# Patient Record
Sex: FEMALE | Race: BLACK OR AFRICAN AMERICAN | NOT HISPANIC OR LATINO | Employment: UNEMPLOYED | URBAN - METROPOLITAN AREA
[De-identification: names, ages, dates, MRNs, and addresses within clinical notes are randomized per-mention and may not be internally consistent; named-entity substitution may affect disease eponyms.]

---

## 2018-12-06 PROBLEM — E10.11 DIABETIC KETOACIDOSIS WITH COMA ASSOCIATED WITH TYPE 1 DIABETES MELLITUS: Status: ACTIVE | Noted: 2018-12-06

## 2018-12-07 PROBLEM — K92.0 HEMATEMESIS: Status: ACTIVE | Noted: 2018-12-07

## 2018-12-07 PROBLEM — Z71.3 ENCOUNTER FOR DIETARY CONSULTATION: Chronic | Status: ACTIVE | Noted: 2018-12-07

## 2018-12-13 PROBLEM — E86.0 DEHYDRATION: Status: ACTIVE | Noted: 2018-12-13

## 2018-12-21 ENCOUNTER — HOSPITAL ENCOUNTER (INPATIENT)
Facility: OTHER | Age: 28
LOS: 3 days | Discharge: HOME | DRG: 639 | End: 2018-12-24
Attending: EMERGENCY MEDICINE | Admitting: HOSPITALIST
Payer: MEDICAID

## 2018-12-21 DIAGNOSIS — E10.10 DIABETIC KETOACIDOSIS WITHOUT COMA ASSOCIATED WITH TYPE 1 DIABETES MELLITUS: ICD-10-CM

## 2018-12-21 DIAGNOSIS — F32.A DEPRESSION, UNSPECIFIED DEPRESSION TYPE: ICD-10-CM

## 2018-12-21 DIAGNOSIS — F99 PSYCHIATRIC DISORDER: ICD-10-CM

## 2018-12-21 DIAGNOSIS — E10.9 TYPE 1 DIABETES MELLITUS WITHOUT COMPLICATION: Primary | ICD-10-CM

## 2018-12-21 PROBLEM — E87.5 HYPERKALEMIA: Status: ACTIVE | Noted: 2018-12-21

## 2018-12-21 LAB
ALLENS TEST: ABNORMAL
ANION GAP SERPL CALC-SCNC: 16 MMOL/L
ANION GAP SERPL CALC-SCNC: 26 MMOL/L
B-HCG UR QL: NEGATIVE
B-OH-BUTYR BLD STRIP-SCNC: 7.8 MMOL/L
BACTERIA #/AREA URNS HPF: NORMAL /HPF
BASOPHILS # BLD AUTO: 0.01 K/UL
BASOPHILS NFR BLD: 0.2 %
BILIRUB UR QL STRIP: NEGATIVE
BUN SERPL-MCNC: 16 MG/DL
BUN SERPL-MCNC: 20 MG/DL
CALCIUM SERPL-MCNC: 8.4 MG/DL
CALCIUM SERPL-MCNC: 9.9 MG/DL
CHLORIDE SERPL-SCNC: 102 MMOL/L
CHLORIDE SERPL-SCNC: 116 MMOL/L
CLARITY UR: CLEAR
CO2 SERPL-SCNC: 11 MMOL/L
CO2 SERPL-SCNC: 12 MMOL/L
COLOR UR: YELLOW
CREAT SERPL-MCNC: 1 MG/DL
CREAT SERPL-MCNC: 1.2 MG/DL
CTP QC/QA: YES
DELSYS: ABNORMAL
DIFFERENTIAL METHOD: ABNORMAL
EOSINOPHIL # BLD AUTO: 0 K/UL
EOSINOPHIL NFR BLD: 0.2 %
ERYTHROCYTE [DISTWIDTH] IN BLOOD BY AUTOMATED COUNT: 15.7 %
EST. GFR  (AFRICAN AMERICAN): >60 ML/MIN/1.73 M^2
EST. GFR  (AFRICAN AMERICAN): >60 ML/MIN/1.73 M^2
EST. GFR  (NON AFRICAN AMERICAN): >60 ML/MIN/1.73 M^2
EST. GFR  (NON AFRICAN AMERICAN): >60 ML/MIN/1.73 M^2
GLUCOSE SERPL-MCNC: 202 MG/DL
GLUCOSE SERPL-MCNC: 433 MG/DL
GLUCOSE UR QL STRIP: ABNORMAL
HCO3 UR-SCNC: 11.9 MMOL/L (ref 24–28)
HCT VFR BLD AUTO: 34.7 %
HGB BLD-MCNC: 10.9 G/DL
HGB UR QL STRIP: NEGATIVE
KETONES UR QL STRIP: ABNORMAL
LEUKOCYTE ESTERASE UR QL STRIP: NEGATIVE
LYMPHOCYTES # BLD AUTO: 1.5 K/UL
LYMPHOCYTES NFR BLD: 27.2 %
MCH RBC QN AUTO: 26.7 PG
MCHC RBC AUTO-ENTMCNC: 31.4 G/DL
MCV RBC AUTO: 85 FL
MICROSCOPIC COMMENT: NORMAL
MONOCYTES # BLD AUTO: 0.3 K/UL
MONOCYTES NFR BLD: 5.6 %
NEUTROPHILS # BLD AUTO: 3.7 K/UL
NEUTROPHILS NFR BLD: 66.6 %
NITRITE UR QL STRIP: NEGATIVE
PCO2 BLDA: 26.3 MMHG (ref 35–45)
PH SMN: 7.26 [PH] (ref 7.35–7.45)
PH UR STRIP: 6 [PH] (ref 5–8)
PLATELET # BLD AUTO: 210 K/UL
PMV BLD AUTO: 12 FL
PO2 BLDA: 73 MMHG (ref 40–60)
POC BE: -15 MMOL/L
POC SATURATED O2: 93 % (ref 95–100)
POCT GLUCOSE: 175 MG/DL (ref 70–110)
POCT GLUCOSE: 178 MG/DL (ref 70–110)
POCT GLUCOSE: 187 MG/DL (ref 70–110)
POCT GLUCOSE: 208 MG/DL (ref 70–110)
POCT GLUCOSE: 263 MG/DL (ref 70–110)
POCT GLUCOSE: 369 MG/DL (ref 70–110)
POCT GLUCOSE: 394 MG/DL (ref 70–110)
POTASSIUM SERPL-SCNC: 5 MMOL/L
POTASSIUM SERPL-SCNC: 6 MMOL/L
PROT UR QL STRIP: NEGATIVE
RBC # BLD AUTO: 4.09 M/UL
SAMPLE: ABNORMAL
SITE: ABNORMAL
SODIUM SERPL-SCNC: 140 MMOL/L
SODIUM SERPL-SCNC: 143 MMOL/L
SP GR UR STRIP: 1.02 (ref 1–1.03)
SQUAMOUS #/AREA URNS HPF: 1 /HPF
URN SPEC COLLECT METH UR: ABNORMAL
UROBILINOGEN UR STRIP-ACNC: NEGATIVE EU/DL
WBC # BLD AUTO: 5.51 K/UL
YEAST URNS QL MICRO: NORMAL

## 2018-12-21 PROCEDURE — 63600175 PHARM REV CODE 636 W HCPCS: Performed by: PHYSICIAN ASSISTANT

## 2018-12-21 PROCEDURE — 82962 GLUCOSE BLOOD TEST: CPT

## 2018-12-21 PROCEDURE — 80048 BASIC METABOLIC PNL TOTAL CA: CPT | Mod: 91

## 2018-12-21 PROCEDURE — 94761 N-INVAS EAR/PLS OXIMETRY MLT: CPT

## 2018-12-21 PROCEDURE — 81025 URINE PREGNANCY TEST: CPT | Performed by: EMERGENCY MEDICINE

## 2018-12-21 PROCEDURE — 25000003 PHARM REV CODE 250: Performed by: NURSE PRACTITIONER

## 2018-12-21 PROCEDURE — 83036 HEMOGLOBIN GLYCOSYLATED A1C: CPT

## 2018-12-21 PROCEDURE — 82010 KETONE BODYS QUAN: CPT

## 2018-12-21 PROCEDURE — 85025 COMPLETE CBC W/AUTO DIFF WBC: CPT

## 2018-12-21 PROCEDURE — 81000 URINALYSIS NONAUTO W/SCOPE: CPT

## 2018-12-21 PROCEDURE — 82803 BLOOD GASES ANY COMBINATION: CPT

## 2018-12-21 PROCEDURE — 99291 CRITICAL CARE FIRST HOUR: CPT | Mod: 25

## 2018-12-21 PROCEDURE — 96365 THER/PROPH/DIAG IV INF INIT: CPT

## 2018-12-21 PROCEDURE — 25000003 PHARM REV CODE 250: Performed by: EMERGENCY MEDICINE

## 2018-12-21 PROCEDURE — 96375 TX/PRO/DX INJ NEW DRUG ADDON: CPT

## 2018-12-21 PROCEDURE — 96361 HYDRATE IV INFUSION ADD-ON: CPT

## 2018-12-21 PROCEDURE — 20000000 HC ICU ROOM

## 2018-12-21 PROCEDURE — 80048 BASIC METABOLIC PNL TOTAL CA: CPT

## 2018-12-21 PROCEDURE — 25000003 PHARM REV CODE 250: Performed by: PHYSICIAN ASSISTANT

## 2018-12-21 PROCEDURE — 36415 COLL VENOUS BLD VENIPUNCTURE: CPT

## 2018-12-21 PROCEDURE — 99223 1ST HOSP IP/OBS HIGH 75: CPT | Mod: ,,, | Performed by: NURSE PRACTITIONER

## 2018-12-21 RX ORDER — SODIUM CHLORIDE 9 MG/ML
1000 INJECTION, SOLUTION INTRAVENOUS
Status: COMPLETED | OUTPATIENT
Start: 2018-12-21 | End: 2018-12-21

## 2018-12-21 RX ORDER — SODIUM CHLORIDE AND POTASSIUM CHLORIDE 150; 900 MG/100ML; MG/100ML
INJECTION, SOLUTION INTRAVENOUS CONTINUOUS
Status: DISCONTINUED | OUTPATIENT
Start: 2018-12-21 | End: 2018-12-21

## 2018-12-21 RX ORDER — FLUOXETINE HYDROCHLORIDE 20 MG/1
20 CAPSULE ORAL DAILY
Status: DISCONTINUED | OUTPATIENT
Start: 2018-12-22 | End: 2018-12-24 | Stop reason: HOSPADM

## 2018-12-21 RX ORDER — DIVALPROEX SODIUM 250 MG/1
250 TABLET, FILM COATED, EXTENDED RELEASE ORAL DAILY
Status: DISCONTINUED | OUTPATIENT
Start: 2018-12-22 | End: 2018-12-24 | Stop reason: HOSPADM

## 2018-12-21 RX ORDER — ACETAMINOPHEN 325 MG/1
650 TABLET ORAL EVERY 4 HOURS PRN
Status: DISCONTINUED | OUTPATIENT
Start: 2018-12-21 | End: 2018-12-24 | Stop reason: HOSPADM

## 2018-12-21 RX ORDER — SODIUM CHLORIDE 9 MG/ML
INJECTION, SOLUTION INTRAVENOUS CONTINUOUS
Status: DISCONTINUED | OUTPATIENT
Start: 2018-12-21 | End: 2018-12-21

## 2018-12-21 RX ORDER — DIVALPROEX SODIUM 500 MG/1
500 TABLET, FILM COATED, EXTENDED RELEASE ORAL DAILY
Status: DISCONTINUED | OUTPATIENT
Start: 2018-12-22 | End: 2018-12-24 | Stop reason: HOSPADM

## 2018-12-21 RX ORDER — DEXTROSE MONOHYDRATE AND SODIUM CHLORIDE 5; .9 G/100ML; G/100ML
INJECTION, SOLUTION INTRAVENOUS CONTINUOUS
Status: DISCONTINUED | OUTPATIENT
Start: 2018-12-21 | End: 2018-12-22

## 2018-12-21 RX ORDER — SODIUM CHLORIDE 0.9 % (FLUSH) 0.9 %
5 SYRINGE (ML) INJECTION
Status: DISCONTINUED | OUTPATIENT
Start: 2018-12-21 | End: 2018-12-24 | Stop reason: HOSPADM

## 2018-12-21 RX ORDER — METOCLOPRAMIDE HYDROCHLORIDE 5 MG/ML
10 INJECTION INTRAMUSCULAR; INTRAVENOUS EVERY 6 HOURS PRN
Status: DISCONTINUED | OUTPATIENT
Start: 2018-12-21 | End: 2018-12-24 | Stop reason: HOSPADM

## 2018-12-21 RX ADMIN — SODIUM CHLORIDE 1000 ML: 0.9 INJECTION, SOLUTION INTRAVENOUS at 04:12

## 2018-12-21 RX ADMIN — SODIUM CHLORIDE 1000 ML: 0.9 INJECTION, SOLUTION INTRAVENOUS at 07:12

## 2018-12-21 RX ADMIN — PROMETHAZINE HYDROCHLORIDE 25 MG: 25 INJECTION INTRAMUSCULAR; INTRAVENOUS at 04:12

## 2018-12-21 RX ADMIN — SODIUM CHLORIDE 1000 ML: 0.9 INJECTION, SOLUTION INTRAVENOUS at 06:12

## 2018-12-21 RX ADMIN — DEXTROSE AND SODIUM CHLORIDE: 5; .9 INJECTION, SOLUTION INTRAVENOUS at 09:12

## 2018-12-21 RX ADMIN — SODIUM CHLORIDE 5 UNITS/HR: 9 INJECTION, SOLUTION INTRAVENOUS at 06:12
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
## 2018-12-22 LAB
ALBUMIN SERPL BCP-MCNC: 2.6 G/DL
ALP SERPL-CCNC: 66 U/L
ALT SERPL W/O P-5'-P-CCNC: 12 U/L
ANION GAP SERPL CALC-SCNC: 11 MMOL/L
ANION GAP SERPL CALC-SCNC: 11 MMOL/L
ANION GAP SERPL CALC-SCNC: 7 MMOL/L
AST SERPL-CCNC: 12 U/L
BASOPHILS # BLD AUTO: 0.03 K/UL
BASOPHILS NFR BLD: 0.6 %
BILIRUB SERPL-MCNC: 0.4 MG/DL
BUN SERPL-MCNC: 10 MG/DL
BUN SERPL-MCNC: 12 MG/DL
BUN SERPL-MCNC: 12 MG/DL
BUN SERPL-MCNC: 14 MG/DL
BUN SERPL-MCNC: 14 MG/DL
CALCIUM SERPL-MCNC: 7.8 MG/DL
CALCIUM SERPL-MCNC: 7.8 MG/DL
CALCIUM SERPL-MCNC: 8.1 MG/DL
CALCIUM SERPL-MCNC: 8.1 MG/DL
CALCIUM SERPL-MCNC: 8.3 MG/DL
CHLORIDE SERPL-SCNC: 107 MMOL/L
CHLORIDE SERPL-SCNC: 113 MMOL/L
CHLORIDE SERPL-SCNC: 114 MMOL/L
CHLORIDE SERPL-SCNC: 114 MMOL/L
CHLORIDE SERPL-SCNC: 115 MMOL/L
CO2 SERPL-SCNC: 16 MMOL/L
CO2 SERPL-SCNC: 19 MMOL/L
CO2 SERPL-SCNC: 20 MMOL/L
CO2 SERPL-SCNC: 20 MMOL/L
CO2 SERPL-SCNC: 22 MMOL/L
CREAT SERPL-MCNC: 0.8 MG/DL
CREAT SERPL-MCNC: 0.9 MG/DL
CREAT SERPL-MCNC: 0.9 MG/DL
CREAT SERPL-MCNC: 1 MG/DL
CREAT SERPL-MCNC: 1 MG/DL
DIFFERENTIAL METHOD: ABNORMAL
EOSINOPHIL # BLD AUTO: 0.2 K/UL
EOSINOPHIL NFR BLD: 3.6 %
ERYTHROCYTE [DISTWIDTH] IN BLOOD BY AUTOMATED COUNT: 15.4 %
EST. GFR  (AFRICAN AMERICAN): >60 ML/MIN/1.73 M^2
EST. GFR  (NON AFRICAN AMERICAN): >60 ML/MIN/1.73 M^2
ESTIMATED AVG GLUCOSE: ABNORMAL MG/DL
GLUCOSE SERPL-MCNC: 184 MG/DL
GLUCOSE SERPL-MCNC: 192 MG/DL
GLUCOSE SERPL-MCNC: 192 MG/DL
GLUCOSE SERPL-MCNC: 481 MG/DL
GLUCOSE SERPL-MCNC: 73 MG/DL
HBA1C MFR BLD HPLC: >14 %
HCT VFR BLD AUTO: 27.6 %
HGB BLD-MCNC: 9 G/DL
LYMPHOCYTES # BLD AUTO: 2.9 K/UL
LYMPHOCYTES NFR BLD: 54.6 %
MAGNESIUM SERPL-MCNC: 2 MG/DL
MCH RBC QN AUTO: 26.8 PG
MCHC RBC AUTO-ENTMCNC: 32.6 G/DL
MCV RBC AUTO: 82 FL
MONOCYTES # BLD AUTO: 0.5 K/UL
MONOCYTES NFR BLD: 9.1 %
NEUTROPHILS # BLD AUTO: 1.6 K/UL
NEUTROPHILS NFR BLD: 32.1 %
PHOSPHATE SERPL-MCNC: 2.8 MG/DL
PLATELET # BLD AUTO: ABNORMAL K/UL
PLATELET BLD QL SMEAR: ABNORMAL
PMV BLD AUTO: ABNORMAL FL
POCT GLUCOSE: 113 MG/DL (ref 70–110)
POCT GLUCOSE: 115 MG/DL (ref 70–110)
POCT GLUCOSE: 131 MG/DL (ref 70–110)
POCT GLUCOSE: 157 MG/DL (ref 70–110)
POCT GLUCOSE: 188 MG/DL (ref 70–110)
POCT GLUCOSE: 189 MG/DL (ref 70–110)
POCT GLUCOSE: 190 MG/DL (ref 70–110)
POCT GLUCOSE: 209 MG/DL (ref 70–110)
POCT GLUCOSE: 241 MG/DL (ref 70–110)
POCT GLUCOSE: 337 MG/DL (ref 70–110)
POCT GLUCOSE: 431 MG/DL (ref 70–110)
POCT GLUCOSE: 75 MG/DL (ref 70–110)
POTASSIUM SERPL-SCNC: 3.6 MMOL/L
POTASSIUM SERPL-SCNC: 4.6 MMOL/L
POTASSIUM SERPL-SCNC: 4.6 MMOL/L
POTASSIUM SERPL-SCNC: 4.9 MMOL/L
POTASSIUM SERPL-SCNC: 5.3 MMOL/L
PROT SERPL-MCNC: 5.1 G/DL
RBC # BLD AUTO: 3.36 M/UL
SODIUM SERPL-SCNC: 137 MMOL/L
SODIUM SERPL-SCNC: 141 MMOL/L
SODIUM SERPL-SCNC: 141 MMOL/L
SODIUM SERPL-SCNC: 142 MMOL/L
SODIUM SERPL-SCNC: 142 MMOL/L
WBC # BLD AUTO: 5.26 K/UL

## 2018-12-22 PROCEDURE — 36415 COLL VENOUS BLD VENIPUNCTURE: CPT

## 2018-12-22 PROCEDURE — 80048 BASIC METABOLIC PNL TOTAL CA: CPT

## 2018-12-22 PROCEDURE — 80048 BASIC METABOLIC PNL TOTAL CA: CPT | Mod: 91

## 2018-12-22 PROCEDURE — 20000000 HC ICU ROOM

## 2018-12-22 PROCEDURE — 25000003 PHARM REV CODE 250: Performed by: HOSPITALIST

## 2018-12-22 PROCEDURE — 83735 ASSAY OF MAGNESIUM: CPT

## 2018-12-22 PROCEDURE — 94761 N-INVAS EAR/PLS OXIMETRY MLT: CPT

## 2018-12-22 PROCEDURE — 84100 ASSAY OF PHOSPHORUS: CPT

## 2018-12-22 PROCEDURE — 99232 SBSQ HOSP IP/OBS MODERATE 35: CPT | Mod: ,,, | Performed by: HOSPITALIST

## 2018-12-22 PROCEDURE — S5571 INSULIN DISPOS PEN 3 ML: HCPCS | Performed by: HOSPITALIST

## 2018-12-22 PROCEDURE — 85025 COMPLETE CBC W/AUTO DIFF WBC: CPT

## 2018-12-22 PROCEDURE — 25000003 PHARM REV CODE 250: Performed by: NURSE PRACTITIONER

## 2018-12-22 PROCEDURE — 63600175 PHARM REV CODE 636 W HCPCS: Performed by: HOSPITALIST

## 2018-12-22 PROCEDURE — 80053 COMPREHEN METABOLIC PANEL: CPT

## 2018-12-22 RX ORDER — IBUPROFEN 200 MG
16 TABLET ORAL
Status: DISCONTINUED | OUTPATIENT
Start: 2018-12-22 | End: 2018-12-22

## 2018-12-22 RX ORDER — INSULIN ASPART 100 [IU]/ML
5 INJECTION, SOLUTION INTRAVENOUS; SUBCUTANEOUS
Status: DISCONTINUED | OUTPATIENT
Start: 2018-12-22 | End: 2018-12-24 | Stop reason: HOSPADM

## 2018-12-22 RX ORDER — GLUCAGON 1 MG
1 KIT INJECTION
Status: DISCONTINUED | OUTPATIENT
Start: 2018-12-22 | End: 2018-12-22

## 2018-12-22 RX ORDER — SODIUM CHLORIDE 9 MG/ML
INJECTION, SOLUTION INTRAVENOUS CONTINUOUS
Status: DISCONTINUED | OUTPATIENT
Start: 2018-12-22 | End: 2018-12-24 | Stop reason: HOSPADM

## 2018-12-22 RX ORDER — INSULIN ASPART 100 [IU]/ML
3 INJECTION, SOLUTION INTRAVENOUS; SUBCUTANEOUS
Status: DISCONTINUED | OUTPATIENT
Start: 2018-12-22 | End: 2018-12-22

## 2018-12-22 RX ORDER — INSULIN ASPART 100 [IU]/ML
0-5 INJECTION, SOLUTION INTRAVENOUS; SUBCUTANEOUS
Status: DISCONTINUED | OUTPATIENT
Start: 2018-12-22 | End: 2018-12-24 | Stop reason: HOSPADM

## 2018-12-22 RX ORDER — IBUPROFEN 200 MG
24 TABLET ORAL
Status: DISCONTINUED | OUTPATIENT
Start: 2018-12-22 | End: 2018-12-22

## 2018-12-22 RX ADMIN — INSULIN ASPART 3 UNITS: 100 INJECTION, SOLUTION INTRAVENOUS; SUBCUTANEOUS at 12:12

## 2018-12-22 RX ADMIN — DIVALPROEX SODIUM 250 MG: 250 TABLET, EXTENDED RELEASE ORAL at 09:12

## 2018-12-22 RX ADMIN — SODIUM CHLORIDE: 0.9 INJECTION, SOLUTION INTRAVENOUS at 08:12

## 2018-12-22 RX ADMIN — INSULIN DETEMIR 5 UNITS: 100 INJECTION, SOLUTION SUBCUTANEOUS at 12:12

## 2018-12-22 RX ADMIN — INSULIN ASPART 6 UNITS: 100 INJECTION, SOLUTION INTRAVENOUS; SUBCUTANEOUS at 10:12

## 2018-12-22 RX ADMIN — SODIUM CHLORIDE: 0.9 INJECTION, SOLUTION INTRAVENOUS at 10:12

## 2018-12-22 RX ADMIN — INSULIN ASPART 3 UNITS: 100 INJECTION, SOLUTION INTRAVENOUS; SUBCUTANEOUS at 04:12

## 2018-12-22 RX ADMIN — FLUOXETINE 20 MG: 20 CAPSULE ORAL at 09:12

## 2018-12-22 RX ADMIN — DIVALPROEX SODIUM 500 MG: 500 TABLET, FILM COATED, EXTENDED RELEASE ORAL at 09:12

## 2018-12-22 RX ADMIN — INSULIN ASPART 4 UNITS: 100 INJECTION, SOLUTION INTRAVENOUS; SUBCUTANEOUS at 05:12

## 2018-12-22 RX ADMIN — QUETIAPINE FUMARATE 300 MG: 200 TABLET, EXTENDED RELEASE ORAL at 09:12
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
## 2018-12-22 NOTE — ED NOTES
Pt hourly rounding complete. Pt resting in stretcher AAOx4, RR even and unlabored, NAD noted. Pt up to date on POC and has no questions, concerns, or needs at this time. Pt remains on continuous bp, cardiac, and pulse ox monitors. Bed locked and in lowest position, side rails up x2, and call light within reach.
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
## 2018-12-22 NOTE — HOSPITAL COURSE
"The patient initially had an anion gap of 16 and she was placed on an insulin drip for DKA.  Her gap closed quickly and the patient was started on a diet which she tolerated.  HBA1c >14 and attempted to discuss treatment plan with the patient, but she is focused only on external factors (i.e. Disagreements with family and uncertainty of living situation upon discharge).  She stated that she feels that she could "hurt someone" when she is discharged.  Telepsychiatry will be consulted to evaluate the patient and did not feel she needed inpatient psychiatric evaluation.  The patient was given medications (insulin and depakote) prior to discharge and information regarding women's shelters in the community.  She is instructed to follow up with her PCP within a week if she is able.  "

## 2018-12-22 NOTE — SUBJECTIVE & OBJECTIVE
Interval History: The patient has no complaints today    Review of Systems   All other systems reviewed and are negative.    Objective:     Vital Signs (Most Recent):  Temp: 98.6 °F (37 °C) (12/22/18 1455)  Pulse: 100 (12/22/18 1455)  Resp: 14 (12/22/18 1255)  BP: 121/73 (12/22/18 1455)  SpO2: 100 % (12/22/18 1455) Vital Signs (24h Range):  Temp:  [97.8 °F (36.6 °C)-99.1 °F (37.3 °C)] 98.6 °F (37 °C)  Pulse:  [] 100  Resp:  [14-20] 14  SpO2:  [99 %-100 %] 100 %  BP: ()/(54-85) 121/73     Weight: 53.7 kg (118 lb 6.2 oz)  Body mass index is 18.54 kg/m².    Intake/Output Summary (Last 24 hours) at 12/22/2018 1553  Last data filed at 12/22/2018 1310  Gross per 24 hour   Intake 7746.22 ml   Output 1975 ml   Net 5771.22 ml      Physical Exam   Constitutional: She is oriented to person, place, and time. She appears well-developed and well-nourished.   Disheveled female in NAD, cooperative with exam   HENT:   Head: Normocephalic.   Mouth/Throat: Oropharynx is clear and moist.   Missing teeth   Eyes: Conjunctivae and EOM are normal. Pupils are equal, round, and reactive to light. Right eye exhibits no discharge. Left eye exhibits no discharge.   Neck: Normal range of motion. Neck supple.   Cardiovascular: Regular rhythm, normal heart sounds and intact distal pulses. Tachycardia present.   Pulses:       Radial pulses are 2+ on the right side, and 2+ on the left side.        Dorsalis pedis pulses are 1+ on the right side, and 1+ on the left side.   Pulmonary/Chest: Effort normal and breath sounds normal. No respiratory distress.   Abdominal: Soft. She exhibits no distension. Bowel sounds are decreased. There is generalized tenderness.   Musculoskeletal: Normal range of motion.   Neurological: She is alert and oriented to person, place, and time.   Skin: Skin is warm and dry.   Psychiatric: She has a normal mood and affect. Her behavior is normal. Thought content normal.       Significant Labs:   CBC:   Recent Labs    Lab 12/21/18  1616 12/22/18  0443   WBC 5.51 5.26   HGB 10.9* 9.0*   HCT 34.7* 27.6*    SEE COMMENT     CMP:   Recent Labs   Lab 12/22/18  0033 12/22/18  0443 12/22/18  0824    141  141 142   K 4.9 4.6  4.6 3.6   * 114*  114* 113*   CO2 16* 20*  20* 22*   * 192*  192* 73   BUN 14 12  12 10   CREATININE 1.0 0.9  0.9 0.8   CALCIUM 8.1* 7.8*  7.8* 8.1*   PROT  --  5.1*  --    ALBUMIN  --  2.6*  --    BILITOT  --  0.4  --    ALKPHOS  --  66  --    AST  --  12  --    ALT  --  12  --    ANIONGAP 11 7*  7* 7*   EGFRNONAA >60 >60  >60 >60     Magnesium:   Recent Labs   Lab 12/22/18  0443   MG 2.0

## 2018-12-22 NOTE — NURSING
Rec'd pt at 1600.  Pt in NAD, VSS.  No sliding scale insulin does noted, MD notified and new orders acknowledged.  Pt resting comfortably with call light in reach.  Will continue to monitor.

## 2018-12-22 NOTE — ASSESSMENT & PLAN NOTE
BG- 433, anion gap 26, beta hydroxybutyrate- 7.8    Insulin drip  3L bolus in ER  D5NS at 150/hr  Sugar free diet

## 2018-12-23 PROBLEM — E87.5 HYPERKALEMIA: Status: RESOLVED | Noted: 2018-12-21 | Resolved: 2018-12-23

## 2018-12-23 PROBLEM — E10.9 TYPE 1 DIABETES MELLITUS WITHOUT COMPLICATION: Status: RESOLVED | Noted: 2018-12-23 | Resolved: 2018-12-23

## 2018-12-23 PROBLEM — E10.10 DIABETIC KETOACIDOSIS WITHOUT COMA ASSOCIATED WITH TYPE 1 DIABETES MELLITUS: Status: RESOLVED | Noted: 2018-12-21 | Resolved: 2018-12-23

## 2018-12-23 PROBLEM — F99 PSYCHIATRIC DISORDER: Status: ACTIVE | Noted: 2018-12-23

## 2018-12-23 PROBLEM — E10.9 TYPE 1 DIABETES MELLITUS WITHOUT COMPLICATION: Status: ACTIVE | Noted: 2018-12-23

## 2018-12-23 PROBLEM — E10.10 DIABETIC KETOACIDOSIS WITHOUT COMA ASSOCIATED WITH TYPE 1 DIABETES MELLITUS: Status: ACTIVE | Noted: 2018-12-23

## 2018-12-23 LAB
ALBUMIN SERPL BCP-MCNC: 2.3 G/DL
ALP SERPL-CCNC: 71 U/L
ALT SERPL W/O P-5'-P-CCNC: 10 U/L
ANION GAP SERPL CALC-SCNC: 9 MMOL/L
AST SERPL-CCNC: 13 U/L
BASOPHILS # BLD AUTO: 0.06 K/UL
BASOPHILS NFR BLD: 1.1 %
BILIRUB SERPL-MCNC: 0.3 MG/DL
BUN SERPL-MCNC: 12 MG/DL
CALCIUM SERPL-MCNC: 8.7 MG/DL
CHLORIDE SERPL-SCNC: 111 MMOL/L
CO2 SERPL-SCNC: 21 MMOL/L
CREAT SERPL-MCNC: 0.8 MG/DL
DIFFERENTIAL METHOD: ABNORMAL
EOSINOPHIL # BLD AUTO: 0.3 K/UL
EOSINOPHIL NFR BLD: 5.7 %
ERYTHROCYTE [DISTWIDTH] IN BLOOD BY AUTOMATED COUNT: 15.2 %
EST. GFR  (AFRICAN AMERICAN): >60 ML/MIN/1.73 M^2
EST. GFR  (NON AFRICAN AMERICAN): >60 ML/MIN/1.73 M^2
GLUCOSE SERPL-MCNC: 216 MG/DL
HCT VFR BLD AUTO: 29.7 %
HGB BLD-MCNC: 9.7 G/DL
LYMPHOCYTES # BLD AUTO: 3.2 K/UL
LYMPHOCYTES NFR BLD: 56.4 %
MAGNESIUM SERPL-MCNC: 1.9 MG/DL
MCH RBC QN AUTO: 26.8 PG
MCHC RBC AUTO-ENTMCNC: 32.7 G/DL
MCV RBC AUTO: 82 FL
MONOCYTES # BLD AUTO: 0.5 K/UL
MONOCYTES NFR BLD: 9.5 %
NEUTROPHILS # BLD AUTO: 1.5 K/UL
NEUTROPHILS NFR BLD: 27.3 %
PHOSPHATE SERPL-MCNC: 3.2 MG/DL
PLATELET # BLD AUTO: 179 K/UL
PMV BLD AUTO: ABNORMAL FL
POCT GLUCOSE: 106 MG/DL (ref 70–110)
POCT GLUCOSE: 141 MG/DL (ref 70–110)
POCT GLUCOSE: 144 MG/DL (ref 70–110)
POCT GLUCOSE: 174 MG/DL (ref 70–110)
POCT GLUCOSE: 246 MG/DL (ref 70–110)
POTASSIUM SERPL-SCNC: 4.2 MMOL/L
PROT SERPL-MCNC: 5 G/DL
RBC # BLD AUTO: 3.62 M/UL
SODIUM SERPL-SCNC: 141 MMOL/L
WBC # BLD AUTO: 5.59 K/UL

## 2018-12-23 PROCEDURE — G0425 INPT/ED TELECONSULT30: HCPCS | Mod: GT,AF,HB, | Performed by: PSYCHIATRY & NEUROLOGY

## 2018-12-23 PROCEDURE — 94761 N-INVAS EAR/PLS OXIMETRY MLT: CPT

## 2018-12-23 PROCEDURE — 36415 COLL VENOUS BLD VENIPUNCTURE: CPT

## 2018-12-23 PROCEDURE — 84100 ASSAY OF PHOSPHORUS: CPT

## 2018-12-23 PROCEDURE — 99233 SBSQ HOSP IP/OBS HIGH 50: CPT | Mod: ,,, | Performed by: HOSPITALIST

## 2018-12-23 PROCEDURE — 11000001 HC ACUTE MED/SURG PRIVATE ROOM

## 2018-12-23 PROCEDURE — 63600175 PHARM REV CODE 636 W HCPCS: Performed by: HOSPITALIST

## 2018-12-23 PROCEDURE — 80053 COMPREHEN METABOLIC PANEL: CPT

## 2018-12-23 PROCEDURE — 25000003 PHARM REV CODE 250: Performed by: NURSE PRACTITIONER

## 2018-12-23 PROCEDURE — 83735 ASSAY OF MAGNESIUM: CPT

## 2018-12-23 PROCEDURE — 85025 COMPLETE CBC W/AUTO DIFF WBC: CPT

## 2018-12-23 RX ADMIN — INSULIN ASPART 5 UNITS: 100 INJECTION, SOLUTION INTRAVENOUS; SUBCUTANEOUS at 08:12

## 2018-12-23 RX ADMIN — QUETIAPINE FUMARATE 300 MG: 200 TABLET, EXTENDED RELEASE ORAL at 08:12

## 2018-12-23 RX ADMIN — INSULIN ASPART 5 UNITS: 100 INJECTION, SOLUTION INTRAVENOUS; SUBCUTANEOUS at 11:12

## 2018-12-23 RX ADMIN — ACETAMINOPHEN 650 MG: 325 TABLET ORAL at 06:12

## 2018-12-23 RX ADMIN — DIVALPROEX SODIUM 500 MG: 500 TABLET, FILM COATED, EXTENDED RELEASE ORAL at 08:12

## 2018-12-23 RX ADMIN — INSULIN ASPART 5 UNITS: 100 INJECTION, SOLUTION INTRAVENOUS; SUBCUTANEOUS at 04:12

## 2018-12-23 RX ADMIN — DIVALPROEX SODIUM 250 MG: 250 TABLET, EXTENDED RELEASE ORAL at 08:12

## 2018-12-23 RX ADMIN — INSULIN ASPART 1 UNITS: 100 INJECTION, SOLUTION INTRAVENOUS; SUBCUTANEOUS at 09:12

## 2018-12-23 RX ADMIN — FLUOXETINE 20 MG: 20 CAPSULE ORAL at 08:12

## 2018-12-23 NOTE — ASSESSMENT & PLAN NOTE
The patient has labile blood glucose  Levels are good this am, will continue current insulin regimen for now

## 2018-12-23 NOTE — NURSING
Patient afebrile, VSS. Denies any nausea or vomiting. Blood glucose is currently 144. Adequate urine output overnight. All safety precautions maintained.

## 2018-12-23 NOTE — PLAN OF CARE
12/22/18 1800   Discharge Assessment   Assessment Type Discharge Planning Assessment   Confirmed/corrected address and phone number on facesheet? Yes   Assessment information obtained from? Patient;Caregiver;Medical Record   Communicated expected length of stay with patient/caregiver yes   Prior to hospitilization cognitive status: Alert/Oriented   Prior to hospitalization functional status: Independent   Current cognitive status: Alert/Oriented   Current Functional Status: Assistive Equipment   Lives With other (see comments)   Able to Return to Prior Arrangements yes   Is patient able to care for self after discharge? Yes   Equipment Currently Used at Home glucometer   Do you have any problems affording any of your prescribed medications? No   Is the patient taking medications as prescribed? yes   Does the patient have transportation home? Yes   Transportation Anticipated public transportation   Discharge Plan A Shelter   Patient/Family in Agreement with Plan yes

## 2018-12-23 NOTE — PLAN OF CARE
"Discharge Planning:  Patient admitted on 12-21-18  LOS-day 1    Chart reviewed, Care plan discussed  Discussed care plan with treatment team,  attending Dr Chandra    Current dispo: home soon  Reports she is "homeless" and occasionally with her "godfather"   Shelter seems to be the location at discharge this admission.  I hope she will outreach to other supportive family members while she is a patient here.      Case management  to follow  "
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
## 2018-12-23 NOTE — ASSESSMENT & PLAN NOTE
Obvious psych disorder, but formal diagnosis is unknown  Will consult telepsych to evaluate as the patient expresses that she wants to harm others

## 2018-12-23 NOTE — PLAN OF CARE
Problem: Adult Inpatient Plan of Care  Goal: Plan of Care Review  Outcome: Ongoing (interventions implemented as appropriate)  Care plan reviewed with patient . VSS on RA. No S/S of pain or discomfort. Patient became alittle agitated when speaking to psychiatrst today but calmed down after I spoke to her. Blood sugar 174. IV CDI . Call light within reach. Bed wheels locked and in lowest position. Side rails up X3. Will continue to monitor patient.

## 2018-12-23 NOTE — PROGRESS NOTES
Report given to ALEXANDRA Rock. Notified new nurse of tele psych consult, consult is already called in. Pranav MORRIS is aware that psych MD still needs to evaluate pt via tele consult monitor.

## 2018-12-23 NOTE — PROGRESS NOTES
Notified the regional referral center to let them know pt is now in room 304 so the psych MD can eval pt.

## 2018-12-23 NOTE — SUBJECTIVE & OBJECTIVE
Interval History: The patient is difficult to re-direct in conversation and focused on anger and frustration she feels with family members.    Review of Systems   Psychiatric/Behavioral: Positive for agitation and behavioral problems.     Objective:     Vital Signs (Most Recent):  Temp: 98.2 °F (36.8 °C) (12/23/18 0715)  Pulse: 86 (12/23/18 0900)  Resp: 19 (12/23/18 0900)  BP: 116/86 (12/23/18 0900)  SpO2: 99 % (12/23/18 0900) Vital Signs (24h Range):  Temp:  [97.6 °F (36.4 °C)-98.7 °F (37.1 °C)] 98.2 °F (36.8 °C)  Pulse:  [] 86  Resp:  [12-22] 19  SpO2:  [98 %-100 %] 99 %  BP: ()/(56-88) 116/86     Weight: 54.2 kg (119 lb 7.8 oz)  Body mass index is 18.71 kg/m².    Intake/Output Summary (Last 24 hours) at 12/23/2018 0959  Last data filed at 12/23/2018 0600  Gross per 24 hour   Intake 3511.91 ml   Output 4650 ml   Net -1138.09 ml      Physical Exam   Constitutional: She is oriented to person, place, and time. She appears well-developed and well-nourished.   Disheveled female in NAD, cooperative with exam   HENT:   Head: Normocephalic.   Mouth/Throat: Oropharynx is clear and moist.   Missing teeth   Eyes: Conjunctivae and EOM are normal. Pupils are equal, round, and reactive to light. Right eye exhibits no discharge. Left eye exhibits no discharge.   Neck: Normal range of motion. Neck supple.   Cardiovascular: Regular rhythm, normal heart sounds and intact distal pulses. Tachycardia present.   Pulses:       Radial pulses are 2+ on the right side, and 2+ on the left side.        Dorsalis pedis pulses are 1+ on the right side, and 1+ on the left side.   Pulmonary/Chest: Effort normal and breath sounds normal. No respiratory distress.   Abdominal: Soft. She exhibits no distension. Bowel sounds are decreased.   Musculoskeletal: Normal range of motion.   Neurological: She is alert and oriented to person, place, and time.   Skin: Skin is warm and dry.   Psychiatric: She has a normal mood and affect. Her  behavior is normal. Thought content normal.       Significant Labs:   CBC:   Recent Labs   Lab 12/21/18  1616 12/22/18  0443 12/23/18  0325   WBC 5.51 5.26 5.59   HGB 10.9* 9.0* 9.7*   HCT 34.7* 27.6* 29.7*    SEE COMMENT 179     CMP:   Recent Labs   Lab 12/22/18  0443 12/22/18  0824 12/22/18  2151 12/23/18  0325     141 142 137 141   K 4.6  4.6 3.6 5.3* 4.2   *  114* 113* 107 111*   CO2 20*  20* 22* 19* 21*   *  192* 73 481* 216*   BUN 12  12 10 14 12   CREATININE 0.9  0.9 0.8 1.0 0.8   CALCIUM 7.8*  7.8* 8.1* 8.3* 8.7   PROT 5.1*  --   --  5.0*   ALBUMIN 2.6*  --   --  2.3*   BILITOT 0.4  --   --  0.3   ALKPHOS 66  --   --  71   AST 12  --   --  13   ALT 12  --   --  10   ANIONGAP 7*  7* 7* 11 9   EGFRNONAA >60  >60 >60 >60 >60     Magnesium:   Recent Labs   Lab 12/22/18  0443 12/23/18  0325   MG 2.0 1.9

## 2018-12-23 NOTE — NURSING TRANSFER
Nursing Transfer Note      12/23/2018     Transfer To: Rm 304    Transfer via wheelchair    Transfer with pt belongings    Transported by Aditi Delong RN    Medicines sent: Yes    Chart send with patient: Yes    Notified: pt notified aunt    Patient reassessed at: 1230 12/23/18    Upon arrival to floor: patient oriented to room, call bell in reach and bed in lowest position

## 2018-12-24 VITALS
SYSTOLIC BLOOD PRESSURE: 128 MMHG | RESPIRATION RATE: 16 BRPM | DIASTOLIC BLOOD PRESSURE: 85 MMHG | HEART RATE: 84 BPM | WEIGHT: 119.5 LBS | TEMPERATURE: 98 F | OXYGEN SATURATION: 99 % | BODY MASS INDEX: 18.75 KG/M2 | HEIGHT: 67 IN

## 2018-12-24 PROBLEM — E10.10 DIABETIC KETOACIDOSIS WITHOUT COMA ASSOCIATED WITH TYPE 1 DIABETES MELLITUS: Status: RESOLVED | Noted: 2018-12-23 | Resolved: 2018-12-24

## 2018-12-24 LAB
ALBUMIN SERPL BCP-MCNC: 2.4 G/DL
ALP SERPL-CCNC: 71 U/L
ALT SERPL W/O P-5'-P-CCNC: 9 U/L
ANION GAP SERPL CALC-SCNC: 8 MMOL/L
AST SERPL-CCNC: 7 U/L
BASOPHILS # BLD AUTO: 0.02 K/UL
BASOPHILS NFR BLD: 0.5 %
BILIRUB SERPL-MCNC: 0.1 MG/DL
BUN SERPL-MCNC: 15 MG/DL
CALCIUM SERPL-MCNC: 8.7 MG/DL
CHLORIDE SERPL-SCNC: 109 MMOL/L
CO2 SERPL-SCNC: 26 MMOL/L
CREAT SERPL-MCNC: 0.7 MG/DL
DIFFERENTIAL METHOD: ABNORMAL
EOSINOPHIL # BLD AUTO: 0.2 K/UL
EOSINOPHIL NFR BLD: 5.7 %
ERYTHROCYTE [DISTWIDTH] IN BLOOD BY AUTOMATED COUNT: 15.5 %
EST. GFR  (AFRICAN AMERICAN): >60 ML/MIN/1.73 M^2
EST. GFR  (NON AFRICAN AMERICAN): >60 ML/MIN/1.73 M^2
GLUCOSE SERPL-MCNC: 195 MG/DL
HCT VFR BLD AUTO: 31.9 %
HGB BLD-MCNC: 10.3 G/DL
LYMPHOCYTES # BLD AUTO: 2.3 K/UL
LYMPHOCYTES NFR BLD: 62.4 %
MAGNESIUM SERPL-MCNC: 1.7 MG/DL
MCH RBC QN AUTO: 27 PG
MCHC RBC AUTO-ENTMCNC: 32.3 G/DL
MCV RBC AUTO: 84 FL
MONOCYTES # BLD AUTO: 0.4 K/UL
MONOCYTES NFR BLD: 11.2 %
NEUTROPHILS # BLD AUTO: 0.7 K/UL
NEUTROPHILS NFR BLD: 19.9 %
PHOSPHATE SERPL-MCNC: 4.3 MG/DL
PLATELET # BLD AUTO: 287 K/UL
PLATELET BLD QL SMEAR: ABNORMAL
PMV BLD AUTO: 10.5 FL
POCT GLUCOSE: 154 MG/DL (ref 70–110)
POCT GLUCOSE: 174 MG/DL (ref 70–110)
POTASSIUM SERPL-SCNC: 4 MMOL/L
PROT SERPL-MCNC: 4.9 G/DL
RBC # BLD AUTO: 3.82 M/UL
SODIUM SERPL-SCNC: 143 MMOL/L
WBC # BLD AUTO: 3.67 K/UL

## 2018-12-24 PROCEDURE — 83735 ASSAY OF MAGNESIUM: CPT

## 2018-12-24 PROCEDURE — 94761 N-INVAS EAR/PLS OXIMETRY MLT: CPT

## 2018-12-24 PROCEDURE — 80053 COMPREHEN METABOLIC PANEL: CPT

## 2018-12-24 PROCEDURE — 99238 HOSP IP/OBS DSCHRG MGMT 30/<: CPT | Mod: ,,, | Performed by: HOSPITALIST

## 2018-12-24 PROCEDURE — 25000003 PHARM REV CODE 250: Performed by: NURSE PRACTITIONER

## 2018-12-24 PROCEDURE — 85025 COMPLETE CBC W/AUTO DIFF WBC: CPT

## 2018-12-24 PROCEDURE — 36415 COLL VENOUS BLD VENIPUNCTURE: CPT

## 2018-12-24 PROCEDURE — 84100 ASSAY OF PHOSPHORUS: CPT

## 2018-12-24 RX ORDER — INSULIN ASPART 100 [IU]/ML
5 INJECTION, SOLUTION INTRAVENOUS; SUBCUTANEOUS
Qty: 10 ML | Refills: 3 | Status: SHIPPED | OUTPATIENT
Start: 2018-12-24 | End: 2018-12-24 | Stop reason: HOSPADM

## 2018-12-24 RX ORDER — INSULIN LISPRO 100 [IU]/ML
5 INJECTION, SOLUTION INTRAVENOUS; SUBCUTANEOUS
Qty: 10 ML | Refills: 3 | Status: ON HOLD | OUTPATIENT
Start: 2018-12-24 | End: 2020-01-30 | Stop reason: HOSPADM

## 2018-12-24 RX ORDER — INSULIN GLARGINE 100 [IU]/ML
20 INJECTION, SOLUTION SUBCUTANEOUS NIGHTLY
Qty: 15 ML | Refills: 3 | Status: SHIPPED | OUTPATIENT
Start: 2018-12-24 | End: 2020-03-26

## 2018-12-24 RX ORDER — DIVALPROEX SODIUM 500 MG/1
500 TABLET, FILM COATED, EXTENDED RELEASE ORAL DAILY
Qty: 30 TABLET | Refills: 3 | Status: ON HOLD | OUTPATIENT
Start: 2018-12-24 | End: 2019-06-14 | Stop reason: CLARIF

## 2018-12-24 RX ORDER — INSULIN GLARGINE 100 [IU]/ML
20 INJECTION, SOLUTION SUBCUTANEOUS NIGHTLY
Qty: 10 ML | Refills: 3 | Status: SHIPPED | OUTPATIENT
Start: 2018-12-24 | End: 2018-12-24 | Stop reason: HOSPADM

## 2018-12-24 RX ADMIN — INSULIN ASPART 5 UNITS: 100 INJECTION, SOLUTION INTRAVENOUS; SUBCUTANEOUS at 08:12

## 2018-12-24 RX ADMIN — QUETIAPINE FUMARATE 300 MG: 200 TABLET, EXTENDED RELEASE ORAL at 08:12

## 2018-12-24 RX ADMIN — INSULIN ASPART 2 UNITS: 100 INJECTION, SOLUTION INTRAVENOUS; SUBCUTANEOUS at 08:12

## 2018-12-24 RX ADMIN — DIVALPROEX SODIUM 250 MG: 250 TABLET, EXTENDED RELEASE ORAL at 08:12

## 2018-12-24 RX ADMIN — DIVALPROEX SODIUM 500 MG: 500 TABLET, FILM COATED, EXTENDED RELEASE ORAL at 08:12

## 2018-12-24 RX ADMIN — FLUOXETINE 20 MG: 20 CAPSULE ORAL at 08:12

## 2018-12-24 RX ADMIN — INSULIN ASPART 5 UNITS: 100 INJECTION, SOLUTION INTRAVENOUS; SUBCUTANEOUS at 12:12

## 2018-12-24 RX ADMIN — INSULIN ASPART 2 UNITS: 100 INJECTION, SOLUTION INTRAVENOUS; SUBCUTANEOUS at 12:12

## 2018-12-24 NOTE — PLAN OF CARE
Problem: Adult Inpatient Plan of Care  Goal: Plan of Care Review  Outcome: Ongoing (interventions implemented as appropriate)  Patient resting in bed at this time, all important items within reach, instructed to call as needed, rounding and plan of care verbalized, call light within reach, bed in lowest and locked position, no injuries reported, night light on, bed alarm used as needed    NS at 100, pt tolerating  AC/-200s  -110s  BRP  No pain reported  Calm and coropertaive  RA, tolerating       No further concerns at this time  Will cont to monitor
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
## 2018-12-24 NOTE — PLAN OF CARE
Copy of discharge summary provided to patient & instructed her to bring to any follow up appts      12/24/18 1337   Final Note   Assessment Type Final Discharge Note   Anticipated Discharge Disposition Home   What phone number can be called within the next 1-3 days to see how you are doing after discharge? [phone]   Discharge plans and expectations educations in teach back method with documentation complete? Yes   Right Care Referral Info   Post Acute Recommendation No Care

## 2018-12-24 NOTE — PROGRESS NOTES
"ALIN spoke to Kezia in pharmacy, pt has insulin and other meds filled in November.  Depakote will be filled by [facility] and copay is $2.  ALIN met with pt and pt acknowledged that meds were filled and that the insulin is at her, Godfather's home, in his "ice box", but doesn't have transportation to get there.  ALIN asked for aunt's phone # [phone]; pt called aunt Margy and gave phone to ALIN.  Margy stated she can pickup pt once discharge and will take pt to [facility] home (Margy's cousin) to get meds.      Pt needs Depakote but stated that she doesn't have the $2 copay.  CM Dept will cover the $2  Copay.  ALIN completed the Cost Transfer from[facility]  signed fjorm  and ALIN faxed to [facility] and requested bedside delivery.   "
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
## 2019-06-13 ENCOUNTER — HOSPITAL ENCOUNTER (INPATIENT)
Facility: OTHER | Age: 29
LOS: 2 days | Discharge: HOME | DRG: 638 | End: 2019-06-15
Attending: EMERGENCY MEDICINE | Admitting: HOSPITALIST
Payer: MEDICAID

## 2019-06-13 DIAGNOSIS — E10.10 DIABETIC KETOACIDOSIS WITHOUT COMA ASSOCIATED WITH TYPE 1 DIABETES MELLITUS: Primary | ICD-10-CM

## 2019-06-13 DIAGNOSIS — E87.5 HYPERKALEMIA: ICD-10-CM

## 2019-06-13 DIAGNOSIS — E86.0 DEHYDRATION: ICD-10-CM

## 2019-06-13 DIAGNOSIS — R73.9 HYPERGLYCEMIA: ICD-10-CM

## 2019-06-13 DIAGNOSIS — R11.2 NAUSEA AND VOMITING: ICD-10-CM

## 2019-06-13 DIAGNOSIS — N28.9 RENAL INSUFFICIENCY: ICD-10-CM

## 2019-06-13 PROBLEM — N17.9 AKI (ACUTE KIDNEY INJURY): Status: ACTIVE | Noted: 2019-06-13

## 2019-06-13 PROBLEM — F19.10 POLYSUBSTANCE ABUSE: Status: ACTIVE | Noted: 2019-06-13

## 2019-06-13 PROBLEM — Z72.0 TOBACCO ABUSE: Status: ACTIVE | Noted: 2019-06-13

## 2019-06-13 LAB
ALBUMIN SERPL BCP-MCNC: 5 G/DL (ref 3.5–5.2)
ALLENS TEST: ABNORMAL
ALP SERPL-CCNC: 102 U/L (ref 55–135)
ALT SERPL W/O P-5'-P-CCNC: 9 U/L (ref 10–44)
AMPHET+METHAMPHET UR QL: NEGATIVE
ANION GAP SERPL CALC-SCNC: 15 MMOL/L (ref 8–16)
ANION GAP SERPL CALC-SCNC: 17 MMOL/L (ref 8–16)
ANION GAP SERPL CALC-SCNC: 24 MMOL/L (ref 8–16)
ANION GAP SERPL CALC-SCNC: 29 MMOL/L (ref 8–16)
ANISOCYTOSIS BLD QL SMEAR: SLIGHT
AST SERPL-CCNC: 17 U/L (ref 10–40)
B-HCG UR QL: NEGATIVE
B-OH-BUTYR BLD STRIP-SCNC: 7 MMOL/L (ref 0–0.5)
BARBITURATES UR QL SCN>200 NG/ML: NEGATIVE
BASOPHILS # BLD AUTO: ABNORMAL K/UL (ref 0–0.2)
BASOPHILS NFR BLD: 1 % (ref 0–1.9)
BENZODIAZ UR QL SCN>200 NG/ML: NEGATIVE
BILIRUB SERPL-MCNC: 0.7 MG/DL (ref 0.1–1)
BILIRUB UR QL STRIP: NEGATIVE
BUN SERPL-MCNC: 10 MG/DL (ref 6–20)
BUN SERPL-MCNC: 13 MG/DL (ref 6–20)
BUN SERPL-MCNC: 17 MG/DL (ref 6–20)
BUN SERPL-MCNC: 18 MG/DL (ref 6–20)
BZE UR QL SCN: NORMAL
CALCIUM SERPL-MCNC: 10.4 MG/DL (ref 8.7–10.5)
CALCIUM SERPL-MCNC: 8.7 MG/DL (ref 8.7–10.5)
CALCIUM SERPL-MCNC: 9.2 MG/DL (ref 8.7–10.5)
CALCIUM SERPL-MCNC: 9.3 MG/DL (ref 8.7–10.5)
CANNABINOIDS UR QL SCN: NORMAL
CHLORIDE SERPL-SCNC: 102 MMOL/L (ref 95–110)
CHLORIDE SERPL-SCNC: 109 MMOL/L (ref 95–110)
CHLORIDE SERPL-SCNC: 110 MMOL/L (ref 95–110)
CHLORIDE SERPL-SCNC: 112 MMOL/L (ref 95–110)
CLARITY UR: CLEAR
CO2 SERPL-SCNC: 13 MMOL/L (ref 23–29)
CO2 SERPL-SCNC: 5 MMOL/L (ref 23–29)
CO2 SERPL-SCNC: 7 MMOL/L (ref 23–29)
CO2 SERPL-SCNC: 9 MMOL/L (ref 23–29)
COLOR UR: YELLOW
CREAT SERPL-MCNC: 1.3 MG/DL (ref 0.5–1.4)
CREAT SERPL-MCNC: 1.5 MG/DL (ref 0.5–1.4)
CREAT SERPL-MCNC: 1.5 MG/DL (ref 0.5–1.4)
CREAT SERPL-MCNC: 1.6 MG/DL (ref 0.5–1.4)
CREAT UR-MCNC: 24.5 MG/DL (ref 15–325)
CTP QC/QA: YES
DELSYS: ABNORMAL
DIFFERENTIAL METHOD: ABNORMAL
EOSINOPHIL # BLD AUTO: ABNORMAL K/UL (ref 0–0.5)
EOSINOPHIL NFR BLD: 1 % (ref 0–8)
ERYTHROCYTE [DISTWIDTH] IN BLOOD BY AUTOMATED COUNT: 16.7 % (ref 11.5–14.5)
EST. GFR  (AFRICAN AMERICAN): 49 ML/MIN/1.73 M^2
EST. GFR  (AFRICAN AMERICAN): 53 ML/MIN/1.73 M^2
EST. GFR  (AFRICAN AMERICAN): 53 ML/MIN/1.73 M^2
EST. GFR  (AFRICAN AMERICAN): >60 ML/MIN/1.73 M^2
EST. GFR  (NON AFRICAN AMERICAN): 43 ML/MIN/1.73 M^2
EST. GFR  (NON AFRICAN AMERICAN): 46 ML/MIN/1.73 M^2
EST. GFR  (NON AFRICAN AMERICAN): 46 ML/MIN/1.73 M^2
EST. GFR  (NON AFRICAN AMERICAN): 55 ML/MIN/1.73 M^2
ETHANOL UR-MCNC: <10 MG/DL
GLUCOSE SERPL-MCNC: 203 MG/DL (ref 70–110)
GLUCOSE SERPL-MCNC: 245 MG/DL (ref 70–110)
GLUCOSE SERPL-MCNC: 310 MG/DL (ref 70–110)
GLUCOSE SERPL-MCNC: 452 MG/DL (ref 70–110)
GLUCOSE UR QL STRIP: ABNORMAL
HCO3 UR-SCNC: 7.7 MMOL/L (ref 24–28)
HCT VFR BLD AUTO: 45.9 % (ref 37–48.5)
HGB BLD-MCNC: 14.3 G/DL (ref 12–16)
HGB UR QL STRIP: ABNORMAL
KETONES UR QL STRIP: ABNORMAL
LEUKOCYTE ESTERASE UR QL STRIP: NEGATIVE
LYMPHOCYTES # BLD AUTO: ABNORMAL K/UL (ref 1–4.8)
LYMPHOCYTES NFR BLD: 27 % (ref 18–48)
MCH RBC QN AUTO: 25.8 PG (ref 27–31)
MCHC RBC AUTO-ENTMCNC: 31.2 G/DL (ref 32–36)
MCV RBC AUTO: 83 FL (ref 82–98)
METAMYELOCYTES NFR BLD MANUAL: 1 %
METHADONE UR QL SCN>300 NG/ML: NEGATIVE
MONOCYTES # BLD AUTO: ABNORMAL K/UL (ref 0.3–1)
MONOCYTES NFR BLD: 4 % (ref 4–15)
NEUTROPHILS NFR BLD: 65 % (ref 38–73)
NEUTS BAND NFR BLD MANUAL: 1 %
NITRITE UR QL STRIP: NEGATIVE
OPIATES UR QL SCN: NEGATIVE
PCO2 BLDA: 25.2 MMHG (ref 35–45)
PCP UR QL SCN>25 NG/ML: NEGATIVE
PH SMN: 7.09 [PH] (ref 7.35–7.45)
PH UR STRIP: 6 [PH] (ref 5–8)
PLATELET # BLD AUTO: 258 K/UL (ref 150–350)
PLATELET BLD QL SMEAR: ABNORMAL
PMV BLD AUTO: 10.9 FL (ref 9.2–12.9)
PO2 BLDA: 118 MMHG (ref 80–100)
POC BE: -22 MMOL/L
POC SATURATED O2: 97 % (ref 95–100)
POCT GLUCOSE: 189 MG/DL (ref 70–110)
POCT GLUCOSE: 191 MG/DL (ref 70–110)
POCT GLUCOSE: 210 MG/DL (ref 70–110)
POCT GLUCOSE: 221 MG/DL (ref 70–110)
POCT GLUCOSE: 244 MG/DL (ref 70–110)
POCT GLUCOSE: 244 MG/DL (ref 70–110)
POCT GLUCOSE: 251 MG/DL (ref 70–110)
POCT GLUCOSE: 252 MG/DL (ref 70–110)
POCT GLUCOSE: 253 MG/DL (ref 70–110)
POCT GLUCOSE: 261 MG/DL (ref 70–110)
POCT GLUCOSE: 323 MG/DL (ref 70–110)
POCT GLUCOSE: 338 MG/DL (ref 70–110)
POCT GLUCOSE: 357 MG/DL (ref 70–110)
POCT GLUCOSE: 375 MG/DL (ref 70–110)
POCT GLUCOSE: 438 MG/DL (ref 70–110)
POCT GLUCOSE: 483 MG/DL (ref 70–110)
POTASSIUM SERPL-SCNC: 4.4 MMOL/L (ref 3.5–5.1)
POTASSIUM SERPL-SCNC: 5.1 MMOL/L (ref 3.5–5.1)
POTASSIUM SERPL-SCNC: 5.2 MMOL/L (ref 3.5–5.1)
POTASSIUM SERPL-SCNC: 5.4 MMOL/L (ref 3.5–5.1)
POTASSIUM SERPL-SCNC: 6 MMOL/L (ref 3.5–5.1)
PROT SERPL-MCNC: 9.3 G/DL (ref 6–8.4)
PROT UR QL STRIP: ABNORMAL
RBC # BLD AUTO: 5.55 M/UL (ref 4–5.4)
SAMPLE: ABNORMAL
SITE: ABNORMAL
SODIUM SERPL-SCNC: 136 MMOL/L (ref 136–145)
SODIUM SERPL-SCNC: 137 MMOL/L (ref 136–145)
SODIUM SERPL-SCNC: 138 MMOL/L (ref 136–145)
SODIUM SERPL-SCNC: 141 MMOL/L (ref 136–145)
SP GR UR STRIP: >=1.03 (ref 1–1.03)
TOXICOLOGY INFORMATION: NORMAL
TROPONIN I SERPL DL<=0.01 NG/ML-MCNC: <0.006 NG/ML (ref 0–0.03)
URN SPEC COLLECT METH UR: ABNORMAL
UROBILINOGEN UR STRIP-ACNC: NEGATIVE EU/DL
WBC # BLD AUTO: 8.36 K/UL (ref 3.9–12.7)

## 2019-06-13 PROCEDURE — 36600 WITHDRAWAL OF ARTERIAL BLOOD: CPT

## 2019-06-13 PROCEDURE — 85007 BL SMEAR W/DIFF WBC COUNT: CPT

## 2019-06-13 PROCEDURE — 63600175 PHARM REV CODE 636 W HCPCS: Performed by: HOSPITALIST

## 2019-06-13 PROCEDURE — 36415 COLL VENOUS BLD VENIPUNCTURE: CPT

## 2019-06-13 PROCEDURE — 93010 ELECTROCARDIOGRAM REPORT: CPT | Mod: ,,, | Performed by: INTERNAL MEDICINE

## 2019-06-13 PROCEDURE — 94761 N-INVAS EAR/PLS OXIMETRY MLT: CPT

## 2019-06-13 PROCEDURE — 25000003 PHARM REV CODE 250: Performed by: EMERGENCY MEDICINE

## 2019-06-13 PROCEDURE — 81003 URINALYSIS AUTO W/O SCOPE: CPT

## 2019-06-13 PROCEDURE — 99291 CRITICAL CARE FIRST HOUR: CPT | Mod: ,,, | Performed by: HOSPITALIST

## 2019-06-13 PROCEDURE — 99284 EMERGENCY DEPT VISIT MOD MDM: CPT | Mod: 25

## 2019-06-13 PROCEDURE — 99291 PR CRITICAL CARE, E/M 30-74 MINUTES: ICD-10-PCS | Mod: ,,, | Performed by: HOSPITALIST

## 2019-06-13 PROCEDURE — 80053 COMPREHEN METABOLIC PANEL: CPT

## 2019-06-13 PROCEDURE — 63600175 PHARM REV CODE 636 W HCPCS: Performed by: EMERGENCY MEDICINE

## 2019-06-13 PROCEDURE — S5010 5% DEXTROSE AND 0.45% SALINE: HCPCS | Performed by: HOSPITALIST

## 2019-06-13 PROCEDURE — 93010 ELECTROCARDIOGRAM REPORT: CPT | Mod: 76,,, | Performed by: INTERNAL MEDICINE

## 2019-06-13 PROCEDURE — 82962 GLUCOSE BLOOD TEST: CPT

## 2019-06-13 PROCEDURE — 84132 ASSAY OF SERUM POTASSIUM: CPT

## 2019-06-13 PROCEDURE — 83036 HEMOGLOBIN GLYCOSYLATED A1C: CPT

## 2019-06-13 PROCEDURE — 99900035 HC TECH TIME PER 15 MIN (STAT)

## 2019-06-13 PROCEDURE — 20000000 HC ICU ROOM

## 2019-06-13 PROCEDURE — 25000003 PHARM REV CODE 250: Performed by: HOSPITALIST

## 2019-06-13 PROCEDURE — 93010 EKG 12-LEAD: ICD-10-PCS | Mod: ,,, | Performed by: INTERNAL MEDICINE

## 2019-06-13 PROCEDURE — 93005 ELECTROCARDIOGRAM TRACING: CPT

## 2019-06-13 PROCEDURE — S4991 NICOTINE PATCH NONLEGEND: HCPCS | Performed by: HOSPITALIST

## 2019-06-13 PROCEDURE — 81025 URINE PREGNANCY TEST: CPT | Performed by: EMERGENCY MEDICINE

## 2019-06-13 PROCEDURE — 85027 COMPLETE CBC AUTOMATED: CPT

## 2019-06-13 PROCEDURE — 96374 THER/PROPH/DIAG INJ IV PUSH: CPT

## 2019-06-13 PROCEDURE — 82803 BLOOD GASES ANY COMBINATION: CPT

## 2019-06-13 PROCEDURE — 82010 KETONE BODYS QUAN: CPT

## 2019-06-13 PROCEDURE — 80307 DRUG TEST PRSMV CHEM ANLYZR: CPT

## 2019-06-13 PROCEDURE — 96361 HYDRATE IV INFUSION ADD-ON: CPT

## 2019-06-13 PROCEDURE — 80048 BASIC METABOLIC PNL TOTAL CA: CPT | Mod: 91

## 2019-06-13 PROCEDURE — S5571 INSULIN DISPOS PEN 3 ML: HCPCS | Performed by: HOSPITALIST

## 2019-06-13 PROCEDURE — 84484 ASSAY OF TROPONIN QUANT: CPT

## 2019-06-13 RX ORDER — SODIUM CHLORIDE 450 MG/100ML
INJECTION, SOLUTION INTRAVENOUS CONTINUOUS
Status: DISCONTINUED | OUTPATIENT
Start: 2019-06-13 | End: 2019-06-15 | Stop reason: HOSPADM

## 2019-06-13 RX ORDER — SODIUM BICARBONATE 1 MEQ/ML
25 SYRINGE (ML) INTRAVENOUS ONCE
Status: COMPLETED | OUTPATIENT
Start: 2019-06-13 | End: 2019-06-13

## 2019-06-13 RX ORDER — DEXTROSE MONOHYDRATE 100 MG/ML
1000 INJECTION, SOLUTION INTRAVENOUS
Status: DISCONTINUED | OUTPATIENT
Start: 2019-06-13 | End: 2019-06-14

## 2019-06-13 RX ORDER — SODIUM CHLORIDE 0.9 % (FLUSH) 0.9 %
10 SYRINGE (ML) INJECTION
Status: DISCONTINUED | OUTPATIENT
Start: 2019-06-13 | End: 2019-06-15 | Stop reason: HOSPADM

## 2019-06-13 RX ORDER — GLUCAGON 1 MG
1 KIT INJECTION
Status: DISCONTINUED | OUTPATIENT
Start: 2019-06-13 | End: 2019-06-15 | Stop reason: HOSPADM

## 2019-06-13 RX ORDER — ACETAMINOPHEN 325 MG/1
650 TABLET ORAL EVERY 6 HOURS PRN
Status: DISCONTINUED | OUTPATIENT
Start: 2019-06-13 | End: 2019-06-15 | Stop reason: HOSPADM

## 2019-06-13 RX ORDER — FAMOTIDINE 20 MG/1
20 TABLET, FILM COATED ORAL 2 TIMES DAILY
Status: DISCONTINUED | OUTPATIENT
Start: 2019-06-13 | End: 2019-06-15 | Stop reason: HOSPADM

## 2019-06-13 RX ORDER — IBUPROFEN 200 MG
16 TABLET ORAL
Status: DISCONTINUED | OUTPATIENT
Start: 2019-06-13 | End: 2019-06-15 | Stop reason: HOSPADM

## 2019-06-13 RX ORDER — SODIUM CHLORIDE 9 MG/ML
1000 INJECTION, SOLUTION INTRAVENOUS CONTINUOUS
Status: ACTIVE | OUTPATIENT
Start: 2019-06-13 | End: 2019-06-13

## 2019-06-13 RX ORDER — IBUPROFEN 200 MG
1 TABLET ORAL DAILY
Status: DISCONTINUED | OUTPATIENT
Start: 2019-06-13 | End: 2019-06-15 | Stop reason: HOSPADM

## 2019-06-13 RX ORDER — DEXTROSE MONOHYDRATE AND SODIUM CHLORIDE 5; .45 G/100ML; G/100ML
INJECTION, SOLUTION INTRAVENOUS CONTINUOUS
Status: DISCONTINUED | OUTPATIENT
Start: 2019-06-13 | End: 2019-06-14

## 2019-06-13 RX ORDER — IBUPROFEN 200 MG
24 TABLET ORAL
Status: DISCONTINUED | OUTPATIENT
Start: 2019-06-13 | End: 2019-06-15 | Stop reason: HOSPADM

## 2019-06-13 RX ORDER — INSULIN ASPART 100 [IU]/ML
1-10 INJECTION, SOLUTION INTRAVENOUS; SUBCUTANEOUS
Status: DISCONTINUED | OUTPATIENT
Start: 2019-06-13 | End: 2019-06-15 | Stop reason: HOSPADM

## 2019-06-13 RX ORDER — FLUOXETINE HYDROCHLORIDE 20 MG/1
20 CAPSULE ORAL DAILY
Status: DISCONTINUED | OUTPATIENT
Start: 2019-06-13 | End: 2019-06-15 | Stop reason: HOSPADM

## 2019-06-13 RX ADMIN — DEXTROSE MONOHYDRATE AND SODIUM CHLORIDE: 5; .45 INJECTION, SOLUTION INTRAVENOUS at 09:06

## 2019-06-13 RX ADMIN — PROMETHAZINE HYDROCHLORIDE 25 MG: 25 INJECTION INTRAMUSCULAR; INTRAVENOUS at 10:06

## 2019-06-13 RX ADMIN — FAMOTIDINE 20 MG: 20 TABLET, FILM COATED ORAL at 12:06

## 2019-06-13 RX ADMIN — SODIUM CHLORIDE 1000 ML: 0.9 INJECTION, SOLUTION INTRAVENOUS at 08:06

## 2019-06-13 RX ADMIN — ACETAMINOPHEN 650 MG: 325 TABLET, FILM COATED ORAL at 08:06

## 2019-06-13 RX ADMIN — SODIUM CHLORIDE: 0.45 INJECTION, SOLUTION INTRAVENOUS at 11:06

## 2019-06-13 RX ADMIN — INSULIN DETEMIR 15 UNITS: 100 INJECTION, SOLUTION SUBCUTANEOUS at 10:06

## 2019-06-13 RX ADMIN — SODIUM CHLORIDE 1000 ML: 0.9 INJECTION, SOLUTION INTRAVENOUS at 09:06

## 2019-06-13 RX ADMIN — FAMOTIDINE 20 MG: 20 TABLET, FILM COATED ORAL at 08:06

## 2019-06-13 RX ADMIN — SODIUM BICARBONATE 25 MEQ: 84 INJECTION, SOLUTION INTRAVENOUS at 06:06

## 2019-06-13 RX ADMIN — NICOTINE 1 PATCH: 14 PATCH, EXTENDED RELEASE TRANSDERMAL at 12:06

## 2019-06-13 RX ADMIN — DEXTROSE MONOHYDRATE AND SODIUM CHLORIDE: 5; .45 INJECTION, SOLUTION INTRAVENOUS at 02:06

## 2019-06-13 RX ADMIN — SODIUM CHLORIDE 5 UNITS/HR: 9 INJECTION, SOLUTION INTRAVENOUS at 10:06

## 2019-06-13 RX ADMIN — INSULIN HUMAN 5 UNITS: 100 INJECTION, SOLUTION PARENTERAL at 08:06

## 2019-06-13 RX ADMIN — SODIUM CHLORIDE 1000 ML: 0.9 INJECTION, SOLUTION INTRAVENOUS at 11:06

## 2019-06-13 NOTE — ASSESSMENT & PLAN NOTE
-K is minimally elevated, but most likely she is actually total body potassium depleted  -Treat with IV fluids and insulin drip  -Repeat labs q4h.

## 2019-06-13 NOTE — ED NOTES
"Donya Sheppard called Dr. Bond to bedside complaining of patient saying she "can't breathe." Patient denied complaint to RN. Dr. Bond at bedside explaining to patient how her oxygenation is 100%, listened to breath sounds (BBS clear), and patient does not appear to be in distress. SOB may be caused due to DKA and PH of blood. Will continue to monitor. VSS at this time.   "

## 2019-06-13 NOTE — ASSESSMENT & PLAN NOTE
-Mrs. Wilson is admitted to inpatient status in our icu  -On admit her sugar is 452, anion gap is 29, beta-hydroxybutyrate is 7.0 and pH is 7.09  -She will be treated with IV fluids, insulin drip, q1h accu checks and q4h BMP  -Will transition to subcutaneous insulin once anion-gap has closed.

## 2019-06-13 NOTE — ASSESSMENT & PLAN NOTE
"-UDS positive for cocaine and marijuana  -When asked when she last used these substances she states, "I dunno."    "

## 2019-06-13 NOTE — ASSESSMENT & PLAN NOTE
"-Chart notes bipolar disorder and schizophrenia, but patient only responds to inquiries with, "I lynne."  -Continue fluoxetine and seroquel for now.  -    "

## 2019-06-13 NOTE — SUBJECTIVE & OBJECTIVE
"Past Medical History:   Diagnosis Date    Bipolar 1 disorder       Diabetes mellitus  Crack cocaine abuse        Past Surgical History:   Procedure Laterality Date     SECTION         Review of patient's allergies indicates:   Allergen Reactions    Codeine     Zofran (as hydrochloride) [ondansetron hcl] Hives       No current facility-administered medications on file prior to encounter.      Current Outpatient Medications on File Prior to Encounter   Medication Sig    divalproex ER (DEPAKOTE) 500 MG Tb24 Take 1 tablet (500 mg total) by mouth once daily.    fluoxetine (PROZAC) 20 MG capsule Take 20 mg by mouth once daily.    ibuprofen (ADVIL,MOTRIN) 800 MG tablet Take 800 mg by mouth 3 (three) times daily.    insulin (BASAGLAR KWIKPEN U-100 INSULIN) glargine 100 units/mL (3mL) SubQ pen Inject 20 Units into the skin every evening.    insulin lispro 100 unit/mL injection Inject 5 Units into the skin 3 (three) times daily before meals.    multivitamin capsule Take 1 capsule by mouth once daily.    quetiapine (SEROQUEL XR) 300 MG Tb24 Take by mouth once daily.    ranitidine (ZANTAC) 150 MG capsule Take 150 mg by mouth 2 (two) times daily.     Family History     "I dunno" per patient        Tobacco Use    Smoking status: yes    Smokeless tobacco: daily   Substance and Sexual Activity    Alcohol use: Yes    Drug use: Yes     Types: Marijuana    Sexual activity: Never     Review of Systems   Constitutional: Positive for chills.   HENT: Negative for congestion and dental problem.    Eyes: Negative for discharge.   Respiratory: Negative for apnea and cough.    Gastrointestinal: Positive for nausea and vomiting. Negative for abdominal pain.   Genitourinary: Negative for difficulty urinating and dysuria.   Neurological: Positive for headaches.   Hematological: Negative for adenopathy. Does not bruise/bleed easily.   Psychiatric/Behavioral: Negative for agitation and behavioral problems.     Objective: "     Vital Signs (Most Recent):  Temp: 98.8 °F (37.1 °C) (06/13/19 1145)  Pulse: 102 (06/13/19 1145)  Resp: 16 (06/13/19 0807)  BP: 139/84 (06/13/19 1230)  SpO2: 100 % (06/13/19 1145) Vital Signs (24h Range):  Temp:  [97.9 °F (36.6 °C)-98.8 °F (37.1 °C)] 98.8 °F (37.1 °C)  Pulse:  [] 102  Resp:  [16] 16  SpO2:  [100 %] 100 %  BP: (134-168)/() 139/84     Weight: 53.1 kg (117 lb)  Body mass index is 18.32 kg/m².    Physical Exam   Constitutional: She appears well-developed.   No acute distress, uncooperative with exam   HENT:   Head: Normocephalic and atraumatic.   Eyes:   Patient refuses to open her eyes for me   Neck: Normal range of motion. Neck supple.   Cardiovascular: Normal rate, regular rhythm and normal heart sounds.   Pulmonary/Chest: Effort normal and breath sounds normal. No respiratory distress.   Abdominal: Soft. Bowel sounds are normal. She exhibits no distension. There is no tenderness.   Musculoskeletal: Normal range of motion. She exhibits no edema.   Neurological: No cranial nerve deficit. Coordination normal.   Moves all extremities, but uncooperative with exam   Skin: Skin is warm and dry.   Psychiatric: Her behavior is normal.   Somewhat agitated and uncooperative   Vitals reviewed.       Significant Labs: All pertinent labs within the past 24 hours have been reviewed.    Significant Imaging: I have reviewed and interpreted all pertinent imaging results/findings within the past 24 hours.

## 2019-06-13 NOTE — ED TRIAGE NOTES
EMT states patient's wife 911 due to weakness and vomiting all night. Patient difficult to arouse on arrival from EMS. Does not open eyes to vigorous stimulation. Withdraws from all 4 extremities, PERRLA. All vitals stable. 2 IVs placed. Urinated on self; then diarrhea, as well. Fluids have been initiated, Insulin administered. Over first hour after arrival, patient became more awake and alert. Answered minimal questions appropriately and following minimal commands, appears irritable.         Donya Sheppard at bedside with patient. Donya states she is patient's wife (kyle) but is unable to state patient's last name, birthday, or current medications/medical history. States they have been  for about 7 years.       Vomiting red streaked blood. Had an eppisode of bradycardia while vomitting. EKG captured. 0845

## 2019-06-13 NOTE — ED NOTES
Dr. Amos made aware of delay in transfer to  ICU. Waiting on insulin drip from pharmacy. Will initiate. Per Dr. Amos okay to give one time dose of phenergan IC 25 mg for nausea and vomiting and then reassess if pt is able to tolerate tylenol for pain.  prior to start of insulin infusion.

## 2019-06-13 NOTE — H&P
"[facility] Hospital Medicine  History & Physical    Patient Name: Hollie Wilson  MRN: [id number]  Admission Date: 2019  Attending Physician: Neo Amos MD  Primary Care Provider: Jose R Valdivia MD         Patient information was obtained from patient, past medical records and ER records.     Subjective:     Principal Problem:Diabetic ketoacidosis with coma associated with type 1 diabetes mellitus    Chief Complaint:   Chief Complaint   Patient presents with    Hyperglycemia     pt  with weakness, nausea vomiting xone day.         HPI: Mrs. Wilson is a29 year old woman with history of bipolar disorder, type 1 diabetes mellitus and crack cocaine abuse who came in for evaluation of nausea and vomiting for 3 days.  History is mostly obtained from review of the records as patient will not answer most of my questions.  Her records indicate she was seen at [facility] ER 2 days ago and treated for hyperglycemia and discharged from the ER.  She states she's been vomiting for 2 days.  She states she has chills and is hungry.  She complains of a headache but when asked for how long she states, "I dunno".  When asked if she has been taking her insulin she states, "I dunno.".  When asked if she has her insulin she states, "I dunno."  She refuses to open her eyes for me.  In the ER she was found to have DKA with elevated anion-gap, dangerously low pH, elevated beta-hydroxybutyrate and hyperglycemia.    Past Medical History:   Diagnosis Date    Bipolar 1 disorder       Diabetes mellitus  Crack cocaine abuse        Past Surgical History:   Procedure Laterality Date     SECTION         Review of patient's allergies indicates:   Allergen Reactions    Codeine     Zofran (as hydrochloride) [ondansetron hcl] Hives       No current facility-administered medications on file prior to encounter.      Current Outpatient Medications on File Prior to Encounter   Medication Sig    divalproex ER " "(DEPAKOTE) 500 MG Tb24 Take 1 tablet (500 mg total) by mouth once daily.    fluoxetine (PROZAC) 20 MG capsule Take 20 mg by mouth once daily.    ibuprofen (ADVIL,MOTRIN) 800 MG tablet Take 800 mg by mouth 3 (three) times daily.    insulin (BASAGLAR KWIKPEN U-100 INSULIN) glargine 100 units/mL (3mL) SubQ pen Inject 20 Units into the skin every evening.    insulin lispro 100 unit/mL injection Inject 5 Units into the skin 3 (three) times daily before meals.    multivitamin capsule Take 1 capsule by mouth once daily.    quetiapine (SEROQUEL XR) 300 MG Tb24 Take by mouth once daily.    ranitidine (ZANTAC) 150 MG capsule Take 150 mg by mouth 2 (two) times daily.     Family History     "I dunno" per patient        Tobacco Use    Smoking status: yes    Smokeless tobacco: daily   Substance and Sexual Activity    Alcohol use: Yes    Drug use: Yes     Types: Marijuana    Sexual activity: Never     Review of Systems   Constitutional: Positive for chills.   HENT: Negative for congestion and dental problem.    Eyes: Negative for discharge.   Respiratory: Negative for apnea and cough.    Gastrointestinal: Positive for nausea and vomiting. Negative for abdominal pain.   Genitourinary: Negative for difficulty urinating and dysuria.   Neurological: Positive for headaches.   Hematological: Negative for adenopathy. Does not bruise/bleed easily.   Psychiatric/Behavioral: Negative for agitation and behavioral problems.     Objective:     Vital Signs (Most Recent):  Temp: 98.8 °F (37.1 °C) (06/13/19 1145)  Pulse: 102 (06/13/19 1145)  Resp: 16 (06/13/19 0807)  BP: 139/84 (06/13/19 1230)  SpO2: 100 % (06/13/19 1145) Vital Signs (24h Range):  Temp:  [97.9 °F (36.6 °C)-98.8 °F (37.1 °C)] 98.8 °F (37.1 °C)  Pulse:  [] 102  Resp:  [16] 16  SpO2:  [100 %] 100 %  BP: (134-168)/() 139/84     Weight: 53.1 kg (117 lb)  Body mass index is 18.32 kg/m².    Physical Exam   Constitutional: She appears well-developed.   No acute " "distress, uncooperative with exam   HENT:   Head: Normocephalic and atraumatic.   Eyes:   Patient refuses to open her eyes for me   Neck: Normal range of motion. Neck supple.   Cardiovascular: Normal rate, regular rhythm and normal heart sounds.   Pulmonary/Chest: Effort normal and breath sounds normal. No respiratory distress.   Abdominal: Soft. Bowel sounds are normal. She exhibits no distension. There is no tenderness.   Musculoskeletal: Normal range of motion. She exhibits no edema.   Neurological: No cranial nerve deficit. Coordination normal.   Moves all extremities, but uncooperative with exam   Skin: Skin is warm and dry.   Psychiatric: Her behavior is normal.   Somewhat agitated and uncooperative   Vitals reviewed.       Significant Labs: All pertinent labs within the past 24 hours have been reviewed.    Significant Imaging: I have reviewed and interpreted all pertinent imaging results/findings within the past 24 hours.    Assessment/Plan:     * Diabetic ketoacidosis with coma associated with type 1 diabetes mellitus  -Mrs. Wilson is admitted to inpatient status in our icu  -On admit her sugar is 452, anion gap is 29, beta-hydroxybutyrate is 7.0 and pH is 7.09  -She will be treated with IV fluids, insulin drip, q1h accu checks and q4h BMP  -Will transition to subcutaneous insulin once anion-gap has closed.      KIMBERLY (acute kidney injury)  -Cr mildly elevated  -Most likely pre-renal due to dehydration from auto-diuresis  -Treat with IV fluids   -Repeat labs in AM.      Hyperkalemia  -K is minimally elevated, but most likely she is actually total body potassium depleted  -Treat with IV fluids and insulin drip  -Repeat labs q4h.      Polysubstance abuse  -UDS positive for cocaine and marijuana  -When asked when she last used these substances she states, "I dunno."      Tobacco abuse  -When patient is more receptive I will  on cessation.  -NRT offered        Psychiatric disorder  -Chart notes bipolar " "disorder and schizophrenia, but patient only responds to inquiries with, "I lynne."  -Continue fluoxetine and seroquel for now.  -      Type 1 diabetes mellitus without complication  -Treatment as above.  -She is severely non-compliant.  -Consult diabetes educator        VTE Risk Mitigation (From admission, onward)        Ordered     IP VTE LOW RISK PATIENT  Once      06/13/19 1144     Place TRISTAN hose  Until discontinued      06/13/19 1144     Place sequential compression device  Until discontinued      06/13/19 1144         35 minutes cc time    Neo Amos MD  Department of Hospital Medicine   [facility]  "

## 2019-06-13 NOTE — PLAN OF CARE
spoke with nurse and provided a compassionate presence, reflective listening and prayer support for patient and family.

## 2019-06-13 NOTE — ED PROVIDER NOTES
Encounter Date: 2019    SCRIBE #1 NOTE: I, Raiza Junior , am scribing for, and in the presence of, Dr. Thakur .       History     Chief Complaint   Patient presents with    Hyperglycemia     pt  with weakness, nausea vomiting xone day.      Time seen by provider: 8:16 AM    This is a 29 y.o. female who presents with complaint of nausea and associated vomiting since two days ago per EMS. Her last blood sugar reading was 478. Girlfriend communicates that symptoms became worst yesterday and she was vomiting all last night. She reports that symptoms became even worst this morning and the pt started to experience body aches and generalized weakness.     The history is provided by the EMS personnel and a friend.     Review of patient's allergies indicates:   Allergen Reactions    Codeine     Zofran (as hydrochloride) [ondansetron hcl] Hives     Past Medical History:   Diagnosis Date    Bipolar 1 disorder     Diabetes mellitus      Past Surgical History:   Procedure Laterality Date     SECTION       No family history on file.  Social History     Tobacco Use    Smoking status: Never Smoker    Smokeless tobacco: Never Used   Substance Use Topics    Alcohol use: Yes    Drug use: Yes     Types: Marijuana     Review of Systems   Constitutional: Negative for chills and fever.        Body aches.    HENT: Negative for congestion, rhinorrhea and sore throat.    Eyes: Negative for visual disturbance.   Respiratory: Negative for cough and shortness of breath.    Cardiovascular: Negative for chest pain.   Gastrointestinal: Positive for nausea and vomiting. Negative for abdominal pain and diarrhea.   Genitourinary: Negative for dysuria.   Musculoskeletal: Negative for back pain.   Skin: Negative for rash.   Neurological: Positive for weakness (generalized). Negative for dizziness and light-headedness.   Psychiatric/Behavioral: Negative for confusion.       Physical Exam     Initial Vitals [19 0807]   BP Pulse  Resp Temp SpO2   137/88 89 16 97.9 °F (36.6 °C) 100 %      MAP       --         Physical Exam    Nursing note and vitals reviewed.  Constitutional: She is not diaphoretic. No distress.   Fatigued.    HENT:   Head: Normocephalic and atraumatic.   Right Ear: External ear normal.   Left Ear: External ear normal.   Eyes: Right eye exhibits no discharge. Left eye exhibits no discharge.   Neck: Normal range of motion. Neck supple.   Cardiovascular: Normal rate, regular rhythm and normal heart sounds. Exam reveals no gallop and no friction rub.    No murmur heard.  Pulmonary/Chest: Breath sounds normal. No respiratory distress. She has no wheezes. She has no rhonchi. She has no rales.   Patient protecting own airway without complications.    Abdominal: Soft. Bowel sounds are normal. She exhibits no distension. There is no tenderness. There is no rebound and no guarding.   Musculoskeletal: Normal range of motion. She exhibits no edema or tenderness.   Lymphadenopathy:     She has no cervical adenopathy.   Neurological: She is alert and oriented to person, place, and time. She has normal strength. No sensory deficit.   Skin: Skin is warm and dry. No rash noted. No erythema.   Psychiatric: She has a normal mood and affect. Her behavior is normal.         ED Course   Procedures  Labs Reviewed   CBC W/ AUTO DIFFERENTIAL - Abnormal; Notable for the following components:       Result Value    RBC 5.55 (*)     Mean Corpuscular Hemoglobin 25.8 (*)     Mean Corpuscular Hemoglobin Conc 31.2 (*)     RDW 16.7 (*)     All other components within normal limits   COMPREHENSIVE METABOLIC PANEL - Abnormal; Notable for the following components:    Potassium 6.0 (*)     CO2 5 (*)     Glucose 452 (*)     Creatinine 1.6 (*)     Total Protein 9.3 (*)     ALT 9 (*)     Anion Gap 29 (*)     eGFR if  49 (*)     eGFR if non  43 (*)     All other components within normal limits    Narrative:       Glucose and CO2   critical result(s) called and verbal readback   obtained from Tangela MORRIS at 0918. , 06/13/2019 09:19   BETA - HYDROXYBUTYRATE, SERUM - Abnormal; Notable for the following components:    Beta-Hydroxybutyrate 7.0 (*)     All other components within normal limits   URINALYSIS, REFLEX TO URINE CULTURE - Abnormal; Notable for the following components:    Specific Gravity, UA >=1.030 (*)     Protein, UA Trace (*)     Glucose, UA 2+ (*)     Ketones, UA 3+ (*)     Occult Blood UA Trace (*)     All other components within normal limits    Narrative:     Preferred Collection Type->Urine, Clean Catch   POCT GLUCOSE - Abnormal; Notable for the following components:    POCT Glucose 375 (*)     All other components within normal limits   ISTAT PROCEDURE - Abnormal; Notable for the following components:    POC PH 7.095 (*)     POC PCO2 25.2 (*)     POC PO2 118 (*)     POC HCO3 7.7 (*)     All other components within normal limits   POCT GLUCOSE - Abnormal; Notable for the following components:    POCT Glucose 483 (*)     All other components within normal limits   POCT GLUCOSE - Abnormal; Notable for the following components:    POCT Glucose 438 (*)     All other components within normal limits   POCT GLUCOSE - Abnormal; Notable for the following components:    POCT Glucose 338 (*)     All other components within normal limits   TOXICOLOGY SCREEN, URINE, RANDOM (COMPLIANCE)    Narrative:     Preferred Collection Type->Urine, Clean Catch   TROPONIN I   TROPONIN I   POTASSIUM   POCT URINE PREGNANCY   POCT GLUCOSE MONITORING CONTINUOUS   POCT GLUCOSE MONITORING CONTINUOUS     EKG Readings: (Independently Interpreted)   Bifascicular Block.        Imaging Results          X-Ray Chest AP Portable (Final result)  Result time 06/13/19 10:21:42    Final result by Ernesto Horan III, MD (06/13/19 10:21:42)                 Impression:      No acute process seen.      Electronically signed by: Ernesto Horan  MD  Date:    06/13/2019  Time:    10:21             Narrative:    EXAMINATION:  XR CHEST AP PORTABLE    CLINICAL HISTORY:  hyperglycemia;    FINDINGS:  Heart size is normal lungs are clear and the bones show no abnormality.  There is mild scoliosis.                                            Scribe Attestation:   Scribe #1: I performed the above scribed service and the documentation accurately describes the services I performed. I attest to the accuracy of the note.    Attending Attestation:           Physician Attestation for Scribe:  Physician Attestation Statement for Scribe #1: I, Dr. Thakur , reviewed documentation, as scribed by Raiza Junior  in my presence, and it is both accurate and complete.         Attending ED Notes:   Emergent evaluation of a 29-year-old female with past medical history of diabetes and diabetic ketoacidosis now presents to the emergency room with complaint of nausea, vomiting and generalized weakness with elevation of blood glucose.  Patient is afebrile, tachycardic and hyperglycemic.  Patient has no elevation white blood cell count.  H&H within normal limits. Potassium is 6.0 with specimen being slightly hemolyzed.  Patient has a bicarb of 5.  Blood glucose is 452.  Beta hydroxybutyrate is 7.0.  PH on ABG is 7.095.  Patient is in diabetic ketoacidosis.  Tox screen is positive for cocaine and marijuana.  Urinary analysis reveals 3+ ketones, trace blood, trace protein and 2+ sugar.  Patient is administered IV regular insulin and IV fluids.  The patient is extensively counseled her diagnosis and treatment including all diagnostic, laboratory and physical exam findings.  The patient is admitted to the ICU in critical condition.          ED Course as of Jun 13 1256   Thu Jun 13, 2019   0837 Notified Dr. Amos and patient will be admitted to Dr. Amos.     [BL]   0972 Called Dr. Amos back for official admission.     [BL]      ED Course User Index  [BL] Raiza Junior     Clinical Impression:      1. Diabetic ketoacidosis without coma associated with type 1 diabetes mellitus    2. Hyperglycemia    3. Nausea and vomiting    4. Hyperkalemia    5. Dehydration    6. Renal insufficiency                                   Eric Thakur MD  06/13/19 4181

## 2019-06-13 NOTE — ED NOTES
While vomiting, pt will become bradycardic with HR dropping to 44-50 bpm. Pt remains awake and responsive during this time. Blood streaked emesis noted. Dr. Bond made aware

## 2019-06-13 NOTE — HPI
"Mrs. Wilson is a29 year old woman with history of bipolar disorder, type 1 diabetes mellitus and crack cocaine abuse who came in for evaluation of nausea and vomiting for 3 days.  History is mostly obtained from review of the records as patient will not answer most of my questions.  Her records indicate she was seen at [facility] ER 2 days ago and treated for hyperglycemia and discharged from the ER.  She states she's been vomiting for 2 days.  She states she has chills and is hungry.  She complains of a headache but when asked for how long she states, "I dunno".  When asked if she has been taking her insulin she states, "I dunno.".  When asked if she has her insulin she states, "I dunno."  She refuses to open her eyes for me.  In the ER she was found to have DKA with elevated anion-gap, dangerously low pH, elevated beta-hydroxybutyrate and hyperglycemia.  "

## 2019-06-13 NOTE — ED NOTES
Patient transported to ICU bed 9 via 2 RNs on stretcher connected to zoll for continuous monitoring throughout transportation. VSS. ICU RNs assumed care of patient.

## 2019-06-13 NOTE — ED NOTES
Transported pt to ICU without incident on portable cardiac monitor with BP cycling and on cont pulse ox in side lying position. Insulin infusing to left arm at 5 units/hr. Pt appears more alert, answering questions more appropriately.

## 2019-06-13 NOTE — ED NOTES
Spoke with Jeanette from ICU, all RNs are with a patient and will call back when someone is available to receive report.

## 2019-06-13 NOTE — ED NOTES
Donya Silver was at bedside, 2 RNs overheard Donya on phone stating she was with one of her girlfriends right now.   After Stating to RN upon arrival that she was patient's wife, while unable to provide any information or background on patient.

## 2019-06-13 NOTE — ASSESSMENT & PLAN NOTE
-Cr mildly elevated  -Most likely pre-renal due to dehydration from auto-diuresis  -Treat with IV fluids   -Repeat labs in AM.

## 2019-06-14 PROBLEM — E10.10 DIABETIC KETOACIDOSIS WITHOUT COMA ASSOCIATED WITH TYPE 1 DIABETES MELLITUS: Status: ACTIVE | Noted: 2019-06-14

## 2019-06-14 LAB
ANION GAP SERPL CALC-SCNC: 10 MMOL/L (ref 8–16)
ANION GAP SERPL CALC-SCNC: 12 MMOL/L (ref 8–16)
ANION GAP SERPL CALC-SCNC: 14 MMOL/L (ref 8–16)
BASOPHILS # BLD AUTO: 0.03 K/UL (ref 0–0.2)
BASOPHILS NFR BLD: 0.3 % (ref 0–1.9)
BUN SERPL-MCNC: 10 MG/DL (ref 6–20)
BUN SERPL-MCNC: 10 MG/DL (ref 6–20)
BUN SERPL-MCNC: 9 MG/DL (ref 6–20)
CALCIUM SERPL-MCNC: 8.4 MG/DL (ref 8.7–10.5)
CALCIUM SERPL-MCNC: 8.5 MG/DL (ref 8.7–10.5)
CALCIUM SERPL-MCNC: 8.9 MG/DL (ref 8.7–10.5)
CHLORIDE SERPL-SCNC: 106 MMOL/L (ref 95–110)
CHLORIDE SERPL-SCNC: 109 MMOL/L (ref 95–110)
CHLORIDE SERPL-SCNC: 110 MMOL/L (ref 95–110)
CO2 SERPL-SCNC: 12 MMOL/L (ref 23–29)
CO2 SERPL-SCNC: 16 MMOL/L (ref 23–29)
CO2 SERPL-SCNC: 20 MMOL/L (ref 23–29)
CREAT SERPL-MCNC: 1.1 MG/DL (ref 0.5–1.4)
CREAT SERPL-MCNC: 1.1 MG/DL (ref 0.5–1.4)
CREAT SERPL-MCNC: 1.2 MG/DL (ref 0.5–1.4)
DIFFERENTIAL METHOD: ABNORMAL
EOSINOPHIL # BLD AUTO: 0.1 K/UL (ref 0–0.5)
EOSINOPHIL NFR BLD: 1.4 % (ref 0–8)
ERYTHROCYTE [DISTWIDTH] IN BLOOD BY AUTOMATED COUNT: 16.6 % (ref 11.5–14.5)
EST. GFR  (AFRICAN AMERICAN): >60 ML/MIN/1.73 M^2
EST. GFR  (NON AFRICAN AMERICAN): >60 ML/MIN/1.73 M^2
ESTIMATED AVG GLUCOSE: 338 MG/DL (ref 68–131)
GLUCOSE SERPL-MCNC: 143 MG/DL (ref 70–110)
GLUCOSE SERPL-MCNC: 199 MG/DL (ref 70–110)
GLUCOSE SERPL-MCNC: 242 MG/DL (ref 70–110)
HBA1C MFR BLD HPLC: 13.4 % (ref 4–5.6)
HCT VFR BLD AUTO: 39.4 % (ref 37–48.5)
HGB BLD-MCNC: 12.8 G/DL (ref 12–16)
LYMPHOCYTES # BLD AUTO: 2.9 K/UL (ref 1–4.8)
LYMPHOCYTES NFR BLD: 31.5 % (ref 18–48)
MAGNESIUM SERPL-MCNC: 1.9 MG/DL (ref 1.6–2.6)
MCH RBC QN AUTO: 25.6 PG (ref 27–31)
MCHC RBC AUTO-ENTMCNC: 32.5 G/DL (ref 32–36)
MCV RBC AUTO: 79 FL (ref 82–98)
MONOCYTES # BLD AUTO: 0.9 K/UL (ref 0.3–1)
MONOCYTES NFR BLD: 9.5 % (ref 4–15)
NEUTROPHILS # BLD AUTO: 5.2 K/UL (ref 1.8–7.7)
NEUTROPHILS NFR BLD: 56.6 % (ref 38–73)
PLATELET # BLD AUTO: 243 K/UL (ref 150–350)
PMV BLD AUTO: 10.9 FL (ref 9.2–12.9)
POCT GLUCOSE: 135 MG/DL (ref 70–110)
POCT GLUCOSE: 147 MG/DL (ref 70–110)
POCT GLUCOSE: 194 MG/DL (ref 70–110)
POCT GLUCOSE: 220 MG/DL (ref 70–110)
POTASSIUM SERPL-SCNC: 3.4 MMOL/L (ref 3.5–5.1)
POTASSIUM SERPL-SCNC: 3.6 MMOL/L (ref 3.5–5.1)
POTASSIUM SERPL-SCNC: 4.3 MMOL/L (ref 3.5–5.1)
RBC # BLD AUTO: 5 M/UL (ref 4–5.4)
SODIUM SERPL-SCNC: 136 MMOL/L (ref 136–145)
SODIUM SERPL-SCNC: 136 MMOL/L (ref 136–145)
SODIUM SERPL-SCNC: 137 MMOL/L (ref 136–145)
WBC # BLD AUTO: 9.22 K/UL (ref 3.9–12.7)

## 2019-06-14 PROCEDURE — 99232 SBSQ HOSP IP/OBS MODERATE 35: CPT | Mod: ,,, | Performed by: HOSPITALIST

## 2019-06-14 PROCEDURE — 83735 ASSAY OF MAGNESIUM: CPT

## 2019-06-14 PROCEDURE — 94761 N-INVAS EAR/PLS OXIMETRY MLT: CPT

## 2019-06-14 PROCEDURE — 99232 PR SUBSEQUENT HOSPITAL CARE,LEVL II: ICD-10-PCS | Mod: ,,, | Performed by: HOSPITALIST

## 2019-06-14 PROCEDURE — 11000001 HC ACUTE MED/SURG PRIVATE ROOM

## 2019-06-14 PROCEDURE — 80048 BASIC METABOLIC PNL TOTAL CA: CPT | Mod: 91

## 2019-06-14 PROCEDURE — S4991 NICOTINE PATCH NONLEGEND: HCPCS | Performed by: HOSPITALIST

## 2019-06-14 PROCEDURE — 25000003 PHARM REV CODE 250: Performed by: HOSPITALIST

## 2019-06-14 PROCEDURE — 63600175 PHARM REV CODE 636 W HCPCS: Performed by: HOSPITALIST

## 2019-06-14 PROCEDURE — 36415 COLL VENOUS BLD VENIPUNCTURE: CPT

## 2019-06-14 PROCEDURE — 85025 COMPLETE CBC W/AUTO DIFF WBC: CPT

## 2019-06-14 RX ORDER — INSULIN ASPART 100 [IU]/ML
5 INJECTION, SOLUTION INTRAVENOUS; SUBCUTANEOUS
Status: DISCONTINUED | OUTPATIENT
Start: 2019-06-14 | End: 2019-06-15 | Stop reason: HOSPADM

## 2019-06-14 RX ORDER — INSULIN ASPART 100 [IU]/ML
1-10 INJECTION, SOLUTION INTRAVENOUS; SUBCUTANEOUS
Status: DISCONTINUED | OUTPATIENT
Start: 2019-06-14 | End: 2019-06-14

## 2019-06-14 RX ADMIN — INSULIN DETEMIR 15 UNITS: 100 INJECTION, SOLUTION SUBCUTANEOUS at 09:06

## 2019-06-14 RX ADMIN — ACETAMINOPHEN 650 MG: 325 TABLET, FILM COATED ORAL at 08:06

## 2019-06-14 RX ADMIN — SODIUM CHLORIDE: 0.45 INJECTION, SOLUTION INTRAVENOUS at 11:06

## 2019-06-14 RX ADMIN — INSULIN DETEMIR 15 UNITS: 100 INJECTION, SOLUTION SUBCUTANEOUS at 08:06

## 2019-06-14 RX ADMIN — NICOTINE 1 PATCH: 14 PATCH, EXTENDED RELEASE TRANSDERMAL at 08:06

## 2019-06-14 RX ADMIN — INSULIN ASPART 4 UNITS: 100 INJECTION, SOLUTION INTRAVENOUS; SUBCUTANEOUS at 05:06

## 2019-06-14 RX ADMIN — INSULIN ASPART 5 UNITS: 100 INJECTION, SOLUTION INTRAVENOUS; SUBCUTANEOUS at 12:06

## 2019-06-14 RX ADMIN — FAMOTIDINE 20 MG: 20 TABLET, FILM COATED ORAL at 08:06

## 2019-06-14 RX ADMIN — INSULIN ASPART 5 UNITS: 100 INJECTION, SOLUTION INTRAVENOUS; SUBCUTANEOUS at 05:06

## 2019-06-14 RX ADMIN — FAMOTIDINE 20 MG: 20 TABLET, FILM COATED ORAL at 09:06

## 2019-06-14 RX ADMIN — SODIUM CHLORIDE: 0.45 INJECTION, SOLUTION INTRAVENOUS at 05:06

## 2019-06-14 RX ADMIN — SODIUM CHLORIDE: 0.45 INJECTION, SOLUTION INTRAVENOUS at 07:06

## 2019-06-14 NOTE — ASSESSMENT & PLAN NOTE
-Cr mildly elevated on admit and improved today.  -Most likely pre-renal due to dehydration from auto-diuresis  -Continue IV fluids  -Repeat labs in AM.

## 2019-06-14 NOTE — PLAN OF CARE
Patient moved from ICU to [facility].    LMSW met with the patient at the bedside.     Patient is alert and oriented with no communication barriers.     Prior to admission patient was independent. Patient denies the use of HH. Patient has a BGM.     Patients PCP is correct on the face sheet. Patient choice pharmacy is at [facility].       Patient has a history of major depression. Patient she has been cleared by her psychiatrist and is no longer taken medications per  orders. Patient denies needing additional mental health resources.     Patient will need Medicaid transportation home.      CM team will continue to follow.        06/14/19 1411   Discharge Assessment   Assessment Type Discharge Planning Assessment   Confirmed/corrected address and phone number on facesheet? Yes   Assessment information obtained from? Patient   Communicated expected length of stay with patient/caregiver no   Prior to hospitilization cognitive status: Alert/Oriented   Prior to hospitalization functional status: Independent   Current cognitive status: Alert/Oriented   Current Functional Status: Independent   Lives With other (see comments)   Able to Return to Prior Arrangements yes   Is patient able to care for self after discharge? Yes   Patient's perception of discharge disposition home or selfcare   Readmission Within the Last 30 Days no previous admission in last 30 days   Patient currently being followed by outpatient case management? No   Patient currently receives any other outside agency services? No   Equipment Currently Used at Home glucometer   Do you have any problems affording any of your prescribed medications? No   Is the patient taking medications as prescribed? yes   Does the patient have transportation home? No   Does the patient receive services at the [facility]? No   Discharge Plan A Home   Patient/Family in Agreement with Plan yes

## 2019-06-14 NOTE — PLAN OF CARE
Problem: Adult Inpatient Plan of Care  Goal: Plan of Care Review  Pt off of insulin drip and transitioned to AC/HS with sliding scale insulin. Tolerating PO without nausea/vomiting. Significant other at bedside. Slept through the night with no problems noted.

## 2019-06-14 NOTE — PROGRESS NOTES
"[facility] Hospital Medicine  Progress Note    Patient Name: Hollie Wilson  MRN: [id number]  Patient Class: IP- Inpatient   Admission Date: 6/13/2019  Length of Stay: 1 days  Attending Physician: Neo Amos MD  Primary Care Provider: Jose R Valdivia MD        Subjective:     Principal Problem:Diabetic ketoacidosis with coma associated with type 1 diabetes mellitus      HPI:  Mrs. Wilson is a29 year old woman with history of bipolar disorder, type 1 diabetes mellitus and crack cocaine abuse who came in for evaluation of nausea and vomiting for 3 days.  History is mostly obtained from review of the records as patient will not answer most of my questions.  Her records indicate she was seen at [facility] ER 2 days ago and treated for hyperglycemia and discharged from the ER.  She states she's been vomiting for 2 days.  She states she has chills and is hungry.  She complains of a headache but when asked for how long she states, "I dunno".  When asked if she has been taking her insulin she states, "I dunno.".  When asked if she has her insulin she states, "I dunno."  She refuses to open her eyes for me.  In the ER she was found to have DKA with elevated anion-gap, dangerously low pH, elevated beta-hydroxybutyrate and hyperglycemia.    Overview/Hospital Course:  No notes on file    Interval History:  No acute events overnight.  Feels much better.  Now on subq insulin.    Review of Systems   Constitutional: Negative for activity change, chills, diaphoresis and fatigue.   HENT: Negative for congestion, drooling and hearing loss.    Eyes: Negative for discharge.   Respiratory: Negative for apnea, chest tightness, shortness of breath and wheezing.    Cardiovascular: Negative for palpitations and leg swelling.   Gastrointestinal: Negative for abdominal distention, abdominal pain, constipation and diarrhea.   Musculoskeletal: Negative for arthralgias and gait problem.   Skin: Negative for rash. "   Neurological: Negative for seizures, light-headedness and numbness.   Hematological: Negative for adenopathy.   Psychiatric/Behavioral: Negative for agitation and behavioral problems.     Objective:     Vital Signs (Most Recent):  Temp: 98.9 °F (37.2 °C) (06/14/19 1147)  Pulse: 74 (06/14/19 1200)  Resp: 18 (06/14/19 1147)  BP: 121/81 (06/14/19 1147)  SpO2: 100 % (06/14/19 1147) Vital Signs (24h Range):  Temp:  [98 °F (36.7 °C)-100.3 °F (37.9 °C)] 98.9 °F (37.2 °C)  Pulse:  [] 74  Resp:  [12-30] 18  SpO2:  [98 %-100 %] 100 %  BP: (103-159)/(59-97) 121/81     Weight: 53.1 kg (117 lb)  Body mass index is 19.47 kg/m².    Intake/Output Summary (Last 24 hours) at 6/14/2019 1216  Last data filed at 6/14/2019 0600  Gross per 24 hour   Intake 2860.33 ml   Output 550 ml   Net 2310.33 ml      Physical Exam   Constitutional: She is oriented to person, place, and time. She appears well-developed and well-nourished.   HENT:   Head: Normocephalic and atraumatic.   Eyes: Pupils are equal, round, and reactive to light. EOM are normal.   Neck: Normal range of motion. Neck supple.   Cardiovascular: Normal rate, regular rhythm and normal heart sounds.   Pulmonary/Chest: Effort normal and breath sounds normal. No respiratory distress.   Abdominal: Soft. Bowel sounds are normal. She exhibits no distension. There is no tenderness.   Musculoskeletal: Normal range of motion. She exhibits no edema.   Neurological: She is oriented to person, place, and time. No cranial nerve deficit. Coordination normal.   Skin: Skin is warm and dry.   Psychiatric: She has a normal mood and affect. Her behavior is normal.   Vitals reviewed.      Significant Labs: All pertinent labs within the past 24 hours have been reviewed.    Significant Imaging: I have reviewed and interpreted all pertinent imaging results/findings within the past 24 hours.      Assessment/Plan:      * Diabetic ketoacidosis with coma associated with type 1 diabetes mellitus  -Mrs.  "Steve was admitted to inpatient status in our icu  -On admit her sugar was 452, anion gap was 29, beta-hydroxybutyrate was 7.0 and pH was 7.09  -She was treated with IV fluids, insulin drip, q1h accu checks and q4h BMP  -Her sugar and anion gap have normalized and she has been transitioned to subq insulin  -She remains mildly acidotic.   -Will transfer to the floor and continue with IV fluids and subq insulin  -She needs to receive diabetes education.  -Hopefully home tomorrow.    KIMBERLY (acute kidney injury)  -Cr mildly elevated on admit and improved today.  -Most likely pre-renal due to dehydration from auto-diuresis  -Continue IV fluids  -Repeat labs in AM.      Hyperkalemia  -K has normalized  -Repeat labs in AM    Polysubstance abuse  -UDS positive for cocaine and marijuana  -Counseled on cessation    Tobacco abuse  -When patient is more receptive I will  on cessation.  -NRT offered        Psychiatric disorder  -Chart notes bipolar disorder and schizophrenia, but patient only responds to inquiries with, "CORRINE batista."  -Continue fluoxetine and seroquel for now.  -      Type 1 diabetes mellitus without complication  -Treatment as above.  -She is severely non-compliant.  -Consult diabetes educator        VTE Risk Mitigation (From admission, onward)        Ordered     IP VTE LOW RISK PATIENT  Once      06/13/19 1144     Place TRISTAN hose  Until discontinued      06/13/19 1144     Place sequential compression device  Until discontinued      06/13/19 1144                Neo Amos MD  Department of Hospital Medicine   [facility]  "

## 2019-06-14 NOTE — SUBJECTIVE & OBJECTIVE
Interval History:  No acute events overnight.  Feels much better.  Now on subq insulin.    Review of Systems   Constitutional: Negative for activity change, chills, diaphoresis and fatigue.   HENT: Negative for congestion, drooling and hearing loss.    Eyes: Negative for discharge.   Respiratory: Negative for apnea, chest tightness, shortness of breath and wheezing.    Cardiovascular: Negative for palpitations and leg swelling.   Gastrointestinal: Negative for abdominal distention, abdominal pain, constipation and diarrhea.   Musculoskeletal: Negative for arthralgias and gait problem.   Skin: Negative for rash.   Neurological: Negative for seizures, light-headedness and numbness.   Hematological: Negative for adenopathy.   Psychiatric/Behavioral: Negative for agitation and behavioral problems.     Objective:     Vital Signs (Most Recent):  Temp: 98.9 °F (37.2 °C) (06/14/19 1147)  Pulse: 74 (06/14/19 1200)  Resp: 18 (06/14/19 1147)  BP: 121/81 (06/14/19 1147)  SpO2: 100 % (06/14/19 1147) Vital Signs (24h Range):  Temp:  [98 °F (36.7 °C)-100.3 °F (37.9 °C)] 98.9 °F (37.2 °C)  Pulse:  [] 74  Resp:  [12-30] 18  SpO2:  [98 %-100 %] 100 %  BP: (103-159)/(59-97) 121/81     Weight: 53.1 kg (117 lb)  Body mass index is 19.47 kg/m².    Intake/Output Summary (Last 24 hours) at 6/14/2019 1216  Last data filed at 6/14/2019 0600  Gross per 24 hour   Intake 2860.33 ml   Output 550 ml   Net 2310.33 ml      Physical Exam   Constitutional: She is oriented to person, place, and time. She appears well-developed and well-nourished.   HENT:   Head: Normocephalic and atraumatic.   Eyes: Pupils are equal, round, and reactive to light. EOM are normal.   Neck: Normal range of motion. Neck supple.   Cardiovascular: Normal rate, regular rhythm and normal heart sounds.   Pulmonary/Chest: Effort normal and breath sounds normal. No respiratory distress.   Abdominal: Soft. Bowel sounds are normal. She exhibits no distension. There is no  tenderness.   Musculoskeletal: Normal range of motion. She exhibits no edema.   Neurological: She is oriented to person, place, and time. No cranial nerve deficit. Coordination normal.   Skin: Skin is warm and dry.   Psychiatric: She has a normal mood and affect. Her behavior is normal.   Vitals reviewed.      Significant Labs: All pertinent labs within the past 24 hours have been reviewed.    Significant Imaging: I have reviewed and interpreted all pertinent imaging results/findings within the past 24 hours.

## 2019-06-14 NOTE — NURSING
Received to room via wheel chair with nurse from ICU, awake and alert. Oriented to room and call light. No c/o at present, no distress noted, will continue to monitor.

## 2019-06-14 NOTE — ASSESSMENT & PLAN NOTE
-Mrs. Wilson was admitted to inpatient status in our icu  -On admit her sugar was 452, anion gap was 29, beta-hydroxybutyrate was 7.0 and pH was 7.09  -She was treated with IV fluids, insulin drip, q1h accu checks and q4h BMP  -Her sugar and anion gap have normalized and she has been transitioned to subq insulin  -She remains mildly acidotic.   -Will transfer to the floor and continue with IV fluids and subq insulin  -She needs to receive diabetes education.  -Hopefully home tomorrow.

## 2019-06-14 NOTE — PHARMACY MED REC
"Admission Medication Reconciliation - Pharmacy Consult Note    The home medication history was taken by Ev Quiros CPhT.  Based on information gathered and subsequent review by the clinical pharmacist, the items below may need attention.     You may go to "Admission" then "Reconcile Home Medications" tabs to review and/or act upon these items.     Potentially problematic discrepancies with current MAR  o Patient IS NOT taking the following which was ordered upon admit  o Fluoxetine 20 mg PO daily  o Quetiapine  mg PO daily    Medications Prior to Admission   Medication Sig Dispense Refill Last Dose    insulin (BASAGLAR KWIKPEN U-100 INSULIN) glargine 100 units/mL (3mL) SubQ pen Inject 20 Units into the skin every evening. (Patient taking differently: Inject 15 Units into the skin 2 (two) times daily. ) 15 mL 3     insulin lispro 100 unit/mL injection Inject 5 Units into the skin 3 (three) times daily before meals. (Patient taking differently: Inject 8 Units into the skin 3 (three) times daily before meals. ) 10 mL 3     ranitidine (ZANTAC) 150 MG capsule Take 150 mg by mouth 2 (two) times daily.   Unknown       Please address this information as you see fit.  Feel free to contact us if you have any questions or require assistance.    Javed Nichols, Pharm.D., BCPS  [phone]                .  .          "

## 2019-06-14 NOTE — CONSULTS
Pt seen by DM educator. When asked questions, pt not responding with coherent answers. Pt has visitor in room upon arrival who pt was yelling at upon entry. Visitor left as soon as educator opened door. Pt spoke about needing to call family, but did not have phone/did not remember numbers. 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 Could not discuss her diabetes in any meaningful way that provides further assistance to patient compliance. Folder with information left for patient.

## 2019-06-15 VITALS
RESPIRATION RATE: 16 BRPM | SYSTOLIC BLOOD PRESSURE: 109 MMHG | DIASTOLIC BLOOD PRESSURE: 74 MMHG | HEART RATE: 90 BPM | TEMPERATURE: 97 F | HEIGHT: 65 IN | OXYGEN SATURATION: 98 % | WEIGHT: 117 LBS | BODY MASS INDEX: 19.49 KG/M2

## 2019-06-15 LAB
ANION GAP SERPL CALC-SCNC: 12 MMOL/L (ref 8–16)
BASOPHILS # BLD AUTO: 0.02 K/UL (ref 0–0.2)
BASOPHILS NFR BLD: 0.5 % (ref 0–1.9)
BUN SERPL-MCNC: 8 MG/DL (ref 6–20)
CALCIUM SERPL-MCNC: 9.2 MG/DL (ref 8.7–10.5)
CHLORIDE SERPL-SCNC: 109 MMOL/L (ref 95–110)
CO2 SERPL-SCNC: 19 MMOL/L (ref 23–29)
CREAT SERPL-MCNC: 0.9 MG/DL (ref 0.5–1.4)
DIFFERENTIAL METHOD: ABNORMAL
EOSINOPHIL # BLD AUTO: 0.1 K/UL (ref 0–0.5)
EOSINOPHIL NFR BLD: 2.8 % (ref 0–8)
ERYTHROCYTE [DISTWIDTH] IN BLOOD BY AUTOMATED COUNT: 16.6 % (ref 11.5–14.5)
EST. GFR  (AFRICAN AMERICAN): >60 ML/MIN/1.73 M^2
EST. GFR  (NON AFRICAN AMERICAN): >60 ML/MIN/1.73 M^2
GLUCOSE SERPL-MCNC: 193 MG/DL (ref 70–110)
HCT VFR BLD AUTO: 42 % (ref 37–48.5)
HGB BLD-MCNC: 13.9 G/DL (ref 12–16)
LYMPHOCYTES # BLD AUTO: 2.1 K/UL (ref 1–4.8)
LYMPHOCYTES NFR BLD: 49.8 % (ref 18–48)
MAGNESIUM SERPL-MCNC: 2.1 MG/DL (ref 1.6–2.6)
MCH RBC QN AUTO: 25.7 PG (ref 27–31)
MCHC RBC AUTO-ENTMCNC: 33.1 G/DL (ref 32–36)
MCV RBC AUTO: 78 FL (ref 82–98)
MONOCYTES # BLD AUTO: 0.4 K/UL (ref 0.3–1)
MONOCYTES NFR BLD: 8.1 % (ref 4–15)
NEUTROPHILS # BLD AUTO: 1.7 K/UL (ref 1.8–7.7)
NEUTROPHILS NFR BLD: 38.6 % (ref 38–73)
PLATELET # BLD AUTO: 216 K/UL (ref 150–350)
PMV BLD AUTO: 11.2 FL (ref 9.2–12.9)
POCT GLUCOSE: 112 MG/DL (ref 70–110)
POCT GLUCOSE: 171 MG/DL (ref 70–110)
POTASSIUM SERPL-SCNC: 3.7 MMOL/L (ref 3.5–5.1)
RBC # BLD AUTO: 5.41 M/UL (ref 4–5.4)
SODIUM SERPL-SCNC: 140 MMOL/L (ref 136–145)
WBC # BLD AUTO: 4.3 K/UL (ref 3.9–12.7)

## 2019-06-15 PROCEDURE — S4991 NICOTINE PATCH NONLEGEND: HCPCS | Performed by: HOSPITALIST

## 2019-06-15 PROCEDURE — 80048 BASIC METABOLIC PNL TOTAL CA: CPT

## 2019-06-15 PROCEDURE — 99239 HOSP IP/OBS DSCHRG MGMT >30: CPT | Mod: ,,, | Performed by: HOSPITALIST

## 2019-06-15 PROCEDURE — 99239 PR HOSPITAL DISCHARGE DAY,>30 MIN: ICD-10-PCS | Mod: ,,, | Performed by: HOSPITALIST

## 2019-06-15 PROCEDURE — 85025 COMPLETE CBC W/AUTO DIFF WBC: CPT

## 2019-06-15 PROCEDURE — 36415 COLL VENOUS BLD VENIPUNCTURE: CPT

## 2019-06-15 PROCEDURE — 25000003 PHARM REV CODE 250: Performed by: HOSPITALIST

## 2019-06-15 PROCEDURE — 83735 ASSAY OF MAGNESIUM: CPT

## 2019-06-15 RX ORDER — QUETIAPINE 300 MG/1
300 TABLET, FILM COATED, EXTENDED RELEASE ORAL DAILY
Status: ON HOLD
Start: 2019-06-15 | End: 2020-01-28

## 2019-06-15 RX ORDER — FLUOXETINE HYDROCHLORIDE 20 MG/1
20 CAPSULE ORAL DAILY
Status: ON HOLD
Start: 2019-06-15 | End: 2020-01-30 | Stop reason: SDUPTHER

## 2019-06-15 RX ORDER — IBUPROFEN 200 MG
1 TABLET ORAL DAILY
Refills: 0 | Status: ON HOLD | COMMUNITY
Start: 2019-06-16 | End: 2020-03-25 | Stop reason: SDUPTHER

## 2019-06-15 RX ADMIN — FAMOTIDINE 20 MG: 20 TABLET, FILM COATED ORAL at 09:06

## 2019-06-15 RX ADMIN — NICOTINE 1 PATCH: 14 PATCH, EXTENDED RELEASE TRANSDERMAL at 09:06

## 2019-06-15 RX ADMIN — INSULIN DETEMIR 15 UNITS: 100 INJECTION, SOLUTION SUBCUTANEOUS at 09:06

## 2019-06-15 RX ADMIN — SODIUM CHLORIDE: 0.45 INJECTION, SOLUTION INTRAVENOUS at 12:06

## 2019-06-15 RX ADMIN — INSULIN ASPART 5 UNITS: 100 INJECTION, SOLUTION INTRAVENOUS; SUBCUTANEOUS at 09:06

## 2019-06-15 RX ADMIN — SODIUM CHLORIDE: 0.45 INJECTION, SOLUTION INTRAVENOUS at 07:06

## 2019-06-15 RX ADMIN — ACETAMINOPHEN 650 MG: 325 TABLET, FILM COATED ORAL at 01:06

## 2019-06-15 NOTE — ASSESSMENT & PLAN NOTE
-Chart notes bipolar disorder and schizophrenia  -She is at baseline status at the time of discharge per discussion with her wife.  -Continue fluoxetine and seroquel for now.  -Follow up with primary care provider at discharge

## 2019-06-15 NOTE — ASSESSMENT & PLAN NOTE
-Mrs. Wilson was admitted to inpatient status in our icu  -On admit her sugar was 452, anion gap was 29, beta-hydroxybutyrate was 7.0 and pH was 7.09  -She was treated with IV fluids, insulin drip, q1h accu checks and q4h BMP  -Her sugar and anion gap have normalized.  she was transitioned to subq insulin and stepped down to the floor on 6/14.  -She received additional diabetes education from myself.  -Her sugars are stable and she has her insulin and can be safely discharged.

## 2019-06-15 NOTE — NURSING
Patient educated on discharge instructions and verbalized understanding.  All questions answered to patient's satisfaction.  IVs removed without complication.  Wife at bedside.  Patient to be transported off unit via walking.

## 2019-06-15 NOTE — ASSESSMENT & PLAN NOTE
-UDS positive for cocaine and marijuana  -had an extensive conversation with she and her wife regarding the risks of further cocaine use including heart attack, stroke and peripheral vascular disease--all leading to early death  -She is in denial that she has an addiction and is pre-contemplative at this point.

## 2019-06-15 NOTE — DISCHARGE SUMMARY
"[facility] Hospital Medicine  Discharge Summary      Patient Name: Hollie Wilson  MRN: [id number]  Admission Date: 6/13/2019  Hospital Length of Stay: 2 days  Discharge Date and Time:  06/15/2019 12:02 PM  Attending Physician: Johnny Amos MD   Discharging Provider: Johnny Amos MD  Primary Care Provider: Jose R Valdivia MD      HPI:   Mrs. Wilson is a29 year old woman with history of bipolar disorder, type 1 diabetes mellitus and crack cocaine abuse who came in for evaluation of nausea and vomiting for 3 days.  History is mostly obtained from review of the records as patient will not answer most of my questions.  Her records indicate she was seen at [facility] ER 2 days ago and treated for hyperglycemia and discharged from the ER.  She states she's been vomiting for 2 days.  She states she has chills and is hungry.  She complains of a headache but when asked for how long she states, "I dunno".  When asked if she has been taking her insulin she states, "I dunno.".  When asked if she has her insulin she states, "I dunno."  She refuses to open her eyes for me.  In the ER she was found to have DKA with elevated anion-gap, dangerously low pH, elevated beta-hydroxybutyrate and hyperglycemia.    Consults:   Consults (From admission, onward)        Status Ordering Provider     Inpatient consult to Diabetes educator  Once     Provider:  (Not yet assigned)    Completed JOHNNY AMOS        Hospital Course By Problem:   * Diabetic ketoacidosis with coma associated with type 1 diabetes mellitus  -Mrs. Wilson was admitted to inpatient status in our icu  -On admit her sugar was 452, anion gap was 29, beta-hydroxybutyrate was 7.0 and pH was 7.09  -She was treated with IV fluids, insulin drip, q1h accu checks and q4h BMP  -Her sugar and anion gap have normalized.  she was transitioned to subq insulin and stepped down to the floor on 6/14.  -She received additional diabetes education from myself.  -Her " sugars are stable and she has her insulin and can be safely discharged.    KIMBERLY (acute kidney injury)  -Cr mildly elevated on admit  -Most likely pre-renal due to dehydration from auto-diuresis  -Cr has normalized by the time of discharge    Hyperkalemia  -K has normalized    Polysubstance abuse  -UDS positive for cocaine and marijuana  -had an extensive conversation with she and her wife regarding the risks of further cocaine use including heart attack, stroke and peripheral vascular disease--all leading to early death  -She is in denial that she has an addiction and is pre-contemplative at this point.    Tobacco abuse  -Counselled on cessation.  -NRT offered        Psychiatric disorder  -Chart notes bipolar disorder and schizophrenia  -She is at baseline status at the time of discharge per discussion with her wife.  -Continue fluoxetine and seroquel for now.  -Follow up with primary care provider at discharge      Type 1 diabetes mellitus without complication  -Treatment as above.  -She is severely non-compliant.      Final Active Diagnoses:    Diagnosis Date Noted POA    PRINCIPAL PROBLEM:  Diabetic ketoacidosis with coma associated with type 1 diabetes mellitus [E10.11] 12/06/2018 Yes    KIMBERLY (acute kidney injury) [N17.9] 06/13/2019 Yes    Hyperkalemia [E87.5] 06/13/2019 Yes    Polysubstance abuse [F19.10] 06/13/2019 Yes    Tobacco abuse [Z72.0] 06/13/2019 Yes    Psychiatric disorder [F99] 12/23/2018 Yes    Type 1 diabetes mellitus without complication [E10.9] 12/23/2018 Yes      Problems Resolved During this Admission:    Diagnosis Date Noted Date Resolved POA    Diabetic ketoacidosis without coma associated with type 1 diabetes mellitus [E10.10] 06/13/2019 06/13/2019 Yes       Discharged Condition: good    Disposition: Home or Self Care    Follow Up:  Follow-up Information     Jose R Valdivia MD In 1 week.    Specialty:  Internal Medicine  Contact information:  [address] [facility] [address]  [place]  [place] [address]  [phone]                 Patient Instructions:      Diet diabetic     Notify your health care provider if you experience any of the following:  increased confusion or weakness     Notify your health care provider if you experience any of the following:  persistent dizziness, light-headedness, or visual disturbances     Notify your health care provider if you experience any of the following:  worsening rash     Notify your health care provider if you experience any of the following:  severe persistent headache     Notify your health care provider if you experience any of the following:  difficulty breathing or increased cough     Notify your health care provider if you experience any of the following:  severe uncontrolled pain     Notify your health care provider if you experience any of the following:  persistent nausea and vomiting or diarrhea     Notify your health care provider if you experience any of the following:  temperature >100.4     Activity as tolerated       Significant Diagnostic Studies: Labs:   BMP:   Recent Labs   Lab 06/14/19  0547 06/14/19  1515 06/15/19  0555   * 242* 193*    136 140   K 3.6 3.4* 3.7    106 109   CO2 16* 20* 19*   BUN 9 10 8   CREATININE 1.1 1.1 0.9   CALCIUM 8.9 8.5* 9.2   MG 1.9  --  2.1   , CMP   Recent Labs   Lab 06/14/19  0547 06/14/19  1515 06/15/19  0555    136 140   K 3.6 3.4* 3.7    106 109   CO2 16* 20* 19*   * 242* 193*   BUN 9 10 8   CREATININE 1.1 1.1 0.9   CALCIUM 8.9 8.5* 9.2   ANIONGAP 12 10 12   ESTGFRAFRICA >60 >60 >60   EGFRNONAA >60 >60 >60   , CBC   Recent Labs   Lab 06/14/19  0548 06/15/19  0555   WBC 9.22 4.30   HGB 12.8 13.9   HCT 39.4 42.0    216    and A1C:   Recent Labs   Lab 12/21/18  2124 06/13/19  1615   HGBA1C >14.0* 13.4*       Pending Diagnostic Studies:     None         Medications:  Reconciled Home Medications:      Medication List      START taking these medications    nicotine 14 mg/24  hr  Commonly known as:  NICODERM CQ  Place 1 patch onto the skin once daily.  Start taking on:  6/16/2019        CHANGE how you take these medications    insulin glargine 100 units/mL (3mL) SubQ pen  Commonly known as:  BASAGLAR KWIKPEN U-100 INSULIN  Inject 20 Units into the skin every evening.  What changed:    · how much to take  · when to take this     insulin lispro 100 unit/mL injection  Inject 5 Units into the skin 3 (three) times daily before meals.  What changed:  how much to take     QUEtiapine 300 MG Tb24  Commonly known as:  SEROQUEL XR  Take 1 tablet (300 mg total) by mouth once daily.  What changed:  how much to take        CONTINUE taking these medications    FLUoxetine 20 MG capsule  Take 1 capsule (20 mg total) by mouth once daily.     ranitidine 150 MG capsule  Commonly known as:  ZANTAC  Take 150 mg by mouth 2 (two) times daily.            Indwelling Lines/Drains at time of discharge:   Lines/Drains/Airways          None          Time spent on the discharge of patient: 35 minutes  Patient was seen and examined on the date of discharge and determined to be suitable for discharge.         Neo Amos MD  Department of Hospital Medicine  [facility]

## 2019-06-15 NOTE — ASSESSMENT & PLAN NOTE
-Cr mildly elevated on admit  -Most likely pre-renal due to dehydration from auto-diuresis  -Cr has normalized by the time of discharge

## 2019-06-15 NOTE — PLAN OF CARE
Problem: Fall Injury Risk  Goal: Absence of Fall and Fall-Related Injury    Intervention: Identify and Manage Contributors to Fall Injury Risk     06/15/19 0517   Manage Acute Allergic Reaction   Medication Review/Management medications reviewed   Identify and Manage Contributors to Fall Injury Risk   Self-Care Promotion independence encouraged         Problem: Adult Inpatient Plan of Care  Goal: Absence of Hospital-Acquired Illness or Injury    Intervention: Identify and Manage Fall Risk     06/15/19 0517   Optimize Balance and Safe Activity   Safety Promotion/Fall Prevention assistive device/personal item within reach;bed alarm refused;Fall Risk signage in place;family to remain at bedside;nonskid shoes/socks when out of bed;side rails raised x 2;instructed to call staff for mobility         Problem: Diabetes Comorbidity  Goal: Blood Glucose Level Within Desired Range    Intervention: Maintain Glycemic Control     06/15/19 0517   Monitor and Manage Ketoacidosis   Glycemic Management blood glucose monitoring

## 2019-06-15 NOTE — PLAN OF CARE
No DC needs.       06/15/19 1128   Final Note   Assessment Type Final Discharge Note   Anticipated Discharge Disposition Home   Hospital Follow Up  Appt(s) scheduled? Yes   Discharge plans and expectations educations in teach back method with documentation complete? Yes   Right Care Referral Info   Post Acute Recommendation No Care

## 2020-01-27 ENCOUNTER — HOSPITAL ENCOUNTER (INPATIENT)
Facility: OTHER | Age: 30
LOS: 3 days | Discharge: HOME | DRG: 638 | End: 2020-01-30
Attending: EMERGENCY MEDICINE | Admitting: INTERNAL MEDICINE
Payer: MEDICAID

## 2020-01-27 DIAGNOSIS — E10.10 DIABETIC KETOACIDOSIS WITHOUT COMA ASSOCIATED WITH TYPE 1 DIABETES MELLITUS: ICD-10-CM

## 2020-01-27 DIAGNOSIS — F19.10 POLYSUBSTANCE ABUSE: ICD-10-CM

## 2020-01-27 DIAGNOSIS — Z72.0 TOBACCO ABUSE: ICD-10-CM

## 2020-01-27 DIAGNOSIS — E11.10 DKA (DIABETIC KETOACIDOSES): ICD-10-CM

## 2020-01-27 DIAGNOSIS — E87.20 LACTIC ACIDOSIS: Primary | ICD-10-CM

## 2020-01-27 DIAGNOSIS — F79 INTELLECTUAL DISABILITY: ICD-10-CM

## 2020-01-27 DIAGNOSIS — F33.40 RECURRENT MAJOR DEPRESSIVE DISORDER, IN REMISSION: ICD-10-CM

## 2020-01-27 DIAGNOSIS — N17.9 AKI (ACUTE KIDNEY INJURY): ICD-10-CM

## 2020-01-27 DIAGNOSIS — F99 PSYCHIATRIC DISORDER: ICD-10-CM

## 2020-01-27 LAB
ALBUMIN SERPL BCP-MCNC: 4.2 G/DL (ref 3.5–5.2)
ALLENS TEST: ABNORMAL
ALP SERPL-CCNC: 99 U/L (ref 55–135)
ALT SERPL W/O P-5'-P-CCNC: 23 U/L (ref 10–44)
AMPHET+METHAMPHET UR QL: NEGATIVE
ANION GAP SERPL CALC-SCNC: 11 MMOL/L (ref 8–16)
ANION GAP SERPL CALC-SCNC: 11 MMOL/L (ref 8–16)
ANION GAP SERPL CALC-SCNC: 27 MMOL/L (ref 8–16)
AST SERPL-CCNC: 26 U/L (ref 10–40)
B-HCG UR QL: NEGATIVE
B-OH-BUTYR BLD STRIP-SCNC: 5.7 MMOL/L (ref 0–0.5)
BACTERIA #/AREA URNS HPF: ABNORMAL /HPF
BACTERIA #/AREA URNS HPF: NORMAL /HPF
BARBITURATES UR QL SCN>200 NG/ML: NEGATIVE
BASOPHILS # BLD AUTO: 0.03 K/UL (ref 0–0.2)
BASOPHILS NFR BLD: 0.3 % (ref 0–1.9)
BENZODIAZ UR QL SCN>200 NG/ML: NEGATIVE
BILIRUB SERPL-MCNC: 0.8 MG/DL (ref 0.1–1)
BILIRUB UR QL STRIP: ABNORMAL
BILIRUB UR QL STRIP: NEGATIVE
BUN SERPL-MCNC: 16 MG/DL (ref 6–20)
BUN SERPL-MCNC: 16 MG/DL (ref 6–20)
BUN SERPL-MCNC: 19 MG/DL (ref 6–20)
BZE UR QL SCN: NEGATIVE
CALCIUM SERPL-MCNC: 10.6 MG/DL (ref 8.7–10.5)
CALCIUM SERPL-MCNC: 8.6 MG/DL (ref 8.7–10.5)
CALCIUM SERPL-MCNC: 8.6 MG/DL (ref 8.7–10.5)
CANNABINOIDS UR QL SCN: NORMAL
CHLORIDE SERPL-SCNC: 102 MMOL/L (ref 95–110)
CHLORIDE SERPL-SCNC: 115 MMOL/L (ref 95–110)
CHLORIDE SERPL-SCNC: 115 MMOL/L (ref 95–110)
CLARITY UR: ABNORMAL
CLARITY UR: CLEAR
CO2 SERPL-SCNC: 12 MMOL/L (ref 23–29)
CO2 SERPL-SCNC: 18 MMOL/L (ref 23–29)
CO2 SERPL-SCNC: 18 MMOL/L (ref 23–29)
COLOR UR: ABNORMAL
COLOR UR: YELLOW
CREAT SERPL-MCNC: 1 MG/DL (ref 0.5–1.4)
CREAT SERPL-MCNC: 1 MG/DL (ref 0.5–1.4)
CREAT SERPL-MCNC: 1.4 MG/DL (ref 0.5–1.4)
CREAT UR-MCNC: 35.1 MG/DL (ref 15–325)
CTP QC/QA: YES
DELSYS: ABNORMAL
DIFFERENTIAL METHOD: ABNORMAL
EOSINOPHIL # BLD AUTO: 0 K/UL (ref 0–0.5)
EOSINOPHIL NFR BLD: 0.3 % (ref 0–8)
ERYTHROCYTE [DISTWIDTH] IN BLOOD BY AUTOMATED COUNT: 16.3 % (ref 11.5–14.5)
EST. GFR  (AFRICAN AMERICAN): 58 ML/MIN/1.73 M^2
EST. GFR  (AFRICAN AMERICAN): >60 ML/MIN/1.73 M^2
EST. GFR  (AFRICAN AMERICAN): >60 ML/MIN/1.73 M^2
EST. GFR  (NON AFRICAN AMERICAN): 50 ML/MIN/1.73 M^2
EST. GFR  (NON AFRICAN AMERICAN): >60 ML/MIN/1.73 M^2
EST. GFR  (NON AFRICAN AMERICAN): >60 ML/MIN/1.73 M^2
ETHANOL UR-MCNC: <10 MG/DL
FLOW: 2
GLUCOSE SERPL-MCNC: 196 MG/DL (ref 70–110)
GLUCOSE SERPL-MCNC: 196 MG/DL (ref 70–110)
GLUCOSE SERPL-MCNC: 604 MG/DL (ref 70–110)
GLUCOSE UR QL STRIP: ABNORMAL
GLUCOSE UR QL STRIP: ABNORMAL
HCO3 UR-SCNC: 14 MMOL/L (ref 24–28)
HCT VFR BLD AUTO: 38.9 % (ref 37–48.5)
HGB BLD-MCNC: 11.8 G/DL (ref 12–16)
HGB UR QL STRIP: ABNORMAL
HGB UR QL STRIP: ABNORMAL
HYALINE CASTS #/AREA URNS LPF: 0 /LPF
IMM GRANULOCYTES # BLD AUTO: 0.06 K/UL (ref 0–0.04)
IMM GRANULOCYTES NFR BLD AUTO: 0.6 % (ref 0–0.5)
KETONES UR QL STRIP: ABNORMAL
KETONES UR QL STRIP: ABNORMAL
LACTATE SERPL-SCNC: 2.5 MMOL/L (ref 0.5–2.2)
LACTATE SERPL-SCNC: 4.1 MMOL/L (ref 0.5–2.2)
LEUKOCYTE ESTERASE UR QL STRIP: NEGATIVE
LEUKOCYTE ESTERASE UR QL STRIP: NEGATIVE
LYMPHOCYTES # BLD AUTO: 0.3 K/UL (ref 1–4.8)
LYMPHOCYTES NFR BLD: 2.5 % (ref 18–48)
MAGNESIUM SERPL-MCNC: 1.8 MG/DL (ref 1.6–2.6)
MCH RBC QN AUTO: 26.6 PG (ref 27–31)
MCHC RBC AUTO-ENTMCNC: 30.3 G/DL (ref 32–36)
MCV RBC AUTO: 88 FL (ref 82–98)
METHADONE UR QL SCN>300 NG/ML: NEGATIVE
MICROSCOPIC COMMENT: ABNORMAL
MICROSCOPIC COMMENT: NORMAL
MODE: ABNORMAL
MONOCYTES # BLD AUTO: 0.7 K/UL (ref 0.3–1)
MONOCYTES NFR BLD: 7.2 % (ref 4–15)
NEUTROPHILS # BLD AUTO: 8.9 K/UL (ref 1.8–7.7)
NEUTROPHILS NFR BLD: 89.1 % (ref 38–73)
NITRITE UR QL STRIP: NEGATIVE
NITRITE UR QL STRIP: NEGATIVE
NRBC BLD-RTO: 0 /100 WBC
OPIATES UR QL SCN: NEGATIVE
PCO2 BLDA: 27.9 MMHG (ref 35–45)
PCP UR QL SCN>25 NG/ML: NEGATIVE
PH SMN: 7.31 [PH] (ref 7.35–7.45)
PH UR STRIP: 6 [PH] (ref 5–8)
PH UR STRIP: 6 [PH] (ref 5–8)
PLATELET # BLD AUTO: 227 K/UL (ref 150–350)
PMV BLD AUTO: 11.7 FL (ref 9.2–12.9)
PO2 BLDA: 29 MMHG (ref 40–60)
POC BE: -12 MMOL/L
POC SATURATED O2: 51 % (ref 95–100)
POCT GLUCOSE: 179 MG/DL (ref 70–110)
POCT GLUCOSE: 182 MG/DL (ref 70–110)
POCT GLUCOSE: 187 MG/DL (ref 70–110)
POCT GLUCOSE: 257 MG/DL (ref 70–110)
POCT GLUCOSE: 270 MG/DL (ref 70–110)
POCT GLUCOSE: 332 MG/DL (ref 70–110)
POTASSIUM SERPL-SCNC: 4 MMOL/L (ref 3.5–5.1)
POTASSIUM SERPL-SCNC: 4 MMOL/L (ref 3.5–5.1)
POTASSIUM SERPL-SCNC: 4.9 MMOL/L (ref 3.5–5.1)
PROT SERPL-MCNC: 7.9 G/DL (ref 6–8.4)
PROT UR QL STRIP: ABNORMAL
PROT UR QL STRIP: NEGATIVE
RBC # BLD AUTO: 4.43 M/UL (ref 4–5.4)
RBC #/AREA URNS HPF: 2 /HPF (ref 0–4)
RBC #/AREA URNS HPF: 4 /HPF (ref 0–4)
SAMPLE: ABNORMAL
SITE: ABNORMAL
SODIUM SERPL-SCNC: 141 MMOL/L (ref 136–145)
SODIUM SERPL-SCNC: 144 MMOL/L (ref 136–145)
SODIUM SERPL-SCNC: 144 MMOL/L (ref 136–145)
SP GR UR STRIP: 1.02 (ref 1–1.03)
SP GR UR STRIP: 1.02 (ref 1–1.03)
SP02: 100
SQUAMOUS #/AREA URNS HPF: 8 /HPF
SQUAMOUS #/AREA URNS HPF: 8 /HPF
TOXICOLOGY INFORMATION: NORMAL
TRICHOMONAS UR QL MICRO: ABNORMAL
TROPONIN I SERPL DL<=0.01 NG/ML-MCNC: <0.006 NG/ML (ref 0–0.03)
URN SPEC COLLECT METH UR: ABNORMAL
URN SPEC COLLECT METH UR: ABNORMAL
UROBILINOGEN UR STRIP-ACNC: NEGATIVE EU/DL
UROBILINOGEN UR STRIP-ACNC: NEGATIVE EU/DL
WBC # BLD AUTO: 10.02 K/UL (ref 3.9–12.7)
WBC #/AREA URNS HPF: 2 /HPF (ref 0–5)
WBC #/AREA URNS HPF: 4 /HPF (ref 0–5)
YEAST URNS QL MICRO: ABNORMAL
YEAST URNS QL MICRO: NORMAL

## 2020-01-27 PROCEDURE — 83605 ASSAY OF LACTIC ACID: CPT | Mod: 91

## 2020-01-27 PROCEDURE — 63600175 PHARM REV CODE 636 W HCPCS: Performed by: EMERGENCY MEDICINE

## 2020-01-27 PROCEDURE — 63600175 PHARM REV CODE 636 W HCPCS: Performed by: PHYSICIAN ASSISTANT

## 2020-01-27 PROCEDURE — 63600175 PHARM REV CODE 636 W HCPCS: Performed by: INTERNAL MEDICINE

## 2020-01-27 PROCEDURE — 93010 EKG 12-LEAD: ICD-10-PCS | Mod: ,,, | Performed by: INTERNAL MEDICINE

## 2020-01-27 PROCEDURE — 80048 BASIC METABOLIC PNL TOTAL CA: CPT

## 2020-01-27 PROCEDURE — 99900035 HC TECH TIME PER 15 MIN (STAT)

## 2020-01-27 PROCEDURE — 84484 ASSAY OF TROPONIN QUANT: CPT

## 2020-01-27 PROCEDURE — 96365 THER/PROPH/DIAG IV INF INIT: CPT

## 2020-01-27 PROCEDURE — 81000 URINALYSIS NONAUTO W/SCOPE: CPT

## 2020-01-27 PROCEDURE — 20000000 HC ICU ROOM

## 2020-01-27 PROCEDURE — 81025 URINE PREGNANCY TEST: CPT | Performed by: EMERGENCY MEDICINE

## 2020-01-27 PROCEDURE — 25000003 PHARM REV CODE 250: Performed by: PHYSICIAN ASSISTANT

## 2020-01-27 PROCEDURE — 80307 DRUG TEST PRSMV CHEM ANLYZR: CPT

## 2020-01-27 PROCEDURE — 99285 EMERGENCY DEPT VISIT HI MDM: CPT | Mod: 25

## 2020-01-27 PROCEDURE — 85025 COMPLETE CBC W/AUTO DIFF WBC: CPT

## 2020-01-27 PROCEDURE — 96361 HYDRATE IV INFUSION ADD-ON: CPT

## 2020-01-27 PROCEDURE — 82803 BLOOD GASES ANY COMBINATION: CPT

## 2020-01-27 PROCEDURE — 93005 ELECTROCARDIOGRAM TRACING: CPT

## 2020-01-27 PROCEDURE — 82010 KETONE BODYS QUAN: CPT

## 2020-01-27 PROCEDURE — 87040 BLOOD CULTURE FOR BACTERIA: CPT | Mod: 59

## 2020-01-27 PROCEDURE — 83605 ASSAY OF LACTIC ACID: CPT

## 2020-01-27 PROCEDURE — 25000003 PHARM REV CODE 250: Performed by: INTERNAL MEDICINE

## 2020-01-27 PROCEDURE — 99223 PR INITIAL HOSPITAL CARE,LEVL III: ICD-10-PCS | Mod: ,,, | Performed by: INTERNAL MEDICINE

## 2020-01-27 PROCEDURE — 99223 1ST HOSP IP/OBS HIGH 75: CPT | Mod: ,,, | Performed by: INTERNAL MEDICINE

## 2020-01-27 PROCEDURE — 87632 RESP VIRUS 6-11 TARGETS: CPT

## 2020-01-27 PROCEDURE — 36415 COLL VENOUS BLD VENIPUNCTURE: CPT

## 2020-01-27 PROCEDURE — 80053 COMPREHEN METABOLIC PANEL: CPT

## 2020-01-27 PROCEDURE — 81000 URINALYSIS NONAUTO W/SCOPE: CPT | Mod: 91

## 2020-01-27 PROCEDURE — 93010 ELECTROCARDIOGRAM REPORT: CPT | Mod: ,,, | Performed by: INTERNAL MEDICINE

## 2020-01-27 PROCEDURE — 83735 ASSAY OF MAGNESIUM: CPT

## 2020-01-27 RX ORDER — SODIUM CHLORIDE 0.9 % (FLUSH) 0.9 %
10 SYRINGE (ML) INJECTION
Status: DISCONTINUED | OUTPATIENT
Start: 2020-01-27 | End: 2020-01-30 | Stop reason: HOSPADM

## 2020-01-27 RX ORDER — SODIUM CHLORIDE 0.9 % (FLUSH) 0.9 %
10 SYRINGE (ML) INJECTION
Status: DISCONTINUED | OUTPATIENT
Start: 2020-01-27 | End: 2020-01-28

## 2020-01-27 RX ORDER — HYDROXYZINE PAMOATE 25 MG/1
25 CAPSULE ORAL EVERY 8 HOURS PRN
Status: DISCONTINUED | OUTPATIENT
Start: 2020-01-27 | End: 2020-01-30 | Stop reason: HOSPADM

## 2020-01-27 RX ORDER — SODIUM CHLORIDE 9 MG/ML
INJECTION, SOLUTION INTRAVENOUS CONTINUOUS
Status: DISCONTINUED | OUTPATIENT
Start: 2020-01-27 | End: 2020-01-27

## 2020-01-27 RX ORDER — DEXTROSE MONOHYDRATE 100 MG/ML
1000 INJECTION, SOLUTION INTRAVENOUS
Status: DISCONTINUED | OUTPATIENT
Start: 2020-01-27 | End: 2020-01-28

## 2020-01-27 RX ORDER — FLUOXETINE HYDROCHLORIDE 20 MG/1
20 CAPSULE ORAL DAILY
Status: DISCONTINUED | OUTPATIENT
Start: 2020-01-28 | End: 2020-01-28

## 2020-01-27 RX ORDER — ACETAMINOPHEN 325 MG/1
650 TABLET ORAL EVERY 6 HOURS PRN
Status: DISCONTINUED | OUTPATIENT
Start: 2020-01-27 | End: 2020-01-30 | Stop reason: HOSPADM

## 2020-01-27 RX ORDER — HALOPERIDOL 5 MG/ML
2 INJECTION INTRAMUSCULAR ONCE
Status: COMPLETED | OUTPATIENT
Start: 2020-01-27 | End: 2020-01-27

## 2020-01-27 RX ORDER — CHLORPROMAZINE HYDROCHLORIDE 25 MG/1
25 TABLET, FILM COATED ORAL NIGHTLY
Status: DISCONTINUED | OUTPATIENT
Start: 2020-01-27 | End: 2020-01-30 | Stop reason: HOSPADM

## 2020-01-27 RX ORDER — PANTOPRAZOLE SODIUM 40 MG/1
40 TABLET, DELAYED RELEASE ORAL DAILY
Status: DISCONTINUED | OUTPATIENT
Start: 2020-01-28 | End: 2020-01-30 | Stop reason: HOSPADM

## 2020-01-27 RX ORDER — DEXTROSE MONOHYDRATE AND SODIUM CHLORIDE 5; .9 G/100ML; G/100ML
INJECTION, SOLUTION INTRAVENOUS CONTINUOUS
Status: DISCONTINUED | OUTPATIENT
Start: 2020-01-27 | End: 2020-01-28

## 2020-01-27 RX ADMIN — CHLORPROMAZINE HYDROCHLORIDE 25 MG: 25 TABLET, SUGAR COATED ORAL at 09:01

## 2020-01-27 RX ADMIN — CEFTRIAXONE 1 G: 1 INJECTION, SOLUTION INTRAVENOUS at 05:01

## 2020-01-27 RX ADMIN — SODIUM CHLORIDE 1000 ML: 0.9 INJECTION, SOLUTION INTRAVENOUS at 02:01

## 2020-01-27 RX ADMIN — PROMETHAZINE HYDROCHLORIDE 12.5 MG: 25 INJECTION INTRAMUSCULAR; INTRAVENOUS at 03:01

## 2020-01-27 RX ADMIN — ACETAMINOPHEN 650 MG: 325 TABLET ORAL at 09:01

## 2020-01-27 RX ADMIN — HALOPERIDOL LACTATE 2 MG: 5 INJECTION INTRAMUSCULAR at 07:01

## 2020-01-27 RX ADMIN — SODIUM CHLORIDE 5 UNITS/HR: 9 INJECTION, SOLUTION INTRAVENOUS at 03:01

## 2020-01-27 RX ADMIN — HYDROXYZINE PAMOATE 25 MG: 25 CAPSULE ORAL at 07:01

## 2020-01-27 RX ADMIN — DEXTROSE AND SODIUM CHLORIDE: 5; .9 INJECTION, SOLUTION INTRAVENOUS at 07:01

## 2020-01-27 RX ADMIN — SODIUM CHLORIDE: 0.9 INJECTION, SOLUTION INTRAVENOUS at 05:01

## 2020-01-27 RX ADMIN — SODIUM CHLORIDE 1000 ML: 0.9 INJECTION, SOLUTION INTRAVENOUS at 03:01

## 2020-01-27 NOTE — ASSESSMENT & PLAN NOTE
DKA - in T1D poorly adherent with treatment. Multiple admissions for DKA with recent ED visit at [facility] on 1/14/2020 (d/c from ED) and [facility] 1/14/20 (admitted for 2 days). On Levemir 16 units at bedtime and Aspart 6 units with meals as outpatient. Last A1C on 1/14/2020 was 11.3.     Glucose of 604 with AG of 27 and B-HB of 5.7 and Lactate of 4.1 on admission.  Venous PH 7.307.  Will admit to ICU on Insulin drip protocol until AG close. IVFluids and Electrolyte replacement with q1 hour BMP.    WBC normal, but has left shift. Lactate elevated at 4.1. CXR normal. Blood cultures pending from ED. Check RVP and Procalcitonin. No obvious source of infection at this time, so will not place on antibiotics.

## 2020-01-27 NOTE — ASSESSMENT & PLAN NOTE
KIMBERLY - creatinine 1.4 on admission with normal baseline. No significant proteinuria on past UAs.  Suspect pre-renal. Follow. Urine lytes if it does not resolve.

## 2020-01-27 NOTE — HOSPITAL COURSE
Patient was started on an insulin drip and IV fluids and transferred to the ICU.  Anion gap closed and she was transition to basal/prandial insulin and transferred to the floor on 1/28.  Nausea improved and she was tolerating a diabetic diet on discharge.  Patient requested a refill of her Novolog and diabetic supplies, but stated she had enough Levemir.  When Novolog prescription was sent to the pharmacy they said it was not time for a refill yet, she should still have plenty despite the fact that she missed an appointment with her endocrinologist (1/29) while she was in the hospital.  On further discussion with her she said that she had run out because she couldn't see the dial on the pen and was wasting too much insulin when priming the pen.  In addition she was not sure how much insulin she was giving herself.  Her boyfriend at bedside said he could not help her with this.  We tried to teach her how to count the clicks prior to giving the insulin but it's not clear whether she understood the instructions.  She should follow up in endocrinology clinic at [facility] as soon as possible for further diabetic education.  While she has low intellectual abilities and has mental health issues she is very clear about the amount and type of insulin she is using so should be teachable.

## 2020-01-27 NOTE — ASSESSMENT & PLAN NOTE
MDD and Intellectual Disability - continue Thorazine, Prozac, Hydroxyzine.  SW consult.  Lives with a friend.

## 2020-01-27 NOTE — HPI
Per H&P by Dr. Panda:  Patient is a 29 year old female with a PMH significant for T1D (multiple admissions for DKA and poor adherence with insulin treatment), MDD, Intellectual Disability (IQ 71), Polysubstance Abuse, GERD who presents with N/V/D and found to be in DKA with Glucose of 604 with AG of 27 and B-HB of 5.7 and Lactate of 4.1.  Patient states the last time she took her insulin was yesterday afternoon.  She is able to tell me her Aspart dose, but not her Detemir dose.  When questioned why she has had multiple admissions for DKA off medications, she says people are stealing her medications.  States she started with N/V today, but has been having daily loose BMs for the past week.  Complains of total body pain, but no specific chest pain.  Has epigastric pain on admission.  Denies f/c.  No cough.  No SOB.  No headaches or change in vision.  Admits to daily marijuana use, but denies other substance use.  No suicidal ideations today.  She is oriented to person, place, month/year, president and situation.

## 2020-01-27 NOTE — H&P
[facility] Hospital Medicine  History & Physical    Patient Name: Hollie Wilson  MRN: [id number]  Admission Date: 1/27/2020  Attending Physician: Saskia Black MD   Primary Care Provider: Jose R Valdivia MD         Patient information was obtained from patient, past medical records and ER records.     Subjective:     Principal Problem:DKA (diabetic ketoacidoses)    Chief Complaint:   Chief Complaint   Patient presents with    Abdominal Pain     n/v x1 week . PMH DKA. EMS CBG>500        HPI: Patient is a 29 year old female with a PMH significant for T1D (multiple admissions for DKA and poor adherence with insulin treatment), MDD, Intellectual Disability (IQ 71), Polysubstance Abuse, GERD who presents with N/V/D and found to be in DKA with Glucose of 604 with AG of 27 and B-HB of 5.7 and Lactate of 4.1.  Patient states the last time she took her insulin was yesterday afternoon.  She is able to tell me her Aspart dose, but not her Detemir dose.  When questioned why she has had multiple admissions for DKA off medications, she says people are stealing her medications.  States she started with N/V today, but has been having daily loose BMs for the past week.  Complains of total body pain, but no specific chest pain.  Has epigastric pain on admission.  Denies f/c.  No cough.  No SOB.  No headaches or change in vision.  Admits to daily marijuana use, but denies other substance use.  No suicidal ideations today.  She is oriented to person, place, month/year, president and situation.      No new subjective & objective note has been filed under this hospital service since the last note was generated.    Assessment/Plan:   Patient is a 29 year old female with a PMH significant for T1D (multiple admissions for DKA and poor adherence with insulin treatment), MDD, Intellectual Disability (IQ 71), Polysubstance Abuse, GERD who presents with N/V/D and found to be in DKA with Glucose of 604 with AG of 27 and  B-HB of 5.7 and Lactate of 4.1.     -Endocrine - DKA - in T1D poorly adherent with treatment. Multiple admissions for DKA with recent ED visit at [facility] on 1/14/2020 (d/c from ED) and [facility] 1/14/20 (admitted for 2 days). On Levemir 16 units at bedtime and Aspart 6 units with meals as outpatient. Last A1C on 1/14/2020 was 11.3.     Glucose of 604 with AG of 27 and B-HB of 5.7 and Lactate of 4.1 on admission.  Venous PH 7.307.  Will admit to ICU on Insulin drip protocol until AG close. IVFluids and Electrolyte replacement with q1 hour BMP.    -ID - WBC normal, but has left shift. Lactate elevated at 4.1. CXR normal. Blood cultures pending from ED. Check RVP and Procalcitonin. No obvious source of infection at this time, so will not place on antibiotics.    -Renal - KIMBERLY - creatinine 1.4 on admission with normal baseline. No significant proteinuria on past UAs.  Suspect pre-renal. Follow. Urine lytes if it does not resolve.    -GI - N/V/D - suspect due to above. Consider stool studies. Allergy to Zofran, so Phenergan as needed.    -Psych - MDD and Intellectual Disability - continue Thorazine, Prozac, Hydroxyzine.  SW consult.  Lives with a friend.    History of PSA - check urine tox.     -Heme - Normocytic Anemia - Hgb 11.8. Follow.     -DVT Prophylaxis with SCD for now      VTE Risk Mitigation (From admission, onward)    None             Ravin Panda MD  Department of Hospital Medicine   [facility]

## 2020-01-27 NOTE — ED TRIAGE NOTES
Pt arrives with c/o elevated blood sugars, nausea, vomiting and diarrhea. Pt has been seen twice this month and diagnosed with DKA

## 2020-01-27 NOTE — SUBJECTIVE & OBJECTIVE
Past Medical History:   Diagnosis Date    Bipolar 1 disorder     Diabetes mellitus        Past Surgical History:   Procedure Laterality Date     SECTION         Review of patient's allergies indicates:   Allergen Reactions    Codeine     Zofran (as hydrochloride) [ondansetron hcl] Hives       No current facility-administered medications on file prior to encounter.      Current Outpatient Medications on File Prior to Encounter   Medication Sig    FLUoxetine 20 MG capsule Take 1 capsule (20 mg total) by mouth once daily.    insulin (BASAGLAR KWIKPEN U-100 INSULIN) glargine 100 units/mL (3mL) SubQ pen Inject 20 Units into the skin every evening. (Patient taking differently: Inject 15 Units into the skin 2 (two) times daily. )    insulin lispro 100 unit/mL injection Inject 5 Units into the skin 3 (three) times daily before meals. (Patient taking differently: Inject 8 Units into the skin 3 (three) times daily before meals. )    nicotine (NICODERM CQ) 14 mg/24 hr Place 1 patch onto the skin once daily.    QUEtiapine (SEROQUEL XR) 300 MG Tb24 Take 1 tablet (300 mg total) by mouth once daily.    ranitidine (ZANTAC) 150 MG capsule Take 150 mg by mouth 2 (two) times daily.     Family History     None        Tobacco Use    Smoking status: Never Smoker    Smokeless tobacco: Never Used   Substance and Sexual Activity    Alcohol use: Yes    Drug use: Yes     Types: Marijuana    Sexual activity: Never     Review of Systems   Constitutional: Positive for activity change. Negative for chills, diaphoresis and fever.   HENT: Negative for congestion, ear pain and sinus pain.    Eyes: Negative for pain.   Respiratory: Negative for chest tightness and shortness of breath.    Cardiovascular: Negative for chest pain, palpitations and leg swelling.   Gastrointestinal: Positive for abdominal pain, diarrhea, nausea and vomiting.   Endocrine: Negative for polydipsia and polyphagia.   Genitourinary: Negative for  difficulty urinating, frequency and pelvic pain.   Musculoskeletal: Positive for arthralgias.   Neurological: Negative for dizziness, seizures, syncope and headaches.   Hematological: Negative.    Psychiatric/Behavioral: Positive for behavioral problems, dysphoric mood and sleep disturbance.     Objective:     Vital Signs (Most Recent):  Temp: 98.5 °F (36.9 °C) (01/27/20 1524)  Pulse: 108 (01/27/20 1531)  Resp: (!) 35 (01/27/20 1531)  BP: 119/78 (01/27/20 1531)  SpO2: 99 % (01/27/20 1531) Vital Signs (24h Range):  Temp:  [98.5 °F (36.9 °C)] 98.5 °F (36.9 °C)  Pulse:  [] 108  Resp:  [21-35] 35  SpO2:  [98 %-100 %] 99 %  BP: (119-154)/(78-93) 119/78     Weight: 53.1 kg (117 lb)  Body mass index is 19.47 kg/m².    Physical Exam   Constitutional: She is oriented to person, place, and time. She appears distressed.   Thin, chronically ill appearing   HENT:   Head: Normocephalic and atraumatic.   Nose: Nose normal.   Mouth/Throat: Oropharynx is clear and moist.   Eyes: Pupils are equal, round, and reactive to light. Conjunctivae and EOM are normal. Right eye exhibits no discharge. No scleral icterus.   Neck: Normal range of motion. Neck supple. No JVD present. No tracheal deviation present. No thyromegaly present.   Cardiovascular: Regular rhythm and normal heart sounds.   tachycardia   Pulmonary/Chest: Effort normal and breath sounds normal. She has no wheezes. She has no rales.   Abdominal: Soft. There is tenderness. There is no rebound and no guarding.   Musculoskeletal: She exhibits no edema.   Neurological: She is alert and oriented to person, place, and time.   Skin: Skin is dry. She is not diaphoretic.   Psychiatric:   agitated         CRANIAL NERVES     CN III, IV, VI   Pupils are equal, round, and reactive to light.  Extraocular motions are normal.        Significant Labs:   CBC:   Recent Labs   Lab 01/27/20  1434   WBC 10.02   HGB 11.8*   HCT 38.9        CMP:   Recent Labs   Lab 01/27/20  1434   NA  141   K 4.9      CO2 12*   *   BUN 19   CREATININE 1.4   CALCIUM 10.6*   PROT 7.9   ALBUMIN 4.2   BILITOT 0.8   ALKPHOS 99   AST 26   ALT 23   ANIONGAP 27*   EGFRNONAA 50*     Lactic Acid:   Recent Labs   Lab 01/27/20  1446   LACTATE 4.1*     Magnesium: No results for input(s): MG in the last 48 hours.    Significant Imaging: I have reviewed all pertinent imaging results/findings within the past 24 hours.

## 2020-01-27 NOTE — H&P
[facility] Medicine  History & Physical    Patient Name: Hollie Wilson  MRN: [id number]  Admission Date: 2020  Attending Physician: Saskia Black MD   Primary Care Provider: Jose R Valdivia MD         Patient information was obtained from patient, past medical records and ER records.     Subjective:     Principal Problem:DKA (diabetic ketoacidoses)    Chief Complaint:   Chief Complaint   Patient presents with    Abdominal Pain     n/v x1 week . PMH DKA. EMS CBG>500        HPI: Patient is a 29 year old female with a PMH significant for T1D (multiple admissions for DKA and poor adherence with insulin treatment), MDD, Intellectual Disability (IQ 71), Polysubstance Abuse, GERD who presents with N/V/D and found to be in DKA with Glucose of 604 with AG of 27 and B-HB of 5.7 and Lactate of 4.1.  Patient states the last time she took her insulin was yesterday afternoon.  She is able to tell me her Aspart dose, but not her Detemir dose.  When questioned why she has had multiple admissions for DKA off medications, she says people are stealing her medications.  States she started with N/V today, but has been having daily loose BMs for the past week.  Complains of total body pain, but no specific chest pain.  Has epigastric pain on admission.  Denies f/c.  No cough.  No SOB.  No headaches or change in vision.  Admits to daily marijuana use, but denies other substance use.  No suicidal ideations today.  She is oriented to person, place, month/year, president and situation.      Past Medical History:   Diagnosis Date    Bipolar 1 disorder     Diabetes mellitus        Past Surgical History:   Procedure Laterality Date     SECTION         Review of patient's allergies indicates:   Allergen Reactions    Codeine     Zofran (as hydrochloride) [ondansetron hcl] Hives       No current facility-administered medications on file prior to encounter.      Current Outpatient Medications on  File Prior to Encounter   Medication Sig    FLUoxetine 20 MG capsule Take 1 capsule (20 mg total) by mouth once daily.    insulin (BASAGLAR KWIKPEN U-100 INSULIN) glargine 100 units/mL (3mL) SubQ pen Inject 20 Units into the skin every evening. (Patient taking differently: Inject 15 Units into the skin 2 (two) times daily. )    insulin lispro 100 unit/mL injection Inject 5 Units into the skin 3 (three) times daily before meals. (Patient taking differently: Inject 8 Units into the skin 3 (three) times daily before meals. )    nicotine (NICODERM CQ) 14 mg/24 hr Place 1 patch onto the skin once daily.    QUEtiapine (SEROQUEL XR) 300 MG Tb24 Take 1 tablet (300 mg total) by mouth once daily.    ranitidine (ZANTAC) 150 MG capsule Take 150 mg by mouth 2 (two) times daily.     Family History     None        Tobacco Use    Smoking status: Never Smoker    Smokeless tobacco: Never Used   Substance and Sexual Activity    Alcohol use: Yes    Drug use: Yes     Types: Marijuana    Sexual activity: Never     Review of Systems   Constitutional: Positive for activity change. Negative for chills, diaphoresis and fever.   HENT: Negative for congestion, ear pain and sinus pain.    Eyes: Negative for pain.   Respiratory: Negative for chest tightness and shortness of breath.    Cardiovascular: Negative for chest pain, palpitations and leg swelling.   Gastrointestinal: Positive for abdominal pain, diarrhea, nausea and vomiting.   Endocrine: Negative for polydipsia and polyphagia.   Genitourinary: Negative for difficulty urinating, frequency and pelvic pain.   Musculoskeletal: Positive for arthralgias.   Neurological: Negative for dizziness, seizures, syncope and headaches.   Hematological: Negative.    Psychiatric/Behavioral: Positive for behavioral problems, dysphoric mood and sleep disturbance.     Objective:     Vital Signs (Most Recent):  Temp: 98.5 °F (36.9 °C) (01/27/20 1524)  Pulse: 108 (01/27/20 1531)  Resp: (!) 35  (01/27/20 1531)  BP: 119/78 (01/27/20 1531)  SpO2: 99 % (01/27/20 1531) Vital Signs (24h Range):  Temp:  [98.5 °F (36.9 °C)] 98.5 °F (36.9 °C)  Pulse:  [] 108  Resp:  [21-35] 35  SpO2:  [98 %-100 %] 99 %  BP: (119-154)/(78-93) 119/78     Weight: 53.1 kg (117 lb)  Body mass index is 19.47 kg/m².    Physical Exam   Constitutional: She is oriented to person, place, and time. She appears distressed.   Thin, chronically ill appearing   HENT:   Head: Normocephalic and atraumatic.   Nose: Nose normal.   Mouth/Throat: Oropharynx is clear and moist.   Eyes: Pupils are equal, round, and reactive to light. Conjunctivae and EOM are normal. Right eye exhibits no discharge. No scleral icterus.   Neck: Normal range of motion. Neck supple. No JVD present. No tracheal deviation present. No thyromegaly present.   Cardiovascular: Regular rhythm and normal heart sounds.   tachycardia   Pulmonary/Chest: Effort normal and breath sounds normal. She has no wheezes. She has no rales.   Abdominal: Soft. There is tenderness. There is no rebound and no guarding.   Musculoskeletal: She exhibits no edema.   Neurological: She is alert and oriented to person, place, and time.   Skin: Skin is dry. She is not diaphoretic.   Psychiatric:   agitated         CRANIAL NERVES     CN III, IV, VI   Pupils are equal, round, and reactive to light.  Extraocular motions are normal.        Significant Labs:   CBC:   Recent Labs   Lab 01/27/20  1434   WBC 10.02   HGB 11.8*   HCT 38.9        CMP:   Recent Labs   Lab 01/27/20  1434      K 4.9      CO2 12*   *   BUN 19   CREATININE 1.4   CALCIUM 10.6*   PROT 7.9   ALBUMIN 4.2   BILITOT 0.8   ALKPHOS 99   AST 26   ALT 23   ANIONGAP 27*   EGFRNONAA 50*     Lactic Acid:   Recent Labs   Lab 01/27/20  1446   LACTATE 4.1*     Magnesium: No results for input(s): MG in the last 48 hours.    Significant Imaging: I have reviewed all pertinent imaging results/findings within the past 24  hours.    Assessment/Plan:   Patient is a 29 year old female with a PMH significant for T1D (multiple admissions for DKA and poor adherence with insulin treatment), MDD, Intellectual Disability (IQ 71), Polysubstance Abuse, GERD who presents with N/V/D and found to be in DKA with Glucose of 604 with AG of 27 and B-HB of 5.7 and Lactate of 4.1.     -Endocrine - DKA - in T1D poorly adherent with treatment. Multiple admissions for DKA with recent ED visit at [facility] on 1/14/2020 (d/c from ED) and [facility] 1/14/20 (admitted for 2 days). On Levemir 16 units at bedtime and Aspart 6 units with meals as outpatient. Last A1C on 1/14/2020 was 11.3.     Glucose of 604 with AG of 27 and B-HB of 5.7 and Lactate of 4.1 on admission.  Venous PH 7.307.  Will admit to ICU on Insulin drip protocol until AG close. IVFluids and Electrolyte replacement with q1 hour BMP.    -ID - WBC normal, but has left shift. Lactate elevated at 4.1. CXR normal. Blood cultures pending from ED. Check RVP and Procalcitonin. No obvious source of infection at this time, so will not place on antibiotics.    -Renal - KIMBERLY - creatinine 1.4 on admission with normal baseline. No significant proteinuria on past UAs.  Suspect pre-renal. Follow. Urine lytes if it does not resolve.    -GI - N/V/D - suspect due to above. Consider stool studies. Allergy to Zofran, so Phenergan as needed.    -Psych - MDD and Intellectual Disability - continue Thorazine, Prozac, Hydroxyzine.  SW consult.  Lives with a friend.    History of PSA - check urine tox.  Admits to only daily marijuana use.    -Heme - Normocytic Anemia - Hgb 11.8. Follow.     -DVT Prophylaxis with SCD for now    VTE Risk Mitigation (From admission, onward)    None             Ravin Panda MD  Department of Hospital Medicine   [facility]

## 2020-01-27 NOTE — ED PROVIDER NOTES
Encounter Date: 2020       History     Chief Complaint   Patient presents with    Abdominal Pain     n/v x1 week . PMH DKA. EMS CBG>500     This 29-year-old female presents emergency department complaining of nausea and vomiting which became worse overnight last night.  Recent admission to [facility] ICU for DKA.  Patient reports generalized body aches.  She denies fever chills. She reports she is also having diarrhea at least 3 or 4 times today.   Her history is limited by condition.     The history is provided by the patient. The history is limited by the condition of the patient.     Review of patient's allergies indicates:   Allergen Reactions    Codeine     Zofran (as hydrochloride) [ondansetron hcl] Hives     Past Medical History:   Diagnosis Date    Bipolar 1 disorder     Diabetes mellitus      Past Surgical History:   Procedure Laterality Date     SECTION       No family history on file.  Social History     Tobacco Use    Smoking status: Never Smoker    Smokeless tobacco: Never Used   Substance Use Topics    Alcohol use: Yes    Drug use: Yes     Types: Marijuana     Review of Systems   Constitutional: Negative for fever.   HENT: Negative for sore throat.    Respiratory: Negative for shortness of breath.    Cardiovascular: Negative for chest pain.   Gastrointestinal: Positive for diarrhea, nausea and vomiting.   Genitourinary: Negative for dysuria.   Musculoskeletal: Positive for myalgias. Negative for back pain.   Skin: Negative for color change, rash and wound.   Neurological: Positive for weakness.   Hematological: Does not bruise/bleed easily.   All other systems reviewed and are negative.      Physical Exam     Initial Vitals   BP Pulse Resp Temp SpO2   -- -- -- -- --      MAP       --         Physical Exam    Nursing note and vitals reviewed.  Constitutional: She appears well-developed and well-nourished. She appears distressed.   HENT:   Head:  Normocephalic and atraumatic.   Right Ear: External ear normal.   Left Ear: External ear normal.   Dry mucosa   Eyes: Conjunctivae and EOM are normal. Pupils are equal, round, and reactive to light. Right eye exhibits no discharge. Left eye exhibits no discharge. No scleral icterus.   Neck: Normal range of motion. Neck supple.   Cardiovascular: Normal rate and normal heart sounds. Exam reveals no gallop and no friction rub.    No murmur heard.  Tachycardic   Pulmonary/Chest: No stridor. No respiratory distress. She has no wheezes. She has no rhonchi. She has no rales.   Tachypneic   Abdominal: Soft. Bowel sounds are normal. She exhibits no distension and no mass. There is no tenderness. There is no rebound and no guarding.   Musculoskeletal: Normal range of motion. She exhibits no edema.   Neurological: She is alert and oriented to person, place, and time. She has normal strength. No cranial nerve deficit or sensory deficit. GCS score is 15. GCS eye subscore is 4. GCS verbal subscore is 5. GCS motor subscore is 6.   Skin: Skin is warm and dry. Capillary refill takes less than 2 seconds.   Psychiatric: She has a normal mood and affect. Her behavior is normal. Judgment and thought content normal.         ED Course   Critical Care  Date/Time: 1/27/2020 3:00 PM  Performed by: Saskia Black MD  Authorized by: Saskia Black MD   Direct patient critical care time: 20 minutes  Additional history critical care time: 10 minutes  Ordering / reviewing critical care time: 10 minutes  Documentation critical care time: 10 minutes  Consulting other physicians critical care time: 10 minutes  Total critical care time (exclusive of procedural time) : 60 minutes  Critical care was necessary to treat or prevent imminent or life-threatening deterioration of the following conditions: dehydration, metabolic crisis, renal failure, CNS failure or compromise, respiratory failure and endocrine crisis.  Critical care was time spent personally  by me on the following activities: development of treatment plan with patient or surrogate, evaluation of patient's response to treatment, interpretation of cardiac output measurements, examination of patient, obtaining history from patient or surrogate, ordering and performing treatments and interventions, ordering and review of laboratory studies, ordering and review of radiographic studies, re-evaluation of patient's condition and review of old charts.        Labs Reviewed   CBC W/ AUTO DIFFERENTIAL - Abnormal; Notable for the following components:       Result Value    Hemoglobin 11.8 (*)     Mean Corpuscular Hemoglobin 26.6 (*)     Mean Corpuscular Hemoglobin Conc 30.3 (*)     RDW 16.3 (*)     Immature Granulocytes 0.6 (*)     Gran # (ANC) 8.9 (*)     Immature Grans (Abs) 0.06 (*)     Lymph # 0.3 (*)     Gran% 89.1 (*)     Lymph% 2.5 (*)     All other components within normal limits   COMPREHENSIVE METABOLIC PANEL - Abnormal; Notable for the following components:    CO2 12 (*)     Glucose 604 (*)     Calcium 10.6 (*)     Anion Gap 27 (*)     eGFR if  58 (*)     eGFR if non  50 (*)     All other components within normal limits    Narrative:     GLU critical result(s) called and verbal readback obtained from Rodrigo Ortega RN by [facility] 01/27/2020 15:06   BETA - HYDROXYBUTYRATE, SERUM - Abnormal; Notable for the following components:    Beta-Hydroxybutyrate 5.7 (*)     All other components within normal limits   LACTIC ACID, PLASMA - Abnormal; Notable for the following components:    Lactate (Lactic Acid) 4.1 (*)     All other components within normal limits    Narrative:     LA critical result(s) called and verbal readback obtained from   Diana Corona RN by [facility] 01/27/2020 15:21   ISTAT PROCEDURE - Abnormal; Notable for the following components:    POC PH 7.307 (*)     POC PCO2 27.9 (*)     POC PO2 29 (*)     POC HCO3 14.0 (*)     POC SATURATED O2 51 (*)     All other  components within normal limits   TROPONIN I   TROPONIN I   POCT URINE PREGNANCY     EKG Readings: (Independently Interpreted)   Previous EKG: Compared with most recent EKG Rhythm: Normal Sinus Rhythm. Heart Rate: 82. Axis: Left Axis Deviation. Other Impression: Abnormal QRS       Imaging Results    None          Medical Decision Making:   Initial Assessment:   29-year-old female with probable recurrent diabetic ketoacidosis.  Mild delirium noted, however patient is oriented.  Unclear with treatment regimen patient is on for her diabetes, poorly controlled with multiple recent admissions.   Differential Diagnosis:   Differential diagnosis includes, but is not limited to:  Poorly controlled diabetes mellitus, DKA, HHS, infectious cause, reactive hyperglycemia due to underlying medical condition such as myocardial infarction, CVA, lack of compliance with medication or dietary regimen    Clinical Tests:   Lab Tests: Ordered and Reviewed  Radiological Study: Ordered and Reviewed  ED Management:  I discussed the patient's presentation, findings and response to treatment in the ED with the hospitalist on call who will admit for further treatment and evaluation.    Dr. Corona - Dr. Panda will admit                                 Clinical Impression:       ICD-10-CM ICD-9-CM   1. Lactic acidosis E87.2 276.2   2. DKA (diabetic ketoacidoses) E11.10 250.10   3. Diabetic ketoacidosis without coma associated with type 1 diabetes mellitus E10.10 250.11         Disposition:   Disposition: Admitted  Condition: Laina Black MD  01/29/20 1124

## 2020-01-28 PROBLEM — E10.10 DIABETIC KETOACIDOSIS WITHOUT COMA ASSOCIATED WITH TYPE 1 DIABETES MELLITUS: Status: ACTIVE | Noted: 2020-01-27

## 2020-01-28 LAB
ANION GAP SERPL CALC-SCNC: 10 MMOL/L (ref 8–16)
ANION GAP SERPL CALC-SCNC: 6 MMOL/L (ref 8–16)
ANION GAP SERPL CALC-SCNC: 8 MMOL/L (ref 8–16)
BUN SERPL-MCNC: 13 MG/DL (ref 6–20)
BUN SERPL-MCNC: 15 MG/DL (ref 6–20)
BUN SERPL-MCNC: 17 MG/DL (ref 6–20)
CALCIUM SERPL-MCNC: 7.6 MG/DL (ref 8.7–10.5)
CALCIUM SERPL-MCNC: 7.9 MG/DL (ref 8.7–10.5)
CALCIUM SERPL-MCNC: 8.4 MG/DL (ref 8.7–10.5)
CHLORIDE SERPL-SCNC: 116 MMOL/L (ref 95–110)
CHLORIDE SERPL-SCNC: 117 MMOL/L (ref 95–110)
CHLORIDE SERPL-SCNC: 118 MMOL/L (ref 95–110)
CO2 SERPL-SCNC: 19 MMOL/L (ref 23–29)
CO2 SERPL-SCNC: 20 MMOL/L (ref 23–29)
CO2 SERPL-SCNC: 21 MMOL/L (ref 23–29)
CREAT SERPL-MCNC: 0.8 MG/DL (ref 0.5–1.4)
CREAT SERPL-MCNC: 0.9 MG/DL (ref 0.5–1.4)
CREAT SERPL-MCNC: 1 MG/DL (ref 0.5–1.4)
EST. GFR  (AFRICAN AMERICAN): >60 ML/MIN/1.73 M^2
EST. GFR  (NON AFRICAN AMERICAN): >60 ML/MIN/1.73 M^2
ESTIMATED AVG GLUCOSE: 260 MG/DL (ref 68–131)
GLUCOSE SERPL-MCNC: 162 MG/DL (ref 70–110)
GLUCOSE SERPL-MCNC: 183 MG/DL (ref 70–110)
GLUCOSE SERPL-MCNC: 235 MG/DL (ref 70–110)
HBA1C MFR BLD HPLC: 10.7 % (ref 4–5.6)
POCT GLUCOSE: 156 MG/DL (ref 70–110)
POCT GLUCOSE: 169 MG/DL (ref 70–110)
POCT GLUCOSE: 171 MG/DL (ref 70–110)
POCT GLUCOSE: 174 MG/DL (ref 70–110)
POCT GLUCOSE: 184 MG/DL (ref 70–110)
POCT GLUCOSE: 188 MG/DL (ref 70–110)
POCT GLUCOSE: 197 MG/DL (ref 70–110)
POCT GLUCOSE: 219 MG/DL (ref 70–110)
POCT GLUCOSE: 227 MG/DL (ref 70–110)
POCT GLUCOSE: 252 MG/DL (ref 70–110)
POCT GLUCOSE: 254 MG/DL (ref 70–110)
POCT GLUCOSE: 263 MG/DL (ref 70–110)
POTASSIUM SERPL-SCNC: 3.8 MMOL/L (ref 3.5–5.1)
POTASSIUM SERPL-SCNC: 3.9 MMOL/L (ref 3.5–5.1)
POTASSIUM SERPL-SCNC: 4 MMOL/L (ref 3.5–5.1)
SODIUM SERPL-SCNC: 144 MMOL/L (ref 136–145)
SODIUM SERPL-SCNC: 145 MMOL/L (ref 136–145)
SODIUM SERPL-SCNC: 146 MMOL/L (ref 136–145)

## 2020-01-28 PROCEDURE — 25000003 PHARM REV CODE 250: Performed by: PHYSICIAN ASSISTANT

## 2020-01-28 PROCEDURE — 11000001 HC ACUTE MED/SURG PRIVATE ROOM

## 2020-01-28 PROCEDURE — 63600175 PHARM REV CODE 636 W HCPCS: Performed by: CLINIC/CENTER

## 2020-01-28 PROCEDURE — 99233 PR SUBSEQUENT HOSPITAL CARE,LEVL III: ICD-10-PCS | Mod: ,,, | Performed by: HOSPITALIST

## 2020-01-28 PROCEDURE — 83036 HEMOGLOBIN GLYCOSYLATED A1C: CPT

## 2020-01-28 PROCEDURE — 80048 BASIC METABOLIC PNL TOTAL CA: CPT

## 2020-01-28 PROCEDURE — 99233 SBSQ HOSP IP/OBS HIGH 50: CPT | Mod: ,,, | Performed by: HOSPITALIST

## 2020-01-28 PROCEDURE — 25000003 PHARM REV CODE 250: Performed by: INTERNAL MEDICINE

## 2020-01-28 PROCEDURE — C9399 UNCLASSIFIED DRUGS OR BIOLOG: HCPCS | Performed by: HOSPITALIST

## 2020-01-28 PROCEDURE — 80048 BASIC METABOLIC PNL TOTAL CA: CPT | Mod: 91

## 2020-01-28 PROCEDURE — 36415 COLL VENOUS BLD VENIPUNCTURE: CPT

## 2020-01-28 PROCEDURE — 63600175 PHARM REV CODE 636 W HCPCS: Performed by: HOSPITALIST

## 2020-01-28 RX ORDER — INSULIN ASPART 100 [IU]/ML
6 INJECTION, SOLUTION INTRAVENOUS; SUBCUTANEOUS
Status: ON HOLD | COMMUNITY
End: 2020-01-30 | Stop reason: HOSPADM

## 2020-01-28 RX ORDER — CHLORPROMAZINE HYDROCHLORIDE 25 MG/1
25 TABLET, FILM COATED ORAL NIGHTLY
Status: ON HOLD | COMMUNITY
End: 2020-03-25 | Stop reason: HOSPADM

## 2020-01-28 RX ORDER — HYDROXYZINE PAMOATE 25 MG/1
25 CAPSULE ORAL EVERY 8 HOURS PRN
Status: ON HOLD | COMMUNITY
End: 2020-03-25 | Stop reason: HOSPADM

## 2020-01-28 RX ORDER — INSULIN ASPART 100 [IU]/ML
0-5 INJECTION, SOLUTION INTRAVENOUS; SUBCUTANEOUS
Status: DISCONTINUED | OUTPATIENT
Start: 2020-01-28 | End: 2020-01-30 | Stop reason: HOSPADM

## 2020-01-28 RX ORDER — GLUCAGON 1 MG
1 KIT INJECTION
Status: DISCONTINUED | OUTPATIENT
Start: 2020-01-28 | End: 2020-01-30 | Stop reason: HOSPADM

## 2020-01-28 RX ORDER — INSULIN ASPART 100 [IU]/ML
6 INJECTION, SOLUTION INTRAVENOUS; SUBCUTANEOUS
Status: DISCONTINUED | OUTPATIENT
Start: 2020-01-28 | End: 2020-01-30 | Stop reason: HOSPADM

## 2020-01-28 RX ORDER — FLUOXETINE HYDROCHLORIDE 20 MG/1
40 CAPSULE ORAL DAILY
Status: DISCONTINUED | OUTPATIENT
Start: 2020-01-29 | End: 2020-01-30 | Stop reason: HOSPADM

## 2020-01-28 RX ORDER — IBUPROFEN 200 MG
16 TABLET ORAL
Status: DISCONTINUED | OUTPATIENT
Start: 2020-01-28 | End: 2020-01-30 | Stop reason: HOSPADM

## 2020-01-28 RX ORDER — IBUPROFEN 200 MG
24 TABLET ORAL
Status: DISCONTINUED | OUTPATIENT
Start: 2020-01-28 | End: 2020-01-30 | Stop reason: HOSPADM

## 2020-01-28 RX ADMIN — INSULIN ASPART 6 UNITS: 100 INJECTION, SOLUTION INTRAVENOUS; SUBCUTANEOUS at 05:01

## 2020-01-28 RX ADMIN — INSULIN ASPART 3 UNITS: 100 INJECTION, SOLUTION INTRAVENOUS; SUBCUTANEOUS at 06:01

## 2020-01-28 RX ADMIN — PANTOPRAZOLE SODIUM 40 MG: 40 TABLET, DELAYED RELEASE ORAL at 09:01

## 2020-01-28 RX ADMIN — ACETAMINOPHEN 650 MG: 325 TABLET ORAL at 07:01

## 2020-01-28 RX ADMIN — INSULIN ASPART 1 UNITS: 100 INJECTION, SOLUTION INTRAVENOUS; SUBCUTANEOUS at 09:01

## 2020-01-28 RX ADMIN — ACETAMINOPHEN 650 MG: 325 TABLET ORAL at 12:01

## 2020-01-28 RX ADMIN — CHLORPROMAZINE HYDROCHLORIDE 25 MG: 25 TABLET, SUGAR COATED ORAL at 09:01

## 2020-01-28 RX ADMIN — HYDROXYZINE PAMOATE 25 MG: 25 CAPSULE ORAL at 09:01

## 2020-01-28 RX ADMIN — INSULIN DETEMIR 15 UNITS: 100 INJECTION, SOLUTION SUBCUTANEOUS at 09:01

## 2020-01-28 RX ADMIN — ACETAMINOPHEN 650 MG: 325 TABLET ORAL at 05:01

## 2020-01-28 RX ADMIN — INSULIN ASPART 6 UNITS: 100 INJECTION, SOLUTION INTRAVENOUS; SUBCUTANEOUS at 11:01

## 2020-01-28 RX ADMIN — SODIUM CHLORIDE 1000 ML: 0.9 INJECTION, SOLUTION INTRAVENOUS at 12:01

## 2020-01-28 RX ADMIN — FLUOXETINE 20 MG: 20 CAPSULE ORAL at 09:01

## 2020-01-28 RX ADMIN — INSULIN ASPART 3 UNITS: 100 INJECTION, SOLUTION INTRAVENOUS; SUBCUTANEOUS at 11:01

## 2020-01-28 NOTE — PROCEDURES
"Hollie Wilson is a 29 y.o. female patient.    Temp: 98.7 °F (37.1 °C) (01/27/20 1645)  Pulse: 108 (01/27/20 1730)  Resp: 17 (01/27/20 1730)  BP: 125/69 (01/27/20 1730)  SpO2: 100 % (01/27/20 1730)  Weight: 56.5 kg (124 lb 9 oz) (01/27/20 1645)  Height: 5' 5" (165.1 cm) (01/27/20 1645)    PICC  Date/Time: 1/27/2020 7:45 PM  Performed by: Alhaji Bradley RN  Consent Done: Yes  Time out: Immediately prior to procedure a time out was called to verify the correct patient, procedure, equipment, support staff and site/side marked as required  Indications: med administration and vascular access  Anesthesia: local infiltration  Local anesthetic: lidocaine 1% without epinephrine  Anesthetic Total (mL): 5  Description of findings: midline   Preparation: skin prepped with chlorhexidine (without alcohol)  Skin prep agent dried: skin prep agent completely dried prior to procedure  Sterile barriers: all five maximum sterile barriers used - cap, mask, sterile gown, sterile gloves, and large sterile sheet  Hand hygiene: hand hygiene performed prior to central venous catheter insertion  Location details: left basilic  Catheter type: single lumen  Catheter size: 4 Fr  Catheter Length: 10cm    Ultrasound guidance: yes  Vessel Caliber: medium and patent, compressibility normal  Vascular Doppler: not done  Needle advanced into vessel with real time Ultrasound guidance.  Guidewire confirmed in vessel.  Sterile sheath used.  no esophageal manometryNumber of attempts: 1  Post-procedure: blood return through all ports, chlorhexidine patch and sterile dressing applied  Estimated blood loss (mL): 0  Specimens: No  Implants: No  Assessment: successful placement  Complications: none          Alhaji Bradley  1/27/2020    "

## 2020-01-28 NOTE — PLAN OF CARE
Problem: Fall Injury Risk  Goal: Absence of Fall and Fall-Related Injury  Outcome: Ongoing, Progressing     Problem: Adult Inpatient Plan of Care  Goal: Plan of Care Review  Outcome: Ongoing, Progressing     Problem: Diabetes Comorbidity  Goal: Blood Glucose Level Within Desired Range  Outcome: Ongoing, Progressing     Problem: Skin Injury Risk Increased  Goal: Skin Health and Integrity  Outcome: Ongoing, Progressing     Problem: Infection  Goal: Infection Symptom Resolution  Outcome: Ongoing, Progressing     Problem: Diabetic Ketoacidosis  Goal: Fluid and Electrolyte Balance with Absence of Ketosis  Outcome: Ongoing, Progressing

## 2020-01-28 NOTE — PLAN OF CARE
Initial Discharge Planning Assessment:  Patient admitted on 1/27/2020    Chart reviewed, Care plan discussed with treatment team,  attending Dr. Florence    PCP updated in Epic: [facility]  Pharmacy, updated in Epic: [facility] at [place], however for this admission pt requests bedside delivery    DME at home: Pt states she has a glucometer, but it is not working and she does not have all the needles and strips that she needs. MD informed.    Current dispo: home  Transportation: Pt will need medicaid transportation home.     Power of  or Living Will: none.    Case management  to follow.       01/28/20 1556   Discharge Assessment   Assessment Type Discharge Planning Assessment   Confirmed/corrected address and phone number on facesheet? Yes   Assessment information obtained from? Patient   Communicated expected length of stay with patient/caregiver yes   Prior to hospitilization cognitive status: Alert/Oriented   Prior to hospitalization functional status: Independent   Current cognitive status: Alert/Oriented   Current Functional Status: Independent   Lives With friend(s)   Able to Return to Prior Arrangements yes   Is patient able to care for self after discharge? Yes   Who are your caregiver(s) and their phone number(s)? Aunt: Margy Rome [phone]   Patient's perception of discharge disposition home or selfcare   Readmission Within the Last 30 Days no previous admission in last 30 days   Patient currently being followed by outpatient case management? No   Patient currently receives any other outside agency services? No   Equipment Currently Used at Home glucometer   Do you have any problems affording any of your prescribed medications? No   Is the patient taking medications as prescribed? no   If no, which medications is patient not taking? insulin, because she states she does not have supplies for her glucometer.  MD informed.   Does the patient have transportation home? No   Does the patient  receive services at the [facility]? No   Discharge Plan A Home with family   DME Needed Upon Discharge  glucometer   Patient/Family in Agreement with Plan yes

## 2020-01-28 NOTE — PROGRESS NOTES
E-ICU    Pt spiked 101.4 with tachycardia to 120s    After receiving tylenol T down to 99.9 but still tachycardic and how hypotensive 90/50    Will order 1000ml NS bolus

## 2020-01-28 NOTE — PLAN OF CARE
Problem: Fall Injury Risk  Goal: Absence of Fall and Fall-Related Injury  Outcome: Ongoing, Progressing     Problem: Adult Inpatient Plan of Care  Goal: Plan of Care Review  Outcome: Ongoing, Progressing     Problem: Diabetes Comorbidity  Goal: Blood Glucose Level Within Desired Range  Outcome: Ongoing, Progressing

## 2020-01-29 PROBLEM — N17.9 AKI (ACUTE KIDNEY INJURY): Status: RESOLVED | Noted: 2019-06-13 | Resolved: 2020-01-29

## 2020-01-29 LAB
ANION GAP SERPL CALC-SCNC: 9 MMOL/L (ref 8–16)
BUN SERPL-MCNC: 6 MG/DL (ref 6–20)
CALCIUM SERPL-MCNC: 7.9 MG/DL (ref 8.7–10.5)
CHLORIDE SERPL-SCNC: 110 MMOL/L (ref 95–110)
CO2 SERPL-SCNC: 19 MMOL/L (ref 23–29)
CREAT SERPL-MCNC: 0.7 MG/DL (ref 0.5–1.4)
EST. GFR  (AFRICAN AMERICAN): >60 ML/MIN/1.73 M^2
EST. GFR  (NON AFRICAN AMERICAN): >60 ML/MIN/1.73 M^2
GLUCOSE SERPL-MCNC: 205 MG/DL (ref 70–110)
POCT GLUCOSE: 150 MG/DL (ref 70–110)
POCT GLUCOSE: 177 MG/DL (ref 70–110)
POCT GLUCOSE: 186 MG/DL (ref 70–110)
POCT GLUCOSE: 86 MG/DL (ref 70–110)
POTASSIUM SERPL-SCNC: 3.9 MMOL/L (ref 3.5–5.1)
SODIUM SERPL-SCNC: 138 MMOL/L (ref 136–145)

## 2020-01-29 PROCEDURE — 36415 COLL VENOUS BLD VENIPUNCTURE: CPT

## 2020-01-29 PROCEDURE — 80048 BASIC METABOLIC PNL TOTAL CA: CPT

## 2020-01-29 PROCEDURE — 99233 SBSQ HOSP IP/OBS HIGH 50: CPT | Mod: ,,, | Performed by: HOSPITALIST

## 2020-01-29 PROCEDURE — 11000001 HC ACUTE MED/SURG PRIVATE ROOM

## 2020-01-29 PROCEDURE — 97802 MEDICAL NUTRITION INDIV IN: CPT

## 2020-01-29 PROCEDURE — 99233 PR SUBSEQUENT HOSPITAL CARE,LEVL III: ICD-10-PCS | Mod: ,,, | Performed by: HOSPITALIST

## 2020-01-29 PROCEDURE — 25000003 PHARM REV CODE 250: Performed by: HOSPITALIST

## 2020-01-29 PROCEDURE — 76937 US GUIDE VASCULAR ACCESS: CPT

## 2020-01-29 PROCEDURE — 36410 VNPNXR 3YR/> PHY/QHP DX/THER: CPT

## 2020-01-29 PROCEDURE — 25000003 PHARM REV CODE 250: Performed by: INTERNAL MEDICINE

## 2020-01-29 PROCEDURE — 25000003 PHARM REV CODE 250: Performed by: PHYSICIAN ASSISTANT

## 2020-01-29 PROCEDURE — 94761 N-INVAS EAR/PLS OXIMETRY MLT: CPT

## 2020-01-29 PROCEDURE — C1751 CATH, INF, PER/CENT/MIDLINE: HCPCS

## 2020-01-29 RX ADMIN — PANTOPRAZOLE SODIUM 40 MG: 40 TABLET, DELAYED RELEASE ORAL at 11:01

## 2020-01-29 RX ADMIN — ACETAMINOPHEN 650 MG: 325 TABLET ORAL at 06:01

## 2020-01-29 RX ADMIN — FLUOXETINE 40 MG: 20 CAPSULE ORAL at 11:01

## 2020-01-29 RX ADMIN — CHLORPROMAZINE HYDROCHLORIDE 25 MG: 25 TABLET, SUGAR COATED ORAL at 09:01

## 2020-01-29 RX ADMIN — INSULIN ASPART 6 UNITS: 100 INJECTION, SOLUTION INTRAVENOUS; SUBCUTANEOUS at 06:01

## 2020-01-29 RX ADMIN — INSULIN ASPART 6 UNITS: 100 INJECTION, SOLUTION INTRAVENOUS; SUBCUTANEOUS at 03:01

## 2020-01-29 RX ADMIN — ACETAMINOPHEN 650 MG: 325 TABLET ORAL at 10:01

## 2020-01-29 RX ADMIN — HYDROXYZINE PAMOATE 25 MG: 25 CAPSULE ORAL at 09:01

## 2020-01-29 RX ADMIN — INSULIN DETEMIR 15 UNITS: 100 INJECTION, SOLUTION SUBCUTANEOUS at 09:01

## 2020-01-29 RX ADMIN — INSULIN ASPART 6 UNITS: 100 INJECTION, SOLUTION INTRAVENOUS; SUBCUTANEOUS at 11:01

## 2020-01-29 NOTE — SUBJECTIVE & OBJECTIVE
Interval History:  Patient hungry and anion gap is closed.  Feeling better.    Review of Systems   Constitutional: Negative for chills and fever.   Respiratory: Negative for cough and shortness of breath.    Cardiovascular: Negative for chest pain and palpitations.     Objective:     Vital Signs (Most Recent):  Temp: 99 °F (37.2 °C) (01/28/20 1550)  Pulse: 94 (01/28/20 1550)  Resp: 15 (01/28/20 1550)  BP: 109/66 (01/28/20 1550)  SpO2: 100 % (01/28/20 1020) Vital Signs (24h Range):  Temp:  [98.1 °F (36.7 °C)-101.4 °F (38.6 °C)] 99 °F (37.2 °C)  Pulse:  [] 94  Resp:  [14-27] 15  SpO2:  [94 %-100 %] 100 %  BP: ()/(50-93) 109/66     Weight: 56.5 kg (124 lb 9 oz)  Body mass index is 20.73 kg/m².    Intake/Output Summary (Last 24 hours) at 1/28/2020 1919  Last data filed at 1/28/2020 0600  Gross per 24 hour   Intake 2391.02 ml   Output 200 ml   Net 2191.02 ml      Physical Exam   Constitutional: She is oriented to person, place, and time. No distress.   Thin, chronically ill appearing   HENT:   Head: Normocephalic and atraumatic.   Nose: Nose normal.   Mouth/Throat: Oropharynx is clear and moist.   Eyes: Pupils are equal, round, and reactive to light. Conjunctivae and EOM are normal. Right eye exhibits no discharge. No scleral icterus.   Neck: Normal range of motion. Neck supple. No JVD present. No tracheal deviation present. No thyromegaly present.   Cardiovascular: Normal rate, regular rhythm and normal heart sounds.   Pulmonary/Chest: Effort normal and breath sounds normal. She has no wheezes. She has no rales.   Abdominal: Soft. There is no tenderness. There is no rebound and no guarding.   Musculoskeletal: She exhibits no edema.   Neurological: She is alert and oriented to person, place, and time.   Skin: Skin is dry. She is not diaphoretic.   Psychiatric: She has a normal mood and affect. Her behavior is normal. Thought content normal.       Significant Labs: All pertinent labs within the past 24 hours  have been reviewed.    Significant Imaging: I have reviewed all pertinent imaging results/findings within the past 24 hours.

## 2020-01-29 NOTE — PLAN OF CARE
Ambulatory outpatient referral placed for psych at nearest location to pt home: [facility].  ALIN to discuss need for followup with pt.      Referral order and clinicals called in and faxed to [facility] (ph [phone] / fax [phone]).  [facility] clinic states pt needs to walk in for care, no appointment necessary.  Information added to AVS.

## 2020-01-29 NOTE — PLAN OF CARE
Assessed pt for spiritual distress - none reported.  Patient stated that she had no spiritual care needs and patient didn't seem to want to participate in spiritual care, so the exchange was brief.  Followup spiritual care was offered upon request.   Pt might benefit with additional spiritual care if time permits before her discharge.

## 2020-01-29 NOTE — PROGRESS NOTES
"  [facility]  Adult Nutrition  Consult Note    SUMMARY     Recommendations    1. Continue current diet regimen via diabetic diet   2. Encourage >50% of PO intake + commercial beverage Boost Glucose Control     Goals:   1. >85% of EEN, EPN by RD follow up    Nutrition Goal Status: new    Reason for Assessment    Reason For Assessment: consult  Diagnosis: diabetes diagnosis/complications(DKA)  Relevant Medical History: DM; Bipolar;   Interdisciplinary Rounds: did not attend    General Information Comments:   1.29.20 Pt noted with DKA with last A1C of 10.6. Pt reports decreased appetite; tray was not touched upon visit. Pt states both appetite and weight fluctuates at home (125 - 135lbs) depending on her mood. Per chart pt has a UBW of 126lbs and current weight of 124lbs. Pt would benefit from Boost Glucose Control. Pt educated on diabetic diet. NFPE completed 1.29.20: mild depletion of orbital and clavicle. Moderate depletion of scapular/back and legs. RD to monitor.     Nutrition Discharge Planning: Adequate intake via diabetic diet    Nutrition Risk Screen    Nutrition Risk Screen: no indicators present    Nutrition/Diet History    Patient Reported Diet/Restrictions/Preferences: diabetic diet  Typical Food/Fluid Intake: Pt states intake fluctuates with mood  Spiritual, Cultural Beliefs, Rastafarian Practices, Values that Affect Care: no    Anthropometrics    Height Method: Stated  Height: 5' 5" (165.1 cm)  Height (inches): 65 in  Weight Method: Bed Scale  Weight: 56.5 kg (124 lb 9 oz)  Weight (lb): 124.56 lb  Ideal Body Weight (IBW), Female: 125 lb  % Ideal Body Weight, Female (lb): 99.65 %  BMI (Calculated): 20.7  BMI Grade: 18.5-24.9 - normal     Lab/Procedures/Meds    Pertinent Labs Reviewed: reviewed  Pertinent Labs Comments: Glucose 205 (H); A1C 10.7  Pertinent Medications Reviewed: reviewed  Pertinent Medications Comments: Insulin, pantoprazole    Estimated/Assessed Needs    Weight Used For " Calorie Calculations: 56.5 kg (124 lb 9 oz)  Energy Calorie Requirements (kcal): 1536 - 1695 kcal/day  Energy Need Method: Gem-St Jeor(x 1.2 kcal/kg)  Protein Requirements: 56.62 - 73.6 g/day(1.0 -1.3 g/kg)  Weight Used For Protein Calculations: 56.5 kg (124 lb 9 oz)  Fluid Requirements (mL): 1 mL/kcal or per MD  Estimated Fluid Requirement Method: RDA Method  RDA Method (mL): 1536  CHO Requirement: 192 g/day    Nutrition Prescription Ordered    Current Diet Order: Diabetic   Oral Nutrition Supplement: Boost Glucose Control (Vanilla)    Evaluation of Received Nutrient/Fluid Intake    % Intake of Estimated Energy Needs: 50 75%   % Meal Intake: 25 - 50 %    Nutrition Risk    Level of Risk/Frequency of Follow-up: moderate to severe    Assessment and Plan    Nutrition Problem:   Malnutrition in the context of Chronic Illness    Related to (etiology):  Decreased appetite     Signs and Symptoms (as evidenced by):  Energy Intake: <75% of estimated energy requirement for >1 week  Body Fat Depletion: mild depletion of orbital, clavicle, triceps   Muscle Mass Depletion: moderate depletion of scapular region, lower extremities     Interventions(treatment strategy):  Collaboration of care with other providers  Commercial beverage      Nutrition Diagnosis Status:   New        Nutrition Problem:   Inconsistent CHO distribution     Related to (Etiology):  Lack of meal planning  Food and Nutrition related knowledge deficit    Signs and Symptoms (As evidenced by):  High blood glucose of 205 and last A1c of 10.6%    Interventions (treatment strategy):   Collaboration of care with other providers  Diabetic diet handout given to pt to follow at home    Nutrition Diagnosis Status:  New      Monitor and Evaluation    Food and Nutrient Intake: energy intake, food and beverage intake  Food and Nutrient Adminstration: diet order  Knowledge/Beliefs/Attitudes: food and nutrition knowledge/skill  Physical Activity and Function:  nutrition-related ADLs and IADLs  Anthropometric Measurements: weight, weight change  Biochemical Data, Medical Tests and Procedures: glucose/endocrine profile, electrolyte and renal panel, gastrointestinal profile, inflammatory profile, lipid profile  Nutrition-Focused Physical Findings: overall appearance     Malnutrition Assessment    Weight loss: 10lb weight loss in 1 month  Body Fat Depletion: mild depletion of orbital, clavicle, triceps   Muscle Mass Depletion: moderate depletion of scapular region, lower extremities   No edema  Fernando Score: 20    Nutrition Follow-Up    RD Follow-up?: Yes

## 2020-01-29 NOTE — PLAN OF CARE
Problem: Malnutrition  Goal: Improved Nutritional Intake  Outcome: Ongoing, Progressing     Recommendations    1. Continue current diet regimen via diabetic diet   2. Encourage >50% of PO intake + commercial beverage Boost Glucose Control     Goals:   1. >85% of EEN, EPN by RD follow up    Nutrition Goal Status: new

## 2020-01-29 NOTE — ASSESSMENT & PLAN NOTE
DKA - in T1D poorly adherent with treatment. Multiple admissions for DKA with recent ED visit at [facility] on 1/14/2020 (d/c from ED) and [facility] 1/14/20 (admitted for 2 days). On Levemir 16 units at bedtime and Aspart 6 units with meals as outpatient. Last A1C on 1/14/2020 was 11.3.     Glucose of 604 with AG of 27 and B-HB of 5.7 and Lactate of 4.1 on admission.  Venous PH 7.307.  Will admit to ICU on Insulin drip protocol until AG close. IVFluids and Electrolyte replacement with q1 hour BMP.    WBC normal, but has left shift. Lactate elevated at 4.1. CXR normal. Blood cultures pending from ED. Check RVP and Procalcitonin. No obvious source of infection at this time, so will not place on antibiotics.

## 2020-01-29 NOTE — PLAN OF CARE
Patient AAOx4, sating good in RA. POC explained, needs attended. POCT checked. Instructed to call staff for mobility. Call light within reach. No complaints at this time.

## 2020-01-29 NOTE — PROGRESS NOTES
[facility] Hospital Medicine  Progress Note    Patient Name: Hollie Wilson  MRN: [id number]  Patient Class: IP- Inpatient   Admission Date: 1/27/2020  Length of Stay: 1 days  Attending Physician: Jennifer Florence MD  Primary Care Provider: [facility]        Subjective:     Principal Problem:Diabetic ketoacidosis without coma associated with type 1 diabetes mellitus        HPI:  Per H&P by Dr. Panda:  Patient is a 29 year old female with a PMH significant for T1D (multiple admissions for DKA and poor adherence with insulin treatment), MDD, Intellectual Disability (IQ 71), Polysubstance Abuse, GERD who presents with N/V/D and found to be in DKA with Glucose of 604 with AG of 27 and B-HB of 5.7 and Lactate of 4.1.  Patient states the last time she took her insulin was yesterday afternoon.  She is able to tell me her Aspart dose, but not her Detemir dose.  When questioned why she has had multiple admissions for DKA off medications, she says people are stealing her medications.  States she started with N/V today, but has been having daily loose BMs for the past week.  Complains of total body pain, but no specific chest pain.  Has epigastric pain on admission.  Denies f/c.  No cough.  No SOB.  No headaches or change in vision.  Admits to daily marijuana use, but denies other substance use.  No suicidal ideations today.  She is oriented to person, place, month/year, president and situation.      Overview/Hospital Course:  Started on Insulin drip in the ED and waiting for transport to ICU.  Given IVFluid bolus in ED.    Interval History:  Patient hungry and anion gap is closed.  Feeling better.    Review of Systems   Constitutional: Negative for chills and fever.   Respiratory: Negative for cough and shortness of breath.    Cardiovascular: Negative for chest pain and palpitations.     Objective:     Vital Signs (Most Recent):  Temp: 99 °F (37.2 °C) (01/28/20  1550)  Pulse: 94 (01/28/20 1550)  Resp: 15 (01/28/20 1550)  BP: 109/66 (01/28/20 1550)  SpO2: 100 % (01/28/20 1020) Vital Signs (24h Range):  Temp:  [98.1 °F (36.7 °C)-101.4 °F (38.6 °C)] 99 °F (37.2 °C)  Pulse:  [] 94  Resp:  [14-27] 15  SpO2:  [94 %-100 %] 100 %  BP: ()/(50-93) 109/66     Weight: 56.5 kg (124 lb 9 oz)  Body mass index is 20.73 kg/m².    Intake/Output Summary (Last 24 hours) at 1/28/2020 1919  Last data filed at 1/28/2020 0600  Gross per 24 hour   Intake 2391.02 ml   Output 200 ml   Net 2191.02 ml      Physical Exam   Constitutional: She is oriented to person, place, and time. No distress.   Thin, chronically ill appearing   HENT:   Head: Normocephalic and atraumatic.   Nose: Nose normal.   Mouth/Throat: Oropharynx is clear and moist.   Eyes: Pupils are equal, round, and reactive to light. Conjunctivae and EOM are normal. Right eye exhibits no discharge. No scleral icterus.   Neck: Normal range of motion. Neck supple. No JVD present. No tracheal deviation present. No thyromegaly present.   Cardiovascular: Normal rate, regular rhythm and normal heart sounds.   Pulmonary/Chest: Effort normal and breath sounds normal. She has no wheezes. She has no rales.   Abdominal: Soft. There is no tenderness. There is no rebound and no guarding.   Musculoskeletal: She exhibits no edema.   Neurological: She is alert and oriented to person, place, and time.   Skin: Skin is dry. She is not diaphoretic.   Psychiatric: She has a normal mood and affect. Her behavior is normal. Thought content normal.       Significant Labs: All pertinent labs within the past 24 hours have been reviewed.    Significant Imaging: I have reviewed all pertinent imaging results/findings within the past 24 hours.      Assessment/Plan:      * Diabetic ketoacidosis without coma associated with type 1 diabetes mellitus  DKA - in T1D poorly adherent with treatment. Multiple admissions for DKA with recent ED visit at [facility] on 1/14/2020  "(d/c from ED) and [facility] 1/14/20 (admitted for 2 days). On Levemir 16 units at bedtime and Aspart 6 units with meals as outpatient. Last A1C on 1/14/2020 was 11.3.     Glucose of 604 with AG of 27 and B-HB of 5.7 and Lactate of 4.1 on admission.  Venous PH 7.307.  Will admit to ICU on Insulin drip protocol until AG close. IVFluids and Electrolyte replacement with q1 hour BMP.    WBC normal, but has left shift. Lactate elevated at 4.1. CXR normal. Blood cultures pending from ED. Check RVP and Procalcitonin. No obvious source of infection at this time, so will not place on antibiotics.    Intellectual disability  - See above under "MDD"      Recurrent major depressive disorder, in remission  MDD and Intellectual Disability - continue Thorazine, Prozac, Hydroxyzine.  SW consult.  Lives with a friend.      KIMBERLY (acute kidney injury)  KIMBERLY - creatinine 1.4 on admission with normal baseline. No significant proteinuria on past UAs.  Suspect pre-renal. Follow. Urine lytes if it does not resolve.      Polysubstance abuse  Check urine tox.      VTE Risk Mitigation (From admission, onward)         Ordered     IP VTE LOW RISK PATIENT  Once      01/27/20 7099                      Jennifer Kessler MD  Department of Hospital Medicine   [facility]  "

## 2020-01-30 VITALS
HEIGHT: 65 IN | TEMPERATURE: 99 F | BODY MASS INDEX: 20.75 KG/M2 | OXYGEN SATURATION: 97 % | SYSTOLIC BLOOD PRESSURE: 117 MMHG | HEART RATE: 104 BPM | DIASTOLIC BLOOD PRESSURE: 80 MMHG | WEIGHT: 124.56 LBS | RESPIRATION RATE: 16 BRPM

## 2020-01-30 LAB
ANION GAP SERPL CALC-SCNC: 10 MMOL/L (ref 8–16)
BUN SERPL-MCNC: 6 MG/DL (ref 6–20)
CALCIUM SERPL-MCNC: 8.3 MG/DL (ref 8.7–10.5)
CHLORIDE SERPL-SCNC: 108 MMOL/L (ref 95–110)
CO2 SERPL-SCNC: 23 MMOL/L (ref 23–29)
CREAT SERPL-MCNC: 0.7 MG/DL (ref 0.5–1.4)
ENTEROVIRUS: NOT DETECTED
EST. GFR  (AFRICAN AMERICAN): >60 ML/MIN/1.73 M^2
EST. GFR  (NON AFRICAN AMERICAN): >60 ML/MIN/1.73 M^2
GLUCOSE SERPL-MCNC: 157 MG/DL (ref 70–110)
HUMAN BOCAVIRUS: NOT DETECTED
HUMAN CORONAVIRUS, COMMON COLD VIRUS: NOT DETECTED
INFLUENZA A - H1N1-09: POSITIVE
PARAINFLUENZA: NOT DETECTED
POCT GLUCOSE: 134 MG/DL (ref 70–110)
POCT GLUCOSE: >500 MG/DL (ref 70–110)
POCT GLUCOSE: >500 MG/DL (ref 70–110)
POTASSIUM SERPL-SCNC: 3.7 MMOL/L (ref 3.5–5.1)
RVP - ADENOVIRUS: NOT DETECTED
RVP - HUMAN METAPNEUMOVIRUS (HMPV): NOT DETECTED
RVP - INFLUENZA A: NOT DETECTED
RVP - INFLUENZA B: NOT DETECTED
RVP - RESPIRATORY SYNCTIAL VIRUS (RSV) A: NOT DETECTED
RVP - RESPIRATORY VIRAL PANEL, SOURCE: ABNORMAL
RVP - RHINOVIRUS: NOT DETECTED
SODIUM SERPL-SCNC: 141 MMOL/L (ref 136–145)

## 2020-01-30 PROCEDURE — 25000003 PHARM REV CODE 250: Performed by: HOSPITALIST

## 2020-01-30 PROCEDURE — 25000003 PHARM REV CODE 250: Performed by: PHYSICIAN ASSISTANT

## 2020-01-30 PROCEDURE — 99238 HOSP IP/OBS DSCHRG MGMT 30/<: CPT | Mod: ,,, | Performed by: HOSPITALIST

## 2020-01-30 PROCEDURE — 36415 COLL VENOUS BLD VENIPUNCTURE: CPT

## 2020-01-30 PROCEDURE — 99238 PR HOSPITAL DISCHARGE DAY,<30 MIN: ICD-10-PCS | Mod: ,,, | Performed by: HOSPITALIST

## 2020-01-30 PROCEDURE — 80048 BASIC METABOLIC PNL TOTAL CA: CPT

## 2020-01-30 PROCEDURE — 25000003 PHARM REV CODE 250: Performed by: INTERNAL MEDICINE

## 2020-01-30 RX ORDER — FLUOXETINE HYDROCHLORIDE 20 MG/1
40 CAPSULE ORAL DAILY
Start: 2020-01-30 | End: 2023-07-07 | Stop reason: CLARIF

## 2020-01-30 RX ORDER — INSULIN ASPART 100 [IU]/ML
6 INJECTION, SOLUTION INTRAVENOUS; SUBCUTANEOUS
Qty: 15 ML | Refills: 0 | Status: ON HOLD | OUTPATIENT
Start: 2020-01-30 | End: 2020-03-25 | Stop reason: SDUPTHER

## 2020-01-30 RX ORDER — LANCETS
1 EACH MISCELLANEOUS
Qty: 100 EACH | Refills: 0 | Status: ON HOLD | OUTPATIENT
Start: 2020-01-30 | End: 2020-03-25 | Stop reason: SDUPTHER

## 2020-01-30 RX ADMIN — INSULIN ASPART 6 UNITS: 100 INJECTION, SOLUTION INTRAVENOUS; SUBCUTANEOUS at 08:01

## 2020-01-30 RX ADMIN — ACETAMINOPHEN 650 MG: 325 TABLET ORAL at 07:01

## 2020-01-30 RX ADMIN — FLUOXETINE 40 MG: 20 CAPSULE ORAL at 08:01

## 2020-01-30 RX ADMIN — PANTOPRAZOLE SODIUM 40 MG: 40 TABLET, DELAYED RELEASE ORAL at 08:01

## 2020-01-30 NOTE — SUBJECTIVE & OBJECTIVE
"Interval History:  Patient feeling better and tolerating food.  She has bipolar disorder and PTSD and gets most of her care at [facility].  Has also endocrinologist at [facility] ("Ms. Stubbs") and missed an appointment with her today.  She reports she ran out of her Novolog and diabetic supplies and needs new supplies.    Review of Systems   Constitutional: Negative for chills and fever.   Respiratory: Negative for cough and shortness of breath.    Cardiovascular: Negative for chest pain and palpitations.     Objective:     Vital Signs (Most Recent):  Temp: 99.5 °F (37.5 °C) (01/29/20 1917)  Pulse: 82 (01/29/20 1917)  Resp: 18 (01/29/20 1917)  BP: 129/81 (01/29/20 1917)  SpO2: 95 % (01/29/20 1917) Vital Signs (24h Range):  Temp:  [98.4 °F (36.9 °C)-99.5 °F (37.5 °C)] 99.5 °F (37.5 °C)  Pulse:  [82-92] 82  Resp:  [16-20] 18  SpO2:  [95 %-100 %] 95 %  BP: (105-134)/(67-88) 129/81     Weight: 56.5 kg (124 lb 9 oz)  Body mass index is 20.73 kg/m².    Intake/Output Summary (Last 24 hours) at 1/29/2020 1936  Last data filed at 1/29/2020 1900  Gross per 24 hour   Intake 325 ml   Output 700 ml   Net -375 ml      Physical Exam   Constitutional: She is oriented to person, place, and time. No distress.   Thin, chronically ill appearing   HENT:   Head: Normocephalic and atraumatic.   Nose: Nose normal.   Mouth/Throat: Oropharynx is clear and moist.   Eyes: Pupils are equal, round, and reactive to light. Conjunctivae and EOM are normal. Right eye exhibits no discharge. No scleral icterus.   Neck: Normal range of motion. Neck supple. No JVD present. No tracheal deviation present. No thyromegaly present.   Cardiovascular: Normal rate, regular rhythm and normal heart sounds.   Pulmonary/Chest: Effort normal and breath sounds normal. She has no wheezes. She has no rales.   Abdominal: Soft. There is no tenderness. There is no rebound and no guarding.   Musculoskeletal: She exhibits no edema.   Neurological: She is alert and oriented to person, " place, and time.   Skin: Skin is dry. She is not diaphoretic.   Psychiatric: She has a normal mood and affect. Her behavior is normal. Thought content normal.       Significant Labs:   POCT Glucose:   Recent Labs   Lab 01/29/20  0747 01/29/20  1230 01/29/20  1810   POCTGLUCOSE 150* 177* 86       Significant Imaging: I have reviewed all pertinent imaging results/findings within the past 24 hours.

## 2020-01-30 NOTE — ASSESSMENT & PLAN NOTE
- Type I diabetic presented in DKA after multiple hospital visits this month.  Last HbA1c on 1/14/20 was 11.3.  - Home regimen Levemir 16 units at bedtime and aspart 6 units with meals.  Seems she ran out of aspart.   - Anion gap closed and patient transferred to floor.  BG control much better.  Tolerating diet.  - Will prescribe the Novolog and diabetic supplies on discharge, likely tomorrow.

## 2020-01-30 NOTE — PLAN OF CARE
Pt given dc instructions - verbs understanding of material.  IV removed.  Belongings taken by patient.  Transported via wc off unit.

## 2020-01-30 NOTE — PROGRESS NOTES
"[facility] Hospital Medicine  Progress Note    Patient Name: Hollie Wilson  MRN: [id number]  Patient Class: IP- Inpatient   Admission Date: 1/27/2020  Length of Stay: 2 days  Attending Physician: Jennifer Florence MD  Primary Care Provider: [facility]        Subjective:     Principal Problem:Diabetic ketoacidosis without coma associated with type 1 diabetes mellitus        HPI:  Per H&P by Dr. Panda:  Patient is a 29 year old female with a PMH significant for T1D (multiple admissions for DKA and poor adherence with insulin treatment), MDD, Intellectual Disability (IQ 71), Polysubstance Abuse, GERD who presents with N/V/D and found to be in DKA with Glucose of 604 with AG of 27 and B-HB of 5.7 and Lactate of 4.1.  Patient states the last time she took her insulin was yesterday afternoon.  She is able to tell me her Aspart dose, but not her Detemir dose.  When questioned why she has had multiple admissions for DKA off medications, she says people are stealing her medications.  States she started with N/V today, but has been having daily loose BMs for the past week.  Complains of total body pain, but no specific chest pain.  Has epigastric pain on admission.  Denies f/c.  No cough.  No SOB.  No headaches or change in vision.  Admits to daily marijuana use, but denies other substance use.  No suicidal ideations today.  She is oriented to person, place, month/year, president and situation.      Overview/Hospital Course:  Patient was started on an insulin drip and IV fluids and transferred to the ICU.  Anion gap closed and she was transition to basal/prandial insulin and transferred to the floor on 1/28.    Interval History:  Patient feeling better and tolerating food.  She has bipolar disorder and PTSD and gets most of her care at [facility].  Has also endocrinologist at [facility] ("Ms. Evelyne") and missed an appointment with her today.  She reports she ran out of her Novolog " and diabetic supplies and needs new supplies.    Review of Systems   Constitutional: Negative for chills and fever.   Respiratory: Negative for cough and shortness of breath.    Cardiovascular: Negative for chest pain and palpitations.     Objective:     Vital Signs (Most Recent):  Temp: 99.5 °F (37.5 °C) (01/29/20 1917)  Pulse: 82 (01/29/20 1917)  Resp: 18 (01/29/20 1917)  BP: 129/81 (01/29/20 1917)  SpO2: 95 % (01/29/20 1917) Vital Signs (24h Range):  Temp:  [98.4 °F (36.9 °C)-99.5 °F (37.5 °C)] 99.5 °F (37.5 °C)  Pulse:  [82-92] 82  Resp:  [16-20] 18  SpO2:  [95 %-100 %] 95 %  BP: (105-134)/(67-88) 129/81     Weight: 56.5 kg (124 lb 9 oz)  Body mass index is 20.73 kg/m².    Intake/Output Summary (Last 24 hours) at 1/29/2020 1936  Last data filed at 1/29/2020 1900  Gross per 24 hour   Intake 325 ml   Output 700 ml   Net -375 ml      Physical Exam   Constitutional: She is oriented to person, place, and time. No distress.   Thin, chronically ill appearing   HENT:   Head: Normocephalic and atraumatic.   Nose: Nose normal.   Mouth/Throat: Oropharynx is clear and moist.   Eyes: Pupils are equal, round, and reactive to light. Conjunctivae and EOM are normal. Right eye exhibits no discharge. No scleral icterus.   Neck: Normal range of motion. Neck supple. No JVD present. No tracheal deviation present. No thyromegaly present.   Cardiovascular: Normal rate, regular rhythm and normal heart sounds.   Pulmonary/Chest: Effort normal and breath sounds normal. She has no wheezes. She has no rales.   Abdominal: Soft. There is no tenderness. There is no rebound and no guarding.   Musculoskeletal: She exhibits no edema.   Neurological: She is alert and oriented to person, place, and time.   Skin: Skin is dry. She is not diaphoretic.   Psychiatric: She has a normal mood and affect. Her behavior is normal. Thought content normal.       Significant Labs:   POCT Glucose:   Recent Labs   Lab 01/29/20  0747 01/29/20  1230 01/29/20  1810  "  POCTGLUCOSE 150* 177* 86       Significant Imaging: I have reviewed all pertinent imaging results/findings within the past 24 hours.      Assessment/Plan:      * Diabetic ketoacidosis without coma associated with type 1 diabetes mellitus  - Type I diabetic presented in DKA after multiple hospital visits this month.  Last HbA1c on 20 was 11.3.  - Home regimen Levemir 16 units at bedtime and aspart 6 units with meals.  Seems she ran out of aspart.   - Anion gap closed and patient transferred to floor.  BG control much better.  Tolerating diet.  - Will prescribe the Novolog and diabetic supplies on discharge, likely tomorrow.      Intellectual disability  - See above under "MDD"      Recurrent major depressive disorder, in remission  - History of MDD and PTSD.  Reportedly has IQ 71 (per [facility] note)  - Hospitalized at [facility] in December for suicidal ideation and discharged with follow up arranged at [facility].  - Continue Thorazine, Prozac, Hydroxyzine.  Followed at [facility], but dislikes going there as her father  while hospitalized there.      Polysubstance abuse  - Check urine tox showed cannabis.  She denies other drug use.      VTE Risk Mitigation (From admission, onward)         Ordered     IP VTE LOW RISK PATIENT  Once      20 [id number]                      Jennifer Kessler MD  Department of Hospital Medicine   [facility]  "

## 2020-01-30 NOTE — ASSESSMENT & PLAN NOTE
- History of MDD and PTSD.  Reportedly has IQ 71 (per [facility] note)  - Hospitalized at [facility] in December for suicidal ideation and discharged with follow up arranged at [facility].  - Continue Thorazine, Prozac, Hydroxyzine.  Followed at [facility], but dislikes going there as her father  while hospitalized there.

## 2020-01-30 NOTE — PLAN OF CARE
Family member at bedside to provide transportation home.    Explained importance of obtaining medication and going to followup appointments.  All questions answered, pt verbalizes understanding.      No further DC needs from CM perspective.       01/30/20 0936   Final Note   Assessment Type Final Discharge Note   Anticipated Discharge Disposition Home   What phone number can be called within the next 1-3 days to see how you are doing after discharge? [phone]   Hospital Follow Up  Appt(s) scheduled? Yes   Discharge plans and expectations educations in teach back method with documentation complete? Yes   Right Care Referral Info   Post Acute Recommendation No Care

## 2020-01-31 NOTE — DISCHARGE SUMMARY
[facility] Hospital Medicine  Discharge Summary      Patient Name: Hollie Wilson  MRN: [id number]  Admission Date: 1/27/2020  Hospital Length of Stay: 3 days  Discharge Date and Time: 1/30/2020 10:46 AM  Attending Physician: No att. providers found   Discharging Provider: Jennifer Kessler MD  Primary Care Provider: [facility]      HPI:   Per H&P by Dr. Panda:  Patient is a 29 year old female with a PMH significant for T1D (multiple admissions for DKA and poor adherence with insulin treatment), MDD, Intellectual Disability (IQ 71), Polysubstance Abuse, GERD who presents with N/V/D and found to be in DKA with Glucose of 604 with AG of 27 and B-HB of 5.7 and Lactate of 4.1.  Patient states the last time she took her insulin was yesterday afternoon.  She is able to tell me her Aspart dose, but not her Detemir dose.  When questioned why she has had multiple admissions for DKA off medications, she says people are stealing her medications.  States she started with N/V today, but has been having daily loose BMs for the past week.  Complains of total body pain, but no specific chest pain.  Has epigastric pain on admission.  Denies f/c.  No cough.  No SOB.  No headaches or change in vision.  Admits to daily marijuana use, but denies other substance use.  No suicidal ideations today.  She is oriented to person, place, month/year, president and situation.            Hospital Course:   Patient was started on an insulin drip and IV fluids and transferred to the ICU.  Anion gap closed and she was transition to basal/prandial insulin and transferred to the floor on 1/28.  Nausea improved and she was tolerating a diabetic diet on discharge.  Patient requested a refill of her Novolog and diabetic supplies, but stated she had enough Levemir.  When Novolog prescription was sent to the pharmacy they said it was not time for a refill yet, she should still have plenty despite the  fact that she missed an appointment with her endocrinologist (1/29) while she was in the hospital.  On further discussion with her she said that she had run out because she couldn't see the dial on the pen and was wasting too much insulin when priming the pen.  In addition she was not sure how much insulin she was giving herself.  Her boyfriend at bedside said he could not help her with this.  We tried to teach her how to count the clicks prior to giving the insulin but it's not clear whether she understood the instructions.  She should follow up in endocrinology clinic at [facility] as soon as possible for further diabetic education.  While she has low intellectual abilities and has mental health issues she is very clear about the amount and type of insulin she is using so should be teachable.     Consults:   Consults (From admission, onward)        Status Ordering Provider     Inpatient consult to Registered Dietitian/Nutritionist  Once     Provider:  (Not yet assigned)    Completed CHERIE SANDHU     Inpatient consult to Social Work  Once     Provider:  (Not yet assigned)    Completed CARLTON PHIPPS            Final Active Diagnoses:    Diagnosis Date Noted POA    PRINCIPAL PROBLEM:  Diabetic ketoacidosis without coma associated with type 1 diabetes mellitus [E10.10] 01/27/2020 Yes    Recurrent major depressive disorder, in remission [F33.40] 01/27/2020 Yes    Intellectual disability [F79] 01/27/2020 Yes    Polysubstance abuse [F19.10] 06/13/2019 Yes    Tobacco abuse [Z72.0] 06/13/2019 Yes      Problems Resolved During this Admission:    Diagnosis Date Noted Date Resolved POA    KIMBERLY (acute kidney injury) [N17.9] 06/13/2019 01/29/2020 Yes       Discharged Condition: stable    Disposition: Home or Self Care    Follow Up:  Follow-up Information     Go to [facility].    Specialties:  Behavioral Health, Psychiatry, Psychology  Why:  Walk in clinic for psychiatric  followup  Contact information:  [address]  [facility] [address]  [phone]             Go to [facility].    Specialty:  Family Medicine  Contact information:  [address] [facility] [address]  [phone]             [facility]. Go in 1 week.    Why:  Outpatient Services- weekly support on Thursdays only  (1:30 - 4:30)   Contact information:  Drop in center on Thursdays (1:30-4:30)  [facility]   [facility] location   [address] [place] [address]  [phone]           Evelyne Maharaj MD.    Why:  Reschedule missed appointment (1/29/20) at [facility]  Contact information:  [facility] Endocr[facility]           Ana Montoya MD.    Specialty:  Internal Medicine  Why:  Follow up for the next available appointment Internal Medicine [facility]  Contact information:  [address] [place] [address]  [phone]             Daphne Lares NP.    Specialty:  Family Medicine  Why:  As scheduled 3/13/20 Gynecology  Contact information:  [address] [facility] [id number] [phone]                 Patient Instructions:      Ambulatory Referral to Psychiatry   Referral Priority: Routine Referral Type: Psychiatric   Referral Reason: Specialty Services Required   Referred to Provider: [facility] Requested Specialty: Psychiatry   Number of Visits Requested: 1     Diet diabetic     Activity as tolerated       Medications:  Reconciled Home Medications:      Medication List      START taking these medications    insulin aspart U-100 100 unit/mL (3 mL) Inpn pen  Commonly known as:  NovoLOG  Inject 6 Units into the skin 3 (three) times daily with meals.  Replaces:  insulin aspart U-100 100 unit/mL injection     OneTouch Ultra Blue Test Strip Strp  Generic drug:  blood sugar diagnostic  test 3 (three) times daily before meals. For One Touch meter     OneTouch UltraSoft Lancets Misc  Generic  drug:  lancets  test 3 (three) times daily with meals.        CONTINUE taking these medications    chlorproMAZINE 25 MG tablet  Commonly known as:  THORAZINE  Take 25 mg by mouth every evening.     FLUoxetine 20 MG capsule  Take 2 capsules (40 mg total) by mouth once daily.     hydrOXYzine pamoate 25 MG Cap  Commonly known as:  VISTARIL  Take 25 mg by mouth every 8 (eight) hours as needed (Insomnia, anxiety).     insulin detemir U-100 100 unit/mL injection  Commonly known as:  LEVEMIR  Inject 15 Units into the skin every evening.     nicotine 14 mg/24 hr  Commonly known as:  NICODERM CQ  Place 1 patch onto the skin once daily.        STOP taking these medications    insulin aspart U-100 100 unit/mL injection  Commonly known as:  NOVOLOG  Replaced by:  insulin aspart U-100 100 unit/mL (3 mL) Inpn pen     insulin glargine 100 units/mL (3mL) SubQ pen  Commonly known as:  Basaglar KwikPen U-100 Insulin     insulin lispro 100 unit/mL injection            Time spent on the discharge of patient: <30 minutes  Patient was seen and examined on the date of discharge and determined to be suitable for discharge.         Jennifer Kessler MD  Department of Hospital Medicine  [facility]

## 2020-02-01 LAB
BACTERIA BLD CULT: NORMAL
BACTERIA BLD CULT: NORMAL

## 2020-02-03 ENCOUNTER — PATIENT OUTREACH (OUTPATIENT)
Dept: ADMINISTRATIVE | Facility: CLINIC | Age: 30
End: 2020-02-03

## 2020-02-03 NOTE — PROGRESS NOTES
C3 nurse attempted to contact patient. No answer. The following message was left for the patient to return the call:  Good Morning  I am a nurse calling on behalf of [facility] from the [facility].  This is a Transitional Care Call for Hollie Wilson . When you have a moment please contact us at [phone] or [phone] Monday through Friday, between the hours of 8 am to 4 pm. We look forward to speaking with you. On behalf of [facility] have a nice day.    The patient does not have a scheduled HOSFU appointment within 7-14 days post hospital discharge date 01/30/2020 [facility] appointment scheduling.

## 2020-03-24 ENCOUNTER — HOSPITAL ENCOUNTER (INPATIENT)
Facility: OTHER | Age: 30
LOS: 2 days | Discharge: HOME | DRG: 638 | End: 2020-03-26
Attending: EMERGENCY MEDICINE | Admitting: EMERGENCY MEDICINE
Payer: MEDICAID

## 2020-03-24 ENCOUNTER — ANESTHESIA (OUTPATIENT)
Dept: INTENSIVE CARE | Facility: OTHER | Age: 30
DRG: 638 | End: 2020-03-24
Payer: MEDICAID

## 2020-03-24 ENCOUNTER — ANESTHESIA EVENT (OUTPATIENT)
Dept: INTENSIVE CARE | Facility: OTHER | Age: 30
DRG: 638 | End: 2020-03-24
Payer: MEDICAID

## 2020-03-24 DIAGNOSIS — Z91.148 NONCOMPLIANCE WITH MEDICATIONS: ICD-10-CM

## 2020-03-24 DIAGNOSIS — E13.10 DIABETIC KETOACIDOSIS WITHOUT COMA ASSOCIATED WITH OTHER SPECIFIED DIABETES MELLITUS: Primary | ICD-10-CM

## 2020-03-24 PROBLEM — Z59.00 HOMELESS: Status: ACTIVE | Noted: 2020-03-24

## 2020-03-24 PROBLEM — E11.10 DIABETIC ACIDOSIS WITHOUT COMA: Status: ACTIVE | Noted: 2020-03-24

## 2020-03-24 LAB
ANION GAP SERPL CALC-SCNC: 18 MMOL/L (ref 8–16)
ANION GAP SERPL CALC-SCNC: 9 MMOL/L (ref 8–16)
B-OH-BUTYR BLD STRIP-SCNC: 3.3 MMOL/L (ref 0–0.5)
BACTERIA #/AREA URNS HPF: ABNORMAL /HPF
BASOPHILS # BLD AUTO: 0.03 K/UL (ref 0–0.2)
BASOPHILS NFR BLD: 0.6 % (ref 0–1.9)
BILIRUB UR QL STRIP: NEGATIVE
BUN SERPL-MCNC: 14 MG/DL (ref 6–20)
BUN SERPL-MCNC: 18 MG/DL (ref 6–20)
CALCIUM SERPL-MCNC: 8.2 MG/DL (ref 8.7–10.5)
CALCIUM SERPL-MCNC: 9 MG/DL (ref 8.7–10.5)
CHLORIDE SERPL-SCNC: 102 MMOL/L (ref 95–110)
CHLORIDE SERPL-SCNC: 109 MMOL/L (ref 95–110)
CLARITY UR: CLEAR
CO2 SERPL-SCNC: 15 MMOL/L (ref 23–29)
CO2 SERPL-SCNC: 24 MMOL/L (ref 23–29)
COLOR UR: YELLOW
CREAT SERPL-MCNC: 0.8 MG/DL (ref 0.5–1.4)
CREAT SERPL-MCNC: 1.4 MG/DL (ref 0.5–1.4)
CREAT UR-MCNC: 61.9 MG/DL (ref 15–325)
DIFFERENTIAL METHOD: ABNORMAL
EOSINOPHIL # BLD AUTO: 0.2 K/UL (ref 0–0.5)
EOSINOPHIL NFR BLD: 4.4 % (ref 0–8)
ERYTHROCYTE [DISTWIDTH] IN BLOOD BY AUTOMATED COUNT: 15.7 % (ref 11.5–14.5)
EST. GFR  (AFRICAN AMERICAN): 58 ML/MIN/1.73 M^2
EST. GFR  (AFRICAN AMERICAN): >60 ML/MIN/1.73 M^2
EST. GFR  (NON AFRICAN AMERICAN): 50 ML/MIN/1.73 M^2
EST. GFR  (NON AFRICAN AMERICAN): >60 ML/MIN/1.73 M^2
ESTIMATED AVG GLUCOSE: 324 MG/DL (ref 68–131)
GLUCOSE SERPL-MCNC: 138 MG/DL (ref 70–110)
GLUCOSE SERPL-MCNC: 469 MG/DL (ref 70–110)
GLUCOSE SERPL-MCNC: 569 MG/DL (ref 70–110)
GLUCOSE UR QL STRIP: ABNORMAL
HBA1C MFR BLD HPLC: 12.9 % (ref 4–5.6)
HCG INTACT+B SERPL-ACNC: <1.2 MIU/ML
HCT VFR BLD AUTO: 37.1 % (ref 37–48.5)
HGB BLD-MCNC: 11.1 G/DL (ref 12–16)
HGB UR QL STRIP: NEGATIVE
IMM GRANULOCYTES # BLD AUTO: 0.01 K/UL (ref 0–0.04)
IMM GRANULOCYTES NFR BLD AUTO: 0.2 % (ref 0–0.5)
KETONES UR QL STRIP: ABNORMAL
LACTATE SERPL-SCNC: 0.4 MMOL/L (ref 0.5–2.2)
LEUKOCYTE ESTERASE UR QL STRIP: NEGATIVE
LYMPHOCYTES # BLD AUTO: 2.4 K/UL (ref 1–4.8)
LYMPHOCYTES NFR BLD: 48.6 % (ref 18–48)
MAGNESIUM SERPL-MCNC: 2.2 MG/DL (ref 1.6–2.6)
MCH RBC QN AUTO: 26 PG (ref 27–31)
MCHC RBC AUTO-ENTMCNC: 29.9 G/DL (ref 32–36)
MCV RBC AUTO: 87 FL (ref 82–98)
MICROSCOPIC COMMENT: ABNORMAL
MONOCYTES # BLD AUTO: 0.5 K/UL (ref 0.3–1)
MONOCYTES NFR BLD: 9.6 % (ref 4–15)
NEUTROPHILS # BLD AUTO: 1.8 K/UL (ref 1.8–7.7)
NEUTROPHILS NFR BLD: 36.6 % (ref 38–73)
NITRITE UR QL STRIP: NEGATIVE
NRBC BLD-RTO: 0 /100 WBC
OSMOLALITY UR: 749 MOSM/KG (ref 50–1200)
PH UR STRIP: 6 [PH] (ref 5–8)
PLATELET # BLD AUTO: 183 K/UL (ref 150–350)
PMV BLD AUTO: 12.6 FL (ref 9.2–12.9)
POCT GLUCOSE: 126 MG/DL (ref 70–110)
POCT GLUCOSE: 128 MG/DL (ref 70–110)
POCT GLUCOSE: 128 MG/DL (ref 70–110)
POCT GLUCOSE: 147 MG/DL (ref 70–110)
POCT GLUCOSE: 181 MG/DL (ref 70–110)
POCT GLUCOSE: 191 MG/DL (ref 70–110)
POCT GLUCOSE: 275 MG/DL (ref 70–110)
POCT GLUCOSE: 284 MG/DL (ref 70–110)
POCT GLUCOSE: 286 MG/DL (ref 70–110)
POCT GLUCOSE: 310 MG/DL (ref 70–110)
POCT GLUCOSE: 393 MG/DL (ref 70–110)
POCT GLUCOSE: 469 MG/DL (ref 70–110)
POTASSIUM SERPL-SCNC: 3.7 MMOL/L (ref 3.5–5.1)
POTASSIUM SERPL-SCNC: 4.6 MMOL/L (ref 3.5–5.1)
PROT UR QL STRIP: NEGATIVE
PROT UR-MCNC: <7 MG/DL (ref 0–15)
PROT/CREAT UR: NORMAL MG/G{CREAT} (ref 0–0.2)
RBC # BLD AUTO: 4.27 M/UL (ref 4–5.4)
RBC #/AREA URNS HPF: 0 /HPF (ref 0–4)
SODIUM SERPL-SCNC: 135 MMOL/L (ref 136–145)
SODIUM SERPL-SCNC: 142 MMOL/L (ref 136–145)
SODIUM UR-SCNC: 64 MMOL/L (ref 20–250)
SP GR UR STRIP: 1.01 (ref 1–1.03)
SQUAMOUS #/AREA URNS HPF: 6 /HPF
URN SPEC COLLECT METH UR: ABNORMAL
UROBILINOGEN UR STRIP-ACNC: NEGATIVE EU/DL
WBC # BLD AUTO: 5.02 K/UL (ref 3.9–12.7)
WBC #/AREA URNS HPF: 2 /HPF (ref 0–5)
WBC CLUMPS URNS QL MICRO: ABNORMAL
YEAST URNS QL MICRO: ABNORMAL

## 2020-03-24 PROCEDURE — 25000003 PHARM REV CODE 250: Performed by: PHYSICIAN ASSISTANT

## 2020-03-24 PROCEDURE — 83605 ASSAY OF LACTIC ACID: CPT

## 2020-03-24 PROCEDURE — 63600175 PHARM REV CODE 636 W HCPCS: Performed by: NURSE PRACTITIONER

## 2020-03-24 PROCEDURE — 82010 KETONE BODYS QUAN: CPT

## 2020-03-24 PROCEDURE — C9399 UNCLASSIFIED DRUGS OR BIOLOG: HCPCS | Performed by: HOSPITALIST

## 2020-03-24 PROCEDURE — 80048 BASIC METABOLIC PNL TOTAL CA: CPT

## 2020-03-24 PROCEDURE — 11000001 HC ACUTE MED/SURG PRIVATE ROOM

## 2020-03-24 PROCEDURE — 63600175 PHARM REV CODE 636 W HCPCS: Performed by: EMERGENCY MEDICINE

## 2020-03-24 PROCEDURE — 99223 PR INITIAL HOSPITAL CARE,LEVL III: ICD-10-PCS | Mod: ,,, | Performed by: PHYSICIAN ASSISTANT

## 2020-03-24 PROCEDURE — 85025 COMPLETE CBC W/AUTO DIFF WBC: CPT

## 2020-03-24 PROCEDURE — 99223 1ST HOSP IP/OBS HIGH 75: CPT | Mod: ,,, | Performed by: HOSPITALIST

## 2020-03-24 PROCEDURE — 63600175 PHARM REV CODE 636 W HCPCS: Performed by: PHYSICIAN ASSISTANT

## 2020-03-24 PROCEDURE — S5010 5% DEXTROSE AND 0.45% SALINE: HCPCS | Performed by: PHYSICIAN ASSISTANT

## 2020-03-24 PROCEDURE — 81000 URINALYSIS NONAUTO W/SCOPE: CPT

## 2020-03-24 PROCEDURE — 82962 GLUCOSE BLOOD TEST: CPT

## 2020-03-24 PROCEDURE — 84300 ASSAY OF URINE SODIUM: CPT

## 2020-03-24 PROCEDURE — 84702 CHORIONIC GONADOTROPIN TEST: CPT

## 2020-03-24 PROCEDURE — S4991 NICOTINE PATCH NONLEGEND: HCPCS | Performed by: PHYSICIAN ASSISTANT

## 2020-03-24 PROCEDURE — 83735 ASSAY OF MAGNESIUM: CPT

## 2020-03-24 PROCEDURE — 63600175 PHARM REV CODE 636 W HCPCS: Performed by: SPECIALIST

## 2020-03-24 PROCEDURE — 82570 ASSAY OF URINE CREATININE: CPT

## 2020-03-24 PROCEDURE — 80048 BASIC METABOLIC PNL TOTAL CA: CPT | Mod: 91

## 2020-03-24 PROCEDURE — 99223 1ST HOSP IP/OBS HIGH 75: CPT | Mod: ,,, | Performed by: PHYSICIAN ASSISTANT

## 2020-03-24 PROCEDURE — 63600175 PHARM REV CODE 636 W HCPCS: Performed by: HOSPITALIST

## 2020-03-24 PROCEDURE — 99223 PR INITIAL HOSPITAL CARE,LEVL III: ICD-10-PCS | Mod: ,,, | Performed by: HOSPITALIST

## 2020-03-24 PROCEDURE — 36556 INSERT NON-TUNNEL CV CATH: CPT | Performed by: SPECIALIST

## 2020-03-24 PROCEDURE — 83036 HEMOGLOBIN GLYCOSYLATED A1C: CPT

## 2020-03-24 PROCEDURE — 99291 CRITICAL CARE FIRST HOUR: CPT

## 2020-03-24 PROCEDURE — 36415 COLL VENOUS BLD VENIPUNCTURE: CPT

## 2020-03-24 PROCEDURE — 83935 ASSAY OF URINE OSMOLALITY: CPT

## 2020-03-24 RX ORDER — IBUPROFEN 200 MG
16 TABLET ORAL
Status: DISCONTINUED | OUTPATIENT
Start: 2020-03-24 | End: 2020-03-26 | Stop reason: HOSPADM

## 2020-03-24 RX ORDER — MIDAZOLAM HYDROCHLORIDE 1 MG/ML
2 INJECTION INTRAMUSCULAR; INTRAVENOUS ONCE
Status: COMPLETED | OUTPATIENT
Start: 2020-03-24 | End: 2020-03-24

## 2020-03-24 RX ORDER — IBUPROFEN 200 MG
1 TABLET ORAL DAILY
Status: DISCONTINUED | OUTPATIENT
Start: 2020-03-24 | End: 2020-03-26 | Stop reason: HOSPADM

## 2020-03-24 RX ORDER — HYDROXYZINE PAMOATE 25 MG/1
25 CAPSULE ORAL EVERY 8 HOURS PRN
Status: DISCONTINUED | OUTPATIENT
Start: 2020-03-24 | End: 2020-03-26 | Stop reason: HOSPADM

## 2020-03-24 RX ORDER — ACETAMINOPHEN 325 MG/1
650 TABLET ORAL EVERY 4 HOURS PRN
Status: DISCONTINUED | OUTPATIENT
Start: 2020-03-24 | End: 2020-03-26 | Stop reason: HOSPADM

## 2020-03-24 RX ORDER — SODIUM CHLORIDE 9 MG/ML
1000 INJECTION, SOLUTION INTRAVENOUS
Status: COMPLETED | OUTPATIENT
Start: 2020-03-24 | End: 2020-03-24

## 2020-03-24 RX ORDER — CHLORPROMAZINE HYDROCHLORIDE 25 MG/1
25 TABLET, FILM COATED ORAL NIGHTLY
Status: DISCONTINUED | OUTPATIENT
Start: 2020-03-24 | End: 2020-03-24

## 2020-03-24 RX ORDER — INSULIN ASPART 100 [IU]/ML
3 INJECTION, SOLUTION INTRAVENOUS; SUBCUTANEOUS
Status: DISCONTINUED | OUTPATIENT
Start: 2020-03-24 | End: 2020-03-25

## 2020-03-24 RX ORDER — SODIUM CHLORIDE 0.9 % (FLUSH) 0.9 %
10 SYRINGE (ML) INJECTION
Status: DISCONTINUED | OUTPATIENT
Start: 2020-03-24 | End: 2020-03-24

## 2020-03-24 RX ORDER — IBUPROFEN 200 MG
24 TABLET ORAL
Status: DISCONTINUED | OUTPATIENT
Start: 2020-03-24 | End: 2020-03-26 | Stop reason: HOSPADM

## 2020-03-24 RX ORDER — GLUCAGON 1 MG
1 KIT INJECTION
Status: DISCONTINUED | OUTPATIENT
Start: 2020-03-24 | End: 2020-03-26 | Stop reason: HOSPADM

## 2020-03-24 RX ORDER — FLUOXETINE HYDROCHLORIDE 20 MG/1
40 CAPSULE ORAL DAILY
Status: DISCONTINUED | OUTPATIENT
Start: 2020-03-24 | End: 2020-03-26 | Stop reason: HOSPADM

## 2020-03-24 RX ORDER — DEXTROSE MONOHYDRATE AND SODIUM CHLORIDE 5; .45 G/100ML; G/100ML
INJECTION, SOLUTION INTRAVENOUS CONTINUOUS PRN
Status: DISCONTINUED | OUTPATIENT
Start: 2020-03-24 | End: 2020-03-24

## 2020-03-24 RX ORDER — INSULIN ASPART 100 [IU]/ML
10 INJECTION, SOLUTION INTRAVENOUS; SUBCUTANEOUS ONCE
Status: COMPLETED | OUTPATIENT
Start: 2020-03-24 | End: 2020-03-24

## 2020-03-24 RX ORDER — SODIUM CHLORIDE, SODIUM LACTATE, POTASSIUM CHLORIDE, CALCIUM CHLORIDE 600; 310; 30; 20 MG/100ML; MG/100ML; MG/100ML; MG/100ML
INJECTION, SOLUTION INTRAVENOUS CONTINUOUS
Status: DISCONTINUED | OUTPATIENT
Start: 2020-03-24 | End: 2020-03-26 | Stop reason: HOSPADM

## 2020-03-24 RX ORDER — SODIUM CHLORIDE 9 MG/ML
INJECTION, SOLUTION INTRAVENOUS CONTINUOUS
Status: DISCONTINUED | OUTPATIENT
Start: 2020-03-24 | End: 2020-03-24

## 2020-03-24 RX ORDER — HEPARIN SODIUM 5000 [USP'U]/ML
5000 INJECTION, SOLUTION INTRAVENOUS; SUBCUTANEOUS EVERY 8 HOURS
Status: DISCONTINUED | OUTPATIENT
Start: 2020-03-24 | End: 2020-03-26 | Stop reason: HOSPADM

## 2020-03-24 RX ADMIN — SODIUM CHLORIDE 1000 ML: 0.9 INJECTION, SOLUTION INTRAVENOUS at 02:03

## 2020-03-24 RX ADMIN — SODIUM CHLORIDE, SODIUM LACTATE, POTASSIUM CHLORIDE, AND CALCIUM CHLORIDE: .6; .31; .03; .02 INJECTION, SOLUTION INTRAVENOUS at 12:03

## 2020-03-24 RX ADMIN — HYDROXYZINE PAMOATE 25 MG: 25 CAPSULE ORAL at 12:03

## 2020-03-24 RX ADMIN — SODIUM CHLORIDE, SODIUM LACTATE, POTASSIUM CHLORIDE, AND CALCIUM CHLORIDE: .6; .31; .03; .02 INJECTION, SOLUTION INTRAVENOUS at 08:03

## 2020-03-24 RX ADMIN — SODIUM CHLORIDE 5 UNITS/HR: 9 INJECTION, SOLUTION INTRAVENOUS at 04:03

## 2020-03-24 RX ADMIN — INSULIN DETEMIR 10 UNITS: 100 INJECTION, SOLUTION SUBCUTANEOUS at 11:03

## 2020-03-24 RX ADMIN — NICOTINE 1 PATCH: 14 PATCH, EXTENDED RELEASE TRANSDERMAL at 12:03

## 2020-03-24 RX ADMIN — HEPARIN SODIUM 5000 UNITS: 5000 INJECTION, SOLUTION INTRAVENOUS; SUBCUTANEOUS at 10:03

## 2020-03-24 RX ADMIN — INSULIN ASPART 10 UNITS: 100 INJECTION, SOLUTION INTRAVENOUS; SUBCUTANEOUS at 10:03

## 2020-03-24 RX ADMIN — HEPARIN SODIUM 5000 UNITS: 5000 INJECTION, SOLUTION INTRAVENOUS; SUBCUTANEOUS at 03:03

## 2020-03-24 RX ADMIN — ACETAMINOPHEN 650 MG: 325 TABLET, FILM COATED ORAL at 05:03

## 2020-03-24 RX ADMIN — ACETAMINOPHEN 650 MG: 325 TABLET, FILM COATED ORAL at 11:03

## 2020-03-24 RX ADMIN — ACETAMINOPHEN 650 MG: 325 TABLET, FILM COATED ORAL at 10:03

## 2020-03-24 RX ADMIN — INSULIN HUMAN 5 UNITS: 100 INJECTION, SOLUTION PARENTERAL at 04:03

## 2020-03-24 RX ADMIN — MIDAZOLAM HYDROCHLORIDE 2 MG: 1 INJECTION, SOLUTION INTRAMUSCULAR; INTRAVENOUS at 08:03

## 2020-03-24 RX ADMIN — HYDROXYZINE PAMOATE 25 MG: 25 CAPSULE ORAL at 10:03

## 2020-03-24 RX ADMIN — INSULIN ASPART 3 UNITS: 100 INJECTION, SOLUTION INTRAVENOUS; SUBCUTANEOUS at 04:03

## 2020-03-24 RX ADMIN — DEXTROSE AND SODIUM CHLORIDE: 5; .45 INJECTION, SOLUTION INTRAVENOUS at 06:03

## 2020-03-24 NOTE — PLAN OF CARE
LMSW contacted the patients cell no answer. LMSW contacted the PACU for patient. PACU patients do not have room phones to complete discharge planning assessment per nurse.     LMSW contacted the patients emergency contact Margy [phone]. NO answer. LMSW will re attempted when patient is moved to the floor.        03/24/20 0915   Discharge Assessment   Assessment Type Discharge Planning Assessment   Prior to hospitilization cognitive status: Unable to Assess

## 2020-03-24 NOTE — CONSULTS
Inpatient consult to Anesthesiology-ACS  Consult performed by: Dorian Hitchcock MD  Consult ordered by: Dagoberto Smalls MD  Reason for consult: Vascular Access.  Assessment/Recommendations: Pt with DKA. No peripheral venous access. Recommend CVL. Discussed with pt, chart reviewed, consent for CVL obtained from pt.

## 2020-03-24 NOTE — ASSESSMENT & PLAN NOTE
- Ms. Hollie Wilson is admitted to inpatient status   - she has DKA w/ elevation anion gap of 18, K of 4.6, and glucose upon initial labs of 569  - She was treated in the ED with NS 2L, and 5 units regular insulin prior to starting insulin drip  - she will be maintained on insulin drip while anion gap closes   - normally takes 16 units long acting insulin QHS and 5 units short acting insulin TID   - DKA pathways ordered

## 2020-03-24 NOTE — SUBJECTIVE & OBJECTIVE
Past Medical History:   Diagnosis Date    Anemia     Bipolar 1 disorder     Diabetes mellitus     Scoliosis        Past Surgical History:   Procedure Laterality Date     SECTION         Review of patient's allergies indicates:   Allergen Reactions    Codeine     Zofran (as hydrochloride) [ondansetron hcl] Hives       No current facility-administered medications on file prior to encounter.      Current Outpatient Medications on File Prior to Encounter   Medication Sig    chlorproMAZINE (THORAZINE) 25 MG tablet Take 25 mg by mouth every evening.    FLUoxetine 20 MG capsule Take 2 capsules (40 mg total) by mouth once daily.    hydrOXYzine pamoate (VISTARIL) 25 MG Cap Take 25 mg by mouth every 8 (eight) hours as needed (Insomnia, anxiety).     insulin aspart U-100 (NOVOLOG) 100 unit/mL (3 mL) InPn pen Inject 6 Units into the skin 3 (three) times daily with meals.    insulin detemir U-100 (LEVEMIR) 100 unit/mL injection Inject 15 Units into the skin every evening.    blood sugar diagnostic Strp test 3 (three) times daily before meals. For One Touch meter    lancets (ONETOUCH ULTRASOFT LANCETS) Misc test 3 (three) times daily with meals.    nicotine (NICODERM CQ) 14 mg/24 hr Place 1 patch onto the skin once daily.     Family History     None        Tobacco Use    Smoking status: Current Every Day Smoker    Smokeless tobacco: Never Used   Substance and Sexual Activity    Alcohol use: Yes    Drug use: Yes     Types: Marijuana    Sexual activity: Never     Review of Systems   Constitutional: Negative for activity change, appetite change, chills, diaphoresis and fever.   HENT: Negative for congestion, ear pain, postnasal drip, rhinorrhea, sinus pressure, sore throat and trouble swallowing.    Respiratory: Negative for cough, shortness of breath and wheezing.    Cardiovascular: Negative for chest pain and palpitations.   Gastrointestinal: Negative for abdominal pain, diarrhea, nausea and vomiting.    Genitourinary: Negative for dysuria, flank pain, frequency, hematuria and urgency.   Musculoskeletal: Negative for arthralgias and myalgias.   Skin: Negative for color change, pallor, rash and wound.   Neurological: Negative for dizziness, syncope, light-headedness and headaches.   Psychiatric/Behavioral: Negative for confusion and decreased concentration.     Objective:     Vital Signs (Most Recent):  Temp: 98.5 °F (36.9 °C) (03/24/20 0500)  Pulse: 84 (03/24/20 0600)  Resp: 20 (03/24/20 0600)  BP: 95/60 (03/24/20 0600)  SpO2: 99 % (03/24/20 0600) Vital Signs (24h Range):  Temp:  [97.7 °F (36.5 °C)-98.5 °F (36.9 °C)] 98.5 °F (36.9 °C)  Pulse:  [75-84] 84  Resp:  [19-20] 20  SpO2:  [99 %-100 %] 99 %  BP: ()/(60-75) 95/60     Weight: 56.2 kg (124 lb)  Body mass index is 19.42 kg/m².    Physical Exam   Constitutional: She is oriented to person, place, and time. She appears well-developed and well-nourished. No distress.   HENT:   Head: Normocephalic and atraumatic.   Eyes: Pupils are equal, round, and reactive to light. Conjunctivae and EOM are normal. No scleral icterus.   Neck: Normal range of motion. Neck supple. No tracheal deviation present.   Cardiovascular: Normal rate, regular rhythm, normal heart sounds and intact distal pulses. Exam reveals no gallop and no friction rub.   No murmur heard.  Pulmonary/Chest: Effort normal and breath sounds normal. No stridor. No respiratory distress. She has no wheezes. She has no rales.   Abdominal: Soft. Bowel sounds are normal. She exhibits no distension and no mass. There is no tenderness. There is no guarding.   Neurological: She is alert and oriented to person, place, and time. No cranial nerve deficit. She exhibits normal muscle tone.   Skin: Skin is warm and dry. She is not diaphoretic. No erythema.   Psychiatric: She has a normal mood and affect. Her behavior is normal. Judgment and thought content normal.   Nursing note and vitals reviewed.        CRANIAL  NERVES     CN III, IV, VI   Pupils are equal, round, and reactive to light.  Extraocular motions are normal.        Significant Labs:   BMP:   Recent Labs   Lab 03/24/20  0248   *   *   K 4.6      CO2 15*   BUN 18   CREATININE 1.4   CALCIUM 9.0     CBC:   Recent Labs   Lab 03/24/20  0248   WBC 5.02   HGB 11.1*   HCT 37.1        CMP:   Recent Labs   Lab 03/24/20  0248   *   K 4.6      CO2 15*   *   BUN 18   CREATININE 1.4   CALCIUM 9.0   ANIONGAP 18*   EGFRNONAA 50*     Urine Culture: No results for input(s): LABURIN in the last 48 hours.  Urine Studies: No results for input(s): COLORU, APPEARANCEUA, PHUR, SPECGRAV, PROTEINUA, GLUCUA, KETONESU, BILIRUBINUA, OCCULTUA, NITRITE, UROBILINOGEN, LEUKOCYTESUR, RBCUA, WBCUA, BACTERIA, SQUAMEPITHEL, HYALINECASTS in the last 48 hours.    Invalid input(s): JOSEFINA  All pertinent labs within the past 24 hours have been reviewed.    Significant Imaging: I have reviewed all pertinent imaging results/findings within the past 24 hours.   Imaging Results    None
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
## 2020-03-24 NOTE — OR NURSING
Anesthesia Hitchcock at beside to insert central line assisted by YAKELIN Neil CRNA. Consent signed and placed in chart. Urine specimen sent to lab.

## 2020-03-24 NOTE — OR NURSING
Phlebotomy unable to drawn labwork due to poor veins. Twice attempted. Dr Coppola secure chat states would like central line. Dr Hitchcock anesthesia notifed. States will come to bedside.
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
## 2020-03-24 NOTE — NURSING
VSS. [id number]. Sleepy but arouses to voice. Pt. on room air. Pt arrived to floor via bed with ALEXANDRA Lopez and transferred to bed. IVF started, oriented to room, call light placed within reach, bed low and locked. No complaints of pain. No acute distress noted at this time. Will continue to monitor.

## 2020-03-24 NOTE — NURSING
Blood sugar 191, Insulin decreased by 50 %, now at 2.5 units per hour.  IV fluids changed to D5!/2 NS at 100 ml/hr.
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
## 2020-03-24 NOTE — ED PROVIDER NOTES
Encounter Date: 3/24/2020    SCRIBE #1 NOTE: Anastasia CASTLE am scribing for, and in the presence of, Dr. Jaquez.       History     Chief Complaint   Patient presents with    Hyperglycemia      upon EMS arrival- non compliant with meds     Time seen by provider: 2:31 AM    This is a 29 y.o. female who presents with complaint of hyperglycemia. Pt has a hx of Diabetes and is insulin dependent. She states she has not had access to her medicine since she has been homeless as of Friday. Pt states since then, her blood sugar levels have been high. She reports associated dizziness, frustration, and sleepiness. She denies polyuria, polydipsia, nausea, vomiting, or abdominal pain.    The history is provided by the patient and medical records.     Review of patient's allergies indicates:   Allergen Reactions    Codeine     Zofran (as hydrochloride) [ondansetron hcl] Hives     Past Medical History:   Diagnosis Date    Anemia     Bipolar 1 disorder     Diabetes mellitus     Scoliosis      Past Surgical History:   Procedure Laterality Date     SECTION       History reviewed. No pertinent family history.  Social History     Tobacco Use    Smoking status: Current Every Day Smoker    Smokeless tobacco: Never Used   Substance Use Topics    Alcohol use: Yes    Drug use: Yes     Types: Marijuana     Review of Systems   Constitutional: Positive for fatigue. Negative for chills and fever.   HENT: Negative for congestion, rhinorrhea and sore throat.    Eyes: Negative for visual disturbance.   Respiratory: Negative for cough and shortness of breath.    Cardiovascular: Negative for chest pain.   Gastrointestinal: Negative for abdominal pain, diarrhea, nausea and vomiting.   Endocrine: Negative for polydipsia and polyuria.   Genitourinary: Negative for dysuria.   Musculoskeletal: Negative for back pain.   Skin: Negative for rash.   Neurological: Positive for dizziness. Negative for weakness and  light-headedness.   Psychiatric/Behavioral: Positive for agitation (frustration). Negative for confusion.       Physical Exam     Initial Vitals [03/24/20 0148]   BP Pulse Resp Temp SpO2   109/75 75 19 97.7 °F (36.5 °C) 100 %      MAP       --         Physical Exam    Nursing note and vitals reviewed.  Constitutional: She appears well-developed and well-nourished. She is not diaphoretic. No distress.   HENT:   Head: Normocephalic and atraumatic.   Eyes: Conjunctivae and EOM are normal. Pupils are equal, round, and reactive to light. No scleral icterus.   Neck: Normal range of motion. Neck supple.   Cardiovascular: Normal rate, regular rhythm and normal heart sounds. Exam reveals no gallop and no friction rub.    No murmur heard.  Pulmonary/Chest: Breath sounds normal. No respiratory distress. She has no wheezes. She has no rhonchi. She has no rales.   Abdominal: Soft. Bowel sounds are normal. She exhibits no distension. There is no tenderness. There is no rebound and no guarding.   Musculoskeletal: Normal range of motion. She exhibits no edema or tenderness.   Neurological: She is alert and oriented to person, place, and time.   Skin: Skin is warm and dry.         ED Course   Critical Care  Date/Time: 3/24/2020 4:56 AM  Performed by: Cathy Jaquez MD  Authorized by: Cathy Jaquez MD   Direct patient critical care time: 15 minutes  Ordering / reviewing critical care time: 15 minutes  Documentation critical care time: 5 minutes  Consulting other physicians critical care time: 5 minutes  Total critical care time (exclusive of procedural time) : 40 minutes  Critical care was necessary to treat or prevent imminent or life-threatening deterioration of the following conditions: endocrine crisis.        Labs Reviewed   CBC W/ AUTO DIFFERENTIAL - Abnormal; Notable for the following components:       Result Value    Hemoglobin 11.1 (*)     Mean Corpuscular Hemoglobin 26.0 (*)     Mean Corpuscular Hemoglobin Conc 29.9 (*)      RDW 15.7 (*)     Gran% 36.6 (*)     Lymph% 48.6 (*)     All other components within normal limits   BETA - HYDROXYBUTYRATE, SERUM - Abnormal; Notable for the following components:    Beta-Hydroxybutyrate 3.3 (*)     All other components within normal limits   BASIC METABOLIC PANEL - Abnormal; Notable for the following components:    Sodium 135 (*)     CO2 15 (*)     Glucose 569 (*)     Anion Gap 18 (*)     eGFR if  58 (*)     eGFR if non  50 (*)     All other components within normal limits    Narrative:     glu    critical result(s) called and verbal readback obtained from   olga rasmussen by AUGIE 03/24/2020 03:35   POCT GLUCOSE - Abnormal; Notable for the following components:    POCT Glucose 469 (*)     All other components within normal limits   POCT GLUCOSE - Abnormal; Notable for the following components:    POCT Glucose 393 (*)     All other components within normal limits   URINALYSIS, REFLEX TO URINE CULTURE          Imaging Results    None          Medical Decision Making:   History:   Old Medical Records: I decided to obtain old medical records.  Clinical Tests:   Lab Tests: Ordered and Reviewed    Additional MDM:   Comments: 29-year-old insulin-dependent diabetic female presents hyperglycemic for the past 4 days.  Patient does admit to medication noncompliance over that same period of time.  Physical exam unremarkable.  Triage vital signs within normal limits.  Labs were obtained and patient does have changes consistent with DKA.  Her beta hydroxybutyrate is 3.3, she has an anion gap of 18 and a bicarb of 15.  Blood glucose was greater than 500.  She did receive 2 L of IV fluids and was started on an insulin drip with a 5 unit insulin bolus given.  Case has been discussed with Jenny Moon the patient was admitted to the hospitalist service..          Scribe Attestation:   Scribe #1: I performed the above scribed service and the documentation accurately describes the  services I performed. I attest to the accuracy of the note.    Attending Attestation:           Physician Attestation for Scribe:  Physician Attestation Statement for Scribe #1: I, Dr. Jaquez, reviewed documentation, as scribed by Anastasia Nguyen in my presence, and it is both accurate and complete.                 ED Course as of Mar 24 0514   Tue Mar 24, 2020   0349 Case discussed with hospitalist. Will admit to Dr. Smalls.    [DM]      ED Course User Index  [DM] Kathy Nguyen                Clinical Impression:     1. Diabetic ketoacidosis without coma associated with other specified diabetes mellitus    2. Noncompliance with medications                ED Disposition Condition    Admit                           Cathy Jaquez MD  03/24/20 0510

## 2020-03-24 NOTE — OR NURSING
Report by phone to Cassandra MORRIS 3 [facility]. Pt placed on transport. No change from previous assessment. Prepared for transfer to [facility].

## 2020-03-24 NOTE — ANESTHESIA PROCEDURE NOTES
Central Line    Diagnosis: DKA  Doctor requesting consult: Serina  Patient location during procedure: PACU  Procedure start time: 3/24/2020 8:21 AM  Timeout: 3/24/2020 8:21 AM  Procedure end time: 3/24/2020 8:32 AM    Staffing  Authorizing Provider: Dorian Hitchcock MD  Performing Provider: Dorian Hitchcock MD    Staffing  Anesthesiologist: Dorian Hitchcock MD  Performed: anesthesiologist   Anesthesiologist was present at the time of the procedure.  Preanesthetic Checklist  Completed: patient identified, site marked, surgical consent, pre-op evaluation, timeout performed, IV checked, risks and benefits discussed, monitors and equipment checked and anesthesia consent given  Indication   Indication: vascular access, med administration     Anesthesia   local infiltration    Central Line   Skin Prep: skin prepped with ChloraPrep, skin prep agent completely dried prior to procedure  maximum sterile barriers used during central venous catheter insertion  hand hygiene performed prior to central venous catheter insertion  Location: right, internal jugular.   Catheter type: triple lumen  Catheter Size: 7 Fr  Inserted Catheter Length: 15 cm  Ultrasound: vascular probe with ultrasound  Vessel Caliber: medium, patent  Vascular Doppler:  not done, compressibility normal  Needle advanced into vessel with real time Ultrasound guidance.  Guidewire confirmed in vessel.  Manometry: none  Insertion Attempts: 1   Securement:line sutured, chlorhexidine patch, sterile dressing applied and blood return through all ports    Post-Procedure   X-Ray: no pneumothorax on x-ray, placement verified by x-ray, tip termination and successful placement  Tip termination comments: In SVC   Adverse Events:none    Guidewire Guidewire removed intact. Guidewire removed intact, verified with nurse.

## 2020-03-25 PROBLEM — F19.20 POLYSUBSTANCE (INCLUDING OPIOIDS) DEPENDENCE, DAILY USE: Status: ACTIVE | Noted: 2017-05-10

## 2020-03-25 PROBLEM — F14.10 COCAINE ABUSE: Status: ACTIVE | Noted: 2017-10-31

## 2020-03-25 PROBLEM — F33.2 MDD (MAJOR DEPRESSIVE DISORDER), RECURRENT SEVERE, WITHOUT PSYCHOSIS: Status: ACTIVE | Noted: 2017-05-10

## 2020-03-25 PROBLEM — D50.9 IRON DEFICIENCY ANEMIA: Status: ACTIVE | Noted: 2018-05-14

## 2020-03-25 PROBLEM — F11.20 POLYSUBSTANCE (INCLUDING OPIOIDS) DEPENDENCE, DAILY USE: Status: ACTIVE | Noted: 2017-05-10

## 2020-03-25 PROBLEM — R41.83 BORDERLINE INTELLECTUAL FUNCTIONING: Status: ACTIVE | Noted: 2017-08-08

## 2020-03-25 LAB
ANION GAP SERPL CALC-SCNC: 7 MMOL/L (ref 8–16)
BASOPHILS # BLD AUTO: 0.03 K/UL (ref 0–0.2)
BASOPHILS NFR BLD: 0.6 % (ref 0–1.9)
BUN SERPL-MCNC: 17 MG/DL (ref 6–20)
CALCIUM SERPL-MCNC: 8.1 MG/DL (ref 8.7–10.5)
CHLORIDE SERPL-SCNC: 106 MMOL/L (ref 95–110)
CO2 SERPL-SCNC: 24 MMOL/L (ref 23–29)
CREAT SERPL-MCNC: 1 MG/DL (ref 0.5–1.4)
DIFFERENTIAL METHOD: ABNORMAL
EOSINOPHIL # BLD AUTO: 0.3 K/UL (ref 0–0.5)
EOSINOPHIL NFR BLD: 6 % (ref 0–8)
ERYTHROCYTE [DISTWIDTH] IN BLOOD BY AUTOMATED COUNT: 15.8 % (ref 11.5–14.5)
EST. GFR  (AFRICAN AMERICAN): >60 ML/MIN/1.73 M^2
EST. GFR  (NON AFRICAN AMERICAN): >60 ML/MIN/1.73 M^2
GLUCOSE SERPL-MCNC: 461 MG/DL (ref 70–110)
HCT VFR BLD AUTO: 35.5 % (ref 37–48.5)
HGB BLD-MCNC: 10.7 G/DL (ref 12–16)
IMM GRANULOCYTES # BLD AUTO: 0.01 K/UL (ref 0–0.04)
IMM GRANULOCYTES NFR BLD AUTO: 0.2 % (ref 0–0.5)
LYMPHOCYTES # BLD AUTO: 2.7 K/UL (ref 1–4.8)
LYMPHOCYTES NFR BLD: 53 % (ref 18–48)
MAGNESIUM SERPL-MCNC: 1.9 MG/DL (ref 1.6–2.6)
MCH RBC QN AUTO: 25.6 PG (ref 27–31)
MCHC RBC AUTO-ENTMCNC: 30.1 G/DL (ref 32–36)
MCV RBC AUTO: 85 FL (ref 82–98)
MONOCYTES # BLD AUTO: 0.3 K/UL (ref 0.3–1)
MONOCYTES NFR BLD: 5.8 % (ref 4–15)
NEUTROPHILS # BLD AUTO: 1.8 K/UL (ref 1.8–7.7)
NEUTROPHILS NFR BLD: 34.4 % (ref 38–73)
NRBC BLD-RTO: 0 /100 WBC
PHOSPHATE SERPL-MCNC: 3.1 MG/DL (ref 2.7–4.5)
PLATELET # BLD AUTO: 231 K/UL (ref 150–350)
PMV BLD AUTO: 12.4 FL (ref 9.2–12.9)
POCT GLUCOSE: 188 MG/DL (ref 70–110)
POCT GLUCOSE: 253 MG/DL (ref 70–110)
POCT GLUCOSE: 417 MG/DL (ref 70–110)
POCT GLUCOSE: 443 MG/DL (ref 70–110)
POTASSIUM SERPL-SCNC: 4.6 MMOL/L (ref 3.5–5.1)
RBC # BLD AUTO: 4.18 M/UL (ref 4–5.4)
SODIUM SERPL-SCNC: 137 MMOL/L (ref 136–145)
WBC # BLD AUTO: 5.17 K/UL (ref 3.9–12.7)

## 2020-03-25 PROCEDURE — 85025 COMPLETE CBC W/AUTO DIFF WBC: CPT

## 2020-03-25 PROCEDURE — 11000001 HC ACUTE MED/SURG PRIVATE ROOM

## 2020-03-25 PROCEDURE — 99233 PR SUBSEQUENT HOSPITAL CARE,LEVL III: ICD-10-PCS | Mod: ,,, | Performed by: HOSPITALIST

## 2020-03-25 PROCEDURE — 99233 SBSQ HOSP IP/OBS HIGH 50: CPT | Mod: ,,, | Performed by: HOSPITALIST

## 2020-03-25 PROCEDURE — 83735 ASSAY OF MAGNESIUM: CPT

## 2020-03-25 PROCEDURE — 84100 ASSAY OF PHOSPHORUS: CPT

## 2020-03-25 PROCEDURE — 80048 BASIC METABOLIC PNL TOTAL CA: CPT

## 2020-03-25 PROCEDURE — S4991 NICOTINE PATCH NONLEGEND: HCPCS | Performed by: PHYSICIAN ASSISTANT

## 2020-03-25 PROCEDURE — 94761 N-INVAS EAR/PLS OXIMETRY MLT: CPT

## 2020-03-25 PROCEDURE — 25000003 PHARM REV CODE 250: Performed by: PHYSICIAN ASSISTANT

## 2020-03-25 PROCEDURE — 63600175 PHARM REV CODE 636 W HCPCS: Performed by: PHYSICIAN ASSISTANT

## 2020-03-25 PROCEDURE — 63600175 PHARM REV CODE 636 W HCPCS: Performed by: HOSPITALIST

## 2020-03-25 RX ORDER — IBUPROFEN 200 MG
1 TABLET ORAL DAILY
Qty: 28 PATCH | Refills: 0 | Status: SHIPPED | OUTPATIENT
Start: 2020-03-25 | End: 2020-04-22

## 2020-03-25 RX ORDER — PEN NEEDLE, DIABETIC 30 GX3/16"
1 NEEDLE, DISPOSABLE MISCELLANEOUS 4 TIMES DAILY
Qty: 100 EACH | Refills: 0 | Status: ON HOLD | OUTPATIENT
Start: 2020-03-25 | End: 2023-06-14 | Stop reason: HOSPADM

## 2020-03-25 RX ORDER — LANCETS
1 EACH MISCELLANEOUS
Qty: 100 EACH | Refills: 0 | Status: ON HOLD | OUTPATIENT
Start: 2020-03-25 | End: 2023-06-14 | Stop reason: HOSPADM

## 2020-03-25 RX ORDER — ACETAMINOPHEN 500 MG
1000 TABLET ORAL EVERY 8 HOURS PRN
Status: ON HOLD | COMMUNITY
Start: 2020-03-25 | End: 2023-06-18

## 2020-03-25 RX ORDER — INSULIN ASPART 100 [IU]/ML
6 INJECTION, SOLUTION INTRAVENOUS; SUBCUTANEOUS
Status: DISCONTINUED | OUTPATIENT
Start: 2020-03-25 | End: 2020-03-26 | Stop reason: HOSPADM

## 2020-03-25 RX ORDER — INSULIN ASPART 100 [IU]/ML
5 INJECTION, SOLUTION INTRAVENOUS; SUBCUTANEOUS ONCE
Status: COMPLETED | OUTPATIENT
Start: 2020-03-25 | End: 2020-03-25

## 2020-03-25 RX ORDER — INSULIN ASPART 100 [IU]/ML
4 INJECTION, SOLUTION INTRAVENOUS; SUBCUTANEOUS
Status: DISCONTINUED | OUTPATIENT
Start: 2020-03-25 | End: 2020-03-25

## 2020-03-25 RX ORDER — INSULIN ASPART 100 [IU]/ML
5 INJECTION, SOLUTION INTRAVENOUS; SUBCUTANEOUS
Qty: 15 ML | Refills: 0 | Status: SHIPPED | OUTPATIENT
Start: 2020-03-25 | End: 2020-03-26 | Stop reason: SDUPTHER

## 2020-03-25 RX ADMIN — HYDROXYZINE PAMOATE 25 MG: 25 CAPSULE ORAL at 01:03

## 2020-03-25 RX ADMIN — SODIUM CHLORIDE, SODIUM LACTATE, POTASSIUM CHLORIDE, AND CALCIUM CHLORIDE: .6; .31; .03; .02 INJECTION, SOLUTION INTRAVENOUS at 01:03

## 2020-03-25 RX ADMIN — ACETAMINOPHEN 650 MG: 325 TABLET, FILM COATED ORAL at 06:03

## 2020-03-25 RX ADMIN — ACETAMINOPHEN 650 MG: 325 TABLET, FILM COATED ORAL at 10:03

## 2020-03-25 RX ADMIN — INSULIN ASPART 6 UNITS: 100 INJECTION, SOLUTION INTRAVENOUS; SUBCUTANEOUS at 04:03

## 2020-03-25 RX ADMIN — NICOTINE 1 PATCH: 14 PATCH, EXTENDED RELEASE TRANSDERMAL at 08:03

## 2020-03-25 RX ADMIN — HEPARIN SODIUM 5000 UNITS: 5000 INJECTION, SOLUTION INTRAVENOUS; SUBCUTANEOUS at 01:03

## 2020-03-25 RX ADMIN — HYDROXYZINE PAMOATE 25 MG: 25 CAPSULE ORAL at 11:03

## 2020-03-25 RX ADMIN — HEPARIN SODIUM 5000 UNITS: 5000 INJECTION, SOLUTION INTRAVENOUS; SUBCUTANEOUS at 06:03

## 2020-03-25 RX ADMIN — INSULIN ASPART 4 UNITS: 100 INJECTION, SOLUTION INTRAVENOUS; SUBCUTANEOUS at 08:03

## 2020-03-25 RX ADMIN — INSULIN ASPART 5 UNITS: 100 INJECTION, SOLUTION INTRAVENOUS; SUBCUTANEOUS at 01:03

## 2020-03-25 RX ADMIN — HEPARIN SODIUM 5000 UNITS: 5000 INJECTION, SOLUTION INTRAVENOUS; SUBCUTANEOUS at 10:03

## 2020-03-25 RX ADMIN — FLUOXETINE 40 MG: 20 CAPSULE ORAL at 08:03

## 2020-03-25 RX ADMIN — ACETAMINOPHEN 650 MG: 325 TABLET, FILM COATED ORAL at 01:03

## 2020-03-25 RX ADMIN — INSULIN ASPART 4 UNITS: 100 INJECTION, SOLUTION INTRAVENOUS; SUBCUTANEOUS at 12:03

## 2020-03-25 RX ADMIN — ACETAMINOPHEN 650 MG: 325 TABLET, FILM COATED ORAL at 11:03

## 2020-03-25 RX ADMIN — SODIUM CHLORIDE, SODIUM LACTATE, POTASSIUM CHLORIDE, AND CALCIUM CHLORIDE: .6; .31; .03; .02 INJECTION, SOLUTION INTRAVENOUS at 06:03

## 2020-03-25 RX ADMIN — HYDROXYZINE PAMOATE 25 MG: 25 CAPSULE ORAL at 06:03

## 2020-03-25 NOTE — PLAN OF CARE
LMSW completed a telephone assessment with the patient,      Patient is alert and oriented with no communication barriers.     Prior to admission patient was independent. Patient is not current with HH or DME. Patient will nee a BGM. Patient is homeless and has been for some time. Patient is banned from local shelters for bringing drugs into the shelters. Patient needs some medium clothing.      Patients PCP is correct on the face sheet. Patient choice pharmacy is bedside delivery.     Patient denies having advance directives.     Patient is agreeable to be being discharge back to the community       03/25/20 1005   Discharge Assessment   Assessment Type Discharge Planning Assessment   Confirmed/corrected address and phone number on facesheet? Yes   Assessment information obtained from? Patient   Communicated expected length of stay with patient/caregiver no   Prior to hospitilization cognitive status: Alert/Oriented   Prior to hospitalization functional status: Independent   Current cognitive status: Alert/Oriented   Current Functional Status: Independent   Lives With other (see comments)   Able to Return to Prior Arrangements yes   Is patient able to care for self after discharge? Yes   Patient's perception of discharge disposition homeless   Readmission Within the Last 30 Days no previous admission in last 30 days   Patient currently being followed by outpatient case management? No   Patient currently receives any other outside agency services? No   Equipment Currently Used at Home none   Do you have any problems affording any of your prescribed medications? No   Is the patient taking medications as prescribed? yes   Does the patient have transportation home? Yes   Transportation Anticipated family or friend will provide   Does the patient receive services at the [facility]? No   Discharge Plan A Other   DME Needed Upon Discharge  glucometer   Patient/Family in Agreement with Plan yes

## 2020-03-25 NOTE — PLAN OF CARE
Recommendations    1. Continue diabetic diet and boost glucose TID.   2. Weekly weights.     Goals: 1. >85% of EEN, EPN by RD follow up.   Nutrition Goal Status: new  Communication of RD Recs: other (comment)(POC)

## 2020-03-25 NOTE — PLAN OF CARE
Plan of Care reviewed with patient and questions answered. Pt up ad asmita in room. Pt voiding per straw colored urine without difficulty noted. Pt free from falls or injury. Pain controlled with pain medications as ordered.  Purposeful rounding performed. Blood sugars monitored and sliding scale insulin given as needed.  Tolerating diet well. Safety maintained. Bed in lowest position and locked. Call light in reach. No acute distress noted. VSS.          Afsaneh Torres RN

## 2020-03-25 NOTE — CONSULTS
"  [facility]  Adult Nutrition  Consult Note    SUMMARY     Recommendations    1. Continue diabetic diet and boost glucose TID.   2. Weekly weights.     Goals: 1. >85% of EEN, EPN by RD follow up.   Nutrition Goal Status: new  Communication of RD Recs: other (comment)(POC)    Reason for Assessment    Reason For Assessment: consult  Diagnosis: diabetes diagnosis/complications(Diabetic ketoacidosis with coma associated with type 1 diabetes mellitus)  Relevant Medical History: DM  Interdisciplinary Rounds: did not attend  General Information Comments:   3.25.- Pt is a 29-year-old woman with history of diabetes mellitus type1. PO intake 100% of meals. Unable to reach pt via phone x 3 attempts for DM education.   -20lb weight loss x 1 year. -15.08% weight loss x 1 year. No edema noted via chart. Pt recently homeless and without insulin.   Due to recent hospital wide restrictions to limit the transfer of COVID-19, we are not performing any physical exams at this time. All S/S will be observational; NFPE to be performed at a future date.    Nutrition Discharge Planning: Adequate nutrition to meet needs via Diabetic diet.     Nutrition Risk Screen    Nutrition Risk Screen: no indicators present    Nutrition/Diet History    Spiritual, Cultural Beliefs, [employer]yb[employer]ian Practices, Values that Affect Care: no    Anthropometrics    Temp: 96.1 °F (35.6 °C)  Height Method: Measured  Height: 5' 7" (170.2 cm)  Height (inches): 67 in  Weight Method: Bed Scale  Weight: 55.2 kg (121 lb 11.1 oz)  Weight (lb): 121.7 lb  Ideal Body Weight (IBW), Female: 135 lb  % Ideal Body Weight, Female (lb): 90.15 %  BMI (Calculated): 19.1  BMI Grade: 18.5-24.9 - normal  Usual Body Weight (UBW), k kg  % Usual Body Weight: 85.1  % Weight Change From Usual Weight: -15.08 %     Lab/Procedures/Meds    Pertinent Labs Reviewed: reviewed  Pertinent Labs Comments: Glucose 461, A1C 12.9  Pertinent Medications Reviewed: reviewed  Pertinent " Medications Comments: Heparin, Insulin    Estimated/Assessed Needs    Weight Used For Calorie Calculations: 56.2 kg (123 lb 14.4 oz)  Energy Calorie Requirements (kcal): 1636 kcals/day(x1.25)  Energy Need Method: [place] Jeor  Protein Requirements: 45-56 g/day(0.8-1.0 g/kg)  Weight Used For Protein Calculations: 56.2 kg (123 lb 14.4 oz)  Fluid Requirements (mL): 1mL/kcal or per MD  Estimated Fluid Requirement Method: RDA Method  RDA Method (mL): 1636  CHO Requirement: 204 g CHO    Nutrition Prescription Ordered    Current Diet Order: 2000 kcal Diabetic diet  Oral Nutrition Supplement: Boost glucose TID    Evaluation of Received Nutrient/Fluid Intake    Energy Calories Required: meeting needs  Protein Required: meeting needs  Fluid Required: meeting needs  Comments: LBM 3.24.20  Tolerance: tolerating  % Intake of Estimated Energy Needs: 75 - 100 %  % Meal Intake: 75 - 100 %    Nutrition Risk    Level of Risk/Frequency of Follow-up: low     Assessment and Plan    Nutrition Problem  Inconsistent CHO distribution     Related to (etiology):   Lack of meal planning    Signs and Symptoms (as evidenced by):   Glucose 461, A1C 12.9    Interventions (treatment strategy):  Collaboration with other providers  DM diet   Commercial beverage    Nutrition Diagnosis Status:   New    Monitor and Evaluation    Food and Nutrient Intake: food and beverage intake  Food and Nutrient Adminstration: diet order  Knowledge/Beliefs/Attitudes: food and nutrition knowledge/skill  Physical Activity and Function: nutrition-related ADLs and IADLs  Anthropometric Measurements: weight  Biochemical Data, Medical Tests and Procedures: glucose/endocrine profile  Nutrition-Focused Physical Findings: overall appearance     Malnutrition Assessment     Fernando Score: 21  No edema noted via chart.   Due to recent hospital wide restrictions to limit the transfer of COVID-19, we are not performing any physical exams at this time. All S/S will be  observational; NFPE to be performed at a future date.    Nutrition Follow-Up    RD Follow-up?: Yes

## 2020-03-25 NOTE — PLAN OF CARE
CM spoke with pharmacy and medication/supply cost is $0 and will be delivered to patient's bedside.    CM picked out clothes for patient and placed in a drawstring bag. Presented clothing to [address] [place] [address] to give to patient at discharge.

## 2020-03-26 VITALS
DIASTOLIC BLOOD PRESSURE: 80 MMHG | BODY MASS INDEX: 19.1 KG/M2 | HEIGHT: 67 IN | SYSTOLIC BLOOD PRESSURE: 126 MMHG | WEIGHT: 121.69 LBS | HEART RATE: 75 BPM | TEMPERATURE: 98 F | OXYGEN SATURATION: 98 % | RESPIRATION RATE: 18 BRPM

## 2020-03-26 LAB
ANION GAP SERPL CALC-SCNC: 6 MMOL/L (ref 8–16)
BASOPHILS # BLD AUTO: 0.02 K/UL (ref 0–0.2)
BASOPHILS NFR BLD: 0.3 % (ref 0–1.9)
BUN SERPL-MCNC: 12 MG/DL (ref 6–20)
CALCIUM SERPL-MCNC: 8.2 MG/DL (ref 8.7–10.5)
CHLORIDE SERPL-SCNC: 106 MMOL/L (ref 95–110)
CO2 SERPL-SCNC: 25 MMOL/L (ref 23–29)
CREAT SERPL-MCNC: 0.9 MG/DL (ref 0.5–1.4)
DIFFERENTIAL METHOD: ABNORMAL
EOSINOPHIL # BLD AUTO: 0.2 K/UL (ref 0–0.5)
EOSINOPHIL NFR BLD: 2.6 % (ref 0–8)
ERYTHROCYTE [DISTWIDTH] IN BLOOD BY AUTOMATED COUNT: 15.9 % (ref 11.5–14.5)
EST. GFR  (AFRICAN AMERICAN): >60 ML/MIN/1.73 M^2
EST. GFR  (NON AFRICAN AMERICAN): >60 ML/MIN/1.73 M^2
GLUCOSE SERPL-MCNC: 349 MG/DL (ref 70–110)
HCT VFR BLD AUTO: 33.9 % (ref 37–48.5)
HGB BLD-MCNC: 10.5 G/DL (ref 12–16)
IMM GRANULOCYTES # BLD AUTO: 0.01 K/UL (ref 0–0.04)
IMM GRANULOCYTES NFR BLD AUTO: 0.2 % (ref 0–0.5)
LYMPHOCYTES # BLD AUTO: 2.7 K/UL (ref 1–4.8)
LYMPHOCYTES NFR BLD: 44.7 % (ref 18–48)
MAGNESIUM SERPL-MCNC: 1.8 MG/DL (ref 1.6–2.6)
MCH RBC QN AUTO: 26.1 PG (ref 27–31)
MCHC RBC AUTO-ENTMCNC: 31 G/DL (ref 32–36)
MCV RBC AUTO: 84 FL (ref 82–98)
MONOCYTES # BLD AUTO: 0.4 K/UL (ref 0.3–1)
MONOCYTES NFR BLD: 5.9 % (ref 4–15)
NEUTROPHILS # BLD AUTO: 2.8 K/UL (ref 1.8–7.7)
NEUTROPHILS NFR BLD: 46.3 % (ref 38–73)
NRBC BLD-RTO: 0 /100 WBC
PHOSPHATE SERPL-MCNC: 3.2 MG/DL (ref 2.7–4.5)
PLATELET # BLD AUTO: 197 K/UL (ref 150–350)
PMV BLD AUTO: 11.4 FL (ref 9.2–12.9)
POCT GLUCOSE: 219 MG/DL (ref 70–110)
POCT GLUCOSE: 318 MG/DL (ref 70–110)
POCT GLUCOSE: 339 MG/DL (ref 70–110)
POTASSIUM SERPL-SCNC: 4.1 MMOL/L (ref 3.5–5.1)
RBC # BLD AUTO: 4.03 M/UL (ref 4–5.4)
SODIUM SERPL-SCNC: 137 MMOL/L (ref 136–145)
WBC # BLD AUTO: 6.09 K/UL (ref 3.9–12.7)

## 2020-03-26 PROCEDURE — 25000003 PHARM REV CODE 250: Performed by: PHYSICIAN ASSISTANT

## 2020-03-26 PROCEDURE — 83735 ASSAY OF MAGNESIUM: CPT

## 2020-03-26 PROCEDURE — 99239 PR HOSPITAL DISCHARGE DAY,>30 MIN: ICD-10-PCS | Mod: ,,, | Performed by: HOSPITALIST

## 2020-03-26 PROCEDURE — S4991 NICOTINE PATCH NONLEGEND: HCPCS | Performed by: PHYSICIAN ASSISTANT

## 2020-03-26 PROCEDURE — 99239 HOSP IP/OBS DSCHRG MGMT >30: CPT | Mod: ,,, | Performed by: HOSPITALIST

## 2020-03-26 PROCEDURE — 85025 COMPLETE CBC W/AUTO DIFF WBC: CPT

## 2020-03-26 PROCEDURE — 84100 ASSAY OF PHOSPHORUS: CPT

## 2020-03-26 PROCEDURE — 80048 BASIC METABOLIC PNL TOTAL CA: CPT

## 2020-03-26 PROCEDURE — 63600175 PHARM REV CODE 636 W HCPCS: Performed by: PHYSICIAN ASSISTANT

## 2020-03-26 RX ORDER — DEXTROSE 4 G
TABLET,CHEWABLE ORAL
Qty: 1 EACH | Refills: 0 | Status: ON HOLD | OUTPATIENT
Start: 2020-03-26 | End: 2023-06-14 | Stop reason: HOSPADM

## 2020-03-26 RX ORDER — INSULIN ASPART 100 [IU]/ML
6 INJECTION, SOLUTION INTRAVENOUS; SUBCUTANEOUS
Qty: 15 ML | Refills: 0 | Status: ON HOLD | OUTPATIENT
Start: 2020-03-26 | End: 2023-06-14 | Stop reason: SDUPTHER

## 2020-03-26 RX ADMIN — HEPARIN SODIUM 5000 UNITS: 5000 INJECTION, SOLUTION INTRAVENOUS; SUBCUTANEOUS at 05:03

## 2020-03-26 RX ADMIN — INSULIN ASPART 6 UNITS: 100 INJECTION, SOLUTION INTRAVENOUS; SUBCUTANEOUS at 07:03

## 2020-03-26 RX ADMIN — FLUOXETINE 40 MG: 20 CAPSULE ORAL at 09:03

## 2020-03-26 RX ADMIN — ACETAMINOPHEN 650 MG: 325 TABLET, FILM COATED ORAL at 09:03

## 2020-03-26 RX ADMIN — NICOTINE 1 PATCH: 14 PATCH, EXTENDED RELEASE TRANSDERMAL at 09:03

## 2020-03-26 NOTE — SUBJECTIVE & OBJECTIVE
Interval History:  Elevated blood sugars.    Review of Systems   Constitutional: Negative for chills and fever.   Respiratory: Negative for shortness of breath and wheezing.    Cardiovascular: Negative for chest pain.   Gastrointestinal: Negative for abdominal distention, abdominal pain, constipation, diarrhea, nausea and vomiting.   Genitourinary: Negative for dysuria and frequency.   Musculoskeletal: Negative for arthralgias and myalgias.   Neurological: Negative for light-headedness.   Psychiatric/Behavioral: Negative for agitation and confusion.     Objective:     Vital Signs (Most Recent):  Temp: 98.1 °F (36.7 °C) (03/25/20 2000)  Pulse: 98 (03/25/20 2000)  Resp: 18 (03/25/20 2000)  BP: 121/82 (03/25/20 2000)  SpO2: 98 % (03/25/20 2000) Vital Signs (24h Range):  Temp:  [96.1 °F (35.6 °C)-98.4 °F (36.9 °C)] 98.1 °F (36.7 °C)  Pulse:  [70-99] 98  Resp:  [16-20] 18  SpO2:  [97 %-100 %] 98 %  BP: (118-149)/(80-96) 121/82     Weight: 55.2 kg (121 lb 11.1 oz)  Body mass index is 19.06 kg/m².    Intake/Output Summary (Last 24 hours) at 3/25/2020 2302  Last data filed at 3/25/2020 1747  Gross per 24 hour   Intake 2690 ml   Output 1150 ml   Net 1540 ml      Physical Exam   Constitutional: She is oriented to person, place, and time. She appears well-developed and well-nourished. No distress.   Thin   HENT:   Head: Atraumatic.   Eyes: Conjunctivae are normal.   Neck: Neck supple.   Cardiovascular: Normal rate, regular rhythm and normal heart sounds.   No murmur heard.  Pulmonary/Chest: Effort normal and breath sounds normal. She has no wheezes.   Abdominal: Soft. Bowel sounds are normal. She exhibits no distension. There is no tenderness.   Musculoskeletal: Normal range of motion. She exhibits no edema or deformity.   Neurological: She is alert and oriented to person, place, and time.       Significant Labs: All pertinent labs within the past 24 hours have been reviewed.    Significant Imaging: I have reviewed all  pertinent imaging results/findings within the past 24 hours.

## 2020-03-26 NOTE — HOSPITAL COURSE
Patient is a 29 year-old woman with diabetes mellitus type I admitted to the hospital for treatment of diabetic ketoacidosis due to noncompliance with her insulin regimen.  Patient treated with intravenous fluids and insulin.  Anion gap closed and patient transitioned to subcutaneous insulin.  Patient's insulin regimen adjusted treat more reasonable glycemic control.  Case management and social work consulted provide resources to help arrange for homeless shelter placement and to help establish outpatient care.  Patient provided for an almost 1 month supply of all recommended prescription medications prior to discharge.  I have emphasized the importance of establishing outpatient care.

## 2020-03-26 NOTE — PLAN OF CARE
Patient is discharged back to the community.     CM provided the patient clothes and verified the patients medications co pay is $0. Patient is banded from shelters per patient and is agreeable to return to the community.     CM needs addressed for discharge.        03/26/20 0909   Final Note   Assessment Type Final Discharge Note   Anticipated Discharge Disposition Home   What phone number can be called within the next 1-3 days to see how you are doing after discharge? [phone]   Right Care Referral Info   Post Acute Recommendation No Care
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
## 2020-03-26 NOTE — PLAN OF CARE
VSS and afebrile, AAOx4. Blood glucose checks maintained. Ordered diet tolerated well. Patient had one episode of vomiting that has resolved. Security was called at beginning of shift due to pt standing in magana yelling and cursing. She was going to leave AMA but then changed her mind last second. No problems since. Plan of care reviewed with patient. Purposeful rounding done, call light at bed side, bed at lowest position, brakes on, non-skid socks on, will continue to monitor.

## 2020-03-26 NOTE — ASSESSMENT & PLAN NOTE
Anion gap closed and transitioned to subcutaneous insulin.  Adjusted insulin dose for better control.

## 2020-03-26 NOTE — NURSING
Pt AAOx4, VSS on RA and afebrile. Discharge paperwork given, pt verbalizes understanding. Medications delivered to room per outpatient pharmacy.  IV removed w/ cath tip intact, WNL.  Pt voiding spontaneously without difficulty. Ambulating without difficulty. To be DCd home w/ family-- will be escorted downstairs via  transport team once dressed, ready & ride arrives. Free from falls, injury, or skin breakdown this hospital admission.
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
## 2020-03-27 NOTE — DISCHARGE SUMMARY
[facility]  Hospital Medicine  Discharge Summary      Patient Name: Hollie Wilson  MRN: [id number]  Admission Date: 3/24/2020  Hospital Length of Stay: 2 days  Discharge Date and Time: 3/26/2020 12:01 PM  Attending Physician: Adali Smalls MD  Discharging Provider: Adali Smalls MD      HPI:   Ms. Hollie Wilson is a 29 y.o. female, with PMH of DM-I, who presented to [facility] ED on 3/24/20 with high blood sugar. She states she has not been able to take her meds since she became homeless on Friday hen her boyfriend kicked her out of where they were living together. She notes associated dizziness, sleepiness, and frustration. She denies polyuria, polydipsia, N/V/D, abdominal pain. She was treated in the ED with 5 units of regular insulin, and started on insulin drip. She is admitted to inpatient status.     Hospital Course:   Patient is a 29 year-old woman with diabetes mellitus type I admitted to the hospital for treatment of diabetic ketoacidosis due to noncompliance with her insulin regimen.  Patient treated with intravenous fluids and insulin.  Anion gap closed and patient transitioned to subcutaneous insulin.  Patient's insulin regimen adjusted treat more reasonable glycemic control.  Case management and social work consulted provide resources to help arrange for homeless shelter placement and to help establish outpatient care.  Patient provided for an almost 1 month supply of all recommended prescription medications prior to discharge.  I have emphasized the importance of establishing outpatient care.     Consults:   Consults (From admission, onward)        Status Ordering Provider     Inpatient consult to Anesthesiology-ACS  Once     Provider:  Dorian Hitchcock MD    Completed ADALI SMALLS     Inpatient consult to Registered Dietitian/Nutritionist  Once     Provider:  (Not yet assigned)    Completed HEMALATHA ALFARO     Inpatient consult to Social Work  Once     Provider:  (Not yet  assigned)    Completed HEMALATHA ALFARO          Final Active Diagnoses:    Diagnosis Date Noted POA    PRINCIPAL PROBLEM:  Diabetic ketoacidosis with coma associated with type 1 diabetes mellitus [E10.11] 12/06/2018 Yes    Homeless [Z59.0] 03/24/2020 Not Applicable    Acute kidney injury [N17.9] 06/13/2019 Yes      Problems Resolved During this Admission:       Discharged Condition: Stable    Disposition: Home or Self Care    Follow Up:  Follow-up Information     Schedule an appointment as soon as possible for a visit with [facility].    Specialty:  Family Medicine  Why:  Re-establish care for management of diabetes  Contact information:  [address] [facility] [address]  [phone]                 Patient Instructions:      Diet diabetic     Notify your health care provider if you experience any of the following:  temperature >100.4     Notify your health care provider if you experience any of the following:  persistent nausea and vomiting or diarrhea     Notify your health care provider if you experience any of the following:  severe uncontrolled pain     Notify your health care provider if you experience any of the following:  difficulty breathing or increased cough     Notify your health care provider if you experience any of the following:  severe persistent headache     Notify your health care provider if you experience any of the following:  worsening rash     Notify your health care provider if you experience any of the following:  persistent dizziness, light-headedness, or visual disturbances     Notify your health care provider if you experience any of the following:  increased confusion or weakness     Activity as tolerated        Medications:  Reconciled Home Medications:      Medication List      START taking these medications    acetaminophen 500 MG tablet  Commonly known as:  TYLENOL  Take 2 tablets (1,000 mg total) by mouth every 8 (eight) hours as needed  "for Pain.     BD ULTRA-FINE JENI PEN NEEDLE 32 gauge x 5/32" Ndle  Generic drug:  pen needle, diabetic  Use 4 (four) times daily.     blood-glucose meter Misc  as directed     insulin aspart U-100 100 unit/mL (3 mL) Inpn pen  Commonly known as:  NovoLOG  Inject 6 Units into the skin 3 (three) times daily with meals.     insulin detemir U-100 100 unit/mL (3 mL) Inpn pen  Commonly known as:  LEVEMIR FLEXTOUCH  Inject 25 Units into the skin once daily.  Replaces:  insulin detemir U-100 100 unit/mL injection        CONTINUE taking these medications    FLUoxetine 20 MG capsule  Take 2 capsules (40 mg total) by mouth once daily.     nicotine 14 mg/24 hr  Commonly known as:  NICODERM CQ  Place 1 patch onto the skin once daily.     ONETOUCH ULTRA BLUE TEST STRIP Strp  Generic drug:  blood sugar diagnostic  test 3 (three) times daily before meals. For One Touch meter     ONETOUCH ULTRASOFT LANCETS Misc  Generic drug:  lancets  test 3 (three) times daily with meals.        STOP taking these medications    chlorproMAZINE 25 MG tablet  Commonly known as:  THORAZINE     hydrOXYzine pamoate 25 MG Cap  Commonly known as:  VISTARIL     insulin detemir U-100 100 unit/mL injection  Commonly known as:  LEVEMIR  Replaced by:  insulin detemir U-100 100 unit/mL (3 mL) Inpn pen            Indwelling Lines/Drains at time of discharge:   Lines/Drains/Airways     None                 Time spent on the discharge of patient: 35 minutes  Patient was seen and examined on the date of discharge and determined to be suitable for discharge.         Dagoberto Smalls MD  Department of Hospital Medicine  [facility]  "

## 2020-03-31 ENCOUNTER — PATIENT OUTREACH (OUTPATIENT)
Dept: ADMINISTRATIVE | Facility: CLINIC | Age: 30
End: 2020-03-31

## 2020-04-15 NOTE — PROGRESS NOTES
Please forward this important TCC information to your provider in order to maximize the post discharge care delivery of this patient.    C3 nurse attempted to contact Hollie Wilson for a TCC post hospital discharge follow up call. No answer. C3 nurse left a voice message and OOC # given. The patient does not have a HOSFU appointment with a Provider within 7-14 days post hospital discharge date 3/27/20. Please contact patient and schedule follow up appointment using HOSFU visit type.    Respectfully,  Meka Jimenez, RN  [facility]  [email or web address]

## 2020-11-12 ENCOUNTER — HOSPITAL ENCOUNTER (INPATIENT)
Facility: HOSPITAL | Age: 30
LOS: 2 days | Discharge: PSYCHIATRIC OR SUBSTANCE USE FACILITY | DRG: 639 | End: 2020-11-14
Attending: EMERGENCY MEDICINE | Admitting: HOSPITALIST
Payer: MEDICAID

## 2020-11-12 DIAGNOSIS — R07.9 CHEST PAIN: ICD-10-CM

## 2020-11-12 DIAGNOSIS — E11.10 DKA (DIABETIC KETOACIDOSES): Primary | ICD-10-CM

## 2020-11-12 DIAGNOSIS — R73.9 HYPERGLYCEMIA: ICD-10-CM

## 2020-11-12 DIAGNOSIS — E10.10 DKA, TYPE 1: ICD-10-CM

## 2020-11-12 DIAGNOSIS — Z86.59 HISTORY OF SCHIZOPHRENIA: ICD-10-CM

## 2020-11-12 DIAGNOSIS — E10.10 DIABETIC KETOACIDOSIS WITHOUT COMA ASSOCIATED WITH TYPE 1 DIABETES MELLITUS: ICD-10-CM

## 2020-11-12 DIAGNOSIS — R47.01 DOES NOT SPEAK: ICD-10-CM

## 2020-11-12 PROBLEM — F12.10 MARIJUANA ABUSE: Status: ACTIVE | Noted: 2020-11-12

## 2020-11-12 PROBLEM — F25.0 SCHIZOAFFECTIVE DISORDER, BIPOLAR TYPE: Status: ACTIVE | Noted: 2020-11-12

## 2020-11-12 PROBLEM — F15.10 AMPHETAMINE ABUSE: Status: ACTIVE | Noted: 2020-11-12

## 2020-11-12 PROBLEM — R94.31 PROLONGED Q-T INTERVAL ON ECG: Status: ACTIVE | Noted: 2020-11-12

## 2020-11-12 LAB
ALBUMIN SERPL BCP-MCNC: 4.6 G/DL (ref 3.5–5.2)
ALLENS TEST: ABNORMAL
ALP SERPL-CCNC: 100 U/L (ref 55–135)
ALT SERPL W/O P-5'-P-CCNC: 8 U/L (ref 10–44)
AMORPH CRY URNS QL MICRO: ABNORMAL
AMPHET+METHAMPHET UR QL: NORMAL
ANION GAP SERPL CALC-SCNC: 15 MMOL/L (ref 8–16)
ANION GAP SERPL CALC-SCNC: 19 MMOL/L (ref 8–16)
APAP SERPL-MCNC: <3 UG/ML (ref 10–20)
AST SERPL-CCNC: 11 U/L (ref 10–40)
B-HCG UR QL: NEGATIVE
B-OH-BUTYR BLD STRIP-SCNC: 2 MMOL/L (ref 0–0.5)
B-OH-BUTYR BLD STRIP-SCNC: 5.6 MMOL/L (ref 0–0.5)
BACTERIA #/AREA URNS HPF: ABNORMAL /HPF
BARBITURATES UR QL SCN>200 NG/ML: NEGATIVE
BASOPHILS # BLD AUTO: 0.05 K/UL (ref 0–0.2)
BASOPHILS NFR BLD: 0.7 % (ref 0–1.9)
BENZODIAZ UR QL SCN>200 NG/ML: NEGATIVE
BILIRUB SERPL-MCNC: 0.9 MG/DL (ref 0.1–1)
BILIRUB UR QL STRIP: NEGATIVE
BNP SERPL-MCNC: 25 PG/ML (ref 0–99)
BUN SERPL-MCNC: 16 MG/DL (ref 6–20)
BUN SERPL-MCNC: 18 MG/DL (ref 6–20)
BZE UR QL SCN: NEGATIVE
CALCIUM SERPL-MCNC: 10.1 MG/DL (ref 8.7–10.5)
CALCIUM SERPL-MCNC: 9 MG/DL (ref 8.7–10.5)
CANNABINOIDS UR QL SCN: NORMAL
CHLORIDE SERPL-SCNC: 101 MMOL/L (ref 95–110)
CHLORIDE SERPL-SCNC: 109 MMOL/L (ref 95–110)
CLARITY UR: CLEAR
CO2 SERPL-SCNC: 16 MMOL/L (ref 23–29)
CO2 SERPL-SCNC: 17 MMOL/L (ref 23–29)
COLOR UR: ABNORMAL
CREAT SERPL-MCNC: 1.1 MG/DL (ref 0.5–1.4)
CREAT SERPL-MCNC: 1.2 MG/DL (ref 0.5–1.4)
CREAT UR-MCNC: 39.5 MG/DL (ref 15–325)
DELSYS: ABNORMAL
DIFFERENTIAL METHOD: ABNORMAL
EOSINOPHIL # BLD AUTO: 0.1 K/UL (ref 0–0.5)
EOSINOPHIL NFR BLD: 1.5 % (ref 0–8)
ERYTHROCYTE [DISTWIDTH] IN BLOOD BY AUTOMATED COUNT: 15.4 % (ref 11.5–14.5)
EST. GFR  (AFRICAN AMERICAN): >60 ML/MIN/1.73 M^2
EST. GFR  (AFRICAN AMERICAN): >60 ML/MIN/1.73 M^2
EST. GFR  (NON AFRICAN AMERICAN): 60 ML/MIN/1.73 M^2
EST. GFR  (NON AFRICAN AMERICAN): >60 ML/MIN/1.73 M^2
ETHANOL SERPL-MCNC: <10 MG/DL
GLUCOSE SERPL-MCNC: 209 MG/DL (ref 70–110)
GLUCOSE SERPL-MCNC: 398 MG/DL (ref 70–110)
GLUCOSE UR QL STRIP: ABNORMAL
HCG INTACT+B SERPL-ACNC: <1.2 MIU/ML
HCO3 UR-SCNC: 18.7 MMOL/L (ref 24–28)
HCT VFR BLD AUTO: 40.5 % (ref 37–48.5)
HGB BLD-MCNC: 12.6 G/DL (ref 12–16)
HGB UR QL STRIP: NEGATIVE
IMM GRANULOCYTES # BLD AUTO: 0.02 K/UL (ref 0–0.04)
IMM GRANULOCYTES NFR BLD AUTO: 0.3 % (ref 0–0.5)
KETONES UR QL STRIP: ABNORMAL
LACTATE SERPL-SCNC: 1.3 MMOL/L (ref 0.5–2.2)
LEUKOCYTE ESTERASE UR QL STRIP: NEGATIVE
LIPASE SERPL-CCNC: 9 U/L (ref 4–60)
LYMPHOCYTES # BLD AUTO: 1.9 K/UL (ref 1–4.8)
LYMPHOCYTES NFR BLD: 25.5 % (ref 18–48)
MAGNESIUM SERPL-MCNC: 2.1 MG/DL (ref 1.6–2.6)
MCH RBC QN AUTO: 26.8 PG (ref 27–31)
MCHC RBC AUTO-ENTMCNC: 31.1 G/DL (ref 32–36)
MCV RBC AUTO: 86 FL (ref 82–98)
METHADONE UR QL SCN>300 NG/ML: NEGATIVE
MICROSCOPIC COMMENT: ABNORMAL
MODE: ABNORMAL
MONOCYTES # BLD AUTO: 0.6 K/UL (ref 0.3–1)
MONOCYTES NFR BLD: 8 % (ref 4–15)
NEUTROPHILS # BLD AUTO: 4.7 K/UL (ref 1.8–7.7)
NEUTROPHILS NFR BLD: 64 % (ref 38–73)
NITRITE UR QL STRIP: NEGATIVE
NON-SQ EPI CELLS #/AREA URNS HPF: 3 /HPF
NRBC BLD-RTO: 0 /100 WBC
OPIATES UR QL SCN: NEGATIVE
PCO2 BLDA: 42.6 MMHG (ref 35–45)
PCP UR QL SCN>25 NG/ML: NEGATIVE
PH SMN: 7.25 [PH] (ref 7.35–7.45)
PH UR STRIP: 6 [PH] (ref 5–8)
PHOSPHATE SERPL-MCNC: 2.8 MG/DL (ref 2.7–4.5)
PLATELET # BLD AUTO: 213 K/UL (ref 150–350)
PMV BLD AUTO: 12.2 FL (ref 9.2–12.9)
PO2 BLDA: 26 MMHG (ref 40–60)
POC BE: -8 MMOL/L
POC SATURATED O2: 38 % (ref 95–100)
POC TCO2: 20 MMOL/L (ref 24–29)
POCT GLUCOSE: 154 MG/DL (ref 70–110)
POCT GLUCOSE: 185 MG/DL (ref 70–110)
POCT GLUCOSE: 195 MG/DL (ref 70–110)
POCT GLUCOSE: 197 MG/DL (ref 70–110)
POCT GLUCOSE: 224 MG/DL (ref 70–110)
POCT GLUCOSE: 322 MG/DL (ref 70–110)
POCT GLUCOSE: 337 MG/DL (ref 70–110)
POCT GLUCOSE: 382 MG/DL (ref 70–110)
POTASSIUM SERPL-SCNC: 4.8 MMOL/L (ref 3.5–5.1)
POTASSIUM SERPL-SCNC: 5.1 MMOL/L (ref 3.5–5.1)
PROT SERPL-MCNC: 8.2 G/DL (ref 6–8.4)
PROT UR QL STRIP: NEGATIVE
RBC # BLD AUTO: 4.71 M/UL (ref 4–5.4)
SAMPLE: ABNORMAL
SARS-COV-2 RDRP RESP QL NAA+PROBE: NEGATIVE
SITE: ABNORMAL
SODIUM SERPL-SCNC: 137 MMOL/L (ref 136–145)
SODIUM SERPL-SCNC: 140 MMOL/L (ref 136–145)
SP GR UR STRIP: 1.03 (ref 1–1.03)
TOXICOLOGY INFORMATION: NORMAL
TSH SERPL DL<=0.005 MIU/L-ACNC: 3.64 UIU/ML (ref 0.4–4)
URN SPEC COLLECT METH UR: ABNORMAL
UROBILINOGEN UR STRIP-ACNC: NEGATIVE EU/DL
WBC # BLD AUTO: 7.26 K/UL (ref 3.9–12.7)
YEAST URNS QL MICRO: ABNORMAL

## 2020-11-12 PROCEDURE — 82010 KETONE BODYS QUAN: CPT

## 2020-11-12 PROCEDURE — 25000003 PHARM REV CODE 250: Performed by: EMERGENCY MEDICINE

## 2020-11-12 PROCEDURE — 99285 EMERGENCY DEPT VISIT HI MDM: CPT | Mod: 25

## 2020-11-12 PROCEDURE — 96375 TX/PRO/DX INJ NEW DRUG ADDON: CPT

## 2020-11-12 PROCEDURE — 80053 COMPREHEN METABOLIC PANEL: CPT

## 2020-11-12 PROCEDURE — 93010 EKG 12-LEAD: ICD-10-PCS | Mod: ,,, | Performed by: INTERNAL MEDICINE

## 2020-11-12 PROCEDURE — 84443 ASSAY THYROID STIM HORMONE: CPT

## 2020-11-12 PROCEDURE — 96372 THER/PROPH/DIAG INJ SC/IM: CPT

## 2020-11-12 PROCEDURE — 85025 COMPLETE CBC W/AUTO DIFF WBC: CPT

## 2020-11-12 PROCEDURE — 83605 ASSAY OF LACTIC ACID: CPT

## 2020-11-12 PROCEDURE — 83690 ASSAY OF LIPASE: CPT

## 2020-11-12 PROCEDURE — 20000000 HC ICU ROOM

## 2020-11-12 PROCEDURE — 93010 ELECTROCARDIOGRAM REPORT: CPT | Mod: ,,, | Performed by: INTERNAL MEDICINE

## 2020-11-12 PROCEDURE — 96374 THER/PROPH/DIAG INJ IV PUSH: CPT

## 2020-11-12 PROCEDURE — 25000003 PHARM REV CODE 250: Performed by: INTERNAL MEDICINE

## 2020-11-12 PROCEDURE — 82803 BLOOD GASES ANY COMBINATION: CPT

## 2020-11-12 PROCEDURE — 25000003 PHARM REV CODE 250: Performed by: HOSPITALIST

## 2020-11-12 PROCEDURE — 93005 ELECTROCARDIOGRAM TRACING: CPT

## 2020-11-12 PROCEDURE — U0002 COVID-19 LAB TEST NON-CDC: HCPCS

## 2020-11-12 PROCEDURE — 82962 GLUCOSE BLOOD TEST: CPT

## 2020-11-12 PROCEDURE — 80307 DRUG TEST PRSMV CHEM ANLYZR: CPT

## 2020-11-12 PROCEDURE — 90792 PR PSYCHIATRIC DIAGNOSTIC EVALUATION W/MEDICAL SERVICES: ICD-10-PCS | Mod: SA,HB,, | Performed by: PHYSICIAN ASSISTANT

## 2020-11-12 PROCEDURE — 99900035 HC TECH TIME PER 15 MIN (STAT)

## 2020-11-12 PROCEDURE — S5010 5% DEXTROSE AND 0.45% SALINE: HCPCS | Performed by: INTERNAL MEDICINE

## 2020-11-12 PROCEDURE — 81000 URINALYSIS NONAUTO W/SCOPE: CPT | Mod: 59

## 2020-11-12 PROCEDURE — 63600175 PHARM REV CODE 636 W HCPCS: Performed by: EMERGENCY MEDICINE

## 2020-11-12 PROCEDURE — 81025 URINE PREGNANCY TEST: CPT

## 2020-11-12 PROCEDURE — 63600175 PHARM REV CODE 636 W HCPCS: Performed by: INTERNAL MEDICINE

## 2020-11-12 PROCEDURE — 90792 PSYCH DIAG EVAL W/MED SRVCS: CPT | Mod: SA,HB,, | Performed by: PHYSICIAN ASSISTANT

## 2020-11-12 PROCEDURE — 80329 ANALGESICS NON-OPIOID 1 OR 2: CPT

## 2020-11-12 PROCEDURE — 83735 ASSAY OF MAGNESIUM: CPT

## 2020-11-12 PROCEDURE — 83880 ASSAY OF NATRIURETIC PEPTIDE: CPT

## 2020-11-12 PROCEDURE — 80320 DRUG SCREEN QUANTALCOHOLS: CPT

## 2020-11-12 PROCEDURE — 36415 COLL VENOUS BLD VENIPUNCTURE: CPT

## 2020-11-12 PROCEDURE — 82010 KETONE BODYS QUAN: CPT | Mod: 91

## 2020-11-12 PROCEDURE — 96361 HYDRATE IV INFUSION ADD-ON: CPT

## 2020-11-12 PROCEDURE — 80048 BASIC METABOLIC PNL TOTAL CA: CPT

## 2020-11-12 PROCEDURE — 84702 CHORIONIC GONADOTROPIN TEST: CPT

## 2020-11-12 PROCEDURE — 84100 ASSAY OF PHOSPHORUS: CPT

## 2020-11-12 PROCEDURE — 83036 HEMOGLOBIN GLYCOSYLATED A1C: CPT

## 2020-11-12 RX ORDER — LORAZEPAM 2 MG/ML
1 INJECTION INTRAMUSCULAR
Status: COMPLETED | OUTPATIENT
Start: 2020-11-12 | End: 2020-11-12

## 2020-11-12 RX ORDER — IPRATROPIUM BROMIDE AND ALBUTEROL SULFATE 2.5; .5 MG/3ML; MG/3ML
3 SOLUTION RESPIRATORY (INHALATION) EVERY 4 HOURS PRN
Status: DISCONTINUED | OUTPATIENT
Start: 2020-11-12 | End: 2020-11-14 | Stop reason: HOSPADM

## 2020-11-12 RX ORDER — DEXTROSE MONOHYDRATE 100 MG/ML
1000 INJECTION, SOLUTION INTRAVENOUS
Status: DISCONTINUED | OUTPATIENT
Start: 2020-11-12 | End: 2020-11-12

## 2020-11-12 RX ORDER — HALOPERIDOL 5 MG/ML
5 INJECTION INTRAMUSCULAR
Status: COMPLETED | OUTPATIENT
Start: 2020-11-12 | End: 2020-11-12

## 2020-11-12 RX ORDER — ACETAMINOPHEN 325 MG/1
650 TABLET ORAL EVERY 4 HOURS PRN
Status: DISCONTINUED | OUTPATIENT
Start: 2020-11-12 | End: 2020-11-12

## 2020-11-12 RX ORDER — SODIUM CHLORIDE 9 MG/ML
125 INJECTION, SOLUTION INTRAVENOUS CONTINUOUS
Status: DISCONTINUED | OUTPATIENT
Start: 2020-11-12 | End: 2020-11-13

## 2020-11-12 RX ORDER — DEXTROSE MONOHYDRATE AND SODIUM CHLORIDE 5; .45 G/100ML; G/100ML
125 INJECTION, SOLUTION INTRAVENOUS CONTINUOUS PRN
Status: DISCONTINUED | OUTPATIENT
Start: 2020-11-12 | End: 2020-11-13

## 2020-11-12 RX ORDER — TALC
6 POWDER (GRAM) TOPICAL NIGHTLY PRN
Status: DISCONTINUED | OUTPATIENT
Start: 2020-11-12 | End: 2020-11-14 | Stop reason: HOSPADM

## 2020-11-12 RX ORDER — FLUOXETINE HYDROCHLORIDE 20 MG/1
40 CAPSULE ORAL DAILY
Status: DISCONTINUED | OUTPATIENT
Start: 2020-11-13 | End: 2020-11-14 | Stop reason: HOSPADM

## 2020-11-12 RX ORDER — AMOXICILLIN 250 MG
1 CAPSULE ORAL 2 TIMES DAILY PRN
Status: DISCONTINUED | OUTPATIENT
Start: 2020-11-12 | End: 2020-11-14 | Stop reason: HOSPADM

## 2020-11-12 RX ORDER — DIPHENHYDRAMINE HYDROCHLORIDE 50 MG/ML
25 INJECTION INTRAMUSCULAR; INTRAVENOUS
Status: COMPLETED | OUTPATIENT
Start: 2020-11-12 | End: 2020-11-12

## 2020-11-12 RX ORDER — SODIUM CHLORIDE 0.9 % (FLUSH) 0.9 %
10 SYRINGE (ML) INJECTION
Status: DISCONTINUED | OUTPATIENT
Start: 2020-11-12 | End: 2020-11-14 | Stop reason: HOSPADM

## 2020-11-12 RX ORDER — ENOXAPARIN SODIUM 100 MG/ML
40 INJECTION SUBCUTANEOUS EVERY 24 HOURS
Status: DISCONTINUED | OUTPATIENT
Start: 2020-11-12 | End: 2020-11-14 | Stop reason: HOSPADM

## 2020-11-12 RX ORDER — SODIUM CHLORIDE 0.9 % (FLUSH) 0.9 %
10 SYRINGE (ML) INJECTION
Status: DISCONTINUED | OUTPATIENT
Start: 2020-11-12 | End: 2020-11-12

## 2020-11-12 RX ORDER — HALOPERIDOL 5 MG/ML
5 INJECTION INTRAMUSCULAR EVERY 6 HOURS PRN
Status: DISCONTINUED | OUTPATIENT
Start: 2020-11-12 | End: 2020-11-14 | Stop reason: HOSPADM

## 2020-11-12 RX ORDER — ACETAMINOPHEN 325 MG/1
650 TABLET ORAL EVERY 4 HOURS PRN
Status: DISCONTINUED | OUTPATIENT
Start: 2020-11-12 | End: 2020-11-14 | Stop reason: HOSPADM

## 2020-11-12 RX ADMIN — DEXTROSE AND SODIUM CHLORIDE 125 ML/HR: 5; .45 INJECTION, SOLUTION INTRAVENOUS at 08:11

## 2020-11-12 RX ADMIN — HALOPERIDOL LACTATE 5 MG: 5 INJECTION, SOLUTION INTRAMUSCULAR at 02:11

## 2020-11-12 RX ADMIN — INSULIN HUMAN 6 UNITS/HR: 1 INJECTION, SOLUTION INTRAVENOUS at 05:11

## 2020-11-12 RX ADMIN — LORAZEPAM 1 MG: 2 INJECTION INTRAMUSCULAR; INTRAVENOUS at 02:11

## 2020-11-12 RX ADMIN — SODIUM CHLORIDE 1000 ML: 0.9 INJECTION, SOLUTION INTRAVENOUS at 07:11

## 2020-11-12 RX ADMIN — SODIUM CHLORIDE 1000 ML: 0.9 INJECTION, SOLUTION INTRAVENOUS at 04:11

## 2020-11-12 RX ADMIN — INSULIN HUMAN 1.5 UNITS/HR: 1 INJECTION, SOLUTION INTRAVENOUS at 06:11

## 2020-11-12 RX ADMIN — DIPHENHYDRAMINE HYDROCHLORIDE 25 MG: 50 INJECTION INTRAMUSCULAR; INTRAVENOUS at 02:11

## 2020-11-12 RX ADMIN — LORAZEPAM 1 MG: 2 INJECTION, SOLUTION INTRAMUSCULAR; INTRAVENOUS at 04:11

## 2020-11-12 RX ADMIN — ENOXAPARIN SODIUM 40 MG: 40 INJECTION SUBCUTANEOUS at 09:11

## 2020-11-12 NOTE — CONSULTS
"[facility]  Psychiatry  Consult Note    Patient Name: Hollie Wilson  MRN: [id number]   Code Status: Prior  Admission Date: 11/12/2020  Hospital Length of Stay: 0 days  Attending Physician: Heber Galindo MD  Primary Care Provider: [facility]    Current Legal Status: Physician's Emergency Certificate (PEC)    Patient information was obtained from patient, relative(s) and ER records.   Inpatient consult to Psychiatry  Consult performed by: Hope Parker PA-C  Consult ordered by: Heber Galindo MD        Subjective:     Principal Problem:<principal problem not specified>    Chief Complaint:  Refusal to communicate     HPI: Consult in the ED.  ED HPI: This is a 30 y.o. female with a PMHx of DM and Bipolar 1 disorder who presents to the ED via EMS due to hyperglycemia. Patient is refusing to communicate and will not answer any questions. Per EMS, patient is here for "diabetic problems" and she had a blood glucose of 448 in triage. Patient urinated on the floor.    The patient is examined lying in her bed.  She appears very thin but not cachectic.  She does not make eye contact at 1st and is starting to become tearful.  After my attempts to engage in conversation patient became more tearful and after I asked if I could touch her arm she shrank back.  All nursing staff was attempting to change her into a hospital gown it was noted that she had a ligature around her neck.  I subsequently examined her neck and found no ligature dacia.  The patient got a few call phone calls but did not answer them during interview.    Called the patient's aunt at the number listed in the chart.  She states that the patient has a history of bipolar and schizophrenia and she does not believe she is medicated at this time.  States the patient bounces around for boyfriend boyfriend and allows meant to take advantage of her.  States she has been homeless most of her adult " life and cannot find stable work.  Aunt says the patient has not been living with her recently but she did talk to her on the phone yesterday and does see her periodically.  States refusal to communicate happens relatively frequently with patient.  States the patient has checked herself in voluntarily multiple times to [facility] and [facility].  States she has also seen outpatient psychiatrist at these institutions as well.    No other information is forthcoming at this time.    Hospital Course: No notes on file         Patient History           Medical as of 2020     Past Medical History     Diagnosis Date Comments Source    Anemia -- -- Provider    Bipolar 1 disorder -- -- Provider    Diabetes mellitus -- -- Provider    Scoliosis -- -- Provider                  Surgical as of 2020     Past Surgical History     Procedure Laterality Date Comments Source     SECTION -- -- -- Provider                  Family as of 2020    None           Tobacco Use as of 2020     Smoking Status Smoking Start Date Smoking Quit Date Packs/Day Years Used    Current Every Day Smoker -- -- -- --    Types Comments Smokeless Tobacco Status Smokeless Tobacco Quit Date Source    -- -- Never Used -- Provider            Alcohol Use as of 2020     Alcohol Use Drinks/Week Alcohol/Week Comments Source    Yes   -- -- Provider    Frequency Typical Drinks Binge Drinking        -- -- --              Drug Use as of 2020     Drug Use Types Frequency Comments Source    Yes  Marijuana -- -- Provider            Sexual Activity as of 2020     Sexually Active Birth Control Partners Comments Source    Never -- -- -- Provider            Activities of Daily Living as of 2020    None           Social Documentation as of 2020    None           Occupational as of 2020    None           Socioeconomic as of 2020     Marital Status Spouse Name Number of Children Years Education  Education Level Preferred Language Ethnicity Race Source    Single -- -- -- -- English /Black White --    Financial Resource Strain Food Insecurity: Worry Food Insecurity: Inability Transportation Needs: Medical Transportation Needs: Non-medical    -- -- -- -- --            Pertinent History     Question Response Comments    Lives with -- --    Place in Birth Order -- --    Lives in -- --    Number of Siblings -- --    Raised by -- --    Legal Involvement -- --    Childhood Trauma -- --    Criminal History of -- --    Financial Status -- --    Highest Level of Education -- --    Does patient have access to a firearm? -- --     Service -- --    Primary Leisure Activity -- --    Spirituality -- --        Past Medical History:   Diagnosis Date    Anemia     Bipolar 1 disorder     Diabetes mellitus     Scoliosis      Past Surgical History:   Procedure Laterality Date     SECTION       Family History     None        Tobacco Use    Smoking status: Current Every Day Smoker    Smokeless tobacco: Never Used   Substance and Sexual Activity    Alcohol use: Yes    Drug use: Yes     Types: Marijuana    Sexual activity: Never     Review of patient's allergies indicates:   Allergen Reactions    Zofran (as hydrochloride) [ondansetron hcl] Hives       No current facility-administered medications on file prior to encounter.      Current Outpatient Medications on File Prior to Encounter   Medication Sig    acetaminophen (TYLENOL) 500 MG tablet Take 2 tablets (1,000 mg total) by mouth every 8 (eight) hours as needed for Pain.    blood sugar diagnostic Strp test 3 (three) times daily before meals. For One Touch meter    blood-glucose meter Misc as directed    FLUoxetine 20 MG capsule Take 2 capsules (40 mg total) by mouth once daily.    insulin aspart U-100 (NOVOLOG) 100 unit/mL (3 mL) InPn pen Inject 6 Units into the skin 3 (three) times daily with meals.    insulin detemir U-100  "(LEVEMIR FLEXTOUCH) 100 unit/mL (3 mL) SubQ InPn pen Inject 25 Units into the skin once daily.    lancets (ONETOUCH ULTRASOFT LANCETS) Misc test 3 (three) times daily with meals.    pen needle, diabetic (PEN NEEDLE) 32 gauge x 5/32" Ndle Use 4 (four) times daily.     Psychotherapeutics (From admission, onward)    None        Review of Systems   Unable to perform ROS: Psychiatric disorder     Strengths and Liabilities: Liability: Patient is unstable.    Objective:     Vital Signs (Most Recent):  Temp: 97.9 °F (36.6 °C) (11/12/20 1216)  Pulse: (!) 113 (11/12/20 1318)  Resp: 18 (11/12/20 1318)  BP: (!) 169/92 (11/12/20 1318)  SpO2: 100 % (11/12/20 1318) Vital Signs (24h Range):  Temp:  [97.9 °F (36.6 °C)] 97.9 °F (36.6 °C)  Pulse:  [102-113] 113  Resp:  [17-18] 18  SpO2:  [100 %] 100 %  BP: (121-169)/(87-95) 169/92     Height: 5' 7" (170.2 cm)  Weight: 68 kg (150 lb)  Body mass index is 23.49 kg/m².    No intake or output data in the 24 hours ending 11/12/20 1538    Physical Exam  Psychiatric:         Mood and Affect: Mood is depressed. Affect is tearful.         Speech: She is noncommunicative.         Behavior: Behavior is withdrawn.      Comments: Cannot fully assess mental state          Significant Labs: None    Significant Imaging: None    Assessment/Plan:     History of schizophrenia  Defer medical management to inpatient team if possible, as pt does not require PRN meds at this time.  Can use haldol 5mg IM/IV/PO for non redirectable agitation.  Per my conversation with the patient's aunt, her refusal to speak may have a volitional component as opposed to purely a negative feature of her schizophrenia.  Plan to admit to [facility] or [facility] if possible as these facilities are familiar with her case.  Needs 1:1 sitter and PEC.      Does not speak  As above       Total Time:  60 minutes      Hope Parker PA-C   Psychiatry  [facility]  "

## 2020-11-12 NOTE — ASSESSMENT & PLAN NOTE
Defer medical management to inpatient team if possible, as pt does not require PRN meds at this time.  Can use haldol 5mg IM/IV/PO for non redirectable agitation.  Per my conversation with the patient's aunt, her refusal to speak may have a volitional component as opposed to purely a negative feature of her schizophrenia.  Plan to admit to [facility] or [facility] if possible as these facilities are familiar with her case.  Needs 1:1 sitter and PEC.

## 2020-11-12 NOTE — SUBJECTIVE & OBJECTIVE
Patient History           Medical as of 2020     Past Medical History     Diagnosis Date Comments Source    Anemia -- -- Provider    Bipolar 1 disorder -- -- Provider    Diabetes mellitus -- -- Provider    Scoliosis -- -- Provider                  Surgical as of 2020     Past Surgical History     Procedure Laterality Date Comments Source     SECTION -- -- -- Provider                  Family as of 2020    None           Tobacco Use as of 2020     Smoking Status Smoking Start Date Smoking Quit Date Packs/Day Years Used    Current Every Day Smoker -- -- -- --    Types Comments Smokeless Tobacco Status Smokeless Tobacco Quit Date Source    -- -- Never Used -- Provider            Alcohol Use as of 2020     Alcohol Use Drinks/Week Alcohol/Week Comments Source    Yes   -- -- Provider    Frequency Typical Drinks Binge Drinking        -- -- --              Drug Use as of 2020     Drug Use Types Frequency Comments Source    Yes  Marijuana -- -- Provider            Sexual Activity as of 2020     Sexually Active Birth Control Partners Comments Source    Never -- -- -- Provider            Activities of Daily Living as of 2020    None           Social Documentation as of 2020    None           Occupational as of 2020    None           Socioeconomic as of 2020     Marital Status Spouse Name Number of Children Years Education Education Level Preferred Language Ethnicity Race Source    Single -- -- -- -- English /Black White --    Financial Resource Strain Food Insecurity: Worry Food Insecurity: Inability Transportation Needs: Medical Transportation Needs: Non-medical    -- -- -- -- --            Pertinent History     Question Response Comments    Lives with -- --    Place in Birth Order -- --    Lives in -- --    Number of Siblings -- --    Raised by -- --    Legal Involvement -- --    Childhood Trauma -- --    Criminal History of -- --  "   Financial Status -- --    Highest Level of Education -- --    Does patient have access to a firearm? -- --     Service -- --    Primary Leisure Activity -- --    Spirituality -- --        Past Medical History:   Diagnosis Date    Anemia     Bipolar 1 disorder     Diabetes mellitus     Scoliosis      Past Surgical History:   Procedure Laterality Date     SECTION       Family History     None        Tobacco Use    Smoking status: Current Every Day Smoker    Smokeless tobacco: Never Used   Substance and Sexual Activity    Alcohol use: Yes    Drug use: Yes     Types: Marijuana    Sexual activity: Never     Review of patient's allergies indicates:   Allergen Reactions    Zofran (as hydrochloride) [ondansetron hcl] Hives       No current facility-administered medications on file prior to encounter.      Current Outpatient Medications on File Prior to Encounter   Medication Sig    acetaminophen (TYLENOL) 500 MG tablet Take 2 tablets (1,000 mg total) by mouth every 8 (eight) hours as needed for Pain.    blood sugar diagnostic Strp test 3 (three) times daily before meals. For One Touch meter    blood-glucose meter Misc as directed    FLUoxetine 20 MG capsule Take 2 capsules (40 mg total) by mouth once daily.    insulin aspart U-100 (NOVOLOG) 100 unit/mL (3 mL) InPn pen Inject 6 Units into the skin 3 (three) times daily with meals.    insulin detemir U-100 (LEVEMIR FLEXTOUCH) 100 unit/mL (3 mL) SubQ InPn pen Inject 25 Units into the skin once daily.    lancets (ONETOUCH ULTRASOFT LANCETS) Misc test 3 (three) times daily with meals.    pen needle, diabetic (PEN NEEDLE) 32 gauge x 5/32" Ndle Use 4 (four) times daily.     Psychotherapeutics (From admission, onward)    None        Review of Systems   Unable to perform ROS: Psychiatric disorder     Strengths and Liabilities: Liability: Patient is unstable.    Objective:     Vital Signs (Most Recent):  Temp: 97.9 °F (36.6 °C) (20 " "1216)  Pulse: (!) 113 (11/12/20 1318)  Resp: 18 (11/12/20 1318)  BP: (!) 169/92 (11/12/20 1318)  SpO2: 100 % (11/12/20 1318) Vital Signs (24h Range):  Temp:  [97.9 °F (36.6 °C)] 97.9 °F (36.6 °C)  Pulse:  [102-113] 113  Resp:  [17-18] 18  SpO2:  [100 %] 100 %  BP: (121-169)/(87-95) 169/92     Height: 5' 7" (170.2 cm)  Weight: 68 kg (150 lb)  Body mass index is 23.49 kg/m².    No intake or output data in the 24 hours ending 11/12/20 1538    Physical Exam  Psychiatric:         Mood and Affect: Mood is depressed. Affect is tearful.         Speech: She is noncommunicative.         Behavior: Behavior is withdrawn.      Comments: Cannot fully assess mental state          Significant Labs: None    Significant Imaging: None  "

## 2020-11-12 NOTE — HPI
"Consult in the ED.  ED HPI: This is a 30 y.o. female with a PMHx of DM and Bipolar 1 disorder who presents to the ED via EMS due to hyperglycemia. Patient is refusing to communicate and will not answer any questions. Per EMS, patient is here for "diabetic problems" and she had a blood glucose of 448 in triage. Patient urinated on the floor.    The patient is examined lying in her bed.  She appears very thin but not cachectic.  She does not make eye contact at 1st and is starting to become tearful.  After my attempts to engage in conversation patient became more tearful and after I asked if I could touch her arm she shrank back.  All nursing staff was attempting to change her into a hospital gown it was noted that she had a ligature around her neck.  I subsequently examined her neck and found no ligature dacia.  The patient got a few call phone calls but did not answer them during interview.    Called the patient's aunt at the number listed in the chart.  She states that the patient has a history of bipolar and schizophrenia and she does not believe she is medicated at this time.  States the patient bounces around for boyfriend boyfriend and allows meant to take advantage of her.  States she has been homeless most of her adult life and cannot find stable work.  Aunt says the patient has not been living with her recently but she did talk to her on the phone yesterday and does see her periodically.  States refusal to communicate happens relatively frequently with patient.  States the patient has checked herself in voluntarily multiple times to [facility] and [facility].  States she has also seen outpatient psychiatrist at these institutions as well.    No other information is forthcoming at this time.  "

## 2020-11-12 NOTE — ED PROVIDER NOTES
"Encounter Date: 2020    SCRIBE #1 NOTE: I, Kait Almonte, am scribing for, and in the presence of,  Heber Galindo MD. I have scribed the following portions of the note - Other sections scribed: HPI, ROS.       History     Chief Complaint   Patient presents with    Hyperglycemia     pt comes in via EMS from home with c/o "diabetic problems". pt has hx of diabetes. pt refuses to speak in triage or to EMS.  in triage.      This is a 30 y.o. female with a PMHx of DM and Bipolar 1 disorder who presents to the ED via EMS due to hyperglycemia. Patient is refusing to communicate and will not answer any questions. Per EMS, patient is here for "diabetic problems" and she had a blood glucose of 448 in triage. Patient urinated on the floor.    The history is provided by the EMS personnel and medical records. No  was used.     Review of patient's allergies indicates:   Allergen Reactions    Zofran (as hydrochloride) [ondansetron hcl] Hives     Past Medical History:   Diagnosis Date    Anemia     Bipolar 1 disorder     Diabetes mellitus     Scoliosis      Past Surgical History:   Procedure Laterality Date     SECTION       No family history on file.  Social History     Tobacco Use    Smoking status: Current Every Day Smoker    Smokeless tobacco: Never Used   Substance Use Topics    Alcohol use: Yes    Drug use: Yes     Types: Marijuana     Review of Systems   Unable to perform ROS: Psychiatric disorder       Physical Exam     Initial Vitals [20 1216]   BP Pulse Resp Temp SpO2   (!) 136/95 107 17 97.9 °F (36.6 °C) 100 %      MAP       --         Physical Exam    Nursing note and vitals reviewed.  Constitutional: She appears well-developed. She is not diaphoretic. No distress.   HENT:   Head: Normocephalic and atraumatic.   Right Ear: Tympanic membrane normal.   Left Ear: Tympanic membrane normal.   Nose: Nose normal.   Mouth/Throat: Uvula is midline, oropharynx is clear " and moist and mucous membranes are normal.   Eyes: EOM are normal. Pupils are equal, round, and reactive to light.   Neck: Trachea normal, normal range of motion, full passive range of motion without pain and phonation normal. Neck supple. No stridor present. No spinous process tenderness and no muscular tenderness present. No tracheal deviation and normal range of motion present. No neck rigidity. No JVD present.   Cardiovascular: Regular rhythm and normal heart sounds.   No murmur heard.  Tachycardic   Pulmonary/Chest: Effort normal and breath sounds normal. No respiratory distress. She has no wheezes. She has no rhonchi. She has no rales.   Abdominal: Soft. Bowel sounds are normal. She exhibits no distension. There is no abdominal tenderness.   Musculoskeletal: No tenderness or edema.   Neurological: She is alert.   Alert, not responding to commands, seeming to track with eyes.  Observed to be moving bilateral upper extremities with normal appearing strength.   Skin: Skin is warm and dry. Capillary refill takes less than 2 seconds.         ED Course   Critical Care    Date/Time: 11/14/2020 11:01 AM  Performed by: Heber Galindo MD  Authorized by: Heber Galindo MD   Direct patient critical care time: 20 minutes  Additional history critical care time: 15 minutes  Ordering / reviewing critical care time: 5 minutes  Documentation critical care time: 5 minutes  Consulting other physicians critical care time: 5 minutes  Total critical care time (exclusive of procedural time) : 50 minutes  Critical care time was exclusive of separately billable procedures and treating other patients and teaching time.  Critical care was necessary to treat or prevent imminent or life-threatening deterioration of the following conditions: shock and endocrine crisis.  Critical care was time spent personally by me on the following activities: development of treatment plan with patient or surrogate, discussions with consultants,  evaluation of patient's response to treatment, examination of patient, obtaining history from patient or surrogate, ordering and performing treatments and interventions, ordering and review of laboratory studies, ordering and review of radiographic studies, re-evaluation of patient's condition and review of old charts.        Labs Reviewed   CBC W/ AUTO DIFFERENTIAL - Abnormal; Notable for the following components:       Result Value    MCH 26.8 (*)     MCHC 31.1 (*)     RDW 15.4 (*)     All other components within normal limits   COMPREHENSIVE METABOLIC PANEL - Abnormal; Notable for the following components:    CO2 17 (*)     Glucose 398 (*)     ALT 8 (*)     Anion Gap 19 (*)     All other components within normal limits   URINALYSIS, REFLEX TO URINE CULTURE - Abnormal; Notable for the following components:    Glucose, UA 3+ (*)     Ketones, UA 2+ (*)     All other components within normal limits    Narrative:     Specimen Source->Urine   BETA - HYDROXYBUTYRATE, SERUM - Abnormal; Notable for the following components:    Beta-Hydroxybutyrate 5.6 (*)     All other components within normal limits   ACETAMINOPHEN LEVEL - Abnormal; Notable for the following components:    Acetaminophen (Tylenol), Serum <3.0 (*)     All other components within normal limits   URINALYSIS MICROSCOPIC - Abnormal; Notable for the following components:    Non-Squam Epith 3 (*)     All other components within normal limits    Narrative:     Specimen Source->Urine   ISTAT PROCEDURE - Abnormal; Notable for the following components:    POC PH 7.251 (*)     POC PO2 26 (*)     POC HCO3 18.7 (*)     POC SATURATED O2 38 (*)     POC TCO2 20 (*)     All other components within normal limits   POCT GLUCOSE - Abnormal; Notable for the following components:    POCT Glucose 382 (*)     All other components within normal limits   POCT GLUCOSE - Abnormal; Notable for the following components:    POCT Glucose 322 (*)     All other components within normal  limits   POCT GLUCOSE - Abnormal; Notable for the following components:    POCT Glucose 337 (*)     All other components within normal limits   LIPASE   ALCOHOL,MEDICAL (ETHANOL)   TSH   DRUG SCREEN PANEL, URINE EMERGENCY    Narrative:     Specimen Source->Urine   SARS-COV-2 RNA AMPLIFICATION, QUAL        ECG Results          EKG 12-lead (Final result)  Result time 11/13/20 06:40:35    Final result by Interface, Lab In [facility] (11/13/20 06:40:35)                 Narrative:    Test Reason : R73.9,    Vent. Rate : 102 BPM     Atrial Rate : 102 BPM     P-R Int : 130 ms          QRS Dur : 112 ms      QT Int : 388 ms       P-R-T Axes : 078 -67 055 degrees     QTc Int : 505 ms    Sinus tachycardia  Right atrial enlargement  Pulmonary disease pattern  Right bundle branch block  Left anterior fascicular block   Bifascicular block   Minimal voltage criteria for LVH, may be normal variant  Septal infarct (cited on or before 06-DEC-2018)  Abnormal ECG  When compared with ECG of 27-JAN-2020 14:26,  Significant changes have occurred  Confirmed by Terry Contreras MD ([id number]) on 11/13/2020 6:40:28 AM    Referred By: [email or web address]   SELF           Confirmed By:Terry Contreras MD                            Imaging Results          X-Ray Chest 1 View (Final result)  Result time 11/12/20 14:58:15    Final result by Noah Malik MD (11/12/20 14:58:15)                 Impression:      Suboptimal examination.  No evidence of acute cardiopulmonary disease.      Electronically signed by: Noah Malik MD  Date:    11/12/2020  Time:    14:58             Narrative:    EXAMINATION:  XR CHEST 1 VIEW    CLINICAL HISTORY:  Chest Pain;    TECHNIQUE:  Single frontal view of the chest was performed.    COMPARISON:  03/24/2020    FINDINGS:  Suboptimal examination due to inability of the patient to elevate her head resulting in obscuration of the right apex.  Previously demonstrated right-sided central venous catheter is no longer present.  Heart size and  pulmonary vascularity are within normal limits.  Mild tortuosity of the thoracic aorta.  Lungs are satisfactorily expanded and appear free of active disease.  No pleural fluid or pneumothorax.  Skeletal structures appear intact.                                 Medical Decision Making:   Clinical Tests:   Lab Tests: Ordered and Reviewed  Radiological Study: Ordered and Reviewed  Medical Tests: Ordered and Reviewed    48-year-old female past medical history as noted above presenting with altered mental status concern for hyperglycemia.  Patient withdrawn, flat affect, not following commands.  Patient noted to be having a tourniquet around her neck or observed, and proceeded to try and wrap the EKG cord around her neck as well.  Psychiatry has evaluated patient recommending inpatient hospitalization for psychiatric condition, however with patient being noncompliant in clearly suicidal, pec place, patient medicated.  ED workup also showing pH of 7.25, normal CBC, bicarb 17, glucose greater than 300, anion gap of 19, BHB of 5.6.  Patient started on insulin infusion, will admit to the ICU with anticipated transition to inpatient psychiatric care afterwards.  Family updated inpatient transition to the ICU in stable and improving condition.            Scribe Attestation:   Scribe #1: I performed the above scribed service and the documentation accurately describes the services I performed. I attest to the accuracy of the note.                      Clinical Impression:       ICD-10-CM ICD-9-CM   1. DKA (diabetic ketoacidoses)  E11.10 250.12   2. Hyperglycemia  R73.9 790.29   3. Does not speak  R47.01 784.3   4. History of schizophrenia  Z86.59 V11.0   5. Chest pain  R07.9 786.50   6. DKA, type 1  E10.10 250.13   7. Diabetic ketoacidosis without coma associated with type 1 diabetes mellitus  E10.10 250.13                          ED Disposition Condition    Admit            Scribe Attestation: I, Heber Galindo M.D.,  personally performed the services described in this documentation. All medical record entries made by the scribe were at my direction and in my presence. I have reviewed the chart and agree that the record reflects my personal performance and is accurate and complete.                 Heber Galindo MD  11/14/20 [id number]

## 2020-11-12 NOTE — ED NOTES
Pt attempted to strangulate herself  2 times using a turniquette and 1 pulse ox cable.Crew in the room stopped her.Team attempted to put an IV IN her arm 2 times and pt struggled.

## 2020-11-12 NOTE — ED NOTES
Pt refusing to communicate, to RN or MD, URINATED ON  The floor on the edge of bed with her clothing on, angry demeanor, cringing her arms and refusing to cooperate.

## 2020-11-13 PROBLEM — E11.10 DKA (DIABETIC KETOACIDOSES): Status: ACTIVE | Noted: 2020-11-13

## 2020-11-13 PROBLEM — E11.10 DKA (DIABETIC KETOACIDOSES): Status: RESOLVED | Noted: 2020-11-13 | Resolved: 2020-11-13

## 2020-11-13 PROBLEM — E10.10 DIABETIC KETOACIDOSIS WITHOUT COMA ASSOCIATED WITH TYPE 1 DIABETES MELLITUS: Status: RESOLVED | Noted: 2020-01-27 | Resolved: 2020-11-13

## 2020-11-13 PROBLEM — R94.31 PROLONGED Q-T INTERVAL ON ECG: Status: RESOLVED | Noted: 2020-11-12 | Resolved: 2020-11-13

## 2020-11-13 LAB
ANION GAP SERPL CALC-SCNC: 6 MMOL/L (ref 8–16)
ANION GAP SERPL CALC-SCNC: 9 MMOL/L (ref 8–16)
AORTIC ROOT ANNULUS: 2.95 CM
AORTIC VALVE CUSP SEPERATION: 2.08 CM
ASCENDING AORTA: 2.67 CM
AV INDEX (PROSTH): 0.74
AV MEAN GRADIENT: 7 MMHG
AV PEAK GRADIENT: 10 MMHG
AV VALVE AREA: 1.7 CM2
AV VELOCITY RATIO: 0.79
B-OH-BUTYR BLD STRIP-SCNC: 0 MMOL/L (ref 0–0.5)
B-OH-BUTYR BLD STRIP-SCNC: 0.9 MMOL/L (ref 0–0.5)
BASOPHILS # BLD AUTO: 0.05 K/UL (ref 0–0.2)
BASOPHILS NFR BLD: 0.7 % (ref 0–1.9)
BSA FOR ECHO PROCEDURE: 1.58 M2
BUN SERPL-MCNC: 10 MG/DL (ref 6–20)
BUN SERPL-MCNC: 12 MG/DL (ref 6–20)
CALCIUM SERPL-MCNC: 7.8 MG/DL (ref 8.7–10.5)
CALCIUM SERPL-MCNC: 8.4 MG/DL (ref 8.7–10.5)
CHLORIDE SERPL-SCNC: 109 MMOL/L (ref 95–110)
CHLORIDE SERPL-SCNC: 110 MMOL/L (ref 95–110)
CO2 SERPL-SCNC: 19 MMOL/L (ref 23–29)
CO2 SERPL-SCNC: 21 MMOL/L (ref 23–29)
CREAT SERPL-MCNC: 0.8 MG/DL (ref 0.5–1.4)
CREAT SERPL-MCNC: 0.9 MG/DL (ref 0.5–1.4)
CV ECHO LV RWT: 0.62 CM
DIFFERENTIAL METHOD: ABNORMAL
DOP CALC AO PEAK VEL: 1.62 M/S
DOP CALC AO VTI: 31.39 CM
DOP CALC LVOT AREA: 2.3 CM2
DOP CALC LVOT DIAMETER: 1.71 CM
DOP CALC LVOT PEAK VEL: 1.28 M/S
DOP CALC LVOT STROKE VOLUME: 53.21 CM3
DOP CALCLVOT PEAK VEL VTI: 23.18 CM
E WAVE DECELERATION TIME: 177.33 MSEC
E/A RATIO: 1.2
ECHO LV POSTERIOR WALL: 1.19 CM (ref 0.6–1.1)
EOSINOPHIL # BLD AUTO: 0.4 K/UL (ref 0–0.5)
EOSINOPHIL NFR BLD: 5.2 % (ref 0–8)
ERYTHROCYTE [DISTWIDTH] IN BLOOD BY AUTOMATED COUNT: 15.5 % (ref 11.5–14.5)
EST. GFR  (AFRICAN AMERICAN): >60 ML/MIN/1.73 M^2
EST. GFR  (AFRICAN AMERICAN): >60 ML/MIN/1.73 M^2
EST. GFR  (NON AFRICAN AMERICAN): >60 ML/MIN/1.73 M^2
EST. GFR  (NON AFRICAN AMERICAN): >60 ML/MIN/1.73 M^2
ESTIMATED AVG GLUCOSE: 232 MG/DL (ref 68–131)
FRACTIONAL SHORTENING: 28 % (ref 28–44)
GLUCOSE SERPL-MCNC: 226 MG/DL (ref 70–110)
GLUCOSE SERPL-MCNC: 230 MG/DL (ref 70–110)
HBA1C MFR BLD HPLC: 9.7 % (ref 4–5.6)
HCT VFR BLD AUTO: 35.1 % (ref 37–48.5)
HGB BLD-MCNC: 11.1 G/DL (ref 12–16)
IMM GRANULOCYTES # BLD AUTO: 0.01 K/UL (ref 0–0.04)
IMM GRANULOCYTES NFR BLD AUTO: 0.1 % (ref 0–0.5)
INTERVENTRICULAR SEPTUM: 1.18 CM (ref 0.6–1.1)
IVRT: 105.61 MSEC
LA MAJOR: 4.63 CM
LA MINOR: 4.55 CM
LA WIDTH: 3.22 CM
LEFT ATRIUM SIZE: 2.73 CM
LEFT ATRIUM VOLUME INDEX: 21.3 ML/M2
LEFT ATRIUM VOLUME: 34.29 CM3
LEFT INTERNAL DIMENSION IN SYSTOLE: 2.74 CM (ref 2.1–4)
LEFT VENTRICLE DIASTOLIC VOLUME INDEX: 38.73 ML/M2
LEFT VENTRICLE DIASTOLIC VOLUME: 62.36 ML
LEFT VENTRICLE MASS INDEX: 94 G/M2
LEFT VENTRICLE SYSTOLIC VOLUME INDEX: 17.3 ML/M2
LEFT VENTRICLE SYSTOLIC VOLUME: 27.91 ML
LEFT VENTRICULAR INTERNAL DIMENSION IN DIASTOLE: 3.81 CM (ref 3.5–6)
LEFT VENTRICULAR MASS: 150.96 G
LYMPHOCYTES # BLD AUTO: 3.4 K/UL (ref 1–4.8)
LYMPHOCYTES NFR BLD: 48.7 % (ref 18–48)
MCH RBC QN AUTO: 26.9 PG (ref 27–31)
MCHC RBC AUTO-ENTMCNC: 31.6 G/DL (ref 32–36)
MCV RBC AUTO: 85 FL (ref 82–98)
MONOCYTES # BLD AUTO: 0.5 K/UL (ref 0.3–1)
MONOCYTES NFR BLD: 7.6 % (ref 4–15)
MV PEAK A VEL: 0.7 M/S
MV PEAK E VEL: 0.84 M/S
MV STENOSIS PRESSURE HALF TIME: 51.42 MS
MV VALVE AREA P 1/2 METHOD: 4.28 CM2
NEUTROPHILS # BLD AUTO: 2.6 K/UL (ref 1.8–7.7)
NEUTROPHILS NFR BLD: 37.7 % (ref 38–73)
NRBC BLD-RTO: 0 /100 WBC
PHOSPHATE SERPL-MCNC: 2.7 MG/DL (ref 2.7–4.5)
PISA TR MAX VEL: 2.05 M/S
PLATELET # BLD AUTO: 221 K/UL (ref 150–350)
PMV BLD AUTO: 11.5 FL (ref 9.2–12.9)
POCT GLUCOSE: 118 MG/DL (ref 70–110)
POCT GLUCOSE: 129 MG/DL (ref 70–110)
POCT GLUCOSE: 167 MG/DL (ref 70–110)
POCT GLUCOSE: 182 MG/DL (ref 70–110)
POCT GLUCOSE: 200 MG/DL (ref 70–110)
POCT GLUCOSE: 201 MG/DL (ref 70–110)
POCT GLUCOSE: 202 MG/DL (ref 70–110)
POCT GLUCOSE: 206 MG/DL (ref 70–110)
POCT GLUCOSE: 213 MG/DL (ref 70–110)
POCT GLUCOSE: 218 MG/DL (ref 70–110)
POCT GLUCOSE: 223 MG/DL (ref 70–110)
POCT GLUCOSE: 229 MG/DL (ref 70–110)
POCT GLUCOSE: 319 MG/DL (ref 70–110)
POCT GLUCOSE: 34 MG/DL (ref 70–110)
POCT GLUCOSE: 39 MG/DL (ref 70–110)
POCT GLUCOSE: 42 MG/DL (ref 70–110)
POCT GLUCOSE: 85 MG/DL (ref 70–110)
POTASSIUM SERPL-SCNC: 4 MMOL/L (ref 3.5–5.1)
POTASSIUM SERPL-SCNC: 4.6 MMOL/L (ref 3.5–5.1)
PULM VEIN S/D RATIO: 1.47
PV PEAK D VEL: 0.3 M/S
PV PEAK S VEL: 0.44 M/S
PV PEAK VELOCITY: 0.9 CM/S
RA MAJOR: 4.55 CM
RA PRESSURE: 3 MMHG
RA WIDTH: 3.83 CM
RBC # BLD AUTO: 4.13 M/UL (ref 4–5.4)
RIGHT VENTRICULAR END-DIASTOLIC DIMENSION: 3.36 CM
SODIUM SERPL-SCNC: 135 MMOL/L (ref 136–145)
SODIUM SERPL-SCNC: 139 MMOL/L (ref 136–145)
STJ: 2.42 CM
TR MAX PG: 17 MMHG
TRICUSPID ANNULAR PLANE SYSTOLIC EXCURSION: 1.62 CM
TV REST PULMONARY ARTERY PRESSURE: 20 MMHG
WBC # BLD AUTO: 6.98 K/UL (ref 3.9–12.7)

## 2020-11-13 PROCEDURE — 84100 ASSAY OF PHOSPHORUS: CPT

## 2020-11-13 PROCEDURE — 82010 KETONE BODYS QUAN: CPT

## 2020-11-13 PROCEDURE — 85025 COMPLETE CBC W/AUTO DIFF WBC: CPT

## 2020-11-13 PROCEDURE — C9399 UNCLASSIFIED DRUGS OR BIOLOG: HCPCS | Performed by: INTERNAL MEDICINE

## 2020-11-13 PROCEDURE — S5010 5% DEXTROSE AND 0.45% SALINE: HCPCS | Performed by: INTERNAL MEDICINE

## 2020-11-13 PROCEDURE — 36415 COLL VENOUS BLD VENIPUNCTURE: CPT

## 2020-11-13 PROCEDURE — 82010 KETONE BODYS QUAN: CPT | Mod: 91

## 2020-11-13 PROCEDURE — 63600175 PHARM REV CODE 636 W HCPCS: Performed by: INTERNAL MEDICINE

## 2020-11-13 PROCEDURE — 25000003 PHARM REV CODE 250: Performed by: INTERNAL MEDICINE

## 2020-11-13 PROCEDURE — 11000001 HC ACUTE MED/SURG PRIVATE ROOM

## 2020-11-13 PROCEDURE — 94761 N-INVAS EAR/PLS OXIMETRY MLT: CPT

## 2020-11-13 PROCEDURE — 97802 MEDICAL NUTRITION INDIV IN: CPT

## 2020-11-13 PROCEDURE — 25000003 PHARM REV CODE 250: Performed by: HOSPITALIST

## 2020-11-13 PROCEDURE — 80048 BASIC METABOLIC PNL TOTAL CA: CPT | Mod: 91

## 2020-11-13 PROCEDURE — 63600175 PHARM REV CODE 636 W HCPCS: Performed by: HOSPITALIST

## 2020-11-13 PROCEDURE — 80048 BASIC METABOLIC PNL TOTAL CA: CPT

## 2020-11-13 RX ORDER — IBUPROFEN 200 MG
24 TABLET ORAL
Status: DISCONTINUED | OUTPATIENT
Start: 2020-11-13 | End: 2020-11-14 | Stop reason: HOSPADM

## 2020-11-13 RX ORDER — INSULIN ASPART 100 [IU]/ML
6 INJECTION, SOLUTION INTRAVENOUS; SUBCUTANEOUS
Status: DISCONTINUED | OUTPATIENT
Start: 2020-11-13 | End: 2020-11-14 | Stop reason: HOSPADM

## 2020-11-13 RX ORDER — IBUPROFEN 200 MG
16 TABLET ORAL
Status: DISCONTINUED | OUTPATIENT
Start: 2020-11-13 | End: 2020-11-14 | Stop reason: HOSPADM

## 2020-11-13 RX ORDER — GLUCAGON 1 MG
1 KIT INJECTION
Status: DISCONTINUED | OUTPATIENT
Start: 2020-11-13 | End: 2020-11-14 | Stop reason: HOSPADM

## 2020-11-13 RX ORDER — INSULIN ASPART 100 [IU]/ML
1-10 INJECTION, SOLUTION INTRAVENOUS; SUBCUTANEOUS
Status: DISCONTINUED | OUTPATIENT
Start: 2020-11-13 | End: 2020-11-14 | Stop reason: HOSPADM

## 2020-11-13 RX ORDER — INSULIN ASPART 100 [IU]/ML
5 INJECTION, SOLUTION INTRAVENOUS; SUBCUTANEOUS
Status: DISCONTINUED | OUTPATIENT
Start: 2020-11-13 | End: 2020-11-14 | Stop reason: HOSPADM

## 2020-11-13 RX ORDER — POTASSIUM CHLORIDE 7.45 MG/ML
10 INJECTION INTRAVENOUS
Status: DISCONTINUED | OUTPATIENT
Start: 2020-11-13 | End: 2020-11-13

## 2020-11-13 RX ADMIN — INSULIN ASPART 5 UNITS: 100 INJECTION, SOLUTION INTRAVENOUS; SUBCUTANEOUS at 05:11

## 2020-11-13 RX ADMIN — FLUOXETINE 40 MG: 20 CAPSULE ORAL at 08:11

## 2020-11-13 RX ADMIN — Medication 24 G: at 11:11

## 2020-11-13 RX ADMIN — ENOXAPARIN SODIUM 40 MG: 40 INJECTION SUBCUTANEOUS at 05:11

## 2020-11-13 RX ADMIN — DEXTROSE AND SODIUM CHLORIDE 125 ML/HR: 5; .45 INJECTION, SOLUTION INTRAVENOUS at 04:11

## 2020-11-13 RX ADMIN — INSULIN DETEMIR 25 UNITS: 100 INJECTION, SOLUTION SUBCUTANEOUS at 05:11

## 2020-11-13 RX ADMIN — INSULIN ASPART 6 UNITS: 100 INJECTION, SOLUTION INTRAVENOUS; SUBCUTANEOUS at 07:11
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
## 2020-11-13 NOTE — EICU
EICU    Pt is a 30 o f with dm, schizophrenia admitted with DKA.  No clear signs or symptoms of infection, pt started on insulin gtt and given crystalloid.    Video monitor   134/89 hr 98 sinus 100% RA rr 10  Pt sleeping, nonlabored breathing    Labs reviewed beta hydroxybutarate 5.6  Wbc 7.2 P 64 L 25 M 8  Creat  1.2 BUN 18 ag 19 K 5.1 gluc 398 Ca 10.1  ua ++ketones neg nit/le  utox +THC, +amphetamines    cxr rotated to right, right apex/clavicle obscured by pt head, nl card silhouette no inf/eff  ekg stach 102 lafb RBBB  ms (Similar to 1/20)    A/P  DKA  No clear infectious culprit  - insulin gtt, add D5 containing fluid when fs <250  - replete K aggressively  - fluids prn  - 1:1, psych following  - dvt proph lovenox      Addendum  K 4.6, anion gap closed, on D5 containing fluids- d/w nursing- most recent gluc 213  No suggestion of eschar in oropharynx or nasopharynx per bedside nurse  Replete K while on insulin gtt, creat 0.9  Pt barely interacting, will leave on gtt for now, reassess at breakfast and if she has an appetite, will give longacting insulin to overlap with gtt for 2-3 hours, then feed and can turn off gtt  - at risk for amphetamine w/d

## 2020-11-13 NOTE — PROGRESS NOTES
"[facility] Hospital Medicine  Progress Note    Patient Name: Hollie Wilson  MRN: [id number]  Patient Class: IP- Inpatient   Admission Date: 11/12/2020  Length of Stay: 1 days  Attending Physician: Jossue Gabriel MD  Primary Care Provider: [facility]        Subjective:     Principal Problem:Diabetic ketoacidosis without coma associated with type 1 diabetes mellitus        HPI:  Ms. Wilson is a 31yo lady with a past medical history of DM1, anemia and bipolar 1 disorder.  I performed my history and exam after she had arrived to the MICU.  On evaluation, Ms. Wilson will only make brief eye contact with me, but will not speak at all despite multiple attempts at encouragement.  This was also noted from the ED nurses at triage as well, who noted, "Pt refusing to communicate, to RN or MD, URINATED ON  The floor on the edge of bed with her clothing on, angry demeanor, cringing her arms and refusing to cooperate."    Psychiatry was consulted and was able to gather collateral information, "All nursing staff was attempting to change her into a hospital gown it was noted that she had a ligature around her neck.  I subsequently examined her neck and found no ligature dacia.  The patient got a few call phone calls but did not answer them during interview.Called the patient's aunt at the number listed in the chart.  She states that the patient has a history of bipolar and schizophrenia and she does not believe she is medicated at this time.  States the patient bounces around for boyfriend boyfriend and allows meant to take advantage of her.  States she has been homeless most of her adult life and cannot find stable work.  Aunt says the patient has not been living with her recently but she did talk to her on the phone yesterday and does see her periodically.  States refusal to communicate happens relatively frequently with patient.  States the patient has checked herself in " "voluntarily multiple times to [facility] and [facility].  States she has also seen outpatient psychiatrist at these institutions as well."    Overview/Hospital Course:  28 y/o female with DM1 and Bipolar presented to ER with bizarre behavior.  Patient has been refusing to talk.  She then had angry demeanor in ER and apparently "attempted to strangulate herself  2 times using a turniquette and 1 pulse ox cable" per ER notes.  Patient was PEC'd and Psych consulted.  She was then noted to be in DKA.  Admitted to ICU on insulin drip.  DKA has resolved and patient transitioned to SQ insulin.    Interval History: Still refusing to talk.  Ate breakfast this morning.    Review of Systems   Reason unable to perform ROS: Refusing to talk.     Objective:     Vital Signs (Most Recent):  Temp: 97.4 °F (36.3 °C) (11/13/20 0430)  Pulse: 94 (11/13/20 0842)  Resp: (!) 28 (11/13/20 0842)  BP: 123/87 (11/13/20 0700)  SpO2: 99 % (11/13/20 0842) Vital Signs (24h Range):  Temp:  [97.4 °F (36.3 °C)-98.2 °F (36.8 °C)] 97.4 °F (36.3 °C)  Pulse:  [] 94  Resp:  [11-29] 28  SpO2:  [93 %-100 %] 99 %  BP: (118-169)/() 123/87     Weight: 53.2 kg (117 lb 4.6 oz)  Body mass index is 18.37 kg/m².    Intake/Output Summary (Last 24 hours) at 11/13/2020 0926  Last data filed at 11/13/2020 0600  Gross per 24 hour   Intake 2181.03 ml   Output 600 ml   Net 1581.03 ml      Physical Exam  Vitals signs and nursing note reviewed.   Constitutional:       General: She is awake. She is not in acute distress.     Appearance: She is well-developed and underweight. She is not toxic-appearing or diaphoretic.   HENT:      Head: Normocephalic and atraumatic.      Mouth/Throat:      Pharynx: No oropharyngeal exudate.   Eyes:      General: No scleral icterus.        Right eye: No discharge.         Left eye: No discharge.      Conjunctiva/sclera: Conjunctivae normal.      Pupils: Pupils are equal, round, and reactive to light.      Comments: " Pupils dilated at 5mm bilaterally   Neck:      Musculoskeletal: Normal range of motion and neck supple.      Thyroid: No thyromegaly.      Vascular: No JVD.      Trachea: No tracheal deviation.   Cardiovascular:      Rate and Rhythm: Normal rate and regular rhythm.      Heart sounds: Normal heart sounds. No murmur. No friction rub. No gallop.    Pulmonary:      Effort: Pulmonary effort is normal. No respiratory distress.      Breath sounds: Normal breath sounds. No stridor. No decreased breath sounds, wheezing, rhonchi or rales.   Chest:      Chest wall: No tenderness.   Abdominal:      General: Bowel sounds are normal. There is no distension.      Palpations: Abdomen is soft. There is no mass.      Tenderness: There is no abdominal tenderness. There is no guarding or rebound.   Genitourinary:     Comments: No nugent in place  Musculoskeletal: Normal range of motion.         General: No tenderness.   Lymphadenopathy:      Cervical: No cervical adenopathy.      Comments: No peripheral edema   Skin:     General: Skin is warm and dry.      Coloration: Skin is not pale.      Findings: No erythema or rash.      Comments: Multiple old scars to neck from past TLC   Neurological:      Comments: Refuses to cooperate with exam   Psychiatric:         Attention and Perception: She is inattentive.         Mood and Affect: Mood is depressed. Affect is flat.         Speech: She is noncommunicative.         Behavior: Behavior is uncooperative, slowed and withdrawn.         Judgment: Judgment is inappropriate.         Significant Labs:   BMP:   Recent Labs   Lab 11/12/20 2008 11/13/20  0631   *   < > 226*      < > 139   K 4.8   < > 4.0      < > 109   CO2 16*   < > 21*   BUN 16   < > 10   CREATININE 1.1   < > 0.8   CALCIUM 9.0   < > 8.4*   MG 2.1  --   --     < > = values in this interval not displayed.     CBC:   Recent Labs   Lab 11/12/20  1603 11/13/20  0130   WBC 7.26 6.98   HGB 12.6 11.1*   HCT 40.5 35.1*  "   221       Significant Imaging: I have reviewed all pertinent imaging results/findings within the past 24 hours.      Assessment/Plan:      * Diabetic ketoacidosis without coma associated with type 1 diabetes mellitus  Resolved.  Transition to SQ insulin.  2000 janet ADA diet.  Probably secondary to non compliance with insulin.      Schizoaffective disorder, bipolar type  Appreciate psychiatry consult:  "Can use haldol 5mg IM/IV/PO for non redirectable agitation.  Per my conversation with the patient's aunt, her refusal to speak may have a volitional component as opposed to purely a negative feature of her schizophrenia.  Plan to admit to [facility] if possible as these facilities are familiar with her case.  Needs 1:1 sitter and PEC."      Marijuana abuse   on discontinuation      Amphetamine abuse   on discontinuation      Does not speak  The patient is able to hear and make appropriate eye contact  Suspect the refusal to speak is volitional, not organic        VTE Risk Mitigation (From admission, onward)         Ordered     enoxaparin injection 40 mg  Every 24 hours      11/12/20 1957     Place TRISTAN hose  Until discontinued      11/12/20 1957     Place sequential compression device  Until discontinued      11/12/20 1957     IP VTE LOW RISK PATIENT  Once      11/12/20 1957                Discharge Planning   THELMA:      Code Status: Full Code   Is the patient medically ready for discharge?:     Reason for patient still in hospital (select all that apply): Patient trending condition             Jossue Gabriel MD  Department of Hospital Medicine   [facility]    "

## 2020-11-13 NOTE — SUBJECTIVE & OBJECTIVE
Interval History: Still refusing to talk.  Ate breakfast this morning.    Review of Systems   Reason unable to perform ROS: Refusing to talk.     Objective:     Vital Signs (Most Recent):  Temp: 97.4 °F (36.3 °C) (11/13/20 0430)  Pulse: 94 (11/13/20 0842)  Resp: (!) 28 (11/13/20 0842)  BP: 123/87 (11/13/20 0700)  SpO2: 99 % (11/13/20 0842) Vital Signs (24h Range):  Temp:  [97.4 °F (36.3 °C)-98.2 °F (36.8 °C)] 97.4 °F (36.3 °C)  Pulse:  [] 94  Resp:  [11-29] 28  SpO2:  [93 %-100 %] 99 %  BP: (118-169)/() 123/87     Weight: 53.2 kg (117 lb 4.6 oz)  Body mass index is 18.37 kg/m².    Intake/Output Summary (Last 24 hours) at 11/13/2020 0926  Last data filed at 11/13/2020 0600  Gross per 24 hour   Intake 2181.03 ml   Output 600 ml   Net 1581.03 ml      Physical Exam  Vitals signs and nursing note reviewed.   Constitutional:       General: She is awake. She is not in acute distress.     Appearance: She is well-developed and underweight. She is not toxic-appearing or diaphoretic.   HENT:      Head: Normocephalic and atraumatic.      Mouth/Throat:      Pharynx: No oropharyngeal exudate.   Eyes:      General: No scleral icterus.        Right eye: No discharge.         Left eye: No discharge.      Conjunctiva/sclera: Conjunctivae normal.      Pupils: Pupils are equal, round, and reactive to light.      Comments: Pupils dilated at 5mm bilaterally   Neck:      Musculoskeletal: Normal range of motion and neck supple.      Thyroid: No thyromegaly.      Vascular: No JVD.      Trachea: No tracheal deviation.   Cardiovascular:      Rate and Rhythm: Normal rate and regular rhythm.      Heart sounds: Normal heart sounds. No murmur. No friction rub. No gallop.    Pulmonary:      Effort: Pulmonary effort is normal. No respiratory distress.      Breath sounds: Normal breath sounds. No stridor. No decreased breath sounds, wheezing, rhonchi or rales.   Chest:      Chest wall: No tenderness.   Abdominal:      General: Bowel  sounds are normal. There is no distension.      Palpations: Abdomen is soft. There is no mass.      Tenderness: There is no abdominal tenderness. There is no guarding or rebound.   Genitourinary:     Comments: No nugent in place  Musculoskeletal: Normal range of motion.         General: No tenderness.   Lymphadenopathy:      Cervical: No cervical adenopathy.      Comments: No peripheral edema   Skin:     General: Skin is warm and dry.      Coloration: Skin is not pale.      Findings: No erythema or rash.      Comments: Multiple old scars to neck from past TLC   Neurological:      Comments: Refuses to cooperate with exam   Psychiatric:         Attention and Perception: She is inattentive.         Mood and Affect: Mood is depressed. Affect is flat.         Speech: She is noncommunicative.         Behavior: Behavior is uncooperative, slowed and withdrawn.         Judgment: Judgment is inappropriate.         Significant Labs:   BMP:   Recent Labs   Lab 11/12/20 2008 11/13/20  0631   *   < > 226*      < > 139   K 4.8   < > 4.0      < > 109   CO2 16*   < > 21*   BUN 16   < > 10   CREATININE 1.1   < > 0.8   CALCIUM 9.0   < > 8.4*   MG 2.1  --   --     < > = values in this interval not displayed.     CBC:   Recent Labs   Lab 11/12/20  1603 11/13/20  0130   WBC 7.26 6.98   HGB 12.6 11.1*   HCT 40.5 35.1*    221       Significant Imaging: I have reviewed all pertinent imaging results/findings within the past 24 hours.

## 2020-11-13 NOTE — PROGRESS NOTES
Pt's condition and progress monitored throughout yesterday and today.  She is now stable for inpatient psych placement per primary team and transfer is in process.  She has been seen and evaluated by psych within the past 24 hours and no further psych involvement is necessary at this time due to disposition in progress.  Will continue to be available until pt is transferred.

## 2020-11-13 NOTE — ASSESSMENT & PLAN NOTE
Resolved.  Transition to SQ insulin.  2000 janet ADA diet.  Probably secondary to non compliance with insulin.

## 2020-11-13 NOTE — PROGRESS NOTES
Pt arrived to unit, awake, alert and answering questions appropriately. Pt states she does not remember why she came to the hospital. 1:1 direct sitter @ bs. Room cleared of all ligature risks. No distress noted. Will continue to monitor.

## 2020-11-13 NOTE — PROGRESS NOTES
Donya with the transfer center stated the pt has been accepted into [facility], she stated she will get a ambulatory car dispatched to pick the pt up.

## 2020-11-13 NOTE — H&P
"[facility] Medicine  History & Physical    Patient Name: Hollie Wilson  MRN: [id number]  Admission Date: 11/12/2020  Attending Physician: Jossue Gabriel MD   Primary Care Provider: [facility]         Patient information was obtained from EMS personnel, past medical records and ER records.     Subjective:     Principal Problem:Diabetic ketoacidosis without coma associated with type 1 diabetes mellitus    Chief Complaint:   Chief Complaint   Patient presents with    Hyperglycemia     pt comes in via EMS from home with c/o "diabetic problems". pt has hx of diabetes. pt refuses to speak in triage or to EMS.  in triage.         HPI: Ms. Wilson is a 29yo lady with a past medical history of DM1, anemia and bipolar 1 disorder.  I performed my history and exam after she had arrived to the MICU.  On evaluation, Ms. Wilson will only make brief eye contact with me, but will not speak at all despite multiple attempts at encouragement.  This was also noted from the ED nurses at triage as well, who noted, "Pt refusing to communicate, to RN or MD, URINATED ON  The floor on the edge of bed with her clothing on, angry demeanor, cringing her arms and refusing to cooperate."    Psychiatry was consulted and was able to gather collateral information, "All nursing staff was attempting to change her into a hospital gown it was noted that she had a ligature around her neck.  I subsequently examined her neck and found no ligature dacia.  The patient got a few call phone calls but did not answer them during interview.Called the patient's aunt at the number listed in the chart.  She states that the patient has a history of bipolar and schizophrenia and she does not believe she is medicated at this time.  States the patient bounces around for boyfriend boyfriend and allows meant to take advantage of her.  States she has been homeless most of her adult life and cannot find " "stable work.  Aunt says the patient has not been living with her recently but she did talk to her on the phone yesterday and does see her periodically.  States refusal to communicate happens relatively frequently with patient.  States the patient has checked herself in voluntarily multiple times to [facility] and [facility].  States she has also seen outpatient psychiatrist at these institutions as well."    Past Medical History:   Diagnosis Date    Anemia     Bipolar 1 disorder     Diabetes mellitus     Scoliosis        Past Surgical History:   Procedure Laterality Date     SECTION         Review of patient's allergies indicates:   Allergen Reactions    Zofran (as hydrochloride) [ondansetron hcl] Hives       No current facility-administered medications on file prior to encounter.      Current Outpatient Medications on File Prior to Encounter   Medication Sig    acetaminophen (TYLENOL) 500 MG tablet Take 2 tablets (1,000 mg total) by mouth every 8 (eight) hours as needed for Pain.    blood sugar diagnostic Strp test 3 (three) times daily before meals. For One Touch meter    blood-glucose meter Misc as directed    FLUoxetine 20 MG capsule Take 2 capsules (40 mg total) by mouth once daily.    insulin aspart U-100 (NOVOLOG) 100 unit/mL (3 mL) InPn pen Inject 6 Units into the skin 3 (three) times daily with meals.    insulin detemir U-100 (LEVEMIR FLEXTOUCH) 100 unit/mL (3 mL) SubQ InPn pen Inject 25 Units into the skin once daily.    lancets (ONETOUCH ULTRASOFT LANCETS) Misc test 3 (three) times daily with meals.    pen needle, diabetic (PEN NEEDLE) 32 gauge x 5/32" Ndle Use 4 (four) times daily.     Family History     None        Tobacco Use    Smoking status: Current Every Day Smoker    Smokeless tobacco: Never Used   Substance and Sexual Activity    Alcohol use: Yes    Drug use: Yes     Types: Marijuana    Sexual activity: Never     Review of Systems   Unable to perform ROS: " Patient nonverbal     Objective:     Vital Signs (Most Recent):  Temp: 97.9 °F (36.6 °C) (11/12/20 1216)  Pulse: 104 (11/12/20 1701)  Resp: 18 (11/12/20 1701)  BP: 137/89 (11/12/20 1701)  SpO2: 100 % (11/12/20 1701) Vital Signs (24h Range):  Temp:  [97.9 °F (36.6 °C)] 97.9 °F (36.6 °C)  Pulse:  [102-113] 104  Resp:  [17-18] 18  SpO2:  [100 %] 100 %  BP: (121-169)/(87-95) 137/89     Weight: 68 kg (150 lb)  Body mass index is 23.49 kg/m².    Physical Exam  Vitals signs and nursing note reviewed.   Constitutional:       General: She is awake. She is not in acute distress.     Appearance: She is well-developed and underweight. She is ill-appearing. She is not toxic-appearing or diaphoretic.   HENT:      Head: Normocephalic and atraumatic.      Mouth/Throat:      Pharynx: No oropharyngeal exudate.   Eyes:      General: No scleral icterus.        Right eye: No discharge.         Left eye: No discharge.      Conjunctiva/sclera: Conjunctivae normal.      Pupils: Pupils are equal, round, and reactive to light.      Comments: Pupils dilated at 5mm bilaterally   Neck:      Musculoskeletal: Normal range of motion and neck supple.      Thyroid: No thyromegaly.      Vascular: No JVD.      Trachea: No tracheal deviation.   Cardiovascular:      Rate and Rhythm: Normal rate and regular rhythm.      Heart sounds: Normal heart sounds. No murmur. No friction rub. No gallop.    Pulmonary:      Effort: Pulmonary effort is normal. No respiratory distress.      Breath sounds: Normal breath sounds. No stridor. No decreased breath sounds, wheezing, rhonchi or rales.   Chest:      Chest wall: No tenderness.   Abdominal:      General: Bowel sounds are normal. There is no distension.      Palpations: Abdomen is soft. There is no mass.      Tenderness: There is no abdominal tenderness. There is no guarding or rebound.   Genitourinary:     Comments: No nugent in place  Musculoskeletal: Normal range of motion.         General: No tenderness.    Lymphadenopathy:      Cervical: No cervical adenopathy.      Comments: No peripheral edema   Skin:     General: Skin is warm and dry.      Coloration: Skin is not pale.      Findings: No erythema or rash.      Comments: Multiple old scars to neck from past TLC   Neurological:      Mental Status: She is lethargic and confused.      Comments: Refuses to cooperate with exam   Psychiatric:         Attention and Perception: She is inattentive.         Mood and Affect: Mood is depressed. Affect is flat.         Speech: She is noncommunicative.         Behavior: Behavior is uncooperative, slowed and withdrawn.         Judgment: Judgment is inappropriate.           CRANIAL NERVES     CN III, IV, VI   Pupils are equal, round, and reactive to light.       Significant Labs:   Recent Results (from the past 24 hour(s))   CBC auto differential    Collection Time: 11/12/20  4:03 PM   Result Value Ref Range    WBC 7.26 3.90 - 12.70 K/uL    RBC 4.71 4.00 - 5.40 M/uL    Hemoglobin 12.6 12.0 - 16.0 g/dL    Hematocrit 40.5 37.0 - 48.5 %    MCV 86 82 - 98 fL    MCH 26.8 (L) 27.0 - 31.0 pg    MCHC 31.1 (L) 32.0 - 36.0 g/dL    RDW 15.4 (H) 11.5 - 14.5 %    Platelets 213 150 - 350 K/uL    MPV 12.2 9.2 - 12.9 fL    Immature Granulocytes 0.3 0.0 - 0.5 %    Gran # (ANC) 4.7 1.8 - 7.7 K/uL    Immature Grans (Abs) 0.02 0.00 - 0.04 K/uL    Lymph # 1.9 1.0 - 4.8 K/uL    Mono # 0.6 0.3 - 1.0 K/uL    Eos # 0.1 0.0 - 0.5 K/uL    Baso # 0.05 0.00 - 0.20 K/uL    nRBC 0 0 /100 WBC    Gran % 64.0 38.0 - 73.0 %    Lymph % 25.5 18.0 - 48.0 %    Mono % 8.0 4.0 - 15.0 %    Eosinophil % 1.5 0.0 - 8.0 %    Basophil % 0.7 0.0 - 1.9 %    Differential Method Automated    Comprehensive metabolic panel    Collection Time: 11/12/20  4:03 PM   Result Value Ref Range    Sodium 137 136 - 145 mmol/L    Potassium 5.1 3.5 - 5.1 mmol/L    Chloride 101 95 - 110 mmol/L    CO2 17 (L) 23 - 29 mmol/L    Glucose 398 (H) 70 - 110 mg/dL    BUN 18 6 - 20 mg/dL    Creatinine 1.2  0.5 - 1.4 mg/dL    Calcium 10.1 8.7 - 10.5 mg/dL    Total Protein 8.2 6.0 - 8.4 g/dL    Albumin 4.6 3.5 - 5.2 g/dL    Total Bilirubin 0.9 0.1 - 1.0 mg/dL    Alkaline Phosphatase 100 55 - 135 U/L    AST 11 10 - 40 U/L    ALT 8 (L) 10 - 44 U/L    Anion Gap 19 (H) 8 - 16 mmol/L    eGFR if African American >60 >60 mL/min/1.73 m^2    eGFR if non African American 60 >60 mL/min/1.73 m^2   Lipase    Collection Time: 11/12/20  4:03 PM   Result Value Ref Range    Lipase 9 4 - 60 U/L   Beta - Hydroxybutyrate, Serum    Collection Time: 11/12/20  4:03 PM   Result Value Ref Range    Beta-Hydroxybutyrate 5.6 (H) 0.0 - 0.5 mmol/L   Ethanol    Collection Time: 11/12/20  4:03 PM   Result Value Ref Range    Alcohol, Serum <10 <10 mg/dL   Acetaminophen level    Collection Time: 11/12/20  4:03 PM   Result Value Ref Range    Acetaminophen (Tylenol), Serum <3.0 (L) 10.0 - 20.0 ug/mL   TSH    Collection Time: 11/12/20  4:03 PM   Result Value Ref Range    TSH 3.640 0.400 - 4.000 uIU/mL   ISTAT PROCEDURE    Collection Time: 11/12/20  4:09 PM   Result Value Ref Range    POC PH 7.251 (L) 7.35 - 7.45    POC PCO2 42.6 35 - 45 mmHg    POC PO2 26 (LL) 40 - 60 mmHg    POC HCO3 18.7 (L) 24 - 28 mmol/L    POC BE -8 -2 to 2 mmol/L    POC SATURATED O2 38 (L) 95 - 100 %    POC TCO2 20 (L) 24 - 29 mmol/L    Sample VENOUS     Site Other     Allens Test N/A     DelSys Room Air     Mode SPONT    COVID-19 Rapid Screening    Collection Time: 11/12/20  4:12 PM   Result Value Ref Range    SARS-CoV-2 RNA, Amplification, Qual Negative Negative   POCT glucose    Collection Time: 11/12/20  4:19 PM   Result Value Ref Range    POCT Glucose 382 (H) 70 - 110 mg/dL   Urinalysis, Reflex to Urine Culture Urine, Clean Catch    Collection Time: 11/12/20  4:45 PM    Specimen: Urine   Result Value Ref Range    Specimen UA Urine, Catheterized     Color, UA Straw Yellow, Straw, Daphne    Appearance, UA Clear Clear    pH, UA 6.0 5.0 - 8.0    Specific Gravity, UA 1.030 1.005 -  1.030    Protein, UA Negative Negative    Glucose, UA 3+ (A) Negative    Ketones, UA 2+ (A) Negative    Bilirubin (UA) Negative Negative    Occult Blood UA Negative Negative    Nitrite, UA Negative Negative    Urobilinogen, UA Negative <2.0 EU/dL    Leukocytes, UA Negative Negative   Drug screen panel, emergency    Collection Time: 11/12/20  4:45 PM   Result Value Ref Range    Benzodiazepines Negative     Methadone metabolites Negative     Cocaine (Metab.) Negative     Opiate Scrn, Ur Negative     Barbiturate Screen, Ur Negative     Amphetamine Screen, Ur Presumptive Positive     THC Presumptive Positive     Phencyclidine Negative     Creatinine, Urine 39.5 15.0 - 325.0 mg/dL    Toxicology Information SEE COMMENT    Urinalysis Microscopic    Collection Time: 11/12/20  4:45 PM   Result Value Ref Range    Bacteria None None-Occ /hpf    Yeast, UA None None    Non-Squam Epith 3 (A) <1/hpf /hpf    Amorphous, UA Moderate None-Moderate    Microscopic Comment SEE COMMENT    POCT glucose    Collection Time: 11/12/20  5:27 PM   Result Value Ref Range    POCT Glucose 322 (H) 70 - 110 mg/dL   POCT glucose    Collection Time: 11/12/20  5:43 PM   Result Value Ref Range    POCT Glucose 337 (H) 70 - 110 mg/dL   POCT glucose    Collection Time: 11/12/20  7:28 PM   Result Value Ref Range    POCT Glucose 224 (H) 70 - 110 mg/dL     Significant Imaging:   XR CHEST 1 VIEW  FINDINGS:  Suboptimal examination due to inability of the patient to elevate her head resulting in obscuration of the right apex.  Previously demonstrated right-sided central venous catheter is no longer present.  Heart size and pulmonary vascularity are within normal limits.  Mild tortuosity of the thoracic aorta.  Lungs are satisfactorily expanded and appear free of active disease.  No pleural fluid or pneumothorax.  Skeletal structures appear intact.     Impression:   Suboptimal examination.    No evidence of acute cardiopulmonary disease.      Electronically signed  by: Noah Malik MD  Date:                                            11/12/2020  Time:                                           14:58    12-NOV-2020 16:24:44 EKG    Sinus tachycardia  Vent. rate 102 BPM  KY interval 130 ms  QRS duration 112 ms  QT/QTc 388/505 ms  Right atrial enlargement  Pulmonary disease pattern  Right bundle branch block  Left anterior fascicular block  Bifascicular block    Minimal voltage criteria for LVH, may be normal variant  Septal infarct (cited on or before 06-DEC-2018)  Abnormal ECG  When compared with ECG of 27-JAN-2020 14:26    Current Facility-Administered Medications:     0.9%  NaCl infusion, 125 mL/hr, Intravenous, Continuous, CHERIE Marshall MD    acetaminophen tablet 650 mg, 650 mg, Oral, Q4H PRN, CHERIE Marshall MD    albuterol-ipratropium 2.5 mg-0.5 mg/3 mL nebulizer solution 3 mL, 3 mL, Nebulization, Q4H PRN, CHERIE Marshall MD    dextrose 5 % and 0.45 % NaCl infusion, 125 mL/hr, Intravenous, Continuous PRN, CHERIE Marshall MD    dextrose 50% injection 12.5 g, 12.5 g, Intravenous, PRN, CHERIE Marshall MD    dextrose 50% injection 25 g, 25 g, Intravenous, PRN, CHERIE Marshall MD    enoxaparin injection 40 mg, 40 mg, Subcutaneous, Q24H, CHERIE Marshall MD    [START ON 11/13/2020] FLUoxetine capsule 40 mg, 40 mg, Oral, Daily, Jossue Gabriel MD    haloperidol lactate injection 5 mg, 5 mg, Intravenous, Q6H PRN, CHERIE Marshall MD    insulin regular in 0.9 % NaCl 100 unit/100 mL (1 unit/mL) infusion, 1.5 Units/hr, Intravenous, Continuous, CHERIE Marshall MD, Last Rate: 0.8 mL/hr at 11/12/20 1941, 0.75 Units/hr at 11/12/20 1941    melatonin tablet 6 mg, 6 mg, Oral, Nightly PRN, CHERIE Marshall MD    promethazine (PHENERGAN) 6.25 mg in dextrose 5 % 50 mL IVPB, 6.25 mg, Intravenous, Q6H PRN, CHERIE Marshall MD    senna-docusate 8.6-50 mg per tablet 1 tablet, 1 tablet, Oral, BID PRN, CHERIE Marshall MD    sodium chloride 0.9% flush 10 mL, 10 mL, Intravenous,  "CHERIE SINGH MD    Assessment/Plan:     * Diabetic ketoacidosis without coma associated with type 1 diabetes mellitus  BHB and BMP q4 - continue insulin until BHB normalized  Suspect much of this is from polysubstance abuse and noncompliance  No evidence of acute metabolic stress, such as infection, etc.   -UA and CXR normal  Best practices DKA protocol initiated   -She is on low dose insulin at 1.5U/hr currently, so likely needing to switch to D5NS soon  Check Mg and Phos now  1L NS bolus initiated on arrival as well      Schizoaffective disorder, bipolar type  Appreciate psychiatry consult:  "Can use haldol 5mg IM/IV/PO for non redirectable agitation.  Per my conversation with the patient's aunt, her refusal to speak may have a volitional component as opposed to purely a negative feature of her schizophrenia.  Plan to admit to [facility] if possible as these facilities are familiar with her case.  Needs 1:1 sitter and PEC."      Does not speak  The patient is able to hear and make appropriate eye contact  Suspect the refusal to speak is volitional, not organic      Amphetamine abuse   on discontinuation      Marijuana abuse   on discontinuation      Prolonged Q-T interval on ECG  Check Mg  Place on telemetry  Monitor K and Mg and Ca closely  Bifascicular block  Check Echo    VTE Risk Mitigation (From admission, onward)         Ordered     enoxaparin injection 40 mg  Every 24 hours      11/12/20 1957     Place TRISTAN hose  Until discontinued      11/12/20 1957     Place sequential compression device  Until discontinued      11/12/20 1957     IP VTE LOW RISK PATIENT  Once      11/12/20 1957              Critical care time spent on the evaluation and treatment of severe organ dysfunction, review of pertinent labs and imaging studies, discussions with consulting providers and discussions with patient/family: 60 minutes.     VIKTORIA Marshall MD  Department of Hospital Medicine "   [facility]

## 2020-11-13 NOTE — PROGRESS NOTES
Pt. Blood glucose was 35. Pt refused D50% injection, stating that it burns. Pt was able to take glucose tablets. BG 15 minutes later was 85. Will continue to monitor.

## 2020-11-13 NOTE — ASSESSMENT & PLAN NOTE
BHB and BMP q4 - continue insulin until BHB normalized  Suspect much of this is from polysubstance abuse and noncompliance  No evidence of acute metabolic stress, such as infection, etc.   -UA and CXR normal  Best practices DKA protocol initiated   -She is on low dose insulin at 1.5U/hr currently, so likely needing to switch to D5NS soon  Check Mg and Phos now  1L NS bolus initiated on arrival as well

## 2020-11-13 NOTE — PROGRESS NOTES
0130:  In to collect scheduled BMP and beta hydroxybutyrate labs.   Patient answering simple questions and following simple commands.  Requested bed pan to void - voided without difficulty with yellow urine noted.  Diaper placed per patient request in case of an accident.   Asked about how patient was managing diabetes at home.  Pt states doesn't have glucose meter at home to check blood sugar and is not complaint with home medications.   When asked if patient wanted to hurt herself - patient immediately closed eyes and pretended to be asleep.  Asked her if she wanted a blanket - she opens eyes, nods head, and say yes.  Once again asked if she had plans to harm herself - patient closed eyes and pretended to fall back asleep.   Continues to be calm, slightly cooperative, but also refusing to answer questions in regards to current thoughts in regards to suicidal ideation, depression, and possible treatment for diagnosed mental disorder issues. Basic needs are addressed and rounded on constantly throughout shift.  Will continue to monitor.  Sitter observing patient in a 1:1 setting.

## 2020-11-13 NOTE — ASSESSMENT & PLAN NOTE
The patient is able to hear and make appropriate eye contact  Suspect the refusal to speak is volitional, not organic

## 2020-11-13 NOTE — CONSULTS
Food & Nutrition  Education    Diet Education: Diabetic Diet type 1   Time Spent: 15 minutes  Learners: Patient          Comments: 28 year old patient who presented with hyperglycemia.  Pt moved from ICU to floor today. Pt resting in bed at visit. Pt states she lives with aunt. Pt does not do the cooking. Pt is of very few words. Asked to if she remembers why she presented to the ED, pt does not recall.     Nutrition Education provided with handouts: Talked to patient about the food that cause blood sugar to go up versus the food that don't. Gave pt a handout on these food items. Pt was falling asleep during eduction. Unable to do NFPE due to pt's lack of participation and just wants to rest. Pt denies nausea, voting, constipation, diarrhea or any GI distress at this time. Pt scheduled to be discharged today.       Typical food intake: chicken, fish, like to go to [employer] for the biscuits, apple juice, milk, water and tea.       All questions and concerns answered. Dietitian's contact information provided.       Follow-Up: Yes!    Please Re-consult as needed        Thanks!

## 2020-11-13 NOTE — PLAN OF CARE
Problem: Adult Inpatient Plan of Care  Goal: Plan of Care Review  Outcome: Ongoing, Progressing  Patient remains PEC'd with sitter at the bedside. Patient voices no thoughts or plans to harm herself at this time.  Patient has the following infusing: D5W 0.45% NS at 125ml/hr, insulin drip at 0.6units/hr. First dose of levimer  25 units received. Insulin drip to be discontinued within the next 2hrs.  Suicidal watch continued.

## 2020-11-13 NOTE — PROGRESS NOTES
Call received from Dorys in laboratory.   Issues updating results in computer at this time - calling result for rapid pregnancy test which was negative.

## 2020-11-13 NOTE — PLAN OF CARE
11/13/20 1039   Discharge Assessment   Assessment Type Discharge Planning Assessment   Confirmed/corrected address and phone number on facesheet? Yes   Assessment information obtained from? Patient   Prior to hospitilization cognitive status: Alert/Oriented   Prior to hospitalization functional status: Independent   Current cognitive status: Risk of Harm to Self/Others   Current Functional Status: Independent   Facility Arrived From: home   Lives With other relative(s)   Able to Return to Prior Arrangements yes   Is patient able to care for self after discharge? Unable to determine at this time (comments)   Who are your caregiver(s) and their phone number(s)? Margy Rome [phone]   Patient's perception of discharge disposition psychiatric hospital   Readmission Within the Last 30 Days no previous admission in last 30 days   Patient currently being followed by outpatient case management? No   Patient currently receives any other outside agency services? No   Equipment Currently Used at Home glucometer   Do you have any problems affording any of your prescribed medications? No   Is the patient taking medications as prescribed? yes   Does the patient have transportation home? Yes   Transportation Anticipated car, drives self;family or friend will provide   Dialysis Name and Scheduled days N/A   Does the patient receive services at the [facility]? No   Discharge Plan A Psychiatric hospital   DME Needed Upon Discharge  none   Patient/Family in Agreement with Plan yes       Pt stated she lives at home with her aunt Jaxon

## 2020-11-13 NOTE — PLAN OF CARE
11/13/20 1626   Final Note   Assessment Type Final Discharge Note   Anticipated Discharge Disposition Psych   Hospital Follow Up  Appt(s) scheduled? No   Discharge plans and expectations educations in teach back method with documentation complete? No   Right Care Referral Info   Facility Name [facility]       ALIN informed Nurse Debra that pt was cleared from case management.         [facility] will be here to pick the pt up in about a hour per transfer center.       ALIN notified pt emergency contact and Aunt Margy that pt was leaving to go to [place], pt stated she lives at home with her.

## 2020-11-13 NOTE — HOSPITAL COURSE
"30 y/o female with DM1 and Bipolar presented to ER with bizarre behavior.  Patient has been refusing to talk.  She then had angry demeanor in ER and apparently "attempted to strangulate herself  2 times using a turniquette and 1 pulse ox cable" per ER notes.  Patient was PEC'd and Psych consulted.  She was then noted to be in DKA.  Admitted to ICU on insulin drip.  DKA has resolved and patient transitioned to SQ insulin.  Patient has remained afebrile and hemodynamically stable.  Now medically stable for discharge and admission into inpatient psychiatry.  SW consulted.  Patient has been accepted at inpatient psych and will be discharged there for psych treatment.  "

## 2020-11-13 NOTE — ASSESSMENT & PLAN NOTE
"Appreciate psychiatry consult:  "Can use haldol 5mg IM/IV/PO for non redirectable agitation.  Per my conversation with the patient's aunt, her refusal to speak may have a volitional component as opposed to purely a negative feature of her schizophrenia.  Plan to admit to [facility] or [facility] if possible as these facilities are familiar with her case.  Needs 1:1 sitter and PEC."    "

## 2020-11-14 VITALS
DIASTOLIC BLOOD PRESSURE: 86 MMHG | OXYGEN SATURATION: 97 % | SYSTOLIC BLOOD PRESSURE: 119 MMHG | BODY MASS INDEX: 18.36 KG/M2 | HEIGHT: 67 IN | HEART RATE: 109 BPM | TEMPERATURE: 98 F | RESPIRATION RATE: 18 BRPM | WEIGHT: 117 LBS
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
## 2020-11-14 NOTE — NURSING
Shoian arrived. All belongings verified and given to Bryson. Security at bedside.     AOx4. VSS: B/P-119/86, T-98, Pulse-109, RR-18, O2 sats-97. Pt denies pain or distress. Ambulated to stretcher independently.

## 2020-11-14 NOTE — NURSING
Pt resting in bed watching television. Pt is being verbal and active in conversation 1:1 sitter at bedside.

## 2020-11-14 NOTE — NURSING
"Pt states "I feel like my sugar low". BG checked: 42, asymptomatic, AOx4. Provided a sandwich, 2 apple juice( 236 cc), and a pack of ceci crackers.     2121: BG rechecked: 129.   "

## 2023-06-13 ENCOUNTER — HOSPITAL ENCOUNTER (OUTPATIENT)
Facility: HOSPITAL | Age: 33
Discharge: HOME | End: 2023-06-14
Attending: EMERGENCY MEDICINE | Admitting: STUDENT IN AN ORGANIZED HEALTH CARE EDUCATION/TRAINING PROGRAM
Payer: COMMERCIAL

## 2023-06-13 DIAGNOSIS — E10.11 DIABETIC KETOACIDOSIS WITH COMA ASSOCIATED WITH TYPE 1 DIABETES MELLITUS: Primary | ICD-10-CM

## 2023-06-13 DIAGNOSIS — R73.9 HYPERGLYCEMIA: ICD-10-CM

## 2023-06-13 PROBLEM — D72.829 LEUKOCYTOSIS: Status: ACTIVE | Noted: 2023-06-13

## 2023-06-13 LAB
ALBUMIN SERPL BCP-MCNC: 4.5 G/DL (ref 3.5–5.2)
ALBUMIN SERPL BCP-MCNC: 4.7 G/DL (ref 3.5–5.2)
ALLENS TEST: ABNORMAL
ALP SERPL-CCNC: 134 U/L (ref 55–135)
ALP SERPL-CCNC: 143 U/L (ref 55–135)
ALT SERPL W/O P-5'-P-CCNC: 35 U/L (ref 10–44)
ALT SERPL W/O P-5'-P-CCNC: 38 U/L (ref 10–44)
AMORPH CRY URNS QL MICRO: NORMAL
AMPHET+METHAMPHET UR QL: NEGATIVE
ANION GAP SERPL CALC-SCNC: 10 MMOL/L (ref 8–16)
ANION GAP SERPL CALC-SCNC: 16 MMOL/L (ref 8–16)
ANION GAP SERPL CALC-SCNC: 22 MMOL/L (ref 8–16)
ANION GAP SERPL CALC-SCNC: 22 MMOL/L (ref 8–16)
AST SERPL-CCNC: 35 U/L (ref 10–40)
AST SERPL-CCNC: 37 U/L (ref 10–40)
B-OH-BUTYR BLD STRIP-SCNC: 3.4 MMOL/L (ref 0–0.5)
BACTERIA #/AREA URNS HPF: NORMAL /HPF
BARBITURATES UR QL SCN>200 NG/ML: NEGATIVE
BASOPHILS # BLD AUTO: 0.03 K/UL (ref 0–0.2)
BASOPHILS # BLD AUTO: 0.03 K/UL (ref 0–0.2)
BASOPHILS NFR BLD: 0.2 % (ref 0–1.9)
BASOPHILS NFR BLD: 0.2 % (ref 0–1.9)
BENZODIAZ UR QL SCN>200 NG/ML: NEGATIVE
BILIRUB SERPL-MCNC: 0.6 MG/DL (ref 0.1–1)
BILIRUB SERPL-MCNC: 0.7 MG/DL (ref 0.1–1)
BILIRUB UR QL STRIP: NEGATIVE
BUN SERPL-MCNC: 22 MG/DL (ref 6–20)
BUN SERPL-MCNC: 28 MG/DL (ref 6–20)
BUN SERPL-MCNC: 33 MG/DL (ref 6–20)
BUN SERPL-MCNC: 34 MG/DL (ref 6–20)
BZE UR QL SCN: NEGATIVE
CALCIUM SERPL-MCNC: 10.7 MG/DL (ref 8.7–10.5)
CALCIUM SERPL-MCNC: 10.7 MG/DL (ref 8.7–10.5)
CALCIUM SERPL-MCNC: 8.3 MG/DL (ref 8.7–10.5)
CALCIUM SERPL-MCNC: 9.3 MG/DL (ref 8.7–10.5)
CANNABINOIDS UR QL SCN: ABNORMAL
CHLORIDE SERPL-SCNC: 109 MMOL/L (ref 95–110)
CHLORIDE SERPL-SCNC: 113 MMOL/L (ref 95–110)
CHLORIDE SERPL-SCNC: 98 MMOL/L (ref 95–110)
CHLORIDE SERPL-SCNC: 98 MMOL/L (ref 95–110)
CLARITY UR: CLEAR
CO2 SERPL-SCNC: 22 MMOL/L (ref 23–29)
CO2 SERPL-SCNC: 23 MMOL/L (ref 23–29)
COLOR UR: YELLOW
CREAT SERPL-MCNC: 0.8 MG/DL (ref 0.5–1.4)
CREAT SERPL-MCNC: 1 MG/DL (ref 0.5–1.4)
CREAT SERPL-MCNC: 1.5 MG/DL (ref 0.5–1.4)
CREAT SERPL-MCNC: 1.5 MG/DL (ref 0.5–1.4)
CREAT UR-MCNC: 22.6 MG/DL (ref 15–325)
DIFFERENTIAL METHOD: ABNORMAL
DIFFERENTIAL METHOD: ABNORMAL
EOSINOPHIL # BLD AUTO: 0 K/UL (ref 0–0.5)
EOSINOPHIL # BLD AUTO: 0 K/UL (ref 0–0.5)
EOSINOPHIL NFR BLD: 0 % (ref 0–8)
EOSINOPHIL NFR BLD: 0 % (ref 0–8)
ERYTHROCYTE [DISTWIDTH] IN BLOOD BY AUTOMATED COUNT: 18.7 % (ref 11.5–14.5)
ERYTHROCYTE [DISTWIDTH] IN BLOOD BY AUTOMATED COUNT: 18.7 % (ref 11.5–14.5)
EST. GFR  (NO RACE VARIABLE): 46 ML/MIN/1.73 M^2
EST. GFR  (NO RACE VARIABLE): 46 ML/MIN/1.73 M^2
EST. GFR  (NO RACE VARIABLE): >60 ML/MIN/1.73 M^2
EST. GFR  (NO RACE VARIABLE): >60 ML/MIN/1.73 M^2
GLUCOSE SERPL-MCNC: 157 MG/DL (ref 70–110)
GLUCOSE SERPL-MCNC: 215 MG/DL (ref 70–110)
GLUCOSE SERPL-MCNC: 464 MG/DL (ref 70–110)
GLUCOSE SERPL-MCNC: 510 MG/DL (ref 70–110)
GLUCOSE UR QL STRIP: ABNORMAL
HCG INTACT+B SERPL-ACNC: <1.2 MIU/ML
HCO3 UR-SCNC: 25.2 MMOL/L (ref 24–28)
HCT VFR BLD AUTO: 38.6 % (ref 37–48.5)
HCT VFR BLD AUTO: 38.6 % (ref 37–48.5)
HGB BLD-MCNC: 12.4 G/DL (ref 12–16)
HGB BLD-MCNC: 12.4 G/DL (ref 12–16)
HGB UR QL STRIP: NEGATIVE
HYALINE CASTS #/AREA URNS LPF: 0 /LPF
IMM GRANULOCYTES # BLD AUTO: 0.05 K/UL (ref 0–0.04)
IMM GRANULOCYTES # BLD AUTO: 0.05 K/UL (ref 0–0.04)
IMM GRANULOCYTES NFR BLD AUTO: 0.4 % (ref 0–0.5)
IMM GRANULOCYTES NFR BLD AUTO: 0.4 % (ref 0–0.5)
KETONES UR QL STRIP: ABNORMAL
LACTATE SERPL-SCNC: 1.1 MMOL/L (ref 0.5–2.2)
LEUKOCYTE ESTERASE UR QL STRIP: NEGATIVE
LIPASE SERPL-CCNC: 14 U/L (ref 4–60)
LIPASE SERPL-CCNC: 14 U/L (ref 4–60)
LYMPHOCYTES # BLD AUTO: 1 K/UL (ref 1–4.8)
LYMPHOCYTES # BLD AUTO: 1 K/UL (ref 1–4.8)
LYMPHOCYTES NFR BLD: 7.9 % (ref 18–48)
LYMPHOCYTES NFR BLD: 7.9 % (ref 18–48)
MAGNESIUM SERPL-MCNC: 2.5 MG/DL (ref 1.6–2.6)
MAGNESIUM SERPL-MCNC: 2.5 MG/DL (ref 1.6–2.6)
MCH RBC QN AUTO: 25.3 PG (ref 27–31)
MCH RBC QN AUTO: 25.3 PG (ref 27–31)
MCHC RBC AUTO-ENTMCNC: 32.1 G/DL (ref 32–36)
MCHC RBC AUTO-ENTMCNC: 32.1 G/DL (ref 32–36)
MCV RBC AUTO: 79 FL (ref 82–98)
MCV RBC AUTO: 79 FL (ref 82–98)
METHADONE UR QL SCN>300 NG/ML: NEGATIVE
MICROSCOPIC COMMENT: NORMAL
MONOCYTES # BLD AUTO: 0.4 K/UL (ref 0.3–1)
MONOCYTES # BLD AUTO: 0.4 K/UL (ref 0.3–1)
MONOCYTES NFR BLD: 3.1 % (ref 4–15)
MONOCYTES NFR BLD: 3.1 % (ref 4–15)
NEUTROPHILS # BLD AUTO: 11.4 K/UL (ref 1.8–7.7)
NEUTROPHILS # BLD AUTO: 11.4 K/UL (ref 1.8–7.7)
NEUTROPHILS NFR BLD: 88.4 % (ref 38–73)
NEUTROPHILS NFR BLD: 88.4 % (ref 38–73)
NITRITE UR QL STRIP: NEGATIVE
NRBC BLD-RTO: 0 /100 WBC
NRBC BLD-RTO: 0 /100 WBC
OPIATES UR QL SCN: NEGATIVE
PCO2 BLDA: 32 MMHG (ref 35–45)
PCP UR QL SCN>25 NG/ML: NEGATIVE
PH SMN: 7.5 [PH] (ref 7.35–7.45)
PH UR STRIP: 7 [PH] (ref 5–8)
PLATELET # BLD AUTO: 369 K/UL (ref 150–450)
PLATELET # BLD AUTO: 369 K/UL (ref 150–450)
PMV BLD AUTO: 10.5 FL (ref 9.2–12.9)
PMV BLD AUTO: 10.5 FL (ref 9.2–12.9)
PO2 BLDA: 35 MMHG (ref 40–60)
POC BE: 2 MMOL/L
POC SATURATED O2: 74 % (ref 95–100)
POC TCO2: 26 MMOL/L (ref 24–29)
POCT GLUCOSE: 146 MG/DL (ref 70–110)
POCT GLUCOSE: 147 MG/DL (ref 70–110)
POCT GLUCOSE: 167 MG/DL (ref 70–110)
POCT GLUCOSE: 179 MG/DL (ref 70–110)
POCT GLUCOSE: 235 MG/DL (ref 70–110)
POCT GLUCOSE: 303 MG/DL (ref 70–110)
POCT GLUCOSE: >500 MG/DL (ref 70–110)
POTASSIUM SERPL-SCNC: 3.7 MMOL/L (ref 3.5–5.1)
POTASSIUM SERPL-SCNC: 4 MMOL/L (ref 3.5–5.1)
POTASSIUM SERPL-SCNC: 5.1 MMOL/L (ref 3.5–5.1)
POTASSIUM SERPL-SCNC: 5.5 MMOL/L (ref 3.5–5.1)
PROT SERPL-MCNC: 8.7 G/DL (ref 6–8.4)
PROT SERPL-MCNC: 8.8 G/DL (ref 6–8.4)
PROT UR QL STRIP: ABNORMAL
RBC # BLD AUTO: 4.9 M/UL (ref 4–5.4)
RBC # BLD AUTO: 4.9 M/UL (ref 4–5.4)
RBC #/AREA URNS HPF: 2 /HPF (ref 0–4)
SAMPLE: ABNORMAL
SITE: ABNORMAL
SODIUM SERPL-SCNC: 142 MMOL/L (ref 136–145)
SODIUM SERPL-SCNC: 143 MMOL/L (ref 136–145)
SODIUM SERPL-SCNC: 145 MMOL/L (ref 136–145)
SODIUM SERPL-SCNC: 147 MMOL/L (ref 136–145)
SP GR UR STRIP: >1.03 (ref 1–1.03)
SQUAMOUS #/AREA URNS HPF: 7 /HPF
TOXICOLOGY INFORMATION: ABNORMAL
URN SPEC COLLECT METH UR: ABNORMAL
UROBILINOGEN UR STRIP-ACNC: NEGATIVE EU/DL
WBC # BLD AUTO: 12.93 K/UL (ref 3.9–12.7)
WBC # BLD AUTO: 12.93 K/UL (ref 3.9–12.7)
WBC #/AREA URNS HPF: 0 /HPF (ref 0–5)
YEAST URNS QL MICRO: NORMAL

## 2023-06-13 PROCEDURE — 36410 VNPNXR 3YR/> PHY/QHP DX/THER: CPT

## 2023-06-13 PROCEDURE — 99900035 HC TECH TIME PER 15 MIN (STAT)

## 2023-06-13 PROCEDURE — 93005 ELECTROCARDIOGRAM TRACING: CPT

## 2023-06-13 PROCEDURE — 82010 KETONE BODYS QUAN: CPT | Performed by: EMERGENCY MEDICINE

## 2023-06-13 PROCEDURE — S5010 5% DEXTROSE AND 0.45% SALINE: HCPCS | Performed by: INTERNAL MEDICINE

## 2023-06-13 PROCEDURE — 83605 ASSAY OF LACTIC ACID: CPT | Performed by: INTERNAL MEDICINE

## 2023-06-13 PROCEDURE — 93010 ELECTROCARDIOGRAM REPORT: CPT | Mod: ,,, | Performed by: INTERNAL MEDICINE

## 2023-06-13 PROCEDURE — C1751 CATH, INF, PER/CENT/MIDLINE: HCPCS

## 2023-06-13 PROCEDURE — 83036 HEMOGLOBIN GLYCOSYLATED A1C: CPT | Performed by: INTERNAL MEDICINE

## 2023-06-13 PROCEDURE — G0378 HOSPITAL OBSERVATION PER HR: HCPCS

## 2023-06-13 PROCEDURE — 80053 COMPREHEN METABOLIC PANEL: CPT | Mod: 91 | Performed by: EMERGENCY MEDICINE

## 2023-06-13 PROCEDURE — 25000003 PHARM REV CODE 250: Performed by: EMERGENCY MEDICINE

## 2023-06-13 PROCEDURE — 36415 COLL VENOUS BLD VENIPUNCTURE: CPT | Performed by: INTERNAL MEDICINE

## 2023-06-13 PROCEDURE — 85025 COMPLETE CBC W/AUTO DIFF WBC: CPT | Performed by: EMERGENCY MEDICINE

## 2023-06-13 PROCEDURE — 96375 TX/PRO/DX INJ NEW DRUG ADDON: CPT

## 2023-06-13 PROCEDURE — 25000003 PHARM REV CODE 250: Performed by: INTERNAL MEDICINE

## 2023-06-13 PROCEDURE — 84702 CHORIONIC GONADOTROPIN TEST: CPT | Performed by: INTERNAL MEDICINE

## 2023-06-13 PROCEDURE — 82803 BLOOD GASES ANY COMBINATION: CPT

## 2023-06-13 PROCEDURE — 96366 THER/PROPH/DIAG IV INF ADDON: CPT

## 2023-06-13 PROCEDURE — 80048 BASIC METABOLIC PNL TOTAL CA: CPT | Mod: XB | Performed by: INTERNAL MEDICINE

## 2023-06-13 PROCEDURE — 80307 DRUG TEST PRSMV CHEM ANLYZR: CPT | Performed by: EMERGENCY MEDICINE

## 2023-06-13 PROCEDURE — 96376 TX/PRO/DX INJ SAME DRUG ADON: CPT

## 2023-06-13 PROCEDURE — 93010 EKG 12-LEAD: ICD-10-PCS | Mod: ,,, | Performed by: INTERNAL MEDICINE

## 2023-06-13 PROCEDURE — 83690 ASSAY OF LIPASE: CPT | Mod: 91 | Performed by: EMERGENCY MEDICINE

## 2023-06-13 PROCEDURE — 81000 URINALYSIS NONAUTO W/SCOPE: CPT | Mod: 59 | Performed by: EMERGENCY MEDICINE

## 2023-06-13 PROCEDURE — 63600175 PHARM REV CODE 636 W HCPCS: Performed by: EMERGENCY MEDICINE

## 2023-06-13 PROCEDURE — 63600175 PHARM REV CODE 636 W HCPCS: Performed by: INTERNAL MEDICINE

## 2023-06-13 PROCEDURE — 99285 EMERGENCY DEPT VISIT HI MDM: CPT | Mod: 25

## 2023-06-13 PROCEDURE — 96361 HYDRATE IV INFUSION ADD-ON: CPT

## 2023-06-13 PROCEDURE — 83735 ASSAY OF MAGNESIUM: CPT | Mod: 91 | Performed by: EMERGENCY MEDICINE

## 2023-06-13 PROCEDURE — 96365 THER/PROPH/DIAG IV INF INIT: CPT | Mod: 59

## 2023-06-13 PROCEDURE — 82962 GLUCOSE BLOOD TEST: CPT

## 2023-06-13 PROCEDURE — 96365 THER/PROPH/DIAG IV INF INIT: CPT

## 2023-06-13 RX ORDER — POLYETHYLENE GLYCOL 3350 17 G/17G
17 POWDER, FOR SOLUTION ORAL DAILY
Status: DISCONTINUED | OUTPATIENT
Start: 2023-06-14 | End: 2023-06-14 | Stop reason: HOSPADM

## 2023-06-13 RX ORDER — SODIUM CHLORIDE 9 MG/ML
1000 INJECTION, SOLUTION INTRAVENOUS CONTINUOUS
Status: DISCONTINUED | OUTPATIENT
Start: 2023-06-13 | End: 2023-06-14

## 2023-06-13 RX ORDER — DROPERIDOL 2.5 MG/ML
0.62 INJECTION, SOLUTION INTRAMUSCULAR; INTRAVENOUS
Status: COMPLETED | OUTPATIENT
Start: 2023-06-13 | End: 2023-06-13

## 2023-06-13 RX ORDER — FAMOTIDINE 20 MG/1
20 TABLET, FILM COATED ORAL DAILY
Status: DISCONTINUED | OUTPATIENT
Start: 2023-06-14 | End: 2023-06-14 | Stop reason: HOSPADM

## 2023-06-13 RX ORDER — POTASSIUM CHLORIDE 7.45 MG/ML
60 INJECTION INTRAVENOUS
Status: DISCONTINUED | OUTPATIENT
Start: 2023-06-13 | End: 2023-06-14 | Stop reason: HOSPADM

## 2023-06-13 RX ORDER — METOCLOPRAMIDE HYDROCHLORIDE 5 MG/ML
5 INJECTION INTRAMUSCULAR; INTRAVENOUS EVERY 6 HOURS PRN
Status: DISCONTINUED | OUTPATIENT
Start: 2023-06-13 | End: 2023-06-14 | Stop reason: HOSPADM

## 2023-06-13 RX ORDER — SODIUM CHLORIDE 9 MG/ML
125 INJECTION, SOLUTION INTRAVENOUS CONTINUOUS
Status: DISCONTINUED | OUTPATIENT
Start: 2023-06-13 | End: 2023-06-14

## 2023-06-13 RX ORDER — SODIUM CHLORIDE 0.9 % (FLUSH) 0.9 %
10 SYRINGE (ML) INJECTION
Status: DISCONTINUED | OUTPATIENT
Start: 2023-06-13 | End: 2023-06-14 | Stop reason: HOSPADM

## 2023-06-13 RX ORDER — POTASSIUM CHLORIDE 7.45 MG/ML
80 INJECTION INTRAVENOUS
Status: DISCONTINUED | OUTPATIENT
Start: 2023-06-13 | End: 2023-06-14 | Stop reason: HOSPADM

## 2023-06-13 RX ORDER — MAGNESIUM SULFATE HEPTAHYDRATE 40 MG/ML
2 INJECTION, SOLUTION INTRAVENOUS ONCE
Status: COMPLETED | OUTPATIENT
Start: 2023-06-13 | End: 2023-06-13

## 2023-06-13 RX ORDER — PROCHLORPERAZINE EDISYLATE 5 MG/ML
5 INJECTION INTRAMUSCULAR; INTRAVENOUS EVERY 6 HOURS PRN
Status: DISCONTINUED | OUTPATIENT
Start: 2023-06-13 | End: 2023-06-14 | Stop reason: HOSPADM

## 2023-06-13 RX ORDER — DEXTROSE MONOHYDRATE 100 MG/ML
INJECTION, SOLUTION INTRAVENOUS
Status: DISCONTINUED | OUTPATIENT
Start: 2023-06-13 | End: 2023-06-14 | Stop reason: HOSPADM

## 2023-06-13 RX ORDER — SODIUM CHLORIDE 9 MG/ML
1000 INJECTION, SOLUTION INTRAVENOUS
Status: COMPLETED | OUTPATIENT
Start: 2023-06-13 | End: 2023-06-13

## 2023-06-13 RX ORDER — POTASSIUM CHLORIDE 7.45 MG/ML
40 INJECTION INTRAVENOUS
Status: DISCONTINUED | OUTPATIENT
Start: 2023-06-13 | End: 2023-06-14 | Stop reason: HOSPADM

## 2023-06-13 RX ORDER — DEXTROSE MONOHYDRATE AND SODIUM CHLORIDE 5; .45 G/100ML; G/100ML
INJECTION, SOLUTION INTRAVENOUS CONTINUOUS PRN
Status: DISCONTINUED | OUTPATIENT
Start: 2023-06-13 | End: 2023-06-14

## 2023-06-13 RX ORDER — DEXTROSE MONOHYDRATE AND SODIUM CHLORIDE 5; .45 G/100ML; G/100ML
125 INJECTION, SOLUTION INTRAVENOUS CONTINUOUS PRN
Status: DISCONTINUED | OUTPATIENT
Start: 2023-06-13 | End: 2023-06-14 | Stop reason: HOSPADM

## 2023-06-13 RX ORDER — SODIUM CHLORIDE 0.9 % (FLUSH) 0.9 %
10 SYRINGE (ML) INJECTION
Status: DISCONTINUED | OUTPATIENT
Start: 2023-06-13 | End: 2023-06-14

## 2023-06-13 RX ORDER — ACETAMINOPHEN 325 MG/1
650 TABLET ORAL EVERY 4 HOURS PRN
Status: DISCONTINUED | OUTPATIENT
Start: 2023-06-13 | End: 2023-06-14 | Stop reason: HOSPADM

## 2023-06-13 RX ADMIN — SODIUM CHLORIDE 1000 ML: 9 INJECTION, SOLUTION INTRAVENOUS at 05:06

## 2023-06-13 RX ADMIN — SODIUM CHLORIDE 1000 ML: 0.9 INJECTION, SOLUTION INTRAVENOUS at 06:06

## 2023-06-13 RX ADMIN — DEXTROSE AND SODIUM CHLORIDE 125 ML/HR: 5; 450 INJECTION, SOLUTION INTRAVENOUS at 08:06

## 2023-06-13 RX ADMIN — INSULIN HUMAN 0.1 UNITS/KG/HR: 1 INJECTION, SOLUTION INTRAVENOUS at 07:06

## 2023-06-13 RX ADMIN — MAGNESIUM SULFATE HEPTAHYDRATE 2 G: 40 INJECTION, SOLUTION INTRAVENOUS at 08:06

## 2023-06-13 RX ADMIN — SODIUM CHLORIDE 1000 ML: 9 INJECTION, SOLUTION INTRAVENOUS at 08:06

## 2023-06-13 RX ADMIN — SODIUM CHLORIDE 125 ML/HR: 9 INJECTION, SOLUTION INTRAVENOUS at 08:06

## 2023-06-13 RX ADMIN — SODIUM CHLORIDE 1000 ML: 9 INJECTION, SOLUTION INTRAVENOUS at 01:06

## 2023-06-13 RX ADMIN — DROPERIDOL 0.62 MG: 2.5 INJECTION, SOLUTION INTRAMUSCULAR; INTRAVENOUS at 05:06

## 2023-06-13 RX ADMIN — INSULIN HUMAN 7 UNITS: 100 INJECTION, SOLUTION PARENTERAL at 05:06

## 2023-06-13 NOTE — ED PROVIDER NOTES
Encounter Date: 2023    SCRIBE #1 NOTE: IManjinder, am scribing for, and in the presence of,  Omid Hugo MD.     History     Chief Complaint   Patient presents with    Hyperglycemia     Pt reports to the ED with C/O hyperglycemia x 2 days. Pt's CBG was greater then 500 at triage. Pt also with N/V, SOB, ABD pain. Pt placed in ANDREA bed for eval.      34 y/o female with DM presents to the ED with hyperglycemia (>500) for 2 days. Se also notes nausea, vomiting, abdominal pain, and diarrhea. No attempted treatment. Patient denies fever, congestion, or any other associated symptoms. Patient is noncompliant with prescribed insulin. She notes gastroparesis, kidney history. She endorses smoking cigarettes and marijuana. Abdominal surgical history of C section. No pregnancy concerns. She is allergic to ondansetron.     The history is provided by the patient. No  was used.   Review of patient's allergies indicates:   Allergen Reactions    Clove oil Itching    Zofran (as hydrochloride) [ondansetron hcl] Hives     Past Medical History:   Diagnosis Date    Anemia     Bipolar 1 disorder     Diabetes mellitus     Scoliosis      Past Surgical History:   Procedure Laterality Date     SECTION       No family history on file.  Social History     Tobacco Use    Smoking status: Every Day    Smokeless tobacco: Never   Substance Use Topics    Alcohol use: Yes    Drug use: Yes     Types: Marijuana     Review of Systems   Constitutional:  Negative for fever.   HENT:  Negative for sore throat.    Eyes:  Negative for visual disturbance.   Respiratory:  Negative for shortness of breath.    Cardiovascular:  Negative for chest pain.   Gastrointestinal:  Positive for abdominal pain, diarrhea, nausea and vomiting.   Genitourinary:  Negative for dysuria.   Musculoskeletal:  Negative for back pain.   Skin:  Negative for rash.   Neurological:  Negative for headaches.     Physical Exam     Initial Vitals   BP  Pulse Resp Temp SpO2   06/13/23 1220 06/13/23 1220 06/13/23 1220 06/13/23 1839 06/13/23 1220   (!) 168/104 60 16 97.8 °F (36.6 °C) 98 %      MAP       --                Physical Exam    Nursing note and vitals reviewed.  Constitutional: She appears well-developed and well-nourished.   Dehydrated, frail-appearing.    HENT:   Head: Normocephalic and atraumatic.   Mouth/Throat: Mucous membranes are dry (marked).   Eyes: EOM are normal. Pupils are equal, round, and reactive to light.   Neck: Neck supple. No thyromegaly present. No JVD present.   Normal range of motion.  Cardiovascular:  Regular rhythm.   Tachycardia present.   Exam reveals no gallop and no friction rub.       No murmur heard.  Pulmonary/Chest: Breath sounds normal. No respiratory distress.   Abdominal: Abdomen is soft. Bowel sounds are normal. She exhibits no distension. There is abdominal tenderness in the epigastric area. There is no rebound and no guarding.   Musculoskeletal:         General: No tenderness or edema. Normal range of motion.      Cervical back: Normal range of motion and neck supple.     Neurological: She is alert and oriented to person, place, and time. She has normal strength. GCS score is 15. GCS eye subscore is 4. GCS verbal subscore is 5. GCS motor subscore is 6.   Skin: Skin is warm and dry. Capillary refill takes less than 2 seconds.   Poor skin turgor.    Psychiatric: She has a normal mood and affect.       ED Course   Critical Care    Date/Time: 6/13/2023 11:51 PM  Performed by: Omid Hugo MD  Authorized by: Omid Hugo MD   Direct patient critical care time: 20 minutes  Additional history critical care time: 5 minutes  Ordering / reviewing critical care time: 10 minutes  Documentation critical care time: 10 minutes  Consulting other physicians critical care time: 10 minutes  Total critical care time (exclusive of procedural time) : 55 minutes  Critical care time was exclusive of teaching time and separately  billable procedures and treating other patients.  Critical care was necessary to treat or prevent imminent or life-threatening deterioration of the following conditions: metabolic crisis.  Critical care was time spent personally by me on the following activities: development of treatment plan with patient or surrogate, discussions with consultants, evaluation of patient's response to treatment, examination of patient, obtaining history from patient or surrogate, ordering and performing treatments and interventions, ordering and review of laboratory studies, ordering and review of radiographic studies, pulse oximetry, re-evaluation of patient's condition and review of old charts.      Labs Reviewed   URINALYSIS, REFLEX TO URINE CULTURE - Abnormal; Notable for the following components:       Result Value    Specific Gravity, UA >1.030 (*)     Protein, UA 1+ (*)     Glucose, UA 4+ (*)     Ketones, UA 2+ (*)     All other components within normal limits    Narrative:     Specimen Source->Urine   BETA - HYDROXYBUTYRATE, SERUM - Abnormal; Notable for the following components:    Beta-Hydroxybutyrate 3.4 (*)     All other components within normal limits   COMPREHENSIVE METABOLIC PANEL - Abnormal; Notable for the following components:    CO2 22 (*)     Glucose 510 (*)     BUN 33 (*)     Creatinine 1.5 (*)     Calcium 10.7 (*)     Total Protein 8.8 (*)     Alkaline Phosphatase 143 (*)     Anion Gap 22 (*)     eGFR 46 (*)     All other components within normal limits    Narrative:      Glucose  critical result(s) called and verbal readback obtained from   Stacy Brown by [facility] 06/13/2023 16:45   CBC W/ AUTO DIFFERENTIAL - Abnormal; Notable for the following components:    WBC 12.93 (*)     MCV 79 (*)     MCH 25.3 (*)     RDW 18.7 (*)     Gran # (ANC) 11.4 (*)     Immature Grans (Abs) 0.05 (*)     Gran % 88.4 (*)     Lymph % 7.9 (*)     Mono % 3.1 (*)     All other components within normal limits   CBC W/ AUTO DIFFERENTIAL -  Abnormal; Notable for the following components:    WBC 12.93 (*)     MCV 79 (*)     MCH 25.3 (*)     RDW 18.7 (*)     Gran # (ANC) 11.4 (*)     Immature Grans (Abs) 0.05 (*)     Gran % 88.4 (*)     Lymph % 7.9 (*)     Mono % 3.1 (*)     All other components within normal limits   COMPREHENSIVE METABOLIC PANEL - Abnormal; Notable for the following components:    Potassium 5.5 (*)     Glucose 464 (*)     BUN 34 (*)     Creatinine 1.5 (*)     Calcium 10.7 (*)     Total Protein 8.7 (*)     Anion Gap 22 (*)     eGFR 46 (*)     All other components within normal limits    Narrative:      Glucose  critical result(s) called and verbal readback obtained from   Stacy Brown  by RIKI 06/13/2023 17:59   DRUG SCREEN PANEL, URINE EMERGENCY - Abnormal; Notable for the following components:    THC Presumptive Positive (*)     All other components within normal limits    Narrative:     Specimen Source->Urine   POCT GLUCOSE - Abnormal; Notable for the following components:    POCT Glucose >500 (*)     All other components within normal limits   ISTAT PROCEDURE - Abnormal; Notable for the following components:    POC PH 7.505 (*)     POC PCO2 32.0 (*)     POC PO2 35 (*)     POC SATURATED O2 74 (*)     All other components within normal limits   POCT GLUCOSE - Abnormal; Notable for the following components:    POCT Glucose 303 (*)     All other components within normal limits   POCT GLUCOSE - Abnormal; Notable for the following components:    POCT Glucose 235 (*)     All other components within normal limits   URINALYSIS MICROSCOPIC    Narrative:     Specimen Source->Urine   MAGNESIUM   LIPASE   LIPASE   MAGNESIUM   DRUG SCREEN PANEL, URINE EMERGENCY   POCT URINE PREGNANCY   POCT GLUCOSE MONITORING CONTINUOUS          Imaging Results    None          Medications   sodium chloride 0.9% flush 10 mL (has no administration in time range)   0.9%  NaCl infusion ( Intravenous Canceled Entry 6/13/23 1945)   dextrose 5 % and 0.45 % NaCl  infusion (has no administration in time range)   dextrose 10 % infusion (has no administration in time range)   dextrose 10 % infusion (has no administration in time range)   dextrose 10% bolus 125 mL 125 mL (has no administration in time range)   dextrose 10% bolus 250 mL 250 mL (has no administration in time range)   insulin regular in 0.9 % NaCl 100 unit/100 mL (1 unit/mL) infusion (0.02 Units/kg/hr × 61.2 kg Intravenous Rate/Dose Change 6/13/23 2330)   sodium chloride 0.9% flush 10 mL (has no administration in time range)   0.9%  NaCl infusion (0 mL/hr Intravenous Stopped 6/13/23 2100)   dextrose 5 % and 0.45 % NaCl infusion (125 mL/hr Intravenous New Bag 6/13/23 2053)   dextrose 50% injection 25 g (has no administration in time range)   dextrose 50% injection 12.5 g (has no administration in time range)   acetaminophen tablet 650 mg (has no administration in time range)   potassium chloride 10 mEq in 100 mL IVPB (has no administration in time range)     And   potassium chloride 10 mEq in 100 mL IVPB (has no administration in time range)     And   potassium chloride 10 mEq in 100 mL IVPB (has no administration in time range)   famotidine tablet 20 mg (has no administration in time range)   metoclopramide HCl injection 5 mg (has no administration in time range)   prochlorperazine injection Soln 5 mg (has no administration in time range)   polyethylene glycol packet 17 g (has no administration in time range)   sodium chloride 0.9% bolus 1,000 mL 1,000 mL (0 mLs Intravenous Stopped 6/13/23 1415)   sodium chloride 0.9% bolus 1,000 mL 1,000 mL (0 mLs Intravenous Stopped 6/13/23 1825)   insulin regular injection 7 Units 0.07 mL (7 Units Intravenous Given 6/13/23 1725)   droPERidol injection 0.625 mg (0.625 mg Intravenous Given 6/13/23 1727)   0.9%  NaCl infusion (1,000 mLs Intravenous New Bag 6/13/23 1845)   sodium chloride 0.9% bolus 1,000 mL 1,000 mL (0 mLs Intravenous Stopped 6/13/23 2141)   magnesium sulfate 2g in  water 50mL IVPB (premix) (0 g Intravenous Stopped 6/13/23 8194)     Medical Decision Making:   History:   Old Medical Records: I decided to obtain old medical records.  Initial Assessment:   This is an emergent evaluation of a 33 y.o. female who presents with hyperglycemia, abdominal pain, nausea, vomiting, and diarrhea. The patient was seen and examined. The history and physical exam was obtained; on exam, there is marked dehydration, tachycardia, poor skin turgor, epigastric abdominal tenderness. The nursing notes and vital signs were reviewed. Secondary to symptoms and examination findings, I ordered CBC, CMP, urinalysis, ISTAT. Will treat as with insulin, fluids, droperidol.   Differential Diagnosis:   Includes but is not limited to: DKA, UTI, cystitis, failure to thrive  Clinical Tests:   Lab Tests: Reviewed and Ordered     Patient appears ill and consistent with DKA.  Labs are equivocal for DKA.  There is an anion gap of 22 but CO2 of 23.  Patient does have a beta hydroxybutyrate elevation.  Patient does have ketones in the urine.  But pH is 7.5.  In discussion with Hospital Medicine will go ahead and start insulin drip and admitted to the ICU.     Scribe Attestation:   Scribe #1: I performed the above scribed service and the documentation accurately describes the services I performed. I attest to the accuracy of the note.            I, Omid Hugo, personally performed the services described in this documentation.  All medical record entries made by the scribe were at my direction and in my presence.  I have reviewed the chart and agree that the record reflects my personal performance and is accurate and complete.       Clinical Impression:   Final diagnoses:  [R73.9] Hyperglycemia        ED Disposition Condition    Observation                 Omid Hugo MD  06/13/23 8998

## 2023-06-13 NOTE — NURSING
Patient was very difficult to get a peripheral access started. She had multiple attempts performed by myself and 2 other MD with an US. All attempts were unsuccesful. A midline was placed and medication was administered and blood work was sent to lab.

## 2023-06-14 VITALS
RESPIRATION RATE: 12 BRPM | BODY MASS INDEX: 19.69 KG/M2 | DIASTOLIC BLOOD PRESSURE: 88 MMHG | TEMPERATURE: 98 F | WEIGHT: 125.44 LBS | OXYGEN SATURATION: 100 % | SYSTOLIC BLOOD PRESSURE: 130 MMHG | HEIGHT: 67 IN | HEART RATE: 77 BPM

## 2023-06-14 LAB
ANION GAP SERPL CALC-SCNC: 7 MMOL/L (ref 8–16)
ANION GAP SERPL CALC-SCNC: 7 MMOL/L (ref 8–16)
BASOPHILS # BLD AUTO: 0.03 K/UL (ref 0–0.2)
BASOPHILS NFR BLD: 0.3 % (ref 0–1.9)
BUN SERPL-MCNC: 15 MG/DL (ref 6–20)
BUN SERPL-MCNC: 18 MG/DL (ref 6–20)
CALCIUM SERPL-MCNC: 8.4 MG/DL (ref 8.7–10.5)
CALCIUM SERPL-MCNC: 8.5 MG/DL (ref 8.7–10.5)
CHLORIDE SERPL-SCNC: 110 MMOL/L (ref 95–110)
CHLORIDE SERPL-SCNC: 112 MMOL/L (ref 95–110)
CO2 SERPL-SCNC: 24 MMOL/L (ref 23–29)
CO2 SERPL-SCNC: 25 MMOL/L (ref 23–29)
CREAT SERPL-MCNC: 0.7 MG/DL (ref 0.5–1.4)
CREAT SERPL-MCNC: 0.8 MG/DL (ref 0.5–1.4)
DIFFERENTIAL METHOD: ABNORMAL
EOSINOPHIL # BLD AUTO: 0.1 K/UL (ref 0–0.5)
EOSINOPHIL NFR BLD: 0.8 % (ref 0–8)
ERYTHROCYTE [DISTWIDTH] IN BLOOD BY AUTOMATED COUNT: 18.8 % (ref 11.5–14.5)
EST. GFR  (NO RACE VARIABLE): >60 ML/MIN/1.73 M^2
EST. GFR  (NO RACE VARIABLE): >60 ML/MIN/1.73 M^2
ESTIMATED AVG GLUCOSE: 266 MG/DL (ref 68–131)
GLUCOSE SERPL-MCNC: 118 MG/DL (ref 70–110)
GLUCOSE SERPL-MCNC: 92 MG/DL (ref 70–110)
HBA1C MFR BLD: 10.9 % (ref 4–5.6)
HCT VFR BLD AUTO: 33.5 % (ref 37–48.5)
HGB BLD-MCNC: 10.6 G/DL (ref 12–16)
IMM GRANULOCYTES # BLD AUTO: 0.04 K/UL (ref 0–0.04)
IMM GRANULOCYTES NFR BLD AUTO: 0.4 % (ref 0–0.5)
LYMPHOCYTES # BLD AUTO: 2.3 K/UL (ref 1–4.8)
LYMPHOCYTES NFR BLD: 21.2 % (ref 18–48)
MAGNESIUM SERPL-MCNC: 2.6 MG/DL (ref 1.6–2.6)
MCH RBC QN AUTO: 25.4 PG (ref 27–31)
MCHC RBC AUTO-ENTMCNC: 31.6 G/DL (ref 32–36)
MCV RBC AUTO: 80 FL (ref 82–98)
MONOCYTES # BLD AUTO: 1 K/UL (ref 0.3–1)
MONOCYTES NFR BLD: 9 % (ref 4–15)
NEUTROPHILS # BLD AUTO: 7.5 K/UL (ref 1.8–7.7)
NEUTROPHILS NFR BLD: 68.3 % (ref 38–73)
NRBC BLD-RTO: 0 /100 WBC
PHOSPHATE SERPL-MCNC: 1.9 MG/DL (ref 2.7–4.5)
PLATELET # BLD AUTO: 315 K/UL (ref 150–450)
PMV BLD AUTO: 10.5 FL (ref 9.2–12.9)
POCT GLUCOSE: 103 MG/DL (ref 70–110)
POCT GLUCOSE: 116 MG/DL (ref 70–110)
POCT GLUCOSE: 119 MG/DL (ref 70–110)
POCT GLUCOSE: 125 MG/DL (ref 70–110)
POCT GLUCOSE: 128 MG/DL (ref 70–110)
POCT GLUCOSE: 140 MG/DL (ref 70–110)
POCT GLUCOSE: 67 MG/DL (ref 70–110)
POCT GLUCOSE: 83 MG/DL (ref 70–110)
POCT GLUCOSE: 87 MG/DL (ref 70–110)
POCT GLUCOSE: 90 MG/DL (ref 70–110)
POCT GLUCOSE: 92 MG/DL (ref 70–110)
POCT GLUCOSE: 95 MG/DL (ref 70–110)
POTASSIUM SERPL-SCNC: 3.5 MMOL/L (ref 3.5–5.1)
POTASSIUM SERPL-SCNC: 3.8 MMOL/L (ref 3.5–5.1)
RBC # BLD AUTO: 4.18 M/UL (ref 4–5.4)
SODIUM SERPL-SCNC: 142 MMOL/L (ref 136–145)
SODIUM SERPL-SCNC: 143 MMOL/L (ref 136–145)
WBC # BLD AUTO: 10.92 K/UL (ref 3.9–12.7)

## 2023-06-14 PROCEDURE — 80048 BASIC METABOLIC PNL TOTAL CA: CPT | Performed by: INTERNAL MEDICINE

## 2023-06-14 PROCEDURE — 94761 N-INVAS EAR/PLS OXIMETRY MLT: CPT

## 2023-06-14 PROCEDURE — 96372 THER/PROPH/DIAG INJ SC/IM: CPT | Performed by: SURGERY

## 2023-06-14 PROCEDURE — S5010 5% DEXTROSE AND 0.45% SALINE: HCPCS | Performed by: INTERNAL MEDICINE

## 2023-06-14 PROCEDURE — G0378 HOSPITAL OBSERVATION PER HR: HCPCS

## 2023-06-14 PROCEDURE — 25000003 PHARM REV CODE 250: Performed by: SURGERY

## 2023-06-14 PROCEDURE — 63600175 PHARM REV CODE 636 W HCPCS: Performed by: INTERNAL MEDICINE

## 2023-06-14 PROCEDURE — 36415 COLL VENOUS BLD VENIPUNCTURE: CPT | Performed by: INTERNAL MEDICINE

## 2023-06-14 PROCEDURE — 96366 THER/PROPH/DIAG IV INF ADDON: CPT

## 2023-06-14 PROCEDURE — 25000003 PHARM REV CODE 250: Performed by: STUDENT IN AN ORGANIZED HEALTH CARE EDUCATION/TRAINING PROGRAM

## 2023-06-14 PROCEDURE — 84100 ASSAY OF PHOSPHORUS: CPT | Performed by: INTERNAL MEDICINE

## 2023-06-14 PROCEDURE — 85025 COMPLETE CBC W/AUTO DIFF WBC: CPT | Performed by: INTERNAL MEDICINE

## 2023-06-14 PROCEDURE — 25000003 PHARM REV CODE 250: Performed by: INTERNAL MEDICINE

## 2023-06-14 PROCEDURE — 83735 ASSAY OF MAGNESIUM: CPT | Performed by: INTERNAL MEDICINE

## 2023-06-14 PROCEDURE — 96375 TX/PRO/DX INJ NEW DRUG ADDON: CPT

## 2023-06-14 PROCEDURE — 96361 HYDRATE IV INFUSION ADD-ON: CPT

## 2023-06-14 RX ORDER — GLUCAGON 1 MG
1 KIT INJECTION
Status: DISCONTINUED | OUTPATIENT
Start: 2023-06-14 | End: 2023-06-14 | Stop reason: HOSPADM

## 2023-06-14 RX ORDER — INSULIN ASPART 100 [IU]/ML
1-10 INJECTION, SOLUTION INTRAVENOUS; SUBCUTANEOUS
Status: DISCONTINUED | OUTPATIENT
Start: 2023-06-14 | End: 2023-06-14 | Stop reason: HOSPADM

## 2023-06-14 RX ORDER — SODIUM,POTASSIUM PHOSPHATES 280-250MG
2 POWDER IN PACKET (EA) ORAL ONCE
Status: COMPLETED | OUTPATIENT
Start: 2023-06-14 | End: 2023-06-14

## 2023-06-14 RX ORDER — DEXTROSE 40 %
15 GEL (GRAM) ORAL
Status: DISCONTINUED | OUTPATIENT
Start: 2023-06-14 | End: 2023-06-14 | Stop reason: HOSPADM

## 2023-06-14 RX ORDER — PEN NEEDLE, DIABETIC 30 GX3/16"
1 NEEDLE, DISPOSABLE MISCELLANEOUS 4 TIMES DAILY
Qty: 200 EACH | Refills: 5 | Status: SHIPPED | OUTPATIENT
Start: 2023-06-14 | End: 2024-02-19

## 2023-06-14 RX ORDER — INSULIN ASPART 100 [IU]/ML
4 INJECTION, SOLUTION INTRAVENOUS; SUBCUTANEOUS
Status: DISCONTINUED | OUTPATIENT
Start: 2023-06-14 | End: 2023-06-14 | Stop reason: HOSPADM

## 2023-06-14 RX ORDER — ISOPROPYL ALCOHOL 70 ML/100ML
1 SWAB TOPICAL 4 TIMES DAILY
Qty: 2000 EACH | Refills: 1 | Status: ON HOLD | OUTPATIENT
Start: 2023-06-14 | End: 2023-06-19

## 2023-06-14 RX ORDER — DEXTROSE 40 %
30 GEL (GRAM) ORAL
Status: DISCONTINUED | OUTPATIENT
Start: 2023-06-14 | End: 2023-06-14 | Stop reason: HOSPADM

## 2023-06-14 RX ORDER — BLOOD-GLUCOSE CONTROL, NORMAL
EACH MISCELLANEOUS
Qty: 200 EACH | Refills: 8 | Status: SHIPPED | OUTPATIENT
Start: 2023-06-14 | End: 2024-02-19

## 2023-06-14 RX ORDER — INSULIN ASPART 100 [IU]/ML
6 INJECTION, SOLUTION INTRAVENOUS; SUBCUTANEOUS
Qty: 15 ML | Refills: 11 | Status: ON HOLD | OUTPATIENT
Start: 2023-06-14 | End: 2023-06-20 | Stop reason: SDUPTHER

## 2023-06-14 RX ORDER — DEXTROSE 4 G
TABLET,CHEWABLE ORAL
Qty: 1 EACH | Refills: 0 | Status: ON HOLD | OUTPATIENT
Start: 2023-06-14 | End: 2023-06-19 | Stop reason: CLARIF

## 2023-06-14 RX ADMIN — Medication 2 PACKET: at 03:06

## 2023-06-14 RX ADMIN — FAMOTIDINE 20 MG: 20 TABLET, FILM COATED ORAL at 09:06

## 2023-06-14 RX ADMIN — INSULIN DETEMIR 7 UNITS: 100 INJECTION, SOLUTION SUBCUTANEOUS at 06:06

## 2023-06-14 RX ADMIN — PROCHLORPERAZINE EDISYLATE 5 MG: 5 INJECTION, SOLUTION INTRAMUSCULAR; INTRAVENOUS at 06:06

## 2023-06-14 RX ADMIN — DEXTROSE AND SODIUM CHLORIDE 125 ML/HR: 5; 450 INJECTION, SOLUTION INTRAVENOUS at 04:06

## 2023-06-14 NOTE — DISCHARGE SUMMARY
[facility] Medicine  Discharge Summary      Patient Name: Hollie Wilson  MRN: [id number]  JAYSON: [id number]  Patient Class: OP- Observation  Admission Date: 6/13/2023  Hospital Length of Stay: 0 days  Discharge Date and Time:  06/14/2023 8:46 AM  Attending Physician: Rickie Painter MD   Discharging Provider: Rickie Painter MD  Primary Care Provider: [facility]    Primary Care Team: Networked reference to record PCT     HPI:    is a 33-year-old female, with known history of type 1 diabetes mellitus, bipolar disorder, who presents the emergency room complaining of nausea vomiting, loose stools, and generally not feeling well.  Reports that she has not taken any of her insulin and ran out about 2 months ago.  Reports markedly decreased oral intake since last 2 days.  Denies any chest pain, shortness of breath, fevers or chills. At home, appears that she is supposed to be taking detemir 25 units daily and preprandial 6 units 3 times a day with meals.    In the ER, she has been afebrile, last /77.  Noted to have a white count of 12.93 thousand, anion gap of 22, bicarbonate level of 23, normal lactate of 1.1, beta hydroxybutyrate acid of 3.4 concerning for DKA.  Her chemistry shows creatinine of 1.5 with baseline being normal along with mild hyperkalemia 5.5 and blood glucose of 510.  Her UA is negative for bacteria.  She is given 3 L of normal saline followed by continuous IV fluids, as well as 7 units of IV insulin bolus and started on IV insulin drip.  She is being admitted for further evaluation and treatment.      * No surgery found *      Hospital Course:   Patient admitted with DKA, put in the ICU and placed on insulin drip, in observation with expectation be out of DKA and able to go home.  Patient out of DKA, tolerating diet, can go home.     ROS:  General: Negative for fevers or chills.  Cardiac: Negative for chest pain or orthopnea    Pulmonary: Negative for dyspnea or wheezing.  GI: Negative for abdominal distention or pain     Vitals:    06/14/23 0300 06/14/23 0400 06/14/23 0500 06/14/23 0600   BP: (!) 135/91 122/82 127/86 (!) 139/90   BP Location: Left arm Left arm Left arm Left arm   Patient Position: Lying Lying Lying Lying   Pulse: 80 80 82 78   Resp: 12 (!) 23 11 11   Temp:       TempSrc:       SpO2: 99% 99% 99% 100%   Weight:       Height:              Body mass index is 19.65 kg/m².      PHYSICAL EXAM:  GENERAL APPEARANCE: alert and cooperative, and appears to be in no acute distress.  HEENT:     HEAD: NC/AT     EYES: PERRL, EOMI.  Vision is grossly intact.  NECK: Neck supple, non-tender without LAD, masses or thyromegaly.  CARDIAC: There is no peripheral edema, cyanosis or pallor.   LUNGS: Clear to auscultation and percussion without rales or wheezing  ABDOMEN: Non-distended. No guarding.  MSK: No joint erythema or tenderness.   EXTREMITIES: No significant deformity or joint abnormality. No edema.   NEUROLOGICAL: CN II-XII grossly intact.   SKIN: Skin normal color, texture and turgor with no lesions or eruptions.  PSYCHIATRIC: Oriented. No tangential speech. No Hyperactive features.      Goals of Care Treatment Preferences:  Code Status: Full Code      Consults:   Consults (From admission, onward)        Status Ordering Provider     Inpatient consult to Registered Dietitian/Nutritionist  Once        Provider:  (Not yet assigned)    Acknowledged MARLENI, SILVIA     Inpatient consult to PICC team ([facility])  Once        Provider:  (Not yet assigned)    DANY [place] new Assessment & Plan notes have been filed under this hospital service since the last note was generated.  Service: Hospital Medicine    Final Active Diagnoses:    Diagnosis Date Noted POA    PRINCIPAL PROBLEM:  DKA (diabetic ketoacidoses) [E11.10] 11/13/2020 Yes    Leukocytosis [D72.829] 06/13/2023 Yes    Acute kidney injury [N17.9] 06/13/2019  Yes    Hyperkalemia [E87.5] 06/13/2019 Yes    Bipolar disorder [F31.9] 05/26/2016 Yes      Problems Resolved During this Admission:       Discharged Condition: good    Disposition:     Follow Up:    Patient Instructions:      Ambulatory referral/consult to Internal Medicine   Standing Status: Future   Referral Priority: Routine Referral Type: Consultation   Referral Reason: Specialty Services Required   Requested Specialty: Internal Medicine   Number of Visits Requested: 1     Ambulatory referral/consult to Endocrinology   Standing Status: Future   Referral Priority: Routine Referral Type: Consultation   Requested Specialty: Endocrinology   Number of Visits Requested: 1       Significant Diagnostic Studies:    Recent Results (from the past 100 hour(s))   POCT GLUCOSE    Collection Time: 06/10/23  6:11 AM   Result Value Ref Range    POCT Glucose 286 (H) 65 - 99 mg/dL    Performed by: BLAKE RUSSELL    POCT GLUCOSE    Collection Time: 06/10/23 11:18 AM   Result Value Ref Range    POCT Glucose 394 (H) 65 - 99 mg/dL    Performed by: YOVANI THRASHER    POCT glucose    Collection Time: 06/13/23 12:19 PM   Result Value Ref Range    POCT Glucose >500 (H) 70 - 110 mg/dL   Beta - Hydroxybutyrate, Serum    Collection Time: 06/13/23  1:47 PM   Result Value Ref Range    Beta-Hydroxybutyrate 3.4 (H) 0.0 - 0.5 mmol/L   ISTAT PROCEDURE    Collection Time: 06/13/23  1:50 PM   Result Value Ref Range    POC PH 7.505 (H) 7.35 - 7.45    POC PCO2 32.0 (L) 35 - 45 mmHg    POC PO2 35 (L) 40 - 60 mmHg    POC HCO3 25.2 24 - 28 mmol/L    POC BE 2 -2 to 2 mmol/L    POC SATURATED O2 74 (L) 95 - 100 %    POC TCO2 26 24 - 29 mmol/L    Sample VENOUS     Site Other     Allens Test N/A    Urinalysis, Reflex to Urine Culture Urine, Clean Catch    Collection Time: 06/13/23  1:53 PM    Specimen: Urine   Result Value Ref Range    Specimen UA Urine, Clean Catch     Color, UA Yellow Yellow, Straw, Daphne    Appearance, UA Clear Clear    pH, UA 7.0 5.0 - 8.0     Specific Gravity, UA >1.030 (A) 1.005 - 1.030    Protein, UA 1+ (A) Negative    Glucose, UA 4+ (A) Negative    Ketones, UA 2+ (A) Negative    Bilirubin (UA) Negative Negative    Occult Blood UA Negative Negative    Nitrite, UA Negative Negative    Urobilinogen, UA Negative <2.0 EU/dL    Leukocytes, UA Negative Negative   Urinalysis Microscopic    Collection Time: 06/13/23  1:53 PM   Result Value Ref Range    RBC, UA 2 0 - 4 /hpf    WBC, UA 0 0 - 5 /hpf    Bacteria None None-Occ /hpf    Yeast, UA None None    Squam Epithel, UA 7 /hpf    Hyaline Casts, UA 0 0-1/lpf /lpf    Amorphous, UA Rare None-Moderate    Microscopic Comment SEE COMMENT    Drug screen panel, in-house    Collection Time: 06/13/23  1:53 PM   Result Value Ref Range    Benzodiazepines Negative Negative    Methadone metabolites Negative Negative    Cocaine (Metab.) Negative Negative    Opiate Scrn, Ur Negative Negative    Barbiturate Screen, Ur Negative Negative    Amphetamine Screen, Ur Negative Negative    THC Presumptive Positive (A) Negative    Phencyclidine Negative Negative    Creatinine, Urine 22.6 15.0 - 325.0 mg/dL    Toxicology Information SEE COMMENT    Comprehensive Metabolic Panel    Collection Time: 06/13/23  2:42 PM   Result Value Ref Range    Sodium 142 136 - 145 mmol/L    Potassium 5.1 3.5 - 5.1 mmol/L    Chloride 98 95 - 110 mmol/L    CO2 22 (L) 23 - 29 mmol/L    Glucose 510 (HH) 70 - 110 mg/dL    BUN 33 (H) 6 - 20 mg/dL    Creatinine 1.5 (H) 0.5 - 1.4 mg/dL    Calcium 10.7 (H) 8.7 - 10.5 mg/dL    Total Protein 8.8 (H) 6.0 - 8.4 g/dL    Albumin 4.7 3.5 - 5.2 g/dL    Total Bilirubin 0.7 0.1 - 1.0 mg/dL    Alkaline Phosphatase 143 (H) 55 - 135 U/L    AST 35 10 - 40 U/L    ALT 38 10 - 44 U/L    Anion Gap 22 (H) 8 - 16 mmol/L    eGFR 46 (A) >60 mL/min/1.73 m^2   Magnesium    Collection Time: 06/13/23  2:42 PM   Result Value Ref Range    Magnesium 2.5 1.6 - 2.6 mg/dL   Lipase    Collection Time: 06/13/23  2:42 PM   Result Value Ref Range     Lipase 14 4 - 60 U/L   CBC Auto Differential    Collection Time: 06/13/23  5:13 PM   Result Value Ref Range    WBC 12.93 (H) 3.90 - 12.70 K/uL    RBC 4.90 4.00 - 5.40 M/uL    Hemoglobin 12.4 12.0 - 16.0 g/dL    Hematocrit 38.6 37.0 - 48.5 %    MCV 79 (L) 82 - 98 fL    MCH 25.3 (L) 27.0 - 31.0 pg    MCHC 32.1 32.0 - 36.0 g/dL    RDW 18.7 (H) 11.5 - 14.5 %    Platelets 369 150 - 450 K/uL    MPV 10.5 9.2 - 12.9 fL    Immature Granulocytes 0.4 0.0 - 0.5 %    Gran # (ANC) 11.4 (H) 1.8 - 7.7 K/uL    Immature Grans (Abs) 0.05 (H) 0.00 - 0.04 K/uL    Lymph # 1.0 1.0 - 4.8 K/uL    Mono # 0.4 0.3 - 1.0 K/uL    Eos # 0.0 0.0 - 0.5 K/uL    Baso # 0.03 0.00 - 0.20 K/uL    nRBC 0 0 /100 WBC    Gran % 88.4 (H) 38.0 - 73.0 %    Lymph % 7.9 (L) 18.0 - 48.0 %    Mono % 3.1 (L) 4.0 - 15.0 %    Eosinophil % 0.0 0.0 - 8.0 %    Basophil % 0.2 0.0 - 1.9 %    Differential Method Automated    CBC auto differential    Collection Time: 06/13/23  5:13 PM   Result Value Ref Range    WBC 12.93 (H) 3.90 - 12.70 K/uL    RBC 4.90 4.00 - 5.40 M/uL    Hemoglobin 12.4 12.0 - 16.0 g/dL    Hematocrit 38.6 37.0 - 48.5 %    MCV 79 (L) 82 - 98 fL    MCH 25.3 (L) 27.0 - 31.0 pg    MCHC 32.1 32.0 - 36.0 g/dL    RDW 18.7 (H) 11.5 - 14.5 %    Platelets 369 150 - 450 K/uL    MPV 10.5 9.2 - 12.9 fL    Immature Granulocytes 0.4 0.0 - 0.5 %    Gran # (ANC) 11.4 (H) 1.8 - 7.7 K/uL    Immature Grans (Abs) 0.05 (H) 0.00 - 0.04 K/uL    Lymph # 1.0 1.0 - 4.8 K/uL    Mono # 0.4 0.3 - 1.0 K/uL    Eos # 0.0 0.0 - 0.5 K/uL    Baso # 0.03 0.00 - 0.20 K/uL    nRBC 0 0 /100 WBC    Gran % 88.4 (H) 38.0 - 73.0 %    Lymph % 7.9 (L) 18.0 - 48.0 %    Mono % 3.1 (L) 4.0 - 15.0 %    Eosinophil % 0.0 0.0 - 8.0 %    Basophil % 0.2 0.0 - 1.9 %    Differential Method Automated    Lipase    Collection Time: 06/13/23  5:13 PM   Result Value Ref Range    Lipase 14 4 - 60 U/L   Comprehensive metabolic panel    Collection Time: 06/13/23  5:13 PM   Result Value Ref Range    Sodium 143 136  - 145 mmol/L    Potassium 5.5 (H) 3.5 - 5.1 mmol/L    Chloride 98 95 - 110 mmol/L    CO2 23 23 - 29 mmol/L    Glucose 464 (HH) 70 - 110 mg/dL    BUN 34 (H) 6 - 20 mg/dL    Creatinine 1.5 (H) 0.5 - 1.4 mg/dL    Calcium 10.7 (H) 8.7 - 10.5 mg/dL    Total Protein 8.7 (H) 6.0 - 8.4 g/dL    Albumin 4.5 3.5 - 5.2 g/dL    Total Bilirubin 0.6 0.1 - 1.0 mg/dL    Alkaline Phosphatase 134 55 - 135 U/L    AST 37 10 - 40 U/L    ALT 35 10 - 44 U/L    Anion Gap 22 (H) 8 - 16 mmol/L    eGFR 46 (A) >60 mL/min/1.73 m^2   Magnesium    Collection Time: 06/13/23  5:13 PM   Result Value Ref Range    Magnesium 2.5 1.6 - 2.6 mg/dL   POCT glucose    Collection Time: 06/13/23  6:17 PM   Result Value Ref Range    POCT Glucose 303 (H) 70 - 110 mg/dL   POCT glucose    Collection Time: 06/13/23  7:17 PM   Result Value Ref Range    POCT Glucose 235 (H) 70 - 110 mg/dL   Basic metabolic panel    Collection Time: 06/13/23  8:00 PM   Result Value Ref Range    Sodium 147 (H) 136 - 145 mmol/L    Potassium 4.0 3.5 - 5.1 mmol/L    Chloride 109 95 - 110 mmol/L    CO2 22 (L) 23 - 29 mmol/L    Glucose 215 (H) 70 - 110 mg/dL    BUN 28 (H) 6 - 20 mg/dL    Creatinine 1.0 0.5 - 1.4 mg/dL    Calcium 9.3 8.7 - 10.5 mg/dL    Anion Gap 16 8 - 16 mmol/L    eGFR >60 >60 mL/min/1.73 m^2   Lactic acid, plasma    Collection Time: 06/13/23  8:00 PM   Result Value Ref Range    Lactate (Lactic Acid) 1.1 0.5 - 2.2 mmol/L   HCG, Quantitative    Collection Time: 06/13/23  8:00 PM   Result Value Ref Range    HCG Quant <1.2 See Text mIU/mL   POCT glucose    Collection Time: 06/13/23  8:38 PM   Result Value Ref Range    POCT Glucose 179 (H) 70 - 110 mg/dL   POCT glucose    Collection Time: 06/13/23  9:29 PM   Result Value Ref Range    POCT Glucose 147 (H) 70 - 110 mg/dL   POCT glucose    Collection Time: 06/13/23 10:29 PM   Result Value Ref Range    POCT Glucose 167 (H) 70 - 110 mg/dL   POCT glucose    Collection Time: 06/13/23 11:30 PM   Result Value Ref Range    POCT Glucose  146 (H) 70 - 110 mg/dL   Basic metabolic panel    Collection Time: 06/13/23 11:31 PM   Result Value Ref Range    Sodium 145 136 - 145 mmol/L    Potassium 3.7 3.5 - 5.1 mmol/L    Chloride 113 (H) 95 - 110 mmol/L    CO2 22 (L) 23 - 29 mmol/L    Glucose 157 (H) 70 - 110 mg/dL    BUN 22 (H) 6 - 20 mg/dL    Creatinine 0.8 0.5 - 1.4 mg/dL    Calcium 8.3 (L) 8.7 - 10.5 mg/dL    Anion Gap 10 8 - 16 mmol/L    eGFR >60 >60 mL/min/1.73 m^2   POCT glucose    Collection Time: 06/14/23 12:28 AM   Result Value Ref Range    POCT Glucose 140 (H) 70 - 110 mg/dL   POCT glucose    Collection Time: 06/14/23  1:41 AM   Result Value Ref Range    POCT Glucose 119 (H) 70 - 110 mg/dL   POCT glucose    Collection Time: 06/14/23  2:28 AM   Result Value Ref Range    POCT Glucose 128 (H) 70 - 110 mg/dL   Basic metabolic panel    Collection Time: 06/14/23  3:33 AM   Result Value Ref Range    Sodium 143 136 - 145 mmol/L    Potassium 3.8 3.5 - 5.1 mmol/L    Chloride 112 (H) 95 - 110 mmol/L    CO2 24 23 - 29 mmol/L    Glucose 118 (H) 70 - 110 mg/dL    BUN 18 6 - 20 mg/dL    Creatinine 0.8 0.5 - 1.4 mg/dL    Calcium 8.5 (L) 8.7 - 10.5 mg/dL    Anion Gap 7 (L) 8 - 16 mmol/L    eGFR >60 >60 mL/min/1.73 m^2   POCT glucose    Collection Time: 06/14/23  3:38 AM   Result Value Ref Range    POCT Glucose 116 (H) 70 - 110 mg/dL   POCT glucose    Collection Time: 06/14/23  4:35 AM   Result Value Ref Range    POCT Glucose 125 (H) 70 - 110 mg/dL   POCT glucose    Collection Time: 06/14/23  5:35 AM   Result Value Ref Range    POCT Glucose 92 70 - 110 mg/dL   Phosphorus    Collection Time: 06/14/23  5:36 AM   Result Value Ref Range    Phosphorus 1.9 (L) 2.7 - 4.5 mg/dL   CBC auto differential    Collection Time: 06/14/23  5:36 AM   Result Value Ref Range    WBC 10.92 3.90 - 12.70 K/uL    RBC 4.18 4.00 - 5.40 M/uL    Hemoglobin 10.6 (L) 12.0 - 16.0 g/dL    Hematocrit 33.5 (L) 37.0 - 48.5 %    MCV 80 (L) 82 - 98 fL    MCH 25.4 (L) 27.0 - 31.0 pg    MCHC 31.6 (L)  "32.0 - 36.0 g/dL    RDW 18.8 (H) 11.5 - 14.5 %    Platelets 315 150 - 450 K/uL    MPV 10.5 9.2 - 12.9 fL    Immature Granulocytes 0.4 0.0 - 0.5 %    Gran # (ANC) 7.5 1.8 - 7.7 K/uL    Immature Grans (Abs) 0.04 0.00 - 0.04 K/uL    Lymph # 2.3 1.0 - 4.8 K/uL    Mono # 1.0 0.3 - 1.0 K/uL    Eos # 0.1 0.0 - 0.5 K/uL    Baso # 0.03 0.00 - 0.20 K/uL    nRBC 0 0 /100 WBC    Gran % 68.3 38.0 - 73.0 %    Lymph % 21.2 18.0 - 48.0 %    Mono % 9.0 4.0 - 15.0 %    Eosinophil % 0.8 0.0 - 8.0 %    Basophil % 0.3 0.0 - 1.9 %    Differential Method Automated    Magnesium    Collection Time: 06/14/23  5:36 AM   Result Value Ref Range    Magnesium 2.6 1.6 - 2.6 mg/dL   POCT glucose    Collection Time: 06/14/23  6:25 AM   Result Value Ref Range    POCT Glucose 83 70 - 110 mg/dL   POCT glucose    Collection Time: 06/14/23  7:09 AM   Result Value Ref Range    POCT Glucose 103 70 - 110 mg/dL   POCT glucose    Collection Time: 06/14/23  8:13 AM   Result Value Ref Range    POCT Glucose 95 70 - 110 mg/dL       Microbiology Results (last 7 days)     ** No results found for the last 168 hours. **          Imaging Results    None             Pending Diagnostic Studies:     Procedure Component Value Units Date/Time    Basic metabolic panel [[id number]] Collected: 06/14/23 0759    Order Status: Sent Lab Status: In process Updated: 06/14/23 0826    Specimen: Blood     Hemoglobin A1c [[id number]] Collected: 06/13/23 2000    Order Status: Sent Lab Status: In process Updated: 06/13/23 2000    Specimen: Blood          Medications:  Reconciled Home Medications:      Medication List      CONTINUE taking these medications    acetaminophen 500 MG tablet  Commonly known as: TYLENOL  Take 2 tablets (1,000 mg total) by mouth every 8 (eight) hours as needed for Pain.     BD ULTRA-FINE JENI PEN NEEDLE 32 gauge x 5/32" Ndle  Generic drug: pen needle, diabetic  Use 4 (four) times daily.     blood-glucose meter Misc  as directed     FLUoxetine 20 MG capsule  Take 2 " capsules (40 mg total) by mouth once daily.     insulin aspart U-100 100 unit/mL (3 mL) Inpn pen  Commonly known as: NovoLOG  Inject 6 Units into the skin 3 (three) times daily with meals.     insulin detemir U-100 (Levemir) 100 unit/mL (3 mL) Inpn pen  Inject 25 Units into the skin once daily.     ONETOUCH ULTRA BLUE TEST STRIP Strp  Generic drug: blood sugar diagnostic  test 3 (three) times daily before meals. For One Touch meter     ONETOUCH ULTRASOFT LANCETS Misc  Generic drug: lancets  test 3 (three) times daily with meals.            Indwelling Lines/Drains at time of discharge:   Lines/Drains/Airways     Drain  Duration           Female External Urinary Catheter 06/13/23 1950 <1 day                Time spent on the discharge of patient: 35 minutes    Critical care time spent on the evaluation and treatment of severe organ dysfunction, review of pertinent labs and imaging studies, discussions with consulting providers and discussions with patient/family: 0 minutes.     Rickie Painter MD  Department of Hospital Medicine  [facility] - Intensive Care

## 2023-06-14 NOTE — HPI
is a 33-year-old female, with known history of type 1 diabetes mellitus, bipolar disorder, who presents the emergency room complaining of nausea vomiting, loose stools, and generally not feeling well.  Reports that she has not taken any of her insulin and ran out about 2 months ago.  Reports markedly decreased oral intake since last 2 days.  Denies any chest pain, shortness of breath, fevers or chills. At home, appears that she is supposed to be taking detemir 25 units daily and preprandial 6 units 3 times a day with meals.    In the ER, she has been afebrile, last /77.  Noted to have a white count of 12.93 thousand, anion gap of 22, bicarbonate level of 23, normal lactate of 1.1, beta hydroxybutyrate acid of 3.4 concerning for DKA.  Her chemistry shows creatinine of 1.5 with baseline being normal along with mild hyperkalemia 5.5 and blood glucose of 510.  Her UA is negative for bacteria.  She is given 3 L of normal saline followed by continuous IV fluids, as well as 7 units of IV insulin bolus and started on IV insulin drip.  She is being admitted for further evaluation and treatment.

## 2023-06-14 NOTE — EICU
EICU BRIEF INITIAL EVALUATION:       HISTORY:      33-year-old woman admitted to the ICU for insulin drip for DKA.  DKA appears to be due to noncompliance   History of anemia, bipolar disorder, scoliosis.  She is an everyday smoker    DKA prophylaxis not indicated   GI prophylaxis famotidine    /80, P 87, R 13, O2 sat 100   Resting comfortably on room air    EKG with sinus bradycardia rate 51, bifascicular block, old septal infarct  Echocardiogram done in November 2020 essentially within normal limits except for mild LV concentric hypertrophy      CAMERA ASSESSMENT: Yes      TELEMETRY: Reviewed      NOTES: Reviewed     LABS: Reviewed      IMAGING: Personally reviewed.      DISCUSSED with bedside nurse        ASSESSMENT AND PLAN:       DKA-continue insulin drip until bicarb and anion gap normalized.  Aggressively hydrate.  Check electrolytes every 4 hours.  Replace electrolytes as needed    Prolonged QT-was normal on admitting EKG, now 520 on monitor.  No arrhythmias.  Does not appear to be on any QT prolonging medications.  Will monitor closely but hopefully will improve with correction of electrolyte abnormalities      I have reviewed the plan of care for the patient and agree with the plan of care unless noted otherwise above.      KEIRY Young MD  eICU Attending  [phone]

## 2023-06-14 NOTE — NURSING
[facility] Intensive Care  ICU Shift Summary  Date: 6/14/2023      Prehospitalization: Home  Admit Date / LOS : 6/13/2023/ 0 days    Diagnosis: DKA (diabetic ketoacidosis)    Consults:        Active: N/A       Needed: N/A     Code Status: Full Code   Advanced Directive: <no information>    LDA:  Lines/Drains/Airways       Drain  Duration             Female External Urinary Catheter 06/13/23 1950 <1 day              Peripheral Intravenous Line  Duration                  Midline Catheter Insertion/Assessment  - Single Lumen 06/13/23 1715 Left brachial vein  <1 day         Peripheral IV - Single Lumen 06/13/23 1720 20 G;1 3/4 in Right Forearm <1 day                  Central Lines/Site/Justification:Patient Does Not Have Central Line  Urinary Cath/Order/Justification:Patient Does Not Have Urinary Catheter    Vasopressors/Infusions:    dextrose 5 % and 0.45 % NaCl 125 mL/hr (06/14/23 2996)          GOALS: Volume/ Hemodynamic: MAP >65                     RASS: 0  alert and calm    Pain Management: none       Pain Controlled: not applicable     Rhythm: NSR    Respiratory Device: Room Air                      Most Recent SBT/ SAT: N/A       MOVE Screen: PASS  ICU Liberation: not applicable    VTE Prophylaxis: Ambulation  Mobility: Ambulatory  Stress Ulcer Prophylaxis: Yes    Isolation: No active isolations    Dietary:   Current Diet Order   Procedures    Diet diabetic [facility]; 2000 Calorie; Standard Tray     Order Specific Question:   Indicate patient location for additional diet options:     Answer:   [facility]     Order Specific Question:   Total calories:     Answer:   2000 Calorie     Order Specific Question:   Tray type:     Answer:   Standard Tray      Tolerance: yes  Advancement: @ goal    I & O (24h):    Intake/Output Summary (Last 24 hours) at 6/14/2023 1316  Last data filed at 6/14/2023 0900  Gross per 24 hour   Intake 300 ml   Output 950 ml   Net -650 ml        Restraints: No    Significant  Dates:  Post Op Date: N/A  Rescue Date: N/A  Imaging/ Diagnostics: N/A    Noteworthy Labs:  CBG, GAP, CO2, K    COVID Test: (--)  CBC/Anemia Labs: Coags:    Recent Labs   Lab 06/13/23 1713 06/14/23  0536   WBC 12.93*  12.93* 10.92   HGB 12.4  12.4 10.6*   HCT 38.6  38.6 33.5*     369 315   MCV 79*  79* 80*   RDW 18.7*  18.7* 18.8*    No results for input(s): PT, INR, APTT in the last 168 hours.     Chemistries:   Recent Labs   Lab 06/13/23  1442 06/13/23 1713 06/13/23 2000 06/14/23  0333 06/14/23  0536 06/14/23  0759    143   < > 143  --  142   K 5.1 5.5*   < > 3.8  --  3.5   CL 98 98   < > 112*  --  110   CO2 22* 23   < > 24  --  25   BUN 33* 34*   < > 18  --  15   CREATININE 1.5* 1.5*   < > 0.8  --  0.7   CALCIUM 10.7* 10.7*   < > 8.5*  --  8.4*   PROT 8.8* 8.7*  --   --   --   --    BILITOT 0.7 0.6  --   --   --   --    ALKPHOS 143* 134  --   --   --   --    ALT 38 35  --   --   --   --    AST 35 37  --   --   --   --    MG 2.5 2.5  --   --  2.6  --    PHOS  --   --   --   --  1.9*  --     < > = values in this interval not displayed.        Cardiac Enzymes: Ejection Fractions:    No results for input(s): CPK, CPKMB, MB, TROPONINI in the last 72 hours. No results found for: EF     POCT Glucose: HbA1c:    Recent Labs   Lab 06/14/23  0813 06/14/23  0950 06/14/23  1118   POCTGLUCOSE 95 87 67*    Hemoglobin A1C   Date Value Ref Range Status   06/13/2023 10.9 (H) 4.0 - 5.6 % Final     Comment:     ADA Screening Guidelines:  5.7-6.4%  Consistent with prediabetes  >or=6.5%  Consistent with diabetes    High levels of fetal hemoglobin interfere with the HbA1C  assay. Heterozygous hemoglobin variants (HbS, HgC, etc)do  not significantly interfere with this assay.   However, presence of multiple variants may affect accuracy.             ICU LOS 17h  Level of Care: Discharge    Chart Check: 12 HR Done  Shift Summary/Plan for the shift: Discharge

## 2023-06-14 NOTE — PLAN OF CARE
ALIN notified Steffany that patient is ready for discharge from case management standpoint.        06/14/23 0853   Final Note   Assessment Type Final Discharge Note   Anticipated Discharge Disposition Home   What phone number can be called within the next 1-3 days to see how you are doing after discharge? [phone]   Post-Acute Status   Post-Acute Authorization Other   Other Status No Post-Acute Service Needs   Discharge Delays None known at this time

## 2023-06-14 NOTE — PLAN OF CARE
Problem: Adult Inpatient Plan of Care  Goal: Plan of Care Review  Outcome: Ongoing, Progressing  Goal: Patient-Specific Goal (Individualized)  Outcome: Ongoing, Progressing  Goal: Absence of Hospital-Acquired Illness or Injury  Outcome: Ongoing, Progressing  Goal: Optimal Comfort and Wellbeing  Outcome: Ongoing, Progressing  Goal: Readiness for Transition of Care  Outcome: Ongoing, Progressing     Problem: Diabetic Ketoacidosis  Goal: Fluid and Electrolyte Balance with Absence of Ketosis  Outcome: Ongoing, Progressing     Problem: Diabetes Comorbidity  Goal: Blood Glucose Level Within Targeted Range  Outcome: Ongoing, Progressing     Pt being discharged from ICU - aaox4, VSS, afebrile. Cleared by CM and MD for discharge. Awaiting prescription delivery. CBG WDL. Discharge summary discussed with next steps to follow. Answered all questions and concerns. Pt able to participate in meals. Pt resting comfortably in bed. Side rails up. Wheels locked. Call light within reach. Cell at bedside.

## 2023-06-14 NOTE — PLAN OF CARE
Problem: Adult Inpatient Plan of Care  Goal: Plan of Care Review  Outcome: Ongoing, Progressing  Goal: Patient-Specific Goal (Individualized)  Outcome: Ongoing, Progressing     Problem: Diabetic Ketoacidosis  Goal: Fluid and Electrolyte Balance with Absence of Ketosis  Outcome: Ongoing, Progressing     Problem: Diabetes Comorbidity  Goal: Blood Glucose Level Within Targeted Range  Outcome: Ongoing, Progressing     Problem: Fluid and Electrolyte Imbalance (Acute Kidney Injury/Impairment)  Goal: Fluid and Electrolyte Balance  Outcome: Ongoing, Progressing     Problem: Oral Intake Inadequate (Acute Kidney Injury/Impairment)  Goal: Optimal Nutrition Intake  Outcome: Ongoing, Progressing     Problem: Renal Function Impairment (Acute Kidney Injury/Impairment)  Goal: Effective Renal Function  Outcome: Ongoing, Progressing     Problem: Infection  Goal: Absence of Infection Signs and Symptoms  Outcome: Ongoing, Progressing

## 2023-06-14 NOTE — HOSPITAL COURSE
Patient admitted with DKA, put in the ICU and placed on insulin drip, in observation with expectation be out of DKA and able to go home.  Patient out of DKA, tolerating diet, can go home.

## 2023-06-14 NOTE — NURSING
[facility], [facility] - [place]  Nurses Note -- 4 Eyes      6/13/2023    Skin assessed on: Admit      [x] No Pressure Injuries Present    []Prevention Measures Documented    [] Yes LDA  for Pressure Injury Previously documented     [] Yes New Pressure Injury Discovered   [] LDA for New Pressure Injury Added      Attending RN:  Keesha Ward RN     Second RN:  Tawnya Ybarra RN

## 2023-06-14 NOTE — ASSESSMENT & PLAN NOTE
Diabetic ketoacidosis  Continue IV insulin drip  Continue IV fluids.  Monitor BMP/AG every 4 hours.      Patient's FSGs are uncontrolled due to hyperglycemia on current medication regimen.  Last A1c reviewed-   Lab Results   Component Value Date    HGBA1C 11.9 (H) 04/12/2023     Most recent fingerstick glucose reviewed-   Recent Labs   Lab 06/13/23  1817 06/13/23  1917 06/13/23 2038 06/13/23 2129   POCTGLUCOSE 303* 235* 179* 147*     Current correctional scale  none  Insulin drip anti-hyperglycemic dose as follows-   Antihyperglycemics (From admission, onward)    Start     Stop Route Frequency Ordered    06/13/23 1845  insulin regular in 0.9 % NaCl 100 unit/100 mL (1 unit/mL) infusion        Question Answer Comment   Insulin Rate Adjustment (DO NOT MODIFY ANSWER) [email or web address]\Images\Pharmacy\InsulinInfusions\INSULIN ADJUSTMENT DKA version [id number].pdf    Initial dose (DO NOT CHANGE): 0.1 units/kg/hr        -- IV Continuous 06/13/23 1835        Hold Oral hypoglycemics while patient is in the hospital.

## 2023-06-14 NOTE — H&P
[facility] Medicine  History & Physical    Patient Name: Hollie Wilson  MRN: [id number]  Patient Class: OP- Observation  Admission Date: 6/13/2023  Attending Physician: Rickie Painter MD   Primary Care Provider: [facility]         Patient information was obtained from patient and ER records.     Subjective:     Principal Problem:DKA (diabetic ketoacidosis)    Chief Complaint:   Chief Complaint   Patient presents with    Hyperglycemia     Pt reports to the ED with C/O hyperglycemia x 2 days. Pt's CBG was greater then 500 at triage. Pt also with N/V, SOB, ABD pain. Pt placed in ANDREA bed for eval.         HPI:  is a 33-year-old female, with known history of type 1 diabetes mellitus, bipolar disorder, who presents the emergency room complaining of nausea vomiting, loose stools, and generally not feeling well.  Reports that she has not taken any of her insulin and ran out about 2 months ago.  Reports markedly decreased oral intake since last 2 days.  Denies any chest pain, shortness of breath, fevers or chills. At home, appears that she is supposed to be taking detemir 25 units daily and preprandial 6 units 3 times a day with meals.    In the ER, she has been afebrile, last /77.  Noted to have a white count of 12.93 thousand, anion gap of 22, bicarbonate level of 23, normal lactate of 1.1, beta hydroxybutyrate acid of 3.4 concerning for DKA.  Her chemistry shows creatinine of 1.5 with baseline being normal along with mild hyperkalemia 5.5 and blood glucose of 510.  Her UA is negative for bacteria.  She is given 3 L of normal saline followed by continuous IV fluids, as well as 7 units of IV insulin bolus and started on IV insulin drip.  She is being admitted for further evaluation and treatment.      Past Medical History:   Diagnosis Date    Anemia     Bipolar 1 disorder     Diabetes mellitus     Scoliosis        Past Surgical History:  "  Procedure Laterality Date     SECTION         Review of patient's allergies indicates:   Allergen Reactions    Clove oil Itching    Zofran (as hydrochloride) [ondansetron hcl] Hives       No current facility-administered medications on file prior to encounter.     Current Outpatient Medications on File Prior to Encounter   Medication Sig    acetaminophen (TYLENOL) 500 MG tablet Take 2 tablets (1,000 mg total) by mouth every 8 (eight) hours as needed for Pain.    blood sugar diagnostic Strp test 3 (three) times daily before meals. For One Touch meter    blood-glucose meter Misc as directed    FLUoxetine 20 MG capsule Take 2 capsules (40 mg total) by mouth once daily.    insulin aspart U-100 (NOVOLOG) 100 unit/mL (3 mL) InPn pen Inject 6 Units into the skin 3 (three) times daily with meals.    insulin detemir U-100 (LEVEMIR FLEXTOUCH) 100 unit/mL (3 mL) SubQ InPn pen Inject 25 Units into the skin once daily.    lancets (ONETOUCH ULTRASOFT LANCETS) Misc test 3 (three) times daily with meals.    pen needle, diabetic (PEN NEEDLE) 32 gauge x 5/32" Ndle Use 4 (four) times daily.     Family History    None       Tobacco Use    Smoking status: Every Day    Smokeless tobacco: Never   Substance and Sexual Activity    Alcohol use: Yes    Drug use: Yes     Types: Marijuana    Sexual activity: Never     Review of Systems  12 point systems reviewed and negative except as mentioned in HPI section.    Objective:     Vital Signs (Most Recent):  Temp: 98.3 °F (36.8 °C) (23)  Pulse: 91 (23)  Resp: 12 (23)  BP: 116/77 (23)  SpO2: 100 % (23) Vital Signs (24h Range):  Temp:  [97.8 °F (36.6 °C)-98.3 °F (36.8 °C)] 98.3 °F (36.8 °C)  Pulse:  [52-94] 91  Resp:  [12-49] 12  SpO2:  [98 %-100 %] 100 %  BP: (116-168)/() 116/77     Weight: 61.2 kg (135 lb)  Body mass index is 21.14 kg/m².     Physical Exam   General: Arousable but somnolent  Eye: PERRL, normal " conjunctiva  HEENT: Normocephalic, atraumatic, dry  oral mucosa  Neck: Supple.  Respiratory: Lungs CTA, equal breath sounds, symmetric expansion, no chest wall tenderness  Cardiovascular: Normal rate, regular rhythm, no appreciable murmurs rubs or gallops, no peripheral edema, good pulses and equal in all extremities.  Gastrointestinal: Soft, Mild diffuse discomfort, non-distended.  Genitourinary: Deferred  Musculoskeletal and Neurologic: Motor function is normal with muscle strength 5/5 bilaterally to upper and lower extremities.         Significant Labs: All pertinent labs within the past 24 hours have been reviewed.  A1C:   Recent Labs   Lab 01/14/23  1802 02/17/23  1451 04/12/23  1809   HGBA1C 12.8* 10.5* 11.9*     BMP:   Recent Labs   Lab 06/13/23 1713 06/13/23 2000   * 215*    147*   K 5.5* 4.0   CL 98 109   CO2 23 22*   BUN 34* 28*   CREATININE 1.5* 1.0   CALCIUM 10.7* 9.3   MG 2.5  --      CMP:   Recent Labs   Lab 06/13/23  1442 06/13/23 1713 06/13/23 2000    143 147*   K 5.1 5.5* 4.0   CL 98 98 109   CO2 22* 23 22*   * 464* 215*   BUN 33* 34* 28*   CREATININE 1.5* 1.5* 1.0   CALCIUM 10.7* 10.7* 9.3   PROT 8.8* 8.7*  --    ALBUMIN 4.7 4.5  --    BILITOT 0.7 0.6  --    ALKPHOS 143* 134  --    AST 35 37  --    ALT 38 35  --    ANIONGAP 22* 22* 16       Significant Imaging: I have reviewed all pertinent imaging results/findings within the past 24 hours.  I have reviewed and interpreted all pertinent imaging results/findings within the past 24 hours.    Assessment/Plan:     * DKA (diabetic ketoacidoses)  Diabetic ketoacidosis  Continue IV insulin drip  Continue IV fluids.  Monitor BMP/AG every 4 hours.      Patient's FSGs are uncontrolled due to hyperglycemia on current medication regimen.  Last A1c reviewed-   Lab Results   Component Value Date    HGBA1C 11.9 (H) 04/12/2023     Most recent fingerstick glucose reviewed-   Recent Labs   Lab 06/13/23 1817 06/13/23  1917  06/13/23 2038 06/13/23 2129   POCTGLUCOSE 303* 235* 179* 147*     Current correctional scale  none  Insulin drip anti-hyperglycemic dose as follows-   Antihyperglycemics (From admission, onward)    Start     Stop Route Frequency Ordered    06/13/23 1845  insulin regular in 0.9 % NaCl 100 unit/100 mL (1 unit/mL) infusion        Question Answer Comment   Insulin Rate Adjustment (DO NOT MODIFY ANSWER) [email or web address]\Images\Pharmacy\InsulinInfusions\INSULIN ADJUSTMENT DKA version [id number].pdf    Initial dose (DO NOT CHANGE): 0.1 units/kg/hr        -- IV Continuous 06/13/23 1835        Hold Oral hypoglycemics while patient is in the hospital.    Acute kidney injury  Most likely prerenal, avoid nephrotoxic agents.  Monitor after IV fluids.      Hyperkalemia  On insulin drip.  Monitor BMP as ordered.        Leukocytosis  Appears to be likely reactive in concentration related.  She has been afebrile, UA is negative, will follow after appropriate IV fluids.        Bipolar disorder  Continue fluoxetine.        VTE Risk Mitigation (From admission, onward)         Ordered     IP VTE LOW RISK PATIENT  Once         06/13/23 1835     Place TRISTAN hose  Until discontinued         06/13/23 1835              Critical care time spent on the evaluation and treatment of severe organ dysfunction, review of pertinent labs and imaging studies, discussions with consulting providers and discussions with patient/family: 35 minutes.     On 06/13/2023, patient should be placed in hospital observation services under my care.        Sachin Meade MD  Department of Hospital Medicine  [facility] - Intensive Care

## 2023-06-14 NOTE — NURSING
[facility] [place]  Nurses Note -- 4 Eyes      6/14/2023       Skin assessed on: Q Shift      [x] No Pressure Injuries Present    [x]Prevention Measures Documented    [] Yes LDA  for Pressure Injury Previously documented     [] Yes New Pressure Injury Discovered   [] LDA for New Pressure Injury Added      Attending RN:  Steffany Edwards RN     Second RN:  ALEXANDRA Thao

## 2023-06-14 NOTE — PLAN OF CARE
Case Management Assessment   PCP: [facility]  Pharmacy: [facility]     Patient Arrived From: home  Existing Help at Home: none     Barriers to Discharge: none     Discharge Plan:               A. Home              B. Shelter        SW completed brief assessment and discussed planning with patient at her bedside. Patient stated that she does not have a place to stay at this time she will try to call her family members to see if she can go by one of them when discharge from the hospital.         06/13/23 3536   Discharge Planning   Assessment Type Discharge Planning Brief Assessment   Resource/Environmental Concerns none   Equipment Currently Used at Home glucometer   Current Living Arrangements apartment   Patient/Family Anticipates Transition to home   Patient/Family Anticipated Services at Transition none   DME Needed Upon Discharge  none   Discharge Plan A Home   Discharge Plan B Home

## 2023-06-14 NOTE — PLAN OF CARE
Case Management Assessment     PCP: [facility]  Pharmacy: [facility]    Patient Arrived From: home  Existing Help at Home: none    Barriers to Discharge: none    Discharge Plan:    A. Home   B. Shelter      SW completed brief assessment and discussed planning with patient at her bedside. Patient stated that she does not have a place to stay at this time she will try to call her family members to see if she can go by one of them when discharge from the hospital.

## 2023-06-14 NOTE — EICU
eICU Physician Virtual/Remote Brief Evaluation Note      Telephone call from bedside RN   Blood sugar 92, anion gap 7, bicarb 24  Requesting DKA transition   Chart reviewed, discussed with RN   Patient mostly on 0.0-2.05 units per kg per hour  Orders placed Levemir insulin 7 units now, moderate dose sliding scale, diabetic diet   Stop insulin drip 2 hours after Levemir given      KEIRY Young MD  eICU Attending  [phone]    This report has been created through the use of [employer] dictation software. Typographical and content errors may occur with this process. While efforts are made to detect and correct such errors, in some cases errors will persist. For this reason, wording in this document should be considered in the proper context and not strictly verbatim

## 2023-06-14 NOTE — SUBJECTIVE & OBJECTIVE
"Past Medical History:   Diagnosis Date    Anemia     Bipolar 1 disorder     Diabetes mellitus     Scoliosis        Past Surgical History:   Procedure Laterality Date     SECTION         Review of patient's allergies indicates:   Allergen Reactions    Clove oil Itching    Zofran (as hydrochloride) [ondansetron hcl] Hives       No current facility-administered medications on file prior to encounter.     Current Outpatient Medications on File Prior to Encounter   Medication Sig    acetaminophen (TYLENOL) 500 MG tablet Take 2 tablets (1,000 mg total) by mouth every 8 (eight) hours as needed for Pain.    blood sugar diagnostic Strp test 3 (three) times daily before meals. For One Touch meter    blood-glucose meter Misc as directed    FLUoxetine 20 MG capsule Take 2 capsules (40 mg total) by mouth once daily.    insulin aspart U-100 (NOVOLOG) 100 unit/mL (3 mL) InPn pen Inject 6 Units into the skin 3 (three) times daily with meals.    insulin detemir U-100 (LEVEMIR FLEXTOUCH) 100 unit/mL (3 mL) SubQ InPn pen Inject 25 Units into the skin once daily.    lancets (ONETOUCH ULTRASOFT LANCETS) Misc test 3 (three) times daily with meals.    pen needle, diabetic (PEN NEEDLE) 32 gauge x 5/32" Ndle Use 4 (four) times daily.     Family History    None       Tobacco Use    Smoking status: Every Day    Smokeless tobacco: Never   Substance and Sexual Activity    Alcohol use: Yes    Drug use: Yes     Types: Marijuana    Sexual activity: Never     Review of Systems  12 point systems reviewed and negative except as mentioned in HPI section.    Objective:     Vital Signs (Most Recent):  Temp: 98.3 °F (36.8 °C) (23)  Pulse: 91 (23)  Resp: 12 (23)  BP: 116/77 (23)  SpO2: 100 % (23) Vital Signs (24h Range):  Temp:  [97.8 °F (36.6 °C)-98.3 °F (36.8 °C)] 98.3 °F (36.8 °C)  Pulse:  [52-94] 91  Resp:  [12-49] 12  SpO2:  [98 %-100 %] 100 %  BP: (116-168)/() 116/77     Weight: " 61.2 kg (135 lb)  Body mass index is 21.14 kg/m².     Physical Exam   General: Arousable but somnolent  Eye: PERRL, normal conjunctiva  HEENT: Normocephalic, atraumatic, dry  oral mucosa  Neck: Supple.  Respiratory: Lungs CTA, equal breath sounds, symmetric expansion, no chest wall tenderness  Cardiovascular: Normal rate, regular rhythm, no appreciable murmurs rubs or gallops, no peripheral edema, good pulses and equal in all extremities.  Gastrointestinal: Soft, Mild diffuse discomfort, non-distended.  Genitourinary: Deferred  Musculoskeletal and Neurologic: Motor function is normal with muscle strength 5/5 bilaterally to upper and lower extremities.         Significant Labs: All pertinent labs within the past 24 hours have been reviewed.  A1C:   Recent Labs   Lab 01/14/23  1802 02/17/23  1451 04/12/23  1809   HGBA1C 12.8* 10.5* 11.9*     BMP:   Recent Labs   Lab 06/13/23 1713 06/13/23 2000   * 215*    147*   K 5.5* 4.0   CL 98 109   CO2 23 22*   BUN 34* 28*   CREATININE 1.5* 1.0   CALCIUM 10.7* 9.3   MG 2.5  --      CMP:   Recent Labs   Lab 06/13/23  1442 06/13/23  1713 06/13/23  2000    143 147*   K 5.1 5.5* 4.0   CL 98 98 109   CO2 22* 23 22*   * 464* 215*   BUN 33* 34* 28*   CREATININE 1.5* 1.5* 1.0   CALCIUM 10.7* 10.7* 9.3   PROT 8.8* 8.7*  --    ALBUMIN 4.7 4.5  --    BILITOT 0.7 0.6  --    ALKPHOS 143* 134  --    AST 35 37  --    ALT 38 35  --    ANIONGAP 22* 22* 16       Significant Imaging: I have reviewed all pertinent imaging results/findings within the past 24 hours.  I have reviewed and interpreted all pertinent imaging results/findings within the past 24 hours.

## 2023-06-14 NOTE — ASSESSMENT & PLAN NOTE
Appears to be likely reactive in concentration related.  She has been afebrile, UA is negative, will follow after appropriate IV fluids.

## 2023-06-18 ENCOUNTER — HOSPITAL ENCOUNTER (INPATIENT)
Facility: OTHER | Age: 33
LOS: 2 days | Discharge: LEFT BEFORE DISCHARGE | DRG: 639 | End: 2023-06-20
Attending: EMERGENCY MEDICINE | Admitting: HOSPITALIST
Payer: COMMERCIAL

## 2023-06-18 DIAGNOSIS — R94.31 QT PROLONGATION: ICD-10-CM

## 2023-06-18 DIAGNOSIS — E10.649: ICD-10-CM

## 2023-06-18 DIAGNOSIS — T68.XXXA HYPOTHERMIA, INITIAL ENCOUNTER: ICD-10-CM

## 2023-06-18 DIAGNOSIS — E16.2 HYPOGLYCEMIA: Primary | ICD-10-CM

## 2023-06-18 DIAGNOSIS — F79 INTELLECTUAL DISABILITY: ICD-10-CM

## 2023-06-18 DIAGNOSIS — E10.9 TYPE 1 DIABETES MELLITUS WITHOUT COMPLICATION: ICD-10-CM

## 2023-06-18 DIAGNOSIS — F99 PSYCHIATRIC DISORDER: ICD-10-CM

## 2023-06-18 PROBLEM — D50.9 IRON DEFICIENCY ANEMIA: Status: RESOLVED | Noted: 2018-05-14 | Resolved: 2023-06-18

## 2023-06-18 PROBLEM — N17.9 ACUTE KIDNEY INJURY: Status: RESOLVED | Noted: 2019-06-13 | Resolved: 2023-06-18

## 2023-06-18 PROBLEM — D72.829 LEUKOCYTOSIS: Status: RESOLVED | Noted: 2023-06-13 | Resolved: 2023-06-18

## 2023-06-18 PROBLEM — E87.5 HYPERKALEMIA: Status: RESOLVED | Noted: 2019-06-13 | Resolved: 2023-06-18

## 2023-06-18 PROBLEM — R47.01 DOES NOT SPEAK: Status: RESOLVED | Noted: 2020-11-12 | Resolved: 2023-06-18

## 2023-06-18 PROBLEM — Z71.3 ENCOUNTER FOR DIETARY CONSULTATION: Chronic | Status: RESOLVED | Noted: 2018-12-07 | Resolved: 2023-06-18

## 2023-06-18 PROBLEM — E11.10 DKA (DIABETIC KETOACIDOSIS): Status: RESOLVED | Noted: 2020-11-13 | Resolved: 2023-06-18

## 2023-06-18 PROBLEM — Z97.8 FOLEY CATHETER IN PLACE: Status: ACTIVE | Noted: 2023-06-18

## 2023-06-18 PROBLEM — K92.0 HEMATEMESIS: Status: RESOLVED | Noted: 2018-12-07 | Resolved: 2023-06-18

## 2023-06-18 PROBLEM — E10.11 DIABETIC KETOACIDOSIS WITH COMA ASSOCIATED WITH TYPE 1 DIABETES MELLITUS: Status: RESOLVED | Noted: 2018-12-06 | Resolved: 2023-06-18

## 2023-06-18 LAB
ABO + RH BLD: NORMAL
ALBUMIN SERPL BCP-MCNC: 3.4 G/DL (ref 3.5–5.2)
ALP SERPL-CCNC: 95 U/L (ref 55–135)
ALT SERPL W/O P-5'-P-CCNC: 33 U/L (ref 10–44)
AMPHET+METHAMPHET UR QL: NEGATIVE
ANION GAP SERPL CALC-SCNC: 12 MMOL/L (ref 8–16)
ANION GAP SERPL CALC-SCNC: 13 MMOL/L (ref 8–16)
AST SERPL-CCNC: 46 U/L (ref 10–40)
B-HCG UR QL: NEGATIVE
BACTERIA #/AREA URNS HPF: NORMAL /HPF
BARBITURATES UR QL SCN>200 NG/ML: NEGATIVE
BASOPHILS # BLD AUTO: 0.02 K/UL (ref 0–0.2)
BASOPHILS NFR BLD: 0.3 % (ref 0–1.9)
BENZODIAZ UR QL SCN>200 NG/ML: NEGATIVE
BILIRUB SERPL-MCNC: 0.5 MG/DL (ref 0.1–1)
BILIRUB UR QL STRIP: NEGATIVE
BLD GP AB SCN CELLS X3 SERPL QL: NORMAL
BUN SERPL-MCNC: 11 MG/DL (ref 6–20)
BUN SERPL-MCNC: 9 MG/DL (ref 6–20)
BZE UR QL SCN: NEGATIVE
CALCIUM SERPL-MCNC: 8.6 MG/DL (ref 8.7–10.5)
CALCIUM SERPL-MCNC: 8.8 MG/DL (ref 8.7–10.5)
CANNABINOIDS UR QL SCN: ABNORMAL
CHLORIDE SERPL-SCNC: 104 MMOL/L (ref 95–110)
CHLORIDE SERPL-SCNC: 106 MMOL/L (ref 95–110)
CK SERPL-CCNC: 3045 U/L (ref 20–180)
CLARITY UR: CLEAR
CO2 SERPL-SCNC: 20 MMOL/L (ref 23–29)
CO2 SERPL-SCNC: 23 MMOL/L (ref 23–29)
COLOR UR: YELLOW
CREAT SERPL-MCNC: 0.3 MG/DL (ref 0.5–1.4)
CREAT SERPL-MCNC: 0.6 MG/DL (ref 0.5–1.4)
CREAT SERPL-MCNC: 0.8 MG/DL (ref 0.5–1.4)
CREAT UR-MCNC: 42.2 MG/DL (ref 15–325)
CTP QC/QA: YES
DIFFERENTIAL METHOD: ABNORMAL
EOSINOPHIL # BLD AUTO: 0 K/UL (ref 0–0.5)
EOSINOPHIL NFR BLD: 0.1 % (ref 0–8)
ERYTHROCYTE [DISTWIDTH] IN BLOOD BY AUTOMATED COUNT: 18.3 % (ref 11.5–14.5)
EST. GFR  (NO RACE VARIABLE): >60 ML/MIN/1.73 M^2
EST. GFR  (NO RACE VARIABLE): >60 ML/MIN/1.73 M^2
ETHANOL UR-MCNC: <10 MG/DL
GLUCOSE SERPL-MCNC: 29 MG/DL (ref 70–110)
GLUCOSE SERPL-MCNC: 88 MG/DL (ref 70–110)
GLUCOSE UR QL STRIP: ABNORMAL
HCT VFR BLD AUTO: 41 % (ref 37–48.5)
HCT VFR BLD CALC: 17 %PCV (ref 36–54)
HGB BLD-MCNC: 12.8 G/DL (ref 12–16)
HGB BLD-MCNC: 6 G/DL
HGB UR QL STRIP: NEGATIVE
HYALINE CASTS #/AREA URNS LPF: 0 /LPF
IMM GRANULOCYTES # BLD AUTO: 0.02 K/UL (ref 0–0.04)
IMM GRANULOCYTES NFR BLD AUTO: 0.3 % (ref 0–0.5)
KETONES UR QL STRIP: NEGATIVE
LACTATE SERPL-SCNC: 1.6 MMOL/L (ref 0.5–2.2)
LDH SERPL L TO P-CCNC: 5.25 MMOL/L (ref 0.5–2.2)
LEUKOCYTE ESTERASE UR QL STRIP: NEGATIVE
LYMPHOCYTES # BLD AUTO: 0.6 K/UL (ref 1–4.8)
LYMPHOCYTES NFR BLD: 8.9 % (ref 18–48)
MAGNESIUM SERPL-MCNC: 1.8 MG/DL (ref 1.6–2.6)
MCH RBC QN AUTO: 25.4 PG (ref 27–31)
MCHC RBC AUTO-ENTMCNC: 31.2 G/DL (ref 32–36)
MCV RBC AUTO: 81 FL (ref 82–98)
METHADONE UR QL SCN>300 NG/ML: NEGATIVE
MICROSCOPIC COMMENT: NORMAL
MONOCYTES # BLD AUTO: 0.1 K/UL (ref 0.3–1)
MONOCYTES NFR BLD: 1.1 % (ref 4–15)
NEUTROPHILS # BLD AUTO: 6.2 K/UL (ref 1.8–7.7)
NEUTROPHILS NFR BLD: 89.3 % (ref 38–73)
NITRITE UR QL STRIP: NEGATIVE
NRBC BLD-RTO: 0 /100 WBC
OPIATES UR QL SCN: NEGATIVE
PCP UR QL SCN>25 NG/ML: NEGATIVE
PH UR STRIP: 6 [PH] (ref 5–8)
PLATELET # BLD AUTO: 226 K/UL (ref 150–450)
PLATELET BLD QL SMEAR: ABNORMAL
PMV BLD AUTO: 10 FL (ref 9.2–12.9)
POC IONIZED CALCIUM: 1.24 MMOL/L (ref 1.06–1.42)
POCT GLUCOSE: 131 MG/DL (ref 70–110)
POCT GLUCOSE: 136 MG/DL (ref 70–110)
POCT GLUCOSE: 141 MG/DL (ref 70–110)
POCT GLUCOSE: 252 MG/DL (ref 70–110)
POCT GLUCOSE: 26 MG/DL (ref 70–110)
POCT GLUCOSE: 31 MG/DL (ref 70–110)
POCT GLUCOSE: 54 MG/DL (ref 70–110)
POCT GLUCOSE: 69 MG/DL (ref 70–110)
POCT GLUCOSE: 74 MG/DL (ref 70–110)
POCT GLUCOSE: 81 MG/DL (ref 70–110)
POCT GLUCOSE: 84 MG/DL (ref 70–110)
POCT GLUCOSE: <20 MG/DL (ref 70–110)
POCT GLUCOSE: <20 MG/DL (ref 70–110)
POCT GLUCOSE: >500 MG/DL (ref 70–110)
POTASSIUM BLD-SCNC: 4.4 MMOL/L (ref 3.5–5.1)
POTASSIUM SERPL-SCNC: 3.2 MMOL/L (ref 3.5–5.1)
POTASSIUM SERPL-SCNC: 3.3 MMOL/L (ref 3.5–5.1)
PROT SERPL-MCNC: 6.4 G/DL (ref 6–8.4)
PROT UR QL STRIP: ABNORMAL
RBC # BLD AUTO: 5.04 M/UL (ref 4–5.4)
RBC #/AREA URNS HPF: 3 /HPF (ref 0–4)
SAMPLE: ABNORMAL
SODIUM BLD-SCNC: 135 MMOL/L (ref 136–145)
SODIUM SERPL-SCNC: 139 MMOL/L (ref 136–145)
SODIUM SERPL-SCNC: 139 MMOL/L (ref 136–145)
SP GR UR STRIP: 1.03 (ref 1–1.03)
SPECIMEN OUTDATE: NORMAL
TOXICOLOGY INFORMATION: ABNORMAL
TSH SERPL DL<=0.005 MIU/L-ACNC: 0.56 UIU/ML (ref 0.4–4)
URN SPEC COLLECT METH UR: ABNORMAL
UROBILINOGEN UR STRIP-ACNC: NEGATIVE EU/DL
VALPROATE SERPL-MCNC: <12.5 UG/ML (ref 50–100)
WBC # BLD AUTO: 6.99 K/UL (ref 3.9–12.7)
WBC #/AREA URNS HPF: 5 /HPF (ref 0–5)
YEAST URNS QL MICRO: NORMAL

## 2023-06-18 PROCEDURE — 96361 HYDRATE IV INFUSION ADD-ON: CPT

## 2023-06-18 PROCEDURE — 20000000 HC ICU ROOM

## 2023-06-18 PROCEDURE — 83735 ASSAY OF MAGNESIUM: CPT | Performed by: INTERNAL MEDICINE

## 2023-06-18 PROCEDURE — 63600175 PHARM REV CODE 636 W HCPCS: Performed by: PHYSICIAN ASSISTANT

## 2023-06-18 PROCEDURE — 80164 ASSAY DIPROPYLACETIC ACD TOT: CPT | Performed by: INTERNAL MEDICINE

## 2023-06-18 PROCEDURE — 93010 ELECTROCARDIOGRAM REPORT: CPT | Mod: ,,, | Performed by: INTERNAL MEDICINE

## 2023-06-18 PROCEDURE — 36410 VNPNXR 3YR/> PHY/QHP DX/THER: CPT

## 2023-06-18 PROCEDURE — 25000003 PHARM REV CODE 250: Performed by: EMERGENCY MEDICINE

## 2023-06-18 PROCEDURE — C1751 CATH, INF, PER/CENT/MIDLINE: HCPCS

## 2023-06-18 PROCEDURE — 99223 PR INITIAL HOSPITAL CARE,LEVL III: ICD-10-PCS | Mod: ,,, | Performed by: PHYSICIAN ASSISTANT

## 2023-06-18 PROCEDURE — 76937 US GUIDE VASCULAR ACCESS: CPT

## 2023-06-18 PROCEDURE — 93010 EKG 12-LEAD: ICD-10-PCS | Mod: ,,, | Performed by: INTERNAL MEDICINE

## 2023-06-18 PROCEDURE — 93005 ELECTROCARDIOGRAM TRACING: CPT

## 2023-06-18 PROCEDURE — 82550 ASSAY OF CK (CPK): CPT | Performed by: INTERNAL MEDICINE

## 2023-06-18 PROCEDURE — 36415 COLL VENOUS BLD VENIPUNCTURE: CPT | Performed by: EMERGENCY MEDICINE

## 2023-06-18 PROCEDURE — 82962 GLUCOSE BLOOD TEST: CPT

## 2023-06-18 PROCEDURE — 99291 CRITICAL CARE FIRST HOUR: CPT

## 2023-06-18 PROCEDURE — 86900 BLOOD TYPING SEROLOGIC ABO: CPT | Performed by: EMERGENCY MEDICINE

## 2023-06-18 PROCEDURE — 99223 1ST HOSP IP/OBS HIGH 75: CPT | Mod: ,,, | Performed by: PHYSICIAN ASSISTANT

## 2023-06-18 PROCEDURE — 80307 DRUG TEST PRSMV CHEM ANLYZR: CPT | Performed by: INTERNAL MEDICINE

## 2023-06-18 PROCEDURE — 96375 TX/PRO/DX INJ NEW DRUG ADDON: CPT

## 2023-06-18 PROCEDURE — 63600175 PHARM REV CODE 636 W HCPCS: Performed by: INTERNAL MEDICINE

## 2023-06-18 PROCEDURE — 84443 ASSAY THYROID STIM HORMONE: CPT | Performed by: EMERGENCY MEDICINE

## 2023-06-18 PROCEDURE — 96374 THER/PROPH/DIAG INJ IV PUSH: CPT

## 2023-06-18 PROCEDURE — 80048 BASIC METABOLIC PNL TOTAL CA: CPT | Mod: XB | Performed by: INTERNAL MEDICINE

## 2023-06-18 PROCEDURE — 85025 COMPLETE CBC W/AUTO DIFF WBC: CPT | Performed by: EMERGENCY MEDICINE

## 2023-06-18 PROCEDURE — 81000 URINALYSIS NONAUTO W/SCOPE: CPT | Performed by: EMERGENCY MEDICINE

## 2023-06-18 PROCEDURE — 81025 URINE PREGNANCY TEST: CPT | Performed by: EMERGENCY MEDICINE

## 2023-06-18 PROCEDURE — 96376 TX/PRO/DX INJ SAME DRUG ADON: CPT

## 2023-06-18 PROCEDURE — 83605 ASSAY OF LACTIC ACID: CPT | Performed by: INTERNAL MEDICINE

## 2023-06-18 PROCEDURE — 80053 COMPREHEN METABOLIC PANEL: CPT | Performed by: EMERGENCY MEDICINE

## 2023-06-18 PROCEDURE — 63600175 PHARM REV CODE 636 W HCPCS: Performed by: EMERGENCY MEDICINE

## 2023-06-18 PROCEDURE — 94761 N-INVAS EAR/PLS OXIMETRY MLT: CPT

## 2023-06-18 PROCEDURE — 36680 INSERT NEEDLE BONE CAVITY: CPT

## 2023-06-18 RX ORDER — ONDANSETRON 2 MG/ML
8 INJECTION INTRAMUSCULAR; INTRAVENOUS ONCE AS NEEDED
Status: DISCONTINUED | OUTPATIENT
Start: 2023-06-18 | End: 2023-06-18

## 2023-06-18 RX ORDER — PROCHLORPERAZINE EDISYLATE 5 MG/ML
10 INJECTION INTRAMUSCULAR; INTRAVENOUS ONCE
Status: COMPLETED | OUTPATIENT
Start: 2023-06-18 | End: 2023-06-18

## 2023-06-18 RX ORDER — HYDROXYZINE PAMOATE 25 MG/1
50 CAPSULE ORAL DAILY PRN
Status: DISCONTINUED | OUTPATIENT
Start: 2023-06-18 | End: 2023-06-20 | Stop reason: HOSPADM

## 2023-06-18 RX ORDER — HYDROMORPHONE HYDROCHLORIDE 1 MG/ML
0.5 INJECTION, SOLUTION INTRAMUSCULAR; INTRAVENOUS; SUBCUTANEOUS EVERY 30 MIN PRN
Status: DISCONTINUED | OUTPATIENT
Start: 2023-06-18 | End: 2023-06-20 | Stop reason: HOSPADM

## 2023-06-18 RX ORDER — DEXTROSE MONOHYDRATE 100 MG/ML
INJECTION, SOLUTION INTRAVENOUS
Status: COMPLETED | OUTPATIENT
Start: 2023-06-18 | End: 2023-06-18

## 2023-06-18 RX ORDER — DEXTROSE MONOHYDRATE 100 MG/ML
INJECTION, SOLUTION INTRAVENOUS CONTINUOUS
Status: DISCONTINUED | OUTPATIENT
Start: 2023-06-18 | End: 2023-06-19

## 2023-06-18 RX ORDER — HYDROXYZINE PAMOATE 50 MG/1
50 CAPSULE ORAL DAILY PRN
COMMUNITY
Start: 2023-05-22 | End: 2023-07-07 | Stop reason: CLARIF

## 2023-06-18 RX ORDER — HYDRALAZINE HYDROCHLORIDE 20 MG/ML
10 INJECTION INTRAMUSCULAR; INTRAVENOUS EVERY 6 HOURS PRN
Status: DISCONTINUED | OUTPATIENT
Start: 2023-06-18 | End: 2023-06-20 | Stop reason: HOSPADM

## 2023-06-18 RX ORDER — RISPERIDONE 1 MG/1
1 TABLET ORAL 2 TIMES DAILY
Status: ON HOLD | COMMUNITY
Start: 2023-05-10 | End: 2023-06-18

## 2023-06-18 RX ORDER — DEXTROSE MONOHYDRATE 100 MG/ML
INJECTION, SOLUTION INTRAVENOUS
Status: DISCONTINUED | OUTPATIENT
Start: 2023-06-18 | End: 2023-06-18

## 2023-06-18 RX ORDER — SODIUM CHLORIDE 0.9 % (FLUSH) 0.9 %
10 SYRINGE (ML) INJECTION
Status: DISCONTINUED | OUTPATIENT
Start: 2023-06-18 | End: 2023-06-20 | Stop reason: HOSPADM

## 2023-06-18 RX ORDER — ENOXAPARIN SODIUM 100 MG/ML
40 INJECTION SUBCUTANEOUS EVERY 24 HOURS
Status: DISCONTINUED | OUTPATIENT
Start: 2023-06-18 | End: 2023-06-20 | Stop reason: HOSPADM

## 2023-06-18 RX ORDER — TALC
6 POWDER (GRAM) TOPICAL NIGHTLY PRN
Status: DISCONTINUED | OUTPATIENT
Start: 2023-06-18 | End: 2023-06-20 | Stop reason: HOSPADM

## 2023-06-18 RX ORDER — SODIUM CHLORIDE 0.9 % (FLUSH) 0.9 %
10 SYRINGE (ML) INJECTION EVERY 6 HOURS
Status: DISCONTINUED | OUTPATIENT
Start: 2023-06-19 | End: 2023-06-20 | Stop reason: HOSPADM

## 2023-06-18 RX ORDER — ONDANSETRON 2 MG/ML
4 INJECTION INTRAMUSCULAR; INTRAVENOUS EVERY 8 HOURS PRN
Status: CANCELLED | OUTPATIENT
Start: 2023-06-18

## 2023-06-18 RX ORDER — NALOXONE HCL 0.4 MG/ML
0.02 VIAL (ML) INJECTION
Status: DISCONTINUED | OUTPATIENT
Start: 2023-06-18 | End: 2023-06-20 | Stop reason: HOSPADM

## 2023-06-18 RX ORDER — MAGNESIUM SULFATE HEPTAHYDRATE 40 MG/ML
2 INJECTION, SOLUTION INTRAVENOUS ONCE
Status: COMPLETED | OUTPATIENT
Start: 2023-06-18 | End: 2023-06-18

## 2023-06-18 RX ORDER — FERROUS SULFATE TAB 325 MG (65 MG ELEMENTAL FE) 325 (65 FE) MG
1 TAB ORAL DAILY
Status: ON HOLD | COMMUNITY
Start: 2023-05-22 | End: 2023-06-18

## 2023-06-18 RX ORDER — MUPIROCIN 20 MG/G
OINTMENT TOPICAL 2 TIMES DAILY
Status: DISCONTINUED | OUTPATIENT
Start: 2023-06-19 | End: 2023-06-20 | Stop reason: HOSPADM

## 2023-06-18 RX ORDER — LIDOCAINE HYDROCHLORIDE 10 MG/ML
1 INJECTION INFILTRATION; PERINEURAL ONCE AS NEEDED
Status: DISCONTINUED | OUTPATIENT
Start: 2023-06-18 | End: 2023-06-20 | Stop reason: HOSPADM

## 2023-06-18 RX ORDER — GLUCAGON 1 MG
1 KIT INJECTION
Status: DISCONTINUED | OUTPATIENT
Start: 2023-06-18 | End: 2023-06-20 | Stop reason: HOSPADM

## 2023-06-18 RX ORDER — METOCLOPRAMIDE HYDROCHLORIDE 5 MG/ML
10 INJECTION INTRAMUSCULAR; INTRAVENOUS
Status: COMPLETED | OUTPATIENT
Start: 2023-06-18 | End: 2023-06-18

## 2023-06-18 RX ORDER — DIVALPROEX SODIUM 500 MG/1
500 TABLET, FILM COATED, EXTENDED RELEASE ORAL NIGHTLY
Status: ON HOLD | COMMUNITY
Start: 2022-08-25 | End: 2023-06-18

## 2023-06-18 RX ORDER — DEXTROSE 40 %
15 GEL (GRAM) ORAL
Status: DISCONTINUED | OUTPATIENT
Start: 2023-06-18 | End: 2023-06-20 | Stop reason: HOSPADM

## 2023-06-18 RX ORDER — DEXTROSE MONOHYDRATE 100 MG/ML
INJECTION, SOLUTION INTRAVENOUS CONTINUOUS
Status: DISCONTINUED | OUTPATIENT
Start: 2023-06-18 | End: 2023-06-18

## 2023-06-18 RX ORDER — DEXTROSE 40 %
30 GEL (GRAM) ORAL
Status: DISCONTINUED | OUTPATIENT
Start: 2023-06-18 | End: 2023-06-20 | Stop reason: HOSPADM

## 2023-06-18 RX ORDER — FLUOXETINE 10 MG/1
40 CAPSULE ORAL DAILY
Status: DISCONTINUED | OUTPATIENT
Start: 2023-06-19 | End: 2023-06-20 | Stop reason: HOSPADM

## 2023-06-18 RX ORDER — POTASSIUM CHLORIDE 7.45 MG/ML
10 INJECTION INTRAVENOUS
Status: COMPLETED | OUTPATIENT
Start: 2023-06-18 | End: 2023-06-19

## 2023-06-18 RX ORDER — MAGNESIUM SULFATE HEPTAHYDRATE 40 MG/ML
2 INJECTION, SOLUTION INTRAVENOUS
Status: COMPLETED | OUTPATIENT
Start: 2023-06-18 | End: 2023-06-18

## 2023-06-18 RX ORDER — ACETAMINOPHEN 325 MG/1
650 TABLET ORAL EVERY 4 HOURS PRN
Status: DISCONTINUED | OUTPATIENT
Start: 2023-06-18 | End: 2023-06-20 | Stop reason: HOSPADM

## 2023-06-18 RX ORDER — BENZTROPINE MESYLATE 1 MG/1
1 TABLET ORAL NIGHTLY
Status: ON HOLD | COMMUNITY
Start: 2022-08-25 | End: 2023-06-18

## 2023-06-18 RX ORDER — IBUPROFEN 200 MG
24 TABLET ORAL
Status: DISCONTINUED | OUTPATIENT
Start: 2023-06-18 | End: 2023-06-20 | Stop reason: HOSPADM

## 2023-06-18 RX ORDER — PROCHLORPERAZINE EDISYLATE 5 MG/ML
5 INJECTION INTRAMUSCULAR; INTRAVENOUS EVERY 6 HOURS PRN
Status: DISCONTINUED | OUTPATIENT
Start: 2023-06-18 | End: 2023-06-19

## 2023-06-18 RX ORDER — POLYETHYLENE GLYCOL 3350 17 G/17G
17 POWDER, FOR SOLUTION ORAL DAILY PRN
Status: DISCONTINUED | OUTPATIENT
Start: 2023-06-18 | End: 2023-06-20 | Stop reason: HOSPADM

## 2023-06-18 RX ORDER — FLUPHENAZINE HYDROCHLORIDE 5 MG/1
2.5 TABLET ORAL DAILY
Status: ON HOLD | COMMUNITY
Start: 2022-08-25 | End: 2023-06-18

## 2023-06-18 RX ORDER — MAG HYDROX/ALUMINUM HYD/SIMETH 200-200-20
30 SUSPENSION, ORAL (FINAL DOSE FORM) ORAL 4 TIMES DAILY PRN
Status: DISCONTINUED | OUTPATIENT
Start: 2023-06-18 | End: 2023-06-20 | Stop reason: HOSPADM

## 2023-06-18 RX ADMIN — POTASSIUM CHLORIDE 10 MEQ: 7.46 INJECTION, SOLUTION INTRAVENOUS at 09:06

## 2023-06-18 RX ADMIN — MAGNESIUM SULFATE HEPTAHYDRATE 2 G: 40 INJECTION, SOLUTION INTRAVENOUS at 09:06

## 2023-06-18 RX ADMIN — HYDROMORPHONE HYDROCHLORIDE 0.5 MG: 0.5 INJECTION, SOLUTION INTRAMUSCULAR; INTRAVENOUS; SUBCUTANEOUS at 05:06

## 2023-06-18 RX ADMIN — POTASSIUM CHLORIDE 10 MEQ: 7.46 INJECTION, SOLUTION INTRAVENOUS at 11:06

## 2023-06-18 RX ADMIN — DEXTROSE MONOHYDRATE 25 G: 25 INJECTION, SOLUTION INTRAVENOUS at 11:06

## 2023-06-18 RX ADMIN — SODIUM CHLORIDE 1000 ML: 9 INJECTION, SOLUTION INTRAVENOUS at 03:06

## 2023-06-18 RX ADMIN — DEXTROSE MONOHYDRATE 25 G: 25 INJECTION, SOLUTION INTRAVENOUS at 03:06

## 2023-06-18 RX ADMIN — MAGNESIUM SULFATE 2 G: 2 INJECTION INTRAVENOUS at 04:06

## 2023-06-18 RX ADMIN — METOCLOPRAMIDE 10 MG: 5 INJECTION, SOLUTION INTRAMUSCULAR; INTRAVENOUS at 01:06

## 2023-06-18 RX ADMIN — SODIUM CHLORIDE 1000 ML: 9 INJECTION, SOLUTION INTRAVENOUS at 12:06

## 2023-06-18 RX ADMIN — PROCHLORPERAZINE EDISYLATE 10 MG: 5 INJECTION INTRAMUSCULAR; INTRAVENOUS at 03:06

## 2023-06-18 RX ADMIN — HYDROMORPHONE HYDROCHLORIDE 0.5 MG: 0.5 INJECTION, SOLUTION INTRAMUSCULAR; INTRAVENOUS; SUBCUTANEOUS at 01:06

## 2023-06-18 RX ADMIN — ENOXAPARIN SODIUM 40 MG: 40 INJECTION SUBCUTANEOUS at 09:06

## 2023-06-18 RX ADMIN — DEXTROSE: 10 SOLUTION INTRAVENOUS at 03:06

## 2023-06-18 NOTE — ED NOTES
Rectal temp 90.1-- bear hugger and multiple warm blankets applied to patient. Unable to obtain IV access-- IO started to LLE. Positive bone marrow/blood return noted. D50IVP given per provider at bedside. D10 bolus hung on pressure bag. Pt disoriented but arouses to painful stimuli. No respiratory distress noted. Pt connected to all monitoring.

## 2023-06-18 NOTE — ED NOTES
Pt appears altered, kicking all over bed. CBG checked-- 29. D50 IVP given via IV. Pt improved immediately. Pt started to talk in complete sentences and ask for her . Pt able to drink juice. 2 juices given to pt. Pt tolerated well

## 2023-06-18 NOTE — H&P
"[facility] Medicine  History & Physical    Patient Name: Hollie Wilson  MRN: [id number]  Patient Class: IP- Inpatient  Admission Date: 2023  Attending Physician: Jennifer Florence MD   Primary Care Provider: [place]         Patient information was obtained from patient, past medical records, and ER records.     Subjective:     Principal Problem:Type 1 diabetes mellitus with hypoglycemia    Chief Complaint:   Chief Complaint   Patient presents with    Hypoglycemia     Found down by boyfriend, CBG 20 upon EMS arrival, was given 125 g D10.  Pt currently awake, states, "I'm cold."  Not answering questions or following commands        HPI: Ms. Wilson is a 33-year-old female with a past medical history of type 1 diabetes mellitus, schizoaffective disorder, and intellectual disability who presented via EMS with altered mental status, hypothermia, and hypoglycemia. She was found unconscious on the floor at home by her  this morning. It is unknown how long she was in this state. She was last seen at baseline at an unknown time last night.  In the ED, initial glucose was less than 20.  She required multiple doses of D50 was eventually placed on a D10 infusion.  At the time of my evaluation, patient was awake and alert and oriented to person and place, though she was not sure why she was here.  She did not remember the last time she took her insulin, though she was able to tell me exactly what insulin at what doses she is supposed to be taking.  She denies suicidal ideation/intentional insulin overdose.    She is being admitted to ICU for treatment of profound hypoglycemia requiring D10 infusion.    Past Medical History:   Diagnosis Date    Anemia     Bipolar 1 disorder     Diabetes mellitus     Scoliosis        Past Surgical History:   Procedure Laterality Date     SECTION         Review of patient's allergies indicates:   Allergen Reactions    " "Clove oil Itching    Zofran (as hydrochloride) [ondansetron hcl] Hives       No current facility-administered medications on file prior to encounter.     Current Outpatient Medications on File Prior to Encounter   Medication Sig    FLUoxetine 20 MG capsule Take 2 capsules (40 mg total) by mouth once daily.    hydrOXYzine pamoate (VISTARIL) 50 MG Cap Take 50 mg by mouth daily as needed for Anxiety.    insulin aspart U-100 (NOVOLOG) 100 unit/mL (3 mL) InPn pen Inject 6 Units into the skin 3 (three) times daily with meals.    insulin detemir U-100, Levemir, 100 unit/mL (3 mL) SubQ InPn pen Inject 25 Units into the skin once daily.    [DISCONTINUED] fluphenazine (PROLIXIN) 5 MG tablet Take 2.5 mg by mouth once daily.    alcohol swabs PadM Use 4 (four) times daily.    blood sugar diagnostic Strp To check BG 4 times daily, to use with insurance preferred meter    blood-glucose meter Misc USE TO CHECK BLOOD GLUCOSE FOUR TIMES DAILY    lancets 30 gauge Misc To check BG 4 times daily, to use with insurance preferred meter    pen needle, diabetic 32 gauge x 5/32" Ndle Use 4 (four) times daily.    [DISCONTINUED] acetaminophen (TYLENOL) 500 MG tablet Take 2 tablets (1,000 mg total) by mouth every 8 (eight) hours as needed for Pain.    [DISCONTINUED] benztropine (COGENTIN) 1 MG tablet Take 1 mg by mouth every evening.    [DISCONTINUED] divalproex 500 MG Tb24 Take 500 mg by mouth every evening.    [DISCONTINUED] FEROSUL 325 mg (65 mg iron) Tab tablet Take 1 tablet by mouth once daily.    [DISCONTINUED] risperiDONE (RISPERDAL) 1 MG tablet Take 1 mg by mouth 2 (two) times daily.     Family History    None       Tobacco Use    Smoking status: Every Day    Smokeless tobacco: Never   Substance and Sexual Activity    Alcohol use: Yes    Drug use: Yes     Types: Marijuana    Sexual activity: Never     Review of Systems   Constitutional: Negative.  Negative for appetite change, chills and fever.   Eyes:  Negative for visual disturbance. "   Respiratory:  Negative for cough, shortness of breath and wheezing.    Cardiovascular:  Negative for chest pain, palpitations and leg swelling.   Gastrointestinal:  Negative for abdominal pain, constipation, diarrhea, nausea and vomiting.   Genitourinary:  Negative for difficulty urinating and dysuria.   Skin:  Negative for rash and wound.   Neurological:  Negative for dizziness, light-headedness and headaches.   Psychiatric/Behavioral:  Negative for behavioral problems and suicidal ideas. The patient is not nervous/anxious.    Objective:     Vital Signs (Most Recent):  Temp: 96.5 °F (35.8 °C) (06/18/23 1500)  Pulse: 98 (06/18/23 1937)  Resp: (!) 24 (06/18/23 1937)  BP: (!) 153/98 (06/18/23 1550)  SpO2: 100 % (06/18/23 1937) Vital Signs (24h Range):  Temp:  [90.8 °F (32.7 °C)-96.5 °F (35.8 °C)] 96.5 °F (35.8 °C)  Pulse:  [76-98] 98  Resp:  [12-28] 24  SpO2:  [97 %-100 %] 100 %  BP: (142-153)/(94-98) 153/98        There is no height or weight on file to calculate BMI.     Physical Exam  Vitals and nursing note reviewed.   Constitutional:       General: She is not in acute distress.     Appearance: Normal appearance.   HENT:      Head: Normocephalic and atraumatic.      Mouth/Throat:      Mouth: Mucous membranes are moist.      Pharynx: Oropharynx is clear.   Eyes:      Conjunctiva/sclera: Conjunctivae normal.      Pupils: Pupils are equal, round, and reactive to light.   Cardiovascular:      Rate and Rhythm: Normal rate and regular rhythm.      Pulses: Normal pulses.      Heart sounds: Normal heart sounds. No murmur heard.  Pulmonary:      Effort: Pulmonary effort is normal.      Breath sounds: Normal breath sounds. No wheezing or rales.   Abdominal:      General: Bowel sounds are normal. There is no distension.      Palpations: Abdomen is soft.      Tenderness: There is no abdominal tenderness.   Musculoskeletal:      Right lower leg: No edema.      Left lower leg: No edema.   Skin:     General: Skin is warm and  dry.      Findings: No erythema, lesion or rash.   Neurological:      General: No focal deficit present.      Mental Status: She is alert. Mental status is at baseline.      Comments: Oriented to person and place, not to situation.  Does appear slightly lethargic, but is easily aroused and answers questions appropriately, though her speech is somewhat difficult to understand at times.   Psychiatric:         Mood and Affect: Mood normal.            CRANIAL NERVES     CN III, IV, VI   Pupils are equal, round, and reactive to light.     Significant Labs: All pertinent labs within the past 24 hours have been reviewed.  CBC:   Recent Labs   Lab 06/18/23  1137 06/18/23  1452   WBC  --  6.99   HGB  --  12.8   HCT 17* 41.0   PLT  --  226     CMP:   Recent Labs   Lab 06/18/23  1452 06/18/23  1933    139   K 3.3* 3.2*    104   CO2 20* 23   GLU 29* 88   BUN 11 9   CREATININE 0.6 0.8   CALCIUM 8.8 8.6*   PROT 6.4  --    ALBUMIN 3.4*  --    BILITOT 0.5  --    ALKPHOS 95  --    AST 46*  --    ALT 33  --    ANIONGAP 13 12     Lactic Acid:   Recent Labs   Lab 06/18/23  2116   LACTATE 1.6     Urine Studies:   Recent Labs   Lab 06/18/23  1203   COLORU Yellow   APPEARANCEUA Clear   PHUR 6.0   SPECGRAV 1.030   PROTEINUA 2+*   GLUCUA 4+*   KETONESU Negative   BILIRUBINUA Negative   OCCULTUA Negative   NITRITE Negative   UROBILINOGEN Negative   LEUKOCYTESUR Negative   RBCUA 3   WBCUA 5   BACTERIA Occasional   HYALINECASTS 0       Significant Imaging: I have reviewed all pertinent imaging results/findings within the past 24 hours.    CT Head Without Contrast [[id number]] Resulted: 06/18/23 1718   Order Status: Completed Updated: 06/18/23 1721   Narrative:     EXAMINATION:   CT HEAD WITHOUT CONTRAST     CLINICAL HISTORY:   Mental status change, unknown cause;     TECHNIQUE:   Low dose axial CT images obtained throughout the head without intravenous contrast. Sagittal and coronal reconstructions were performed.     COMPARISON:    Head CT 12/06/2018     FINDINGS:   Intracranial compartment:     Ventricles and sulci are normal in size for age without evidence of hydrocephalus. No extra-axial blood or fluid collections.     The brain parenchyma appears normal. No parenchymal mass, hemorrhage, edema or major vascular distribution infarct.     Skull/extracranial contents (limited evaluation): No fracture. Mastoid air cells and paranasal sinuses are essentially clear.  Imaged portions of the orbits are within normal limits.    Impression:       No acute intracranial abnormality.  If persistent neurologic deficit, MRI brain can be obtained.       Electronically signed by: Tate Chandra MD   Date: 06/18/2023   Time: 17:18   X-Ray Chest 1 View [[id number]] Resulted: 06/18/23 1151   Order Status: Completed Updated: 06/18/23 1153   Narrative:     EXAMINATION:   XR CHEST 1 VIEW     CLINICAL HISTORY:   Hypoglycemia, unspecified     TECHNIQUE:   Single frontal view of the chest was performed.     COMPARISON:   11/12/2020     FINDINGS:   The lungs are clear with normal appearance of pulmonary vasculature. No pleural effusion. No evident pneumothorax.     The cardiac silhouette is normal in size. The hilar and mediastinal contours are unremarkable.     Bones are intact.    Impression:       No acute abnormality.       Electronically signed by: Belkis Thakur MD   Date: 06/18/2023      Assessment/Plan:     * Type 1 diabetes mellitus with hypoglycemia  - admitted at [facility] from 5/30-6/10 for DKA, which was complicated by symptomatic hypoglycemia, and was evaluated by Endocrinology who recommended discharge on Tresiba 8 units every morning with breakfast and 6 units every evening with dinner as well as SSI with meals.  Goal to maintain blood sugars 200-300.  She was admitted again for mild DKA at [facility] from 06/13- 6/14.  She was discharged on 25 units Levemir daily as well as 6 units aspart t.i.d. with meals.  - I suspect that she was discharged on  too much insulin on 6/14 and this is the cause of her hypoglycemia, though it is also possible she accidentally administered too high a dose  - she continues to require D10 infusion at 150 cc/hour to maintain glucose >70  - continue POCT glucose checks every hour    Hypothermia  - temp 90.8 ° F upon arrival, resolved relatively quickly with warm blankets.  - does not appear septic (no leukocytosis, blood pressure stable, chest x-ray and UA clear)  - monitor    Carroll catheter in place  - placed in ED upon arrival when patient was minimally responsive.  - no indication for continued Carroll catheter, can do voiding trial 1st thing in the morning.      Schizoaffective disorder, bipolar type  Anxiety/depression  Intellectual disability  - patient states she is currently not on any antipsychotic medications, and it does not appear that she has any active prescriptions in the system  - continue home Prozac and Vistaril p.r.n.        VTE Risk Mitigation (From admission, onward)           Ordered     enoxaparin injection 40 mg  Every 24 hours         06/18/23 1928     Place sequential compression device  Until discontinued         06/18/23 1928     IP VTE LOW RISK PATIENT  Once         06/18/23 1905     Place TRISTAN hose  Until discontinued         06/18/23 1905                               ORALIA FonsecaC  Department of Hospital Medicine

## 2023-06-18 NOTE — ED NOTES
Pt given juice and D10 increased to 125ml/hr per Dr. Barry at bedside     Pt requesting pain meds. Provider notified

## 2023-06-18 NOTE — ED NOTES
Pt unable to tolerate increase in D10 at 125 due to pain. Infusion put back at 100 ml/hr until pain meds can be given

## 2023-06-18 NOTE — ED NOTES
Fluid warmer connect to IV fluids and running      Pt aaox4, talking in complete sentences and answering questions appropriately

## 2023-06-18 NOTE — ED PROVIDER NOTES
"  Source of History:  Medical record, patient, EMS personnel     Chief complaint:  Per triage note: "Hypoglycemia (Found down by boyfriend, CBG 20 upon EMS arrival, was given 125 g D10.  Pt currently awake, states, "I'm cold."  Not answering questions or following commands)  "    HPI:    Patient presents via EMS with altered mental status, hypothermia, and hypoglycemia. She was found unconscious on the floor at home by her partner this morning. It is unknown how long she was in this state. She was last seen at baseline at an unknown time last night.  History is limited by patient condition and intellectual disability.    ROS:   See HPI for pertinent Review of Systems      Review of patient's allergies indicates:   Allergen Reactions    Clove oil Itching    Zofran (as hydrochloride) [ondansetron hcl] Hives       PMH:  As per HPI and below:  Past Medical History:   Diagnosis Date    Anemia     Bipolar 1 disorder     Diabetes mellitus     Scoliosis        Past Surgical History:   Procedure Laterality Date     SECTION         Social History     Tobacco Use    Smoking status: Every Day    Smokeless tobacco: Never   Substance Use Topics    Alcohol use: Yes    Drug use: Yes     Types: Marijuana       Physical Exam:      Nursing note and vitals reviewed.  BP (!) 153/98 (BP Location: Left arm, Patient Position: Lying)   Pulse 96   Temp 96.5 °F (35.8 °C) (Rectal)   Resp 18   SpO2 99%     Constitutional: No distress.  Eyes: EOMI. No discharge. Anicteric.  HENT:  Very dry mucous membranes.  Neck: Normal range of motion. Neck supple.  Cardiovascular: Normal rate. No murmur, no gallop and no friction rub heard.   Pulmonary/Chest: No respiratory distress. Effort normal. No wheezes, no rales, no rhonchi.   Abdominal: Bowel sounds normal. Soft. No distension and no mass. There is no tenderness. There is no rebound, no guarding, no tenderness at McBurney's point.  Neurological: GCS 15. Alert and oriented to person, place, " "and time. No gross cranial nerve, light touch or strength deficit. Coordination normal.   Skin: Skin is warm and dry.   EXT: 2+ radial pulses.   Psychiatric:  Resists exam.  Difficult to redirect.  Intermittently yells out "I'm cold!"        Medical Decision Making / Independent Interpretations / External Records Reviewed:      ED Course as of 06/18/23 1819   Sun Jun 18, 2023   1114 Patient is a 33-year-old female with diabetes, poorly-controlled diabetes, recently admitted to the ICU for diabetic ketoacidosis who presents with altered mental status, hypothermia, hypoglycemia.  She was found by her partner unconscious on the floor of her home just prior to arrival.  EMS placed an IO, but it became nonfunctional after patient received perhaps as much as half of an amp of glucose.   Multiple attempts at peripheral IV access were unsuccessful including attempt at L EJ.   ===========================================  PROCEDURE: intraosseous line placement 2/2 lack of peripheral IV access, critical illness. Placed by RN. Supervised by myself. Location at L tibial plateau. Appropriate return of blood and flushes appropriately. Tolerated well with no immediate complication  ===========================================  Initial differential included medication error (pt is on insulin, has no sulfonylureas on her medication list), rhabdomyolysis, occult sepsis, aspiration pneumonia.   I anticipate admission.     --  Prehospital/EMS EKG Interpretation: I independently reviewed and interpreted EKG which shows normal sinus rhythm at 65 beats per minute, no STEMI, no significant acute ST/T abnormalities, normal intervals.    There are new T-wave inversions laterally.  -- [RC]   1119 Repeat glucose 31 after pushing in and through left tibial plateau IO.  Will give another amp of D50 [RC]   1131 --  EKG Interpretation: I independently reviewed and interpreted EKG which shows normal sinus rhythm at 74 beats per minute, no STEMI, no " significant acute ST/T abnormalities, QTc 537.  Compared to her prior hospital EKG, prior QTC was 497.  Compared to pre-hospital EKG, lateral T-waves are now upright  --   [RC]   1258 I independently reviewed and interpreted CXR which shows no pneumothorax, no focal consolidation, no cardiomegaly, no acute process.   [RC]   1537 --  EKG Interpretation: I independently reviewed and interpreted EKG which shows normal sinus rhythm at 97 beats per minute, no STEMI, no significant acute ST/T abnormalities, QTc 541.  No acute change compared to prior tracing.  --   [RC]   1636 Multiple attempts were made to bring the patient to CT however she was either unstable, found to be very hypoglycemic, or uncooperative with radiology staff.  She has been brought there again for another attempt.  I anticipate admission for altered mental status pending unremarkable head CT. [RC]   1719 I independently reviewed and interpreted CT head which shows no acute intracranial bleeding, mass, skull fracture, or acute intracranial process.  Final read pending.   I independently reviewed and interpreted labs which are notable for microcytosis, minimal metabolic acidosis, mild hypokalemia, proteinurea.  There is no evidence of acute infection.    [RC]   1731  is now at bedside. He clarifies that pt takes 60 units of an orange insulin pen, and 25 of a green insulin pen, but is unsure of the names. She typically does not check her blood sugar before taking insulin.  He last saw her at 1600 hours before he went to work yesterday evening.  He feels that she is now near her usual mental status. [RC]   1818   =====================================  Critical Care:  45 minutes total critical care time was personally spent by me, exclusive of procedures and separately billable time.   Critical care was necessary to treat or prevent imminent or life-threatening deterioration of the following conditions:  Hypoglycemia, altered mental status    =====================================     [RC]      ED Course User Index  [RC] Vern Barry MD         Medications   HYDROmorphone injection 0.5 mg (0.5 mg Intravenous Given 6/18/23 1758)   dextrose 10 % infusion (has no administration in time range)   dextrose 50% injection 25 g (25 g Intravenous Given 6/18/23 1108)   dextrose 50% injection 25 g (25 g Intravenous Given 6/18/23 1120)   sodium chloride 0.9% bolus 1,000 mL 1,000 mL (0 mLs Intravenous Stopped 6/18/23 1759)   metoclopramide HCl injection 10 mg (10 mg Intravenous Given 6/18/23 1335)   prochlorperazine injection Soln 10 mg (10 mg Intravenous Given 6/18/23 1520)   sodium chloride 0.9% bolus 1,000 mL 1,000 mL (0 mLs Intravenous Stopped 6/18/23 1759)   dextrose 50% injection 25 g (25 g Intravenous Given 6/18/23 1526)   dextrose 10 % infusion ( Intravenous New Bag 6/18/23 1535)   magnesium sulfate 2g in water 50mL IVPB (premix) (2 g Intravenous New Bag 6/18/23 1617)            ---  I, Azra Crowley, scribed for, and in the presence of, Dr. Barry. I performed the scribed service and the documentation accurately describes the services I performed. I attest to the accuracy of the note.     Physician Attestation for Scribe:   I, Vern Barry MD, reviewed documentation as scribed in my presence, which is both accurate and complete.    Diagnostic Impression:    1. Hypoglycemia    2. Hypothermia, initial encounter    3. Type 1 diabetes mellitus without complication    4. Intellectual disability    5. Psychiatric disorder         ED Disposition Condition    Admit Stable          No future appointments.             Vern Barry MD  06/18/23 9632

## 2023-06-18 NOTE — ED NOTES
Unable to locate fluid warmer. Provider notified. House supervisor called.     Warm blankets applied to patient

## 2023-06-19 LAB
ANION GAP SERPL CALC-SCNC: 9 MMOL/L (ref 8–16)
BASOPHILS # BLD AUTO: 0.03 K/UL (ref 0–0.2)
BASOPHILS NFR BLD: 0.3 % (ref 0–1.9)
BUN SERPL-MCNC: 6 MG/DL (ref 6–20)
CALCIUM SERPL-MCNC: 8.8 MG/DL (ref 8.7–10.5)
CHLORIDE SERPL-SCNC: 106 MMOL/L (ref 95–110)
CK SERPL-CCNC: 3616 U/L (ref 20–180)
CO2 SERPL-SCNC: 24 MMOL/L (ref 23–29)
CREAT SERPL-MCNC: 0.7 MG/DL (ref 0.5–1.4)
DIFFERENTIAL METHOD: ABNORMAL
EOSINOPHIL # BLD AUTO: 0.1 K/UL (ref 0–0.5)
EOSINOPHIL NFR BLD: 0.5 % (ref 0–8)
ERYTHROCYTE [DISTWIDTH] IN BLOOD BY AUTOMATED COUNT: 18.6 % (ref 11.5–14.5)
EST. GFR  (NO RACE VARIABLE): >60 ML/MIN/1.73 M^2
GLUCOSE SERPL-MCNC: 111 MG/DL (ref 70–110)
HCT VFR BLD AUTO: 39.8 % (ref 37–48.5)
HGB BLD-MCNC: 12.7 G/DL (ref 12–16)
IMM GRANULOCYTES # BLD AUTO: 0.04 K/UL (ref 0–0.04)
IMM GRANULOCYTES NFR BLD AUTO: 0.4 % (ref 0–0.5)
LYMPHOCYTES # BLD AUTO: 0.8 K/UL (ref 1–4.8)
LYMPHOCYTES NFR BLD: 7 % (ref 18–48)
MCH RBC QN AUTO: 25.8 PG (ref 27–31)
MCHC RBC AUTO-ENTMCNC: 31.9 G/DL (ref 32–36)
MCV RBC AUTO: 81 FL (ref 82–98)
MONOCYTES # BLD AUTO: 0.3 K/UL (ref 0.3–1)
MONOCYTES NFR BLD: 2.5 % (ref 4–15)
NEUTROPHILS # BLD AUTO: 10.2 K/UL (ref 1.8–7.7)
NEUTROPHILS NFR BLD: 89.3 % (ref 38–73)
NRBC BLD-RTO: 0 /100 WBC
PLATELET # BLD AUTO: 194 K/UL (ref 150–450)
PMV BLD AUTO: ABNORMAL FL (ref 9.2–12.9)
POCT GLUCOSE: 102 MG/DL (ref 70–110)
POCT GLUCOSE: 105 MG/DL (ref 70–110)
POCT GLUCOSE: 107 MG/DL (ref 70–110)
POCT GLUCOSE: 111 MG/DL (ref 70–110)
POCT GLUCOSE: 123 MG/DL (ref 70–110)
POCT GLUCOSE: 124 MG/DL (ref 70–110)
POCT GLUCOSE: 127 MG/DL (ref 70–110)
POCT GLUCOSE: 130 MG/DL (ref 70–110)
POCT GLUCOSE: 137 MG/DL (ref 70–110)
POCT GLUCOSE: 139 MG/DL (ref 70–110)
POCT GLUCOSE: 139 MG/DL (ref 70–110)
POCT GLUCOSE: 142 MG/DL (ref 70–110)
POCT GLUCOSE: 146 MG/DL (ref 70–110)
POCT GLUCOSE: 150 MG/DL (ref 70–110)
POCT GLUCOSE: 163 MG/DL (ref 70–110)
POCT GLUCOSE: 71 MG/DL (ref 70–110)
POCT GLUCOSE: 77 MG/DL (ref 70–110)
POCT GLUCOSE: 78 MG/DL (ref 70–110)
POCT GLUCOSE: 91 MG/DL (ref 70–110)
POTASSIUM SERPL-SCNC: 3.7 MMOL/L (ref 3.5–5.1)
RBC # BLD AUTO: 4.93 M/UL (ref 4–5.4)
SODIUM SERPL-SCNC: 139 MMOL/L (ref 136–145)
WBC # BLD AUTO: 11.42 K/UL (ref 3.9–12.7)

## 2023-06-19 PROCEDURE — 25000003 PHARM REV CODE 250: Performed by: HOSPITALIST

## 2023-06-19 PROCEDURE — 85025 COMPLETE CBC W/AUTO DIFF WBC: CPT | Performed by: NURSE PRACTITIONER

## 2023-06-19 PROCEDURE — 63600175 PHARM REV CODE 636 W HCPCS: Performed by: PHYSICIAN ASSISTANT

## 2023-06-19 PROCEDURE — 20000000 HC ICU ROOM

## 2023-06-19 PROCEDURE — 80048 BASIC METABOLIC PNL TOTAL CA: CPT | Performed by: HOSPITALIST

## 2023-06-19 PROCEDURE — 87040 BLOOD CULTURE FOR BACTERIA: CPT | Performed by: HOSPITALIST

## 2023-06-19 PROCEDURE — 63600175 PHARM REV CODE 636 W HCPCS: Performed by: NURSE PRACTITIONER

## 2023-06-19 PROCEDURE — 63600175 PHARM REV CODE 636 W HCPCS: Performed by: INTERNAL MEDICINE

## 2023-06-19 PROCEDURE — 25000003 PHARM REV CODE 250: Performed by: NURSE PRACTITIONER

## 2023-06-19 PROCEDURE — 63600175 PHARM REV CODE 636 W HCPCS: Performed by: HOSPITALIST

## 2023-06-19 PROCEDURE — 99233 SBSQ HOSP IP/OBS HIGH 50: CPT | Mod: ,,, | Performed by: HOSPITALIST

## 2023-06-19 PROCEDURE — 25000003 PHARM REV CODE 250: Performed by: PHYSICIAN ASSISTANT

## 2023-06-19 PROCEDURE — 99233 PR SUBSEQUENT HOSPITAL CARE,LEVL III: ICD-10-PCS | Mod: ,,, | Performed by: HOSPITALIST

## 2023-06-19 PROCEDURE — A4216 STERILE WATER/SALINE, 10 ML: HCPCS | Performed by: NURSE PRACTITIONER

## 2023-06-19 PROCEDURE — 82550 ASSAY OF CK (CPK): CPT | Performed by: INTERNAL MEDICINE

## 2023-06-19 PROCEDURE — 94761 N-INVAS EAR/PLS OXIMETRY MLT: CPT

## 2023-06-19 RX ORDER — FERROUS SULFATE 325(65) MG
1 TABLET ORAL EVERY OTHER DAY
COMMUNITY
Start: 2023-05-24 | End: 2023-07-07 | Stop reason: CLARIF

## 2023-06-19 RX ORDER — INSULIN ASPART 100 [IU]/ML
0-5 INJECTION, SOLUTION INTRAVENOUS; SUBCUTANEOUS
Status: DISCONTINUED | OUTPATIENT
Start: 2023-06-19 | End: 2023-06-20 | Stop reason: HOSPADM

## 2023-06-19 RX ORDER — INSULIN DEGLUDEC 100 U/ML
INJECTION, SOLUTION SUBCUTANEOUS 2 TIMES DAILY
COMMUNITY
End: 2024-02-19

## 2023-06-19 RX ADMIN — ACETAMINOPHEN 650 MG: 325 TABLET, FILM COATED ORAL at 12:06

## 2023-06-19 RX ADMIN — PROCHLORPERAZINE EDISYLATE 5 MG: 5 INJECTION INTRAMUSCULAR; INTRAVENOUS at 08:06

## 2023-06-19 RX ADMIN — MUPIROCIN: 20 OINTMENT TOPICAL at 10:06

## 2023-06-19 RX ADMIN — PROMETHAZINE HYDROCHLORIDE 25 MG: 25 INJECTION INTRAMUSCULAR; INTRAVENOUS at 08:06

## 2023-06-19 RX ADMIN — Medication 6 MG: at 08:06

## 2023-06-19 RX ADMIN — ACETAMINOPHEN 650 MG: 325 TABLET, FILM COATED ORAL at 08:06

## 2023-06-19 RX ADMIN — HYDRALAZINE HYDROCHLORIDE 10 MG: 20 INJECTION INTRAMUSCULAR; INTRAVENOUS at 12:06

## 2023-06-19 RX ADMIN — FLUOXETINE 40 MG: 20 CAPSULE ORAL at 08:06

## 2023-06-19 RX ADMIN — PROCHLORPERAZINE EDISYLATE 5 MG: 5 INJECTION INTRAMUSCULAR; INTRAVENOUS at 12:06

## 2023-06-19 RX ADMIN — DEXTROSE MONOHYDRATE: 100 INJECTION, SOLUTION INTRAVENOUS at 12:06

## 2023-06-19 RX ADMIN — Medication 10 ML: at 06:06

## 2023-06-19 RX ADMIN — POTASSIUM CHLORIDE 10 MEQ: 7.46 INJECTION, SOLUTION INTRAVENOUS at 01:06

## 2023-06-19 RX ADMIN — Medication 10 ML: at 11:06

## 2023-06-19 RX ADMIN — INSULIN DETEMIR 2 UNITS: 100 INJECTION, SOLUTION SUBCUTANEOUS at 08:06

## 2023-06-19 RX ADMIN — PROMETHAZINE HYDROCHLORIDE 25 MG: 25 INJECTION INTRAMUSCULAR; INTRAVENOUS at 02:06

## 2023-06-19 RX ADMIN — MUPIROCIN: 20 OINTMENT TOPICAL at 08:06

## 2023-06-19 RX ADMIN — ENOXAPARIN SODIUM 40 MG: 40 INJECTION SUBCUTANEOUS at 08:06

## 2023-06-19 RX ADMIN — POTASSIUM CHLORIDE 10 MEQ: 7.46 INJECTION, SOLUTION INTRAVENOUS at 12:06

## 2023-06-19 RX ADMIN — Medication 10 ML: at 12:06

## 2023-06-19 NOTE — SUBJECTIVE & OBJECTIVE
"Past Medical History:   Diagnosis Date    Anemia     Bipolar 1 disorder     Diabetes mellitus     Scoliosis        Past Surgical History:   Procedure Laterality Date     SECTION         Review of patient's allergies indicates:   Allergen Reactions    Clove oil Itching    Zofran (as hydrochloride) [ondansetron hcl] Hives       No current facility-administered medications on file prior to encounter.     Current Outpatient Medications on File Prior to Encounter   Medication Sig    FLUoxetine 20 MG capsule Take 2 capsules (40 mg total) by mouth once daily.    hydrOXYzine pamoate (VISTARIL) 50 MG Cap Take 50 mg by mouth daily as needed for Anxiety.    insulin aspart U-100 (NOVOLOG) 100 unit/mL (3 mL) InPn pen Inject 6 Units into the skin 3 (three) times daily with meals.    insulin detemir U-100, Levemir, 100 unit/mL (3 mL) SubQ InPn pen Inject 25 Units into the skin once daily.    [DISCONTINUED] fluphenazine (PROLIXIN) 5 MG tablet Take 2.5 mg by mouth once daily.    alcohol swabs PadM Use 4 (four) times daily.    blood sugar diagnostic Strp To check BG 4 times daily, to use with insurance preferred meter    blood-glucose meter Misc USE TO CHECK BLOOD GLUCOSE FOUR TIMES DAILY    lancets 30 gauge Misc To check BG 4 times daily, to use with insurance preferred meter    pen needle, diabetic 32 gauge x 5/32" Ndle Use 4 (four) times daily.    [DISCONTINUED] acetaminophen (TYLENOL) 500 MG tablet Take 2 tablets (1,000 mg total) by mouth every 8 (eight) hours as needed for Pain.    [DISCONTINUED] benztropine (COGENTIN) 1 MG tablet Take 1 mg by mouth every evening.    [DISCONTINUED] divalproex 500 MG Tb24 Take 500 mg by mouth every evening.    [DISCONTINUED] FEROSUL 325 mg (65 mg iron) Tab tablet Take 1 tablet by mouth once daily.    [DISCONTINUED] risperiDONE (RISPERDAL) 1 MG tablet Take 1 mg by mouth 2 (two) times daily.     Family History    None       Tobacco Use    Smoking status: Every Day    Smokeless tobacco: " Never   Substance and Sexual Activity    Alcohol use: Yes    Drug use: Yes     Types: Marijuana    Sexual activity: Never     Review of Systems   Constitutional: Negative.  Negative for appetite change, chills and fever.   Eyes:  Negative for visual disturbance.   Respiratory:  Negative for cough, shortness of breath and wheezing.    Cardiovascular:  Negative for chest pain, palpitations and leg swelling.   Gastrointestinal:  Negative for abdominal pain, constipation, diarrhea, nausea and vomiting.   Genitourinary:  Negative for difficulty urinating and dysuria.   Skin:  Negative for rash and wound.   Neurological:  Negative for dizziness, light-headedness and headaches.   Psychiatric/Behavioral:  Negative for behavioral problems and suicidal ideas. The patient is not nervous/anxious.    Objective:     Vital Signs (Most Recent):  Temp: 96.5 °F (35.8 °C) (06/18/23 1500)  Pulse: 98 (06/18/23 1937)  Resp: (!) 24 (06/18/23 1937)  BP: (!) 153/98 (06/18/23 1550)  SpO2: 100 % (06/18/23 1937) Vital Signs (24h Range):  Temp:  [90.8 °F (32.7 °C)-96.5 °F (35.8 °C)] 96.5 °F (35.8 °C)  Pulse:  [76-98] 98  Resp:  [12-28] 24  SpO2:  [97 %-100 %] 100 %  BP: (142-153)/(94-98) 153/98        There is no height or weight on file to calculate BMI.     Physical Exam  Vitals and nursing note reviewed.   Constitutional:       General: She is not in acute distress.     Appearance: Normal appearance.   HENT:      Head: Normocephalic and atraumatic.      Mouth/Throat:      Mouth: Mucous membranes are moist.      Pharynx: Oropharynx is clear.   Eyes:      Conjunctiva/sclera: Conjunctivae normal.      Pupils: Pupils are equal, round, and reactive to light.   Cardiovascular:      Rate and Rhythm: Normal rate and regular rhythm.      Pulses: Normal pulses.      Heart sounds: Normal heart sounds. No murmur heard.  Pulmonary:      Effort: Pulmonary effort is normal.      Breath sounds: Normal breath sounds. No wheezing or rales.   Abdominal:       General: Bowel sounds are normal. There is no distension.      Palpations: Abdomen is soft.      Tenderness: There is no abdominal tenderness.   Musculoskeletal:      Right lower leg: No edema.      Left lower leg: No edema.   Skin:     General: Skin is warm and dry.      Findings: No erythema, lesion or rash.   Neurological:      General: No focal deficit present.      Mental Status: She is alert. Mental status is at baseline.      Comments: Oriented to person and place, not to situation.  Does appear slightly lethargic, but is easily aroused and answers questions appropriately, though her speech is somewhat difficult to understand at times.   Psychiatric:         Mood and Affect: Mood normal.            CRANIAL NERVES     CN III, IV, VI   Pupils are equal, round, and reactive to light.     Significant Labs: All pertinent labs within the past 24 hours have been reviewed.  CBC:   Recent Labs   Lab 06/18/23  1137 06/18/23  1452   WBC  --  6.99   HGB  --  12.8   HCT 17* 41.0   PLT  --  226     CMP:   Recent Labs   Lab 06/18/23  1452 06/18/23  1933    139   K 3.3* 3.2*    104   CO2 20* 23   GLU 29* 88   BUN 11 9   CREATININE 0.6 0.8   CALCIUM 8.8 8.6*   PROT 6.4  --    ALBUMIN 3.4*  --    BILITOT 0.5  --    ALKPHOS 95  --    AST 46*  --    ALT 33  --    ANIONGAP 13 12     Lactic Acid:   Recent Labs   Lab 06/18/23  2116   LACTATE 1.6     Urine Studies:   Recent Labs   Lab 06/18/23  1203   COLORU Yellow   APPEARANCEUA Clear   PHUR 6.0   SPECGRAV 1.030   PROTEINUA 2+*   GLUCUA 4+*   KETONESU Negative   BILIRUBINUA Negative   OCCULTUA Negative   NITRITE Negative   UROBILINOGEN Negative   LEUKOCYTESUR Negative   RBCUA 3   WBCUA 5   BACTERIA Occasional   HYALINECASTS 0       Significant Imaging: I have reviewed all pertinent imaging results/findings within the past 24 hours.    CT Head Without Contrast [[id number]] Resulted: 06/18/23 1718   Order Status: Completed Updated: 06/18/23 1721   Narrative:      EXAMINATION:   CT HEAD WITHOUT CONTRAST     CLINICAL HISTORY:   Mental status change, unknown cause;     TECHNIQUE:   Low dose axial CT images obtained throughout the head without intravenous contrast. Sagittal and coronal reconstructions were performed.     COMPARISON:   Head CT 12/06/2018     FINDINGS:   Intracranial compartment:     Ventricles and sulci are normal in size for age without evidence of hydrocephalus. No extra-axial blood or fluid collections.     The brain parenchyma appears normal. No parenchymal mass, hemorrhage, edema or major vascular distribution infarct.     Skull/extracranial contents (limited evaluation): No fracture. Mastoid air cells and paranasal sinuses are essentially clear.  Imaged portions of the orbits are within normal limits.    Impression:       No acute intracranial abnormality.  If persistent neurologic deficit, MRI brain can be obtained.       Electronically signed by: Tate Chandra MD   Date: 06/18/2023   Time: 17:18   X-Ray Chest 1 View [[id number]] Resulted: 06/18/23 1151   Order Status: Completed Updated: 06/18/23 1153   Narrative:     EXAMINATION:   XR CHEST 1 VIEW     CLINICAL HISTORY:   Hypoglycemia, unspecified     TECHNIQUE:   Single frontal view of the chest was performed.     COMPARISON:   11/12/2020     FINDINGS:   The lungs are clear with normal appearance of pulmonary vasculature. No pleural effusion. No evident pneumothorax.     The cardiac silhouette is normal in size. The hilar and mediastinal contours are unremarkable.     Bones are intact.    Impression:       No acute abnormality.       Electronically signed by: Belkis Thakur MD   Date: 06/18/2023

## 2023-06-19 NOTE — ASSESSMENT & PLAN NOTE
- admitted at [facility] from 5/30-6/10 for DKA, which was complicated by symptomatic hypoglycemia, and was evaluated by Endocrinology who recommended discharge on Tresiba 8 units every morning with breakfast and 6 units every evening with dinner as well as SSI with meals.  Goal to maintain blood sugars 200-300.  She was admitted again for mild DKA at [facility] from 06/13- 6/14.  She was discharged on 25 units Levemir daily as well as 6 units aspart t.i.d. with meals.  - Chart review shows she has active prescriptions for Humulin 70/30, Tresiba, Lantus, Levemir, aspart and lispro insulins  - Overnight she required D10 infusion at 150 cc/hour to maintain glucose >70  - Discontinue D10 and continue POCT glucose checks every hour until stable for 6 accuchecks.  - Needs thorough pharmacy review  - Asked her  to bring all the insulin in the home.

## 2023-06-19 NOTE — ASSESSMENT & PLAN NOTE
- temp 90.8 ° F upon arrival, resolved relatively quickly with warm blankets.  - does not appear septic (no leukocytosis, blood pressure stable, chest x-ray and UA clear)  - monitor

## 2023-06-19 NOTE — ASSESSMENT & PLAN NOTE
- admitted at [facility] from 5/30-6/10 for DKA, which was complicated by symptomatic hypoglycemia, and was evaluated by Endocrinology who recommended discharge on Tresiba 8 units every morning with breakfast and 6 units every evening with dinner as well as SSI with meals.  Goal to maintain blood sugars 200-300.  She was admitted again for mild DKA at [facility] from 06/13- 6/14.  She was discharged on 25 units Levemir daily as well as 6 units aspart t.i.d. with meals.  - I suspect that she was discharged on too much insulin on 6/14 and this is the cause of her hypoglycemia, though it is also possible she accidentally administered too high a dose  - she continues to require D10 infusion at 150 cc/hour to maintain glucose >70  - continue POCT glucose checks every hour

## 2023-06-19 NOTE — ASSESSMENT & PLAN NOTE
- temp 90.8 ° F upon arrival, resolved relatively quickly with warm blankets.  - does not appear septic (no leukocytosis, blood pressure stable, chest x-ray and UA clear)  - Resolved, monitor

## 2023-06-19 NOTE — SUBJECTIVE & OBJECTIVE
Interval History: Patient nauseated and retching, tachycardic as a result and unable to give me any history other than to say her legs hurt (IO lines removed from both tibias last night).  D10 infusing.  Spoke to her  by phone.    Review of Systems   Unable to perform ROS: Acuity of condition   Objective:     Vital Signs (Most Recent):  Temp: 98.9 °F (37.2 °C) (06/19/23 0301)  Pulse: (!) 113 (06/19/23 0530)  Resp: (!) 25 (06/19/23 0530)  BP: (!) 146/103 (06/19/23 0530)  SpO2: 99 % (06/19/23 0530) Vital Signs (24h Range):  Temp:  [90.8 °F (32.7 °C)-99.7 °F (37.6 °C)] 98.9 °F (37.2 °C)  Pulse:  [] 113  Resp:  [10-32] 25  SpO2:  [97 %-100 %] 99 %  BP: (134-176)/() 146/103        There is no height or weight on file to calculate BMI.    Intake/Output Summary (Last 24 hours) at 6/19/2023 0836  Last data filed at 6/19/2023 0600  Gross per 24 hour   Intake 1567.74 ml   Output 3070 ml   Net -1502.26 ml         Physical Exam  Constitutional:       General: She is in acute distress.      Appearance: She is ill-appearing.   HENT:      Mouth/Throat:      Comments: Poor dentition.  Positive bruxism.  Cardiovascular:      Rate and Rhythm: Regular rhythm. Tachycardia present.      Heart sounds: Murmur heard.     No gallop.      Comments: Complained of chest pain, has point tenderness on palpation  Pulmonary:      Comments: Unable to cooperate with exam  Abdominal:      Comments: Nauseated, not palpated.   Skin:     General: Skin is warm and dry.   Neurological:      Mental Status: She is alert.      Comments: Unable to answer questions   Psychiatric:      Comments: Agitated           Significant Labs: All pertinent labs within the past 24 hours have been reviewed.    Significant Imaging: I have reviewed all pertinent imaging results/findings within the past 24 hours.

## 2023-06-19 NOTE — HPI
"From H&P by Jasmin WERNER:  "Ms. Wilson is a 33-year-old female with a past medical history of type 1 diabetes mellitus, schizoaffective disorder, and intellectual disability who presented via EMS with altered mental status, hypothermia, and hypoglycemia. She was found unconscious on the floor at home by her  this morning. It is unknown how long she was in this state. She was last seen at baseline at an unknown time last night.  In the ED, initial glucose was less than 20.  She required multiple doses of D50 was eventually placed on a D10 infusion.  At the time of my evaluation, patient was awake and alert and oriented to person and place, though she was not sure why she was here.  She did not remember the last time she took her insulin, though she was able to tell me exactly what insulin at what doses she is supposed to be taking.  She denies suicidal ideation/intentional insulin overdose.    She is being admitted to ICU for treatment of profound hypoglycemia requiring D10 infusion."  "

## 2023-06-19 NOTE — NURSING
Nurses Note -- 4 Eyes      6/19/2023   2:39 AM      Skin assessed during: Admit      [x] No Altered Skin Integrity Present    []Prevention Measures Documented      [] Yes- Altered Skin Integrity Present or Discovered   [] LDA Added if Not in Epic (Describe Wound)   [] New Altered Skin Integrity was Present on Admit and Documented in LDA   [] Wound Image Taken    Wound Care Consulted? No    Attending Nurse:  Alvina Quiros RN     Second RN/Staff Member:  Hanna Conklin RN

## 2023-06-19 NOTE — HOSPITAL COURSE
Patient was admitted to ICU on a D10 drip and glucose was monitored hourly.  On the drip her sugar was stable throughout the night.    Review of the chart showed she was prescribed Levemir and aspart insulin on discharge from [facility] this week.  She also has active prescriptions for Humulin 70/30 from last month, Lantus and lispro from May, Tresiba from 6/10.  Asked patient's  to bring all insulin in the home.    Patient elected to leave AMA prior to completing therapy. She was continued on her tresiba, her levemir was confiscated and she is to continue on her premeal aspart. Concern for readmit as she is oriented and understanding enough to not be PEC'd but doesn't quite have a full grasp on titration of her medication and sugar checks. I am also concerned about her living environment and how safe it is.

## 2023-06-19 NOTE — ASSESSMENT & PLAN NOTE
- placed in ED upon arrival when patient was minimally responsive.  - no indication for continued Carroll catheter, will discontinue

## 2023-06-19 NOTE — ASSESSMENT & PLAN NOTE
Anxiety/depression  Intellectual disability  - patient states she is currently not on any antipsychotic medications, and it does not appear that she has any active prescriptions in the system  - continue home Prozac and Vistaril p.r.n.

## 2023-06-19 NOTE — ASSESSMENT & PLAN NOTE
- placed in ED upon arrival when patient was minimally responsive.  - no indication for continued Carroll catheter, can do voiding trial 1st thing in the morning.

## 2023-06-19 NOTE — PLAN OF CARE
Patient was sleep. I conducted the assessment with her  Jori Ramos.  Patient  verified address is correct on face sheet.  Patient pharmacy of choice is [facility] on [address] in [place].  Patient only uses a glucometer for DME use.  Patient family will help transport at discharge.   06/19/23 1439   Discharge Assessment   Assessment Type Discharge Planning Assessment   Confirmed/corrected address, phone number and insurance Yes   Confirmed Demographics Correct on Facesheet   Source of Information family   People in Home spouse   Do you expect to return to your current living situation? Yes   Do you have help at home or someone to help you manage your care at home? No   Prior to hospitilization cognitive status: Alert/Oriented   Current cognitive status: Alert/Oriented   Equipment Currently Used at Home glucometer   Readmission within 30 days? Yes  ([facility])   Patient currently being followed by outpatient case management? No   Do you currently have service(s) that help you manage your care at home? No   Do you take prescription medications? Yes   Do you have prescription coverage? Yes   Coverage Medicaid   Do you have any problems affording any of your prescribed medications? No   Is the patient taking medications as prescribed? yes   Who is going to help you get home at discharge? Family   How do you get to doctors appointments? family or friend will provide;health plan transportation   Are you on dialysis? No   Do you take coumadin? No   Discharge Plan A Home

## 2023-06-19 NOTE — EICU
eICU Brief Note    Issue: 33 y old woman found unresponsive , cold and with severe hypoglycemia with CBG 20 . Recent DKA admission and on insulin at home. Now awake post glucose repletion and incoherent speech- RN unclear if psychiatric issue.     BP (!) 153/98 (BP Location: Left arm, Patient Position: Lying)   Pulse 96   Temp 96.5 °F (35.8 °C) (Rectal)   Resp 18   SpO2 99%   Breastfeeding No   Moving in bed    CT brain and CXR ok  EKG prolonged  msec  K 3.3 unclear if repleted    Plan :  Monitor glucose-   from insulin   Check valproic acid, Mg, BMP  Continue D10  SCD and enoxaparin for DVT prophylaxis  Psych meds on hold as QTc prolonged- recheck   HTN - monitor  IV access being tried - had IO   D/w RN      2029 RN Notes pt more oriented - no psych meds now except vistaril ? For anxiety  CPK 3045- recheck in AM - ? From being down   Check LA- if high to consider fluids  Hydralazine prn HTN  U tox to check  D/w NP

## 2023-06-19 NOTE — PROGRESS NOTES
"[facility] Intensive Care [facility] Medicine  Progress Note    Patient Name: Hollie Wilson  MRN: [id number]  Patient Class: IP- Inpatient   Admission Date: 6/18/2023  Length of Stay: 1 days  Attending Physician: Jennifer Florence MD  Primary Care Provider: [facility]        Subjective:     Principal Problem:Type 1 diabetes mellitus with hypoglycemia        HPI:  From H&P by Jasmin Anderson PA:  "Ms. Wilson is a 33-year-old female with a past medical history of type 1 diabetes mellitus, schizoaffective disorder, and intellectual disability who presented via EMS with altered mental status, hypothermia, and hypoglycemia. She was found unconscious on the floor at home by her  this morning. It is unknown how long she was in this state. She was last seen at baseline at an unknown time last night.  In the ED, initial glucose was less than 20.  She required multiple doses of D50 was eventually placed on a D10 infusion.  At the time of my evaluation, patient was awake and alert and oriented to person and place, though she was not sure why she was here.  She did not remember the last time she took her insulin, though she was able to tell me exactly what insulin at what doses she is supposed to be taking.  She denies suicidal ideation/intentional insulin overdose.    She is being admitted to ICU for treatment of profound hypoglycemia requiring D10 infusion."      Overview/Hospital Course:  Patient was admitted to ICU on a D10 drip and glucose was monitored hourly.  On the drip her sugar was stable throughout the night.    Review of the chart showed she was prescribed Levemir and aspart insulin on discharge from [facility] this week.  She also has active prescriptions for Humulin 70/30 from last month, Lantus and lispro from May, Tresiba from 6/10.  Asked patient's  to bring all insulin in the home.      Interval History: Patient nauseated and retching, tachycardic as a " result and unable to give me any history other than to say her legs hurt (IO lines removed from both tibias last night).  D10 infusing.  Spoke to her  by phone.    Review of Systems   Unable to perform ROS: Acuity of condition   Objective:     Vital Signs (Most Recent):  Temp: 98.9 °F (37.2 °C) (06/19/23 0301)  Pulse: (!) 113 (06/19/23 0530)  Resp: (!) 25 (06/19/23 0530)  BP: (!) 146/103 (06/19/23 0530)  SpO2: 99 % (06/19/23 0530) Vital Signs (24h Range):  Temp:  [90.8 °F (32.7 °C)-99.7 °F (37.6 °C)] 98.9 °F (37.2 °C)  Pulse:  [] 113  Resp:  [10-32] 25  SpO2:  [97 %-100 %] 99 %  BP: (134-176)/() 146/103        There is no height or weight on file to calculate BMI.    Intake/Output Summary (Last 24 hours) at 6/19/2023 0836  Last data filed at 6/19/2023 0600  Gross per 24 hour   Intake 1567.74 ml   Output 3070 ml   Net -1502.26 ml         Physical Exam  Constitutional:       General: She is in acute distress.      Appearance: She is ill-appearing.   HENT:      Mouth/Throat:      Comments: Poor dentition.  Positive bruxism.  Cardiovascular:      Rate and Rhythm: Regular rhythm. Tachycardia present.      Heart sounds: Murmur heard.     No gallop.      Comments: Complained of chest pain, has point tenderness on palpation  Pulmonary:      Comments: Unable to cooperate with exam  Abdominal:      Comments: Nauseated, not palpated.   Skin:     General: Skin is warm and dry.   Neurological:      Mental Status: She is alert.      Comments: Unable to answer questions   Psychiatric:      Comments: Agitated           Significant Labs: All pertinent labs within the past 24 hours have been reviewed.    Significant Imaging: I have reviewed all pertinent imaging results/findings within the past 24 hours.      Assessment/Plan:      * Type 1 diabetes mellitus with hypoglycemia  - admitted at [facility] from 5/30-6/10 for DKA, which was complicated by symptomatic hypoglycemia, and was evaluated by Endocrinology who  recommended discharge on Tresiba 8 units every morning with breakfast and 6 units every evening with dinner as well as SSI with meals.  Goal to maintain blood sugars 200-300.  She was admitted again for mild DKA at [facility] from 06/13- 6/14.  She was discharged on 25 units Levemir daily as well as 6 units aspart t.i.d. with meals.  - Chart review shows she has active prescriptions for Humulin 70/30, Tresiba, Lantus, Levemir, aspart and lispro insulins  - Overnight she required D10 infusion at 150 cc/hour to maintain glucose >70  - Discontinue D10 and continue POCT glucose checks every hour until stable for 6 accuchecks.  - Needs thorough pharmacy review  - Asked her  to bring all the insulin in the home - brought Levemir, Tresiba and aspart insulin.  - Chart review - she was prescribed Tresiba 8 units in the AM and 6 in the evening when she was discharged from [facility] on 5/30/2023.  Has follow up appointments scheduled with PCP and endocrinologist.  She is not on any prandial insulin in the meantime, has a history of hypoglycemic episodes.    Hypothermia  - temp 90.8 ° F upon arrival, resolved relatively quickly with warm blankets.  - does not appear septic (no leukocytosis, blood pressure stable, chest x-ray and UA clear)  - Resolved, monitor    Carroll catheter in place  - placed in ED upon arrival when patient was minimally responsive.  - no indication for continued Carroll catheter, will discontinue    Schizoaffective disorder, bipolar type  Anxiety/depression  Intellectual disability  - patient states she is currently not on any antipsychotic medications, and it does not appear that she has any active prescriptions in the system  - continue home Prozac and Vistaril p.r.n.  - Unclear why valproic acid level was drawn but it was undetectable      Intellectual disability  Per chart review        VTE Risk Mitigation (From admission, onward)           Ordered     enoxaparin injection 40 mg  Every 24 hours          06/18/23 1928     Place sequential compression device  Until discontinued         06/18/23 1928     IP VTE LOW RISK PATIENT  Once         06/18/23 1905     Place TRISTAN hose  Until discontinued         06/18/23 1905                    Discharge Planning   THELMA:      Code Status: Full Code   Is the patient medically ready for discharge?:     Reason for patient still in hospital (select all that apply): Patient unstable, Patient trending condition, Laboratory test and Treatment                     Jennifer Kessler MD  Department of Hospital Medicine   [facility] - Intensive Care ([place])

## 2023-06-19 NOTE — ASSESSMENT & PLAN NOTE
Anxiety/depression  Intellectual disability  - patient states she is currently not on any antipsychotic medications, and it does not appear that she has any active prescriptions in the system  - continue home Prozac and Vistaril p.r.n.  - Unclear why valproic acid level was drawn but it was undetectable

## 2023-06-20 VITALS
SYSTOLIC BLOOD PRESSURE: 115 MMHG | WEIGHT: 139.56 LBS | BODY MASS INDEX: 21.86 KG/M2 | RESPIRATION RATE: 18 BRPM | DIASTOLIC BLOOD PRESSURE: 80 MMHG | TEMPERATURE: 98 F | HEART RATE: 98 BPM | OXYGEN SATURATION: 100 %

## 2023-06-20 PROBLEM — Z97.8 FOLEY CATHETER IN PLACE: Status: RESOLVED | Noted: 2023-06-18 | Resolved: 2023-06-20

## 2023-06-20 PROBLEM — T68.XXXA HYPOTHERMIA: Status: RESOLVED | Noted: 2023-06-18 | Resolved: 2023-06-20

## 2023-06-20 LAB
ANION GAP SERPL CALC-SCNC: 5 MMOL/L (ref 8–16)
BUN SERPL-MCNC: 9 MG/DL (ref 6–20)
CALCIUM SERPL-MCNC: 8.7 MG/DL (ref 8.7–10.5)
CHLORIDE SERPL-SCNC: 106 MMOL/L (ref 95–110)
CO2 SERPL-SCNC: 26 MMOL/L (ref 23–29)
CREAT SERPL-MCNC: 0.9 MG/DL (ref 0.5–1.4)
EST. GFR  (NO RACE VARIABLE): >60 ML/MIN/1.73 M^2
GLUCOSE SERPL-MCNC: 318 MG/DL (ref 70–110)
POCT GLUCOSE: 195 MG/DL (ref 70–110)
POCT GLUCOSE: 337 MG/DL (ref 70–110)
POTASSIUM SERPL-SCNC: 4.2 MMOL/L (ref 3.5–5.1)
SODIUM SERPL-SCNC: 137 MMOL/L (ref 136–145)

## 2023-06-20 PROCEDURE — 36415 COLL VENOUS BLD VENIPUNCTURE: CPT | Performed by: HOSPITALIST

## 2023-06-20 PROCEDURE — 99239 HOSP IP/OBS DSCHRG MGMT >30: CPT | Mod: ,,, | Performed by: INTERNAL MEDICINE

## 2023-06-20 PROCEDURE — 99239 PR HOSPITAL DISCHARGE DAY,>30 MIN: ICD-10-PCS | Mod: ,,, | Performed by: INTERNAL MEDICINE

## 2023-06-20 PROCEDURE — 63600175 PHARM REV CODE 636 W HCPCS: Performed by: HOSPITALIST

## 2023-06-20 PROCEDURE — 80048 BASIC METABOLIC PNL TOTAL CA: CPT | Performed by: HOSPITALIST

## 2023-06-20 PROCEDURE — A4216 STERILE WATER/SALINE, 10 ML: HCPCS | Performed by: NURSE PRACTITIONER

## 2023-06-20 PROCEDURE — 25000003 PHARM REV CODE 250: Performed by: NURSE PRACTITIONER

## 2023-06-20 PROCEDURE — 94761 N-INVAS EAR/PLS OXIMETRY MLT: CPT

## 2023-06-20 PROCEDURE — 25000003 PHARM REV CODE 250: Performed by: INTERNAL MEDICINE

## 2023-06-20 PROCEDURE — 25000003 PHARM REV CODE 250: Performed by: PHYSICIAN ASSISTANT

## 2023-06-20 RX ORDER — INSULIN ASPART 100 [IU]/ML
3 INJECTION, SOLUTION INTRAVENOUS; SUBCUTANEOUS
Qty: 15 ML | Refills: 3 | Status: SHIPPED | OUTPATIENT
Start: 2023-06-20 | End: 2024-02-19

## 2023-06-20 RX ADMIN — INSULIN DETEMIR 4 UNITS: 100 INJECTION, SOLUTION SUBCUTANEOUS at 08:06

## 2023-06-20 RX ADMIN — INSULIN ASPART 4 UNITS: 100 INJECTION, SOLUTION INTRAVENOUS; SUBCUTANEOUS at 07:06

## 2023-06-20 RX ADMIN — FLUOXETINE 40 MG: 20 CAPSULE ORAL at 08:06

## 2023-06-20 RX ADMIN — Medication 10 ML: at 05:06

## 2023-06-20 RX ADMIN — MUPIROCIN: 20 OINTMENT TOPICAL at 09:06

## 2023-06-20 RX ADMIN — Medication 10 ML: at 12:06

## 2023-06-20 NOTE — NURSING
confirmed that 1 set of blood cultures were collected today and sent off to main campus around 1600.

## 2023-06-20 NOTE — PLAN OF CARE
Problem: Infection  Goal: Absence of Infection Signs and Symptoms  6/20/2023 0327 by Celestine Salgado RN  Outcome: Ongoing, Progressing  6/20/2023 0327 by Celestine Salgado RN  Outcome: Ongoing, Progressing     Problem: Adult Inpatient Plan of Care  Goal: Plan of Care Review  6/20/2023 0327 by Celestine Salgado RN  Outcome: Ongoing, Progressing  6/20/2023 0327 by Celestine Salgado RN  Outcome: Ongoing, Progressing  Goal: Patient-Specific Goal (Individualized)  6/20/2023 0327 by Celestine Salgado RN  Outcome: Ongoing, Progressing  6/20/2023 0327 by Celestine Salgado RN  Outcome: Ongoing, Progressing  Goal: Absence of Hospital-Acquired Illness or Injury  6/20/2023 0327 by Celestine Salgado RN  Outcome: Ongoing, Progressing  6/20/2023 0327 by Celestine Salgado RN  Outcome: Ongoing, Progressing  Goal: Optimal Comfort and Wellbeing  6/20/2023 0327 by Celestine Salgado RN  Outcome: Ongoing, Progressing  6/20/2023 0327 by Celestine Salgado RN  Outcome: Ongoing, Progressing  Goal: Readiness for Transition of Care  6/20/2023 0327 by Celestine Salgado RN  Outcome: Ongoing, Progressing  6/20/2023 0327 by Celestine Salgado RN  Outcome: Ongoing, Progressing     Problem: Diabetes Comorbidity  Goal: Blood Glucose Level Within Targeted Range  6/20/2023 0327 by Celestine Salgado RN  Outcome: Ongoing, Progressing  6/20/2023 0327 by Celestine Salgado RN  Outcome: Ongoing, Progressing

## 2023-06-20 NOTE — NURSING
"Answered patient call light at 1145. Pt requesting medication for pain and nausea. Left to retrieve prn medications, pt pressed call light again within 30 seconds. Returned to room and patient yelled "I want to leave, I am tired of being in here. I don't want to be here no more." Provided patient education regarding risks of leaving against medical advice. Patient stated she did not need to be in the hospital and she wasn't "waiting on the doctor no more."     Wade LAWS was informed of pt's desire to leave AMA. MD advised he sent patient's insulin prescriptions to pharmacy for patient.     Brought patient scrubs, knit underwear and socks. Patient signed AMA paperwork with ALEXANDRA Naqvi as second witness. One midline IV and one peripheral IV were removed.     Patient made phone call to relative to pick her up. Patient was overhead stating "the doctor discharged me so I need picked up." Reiterated to patient that she was not medically cleared to leave the hospital by the physician and that she was choosing to leave against medical advice.     Patient heard yelling at person on the phone stating "they won't give me my medications and are kicking me out." Again, advised patient she could stay for treatment and would be provided the medications the MD prescribed for her.     Patient left ICU with  at approximately 1235.  "

## 2023-06-20 NOTE — DISCHARGE SUMMARY
"[facility] Intensive Care [facility] Medicine  Discharge Summary      Patient Name: Hollie Wilson  MRN: [id number]  JAYSON: [id number]  Patient Class: IP- Inpatient  Admission Date: 6/18/2023  Hospital Length of Stay: 2 days  Discharge Date and Time:  06/20/2023 1:09 PM  Attending Physician: Lukasz Olvera MD   Discharging Provider: Lukasz Olvera MD  Primary Care Provider: Martin Rios MD    Primary Care Team: Networked reference to record PCT     HPI:   From H&P by Jasmin Anderson PA:  "Ms. Wilson is a 33-year-old female with a past medical history of type 1 diabetes mellitus, schizoaffective disorder, and intellectual disability who presented via EMS with altered mental status, hypothermia, and hypoglycemia. She was found unconscious on the floor at home by her  this morning. It is unknown how long she was in this state. She was last seen at baseline at an unknown time last night.  In the ED, initial glucose was less than 20.  She required multiple doses of D50 was eventually placed on a D10 infusion.  At the time of my evaluation, patient was awake and alert and oriented to person and place, though she was not sure why she was here.  She did not remember the last time she took her insulin, though she was able to tell me exactly what insulin at what doses she is supposed to be taking.  She denies suicidal ideation/intentional insulin overdose.    She is being admitted to ICU for treatment of profound hypoglycemia requiring D10 infusion."      * No surgery found *      Hospital Course:   Patient was admitted to ICU on a D10 drip and glucose was monitored hourly.  On the drip her sugar was stable throughout the night.    Review of the chart showed she was prescribed Levemir and aspart insulin on discharge from [facility] this week.  She also has active prescriptions for Humulin 70/30 from last month, Lantus and lispro from May, Tresiba from 6/10.  Asked patient's  to bring all insulin in the " home.    Patient elected to leave AMA prior to completing therapy. She was continued on her tresiba, her levemir was confiscated and she is to continue on her premeal aspart. Concern for readmit as she is oriented and understanding enough to not be PEC'd but doesn't quite have a full grasp on titration of her medication and sugar checks. I am also concerned about her living environment and how safe it is.       Goals of Care Treatment Preferences:  Code Status: Full Code      Consults:   Consults (From admission, onward)        Status Ordering Provider     Inpatient consult to PICC team (DEBORA)  Once        Provider:  (Not yet assigned)    Acknowledged SAYDA COOL          Endocrine  * Type 1 diabetes mellitus with hypoglycemia  - admitted at [facility] from 5/30-6/10 for DKA, which was complicated by symptomatic hypoglycemia, and was evaluated by Endocrinology who recommended discharge on Tresiba 8 units every morning with breakfast and 6 units every evening with dinner as well as SSI with meals.  Goal to maintain blood sugars 200-300.  She was admitted again for mild DKA at [facility] from 06/13- 6/14.  She was discharged on 25 units Levemir daily as well as 6 units aspart t.i.d. with meals.  - Chart review shows she has active prescriptions for Humulin 70/30, Tresiba, Lantus, Levemir, aspart and lispro insulins  - Overnight she required D10 infusion at 150 cc/hour to maintain glucose >70  - Discontinue D10 and continue POCT glucose checks every hour until stable for 6 accuchecks.  - Needs thorough pharmacy review  - Asked her  to bring all the insulin in the home - brought Levemir, Tresiba and aspart insulin.  - Chart review - she was prescribed Tresiba 8 units in the AM and 6 in the evening when she was discharged from [facility] on 5/30/2023.  Has follow up appointments scheduled with PCP and endocrinologist.  She is not on any prandial insulin in the meantime, has a history of hypoglycemic  "episodes.      Final Active Diagnoses:    Diagnosis Date Noted POA    PRINCIPAL PROBLEM:  Type 1 diabetes mellitus with hypoglycemia [E10.649] 12/23/2018 Yes    Schizoaffective disorder, bipolar type [F25.0] 11/12/2020 Yes    Intellectual disability [F79] 01/27/2020 Yes      Problems Resolved During this Admission:    Diagnosis Date Noted Date Resolved POA    Hypothermia [T68.XXXA] 06/18/2023 06/20/2023 Yes    Carroll catheter in place [Z97.8] 06/18/2023 06/20/2023 Not Applicable       Discharged Condition: fair    Disposition:     Follow Up:    Patient Instructions:   No discharge procedures on file.    Significant Diagnostic Studies: Labs:   BMP:   Recent Labs   Lab 06/18/23  1933 06/19/23  0418 06/20/23  0350   GLU 88 111* 318*    139 137   K 3.2* 3.7 4.2    106 106   CO2 23 24 26   BUN 9 6 9   CREATININE 0.8 0.7 0.9   CALCIUM 8.6* 8.8 8.7   MG 1.8  --   --     and CBC   Recent Labs   Lab 06/18/23  1452 06/19/23  0419   WBC 6.99 11.42   HGB 12.8 12.7   HCT 41.0 39.8    194       Pending Diagnostic Studies:     None         Medications:  Reconciled Home Medications:      Medication List      CHANGE how you take these medications    insulin aspart U-100 100 unit/mL (3 mL) Inpn pen  Commonly known as: NovoLOG  Inject 3 Units into the skin 3 (three) times daily with meals.  What changed: how much to take        CONTINUE taking these medications    BD ULTRA-FINE JENI PEN NEEDLE 32 gauge x 5/32" Ndle  Generic drug: pen needle, diabetic  Use 4 (four) times daily.     ferrous sulfate 325 mg (65 mg iron) Tab tablet  Commonly known as: FEOSOL  Take 1 tablet by mouth every other day.     FLUoxetine 20 MG capsule  Take 2 capsules (40 mg total) by mouth once daily.     hydrOXYzine pamoate 50 MG Cap  Commonly known as: VISTARIL  Take 50 mg by mouth daily as needed for Anxiety.     insulin degludec 100 unit/mL (3 mL) insulin pen  Commonly known as: TRESIBA FLEXTOUCH U-100  Inject into the skin 2 (two) " times a day. 8 units SC in the AM and 6 units SC in the PM     ONETOUCH DELICA PLUS LANCET 30 gauge Misc  Generic drug: lancets  To check BG 4 times daily, to use with insurance preferred meter     ONETOUCH VERIO TEST STRIPS Strp  Generic drug: blood sugar diagnostic  To check BG 4 times daily, to use with insurance preferred meter        STOP taking these medications    LEVEMIR FLEXPEN 100 unit/mL (3 mL) Inpn pen  Generic drug: insulin detemir U-100 (Levemir)            Indwelling Lines/Drains at time of discharge:   Lines/Drains/Airways     None                 Time spent on the discharge of patient: 45 minutes         Lukasz Olvear MD  Department of Hospital Medicine  [facility] - Intensive Care ([place])

## 2023-06-24 LAB
BACTERIA BLD CULT: NORMAL
BACTERIA BLD CULT: NORMAL

## 2023-07-07 ENCOUNTER — HOSPITAL ENCOUNTER (OUTPATIENT)
Facility: HOSPITAL | Age: 33
Discharge: HOME | End: 2023-07-08
Attending: STUDENT IN AN ORGANIZED HEALTH CARE EDUCATION/TRAINING PROGRAM | Admitting: STUDENT IN AN ORGANIZED HEALTH CARE EDUCATION/TRAINING PROGRAM
Payer: COMMERCIAL

## 2023-07-07 DIAGNOSIS — R07.9 CHEST PAIN: ICD-10-CM

## 2023-07-07 DIAGNOSIS — R73.9 HYPERGLYCEMIA: ICD-10-CM

## 2023-07-07 DIAGNOSIS — E10.649 HYPOGLYCEMIA DUE TO TYPE 1 DIABETES MELLITUS: Primary | ICD-10-CM

## 2023-07-07 LAB
ALBUMIN SERPL BCP-MCNC: 3.6 G/DL (ref 3.5–5.2)
ALP SERPL-CCNC: 78 U/L (ref 55–135)
ALT SERPL W/O P-5'-P-CCNC: 15 U/L (ref 10–44)
ANION GAP SERPL CALC-SCNC: 11 MMOL/L (ref 8–16)
AST SERPL-CCNC: 19 U/L (ref 10–40)
B-OH-BUTYR BLD STRIP-SCNC: 0.1 MMOL/L (ref 0–0.5)
BASOPHILS # BLD AUTO: 0.03 K/UL (ref 0–0.2)
BASOPHILS NFR BLD: 0.6 % (ref 0–1.9)
BILIRUB SERPL-MCNC: 0.4 MG/DL (ref 0.1–1)
BNP SERPL-MCNC: <10 PG/ML (ref 0–99)
BUN SERPL-MCNC: 13 MG/DL (ref 6–20)
CALCIUM SERPL-MCNC: 9.9 MG/DL (ref 8.7–10.5)
CHLORIDE SERPL-SCNC: 105 MMOL/L (ref 95–110)
CO2 SERPL-SCNC: 23 MMOL/L (ref 23–29)
CREAT SERPL-MCNC: 0.8 MG/DL (ref 0.5–1.4)
DIFFERENTIAL METHOD: ABNORMAL
EOSINOPHIL # BLD AUTO: 0.1 K/UL (ref 0–0.5)
EOSINOPHIL NFR BLD: 1.3 % (ref 0–8)
ERYTHROCYTE [DISTWIDTH] IN BLOOD BY AUTOMATED COUNT: 16.9 % (ref 11.5–14.5)
EST. GFR  (NO RACE VARIABLE): >60 ML/MIN/1.73 M^2
GLUCOSE SERPL-MCNC: 31 MG/DL (ref 70–110)
HCT VFR BLD AUTO: 35.2 % (ref 37–48.5)
HGB BLD-MCNC: 10.8 G/DL (ref 12–16)
IMM GRANULOCYTES # BLD AUTO: 0.01 K/UL (ref 0–0.04)
IMM GRANULOCYTES NFR BLD AUTO: 0.2 % (ref 0–0.5)
LYMPHOCYTES # BLD AUTO: 1.6 K/UL (ref 1–4.8)
LYMPHOCYTES NFR BLD: 29.3 % (ref 18–48)
MCH RBC QN AUTO: 25.2 PG (ref 27–31)
MCHC RBC AUTO-ENTMCNC: 30.7 G/DL (ref 32–36)
MCV RBC AUTO: 82 FL (ref 82–98)
MONOCYTES # BLD AUTO: 0.6 K/UL (ref 0.3–1)
MONOCYTES NFR BLD: 11.1 % (ref 4–15)
NEUTROPHILS # BLD AUTO: 3.1 K/UL (ref 1.8–7.7)
NEUTROPHILS NFR BLD: 57.5 % (ref 38–73)
NRBC BLD-RTO: 0 /100 WBC
PLATELET # BLD AUTO: 285 K/UL (ref 150–450)
PMV BLD AUTO: 10.4 FL (ref 9.2–12.9)
POCT GLUCOSE: 113 MG/DL (ref 70–110)
POCT GLUCOSE: 122 MG/DL (ref 70–110)
POCT GLUCOSE: 27 MG/DL (ref 70–110)
POCT GLUCOSE: 28 MG/DL (ref 70–110)
POCT GLUCOSE: 39 MG/DL (ref 70–110)
POCT GLUCOSE: 78 MG/DL (ref 70–110)
POTASSIUM SERPL-SCNC: 3.6 MMOL/L (ref 3.5–5.1)
PROT SERPL-MCNC: 6.9 G/DL (ref 6–8.4)
RBC # BLD AUTO: 4.28 M/UL (ref 4–5.4)
SODIUM SERPL-SCNC: 139 MMOL/L (ref 136–145)
TROPONIN I SERPL DL<=0.01 NG/ML-MCNC: <0.006 NG/ML (ref 0–0.03)
WBC # BLD AUTO: 5.39 K/UL (ref 3.9–12.7)

## 2023-07-07 PROCEDURE — 93010 EKG 12-LEAD: ICD-10-PCS | Mod: ,,, | Performed by: INTERNAL MEDICINE

## 2023-07-07 PROCEDURE — 83880 ASSAY OF NATRIURETIC PEPTIDE: CPT | Performed by: STUDENT IN AN ORGANIZED HEALTH CARE EDUCATION/TRAINING PROGRAM

## 2023-07-07 PROCEDURE — 81025 URINE PREGNANCY TEST: CPT

## 2023-07-07 PROCEDURE — 82962 GLUCOSE BLOOD TEST: CPT

## 2023-07-07 PROCEDURE — G0378 HOSPITAL OBSERVATION PER HR: HCPCS

## 2023-07-07 PROCEDURE — 80307 DRUG TEST PRSMV CHEM ANLYZR: CPT | Performed by: HOSPITALIST

## 2023-07-07 PROCEDURE — 96376 TX/PRO/DX INJ SAME DRUG ADON: CPT

## 2023-07-07 PROCEDURE — 85025 COMPLETE CBC W/AUTO DIFF WBC: CPT | Performed by: STUDENT IN AN ORGANIZED HEALTH CARE EDUCATION/TRAINING PROGRAM

## 2023-07-07 PROCEDURE — 99285 EMERGENCY DEPT VISIT HI MDM: CPT | Mod: 25

## 2023-07-07 PROCEDURE — 80053 COMPREHEN METABOLIC PANEL: CPT | Performed by: STUDENT IN AN ORGANIZED HEALTH CARE EDUCATION/TRAINING PROGRAM

## 2023-07-07 PROCEDURE — 93005 ELECTROCARDIOGRAM TRACING: CPT

## 2023-07-07 PROCEDURE — 96365 THER/PROPH/DIAG IV INF INIT: CPT

## 2023-07-07 PROCEDURE — 93010 ELECTROCARDIOGRAM REPORT: CPT | Mod: ,,, | Performed by: INTERNAL MEDICINE

## 2023-07-07 PROCEDURE — 84484 ASSAY OF TROPONIN QUANT: CPT | Performed by: STUDENT IN AN ORGANIZED HEALTH CARE EDUCATION/TRAINING PROGRAM

## 2023-07-07 PROCEDURE — 25000003 PHARM REV CODE 250: Performed by: STUDENT IN AN ORGANIZED HEALTH CARE EDUCATION/TRAINING PROGRAM

## 2023-07-07 PROCEDURE — 82010 KETONE BODYS QUAN: CPT | Performed by: STUDENT IN AN ORGANIZED HEALTH CARE EDUCATION/TRAINING PROGRAM

## 2023-07-07 PROCEDURE — 96375 TX/PRO/DX INJ NEW DRUG ADDON: CPT

## 2023-07-07 PROCEDURE — 81003 URINALYSIS AUTO W/O SCOPE: CPT | Mod: 59 | Performed by: STUDENT IN AN ORGANIZED HEALTH CARE EDUCATION/TRAINING PROGRAM

## 2023-07-07 PROCEDURE — 63600175 PHARM REV CODE 636 W HCPCS: Performed by: STUDENT IN AN ORGANIZED HEALTH CARE EDUCATION/TRAINING PROGRAM

## 2023-07-07 RX ORDER — ACETAMINOPHEN 325 MG/1
650 TABLET ORAL EVERY 4 HOURS PRN
Status: DISCONTINUED | OUTPATIENT
Start: 2023-07-07 | End: 2023-07-08 | Stop reason: HOSPADM

## 2023-07-07 RX ORDER — DEXTROSE 50 % IN WATER (D50W) INTRAVENOUS SYRINGE
25
Status: COMPLETED | OUTPATIENT
Start: 2023-07-07 | End: 2023-07-07

## 2023-07-07 RX ORDER — PROMETHAZINE HYDROCHLORIDE 25 MG/1
25 TABLET ORAL EVERY 6 HOURS PRN
Status: DISCONTINUED | OUTPATIENT
Start: 2023-07-07 | End: 2023-07-08 | Stop reason: HOSPADM

## 2023-07-07 RX ORDER — INSULIN ASPART 100 [IU]/ML
0-5 INJECTION, SOLUTION INTRAVENOUS; SUBCUTANEOUS
Status: DISCONTINUED | OUTPATIENT
Start: 2023-07-07 | End: 2023-07-08 | Stop reason: HOSPADM

## 2023-07-07 RX ORDER — DEXTROSE MONOHYDRATE AND SODIUM CHLORIDE 5; .9 G/100ML; G/100ML
INJECTION, SOLUTION INTRAVENOUS CONTINUOUS
Status: DISCONTINUED | OUTPATIENT
Start: 2023-07-07 | End: 2023-07-07

## 2023-07-07 RX ORDER — POLYETHYLENE GLYCOL 3350 17 G/17G
17 POWDER, FOR SOLUTION ORAL DAILY
Status: DISCONTINUED | OUTPATIENT
Start: 2023-07-08 | End: 2023-07-08 | Stop reason: HOSPADM

## 2023-07-07 RX ORDER — DEXTROSE MONOHYDRATE 100 MG/ML
INJECTION, SOLUTION INTRAVENOUS CONTINUOUS
Status: DISCONTINUED | OUTPATIENT
Start: 2023-07-07 | End: 2023-07-08

## 2023-07-07 RX ORDER — MAG HYDROX/ALUMINUM HYD/SIMETH 200-200-20
30 SUSPENSION, ORAL (FINAL DOSE FORM) ORAL 4 TIMES DAILY PRN
Status: DISCONTINUED | OUTPATIENT
Start: 2023-07-07 | End: 2023-07-08 | Stop reason: HOSPADM

## 2023-07-07 RX ORDER — IBUPROFEN 200 MG
16 TABLET ORAL
Status: DISCONTINUED | OUTPATIENT
Start: 2023-07-07 | End: 2023-07-08 | Stop reason: HOSPADM

## 2023-07-07 RX ORDER — PROCHLORPERAZINE EDISYLATE 5 MG/ML
5 INJECTION INTRAMUSCULAR; INTRAVENOUS EVERY 6 HOURS PRN
Status: DISCONTINUED | OUTPATIENT
Start: 2023-07-07 | End: 2023-07-08 | Stop reason: HOSPADM

## 2023-07-07 RX ORDER — NALOXONE HCL 0.4 MG/ML
0.02 VIAL (ML) INJECTION
Status: DISCONTINUED | OUTPATIENT
Start: 2023-07-07 | End: 2023-07-08 | Stop reason: HOSPADM

## 2023-07-07 RX ORDER — IBUPROFEN 200 MG
24 TABLET ORAL
Status: DISCONTINUED | OUTPATIENT
Start: 2023-07-07 | End: 2023-07-08 | Stop reason: HOSPADM

## 2023-07-07 RX ORDER — GLUCAGON 1 MG
1 KIT INJECTION
Status: DISCONTINUED | OUTPATIENT
Start: 2023-07-07 | End: 2023-07-08 | Stop reason: HOSPADM

## 2023-07-07 RX ORDER — DROPERIDOL 2.5 MG/ML
0.62 INJECTION, SOLUTION INTRAMUSCULAR; INTRAVENOUS ONCE
Status: COMPLETED | OUTPATIENT
Start: 2023-07-07 | End: 2023-07-07

## 2023-07-07 RX ORDER — HYDROXYZINE PAMOATE 25 MG/1
50 CAPSULE ORAL DAILY PRN
Status: DISCONTINUED | OUTPATIENT
Start: 2023-07-07 | End: 2023-07-08 | Stop reason: HOSPADM

## 2023-07-07 RX ORDER — FLUOXETINE HYDROCHLORIDE 20 MG/1
40 CAPSULE ORAL DAILY
Status: DISCONTINUED | OUTPATIENT
Start: 2023-07-08 | End: 2023-07-08 | Stop reason: HOSPADM

## 2023-07-07 RX ORDER — DEXTROSE MONOHYDRATE 100 MG/ML
INJECTION, SOLUTION INTRAVENOUS
Status: COMPLETED | OUTPATIENT
Start: 2023-07-07 | End: 2023-07-07

## 2023-07-07 RX ORDER — TALC
6 POWDER (GRAM) TOPICAL NIGHTLY PRN
Status: DISCONTINUED | OUTPATIENT
Start: 2023-07-07 | End: 2023-07-08 | Stop reason: HOSPADM

## 2023-07-07 RX ADMIN — DROPERIDOL 0.62 MG: 2.5 INJECTION, SOLUTION INTRAMUSCULAR; INTRAVENOUS at 08:07

## 2023-07-07 RX ADMIN — DEXTROSE MONOHYDRATE 100 ML/HR: 100 INJECTION, SOLUTION INTRAVENOUS at 09:07

## 2023-07-07 RX ADMIN — DEXTROSE MONOHYDRATE 25 G: 25 INJECTION, SOLUTION INTRAVENOUS at 09:07

## 2023-07-07 RX ADMIN — SODIUM CHLORIDE 1000 ML: 9 INJECTION, SOLUTION INTRAVENOUS at 08:07

## 2023-07-07 RX ADMIN — DEXTROSE MONOHYDRATE 25 G: 25 INJECTION, SOLUTION INTRAVENOUS at 07:07

## 2023-07-07 RX ADMIN — DEXTROSE MONOHYDRATE 100 ML/HR: 100 INJECTION, SOLUTION INTRAVENOUS at 07:07

## 2023-07-07 NOTE — Clinical Note
Diagnosis: Hyperglycemia [549310]   Future Attending Provider: OBINNA WALLACE [[id number]]   Admitting Provider:: JESÚS STACY [[id number]]

## 2023-07-07 NOTE — ED TRIAGE NOTES
Pt presents to the ED via EMS for CC of hypoglycemia. Pt has been fatigued today and took her insulin without eating. EMS found the patient blood sugar to be 42 upon arrival. Oral glucose was given and pt blood sugar is now 78. Pt complains of midsternal chest and back pain but denies LOC or falling. Pt states the pain is 10/10. Pt has past hx of polysubstance abuse, schizophrenia, diabetes type 1 with hypoglycemia, and intellectual disability. Pt appears to be drowsy but is alert and oriented and in no apparent distress at this time. Pt denies SOB.

## 2023-07-08 VITALS
HEIGHT: 67 IN | SYSTOLIC BLOOD PRESSURE: 109 MMHG | HEART RATE: 91 BPM | BODY MASS INDEX: 20.38 KG/M2 | WEIGHT: 129.88 LBS | DIASTOLIC BLOOD PRESSURE: 78 MMHG | TEMPERATURE: 98 F | RESPIRATION RATE: 27 BRPM | OXYGEN SATURATION: 100 %

## 2023-07-08 LAB
AMPHET+METHAMPHET UR QL: NEGATIVE
ANION GAP SERPL CALC-SCNC: 10 MMOL/L (ref 8–16)
B-HCG UR QL: NEGATIVE
BARBITURATES UR QL SCN>200 NG/ML: NEGATIVE
BENZODIAZ UR QL SCN>200 NG/ML: NEGATIVE
BILIRUB UR QL STRIP: NEGATIVE
BUN SERPL-MCNC: 10 MG/DL (ref 6–20)
BZE UR QL SCN: NEGATIVE
CALCIUM SERPL-MCNC: 9.2 MG/DL (ref 8.7–10.5)
CANNABINOIDS UR QL SCN: ABNORMAL
CHLORIDE SERPL-SCNC: 104 MMOL/L (ref 95–110)
CLARITY UR: CLEAR
CO2 SERPL-SCNC: 23 MMOL/L (ref 23–29)
COLOR UR: YELLOW
CREAT SERPL-MCNC: 0.7 MG/DL (ref 0.5–1.4)
CREAT UR-MCNC: 74.6 MG/DL (ref 15–325)
CTP QC/QA: YES
EST. GFR  (NO RACE VARIABLE): >60 ML/MIN/1.73 M^2
GLUCOSE SERPL-MCNC: 86 MG/DL (ref 70–110)
GLUCOSE UR QL STRIP: ABNORMAL
HGB UR QL STRIP: NEGATIVE
KETONES UR QL STRIP: NEGATIVE
LEUKOCYTE ESTERASE UR QL STRIP: NEGATIVE
MAGNESIUM SERPL-MCNC: 1.5 MG/DL (ref 1.6–2.6)
METHADONE UR QL SCN>300 NG/ML: NEGATIVE
NITRITE UR QL STRIP: NEGATIVE
OPIATES UR QL SCN: NEGATIVE
PCP UR QL SCN>25 NG/ML: NEGATIVE
PH UR STRIP: 6 [PH] (ref 5–8)
POCT GLUCOSE: 105 MG/DL (ref 70–110)
POCT GLUCOSE: 129 MG/DL (ref 70–110)
POCT GLUCOSE: 134 MG/DL (ref 70–110)
POCT GLUCOSE: 164 MG/DL (ref 70–110)
POCT GLUCOSE: 188 MG/DL (ref 70–110)
POCT GLUCOSE: 190 MG/DL (ref 70–110)
POCT GLUCOSE: 193 MG/DL (ref 70–110)
POCT GLUCOSE: 195 MG/DL (ref 70–110)
POCT GLUCOSE: 38 MG/DL (ref 70–110)
POCT GLUCOSE: 50 MG/DL (ref 70–110)
POCT GLUCOSE: 55 MG/DL (ref 70–110)
POTASSIUM SERPL-SCNC: 4.3 MMOL/L (ref 3.5–5.1)
PROT UR QL STRIP: NEGATIVE
SODIUM SERPL-SCNC: 137 MMOL/L (ref 136–145)
SP GR UR STRIP: 1.01 (ref 1–1.03)
TOXICOLOGY INFORMATION: ABNORMAL
URN SPEC COLLECT METH UR: ABNORMAL
UROBILINOGEN UR STRIP-ACNC: NEGATIVE EU/DL

## 2023-07-08 PROCEDURE — 94761 N-INVAS EAR/PLS OXIMETRY MLT: CPT

## 2023-07-08 PROCEDURE — 25000003 PHARM REV CODE 250

## 2023-07-08 PROCEDURE — 25000003 PHARM REV CODE 250: Performed by: HOSPITALIST

## 2023-07-08 PROCEDURE — 83735 ASSAY OF MAGNESIUM: CPT

## 2023-07-08 PROCEDURE — 96367 TX/PROPH/DG ADDL SEQ IV INF: CPT

## 2023-07-08 PROCEDURE — 96372 THER/PROPH/DIAG INJ SC/IM: CPT | Performed by: HOSPITALIST

## 2023-07-08 PROCEDURE — 96366 THER/PROPH/DIAG IV INF ADDON: CPT

## 2023-07-08 PROCEDURE — 96365 THER/PROPH/DIAG IV INF INIT: CPT | Mod: 59

## 2023-07-08 PROCEDURE — G0378 HOSPITAL OBSERVATION PER HR: HCPCS

## 2023-07-08 PROCEDURE — 36415 COLL VENOUS BLD VENIPUNCTURE: CPT

## 2023-07-08 PROCEDURE — 63600175 PHARM REV CODE 636 W HCPCS: Performed by: HOSPITALIST

## 2023-07-08 PROCEDURE — 96376 TX/PRO/DX INJ SAME DRUG ADON: CPT

## 2023-07-08 PROCEDURE — 80048 BASIC METABOLIC PNL TOTAL CA: CPT

## 2023-07-08 RX ORDER — MAGNESIUM SULFATE HEPTAHYDRATE 40 MG/ML
2 INJECTION, SOLUTION INTRAVENOUS ONCE
Status: COMPLETED | OUTPATIENT
Start: 2023-07-08 | End: 2023-07-08

## 2023-07-08 RX ADMIN — DEXTROSE MONOHYDRATE: 100 INJECTION, SOLUTION INTRAVENOUS at 12:07

## 2023-07-08 RX ADMIN — MAGNESIUM SULFATE HEPTAHYDRATE 2 G: 40 INJECTION, SOLUTION INTRAVENOUS at 10:07

## 2023-07-08 RX ADMIN — DEXTROSE MONOHYDRATE 25 G: 25 INJECTION, SOLUTION INTRAVENOUS at 12:07

## 2023-07-08 RX ADMIN — DEXTROSE MONOHYDRATE 250 ML: 100 INJECTION, SOLUTION INTRAVENOUS at 02:07

## 2023-07-08 RX ADMIN — POLYETHYLENE GLYCOL 3350 17 G: 17 POWDER, FOR SOLUTION ORAL at 09:07

## 2023-07-08 RX ADMIN — INSULIN DETEMIR 5 UNITS: 100 INJECTION, SOLUTION SUBCUTANEOUS at 12:07

## 2023-07-08 RX ADMIN — HYDROXYZINE PAMOATE 50 MG: 25 CAPSULE ORAL at 09:07

## 2023-07-08 RX ADMIN — FLUOXETINE 40 MG: 20 CAPSULE ORAL at 09:07

## 2023-07-08 NOTE — ASSESSMENT & PLAN NOTE
Patient's FSGs are uncontrolled due to hypoglycemia on current medication regimen.  Last A1c reviewed-   Lab Results   Component Value Date    HGBA1C 10.9 (H) 06/13/2023     Most recent fingerstick glucose reviewed-   Recent Labs   Lab 07/07/23  1932 07/07/23 2011 07/07/23 2107 07/07/23  2156   POCTGLUCOSE 28* 113* 27* 122*     Current correctional scale  Low  Maintain anti-hyperglycemic dose as follows-   Antihyperglycemics (From admission, onward)    Start     Stop Route Frequency Ordered    07/07/23 2233  insulin aspart U-100 pen 0-5 Units         -- SubQ Before meals & nightly PRN 07/07/23 2137        Hold Oral hypoglycemics while patient is in the hospital.    Patient placed on D10 infusion with initial hourly checks x2  Was shown to not be stable with Q 2 checks, upgraded to OBS/ICU for q.1h checks until stable

## 2023-07-08 NOTE — NURSING
IV infiltrated to left arm. Left hand and left lower arm swollen. IV changed to another site. Arm elevated on pillows and a warm blanket placed around the arm. Patient refusing another IV to be started. Patient wants to leave the hospital. Dr. Divine thornton. He is coming to see the patient.

## 2023-07-08 NOTE — NURSING
Patient's  is at bedside. Patient decided to stay. Patient has not been taking her medication for her bipolar disease and wants help getting her medicine.

## 2023-07-08 NOTE — NURSING
[facility]  Nurses Note -- 4 Eyes      7/8/2023       Skin assessed on: Q Shift      [x] No Pressure Injuries Present    [x]Prevention Measures Documented    [] Yes LDA  for Pressure Injury Previously documented     [] Yes New Pressure Injury Discovered   [] LDA for New Pressure Injury Added      Attending RN:  Latonia Breen RN     Second RN:  Violet Sherwood RN

## 2023-07-08 NOTE — ED NOTES
Patient agreeable to have pure wick in place to attempt urine collection. Also agreeable to let RT attempt to obtain ABG in approx 15 minutes after medication administration. Respiratory therapist aware.

## 2023-07-08 NOTE — NURSING
Pt d/c'd to home.  IV removed and intact.  Pt escorted out of hospital with belongings in place.  D/c instructions reviewed and understanding expressed.

## 2023-07-08 NOTE — ED NOTES
Patient refused to have ABG collected. Respiratory notified MD and charge nurse. MD states okay to wait at this time.

## 2023-07-08 NOTE — NURSING
[facility] [place] Intensive Care  ICU Shift Summary  Date: 7/8/2023      Prehospitalization: Home  Admit Date / LOS : 7/7/2023/ 0 days    Diagnosis: Hypoglycemia due to type 1 diabetes mellitus    Consults:        Active: SW       Needed: N/A     Code Status: Full Code   Advanced Directive: <no information>    LDA:  Lines/Drains/Airways       Peripheral Intravenous Line  Duration                  Peripheral IV - Single Lumen 07/08/23 0046 20 G Anterior;Proximal;Right Forearm <1 day         Peripheral IV - Single Lumen 07/08/23 0049 20 G;1 3/4 in Left Antecubital <1 day                  Central Lines/Site/Justification:Patient Does Not Have Central Line  Urinary Cath/Order/Justification:Patient Does Not Have Urinary Catheter    Vasopressors/Infusions:    dextrose 10 % in water (D10W) 100 mL/hr at 07/08/23 0030          GOALS: Volume/ Hemodynamic: N/A                     RASS: 0  alert and calm    Pain Management: none       Pain Controlled: not applicable     Rhythm: NSR    Respiratory Device: Room Air                      Most Recent SBT/ SAT: Did not perform       MOVE Screen: PASS  ICU Liberation: no    VTE Prophylaxis: Pharm  Mobility: Ambulatory  Stress Ulcer Prophylaxis: No    Isolation: No active isolations    Dietary:   Current Diet Order   Procedures    Diet diabetic [facility]; 2000 Calorie     Order Specific Question:   Indicate patient location for additional diet options:     Answer:   [facility]     Order Specific Question:   Total calories:     Answer:   2000 Calorie      Tolerance: yes  Advancement: yes    I & O (24h):    Intake/Output Summary (Last 24 hours) at 7/8/2023 0612  Last data filed at 7/8/2023 0605  Gross per 24 hour   Intake 620 ml   Output 550 ml   Net 70 ml        Restraints: No    Significant Dates:  Post Op Date: N/A  Rescue Date: N/A  Imaging/ Diagnostics: N/A    Noteworthy Labs:  none    COVID Test: (--)  CBC/Anemia Labs: Coags:    Recent Labs   Lab 07/07/23 2053   WBC 5.39    HGB 10.8*   HCT 35.2*      MCV 82   RDW 16.9*    No results for input(s): PT, INR, APTT in the last 168 hours.     Chemistries:   Recent Labs   Lab 07/07/23 1950      K 3.6      CO2 23   BUN 13   CREATININE 0.8   CALCIUM 9.9   PROT 6.9   BILITOT 0.4   ALKPHOS 78   ALT 15   AST 19        Cardiac Enzymes: Ejection Fractions:    Recent Labs     07/07/23 1950   TROPONINI <0.006    No results found for: EF     POCT Glucose: HbA1c:    Recent Labs   Lab 07/08/23  0408 07/08/23  0501 07/08/23  0604   POCTGLUCOSE 55* 50* 129*    Hemoglobin A1C   Date Value Ref Range Status   06/13/2023 10.9 (H) 4.0 - 5.6 % Final     Comment:     ADA Screening Guidelines:  5.7-6.4%  Consistent with prediabetes  >or=6.5%  Consistent with diabetes    High levels of fetal hemoglobin interfere with the HbA1C  assay. Heterozygous hemoglobin variants (HbS, HgC, etc)do  not significantly interfere with this assay.   However, presence of multiple variants may affect accuracy.             ICU LOS 4h  Level of Care: Critical Care    Chart Check: 24 HR Done  Shift Summary/Plan for the shift: plan is to monitor patient's glucose until it becomes stable.

## 2023-07-08 NOTE — PLAN OF CARE
[facility] - Intensive Care  Discharge Final Note    Primary Care Provider: Martin Rios MD    Expected Discharge Date: 7/8/2023    Final Discharge Note (most recent)       Final Note - 07/08/23 1047          Final Note    Assessment Type Final Discharge Note     Anticipated Discharge Disposition Home or Self Care     What phone number can be called within the next 1-3 days to see how you are doing after discharge? [phone]     Hospital Resources/Appts/Education Provided Appointments scheduled and added to AVS;Appointments scheduled by Navigator/Coordinator        Post-Acute Status    Discharge Delays None known at this time                     Important Message from Medicare             Contact Info       Martin Rios MD   Specialty: Internal Medicine   Relationship: PCP - General    [address] [address]   Phone: [phone]       Next Steps: Follow up    Instructions: Call to schedule your hospital follow up to be seen within 7 days           secure chat nurse Latonia: Patient cleared from case management standpoint.

## 2023-07-08 NOTE — DISCHARGE SUMMARY
"[facility] Medicine  Discharge Summary      Patient Name: Hollie Wilson  MRN: [id number]  JAYSON: [id number]  Patient Class: OP- Observation  Admission Date: 7/7/2023  Hospital Length of Stay: 0 days  Discharge Date and Time:  07/08/2023 1:47 PM  Attending Physician: Cecilia Baca MD   Discharging Provider: Cecilia Baca MD  Primary Care Provider: Martin Rios MD    Primary Care Team: Networked reference to record PCT     HPI:   Hollie Wilson 33 y.o. female with type 1 diabetes mellitus, schizoaffective disorder, intellectual disability, history of polysubstance abuse who presented via EMS with a chief complaint of weakness and chest wall pain.  EMS noted initial blood sugar 42, was given oral glucose with recheck at 84.  Patient denies eating today states that she did take her insulin.  Patient reports I had candy that was enough when asked what she had had to eat today. Per daughter, pt was hungry but wanted candy - a blue frosty to be specific, but the daughter did not want her having it. The pt subsequently did not eat and developed hypoglycemia. No nausea, vomiting or diarrhea. Pt denies chest pain, contrary to triage note. Pt says that she has "pain all over her body," that she has had for two days. She is unable to localize pain. No SOB.  Patient was extremely lethargic on exam HPI limited due to the state of the patient.  Per daughter she denied any attempted self-treatment.  No other alleviating or exacerbating factors noted    In the ED patient was afebrile without leukocytosis, mild normocytic hypochromic anemia noted, glucose 31, BNP and troponin negative, beta hydroxybutyrate WNL, CXR negative.  Patient was placed in observation for further management of hypoglycemia      * No surgery found *      Hospital Course:   Ms Hollie Wilson was placed in observation for hypoglycemia associated with taking insulin but not eating. Started D10 gtt and glucose " normalized. Started back long acting insulin because she is type 1 diabetic. She is now tolerating diet. Stable for discharge to home and follow up with PCP.        Goals of Care Treatment Preferences:  Code Status: Full Code      Consults:     No new Assessment & Plan notes have been filed under this hospital service since the last note was generated.  Service: Hospital Medicine    Final Active Diagnoses:    Diagnosis Date Noted POA    PRINCIPAL PROBLEM:  Hypoglycemia due to type 1 diabetes mellitus [E10.649] 12/23/2018 Yes    Intellectual disability [F79] 01/27/2020 Yes    Polysubstance abuse [F19.10] 06/13/2019 Yes    Psychiatric disorder [F99] 12/23/2018 Yes      Problems Resolved During this Admission:       Discharged Condition: stable    Disposition: Home or Self Care    Follow Up:   Follow-up Information     Martin Rios MD Follow up.    Specialty: Internal Medicine  Why: Call to schedule your hospital follow up to be seen within 7 days  Contact information:  [address] [place] [id number]  [phone]                       Patient Instructions:      Diet diabetic     Notify your health care provider if you experience any of the following:  temperature >100.4     Notify your health care provider if you experience any of the following:  persistent nausea and vomiting or diarrhea     Notify your health care provider if you experience any of the following:  severe uncontrolled pain     Notify your health care provider if you experience any of the following:  redness, tenderness, or signs of infection (pain, swelling, redness, odor or green/yellow discharge around incision site)     Notify your health care provider if you experience any of the following:  difficulty breathing or increased cough     Notify your health care provider if you experience any of the following:  severe persistent headache     Notify your health care provider if you experience any of the following:  worsening rash  "    Notify your health care provider if you experience any of the following:  persistent dizziness, light-headedness, or visual disturbances     Notify your health care provider if you experience any of the following:  increased confusion or weakness     Activity as tolerated       Significant Diagnostic Studies: N/A    Pending Diagnostic Studies:     None         Medications:  Reconciled Home Medications:      Medication List      CHANGE how you take these medications    insulin aspart U-100 100 unit/mL (3 mL) Inpn pen  Commonly known as: NovoLOG  Inject 3 Units into the skin 3 (three) times daily with meals.  What changed: how much to take        CONTINUE taking these medications    BD ULTRA-FINE JENI PEN NEEDLE 32 gauge x 5/32" Ndle  Generic drug: pen needle, diabetic  Use 4 (four) times daily.     insulin degludec 100 unit/mL (3 mL) insulin pen  Commonly known as: TRESIBA FLEXTOUCH U-100  Inject into the skin 2 (two) times a day. 8 units SC in the AM and 6 units SC in the PM     ONETOUCH DELICA PLUS LANCET 30 gauge Misc  Generic drug: lancets  To check BG 4 times daily, to use with insurance preferred meter     ONETOUCH VERIO TEST STRIPS Strp  Generic drug: blood sugar diagnostic  To check BG 4 times daily, to use with insurance preferred meter            Indwelling Lines/Drains at time of discharge:   Lines/Drains/Airways     None                 Time spent on the discharge of patient: 35 minutes      Cecilia Baca MD  Department of Hospital Medicine  [facility] - Intensive Care  "

## 2023-07-08 NOTE — RESPIRATORY THERAPY
Patient refused ABG at present time.  MD Peck, charge nurse, and patient's nurse were all notified.  MD stated OK to wait at present time.

## 2023-07-08 NOTE — HPI
"Hollie Wilson 33 y.o. female with type 1 diabetes mellitus, schizoaffective disorder, intellectual disability, history of polysubstance abuse who presented via EMS with a chief complaint of weakness and chest wall pain.  EMS noted initial blood sugar 42, was given oral glucose with recheck at 84.  Patient denies eating today states that she did take her insulin.  Patient reports I had candy that was enough when asked what she had had to eat today. Per daughter, pt was hungry but wanted candy - a blue frosty to be specific, but the daughter did not want her having it. The pt subsequently did not eat and developed hypoglycemia. No nausea, vomiting or diarrhea. Pt denies chest pain, contrary to triage note. Pt says that she has "pain all over her body," that she has had for two days. She is unable to localize pain. No SOB.  Patient was extremely lethargic on exam HPI limited due to the state of the patient.  Per daughter she denied any attempted self-treatment.  No other alleviating or exacerbating factors noted    In the ED patient was afebrile without leukocytosis, mild normocytic hypochromic anemia noted, glucose 31, BNP and troponin negative, beta hydroxybutyrate WNL, CXR negative.  Patient was placed in observation for further management of hypoglycemia  "

## 2023-07-08 NOTE — ASSESSMENT & PLAN NOTE
Chart notes bipolar disorder and schizophrenia  Difficult to determine status due to lethargy of patient, recheck when patient was more alert  Continue home fluoxetine

## 2023-07-08 NOTE — ED PROVIDER NOTES
"Encounter Date: 2023       History     Chief Complaint   Patient presents with    Fatigue     Pt c/o weakness x1 hour and anterior chest wall pain x3 days that is worse on palpation and exertion. Per EMS initial blood sugar 42. EMS gave oral glucose, recheck at 84. Pt denies eating today and states she did take her insulin. BP is 99/54. Pt slightly tachycardic at 103. Pt refused IV access because she usually requires an ultrasound.      33 year old female who presents with an episode of hypoglycemia after reportedly not eating but taking her insulin. She says she was not hungry and did not want to eat. Per daughter, pt was hungry but wanted candy - a blue frosty to be specific, but the daughter did not want her having it. The pt subsequently did not eat and developed hypoglycemia. No nausea, vomiting or diarrhea. Pt denies chest pain, contrary to triage note. Pt says that she has "pain all over her body," that she has had for two days. She is unable to localize pain. No SOB.    Review of patient's allergies indicates:   Allergen Reactions    Clove oil Itching    Zofran (as hydrochloride) [ondansetron hcl] Hives     Past Medical History:   Diagnosis Date    Anemia     Bipolar 1 disorder     Diabetes mellitus     Scoliosis      Past Surgical History:   Procedure Laterality Date     SECTION       No family history on file.  Social History     Tobacco Use    Smoking status: Every Day    Smokeless tobacco: Never   Substance Use Topics    Alcohol use: Yes    Drug use: Yes     Types: Marijuana     Review of Systems   All other systems reviewed and are negative.    Physical Exam     Initial Vitals [23 1836]   BP Pulse Resp Temp SpO2   (!) 99/54 103 18 98.6 °F (37 °C) 98 %      MAP       --         Physical Exam    Nursing note and vitals reviewed.  Constitutional: She is not diaphoretic. No distress.   NAD, responds to simple commands, follows commands, appears sluggish   HENT:   Head: Normocephalic and " atraumatic.   Eyes: EOM are normal. Pupils are equal, round, and reactive to light.   Neck: Trachea normal and phonation normal. Neck supple. Carotid bruit is not present. No JVD present.   Normal range of motion.  Cardiovascular:  Normal rate, regular rhythm, normal heart sounds and intact distal pulses.     Exam reveals no gallop and no friction rub.       No murmur heard.  Pulmonary/Chest: Breath sounds normal. No stridor. No respiratory distress. She has no wheezes. She has no rhonchi. She has no rales. She exhibits no tenderness.   Abdominal: Abdomen is soft. Bowel sounds are normal. She exhibits no distension and no mass. There is no abdominal tenderness. There is no rebound and no guarding.   Musculoskeletal:         General: No edema.      Cervical back: Normal range of motion and neck supple.      Right lower leg: No swelling. No edema.      Left lower leg: No swelling. No edema.     Neurological: She is alert and oriented to person, place, and time. GCS score is 15. GCS eye subscore is 4. GCS verbal subscore is 5. GCS motor subscore is 6.   Skin: Skin is warm and dry. Capillary refill takes less than 2 seconds.   Psychiatric:   sluggish       ED Course   Procedures  Labs Reviewed   CBC W/ AUTO DIFFERENTIAL - Abnormal; Notable for the following components:       Result Value    Hemoglobin 10.8 (*)     Hematocrit 35.2 (*)     MCH 25.2 (*)     MCHC 30.7 (*)     RDW 16.9 (*)     All other components within normal limits   COMPREHENSIVE METABOLIC PANEL - Abnormal; Notable for the following components:    Glucose 31 (*)     All other components within normal limits    Narrative:     Glucose critical result(s) called and verbal readback obtained from   Bree REYES RN ED by CD4 07/07/2023 20:36   POCT GLUCOSE - Abnormal; Notable for the following components:    POCT Glucose 28 (*)     All other components within normal limits   POCT GLUCOSE - Abnormal; Notable for the following components:    POCT Glucose 113 (*)      All other components within normal limits   POCT GLUCOSE - Abnormal; Notable for the following components:    POCT Glucose 27 (*)     All other components within normal limits   BETA - HYDROXYBUTYRATE, SERUM   TROPONIN I   B-TYPE NATRIURETIC PEPTIDE   URINALYSIS, REFLEX TO URINE CULTURE   POCT GLUCOSE   POCT GLUCOSE MONITORING CONTINUOUS   POCT GLUCOSE MONITORING CONTINUOUS          Imaging Results              X-Ray Chest AP Portable (Final result)  Result time 07/07/23 19:19:16      Final result by Izabela Eaton MD (07/07/23 19:19:16)                   Impression:      No acute intrathoracic abnormality detected.      Electronically signed by: Izabela Eaton  Date:    07/07/2023  Time:    19:19               Narrative:    EXAMINATION:  AP PORTABLE CHEST    CLINICAL HISTORY:  hyperglycemia;    TECHNIQUE:  AP portable chest radiograph was submitted.    COMPARISON:  06/18/2023    FINDINGS:  AP portable chest radiograph demonstrates a cardiac silhouette within normal limits.  There is no focal consolidation, pneumothorax, or pleural effusion.                                       Medications   sodium chloride 0.9% bolus 1,000 mL 1,000 mL (1,000 mLs Intravenous New Bag 7/7/23 2009)   droPERidol injection 0.625 mg (0.625 mg Intravenous Given 7/7/23 2026)   dextrose 50 % in water (D50W) injection 25 g (25 g Intravenous Given 7/7/23 1938)   dextrose 10 % infusion (0 mL/hr Intravenous Stopped 7/7/23 2025)   dextrose 50 % in water (D50W) injection 25 g (25 g Intravenous Given 7/7/23 2112)   dextrose 10 % infusion (100 mL/hr Intravenous New Bag 7/7/23 2114)     Medical Decision Making:   Initial Assessment:   Hemodynamically stable. Afebrile. Phonating and protecting the airway spontaneously. No clinical evidence for cardiovascular instability or impending airway compromise. Examination as above. Will obtain labs, imaging and reassess.              ED Course as of 07/07/23 2127 Fri Jul 07, 2023 1936 POCT  Glucose: 78 [BG]   1941 BSG noted, D50 and D10 ordered.  [BG]   2111 Refractory hypoglycemia. D10 drip continued, additional D50 ordered. Pt room has odor of marijuana. She is mentating. She has ordered popeyes chicken for herself. Medication hx reviewed - no sulfonylureas. No hx of prednisone use. Will plan for admission due to refractory hypoglycemia in a high risk pt.  [BG]      ED Course User Index  [BG] Heber Gonsales MD          Discussed w/ HM. Plan for admission due to high risk nature of pt with hypoglycemia.          Clinical Impression:   Final diagnoses:  [R73.9] Hyperglycemia        ED Disposition Condition    Observation                 Heber Gonsales MD  07/07/23 2127

## 2023-07-08 NOTE — HOSPITAL COURSE
Ms Hollie Wilson was placed in observation for hypoglycemia associated with taking insulin but not eating. Started D10 gtt and glucose normalized. Started back long acting insulin because she is type 1 diabetic. She is now tolerating diet. Stable for discharge to home and follow up with PCP.

## 2023-07-08 NOTE — NURSING
IV infiltrated to left arm.Left hand and left lower arm swollen. IV changed to another site. Arm elevated on pillows. Warm blanket placed around the arm. Patient refusing another IV to be started. Patient wants to leave hospital. Dr. Divine thornton. He is coming to see the patient.

## 2023-07-08 NOTE — NURSING
[facility] [place]  Nurses Note -- 4 Eyes      7/8/2023       Skin assessed on: Admit      [x] No Pressure Injuries Present    [x]Prevention Measures Documented    [] Yes LDA  for Pressure Injury Previously documented     [] Yes New Pressure Injury Discovered   [] LDA for New Pressure Injury Added      Attending RN:  Violet Sherwood RN     Second RN:  Kait Winters RN

## 2023-07-08 NOTE — H&P
"[facility]  [facility]  History & Physical    Patient Name: Hollie Wilson  MRN: [id number]  Patient Class: OP- Observation  Admission Date: 7/7/2023  Attending Physician: Cecilia Baca MD   Primary Care Provider: Martin Rios MD         Patient information was obtained from patient, relative(s), past medical records and ER records.     Subjective:     Principal Problem:Hypoglycemia due to type 1 diabetes mellitus    Chief Complaint:   Chief Complaint   Patient presents with    Fatigue     Pt c/o weakness x1 hour and anterior chest wall pain x3 days that is worse on palpation and exertion. Per EMS initial blood sugar 42. EMS gave oral glucose, recheck at 84. Pt denies eating today and states she did take her insulin. BP is 99/54. Pt slightly tachycardic at 103. Pt refused IV access because she usually requires an ultrasound.         HPI: Hollie Wilson 33 y.o. female with type 1 diabetes mellitus, schizoaffective disorder, intellectual disability, history of polysubstance abuse who presented via EMS with a chief complaint of weakness and chest wall pain.  EMS noted initial blood sugar 42, was given oral glucose with recheck at 84.  Patient denies eating today states that she did take her insulin.  Patient reports I had candy that was enough when asked what she had had to eat today. Per daughter, pt was hungry but wanted candy - a blue frosty to be specific, but the daughter did not want her having it. The pt subsequently did not eat and developed hypoglycemia. No nausea, vomiting or diarrhea. Pt denies chest pain, contrary to triage note. Pt says that she has "pain all over her body," that she has had for two days. She is unable to localize pain. No SOB.  Patient was extremely lethargic on exam HPI limited due to the state of the patient.  Per daughter she denied any attempted self-treatment.  No other alleviating or exacerbating factors noted    In the ED patient was afebrile " "without leukocytosis, mild normocytic hypochromic anemia noted, glucose 31, BNP and troponin negative, beta hydroxybutyrate WNL, CXR negative.  Patient was placed in observation for further management of hypoglycemia      Past Medical History:   Diagnosis Date    Anemia     Bipolar 1 disorder     Diabetes mellitus     Scoliosis        Past Surgical History:   Procedure Laterality Date     SECTION         Review of patient's allergies indicates:   Allergen Reactions    Clove oil Itching    Zofran (as hydrochloride) [ondansetron hcl] Hives       No current facility-administered medications on file prior to encounter.     Current Outpatient Medications on File Prior to Encounter   Medication Sig    insulin aspart U-100 (NOVOLOG) 100 unit/mL (3 mL) InPn pen Inject 3 Units into the skin 3 (three) times daily with meals. (Patient taking differently: Inject 8 Units into the skin 3 (three) times daily with meals.)    insulin degludec (TRESIBA FLEXTOUCH U-100) 100 unit/mL (3 mL) insulin pen Inject into the skin 2 (two) times a day. 8 units SC in the AM and 6 units SC in the PM    blood sugar diagnostic Strp To check BG 4 times daily, to use with insurance preferred meter    lancets 30 gauge Misc To check BG 4 times daily, to use with insurance preferred meter    pen needle, diabetic 32 gauge x 5/32" Ndle Use 4 (four) times daily.    [DISCONTINUED] ferrous sulfate (FEOSOL) 325 mg (65 mg iron) Tab tablet Take 1 tablet by mouth every other day.    [DISCONTINUED] FLUoxetine 20 MG capsule Take 2 capsules (40 mg total) by mouth once daily.    [DISCONTINUED] hydrOXYzine pamoate (VISTARIL) 50 MG Cap Take 50 mg by mouth daily as needed for Anxiety.     Family History    None       Tobacco Use    Smoking status: Every Day    Smokeless tobacco: Never   Substance and Sexual Activity    Alcohol use: Yes    Drug use: Yes     Types: Marijuana    Sexual activity: Never     Review of Systems   Constitutional:  " Positive for fatigue. Negative for chills and fever.   HENT:  Negative for congestion and rhinorrhea.    Eyes:  Negative for photophobia and visual disturbance.   Respiratory:  Negative for cough and shortness of breath.    Cardiovascular:  Negative for chest pain, palpitations and leg swelling.   Gastrointestinal:  Negative for abdominal pain, diarrhea, nausea and vomiting.   Genitourinary:  Negative for dysuria, frequency and urgency.   Skin:  Negative for pallor, rash and wound.   Neurological:  Positive for weakness. Negative for light-headedness and headaches.   Psychiatric/Behavioral:  Negative for confusion and decreased concentration.    Objective:     Vital Signs (Most Recent):  Temp: 98.6 °F (37 °C) (07/07/23 1836)  Pulse: 85 (07/07/23 2236)  Resp: 14 (07/07/23 2236)  BP: (!) 95/59 (07/07/23 2236)  SpO2: 98 % (07/07/23 1836) Vital Signs (24h Range):  Temp:  [98.6 °F (37 °C)] 98.6 °F (37 °C)  Pulse:  [] 85  Resp:  [14-18] 14  SpO2:  [98 %] 98 %  BP: (91-99)/(54-59) 95/59     Weight: 63.5 kg (140 lb)  Body mass index is 21.93 kg/m².     Physical Exam  Vitals and nursing note reviewed.   Constitutional:       General: She is not in acute distress.     Appearance: She is well-developed.   HENT:      Head: Normocephalic and atraumatic.      Right Ear: External ear normal.      Left Ear: External ear normal.      Nose: Nose normal.   Eyes:      Conjunctiva/sclera: Conjunctivae normal.      Pupils: Pupils are equal, round, and reactive to light.   Cardiovascular:      Rate and Rhythm: Normal rate and regular rhythm.   Pulmonary:      Effort: Pulmonary effort is normal. No respiratory distress.      Breath sounds: Normal breath sounds. No wheezing.   Abdominal:      General: Bowel sounds are normal. There is no distension.      Palpations: Abdomen is soft.      Tenderness: There is no abdominal tenderness.      Comments: No palpable hepatomegaly or splenomegaly    Musculoskeletal:         General: No  tenderness. Normal range of motion.      Cervical back: Normal range of motion and neck supple.   Skin:     General: Skin is warm and dry.   Neurological:      Mental Status: She is lethargic and disoriented.   Psychiatric:         Speech: Speech is delayed.         Behavior: Behavior is slowed.         Thought Content: Thought content normal.         Cognition and Memory: Cognition is impaired. Memory is impaired.      Comments: Incredibly lethargic on exam, patient falling asleep mid sentence, responding to questions appropriately however disoriented to place time and situation            CRANIAL NERVES     CN III, IV, VI   Pupils are equal, round, and reactive to light.     Significant Labs: All pertinent labs within the past 24 hours have been reviewed.  Recent Lab Results  (Last 5 results in the past 24 hours)        07/07/23  2354   07/07/23  2156   07/07/23  2107   07/07/23 2053 07/07/23 2011        Albumin               Alkaline Phosphatase               ALT               Anion Gap               AST               Baso #       0.03         Basophil %       0.6         Beta-Hydroxybutyrate               BILIRUBIN TOTAL               BNP               BUN               Calcium               Chloride               CO2               Creatinine               Differential Method       Automated         eGFR               Eos #       0.1         Eosinophil %       1.3         Glucose               Gran # (ANC)       3.1         Gran %       57.5         Hematocrit       35.2         Hemoglobin       10.8         Immature Grans (Abs)       0.01  Comment: Mild elevation in immature granulocytes is non specific and   can be seen in a variety of conditions including stress response,   acute inflammation, trauma and pregnancy. Correlation with other   laboratory and clinical findings is essential.           Immature Granulocytes       0.2         Lymph #       1.6         Lymph %       29.3         MCH       25.2          MCHC       30.7         MCV       82         Mono #       0.6         Mono %       11.1         MPV       10.4         nRBC       0         Platelets       285         POCT Glucose 39   122   27     113       Potassium               PROTEIN TOTAL               RBC       4.28         RDW       16.9         Sodium               Troponin I               WBC       5.39                                Significant Imaging: I have reviewed all pertinent imaging results/findings within the past 24 hours.  Imaging Results              X-Ray Chest AP Portable (Final result)  Result time 07/07/23 19:19:16      Final result by Izabela Eaton MD (07/07/23 19:19:16)                   Impression:      No acute intrathoracic abnormality detected.      Electronically signed by: Izabela Eaton  Date:    07/07/2023  Time:    19:19               Narrative:    EXAMINATION:  AP PORTABLE CHEST    CLINICAL HISTORY:  hyperglycemia;    TECHNIQUE:  AP portable chest radiograph was submitted.    COMPARISON:  06/18/2023    FINDINGS:  AP portable chest radiograph demonstrates a cardiac silhouette within normal limits.  There is no focal consolidation, pneumothorax, or pleural effusion.                                        Assessment/Plan:     * Hypoglycemia due to type 1 diabetes mellitus  Patient's FSGs are uncontrolled due to hypoglycemia on current medication regimen.  Last A1c reviewed-   Lab Results   Component Value Date    HGBA1C 10.9 (H) 06/13/2023     Most recent fingerstick glucose reviewed-   Recent Labs   Lab 07/07/23  1932 07/07/23 2011 07/07/23 2107 07/07/23  2156   POCTGLUCOSE 28* 113* 27* 122*     Current correctional scale  Low  Maintain anti-hyperglycemic dose as follows-   Antihyperglycemics (From admission, onward)    Start     Stop Route Frequency Ordered    07/07/23 2233  insulin aspart U-100 pen 0-5 Units         -- SubQ Before meals & nightly PRN 07/07/23 2137        Hold Oral hypoglycemics while patient is  in the hospital.    Patient placed on D10 infusion with initial hourly checks x2  Was shown to not be stable with Q 2 checks, upgraded to OBS/ICU for q.1h checks until stable    Intellectual disability  Per chart review    Polysubstance abuse  Denies any current use   Check urine tox    Psychiatric disorder  Chart notes bipolar disorder and schizophrenia  Difficult to determine status due to lethargy of patient, recheck when patient was more alert  Continue home fluoxetine        VTE Risk Mitigation (From admission, onward)         Ordered     IP VTE LOW RISK PATIENT  Once         07/07/23 2137     Place sequential compression device  Until discontinued         07/07/23 2137                     On 07/08/2023, patient should be placed in hospital observation services under my care in collaboration with Cecilia Baca MD.      Kendrick Pittman NP  Department of Hospital Medicine  [facility] - Emergency Dept

## 2023-07-08 NOTE — SUBJECTIVE & OBJECTIVE
"Past Medical History:   Diagnosis Date    Anemia     Bipolar 1 disorder     Diabetes mellitus     Scoliosis        Past Surgical History:   Procedure Laterality Date     SECTION         Review of patient's allergies indicates:   Allergen Reactions    Clove oil Itching    Zofran (as hydrochloride) [ondansetron hcl] Hives       No current facility-administered medications on file prior to encounter.     Current Outpatient Medications on File Prior to Encounter   Medication Sig    insulin aspart U-100 (NOVOLOG) 100 unit/mL (3 mL) InPn pen Inject 3 Units into the skin 3 (three) times daily with meals. (Patient taking differently: Inject 8 Units into the skin 3 (three) times daily with meals.)    insulin degludec (TRESIBA FLEXTOUCH U-100) 100 unit/mL (3 mL) insulin pen Inject into the skin 2 (two) times a day. 8 units SC in the AM and 6 units SC in the PM    blood sugar diagnostic Strp To check BG 4 times daily, to use with insurance preferred meter    lancets 30 gauge Misc To check BG 4 times daily, to use with insurance preferred meter    pen needle, diabetic 32 gauge x 5/32" Ndle Use 4 (four) times daily.    [DISCONTINUED] ferrous sulfate (FEOSOL) 325 mg (65 mg iron) Tab tablet Take 1 tablet by mouth every other day.    [DISCONTINUED] FLUoxetine 20 MG capsule Take 2 capsules (40 mg total) by mouth once daily.    [DISCONTINUED] hydrOXYzine pamoate (VISTARIL) 50 MG Cap Take 50 mg by mouth daily as needed for Anxiety.     Family History    None       Tobacco Use    Smoking status: Every Day    Smokeless tobacco: Never   Substance and Sexual Activity    Alcohol use: Yes    Drug use: Yes     Types: Marijuana    Sexual activity: Never     Review of Systems   Constitutional:  Positive for fatigue. Negative for chills and fever.   HENT:  Negative for congestion and rhinorrhea.    Eyes:  Negative for photophobia and visual disturbance.   Respiratory:  Negative for cough and shortness of breath.  "   Cardiovascular:  Negative for chest pain, palpitations and leg swelling.   Gastrointestinal:  Negative for abdominal pain, diarrhea, nausea and vomiting.   Genitourinary:  Negative for dysuria, frequency and urgency.   Skin:  Negative for pallor, rash and wound.   Neurological:  Positive for weakness. Negative for light-headedness and headaches.   Psychiatric/Behavioral:  Negative for confusion and decreased concentration.    Objective:     Vital Signs (Most Recent):  Temp: 98.6 °F (37 °C) (07/07/23 1836)  Pulse: 85 (07/07/23 2236)  Resp: 14 (07/07/23 2236)  BP: (!) 95/59 (07/07/23 2236)  SpO2: 98 % (07/07/23 1836) Vital Signs (24h Range):  Temp:  [98.6 °F (37 °C)] 98.6 °F (37 °C)  Pulse:  [] 85  Resp:  [14-18] 14  SpO2:  [98 %] 98 %  BP: (91-99)/(54-59) 95/59     Weight: 63.5 kg (140 lb)  Body mass index is 21.93 kg/m².     Physical Exam  Vitals and nursing note reviewed.   Constitutional:       General: She is not in acute distress.     Appearance: She is well-developed.   HENT:      Head: Normocephalic and atraumatic.      Right Ear: External ear normal.      Left Ear: External ear normal.      Nose: Nose normal.   Eyes:      Conjunctiva/sclera: Conjunctivae normal.      Pupils: Pupils are equal, round, and reactive to light.   Cardiovascular:      Rate and Rhythm: Normal rate and regular rhythm.   Pulmonary:      Effort: Pulmonary effort is normal. No respiratory distress.      Breath sounds: Normal breath sounds. No wheezing.   Abdominal:      General: Bowel sounds are normal. There is no distension.      Palpations: Abdomen is soft.      Tenderness: There is no abdominal tenderness.      Comments: No palpable hepatomegaly or splenomegaly    Musculoskeletal:         General: No tenderness. Normal range of motion.      Cervical back: Normal range of motion and neck supple.   Skin:     General: Skin is warm and dry.   Neurological:      Mental Status: She is lethargic and disoriented.   Psychiatric:          Speech: Speech is delayed.         Behavior: Behavior is slowed.         Thought Content: Thought content normal.         Cognition and Memory: Cognition is impaired. Memory is impaired.      Comments: Incredibly lethargic on exam, patient falling asleep mid sentence, responding to questions appropriately however disoriented to place time and situation            CRANIAL NERVES     CN III, IV, VI   Pupils are equal, round, and reactive to light.     Significant Labs: All pertinent labs within the past 24 hours have been reviewed.  Recent Lab Results  (Last 5 results in the past 24 hours)        07/07/23  2354   07/07/23  2156   07/07/23  2107   07/07/23  2053   07/07/23  2011        Albumin               Alkaline Phosphatase               ALT               Anion Gap               AST               Baso #       0.03         Basophil %       0.6         Beta-Hydroxybutyrate               BILIRUBIN TOTAL               BNP               BUN               Calcium               Chloride               CO2               Creatinine               Differential Method       Automated         eGFR               Eos #       0.1         Eosinophil %       1.3         Glucose               Gran # (ANC)       3.1         Gran %       57.5         Hematocrit       35.2         Hemoglobin       10.8         Immature Grans (Abs)       0.01  Comment: Mild elevation in immature granulocytes is non specific and   can be seen in a variety of conditions including stress response,   acute inflammation, trauma and pregnancy. Correlation with other   laboratory and clinical findings is essential.           Immature Granulocytes       0.2         Lymph #       1.6         Lymph %       29.3         MCH       25.2         MCHC       30.7         MCV       82         Mono #       0.6         Mono %       11.1         MPV       10.4         nRBC       0         Platelets       285         POCT Glucose 39   122   27     113       Potassium                PROTEIN TOTAL               RBC       4.28         RDW       16.9         Sodium               Troponin I               WBC       5.39                                Significant Imaging: I have reviewed all pertinent imaging results/findings within the past 24 hours.  Imaging Results              X-Ray Chest AP Portable (Final result)  Result time 07/07/23 19:19:16      Final result by Izabela Eaton MD (07/07/23 19:19:16)                   Impression:      No acute intrathoracic abnormality detected.      Electronically signed by: Izabela Eaton  Date:    07/07/2023  Time:    19:19               Narrative:    EXAMINATION:  AP PORTABLE CHEST    CLINICAL HISTORY:  hyperglycemia;    TECHNIQUE:  AP portable chest radiograph was submitted.    COMPARISON:  06/18/2023    FINDINGS:  AP portable chest radiograph demonstrates a cardiac silhouette within normal limits.  There is no focal consolidation, pneumothorax, or pleural effusion.

## 2023-07-13 ENCOUNTER — PES CALL (OUTPATIENT)
Dept: ADMINISTRATIVE | Facility: CLINIC | Age: 33
End: 2023-07-13
Payer: COMMERCIAL

## 2023-07-19 ENCOUNTER — OFFICE VISIT (OUTPATIENT)
Dept: HOME HEALTH SERVICES | Facility: CLINIC | Age: 33
End: 2023-07-19
Payer: COMMERCIAL

## 2023-07-19 DIAGNOSIS — Z91.148 NONCOMPLIANCE WITH MEDICATION REGIMEN: Primary | ICD-10-CM

## 2023-07-19 DIAGNOSIS — E10.649 HYPOGLYCEMIA DUE TO TYPE 1 DIABETES MELLITUS: ICD-10-CM

## 2023-07-19 PROCEDURE — 1111F PR DISCHARGE MEDS RECONCILED W/ CURRENT OUTPATIENT MED LIST: ICD-10-PCS | Mod: CPTII,S$GLB,, | Performed by: NURSE PRACTITIONER

## 2023-07-19 PROCEDURE — 3046F HEMOGLOBIN A1C LEVEL >9.0%: CPT | Mod: CPTII,S$GLB,, | Performed by: NURSE PRACTITIONER

## 2023-07-19 PROCEDURE — 1160F RVW MEDS BY RX/DR IN RCRD: CPT | Mod: CPTII,S$GLB,, | Performed by: NURSE PRACTITIONER

## 2023-07-19 PROCEDURE — 99350 HOME/RES VST EST HIGH MDM 60: CPT | Mod: S$GLB,,, | Performed by: NURSE PRACTITIONER

## 2023-07-19 PROCEDURE — 1160F PR REVIEW ALL MEDS BY PRESCRIBER/CLIN PHARMACIST DOCUMENTED: ICD-10-PCS | Mod: CPTII,S$GLB,, | Performed by: NURSE PRACTITIONER

## 2023-07-19 PROCEDURE — 99350 PR HOME VISIT,ESTAB PATIENT,LEVEL IV: ICD-10-PCS | Mod: S$GLB,,, | Performed by: NURSE PRACTITIONER

## 2023-07-19 PROCEDURE — 3046F PR MOST RECENT HEMOGLOBIN A1C LEVEL > 9.0%: ICD-10-PCS | Mod: CPTII,S$GLB,, | Performed by: NURSE PRACTITIONER

## 2023-07-19 PROCEDURE — 1159F MED LIST DOCD IN RCRD: CPT | Mod: CPTII,S$GLB,, | Performed by: NURSE PRACTITIONER

## 2023-07-19 PROCEDURE — 1159F PR MEDICATION LIST DOCUMENTED IN MEDICAL RECORD: ICD-10-PCS | Mod: CPTII,S$GLB,, | Performed by: NURSE PRACTITIONER

## 2023-07-19 PROCEDURE — 1111F DSCHRG MED/CURRENT MED MERGE: CPT | Mod: CPTII,S$GLB,, | Performed by: NURSE PRACTITIONER

## 2023-07-24 VITALS — OXYGEN SATURATION: 94 % | TEMPERATURE: 98 F | HEART RATE: 95 BPM

## 2023-07-24 NOTE — ASSESSMENT & PLAN NOTE
--referral to endocrinology placed  --patient remains overall noncompliant with insulin regimen and obtaining blood glucose levels  --refusing home health  --refusing to take blood glucose log  --education done      Hemoglobin A1C   Date Value Ref Range Status   06/13/2023 10.9 (H) 4.0 - 5.6 % Final     Comment:     ADA Screening Guidelines:  5.7-6.4%  Consistent with prediabetes  >or=6.5%  Consistent with diabetes    High levels of fetal hemoglobin interfere with the HbA1C  assay. Heterozygous hemoglobin variants (HbS, HgC, etc)do  not significantly interfere with this assay.   However, presence of multiple variants may affect accuracy.     04/12/2023 11.9 (H) 4.7 - 5.6 % Final   02/17/2023 10.5 (H) 4.7 - 5.6 % Final   01/14/2023 12.8 (H) 4.7 - 5.6 % Final   11/12/2020 9.7 (H) 4.0 - 5.6 % Final     Comment:     ADA Screening Guidelines:  5.7-6.4%  Consistent with prediabetes  >or=6.5%  Consistent with diabetes  High levels of fetal hemoglobin interfere with the HbA1C  assay. Heterozygous hemoglobin variants (HbS, HgC, etc)do  not significantly interfere with this assay.   However, presence of multiple variants may affect accuracy.     03/24/2020 12.9 (H) 4.0 - 5.6 % Final     Comment:     ADA Screening Guidelines:  5.7-6.4%  Consistent with prediabetes  >or=6.5%  Consistent with diabetes  High levels of fetal hemoglobin interfere with the HbA1C  assay. Heterozygous hemoglobin variants (HbS, HgC, etc)do  not significantly interfere with this assay.   However, presence of multiple variants may affect accuracy.

## 2023-07-24 NOTE — PROGRESS NOTES
[facility]  Transition of Care Home Visit    Visit Date: 7/19/2023  Encounter Provider: Montrell Snyder   PCP:  Martin Rios MD    PRESENTING HISTORY      Patient ID: Hollie Wilson is a 33 y.o. female.    Consult Requested By:  Dr. Cecilia Baca  Reason for Consult:  Hospital Follow Up.    Hollie AGUAYO is being seen at home due to being seen at home due to physical debility that presents a taxing effort to leave the home, to mitigate high risk of hospital readmission and/or due to the limited availability of reliable or safe options for transportation to the point of access to the provider. Prior to treatment on this visit the chart was reviewed and patient verbal consent was obtained.      Chief Complaint: Transitional Care        History of Present Illness: Ms. Hollie Wilson is a 33 y.o. female who was recently admitted to the hospital.    Hospital Course:   Patient was admitted to ICU on a D10 drip and glucose was monitored hourly.  On the drip her sugar was stable throughout the night.     Review of the chart showed she was prescribed Levemir and aspart insulin on discharge from [facility] this week.  She also has active prescriptions for Humulin 70/30 from last month, Lantus and lispro from May, Tresiba from 6/10.  Asked patient's  to bring all insulin in the home.     Patient elected to leave A prior to completing therapy. She was continued on her tresiba, her levemir was confiscated and she is to continue on her premeal aspart. Concern for readmit as she is oriented and understanding enough to not be PEC'd but doesn't quite have a full grasp on titration of her medication and sugar checks. I am also concerned about her living environment and how safe it is.        Goals of Care Treatment Preferences:  Code Status: Full Code        Consults:          Consults (From admission, onward)         Status Ordering Provider       Inpatient consult to PICC team (JAIDENS)  Once        Provider:  (Not yet  assigned)    Acknowledged SAYDA COOL             Endocrine  * Type 1 diabetes mellitus with hypoglycemia  - admitted at [facility] from -6/10 for DKA, which was complicated by symptomatic hypoglycemia, and was evaluated by Endocrinology who recommended discharge on Tresiba 8 units every morning with breakfast and 6 units every evening with dinner as well as SSI with meals.  Goal to maintain blood sugars 200-300.  She was admitted again for mild DKA at [facility] from - .  She was discharged on 25 units Levemir daily as well as 6 units aspart t.i.d. with meals.  - Chart review shows she has active prescriptions for Humulin 70/30, Tresiba, Lantus, Levemir, aspart and lispro insulins  - Overnight she required D10 infusion at 150 cc/hour to maintain glucose >70  - Discontinue D10 and continue POCT glucose checks every hour until stable for 6 accuchecks.  - Needs thorough pharmacy review  - Asked her  to bring all the insulin in the home - brought Levemir, Tresiba and aspart insulin.  - Chart review - she was prescribed Tresiba 8 units in the AM and 6 in the evening when she was discharged from [facility] on 2023.  Has follow up appointments scheduled with PCP and endocrinologist.  She is not on any prandial insulin in the meantime, has a history of hypoglycemic episodes.  ___________________________________________________________________    Today: With this visit today patient is found lying in bed with SO present for visit. Patient refusing to take BG log and intermittently compliant with insulin. Patient is AAOx3, able to verify her name and . Education done. Patient endorses ability to perform ADls. Endorses eating x 3 meals per day, daily BMs, and adequate sleep pattern. Patient reluctant to any education. No additional needs at this this time. All of my information was given to the patient and family if any questions or concerns arise.     VSS. Denies fever, chest pain, shortness of  breath, nausea, vomiting, diarrhea. Risks of environmental exposure to coronavirus discussed including: social distancing, hand hygiene, and limiting departures from the home for necessities only.  Reports understanding and willingness to comply.  All hospital discharge orders reviewed and being followed, all medications reconciled and reviewed, patient and family verbalized understanding. No other needs identified at this time.        Review of Systems   Constitutional:  Negative for activity change and appetite change.   HENT:  Negative for congestion and dental problem.    Eyes:  Negative for discharge and itching.   Respiratory:  Negative for choking and chest tightness.    Cardiovascular:  Negative for chest pain and palpitations.   Gastrointestinal:  Negative for rectal pain and vomiting.   Endocrine: Negative for cold intolerance and heat intolerance.   Genitourinary:  Negative for enuresis and flank pain.   Musculoskeletal:  Negative for myalgias and neck pain.   Skin:  Negative for color change and wound.   Allergic/Immunologic: Negative for environmental allergies and food allergies.   Neurological:  Negative for tremors and syncope.   Hematological:  Does not bruise/bleed easily.   Psychiatric/Behavioral:  Negative for decreased concentration and dysphoric mood.      PAST HISTORY:     Past Medical History:   Diagnosis Date    Anemia     Bipolar 1 disorder     Diabetes mellitus     Scoliosis        Past Surgical History:   Procedure Laterality Date     SECTION         No family history on file.    Social History     Socioeconomic History    Marital status: Significant Other   Tobacco Use    Smoking status: Every Day    Smokeless tobacco: Never   Substance and Sexual Activity    Alcohol use: Yes    Drug use: Yes     Types: Marijuana    Sexual activity: Never       MEDICATIONS & ALLERGIES:     Current Outpatient Medications on File Prior to Visit   Medication Sig Dispense Refill    blood sugar  "diagnostic Strp To check BG 4 times daily, to use with insurance preferred meter 200 each 8    insulin aspart U-100 (NOVOLOG) 100 unit/mL (3 mL) InPn pen Inject 3 Units into the skin 3 (three) times daily with meals. 15 mL 3    insulin degludec (TRESIBA FLEXTOUCH U-100) 100 unit/mL (3 mL) insulin pen Inject into the skin 2 (two) times a day. 8 units SC in the AM and 6 units SC in the PM      lancets 30 gauge Misc To check BG 4 times daily, to use with insurance preferred meter 200 each 8    pen needle, diabetic 32 gauge x 5/32" Ndle Use 4 (four) times daily. 200 each 5     No current facility-administered medications on file prior to visit.        Review of patient's allergies indicates:   Allergen Reactions    Clove oil Itching    Zofran (as hydrochloride) [ondansetron hcl] Hives       OBJECTIVE:     Vital Signs:  Vitals:    07/19/23 1600   Pulse: 95   Temp: 97.8 °F (36.6 °C)     There is no height or weight on file to calculate BMI.     Physical Exam:  Physical Exam  Vitals (limited) reviewed.   Constitutional:       Appearance: She is well-developed.   HENT:      Head: Normocephalic and atraumatic.   Eyes:      Pupils: Pupils are equal, round, and reactive to light.   Cardiovascular:      Rate and Rhythm: Normal rate.   Pulmonary:      Effort: Pulmonary effort is normal.      Breath sounds: Normal breath sounds.   Abdominal:      General: Bowel sounds are normal.      Palpations: Abdomen is soft.   Musculoskeletal:         General: Normal range of motion.      Cervical back: Normal range of motion and neck supple.   Skin:     General: Skin is warm and dry.   Neurological:      Mental Status: She is alert. Mental status is at baseline.      GCS: GCS eye subscore is 4. GCS verbal subscore is 5. GCS motor subscore is 6.   Psychiatric:         Attention and Perception: Attention normal.         Mood and Affect: Mood normal.         Speech: Speech normal.     Laboratory  Lab Results   Component Value Date    WBC 5.39 " 07/07/2023    HGB 10.8 (L) 07/07/2023    HCT 35.2 (L) 07/07/2023    MCV 82 07/07/2023     07/07/2023     Lab Results   Component Value Date    INR 0.9 12/06/2018    INR 0.8 02/08/2009     Lab Results   Component Value Date    HGBA1C 10.9 (H) 06/13/2023     No results for input(s): POCTGLUCOSE in the last 72 hours.      TRANSITION OF CARE:     Family and/or Caretaker present at visit?  Yes.  Diagnostic tests reviewed/disposition: No diagnosic tests pending after this hospitalization.  Disease/illness education: Importance of compliance with all prescribed medication and treatments, COVID precautions/Social Distancing/Mask Use  Home health/community services discussion/referrals: Patient does not have home health established from hospital visit.  They refuse need home health.  If needed, we will set up home health for the patient.   Establishment or re-establishment of referral orders for community resources: No other necessary community resources.   Discussion with other health care providers: No discussion with other health care providers necessary.     Transition of Care Visit:  I have reviewed and updated the history and problem list.  I have reconciled the medication list.  I have discussed the hospitalization and current medical issues, prognosis and plans with the patient/family.  I  spent more than 50% of time discussing the care with the patient/family.  Total Face-to-Face Encounter: 60 minutes.    Medications Reconciliation:   I have reconciled the patient's home medications and discharge medications with the patient/family. I have updated all changes.  Refer to After-Visit Medication List.      I have discussed discharge plans, follow-up instructions, future appointments, provider contact information, indicators to seek medical emergency treatment, and advisement to call with additional questions or concerns. Patient and caregiver verbalize understanding.    ASSESSMENT & PLAN:       1. Noncompliance  with medication regimen  Assessment & Plan:  --patient not a great historian; remains noncompliant as she has in the past.  Significant other present for visit in relays to me that she will not take her own blood sugar.  Education done.    Orders:  -     Ambulatory referral/consult to Endocrinology; Future; Expected date: 07/31/2023    2. Hypoglycemia due to type 1 diabetes mellitus  Assessment & Plan:  --referral to endocrinology placed  --patient remains overall noncompliant with insulin regimen and obtaining blood glucose levels  --refusing home health  --refusing to take blood glucose log  --education done      Hemoglobin A1C   Date Value Ref Range Status   06/13/2023 10.9 (H) 4.0 - 5.6 % Final     Comment:     ADA Screening Guidelines:  5.7-6.4%  Consistent with prediabetes  >or=6.5%  Consistent with diabetes    High levels of fetal hemoglobin interfere with the HbA1C  assay. Heterozygous hemoglobin variants (HbS, HgC, etc)do  not significantly interfere with this assay.   However, presence of multiple variants may affect accuracy.     04/12/2023 11.9 (H) 4.7 - 5.6 % Final   02/17/2023 10.5 (H) 4.7 - 5.6 % Final   01/14/2023 12.8 (H) 4.7 - 5.6 % Final   11/12/2020 9.7 (H) 4.0 - 5.6 % Final     Comment:     ADA Screening Guidelines:  5.7-6.4%  Consistent with prediabetes  >or=6.5%  Consistent with diabetes  High levels of fetal hemoglobin interfere with the HbA1C  assay. Heterozygous hemoglobin variants (HbS, HgC, etc)do  not significantly interfere with this assay.   However, presence of multiple variants may affect accuracy.     03/24/2020 12.9 (H) 4.0 - 5.6 % Final     Comment:     ADA Screening Guidelines:  5.7-6.4%  Consistent with prediabetes  >or=6.5%  Consistent with diabetes  High levels of fetal hemoglobin interfere with the HbA1C  assay. Heterozygous hemoglobin variants (HbS, HgC, etc)do  not significantly interfere with this assay.   However, presence of multiple variants may affect accuracy.    "        Orders:  -     Ambulatory referral/consult to [facility]  -     Ambulatory referral/consult to Endocrinology; Future; Expected date: 07/31/2023         Were controlled substances prescribed?  No    Instructions for the patient:    Scheduled Follow-up :  No future appointments.    After Visit Medication List :     Medication List            Accurate as of July 19, 2023 11:59 PM. If you have any questions, ask your nurse or doctor.                CONTINUE taking these medications      BD ULTRA-FINE JENI PEN NEEDLE 32 gauge x 5/32" Ndle  Generic drug: pen needle, diabetic  Use 4 (four) times daily.     insulin aspart U-100 100 unit/mL (3 mL) Inpn pen  Commonly known as: NovoLOG  Inject 3 Units into the skin 3 (three) times daily with meals.     insulin degludec 100 unit/mL (3 mL) insulin pen  Commonly known as: TRESIBA FLEXTOUCH U-100     ONETOUCH DELICA PLUS LANCET 30 gauge Misc  Generic drug: lancets  To check BG 4 times daily, to use with insurance preferred meter     ONETOUCH VERIO TEST STRIPS Strp  Generic drug: blood sugar diagnostic  To check BG 4 times daily, to use with insurance preferred meter              Signature: Montrell Snyder NP    "

## 2023-07-24 NOTE — ASSESSMENT & PLAN NOTE
--patient not a great historian; remains noncompliant as she has in the past.  Significant other present for visit in relays to me that she will not take her own blood sugar.  Education done.

## 2023-07-25 ENCOUNTER — TELEPHONE (OUTPATIENT)
Dept: ADMINISTRATIVE | Facility: HOSPITAL | Age: 33
End: 2023-07-25
Payer: COMMERCIAL

## 2023-08-30 NOTE — OR NURSING
Central line in place. Xray called for line placement. Labwork drawn and sent. Pt tolerated well.   left sided weakness with numbness onset 12Ppm today dizziness x 1hour PTA, last seen normal 6am today

## 2024-02-18 ENCOUNTER — HOSPITAL ENCOUNTER (EMERGENCY)
Facility: OTHER | Age: 34
Discharge: HOME | End: 2024-02-19
Attending: EMERGENCY MEDICINE
Payer: COMMERCIAL

## 2024-02-18 DIAGNOSIS — R53.1 GENERALIZED WEAKNESS: Primary | ICD-10-CM

## 2024-02-18 DIAGNOSIS — R11.2 NAUSEA AND VOMITING, UNSPECIFIED VOMITING TYPE: ICD-10-CM

## 2024-02-18 DIAGNOSIS — E16.2 HYPOGLYCEMIA: ICD-10-CM

## 2024-02-18 DIAGNOSIS — E10.649 TYPE 1 DIABETES MELLITUS WITH HYPOGLYCEMIA AND WITHOUT COMA: ICD-10-CM

## 2024-02-18 PROCEDURE — 99284 EMERGENCY DEPT VISIT MOD MDM: CPT

## 2024-02-18 PROCEDURE — 87635 SARS-COV-2 COVID-19 AMP PRB: CPT | Performed by: EMERGENCY MEDICINE

## 2024-02-18 RX ORDER — METOCLOPRAMIDE HYDROCHLORIDE 5 MG/ML
10 INJECTION INTRAMUSCULAR; INTRAVENOUS
Status: COMPLETED | OUTPATIENT
Start: 2024-02-19 | End: 2024-02-19

## 2024-02-19 VITALS
HEART RATE: 82 BPM | DIASTOLIC BLOOD PRESSURE: 80 MMHG | OXYGEN SATURATION: 100 % | TEMPERATURE: 98 F | SYSTOLIC BLOOD PRESSURE: 123 MMHG | RESPIRATION RATE: 18 BRPM

## 2024-02-19 PROBLEM — Z59.00 HOMELESS: Status: RESOLVED | Noted: 2020-03-24 | Resolved: 2024-02-19

## 2024-02-19 PROBLEM — E10.649 HYPOGLYCEMIA DUE TO TYPE 1 DIABETES MELLITUS: Status: RESOLVED | Noted: 2018-12-23 | Resolved: 2024-02-19

## 2024-02-19 PROBLEM — E86.0 DEHYDRATION: Status: RESOLVED | Noted: 2018-12-13 | Resolved: 2024-02-19

## 2024-02-19 LAB
ALBUMIN SERPL BCP-MCNC: 4 G/DL (ref 3.5–5.2)
ALP SERPL-CCNC: 100 U/L (ref 55–135)
ALT SERPL W/O P-5'-P-CCNC: 10 U/L (ref 10–44)
AMPHET+METHAMPHET UR QL: NEGATIVE
ANION GAP SERPL CALC-SCNC: 9 MMOL/L (ref 8–16)
APAP SERPL-MCNC: <3 UG/ML (ref 10–20)
AST SERPL-CCNC: 12 U/L (ref 10–40)
BARBITURATES UR QL SCN>200 NG/ML: NEGATIVE
BASOPHILS # BLD AUTO: 0.04 K/UL (ref 0–0.2)
BASOPHILS NFR BLD: 0.4 % (ref 0–1.9)
BENZODIAZ UR QL SCN>200 NG/ML: NEGATIVE
BILIRUB SERPL-MCNC: 0.3 MG/DL (ref 0.1–1)
BILIRUB UR QL STRIP: NEGATIVE
BUN SERPL-MCNC: 14 MG/DL (ref 6–20)
BZE UR QL SCN: NEGATIVE
CALCIUM SERPL-MCNC: 9.2 MG/DL (ref 8.7–10.5)
CANNABINOIDS UR QL SCN: ABNORMAL
CHLORIDE SERPL-SCNC: 108 MMOL/L (ref 95–110)
CLARITY UR: CLEAR
CO2 SERPL-SCNC: 23 MMOL/L (ref 23–29)
COLOR UR: YELLOW
CREAT SERPL-MCNC: 0.8 MG/DL (ref 0.5–1.4)
CREAT UR-MCNC: 31.6 MG/DL (ref 15–325)
CTP QC/QA: YES
CTP QC/QA: YES
DIFFERENTIAL METHOD BLD: ABNORMAL
EOSINOPHIL # BLD AUTO: 0.3 K/UL (ref 0–0.5)
EOSINOPHIL NFR BLD: 3 % (ref 0–8)
ERYTHROCYTE [DISTWIDTH] IN BLOOD BY AUTOMATED COUNT: 17.6 % (ref 11.5–14.5)
EST. GFR  (NO RACE VARIABLE): >60 ML/MIN/1.73 M^2
ETHANOL SERPL-MCNC: <10 MG/DL
GLUCOSE SERPL-MCNC: 72 MG/DL (ref 70–110)
GLUCOSE UR QL STRIP: NEGATIVE
HCG INTACT+B SERPL-ACNC: <1.2 MIU/ML
HCT VFR BLD AUTO: 38.5 % (ref 37–48.5)
HGB BLD-MCNC: 12 G/DL (ref 12–16)
HGB UR QL STRIP: NEGATIVE
IMM GRANULOCYTES # BLD AUTO: 0.04 K/UL (ref 0–0.04)
IMM GRANULOCYTES NFR BLD AUTO: 0.4 % (ref 0–0.5)
KETONES UR QL STRIP: NEGATIVE
LEUKOCYTE ESTERASE UR QL STRIP: NEGATIVE
LYMPHOCYTES # BLD AUTO: 2 K/UL (ref 1–4.8)
LYMPHOCYTES NFR BLD: 18.9 % (ref 18–48)
MCH RBC QN AUTO: 24.8 PG (ref 27–31)
MCHC RBC AUTO-ENTMCNC: 31.2 G/DL (ref 32–36)
MCV RBC AUTO: 80 FL (ref 82–98)
METHADONE UR QL SCN>300 NG/ML: NEGATIVE
MONOCYTES # BLD AUTO: 0.6 K/UL (ref 0.3–1)
MONOCYTES NFR BLD: 6 % (ref 4–15)
NEUTROPHILS # BLD AUTO: 7.4 K/UL (ref 1.8–7.7)
NEUTROPHILS NFR BLD: 71.3 % (ref 38–73)
NITRITE UR QL STRIP: NEGATIVE
NRBC BLD-RTO: 0 /100 WBC
OPIATES UR QL SCN: NEGATIVE
PCP UR QL SCN>25 NG/ML: NEGATIVE
PH UR STRIP: 8 [PH] (ref 5–8)
PLATELET # BLD AUTO: 260 K/UL (ref 150–450)
PMV BLD AUTO: 10.8 FL (ref 9.2–12.9)
POC MOLECULAR INFLUENZA A AGN: NEGATIVE
POC MOLECULAR INFLUENZA B AGN: NEGATIVE
POCT GLUCOSE: 59 MG/DL (ref 70–110)
POCT GLUCOSE: 84 MG/DL (ref 70–110)
POTASSIUM SERPL-SCNC: 3.5 MMOL/L (ref 3.5–5.1)
PROT SERPL-MCNC: 7.6 G/DL (ref 6–8.4)
PROT UR QL STRIP: NEGATIVE
RBC # BLD AUTO: 4.83 M/UL (ref 4–5.4)
SALICYLATES SERPL-MCNC: <5 MG/DL (ref 15–30)
SARS-COV-2 RDRP RESP QL NAA+PROBE: NEGATIVE
SODIUM SERPL-SCNC: 140 MMOL/L (ref 136–145)
SP GR UR STRIP: 1.02 (ref 1–1.03)
TOXICOLOGY INFORMATION: ABNORMAL
TSH SERPL DL<=0.005 MIU/L-ACNC: 2.17 UIU/ML (ref 0.4–4)
URN SPEC COLLECT METH UR: NORMAL
UROBILINOGEN UR STRIP-ACNC: NEGATIVE EU/DL
WBC # BLD AUTO: 10.41 K/UL (ref 3.9–12.7)

## 2024-02-19 PROCEDURE — 81003 URINALYSIS AUTO W/O SCOPE: CPT | Mod: 59 | Performed by: EMERGENCY MEDICINE

## 2024-02-19 PROCEDURE — 93005 ELECTROCARDIOGRAM TRACING: CPT

## 2024-02-19 PROCEDURE — 80053 COMPREHEN METABOLIC PANEL: CPT | Performed by: EMERGENCY MEDICINE

## 2024-02-19 PROCEDURE — 80179 DRUG ASSAY SALICYLATE: CPT | Performed by: EMERGENCY MEDICINE

## 2024-02-19 PROCEDURE — 96375 TX/PRO/DX INJ NEW DRUG ADDON: CPT

## 2024-02-19 PROCEDURE — 93010 ELECTROCARDIOGRAM REPORT: CPT | Mod: ,,, | Performed by: INTERNAL MEDICINE

## 2024-02-19 PROCEDURE — 82962 GLUCOSE BLOOD TEST: CPT

## 2024-02-19 PROCEDURE — 84443 ASSAY THYROID STIM HORMONE: CPT | Performed by: EMERGENCY MEDICINE

## 2024-02-19 PROCEDURE — 25000003 PHARM REV CODE 250: Performed by: EMERGENCY MEDICINE

## 2024-02-19 PROCEDURE — 80307 DRUG TEST PRSMV CHEM ANLYZR: CPT | Performed by: EMERGENCY MEDICINE

## 2024-02-19 PROCEDURE — 80143 DRUG ASSAY ACETAMINOPHEN: CPT | Performed by: EMERGENCY MEDICINE

## 2024-02-19 PROCEDURE — 96374 THER/PROPH/DIAG INJ IV PUSH: CPT

## 2024-02-19 PROCEDURE — 96361 HYDRATE IV INFUSION ADD-ON: CPT

## 2024-02-19 PROCEDURE — 36415 COLL VENOUS BLD VENIPUNCTURE: CPT | Performed by: EMERGENCY MEDICINE

## 2024-02-19 PROCEDURE — 63600175 PHARM REV CODE 636 W HCPCS: Performed by: EMERGENCY MEDICINE

## 2024-02-19 PROCEDURE — 82077 ASSAY SPEC XCP UR&BREATH IA: CPT | Performed by: EMERGENCY MEDICINE

## 2024-02-19 PROCEDURE — 85025 COMPLETE CBC W/AUTO DIFF WBC: CPT | Performed by: EMERGENCY MEDICINE

## 2024-02-19 PROCEDURE — 84702 CHORIONIC GONADOTROPIN TEST: CPT | Performed by: EMERGENCY MEDICINE

## 2024-02-19 RX ORDER — BLOOD-GLUCOSE SENSOR
1 EACH MISCELLANEOUS
Qty: 1 EACH | Refills: 0 | Status: SHIPPED | OUTPATIENT
Start: 2024-02-19

## 2024-02-19 RX ORDER — BLOOD-GLUCOSE,RECEIVER,CONT
1 EACH MISCELLANEOUS
Qty: 1 EACH | Refills: 0 | Status: SHIPPED | OUTPATIENT
Start: 2024-02-19 | End: 2025-02-18

## 2024-02-19 RX ORDER — PEN NEEDLE, DIABETIC 30 GX3/16"
1 NEEDLE, DISPOSABLE MISCELLANEOUS 4 TIMES DAILY
Qty: 200 EACH | Refills: 2 | Status: ON HOLD | OUTPATIENT
Start: 2024-02-19 | End: 2024-05-19 | Stop reason: HOSPADM

## 2024-02-19 RX ORDER — PROMETHAZINE HYDROCHLORIDE 25 MG/1
25 SUPPOSITORY RECTAL EVERY 6 HOURS PRN
Qty: 10 SUPPOSITORY | Refills: 0 | Status: SHIPPED | OUTPATIENT
Start: 2024-02-19

## 2024-02-19 RX ORDER — METOCLOPRAMIDE 10 MG/1
10 TABLET ORAL EVERY 6 HOURS PRN
Qty: 10 TABLET | Refills: 0 | Status: ON HOLD | OUTPATIENT
Start: 2024-02-19 | End: 2024-05-19

## 2024-02-19 RX ORDER — BLOOD-GLUCOSE CONTROL, NORMAL
EACH MISCELLANEOUS
Qty: 200 EACH | Refills: 2 | Status: SHIPPED | OUTPATIENT
Start: 2024-02-19

## 2024-02-19 RX ORDER — IBUPROFEN 200 MG
16 TABLET ORAL
Qty: 12 TABLET | Refills: 2 | Status: SHIPPED | OUTPATIENT
Start: 2024-02-19 | End: 2025-02-18

## 2024-02-19 RX ORDER — INSULIN DEGLUDEC 100 U/ML
INJECTION, SOLUTION SUBCUTANEOUS
Qty: 6 ML | Refills: 2 | Status: ON HOLD | OUTPATIENT
Start: 2024-02-19 | End: 2024-04-22 | Stop reason: HOSPADM

## 2024-02-19 RX ORDER — INSULIN ASPART 100 [IU]/ML
3 INJECTION, SOLUTION INTRAVENOUS; SUBCUTANEOUS
Qty: 6 ML | Refills: 2 | Status: SHIPPED | OUTPATIENT
Start: 2024-02-19 | End: 2024-05-08 | Stop reason: SDUPTHER

## 2024-02-19 RX ADMIN — SODIUM CHLORIDE 1000 ML: 9 INJECTION, SOLUTION INTRAVENOUS at 12:02

## 2024-02-19 RX ADMIN — METOCLOPRAMIDE 10 MG: 5 INJECTION, SOLUTION INTRAMUSCULAR; INTRAVENOUS at 12:02

## 2024-02-19 RX ADMIN — DEXTROSE MONOHYDRATE 125 ML: 100 INJECTION, SOLUTION INTRAVENOUS at 02:02
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
## 2024-02-19 NOTE — ED NOTES
[facility]. [place] contacted. Reports pt can arrive and seek shelter with discharge papers. Pt provided with discharge papers. Will order Lyft.

## 2024-02-20 LAB
OHS QRS DURATION: 124 MS
OHS QTC CALCULATION: 534 MS

## 2024-03-26 ENCOUNTER — HOSPITAL ENCOUNTER (EMERGENCY)
Facility: HOSPITAL | Age: 34
Discharge: HOME | End: 2024-03-26
Attending: EMERGENCY MEDICINE
Payer: COMMERCIAL

## 2024-03-26 VITALS
SYSTOLIC BLOOD PRESSURE: 119 MMHG | OXYGEN SATURATION: 100 % | TEMPERATURE: 98 F | DIASTOLIC BLOOD PRESSURE: 73 MMHG | HEART RATE: 86 BPM | RESPIRATION RATE: 18 BRPM | HEIGHT: 67 IN | BODY MASS INDEX: 21.19 KG/M2 | WEIGHT: 135 LBS

## 2024-03-26 DIAGNOSIS — N76.0 BACTERIAL VAGINOSIS: ICD-10-CM

## 2024-03-26 DIAGNOSIS — Z71.1 CONCERN ABOUT STD IN FEMALE WITHOUT DIAGNOSIS: Primary | ICD-10-CM

## 2024-03-26 DIAGNOSIS — K59.00 CONSTIPATION, UNSPECIFIED CONSTIPATION TYPE: ICD-10-CM

## 2024-03-26 DIAGNOSIS — B96.89 BACTERIAL VAGINOSIS: ICD-10-CM

## 2024-03-26 DIAGNOSIS — A59.9 TRICHOMONIASIS: ICD-10-CM

## 2024-03-26 LAB
ALBUMIN SERPL BCP-MCNC: 4.2 G/DL (ref 3.5–5.2)
ALP SERPL-CCNC: 100 U/L (ref 55–135)
ALT SERPL W/O P-5'-P-CCNC: 12 U/L (ref 10–44)
ANION GAP SERPL CALC-SCNC: 7 MMOL/L (ref 8–16)
AST SERPL-CCNC: 17 U/L (ref 10–40)
B-HCG UR QL: NEGATIVE
BACTERIA #/AREA URNS HPF: NORMAL /HPF
BACTERIA GENITAL QL WET PREP: ABNORMAL
BASOPHILS # BLD AUTO: 0.04 K/UL (ref 0–0.2)
BASOPHILS NFR BLD: 0.7 % (ref 0–1.9)
BILIRUB SERPL-MCNC: 0.6 MG/DL (ref 0.1–1)
BILIRUB UR QL STRIP: NEGATIVE
BUN SERPL-MCNC: 13 MG/DL (ref 6–20)
CALCIUM SERPL-MCNC: 9.7 MG/DL (ref 8.7–10.5)
CHLORIDE SERPL-SCNC: 107 MMOL/L (ref 95–110)
CLARITY UR: CLEAR
CLUE CELLS VAG QL WET PREP: ABNORMAL
CO2 SERPL-SCNC: 23 MMOL/L (ref 23–29)
COLOR UR: YELLOW
CREAT SERPL-MCNC: 1 MG/DL (ref 0.5–1.4)
CTP QC/QA: YES
DIFFERENTIAL METHOD BLD: ABNORMAL
EOSINOPHIL # BLD AUTO: 0.4 K/UL (ref 0–0.5)
EOSINOPHIL NFR BLD: 7.2 % (ref 0–8)
ERYTHROCYTE [DISTWIDTH] IN BLOOD BY AUTOMATED COUNT: 16.2 % (ref 11.5–14.5)
EST. GFR  (NO RACE VARIABLE): >60 ML/MIN/1.73 M^2
FILAMENT FUNGI VAG WET PREP-#/AREA: ABNORMAL
GLUCOSE SERPL-MCNC: 169 MG/DL (ref 70–110)
GLUCOSE UR QL STRIP: ABNORMAL
HCT VFR BLD AUTO: 38.5 % (ref 37–48.5)
HGB BLD-MCNC: 11.7 G/DL (ref 12–16)
HGB UR QL STRIP: NEGATIVE
IMM GRANULOCYTES # BLD AUTO: 0.02 K/UL (ref 0–0.04)
IMM GRANULOCYTES NFR BLD AUTO: 0.4 % (ref 0–0.5)
KETONES UR QL STRIP: ABNORMAL
LEUKOCYTE ESTERASE UR QL STRIP: NEGATIVE
LIPASE SERPL-CCNC: 15 U/L (ref 4–60)
LYMPHOCYTES # BLD AUTO: 2.6 K/UL (ref 1–4.8)
LYMPHOCYTES NFR BLD: 48.1 % (ref 18–48)
MCH RBC QN AUTO: 25.4 PG (ref 27–31)
MCHC RBC AUTO-ENTMCNC: 30.4 G/DL (ref 32–36)
MCV RBC AUTO: 84 FL (ref 82–98)
MICROSCOPIC COMMENT: NORMAL
MONOCYTES # BLD AUTO: 0.4 K/UL (ref 0.3–1)
MONOCYTES NFR BLD: 7.2 % (ref 4–15)
NEUTROPHILS # BLD AUTO: 2 K/UL (ref 1.8–7.7)
NEUTROPHILS NFR BLD: 36.4 % (ref 38–73)
NITRITE UR QL STRIP: NEGATIVE
NRBC BLD-RTO: 0 /100 WBC
PH UR STRIP: 6 [PH] (ref 5–8)
PLATELET # BLD AUTO: 243 K/UL (ref 150–450)
PMV BLD AUTO: 11 FL (ref 9.2–12.9)
POTASSIUM SERPL-SCNC: 4.3 MMOL/L (ref 3.5–5.1)
PROT SERPL-MCNC: 7.9 G/DL (ref 6–8.4)
PROT UR QL STRIP: NEGATIVE
RBC # BLD AUTO: 4.6 M/UL (ref 4–5.4)
SODIUM SERPL-SCNC: 137 MMOL/L (ref 136–145)
SP GR UR STRIP: 1.02 (ref 1–1.03)
SPECIMEN SOURCE: ABNORMAL
SQUAMOUS #/AREA URNS HPF: 4 /HPF
T VAGINALIS GENITAL QL WET PREP: ABNORMAL
URN SPEC COLLECT METH UR: ABNORMAL
UROBILINOGEN UR STRIP-ACNC: NEGATIVE EU/DL
WBC # BLD AUTO: 5.45 K/UL (ref 3.9–12.7)
WBC #/AREA VAG WET PREP: ABNORMAL
YEAST GENITAL QL WET PREP: ABNORMAL
YEAST URNS QL MICRO: NORMAL

## 2024-03-26 PROCEDURE — 63600175 PHARM REV CODE 636 W HCPCS

## 2024-03-26 PROCEDURE — 87491 CHLMYD TRACH DNA AMP PROBE: CPT

## 2024-03-26 PROCEDURE — 85025 COMPLETE CBC W/AUTO DIFF WBC: CPT | Performed by: EMERGENCY MEDICINE

## 2024-03-26 PROCEDURE — 83690 ASSAY OF LIPASE: CPT | Performed by: EMERGENCY MEDICINE

## 2024-03-26 PROCEDURE — 96372 THER/PROPH/DIAG INJ SC/IM: CPT

## 2024-03-26 PROCEDURE — 81000 URINALYSIS NONAUTO W/SCOPE: CPT

## 2024-03-26 PROCEDURE — 87210 SMEAR WET MOUNT SALINE/INK: CPT

## 2024-03-26 PROCEDURE — 81025 URINE PREGNANCY TEST: CPT

## 2024-03-26 PROCEDURE — 99285 EMERGENCY DEPT VISIT HI MDM: CPT | Mod: 25

## 2024-03-26 PROCEDURE — 80053 COMPREHEN METABOLIC PANEL: CPT | Performed by: EMERGENCY MEDICINE

## 2024-03-26 RX ORDER — KETOROLAC TROMETHAMINE 30 MG/ML
15 INJECTION, SOLUTION INTRAMUSCULAR; INTRAVENOUS
Status: DISCONTINUED | OUTPATIENT
Start: 2024-03-26 | End: 2024-03-26

## 2024-03-26 RX ORDER — METRONIDAZOLE 500 MG/1
500 TABLET ORAL 2 TIMES DAILY
Qty: 14 TABLET | Refills: 0 | Status: SHIPPED | OUTPATIENT
Start: 2024-03-26 | End: 2024-04-02

## 2024-03-26 RX ORDER — METRONIDAZOLE 500 MG/1
500 TABLET ORAL 2 TIMES DAILY
Qty: 14 TABLET | Refills: 0 | Status: SHIPPED | OUTPATIENT
Start: 2024-03-26 | End: 2024-03-26

## 2024-03-26 RX ORDER — DOXYCYCLINE 100 MG/1
100 CAPSULE ORAL 2 TIMES DAILY
Qty: 14 CAPSULE | Refills: 0 | Status: SHIPPED | OUTPATIENT
Start: 2024-03-26 | End: 2024-04-02

## 2024-03-26 RX ORDER — DOXYCYCLINE 100 MG/1
100 CAPSULE ORAL 2 TIMES DAILY
Qty: 14 CAPSULE | Refills: 0 | Status: SHIPPED | OUTPATIENT
Start: 2024-03-26 | End: 2024-03-26

## 2024-03-26 RX ORDER — KETOROLAC TROMETHAMINE 30 MG/ML
15 INJECTION, SOLUTION INTRAMUSCULAR; INTRAVENOUS
Status: COMPLETED | OUTPATIENT
Start: 2024-03-26 | End: 2024-03-26

## 2024-03-26 RX ORDER — POLYETHYLENE GLYCOL 3350 17 G/17G
17 POWDER, FOR SOLUTION ORAL DAILY
Qty: 30 EACH | Refills: 0 | Status: SHIPPED | OUTPATIENT
Start: 2024-03-26

## 2024-03-26 RX ORDER — POLYETHYLENE GLYCOL 3350 17 G/17G
17 POWDER, FOR SOLUTION ORAL DAILY
Qty: 30 EACH | Refills: 0 | Status: SHIPPED | OUTPATIENT
Start: 2024-03-26 | End: 2024-03-26

## 2024-03-26 RX ADMIN — KETOROLAC TROMETHAMINE 15 MG: 30 INJECTION, SOLUTION INTRAMUSCULAR at 09:03

## 2024-03-27 NOTE — DISCHARGE INSTRUCTIONS
You were seen in the emergency department today and diagnosed with constipation, Trichomonas, bacterial vaginosis.  Please take MiraLax daily for constipation.  Please take Flagyl and doxycycline twice daily for 7 days to treat Trichomonas and bacterial vaginosis as well as empiric treatment of chlamydia.  You were given a shot of Rocephin in the emergency department for empiric treatment of gonorrhea.  Please do not engage in sexual activity for 2 weeks.  Notify all of your sexual partners of positive result of Trichomonas.  You will receive a call if abnormal results for your gonorrhea and chlamydia testing.  Follow-up with your primary care provider.  I have also referred you to an OBGYN to establish care.  Return to the ER for any new or worsening symptoms.  Thank you for allowing me to care for you today.  Feel better.

## 2024-03-27 NOTE — ED TRIAGE NOTES
Pt presents to ED with complaint of abdominal pain that radiates to her right lower back with urinary hesitance sine this morning. Pt is also complaining of lower back pain.

## 2024-03-27 NOTE — ED PROVIDER NOTES
"Encounter Date: 3/26/2024    SCRIBE #1 NOTE: I, Asiajanette Berg, am scribing for, and in the presence of,  Abdirashid Rodas PA-C. I have scribed the following portions of the note - Other sections scribed: HPI, ROS, PE.       History     Chief Complaint   Patient presents with    Abdominal Pain     Pt reports right lower abd pain that radiates to her right lower back accompanied by urinary hesitancy and malodorous urine since this morning. Pt denies fevers.     Back Pain     Patient is a 33 y.o. female with a past medical history of diabetes, psychiatric disorder who presents to the Emergency Department for evaluation of atraumatic right flank pain radiating down to the abdomen x 2 days.  She also reports associated symptoms of dysuria, malodorous urine and "white creamy" vaginal discharge. Reports urinary frequency as well. She has not taken any medications for the symptoms. No history of kidney stones. Patient reports concern for STIs. She has had only 1 partner in the last year and denies a history of STI's. She denies fever, chills, nausea, or vomiting.  Denies headache, chest pain, shortness of breath.  No other associated symptoms.     The history is provided by the patient.     Review of patient's allergies indicates:   Allergen Reactions    Clove oil Itching    Zofran (as hydrochloride) [ondansetron hcl] Hives     Past Medical History:   Diagnosis Date    Anemia     Borderline intellectual functioning 2017    Diabetes mellitus     Mood disorder     Scoliosis     Substance use disorder      Past Surgical History:   Procedure Laterality Date     SECTION       No family history on file.  Social History     Tobacco Use    Smoking status: Every Day    Smokeless tobacco: Never   Substance Use Topics    Alcohol use: Yes    Drug use: Yes     Types: Marijuana     Review of Systems   Constitutional:  Negative for chills and fever.   Respiratory:  Negative for shortness of breath.    Cardiovascular:  " "Negative for chest pain.   Gastrointestinal:  Positive for abdominal pain. Negative for nausea and vomiting.   Genitourinary:  Positive for dysuria, flank pain (right side; radiating down), frequency and vaginal discharge ("white creamy"). Negative for hematuria.   Musculoskeletal:  Negative for neck pain and neck stiffness.   Skin:  Negative for rash.   Neurological:  Negative for dizziness, light-headedness and headaches.       Physical Exam     Initial Vitals [03/26/24 1747]   BP Pulse Resp Temp SpO2   119/73 86 18 98.3 °F (36.8 °C) 100 %      MAP       --         Physical Exam    Nursing note and vitals reviewed.  Constitutional: She appears well-developed and well-nourished.   HENT:   Head: Normocephalic and atraumatic.   Right Ear: External ear normal.   Left Ear: External ear normal.   Neck: Carotid bruit is not present.   Normal range of motion.  Cardiovascular:  Normal rate, regular rhythm, normal heart sounds and intact distal pulses.     Exam reveals no gallop and no friction rub.       No murmur heard.  Pulmonary/Chest: Breath sounds normal. No respiratory distress. She has no wheezes. She has no rhonchi. She has no rales.   Abdominal: Abdomen is soft. Bowel sounds are normal. She exhibits no distension. There is no abdominal tenderness.   There is right CVA tenderness.There is no rebound and no guarding.   Musculoskeletal:         General: Normal range of motion.      Cervical back: Normal range of motion.     Neurological: She is alert and oriented to person, place, and time. GCS score is 15. GCS eye subscore is 4. GCS verbal subscore is 5. GCS motor subscore is 6.   Psychiatric: She has a normal mood and affect.         ED Course   Procedures  Labs Reviewed   VAGINAL SCREEN - Abnormal; Notable for the following components:       Result Value    Trichomonas Few (*)     Clue Cells Moderate (*)     Bacteria - Vaginal Screen Moderate (*)     All other components within normal limits    Narrative:     Self " swab  Release to patient->Immediate   URINALYSIS, REFLEX TO URINE CULTURE - Abnormal; Notable for the following components:    Glucose, UA 4+ (*)     Ketones, UA Trace (*)     All other components within normal limits    Narrative:     Specimen Source->Urine   COMPREHENSIVE METABOLIC PANEL - Abnormal; Notable for the following components:    Glucose 169 (*)     Anion Gap 7 (*)     All other components within normal limits   CBC W/ AUTO DIFFERENTIAL - Abnormal; Notable for the following components:    Hemoglobin 11.7 (*)     MCH 25.4 (*)     MCHC 30.4 (*)     RDW 16.2 (*)     Gran % 36.4 (*)     Lymph % 48.1 (*)     All other components within normal limits   C. TRACHOMATIS/N. GONORRHOEAE BY AMP DNA   URINALYSIS MICROSCOPIC    Narrative:     Specimen Source->Urine   LIPASE   POCT URINE PREGNANCY          Imaging Results              CT Renal Stone Study ABD Pelvis WO (Final result)  Result time 03/26/24 20:01:21      Final result by Izabela Eaton MD (03/26/24 20:01:21)                   Impression:      No nephrolithiasis or hydronephrosis. Mild circumferential urinary bladder wall thickening.  Findings may be related to under distension and or acute cystitis.  Recommend correlation with urinalysis.    Moderately large fecal material.  Question constipation.    Mild atherosclerosis.      Electronically signed by: Izabela Eaton  Date:    03/26/2024  Time:    20:01               Narrative:    EXAMINATION:  CT RENAL STONE STUDY ABDOMEN PELVIS WITHOUT    CLINICAL HISTORY:  Right lower flank pain that radiates to the right lower back accompanied by urinary has intense C and malodorous urine since this morning.    TECHNIQUE:  5 mm unenhanced axial images from the lung bases through the greater trochanters were performed.  Coronal and sagittal reformatted images were provided.    COMPARISON:  None.    FINDINGS:  Within the limits of a noncontrast examination, the liver, spleen, pancreas, and adrenal glands are  unremarkable.  The gallbladder contains no calcified gallstones.    There is no nephrolithiasis or hydronephrosis.    There is no gross abdominal adenopathy or ascites.  Mild atherosclerotic changes are present, which is greater than expected for age.    There is mild circumferential urinary bladder wall thickening in the nearly decompressed urinary bladder.  There are no pelvic masses or adenopathy.  There is no free pelvic fluid.  Moderately large fecal material is seen within the colon.  The appendix is not inflamed.    The lung bases are essentially clear.                                       Medications   lactated ringers bolus 1,000 mL (has no administration in time range)   cefTRIAXone (Rocephin) 500 mg in LIDOcaine HCL 10 mg/ml (1%) 2 mL IM only syringe (has no administration in time range)   ketorolac injection 15 mg (15 mg Intramuscular Given 3/26/24 2121)     Medical Decision Making  This is an emergent evaluation of a 33-year-old female with a past medical history of diabetes, psychiatric disorder who presents to the emergency department for evaluation of atraumatic right flank pain that radiates down the abdomen x2 days.    Patient looks well.  There is right-sided CVA tenderness.  Abdomen is otherwise soft, nondistended, nontender, normal bowel sounds. Regular rate rhythm without murmurs.  No carotid bruits appreciated on exam. Lungs are clear to auscultation bilaterally.      Differential diagnosis includes but is not limited to ureterolithiasis, pyelonephritis, UTI, STI, appendicitis, cholecystitis, GERD/gastritis, pancreatitis, bowel obstruction.    Workup initiated with basic labs, lipase, urinalysis, UPT, GC urine, vaginal screen, CT renal stone study.  Ordered fluids.  Ordered Toradol.  Obtaining  were difficult due to patient pulling out IV.  Venipuncture had to be performed by laboratory.  Toradol with switch to IM injection.  CBC is without leukocytosis, hemoglobin of 11.7.  CMP with normal  renal function, glucose of 169, no other abnormalities.  Lipase within normal limits, pancreatitis unlikely.  Vaginal screen showing Trichomonas and clue cells.  Urinalysis showing 4+ glucose, trace ketones, no signs of infection, negative for nitrites or leukocytes.  GC urine pending.  CT scan shows constipation and some thickening in the bladder consistent with an infection.  I have informed the patient of results.  She would like empiric treatment for gonorrhea and chlamydia.  Ordered Rocephin here in the emergency room.  Will discharge home with doxycycline and metronidazole.  Instructed patient to refrain from sexual activity for 2 weeks, and to notify all sexual partners.  Will place ambulatory referral to OBGYN for patient to establish care.  She will also follow up with primary care provider.  No emergent pathology. Patient is very well appearing, and in no acute distress. Vital signs are reassuring here in the emergency department, patient is afebrile, breathing comfortable, satting 100 % on room air. Patient/Caregiver is stable for discharge at this time. Patient/Caregiver verbalize understanding of care plan. All questions and concerns were addressed. Discussed strict return precautions with the patient/caregiver. Instructed follow up with primary care provider within 1 week.      Abdirashid Rodas PA-C    DISCLAIMER: This note was prepared with [employer] voice recognition transcription software. Garbled syntax, mangled pronouns, and other bizarre constructions may be attributed to that software system.     Amount and/or Complexity of Data Reviewed  Labs: ordered. Decision-making details documented in ED Course.  Radiology: ordered.    Risk  OTC drugs.  Prescription drug management.            Scribe Attestation:   Scribe #1: I performed the above scribed service and the documentation accurately describes the services I performed. I attest to the accuracy of the note.        ED Course as of 03/26/24 6803   Tue  Mar 26, 2024   2046 Notified by RN that patient was a hard stick. Ultrasound guided IV was placed. Patient being very problematic. She pulled the IV out. States it hurt. Not wanting another IV. I have discussed that I can not treat her without the work up. She understands. [TM]   2151 Vaginal Screen(!)  Significant for trichomoniasis, bacterial vaginosis.   currently in room trying to obtain labs. [TM]      ED Course User Index  [TM] Abdirashid Rodas PA-C                     IAbdirashid PA-C, personally performed the services described in this documentation. All medical record entries made by the scribe were at my direction and in my presence. I have reviewed the chart and agree that the record reflects my personal performance and is accurate and complete.       Clinical Impression:  Final diagnoses:  [Z71.1] Concern about STD in female without diagnosis (Primary)  [A59.9] Trichomoniasis  [N76.0, B96.89] Bacterial vaginosis  [K59.00] Constipation, unspecified constipation type          ED Disposition Condition    Discharge Stable          ED Prescriptions       Medication Sig Dispense Start Date End Date Auth. Provider    polyethylene glycol (GLYCOLAX) 17 gram PwPk Take 17 g by mouth once daily. 30 each 3/26/2024 -- Abdirashid Rodas PA-C    metroNIDAZOLE (FLAGYL) 500 MG tablet Take 1 tablet (500 mg total) by mouth 2 (two) times a day. for 7 days 14 tablet 3/26/2024 4/2/2024 Abdirashid Rodas PA-C    doxycycline (VIBRAMYCIN) 100 MG Cap Take 1 capsule (100 mg total) by mouth 2 (two) times daily. for 7 days 14 capsule 3/26/2024 4/2/2024 Abdirashid Rodas PA-C          Follow-up Information       Follow up With Specialties Details Why Contact Info    Martin Rios MD Internal Medicine   [address] [facility] [address]  [phone]      [facility] - Emergency Dept Emergency Medicine Go to  As needed, If symptoms worsen, or new symptoms develop [address] [facility] [address]  [phone]             Abdirashid Rodas PA-C  03/26/24 2130

## 2024-03-28 LAB
C TRACH DNA SPEC QL NAA+PROBE: NOT DETECTED
N GONORRHOEA DNA SPEC QL NAA+PROBE: NOT DETECTED

## 2024-04-11 ENCOUNTER — HOSPITAL ENCOUNTER (INPATIENT)
Facility: HOSPITAL | Age: 34
LOS: 2 days | Discharge: HOME | DRG: 638 | End: 2024-04-14
Attending: EMERGENCY MEDICINE | Admitting: INTERNAL MEDICINE
Payer: COMMERCIAL

## 2024-04-11 DIAGNOSIS — R73.9 HYPERGLYCEMIA: ICD-10-CM

## 2024-04-11 DIAGNOSIS — R11.10 EMESIS: ICD-10-CM

## 2024-04-11 DIAGNOSIS — E11.10 DKA (DIABETIC KETOACIDOSIS): ICD-10-CM

## 2024-04-11 DIAGNOSIS — E10.10 DIABETIC KETOACIDOSIS WITHOUT COMA ASSOCIATED WITH TYPE 1 DIABETES MELLITUS: Primary | ICD-10-CM

## 2024-04-11 PROCEDURE — 99285 EMERGENCY DEPT VISIT HI MDM: CPT | Mod: 25

## 2024-04-11 PROCEDURE — 82962 GLUCOSE BLOOD TEST: CPT

## 2024-04-11 PROCEDURE — 93005 ELECTROCARDIOGRAM TRACING: CPT

## 2024-04-11 PROCEDURE — 96375 TX/PRO/DX INJ NEW DRUG ADDON: CPT

## 2024-04-11 PROCEDURE — 96374 THER/PROPH/DIAG INJ IV PUSH: CPT

## 2024-04-11 PROCEDURE — 93010 ELECTROCARDIOGRAM REPORT: CPT | Mod: 76,,, | Performed by: INTERNAL MEDICINE

## 2024-04-11 PROCEDURE — 81025 URINE PREGNANCY TEST: CPT | Performed by: EMERGENCY MEDICINE

## 2024-04-11 PROCEDURE — 96361 HYDRATE IV INFUSION ADD-ON: CPT

## 2024-04-11 RX ORDER — DROPERIDOL 2.5 MG/ML
0.62 INJECTION, SOLUTION INTRAMUSCULAR; INTRAVENOUS ONCE
Status: COMPLETED | OUTPATIENT
Start: 2024-04-11 | End: 2024-04-12

## 2024-04-11 RX ORDER — PANTOPRAZOLE SODIUM 40 MG/10ML
80 INJECTION, POWDER, LYOPHILIZED, FOR SOLUTION INTRAVENOUS
Status: COMPLETED | OUTPATIENT
Start: 2024-04-11 | End: 2024-04-12

## 2024-04-12 PROBLEM — K31.84 GASTROPARESIS: Status: ACTIVE | Noted: 2024-04-12

## 2024-04-12 LAB
ALBUMIN SERPL BCP-MCNC: 4.4 G/DL (ref 3.5–5.2)
ALLENS TEST: ABNORMAL
ALLENS TEST: ABNORMAL
ALP SERPL-CCNC: 156 U/L (ref 55–135)
ALT SERPL W/O P-5'-P-CCNC: 18 U/L (ref 10–44)
AMPHET+METHAMPHET UR QL: NEGATIVE
ANION GAP SERPL CALC-SCNC: 11 MMOL/L (ref 8–16)
ANION GAP SERPL CALC-SCNC: 16 MMOL/L (ref 8–16)
ANION GAP SERPL CALC-SCNC: 23 MMOL/L (ref 8–16)
ANION GAP SERPL CALC-SCNC: 30 MMOL/L (ref 8–16)
ANION GAP SERPL CALC-SCNC: 7 MMOL/L (ref 8–16)
AST SERPL-CCNC: 24 U/L (ref 10–40)
B-HCG UR QL: NEGATIVE
B-HCG UR QL: NEGATIVE
B-OH-BUTYR BLD STRIP-SCNC: 6.9 MMOL/L (ref 0–0.5)
BACTERIA #/AREA URNS AUTO: NORMAL /HPF
BARBITURATES UR QL SCN>200 NG/ML: NEGATIVE
BASOPHILS # BLD AUTO: 0.07 K/UL (ref 0–0.2)
BASOPHILS NFR BLD: 0.5 % (ref 0–1.9)
BENZODIAZ UR QL SCN>200 NG/ML: NEGATIVE
BILIRUB SERPL-MCNC: 0.3 MG/DL (ref 0.1–1)
BILIRUB UR QL STRIP: NEGATIVE
BUN SERPL-MCNC: 19 MG/DL (ref 6–20)
BUN SERPL-MCNC: 23 MG/DL (ref 6–20)
BUN SERPL-MCNC: 24 MG/DL (ref 6–20)
BUN SERPL-MCNC: 28 MG/DL (ref 6–20)
BUN SERPL-MCNC: 30 MG/DL (ref 6–20)
BUN SERPL-MCNC: 37 MG/DL (ref 6–30)
BZE UR QL SCN: NEGATIVE
CALCIUM SERPL-MCNC: 10.4 MG/DL (ref 8.7–10.5)
CALCIUM SERPL-MCNC: 8.8 MG/DL (ref 8.7–10.5)
CALCIUM SERPL-MCNC: 8.9 MG/DL (ref 8.7–10.5)
CALCIUM SERPL-MCNC: 9 MG/DL (ref 8.7–10.5)
CALCIUM SERPL-MCNC: 9.9 MG/DL (ref 8.7–10.5)
CANNABINOIDS UR QL SCN: ABNORMAL
CHLORIDE SERPL-SCNC: 103 MMOL/L (ref 95–110)
CHLORIDE SERPL-SCNC: 108 MMOL/L (ref 95–110)
CHLORIDE SERPL-SCNC: 110 MMOL/L (ref 95–110)
CHLORIDE SERPL-SCNC: 112 MMOL/L (ref 95–110)
CHLORIDE SERPL-SCNC: 113 MMOL/L (ref 95–110)
CHLORIDE SERPL-SCNC: 115 MMOL/L (ref 95–110)
CLARITY UR REFRACT.AUTO: CLEAR
CO2 SERPL-SCNC: 10 MMOL/L (ref 23–29)
CO2 SERPL-SCNC: 12 MMOL/L (ref 23–29)
CO2 SERPL-SCNC: 16 MMOL/L (ref 23–29)
CO2 SERPL-SCNC: 19 MMOL/L (ref 23–29)
CO2 SERPL-SCNC: 7 MMOL/L (ref 23–29)
COLOR UR AUTO: YELLOW
CREAT SERPL-MCNC: 0.9 MG/DL (ref 0.5–1.4)
CREAT SERPL-MCNC: 0.9 MG/DL (ref 0.5–1.4)
CREAT SERPL-MCNC: 1.2 MG/DL (ref 0.5–1.4)
CREAT SERPL-MCNC: 1.3 MG/DL (ref 0.5–1.4)
CREAT SERPL-MCNC: 1.4 MG/DL (ref 0.5–1.4)
CREAT SERPL-MCNC: 1.5 MG/DL (ref 0.5–1.4)
CREAT UR-MCNC: 21 MG/DL (ref 15–325)
CTP QC/QA: YES
DELSYS: ABNORMAL
DIFFERENTIAL METHOD BLD: ABNORMAL
EOSINOPHIL # BLD AUTO: 0 K/UL (ref 0–0.5)
EOSINOPHIL NFR BLD: 0.1 % (ref 0–8)
ERYTHROCYTE [DISTWIDTH] IN BLOOD BY AUTOMATED COUNT: 16.1 % (ref 11.5–14.5)
EST. GFR  (NO RACE VARIABLE): 46.3 ML/MIN/1.73 M^2
EST. GFR  (NO RACE VARIABLE): 50.3 ML/MIN/1.73 M^2
EST. GFR  (NO RACE VARIABLE): 55 ML/MIN/1.73 M^2
EST. GFR  (NO RACE VARIABLE): >60 ML/MIN/1.73 M^2
EST. GFR  (NO RACE VARIABLE): >60 ML/MIN/1.73 M^2
ESTIMATED AVG GLUCOSE: 226 MG/DL (ref 68–131)
GLUCOSE SERPL-MCNC: 150 MG/DL (ref 70–110)
GLUCOSE SERPL-MCNC: 170 MG/DL (ref 70–110)
GLUCOSE SERPL-MCNC: 194 MG/DL (ref 70–110)
GLUCOSE SERPL-MCNC: 275 MG/DL (ref 70–110)
GLUCOSE SERPL-MCNC: 495 MG/DL (ref 70–110)
GLUCOSE SERPL-MCNC: 512 MG/DL (ref 70–110)
GLUCOSE UR QL STRIP: ABNORMAL
HBA1C MFR BLD: 9.5 % (ref 4–5.6)
HCO3 UR-SCNC: 15.6 MMOL/L (ref 24–28)
HCO3 UR-SCNC: 9.6 MMOL/L (ref 24–28)
HCT VFR BLD AUTO: 40.2 % (ref 37–48.5)
HCT VFR BLD CALC: 43 %PCV (ref 36–54)
HCV AB SERPL QL IA: NORMAL
HGB BLD-MCNC: 11.9 G/DL (ref 12–16)
HGB UR QL STRIP: NEGATIVE
HIV 1+2 AB+HIV1 P24 AG SERPL QL IA: NORMAL
IMM GRANULOCYTES # BLD AUTO: 0.06 K/UL (ref 0–0.04)
IMM GRANULOCYTES NFR BLD AUTO: 0.4 % (ref 0–0.5)
KETONES UR QL STRIP: ABNORMAL
LEUKOCYTE ESTERASE UR QL STRIP: NEGATIVE
LIPASE SERPL-CCNC: 22 U/L (ref 4–60)
LYMPHOCYTES # BLD AUTO: 1.2 K/UL (ref 1–4.8)
LYMPHOCYTES NFR BLD: 9.1 % (ref 18–48)
MAGNESIUM SERPL-MCNC: 1.8 MG/DL (ref 1.6–2.6)
MAGNESIUM SERPL-MCNC: 1.8 MG/DL (ref 1.6–2.6)
MAGNESIUM SERPL-MCNC: 1.9 MG/DL (ref 1.6–2.6)
MAGNESIUM SERPL-MCNC: 2 MG/DL (ref 1.6–2.6)
MCH RBC QN AUTO: 25.7 PG (ref 27–31)
MCHC RBC AUTO-ENTMCNC: 29.6 G/DL (ref 32–36)
MCV RBC AUTO: 87 FL (ref 82–98)
METHADONE UR QL SCN>300 NG/ML: NEGATIVE
MICROSCOPIC COMMENT: NORMAL
MONOCYTES # BLD AUTO: 0.5 K/UL (ref 0.3–1)
MONOCYTES NFR BLD: 3.9 % (ref 4–15)
NEUTROPHILS # BLD AUTO: 11.5 K/UL (ref 1.8–7.7)
NEUTROPHILS NFR BLD: 86 % (ref 38–73)
NITRITE UR QL STRIP: NEGATIVE
NRBC BLD-RTO: 0 /100 WBC
OHS QRS DURATION: 122 MS
OHS QRS DURATION: 126 MS
OHS QTC CALCULATION: 520 MS
OHS QTC CALCULATION: 524 MS
OPIATES UR QL SCN: NEGATIVE
PCO2 BLDA: 26 MMHG (ref 35–45)
PCO2 BLDA: 33.5 MMHG (ref 35–45)
PCP UR QL SCN>25 NG/ML: NEGATIVE
PH SMN: 7.18 [PH] (ref 7.35–7.45)
PH SMN: 7.28 [PH] (ref 7.35–7.45)
PH UR STRIP: 5 [PH] (ref 5–8)
PHOSPHATE SERPL-MCNC: 2 MG/DL (ref 2.7–4.5)
PHOSPHATE SERPL-MCNC: 2.3 MG/DL (ref 2.7–4.5)
PHOSPHATE SERPL-MCNC: 3 MG/DL (ref 2.7–4.5)
PHOSPHATE SERPL-MCNC: 3.8 MG/DL (ref 2.7–4.5)
PLATELET # BLD AUTO: 347 K/UL (ref 150–450)
PMV BLD AUTO: 12.2 FL (ref 9.2–12.9)
PO2 BLDA: 43 MMHG (ref 40–60)
PO2 BLDA: 46 MMHG (ref 40–60)
POC BE: -11 MMOL/L
POC BE: -19 MMOL/L
POC IONIZED CALCIUM: 1.25 MMOL/L (ref 1.06–1.42)
POC SATURATED O2: 72 % (ref 95–100)
POC SATURATED O2: 73 % (ref 95–100)
POC TCO2 (MEASURED): 12 MMOL/L (ref 23–29)
POC TCO2: 10 MMOL/L (ref 24–29)
POC TCO2: 17 MMOL/L (ref 24–29)
POCT GLUCOSE: 145 MG/DL (ref 70–110)
POCT GLUCOSE: 159 MG/DL (ref 70–110)
POCT GLUCOSE: 160 MG/DL (ref 70–110)
POCT GLUCOSE: 168 MG/DL (ref 70–110)
POCT GLUCOSE: 169 MG/DL (ref 70–110)
POCT GLUCOSE: 171 MG/DL (ref 70–110)
POCT GLUCOSE: 177 MG/DL (ref 70–110)
POCT GLUCOSE: 179 MG/DL (ref 70–110)
POCT GLUCOSE: 186 MG/DL (ref 70–110)
POCT GLUCOSE: 193 MG/DL (ref 70–110)
POCT GLUCOSE: 201 MG/DL (ref 70–110)
POCT GLUCOSE: 205 MG/DL (ref 70–110)
POCT GLUCOSE: 210 MG/DL (ref 70–110)
POCT GLUCOSE: 235 MG/DL (ref 70–110)
POCT GLUCOSE: 275 MG/DL (ref 70–110)
POCT GLUCOSE: 388 MG/DL (ref 70–110)
POCT GLUCOSE: 457 MG/DL (ref 70–110)
POCT GLUCOSE: 466 MG/DL (ref 70–110)
POCT GLUCOSE: 476 MG/DL (ref 70–110)
POTASSIUM BLD-SCNC: 5.2 MMOL/L (ref 3.5–5.1)
POTASSIUM SERPL-SCNC: 4.1 MMOL/L (ref 3.5–5.1)
POTASSIUM SERPL-SCNC: 4.8 MMOL/L (ref 3.5–5.1)
POTASSIUM SERPL-SCNC: 4.9 MMOL/L (ref 3.5–5.1)
POTASSIUM SERPL-SCNC: 4.9 MMOL/L (ref 3.5–5.1)
POTASSIUM SERPL-SCNC: 5.6 MMOL/L (ref 3.5–5.1)
PROT SERPL-MCNC: 9 G/DL (ref 6–8.4)
PROT UR QL STRIP: ABNORMAL
RBC # BLD AUTO: 4.63 M/UL (ref 4–5.4)
RBC #/AREA URNS AUTO: 3 /HPF (ref 0–4)
SAMPLE: ABNORMAL
SITE: ABNORMAL
SITE: ABNORMAL
SODIUM BLD-SCNC: 136 MMOL/L (ref 136–145)
SODIUM SERPL-SCNC: 139 MMOL/L (ref 136–145)
SODIUM SERPL-SCNC: 139 MMOL/L (ref 136–145)
SODIUM SERPL-SCNC: 140 MMOL/L (ref 136–145)
SODIUM SERPL-SCNC: 143 MMOL/L (ref 136–145)
SODIUM SERPL-SCNC: 143 MMOL/L (ref 136–145)
SP GR UR STRIP: 1.02 (ref 1–1.03)
SQUAMOUS #/AREA URNS AUTO: 1 /HPF
TOXICOLOGY INFORMATION: ABNORMAL
URN SPEC COLLECT METH UR: ABNORMAL
WBC # BLD AUTO: 13.35 K/UL (ref 3.9–12.7)
WBC #/AREA URNS AUTO: 0 /HPF (ref 0–5)
YEAST UR QL AUTO: NORMAL

## 2024-04-12 PROCEDURE — 80053 COMPREHEN METABOLIC PANEL: CPT | Performed by: EMERGENCY MEDICINE

## 2024-04-12 PROCEDURE — 99900035 HC TECH TIME PER 15 MIN (STAT)

## 2024-04-12 PROCEDURE — 25000003 PHARM REV CODE 250

## 2024-04-12 PROCEDURE — 94761 N-INVAS EAR/PLS OXIMETRY MLT: CPT | Mod: XB

## 2024-04-12 PROCEDURE — 25000003 PHARM REV CODE 250: Performed by: INTERNAL MEDICINE

## 2024-04-12 PROCEDURE — C9113 INJ PANTOPRAZOLE SODIUM, VIA: HCPCS | Performed by: EMERGENCY MEDICINE

## 2024-04-12 PROCEDURE — 82010 KETONE BODYS QUAN: CPT | Performed by: EMERGENCY MEDICINE

## 2024-04-12 PROCEDURE — 63600175 PHARM REV CODE 636 W HCPCS

## 2024-04-12 PROCEDURE — 63600175 PHARM REV CODE 636 W HCPCS: Performed by: STUDENT IN AN ORGANIZED HEALTH CARE EDUCATION/TRAINING PROGRAM

## 2024-04-12 PROCEDURE — 82803 BLOOD GASES ANY COMBINATION: CPT

## 2024-04-12 PROCEDURE — 86803 HEPATITIS C AB TEST: CPT | Performed by: PHYSICIAN ASSISTANT

## 2024-04-12 PROCEDURE — 80048 BASIC METABOLIC PNL TOTAL CA: CPT | Mod: 91,XB

## 2024-04-12 PROCEDURE — 25000003 PHARM REV CODE 250: Performed by: EMERGENCY MEDICINE

## 2024-04-12 PROCEDURE — 83690 ASSAY OF LIPASE: CPT | Performed by: EMERGENCY MEDICINE

## 2024-04-12 PROCEDURE — 84100 ASSAY OF PHOSPHORUS: CPT

## 2024-04-12 PROCEDURE — C9113 INJ PANTOPRAZOLE SODIUM, VIA: HCPCS

## 2024-04-12 PROCEDURE — S5010 5% DEXTROSE AND 0.45% SALINE: HCPCS | Performed by: EMERGENCY MEDICINE

## 2024-04-12 PROCEDURE — 80307 DRUG TEST PRSMV CHEM ANLYZR: CPT | Performed by: EMERGENCY MEDICINE

## 2024-04-12 PROCEDURE — 81001 URINALYSIS AUTO W/SCOPE: CPT | Mod: XB | Performed by: EMERGENCY MEDICINE

## 2024-04-12 PROCEDURE — 87389 HIV-1 AG W/HIV-1&-2 AB AG IA: CPT | Performed by: PHYSICIAN ASSISTANT

## 2024-04-12 PROCEDURE — 83036 HEMOGLOBIN GLYCOSYLATED A1C: CPT

## 2024-04-12 PROCEDURE — 85025 COMPLETE CBC W/AUTO DIFF WBC: CPT | Performed by: EMERGENCY MEDICINE

## 2024-04-12 PROCEDURE — 76937 US GUIDE VASCULAR ACCESS: CPT

## 2024-04-12 PROCEDURE — 11000001 HC ACUTE MED/SURG PRIVATE ROOM

## 2024-04-12 PROCEDURE — 81025 URINE PREGNANCY TEST: CPT | Performed by: INTERNAL MEDICINE

## 2024-04-12 PROCEDURE — 83735 ASSAY OF MAGNESIUM: CPT | Mod: 91

## 2024-04-12 PROCEDURE — 63600175 PHARM REV CODE 636 W HCPCS: Performed by: EMERGENCY MEDICINE

## 2024-04-12 RX ORDER — MUPIROCIN 20 MG/G
OINTMENT TOPICAL 2 TIMES DAILY
Status: DISCONTINUED | OUTPATIENT
Start: 2024-04-12 | End: 2024-04-14 | Stop reason: HOSPADM

## 2024-04-12 RX ORDER — ENOXAPARIN SODIUM 100 MG/ML
40 INJECTION SUBCUTANEOUS EVERY 24 HOURS
Status: DISCONTINUED | OUTPATIENT
Start: 2024-04-12 | End: 2024-04-14 | Stop reason: HOSPADM

## 2024-04-12 RX ORDER — PROCHLORPERAZINE EDISYLATE 5 MG/ML
2.5 INJECTION INTRAMUSCULAR; INTRAVENOUS EVERY 6 HOURS PRN
Status: DISCONTINUED | OUTPATIENT
Start: 2024-04-12 | End: 2024-04-14 | Stop reason: HOSPADM

## 2024-04-12 RX ORDER — PANTOPRAZOLE SODIUM 40 MG/10ML
40 INJECTION, POWDER, LYOPHILIZED, FOR SOLUTION INTRAVENOUS DAILY
Status: DISCONTINUED | OUTPATIENT
Start: 2024-04-12 | End: 2024-04-12

## 2024-04-12 RX ORDER — DEXTROSE MONOHYDRATE 100 MG/ML
INJECTION, SOLUTION INTRAVENOUS
Status: DISCONTINUED | OUTPATIENT
Start: 2024-04-12 | End: 2024-04-12

## 2024-04-12 RX ORDER — PANTOPRAZOLE SODIUM 40 MG/1
40 TABLET, DELAYED RELEASE ORAL DAILY
Status: DISCONTINUED | OUTPATIENT
Start: 2024-04-13 | End: 2024-04-14 | Stop reason: HOSPADM

## 2024-04-12 RX ORDER — SODIUM,POTASSIUM PHOSPHATES 280-250MG
2 POWDER IN PACKET (EA) ORAL EVERY 4 HOURS
Status: DISCONTINUED | OUTPATIENT
Start: 2024-04-12 | End: 2024-04-12

## 2024-04-12 RX ORDER — INSULIN ASPART 100 [IU]/ML
0-10 INJECTION, SOLUTION INTRAVENOUS; SUBCUTANEOUS
Status: DISCONTINUED | OUTPATIENT
Start: 2024-04-12 | End: 2024-04-14 | Stop reason: HOSPADM

## 2024-04-12 RX ORDER — SODIUM CHLORIDE 9 MG/ML
1000 INJECTION, SOLUTION INTRAVENOUS CONTINUOUS
Status: DISCONTINUED | OUTPATIENT
Start: 2024-04-12 | End: 2024-04-12

## 2024-04-12 RX ORDER — SODIUM CHLORIDE 0.9 % (FLUSH) 0.9 %
10 SYRINGE (ML) INJECTION
Status: DISCONTINUED | OUTPATIENT
Start: 2024-04-12 | End: 2024-04-12

## 2024-04-12 RX ORDER — METOCLOPRAMIDE HYDROCHLORIDE 5 MG/ML
10 INJECTION INTRAMUSCULAR; INTRAVENOUS
Status: COMPLETED | OUTPATIENT
Start: 2024-04-12 | End: 2024-04-12

## 2024-04-12 RX ORDER — INSULIN GLARGINE 100 [IU]/ML
5 INJECTION, SOLUTION SUBCUTANEOUS DAILY
Status: ON HOLD | COMMUNITY
Start: 2024-03-29 | End: 2024-04-14 | Stop reason: HOSPADM

## 2024-04-12 RX ORDER — DEXTROSE, SODIUM CHLORIDE, SODIUM LACTATE, POTASSIUM CHLORIDE, AND CALCIUM CHLORIDE 5; .6; .31; .03; .02 G/100ML; G/100ML; G/100ML; G/100ML; G/100ML
INJECTION, SOLUTION INTRAVENOUS CONTINUOUS
Status: DISCONTINUED | OUTPATIENT
Start: 2024-04-12 | End: 2024-04-12

## 2024-04-12 RX ORDER — RISPERIDONE 0.25 MG/1
1 TABLET ORAL 2 TIMES DAILY
Status: DISCONTINUED | OUTPATIENT
Start: 2024-04-12 | End: 2024-04-14 | Stop reason: HOSPADM

## 2024-04-12 RX ORDER — GABAPENTIN 300 MG/1
1 CAPSULE ORAL 3 TIMES DAILY
COMMUNITY
Start: 2024-03-21

## 2024-04-12 RX ORDER — ACETAMINOPHEN 500 MG
1000 TABLET ORAL EVERY 6 HOURS PRN
Status: DISCONTINUED | OUTPATIENT
Start: 2024-04-12 | End: 2024-04-14 | Stop reason: HOSPADM

## 2024-04-12 RX ORDER — FLUOXETINE HYDROCHLORIDE 20 MG/1
60 CAPSULE ORAL DAILY
Status: DISCONTINUED | OUTPATIENT
Start: 2024-04-12 | End: 2024-04-14 | Stop reason: HOSPADM

## 2024-04-12 RX ORDER — RISPERIDONE 1 MG/1
1 TABLET ORAL 2 TIMES DAILY
COMMUNITY
Start: 2024-03-21 | End: 2024-04-28

## 2024-04-12 RX ORDER — LORAZEPAM 2 MG/ML
0.5 INJECTION INTRAMUSCULAR ONCE AS NEEDED
Status: COMPLETED | OUTPATIENT
Start: 2024-04-12 | End: 2024-04-12

## 2024-04-12 RX ORDER — SODIUM CHLORIDE 0.9 % (FLUSH) 0.9 %
10 SYRINGE (ML) INJECTION
Status: DISCONTINUED | OUTPATIENT
Start: 2024-04-12 | End: 2024-04-14 | Stop reason: HOSPADM

## 2024-04-12 RX ORDER — PROCHLORPERAZINE MALEATE 5 MG
5 TABLET ORAL 3 TIMES DAILY PRN
Status: DISCONTINUED | OUTPATIENT
Start: 2024-04-12 | End: 2024-04-12

## 2024-04-12 RX ORDER — PROCHLORPERAZINE EDISYLATE 5 MG/ML
10 INJECTION INTRAMUSCULAR; INTRAVENOUS EVERY 6 HOURS PRN
Status: DISCONTINUED | OUTPATIENT
Start: 2024-04-12 | End: 2024-04-12

## 2024-04-12 RX ORDER — FLUOXETINE HYDROCHLORIDE 20 MG/1
3 CAPSULE ORAL DAILY
COMMUNITY
Start: 2024-03-05

## 2024-04-12 RX ORDER — SODIUM CHLORIDE, SODIUM LACTATE, POTASSIUM CHLORIDE, CALCIUM CHLORIDE 600; 310; 30; 20 MG/100ML; MG/100ML; MG/100ML; MG/100ML
INJECTION, SOLUTION INTRAVENOUS CONTINUOUS
Status: DISCONTINUED | OUTPATIENT
Start: 2024-04-12 | End: 2024-04-12

## 2024-04-12 RX ORDER — IBUPROFEN 200 MG
16 TABLET ORAL
Status: DISCONTINUED | OUTPATIENT
Start: 2024-04-12 | End: 2024-04-14 | Stop reason: HOSPADM

## 2024-04-12 RX ORDER — IBUPROFEN 200 MG
24 TABLET ORAL
Status: DISCONTINUED | OUTPATIENT
Start: 2024-04-12 | End: 2024-04-14 | Stop reason: HOSPADM

## 2024-04-12 RX ORDER — PANTOPRAZOLE SODIUM 40 MG/1
1 TABLET, DELAYED RELEASE ORAL EVERY MORNING
Status: ON HOLD | COMMUNITY
Start: 2024-03-21 | End: 2024-05-19

## 2024-04-12 RX ORDER — DEXTROSE MONOHYDRATE AND SODIUM CHLORIDE 5; .45 G/100ML; G/100ML
INJECTION, SOLUTION INTRAVENOUS CONTINUOUS PRN
Status: DISCONTINUED | OUTPATIENT
Start: 2024-04-12 | End: 2024-04-12

## 2024-04-12 RX ORDER — INSULIN LISPRO 100 [IU]/ML
INJECTION, SOLUTION INTRAVENOUS; SUBCUTANEOUS
Status: ON HOLD | COMMUNITY
Start: 2024-02-02 | End: 2024-04-22 | Stop reason: HOSPADM

## 2024-04-12 RX ORDER — GLUCAGON 1 MG
1 KIT INJECTION
Status: DISCONTINUED | OUTPATIENT
Start: 2024-04-12 | End: 2024-04-14 | Stop reason: HOSPADM

## 2024-04-12 RX ORDER — SODIUM,POTASSIUM PHOSPHATES 280-250MG
1 POWDER IN PACKET (EA) ORAL ONCE
Status: COMPLETED | OUTPATIENT
Start: 2024-04-13 | End: 2024-04-13

## 2024-04-12 RX ORDER — PROCHLORPERAZINE EDISYLATE 5 MG/ML
10 INJECTION INTRAMUSCULAR; INTRAVENOUS ONCE
Status: COMPLETED | OUTPATIENT
Start: 2024-04-12 | End: 2024-04-12

## 2024-04-12 RX ADMIN — ACETAMINOPHEN 1000 MG: 500 TABLET ORAL at 09:04

## 2024-04-12 RX ADMIN — INSULIN HUMAN 0.1 UNITS/KG/HR: 1 INJECTION, SOLUTION INTRAVENOUS at 02:04

## 2024-04-12 RX ADMIN — LORAZEPAM 0.5 MG: 2 INJECTION INTRAMUSCULAR; INTRAVENOUS at 07:04

## 2024-04-12 RX ADMIN — DROPERIDOL 0.62 MG: 2.5 INJECTION, SOLUTION INTRAMUSCULAR; INTRAVENOUS at 12:04

## 2024-04-12 RX ADMIN — ENOXAPARIN SODIUM 40 MG: 40 INJECTION SUBCUTANEOUS at 05:04

## 2024-04-12 RX ADMIN — PROCHLORPERAZINE EDISYLATE 10 MG: 5 INJECTION INTRAMUSCULAR; INTRAVENOUS at 06:04

## 2024-04-12 RX ADMIN — INSULIN ASPART 2 UNITS: 100 INJECTION, SOLUTION INTRAVENOUS; SUBCUTANEOUS at 10:04

## 2024-04-12 RX ADMIN — POTASSIUM & SODIUM PHOSPHATES POWDER PACK 280-160-250 MG 2 PACKET: 280-160-250 PACK at 08:04

## 2024-04-12 RX ADMIN — PROCHLORPERAZINE EDISYLATE 2.5 MG: 5 INJECTION INTRAMUSCULAR; INTRAVENOUS at 02:04

## 2024-04-12 RX ADMIN — PANTOPRAZOLE SODIUM 80 MG: 40 INJECTION, POWDER, FOR SOLUTION INTRAVENOUS at 12:04

## 2024-04-12 RX ADMIN — METOCLOPRAMIDE 10 MG: 5 INJECTION, SOLUTION INTRAMUSCULAR; INTRAVENOUS at 02:04

## 2024-04-12 RX ADMIN — MUPIROCIN: 20 OINTMENT TOPICAL at 08:04

## 2024-04-12 RX ADMIN — SODIUM CHLORIDE 1000 ML: 0.9 INJECTION, SOLUTION INTRAVENOUS at 12:04

## 2024-04-12 RX ADMIN — PANTOPRAZOLE SODIUM 40 MG: 40 INJECTION, POWDER, FOR SOLUTION INTRAVENOUS at 08:04

## 2024-04-12 RX ADMIN — INSULIN DETEMIR 6 UNITS: 100 INJECTION, SOLUTION SUBCUTANEOUS at 05:04

## 2024-04-12 RX ADMIN — SODIUM CHLORIDE 1000 ML: 9 INJECTION, SOLUTION INTRAVENOUS at 04:04

## 2024-04-12 RX ADMIN — SODIUM CHLORIDE, POTASSIUM CHLORIDE, SODIUM LACTATE AND CALCIUM CHLORIDE: 600; 310; 30; 20 INJECTION, SOLUTION INTRAVENOUS at 07:04

## 2024-04-12 RX ADMIN — INSULIN HUMAN 0.2 UNITS/KG/HR: 1 INJECTION, SOLUTION INTRAVENOUS at 06:04

## 2024-04-12 RX ADMIN — DEXTROSE AND SODIUM CHLORIDE: 5; 450 INJECTION, SOLUTION INTRAVENOUS at 07:04

## 2024-04-12 RX ADMIN — FLUOXETINE HYDROCHLORIDE 60 MG: 20 CAPSULE ORAL at 09:04

## 2024-04-12 RX ADMIN — SODIUM CHLORIDE, SODIUM LACTATE, POTASSIUM CHLORIDE, CALCIUM CHLORIDE AND DEXTROSE MONOHYDRATE: 5; 600; 310; 30; 20 INJECTION, SOLUTION INTRAVENOUS at 05:04

## 2024-04-12 RX ADMIN — SODIUM CHLORIDE, SODIUM LACTATE, POTASSIUM CHLORIDE, CALCIUM CHLORIDE AND DEXTROSE MONOHYDRATE: 5; 600; 310; 30; 20 INJECTION, SOLUTION INTRAVENOUS at 08:04

## 2024-04-12 RX ADMIN — PROCHLORPERAZINE MALEATE 5 MG: 5 TABLET ORAL at 01:04

## 2024-04-12 RX ADMIN — INSULIN HUMAN 0.05 UNITS/KG/HR: 1 INJECTION, SOLUTION INTRAVENOUS at 07:04

## 2024-04-12 NOTE — ASSESSMENT & PLAN NOTE
History of gastroparesis on home reglan.     --Check Qtc  --PRN antiemetics  --NPO in setting of DKA  --Concern for coffee ground emesis in ED, will continue PPI and trend CBC

## 2024-04-12 NOTE — AI DETERIORATION ALERT
"RAPID RESPONSE NURSE AI ALERT       AI alert received.    Chart Reviewed: 04/12/2024, 5:20 AM    MRN: [id number]  Bed: ED 15/15    Dx: DKA (diabetic ketoacidosis)    Hollie Wilson has a past medical history of Anemia, Borderline intellectual functioning, Diabetes mellitus, Mood disorder, Scoliosis, and Substance use disorder.    Last VS: BP (!) 145/67 (BP Location: Right arm, Patient Position: Lying)   Pulse 110   Temp 98.9 °F (37.2 °C) (Oral)   Resp (!) 25   Ht 5' 7" (1.702 m)   Wt 61.2 kg (135 lb)   SpO2 100%   BMI 21.14 kg/m²     24H Vital Sign Range:  Temp:  [98.9 °F (37.2 °C)]   Pulse:  []   Resp:  [13-34]   BP: (145-150)/(67-86)   SpO2:  [99 %-100 %]     Level of Consciousness (AVPU): alert    Recent Labs     04/09/24  0553 04/12/24  0031 04/12/24  0042   WBC 8.8 13.35*  --    HGB 10.6* 11.9*  --    HCT 33.2* 40.2 43   PLT  --  347  --        Recent Labs     04/12/24  0031      K 5.6*      CO2 7*   BUN 28*   CREATININE 1.5*   *        Recent Labs     04/12/24  0050   PH 7.178*   PCO2 26.0*   PO2 46   HCO3 9.6*   POCSATURATED 72   BE -19*        OXYGEN:  Flow (L/min): 4          MEWS score: 3    AI alert - CCM admitting to the MICU for DKA.    Tammy Cleveland RN        "

## 2024-04-12 NOTE — CONSULTS
Consult received. Patient to be admitted to the MICU. Full H&P to follow.    Iza Sneed NP  Critical Care Medicine  4/12/2024   3:33 AM

## 2024-04-12 NOTE — HPI
Ms. Hollie Wilson is a 33 y.o. woman with a past medical history intellectual dysfunction, poorly controlled diabetes, mood disorder, substance abuse, gastroparesis who presented to the ED by EMS for hyperglycemia and emesis. EMS states the call was initially for shortness of breath, they found the patient lying in bed naked. She was initially confused, they reported a GCS of 14 which resolved. She was able to then answer questions appropriately. She began vomiting enroute, noted to have dark, brown contents with retching. Person at the house had little information about the patient per EMS. Patient states she has been out of her insulin for a couple days. In ED found to have , serum bicarb 7 and pH 7.17. Patient given IVF and started on insulin gtt.  Critical Care Medicine consulted for DKA and patient admitted to MICU.

## 2024-04-12 NOTE — EICU
Nurses Note -- 4 Eyes      4/12/2024   5:30 AM      Skin assessed during: Admit      [x] No Altered Skin Integrity Present    [x]Prevention Measures Documented      [] Yes- Altered Skin Integrity Present or Discovered   [] LDA Added if Not in Epic (Describe Wound)   [] New Altered Skin Integrity was Present on Admit and Documented in LDA   [] Wound Image Taken    Wound Care Consulted? No    Attending Nurse:  Bree Kan RN/Staff Member:   Dorys

## 2024-04-12 NOTE — H&P
[facility] Emergency Dept  Critical Care Medicine  History & Physical    Patient Name: Hollie Wilson  MRN: [id number]  Admission Date: 2024  Hospital Length of Stay: 0 days  Code Status: Full Code  Attending Physician: Padmini Alanis MD   Primary Care Provider: Martin Rios MD   Principal Problem: DKA (diabetic ketoacidosis)    Subjective:     HPI:  MsNell Wilson is a 33 y.o. woman with a past medical history intellectual dysfunction, poorly controlled diabetes, mood disorder, substance abuse, gastroparesis who presented to the ED by EMS for hyperglycemia and emesis. EMS states the call was initially for shortness of breath, they found the patient lying in bed naked. She was initially confused, they reported a GCS of 14 which resolved. She was able to then answer questions appropriately. She began vomiting enroute, noted to have dark, brown contents with retching. Person at the house had little information about the patient per EMS. Patient states she has been out of her insulin for a couple days. In ED found to have , serum bicarb 7 and pH 7.17. Patient given IVF and started on insulin gtt.  Critical Care Medicine consulted for DKA and patient admitted to MICU.      Hospital/ICU Course:  No notes on file     Past Medical History:   Diagnosis Date    Anemia     Borderline intellectual functioning 2017    Diabetes mellitus     Mood disorder     Scoliosis     Substance use disorder        Past Surgical History:   Procedure Laterality Date     SECTION         Review of patient's allergies indicates:   Allergen Reactions    Clove oil Itching    Zofran (as hydrochloride) [ondansetron hcl] Hives       Family History    None       Tobacco Use    Smoking status: Every Day    Smokeless tobacco: Never   Substance and Sexual Activity    Alcohol use: Yes    Drug use: Yes     Types: Marijuana    Sexual activity: Never      Review of Systems   Respiratory:  Negative for shortness of breath.     Gastrointestinal:  Positive for abdominal pain, nausea and vomiting.   Endocrine: Positive for polyuria.   Genitourinary:  Positive for frequency.   All other systems reviewed and are negative.    Objective:     Vital Signs (Most Recent):  Temp: 98.9 °F (37.2 °C) (04/11/24 2329)  Pulse: 95 (04/12/24 0050)  Resp: 18 (04/12/24 0050)  BP: (!) 150/86 (04/12/24 0035)  SpO2: 100 % (04/12/24 0050) Vital Signs (24h Range):  Temp:  [98.9 °F (37.2 °C)] 98.9 °F (37.2 °C)  Pulse:  [83-95] 95  Resp:  [13-34] 18  SpO2:  [99 %-100 %] 100 %  BP: (148-150)/(85-86) 150/86   Weight: 61.2 kg (135 lb)  Body mass index is 21.14 kg/m².    No intake or output data in the 24 hours ending 04/12/24 0335       Physical Exam  Vitals and nursing note reviewed.   Constitutional:       General: She is not in acute distress.     Appearance: She is not toxic-appearing.   HENT:      Head: Normocephalic and atraumatic.      Right Ear: External ear normal.      Left Ear: External ear normal.      Nose: Nose normal.   Eyes:      Extraocular Movements: Extraocular movements intact.   Cardiovascular:      Rate and Rhythm: Normal rate and regular rhythm.      Heart sounds: Normal heart sounds.   Pulmonary:      Effort: Pulmonary effort is normal.      Breath sounds: Normal breath sounds.   Abdominal:      Palpations: Abdomen is soft.      Tenderness: There is no abdominal tenderness. There is no guarding or rebound.   Musculoskeletal:         General: No deformity or signs of injury.      Cervical back: Neck supple.   Skin:     General: Skin is warm and dry.   Neurological:      Mental Status: She is alert and oriented to person, place, and time. Mental status is at baseline.   Psychiatric:         Mood and Affect: Mood normal.         Behavior: Behavior normal.            Vents:     Lines/Drains/Airways       Peripheral Intravenous Line  Duration                  Peripheral IV - Single Lumen 04/12/24 0036 20 G Left Antecubital <1 day                   Significant Labs:    CBC/Anemia Profile:  Recent Labs   Lab 04/12/24  0031 04/12/24  0042   WBC 13.35*  --    HGB 11.9*  --    HCT 40.2 43     --    MCV 87  --    RDW 16.1*  --         Chemistries:  Recent Labs   Lab 04/12/24  0031      K 5.6*      CO2 7*   BUN 28*   CREATININE 1.5*   CALCIUM 10.4   ALBUMIN 4.4   PROT 9.0*   BILITOT 0.3   ALKPHOS 156*   ALT 18   AST 24       All pertinent labs within the past 24 hours have been reviewed.    Significant Imaging: I have reviewed all pertinent imaging results/findings within the past 24 hours.  Assessment/Plan:     Psychiatric  Polysubstance abuse  Follow up UDS.      Endocrine  * DKA (diabetic ketoacidoses)  Reported from home with hyperglycemia and emesis, reported to have been out of her insulin for 3 days.   and serum bicarb 7.  Acidotic with pH 7.17, BHB 6.9.     - DKA/HHS Pathway initiated  - HbA1c ordered  - Home DM regimen:   - Start insulin gtt per protocol with q1h accuchecks while on the drip.    - Once Bicarb >18, Anion gap <10, Glucose <200 on 2 readings and able to tolerate PO intake without nausea and vomiting, transition to subq insulin with a 1-2 h overlap with drip.    - Start LR at 125 cc/hr.  Once blood sugar is 250, change IVF to D5 1/2 NS at 50cc/h  - Monitor electrolytes with BMP q4h while on insulin gtt and place on tele  - Hold insulin gtt for K < 3.3      GI  Gastroparesis  History of gastroparesis on home reglan.     --Check Qtc  --PRN antiemetics  --NPO in setting of DKA  --Concern for coffee ground emesis in ED, will continue PPI and trend CBC    Other  Tobacco abuse  Will need smoking cessation counseling when appropriate.        Critical Care Time: 40 minutes  Critical secondary to Patient has a condition that poses threat to life and bodily function: DKA    Plan discussed with PCCM Fellow Dr. Gonzalez.      Critical care was time spent personally by me on the following activities: development of treatment plan  with patient or surrogate and bedside caregivers, discussions with consultants, evaluation of patient's response to treatment, examination of patient, ordering and performing treatments and interventions, ordering and review of laboratory studies, ordering and review of radiographic studies, pulse oximetry, re-evaluation of patient's condition. This critical care time did not overlap with that of any other provider or involve time for any procedures.     Iza Sneed NP  Critical Care Medicine  [facility] Emergency Dept

## 2024-04-12 NOTE — PLAN OF CARE
MICU DAILY GOALS     Family/Goals of care/Code Status   Code Status: Full Code    24H Vital Sign Range  Temp:  [98.1 °F (36.7 °C)-99 °F (37.2 °C)]   Pulse:  []   Resp:  [12-38]   BP: (121-160)/(64-95)   SpO2:  [99 %-100 %]      Shift Events (include procedures and significant events)   Insulin gtt titrated per q1 hour glucose checks as ordered. Pt cleared for ice chips and sips of water by MD but had a couple episodes of emesis & nausea that required multiple PRN antiemetics. Q4 BMP's obtained; detemir given at 1740 with plans to transition off the insulin gtt. No acute events throughout shift.    AWAKE RASS: Goal - RASS Goal: 0-->alert and calm  Actual - RASS (Finnegan Agitation-Sedation Scale): drowsy    Restraint necessity: Not necessary   BREATHE SBT: Not intubated    Coordinate A & B, analgesics/sedatives Pain: managed   SAT: Not intubated   Delirium CAM-ICU: Overall CAM-ICU: Negative   Early(intubated/ Progressive (non-intubated) Mobility MOVE Screen (INTUBATED ONLY): Not intubated    Activity: Activity Management: Arm raise - L1, Rolling - L1, Bridge - L1   Feeding/Nutrition Diet order: Diet/Nutrition Received: NPO, ice chips, sips of water,     Thrombus DVT prophylaxis:     HOB Elevation Head of Bed (HOB) Positioning: HOB at 30-45 degrees   Ulcer Prophylaxis GI: yes   Glucose control managed Glycemic Management: blood glucose monitored   Skin Skin assessed during: Daily Assessment    Sacrum intact/not altered? Yes  Heels intact/not altered? Yes  Surgical wound? No    CHECK ONE!   (no altered skin or altered skin) and sub boxes:  [x] No Altered Skin Integrity Present    [x]Prevention Measures Documented    [] Altered Skin Integrity Present or Discovered   [] LDA present in EPIC, daily doc completed              [] LDA added if not in EPIC (describe wound).                    When describing wound, do not stage, use descriptive words only.    [] Wound Image Taken (required on admit,                    transfer/discharge and every Tuesday)    Wound Care Consulted? No    4 EYES:  Attending Nurse (1st set of eyes): ALEXANDRA Renae    Second RN/Staff Member (2nd set of eyes):    Bowel Function no issues    Indwelling Catheter Necessity            De-escalation Antibiotics N/A        VS and assessment per flow sheet, patient progressing towards goals as tolerated, plan of care reviewed with  Hollie Wilson , all concerns addressed, will continue to monitor.

## 2024-04-12 NOTE — ED PROVIDER NOTES
Encounter Date: 2024       History     Chief Complaint   Patient presents with    Hyperglycemia      with EMS    Emesis     +blood     Hollie Wilson is a 33-year-old female with a past medical history intellectual dysfunction, poorly controlled diabetes, mood disorder, substance abuse, gastroparesis presenting today by EMS for hyperglycemia and emesis.  EMS states the call was initially for shortness of breath, they found the patient lying in bed naked.  She was initially confused, they reported a GCS of 14 which resolved.  She was able to then answer questions appropriately.  She began vomiting enroute, noted to have dark, brown contents with retching.  Person at the house had little information about the patient per EMS.  Patient states she has been out of her insulin for a couple days.      Review of patient's allergies indicates:   Allergen Reactions    Clove oil Itching    Zofran (as hydrochloride) [ondansetron hcl] Hives     Past Medical History:   Diagnosis Date    Anemia     Borderline intellectual functioning 2017    Diabetes mellitus     Mood disorder     Scoliosis     Substance use disorder      Past Surgical History:   Procedure Laterality Date     SECTION       No family history on file.  Social History     Tobacco Use    Smoking status: Every Day    Smokeless tobacco: Never   Substance Use Topics    Alcohol use: Yes    Drug use: Yes     Types: Marijuana     Review of Systems    Physical Exam     Initial Vitals   BP Pulse Resp Temp SpO2   24 2326 24 2326 24 2326 24 2329 24 2326   (!) 148/85 87 (!) 34 98.9 °F (37.2 °C) 100 %      MAP       --                Physical Exam    Nursing note and vitals reviewed.  Constitutional: She appears well-developed and well-nourished. No distress.   Thin, actively vomiting     HENT:   Dry MM     Eyes: Conjunctivae are normal. No scleral icterus.   Neck: No JVD present.   Cardiovascular:  Normal rate, regular  rhythm and intact distal pulses.           Pulmonary/Chest: Breath sounds normal. Tachypnea noted. No respiratory distress. She has no wheezes. She has no rales.   Abdominal: Abdomen is soft. Bowel sounds are normal. She exhibits no distension. There is no abdominal tenderness. There is no rebound.   Musculoskeletal:         General: No edema.     Lymphadenopathy:     She has no cervical adenopathy.   Neurological: She is alert and oriented to person, place, and time. No cranial nerve deficit or sensory deficit.   GCS 15     Skin: Skin is warm. No rash noted.         ED Course   Procedures  Labs Reviewed   CBC W/ AUTO DIFFERENTIAL - Abnormal; Notable for the following components:       Result Value    WBC 13.35 (*)     Hemoglobin 11.9 (*)     MCH 25.7 (*)     MCHC 29.6 (*)     RDW 16.1 (*)     Gran # (ANC) 11.5 (*)     Immature Grans (Abs) 0.06 (*)     Gran % 86.0 (*)     Lymph % 9.1 (*)     Mono % 3.9 (*)     All other components within normal limits   COMPREHENSIVE METABOLIC PANEL - Abnormal; Notable for the following components:    Potassium 5.6 (*)     CO2 7 (*)     Glucose 512 (*)     BUN 28 (*)     Creatinine 1.5 (*)     Total Protein 9.0 (*)     Alkaline Phosphatase 156 (*)     eGFR 46.3 (*)     Anion Gap 30 (*)     All other components within normal limits   BETA - HYDROXYBUTYRATE, SERUM - Abnormal; Notable for the following components:    Beta-Hydroxybutyrate 6.9 (*)     All other components within normal limits   URINALYSIS, REFLEX TO URINE CULTURE - Abnormal; Notable for the following components:    Protein, UA Trace (*)     Glucose, UA 4+ (*)     Ketones, UA 3+ (*)     All other components within normal limits    Narrative:     Specimen Source->Urine   DRUG SCREEN PANEL, URINE EMERGENCY - Abnormal; Notable for the following components:    THC Presumptive Positive (*)     All other components within normal limits    Narrative:     Specimen Source->Urine   POCT GLUCOSE - Abnormal; Notable for the following  components:    POCT Glucose 476 (*)     All other components within normal limits   ISTAT PROCEDURE - Abnormal; Notable for the following components:    POC Glucose 495 (*)     POC BUN 37 (*)     POC Potassium 5.2 (*)     POC TCO2 (MEASURED) 12 (*)     All other components within normal limits   ISTAT PROCEDURE - Abnormal; Notable for the following components:    POC PH 7.178 (*)     POC PCO2 26.0 (*)     POC HCO3 9.6 (*)     POC BE -19 (*)     POC TCO2 10 (*)     All other components within normal limits   POCT GLUCOSE - Abnormal; Notable for the following components:    POCT Glucose 466 (*)     All other components within normal limits   POCT GLUCOSE - Abnormal; Notable for the following components:    POCT Glucose 457 (*)     All other components within normal limits   POCT GLUCOSE - Abnormal; Notable for the following components:    POCT Glucose 160 (*)     All other components within normal limits   POCT GLUCOSE - Abnormal; Notable for the following components:    POCT Glucose 388 (*)     All other components within normal limits   LIPASE   URINALYSIS MICROSCOPIC    Narrative:     Specimen Source->Urine   HIV 1 / 2 ANTIBODY   HEPATITIS C ANTIBODY   BASIC METABOLIC PANEL   CBC W/ AUTO DIFFERENTIAL   CBC W/ AUTO DIFFERENTIAL   MAGNESIUM   PHOSPHORUS   HEMOGLOBIN A1C   POCT URINE PREGNANCY   POCT URINE PREGNANCY   ISTAT CHEM8   POCT GLUCOSE MONITORING CONTINUOUS     EKG Readings: (Independently Interpreted)   EKG:  Sinus rhythm at 78, bifascicular block, no acute ischemic change       Imaging Results              X-Ray Chest AP Portable (Final result)  Result time 04/12/24 01:35:51      Final result by Rosendo Torres MD (04/12/24 01:35:51)                   Impression:      There is no radiographic evidence for acute intrathoracic process.      Electronically signed by: Rosendo Torres  Date:    04/12/2024  Time:    01:35               Narrative:    EXAMINATION:  XR CHEST AP PORTABLE    CLINICAL  HISTORY:  hyperglycemia;    TECHNIQUE:  Single frontal view of the chest was performed.    COMPARISON:  Chest radiograph July 7, 2023    FINDINGS:  Single portable chest view is submitted.  The patient is angled, there is mild diminished depth of inspiration.  Accentuation of attenuation associated with soft tissue attenuation noted.    When accounting for position and technique and depth of inspiration the appearance of the cardiomediastinal silhouette and pulmonary bronchovascular markings is appropriate.    There is no evidence for confluent infiltrate or consolidation, significant pleural effusion or pneumothorax.    The visualized osseous structures appear intact.                                    X-Rays:   Independently Interpreted Readings:   Other Readings:  Chest x-ray:  No acute process    Medications   dextrose 5 % and 0.45 % NaCl infusion (has no administration in time range)   dextrose 10 % infusion (has no administration in time range)   insulin regular in 0.9 % NaCl 100 unit/100 mL (1 unit/mL) infusion (0.2 Units/kg/hr × 61.2 kg Intravenous No Dose/Rate Change 4/12/24 0432)   dextrose 10% bolus 125 mL 125 mL (has no administration in time range)   dextrose 10% bolus 250 mL 250 mL (has no administration in time range)   sodium chloride 0.9% bolus 1,000 mL 1,000 mL (1,000 mLs Intravenous New Bag 4/12/24 0421)   sodium chloride 0.9% flush 10 mL (has no administration in time range)   dextrose 10 % infusion (has no administration in time range)   lactated ringers infusion (has no administration in time range)   pantoprazole injection 40 mg (has no administration in time range)   sodium chloride 0.9% bolus 1,000 mL 1,000 mL (0 mLs Intravenous Stopped 4/12/24 0405)   droPERidol injection 0.625 mg (0.625 mg Intravenous Given 4/12/24 0022)   pantoprazole injection 80 mg (80 mg Intravenous Given 4/12/24 0024)   insulin regular bolus from bag/infusion 6.12 Units 6.12 mL (6.12 Units Intravenous Bolus from Bag  4/12/24 0228)   metoclopramide injection 10 mg (10 mg Intravenous Given 4/12/24 0232)     Medical Decision Making  Emergent evaluation a 33-year-old female presenting by EMS for shortness of breath found to be hyperglycemic then proceeded to vomit coffee-ground emesis.    Afebrile on arrival, hypertensive, tachypneic with normal saturation 100%.  She is neurologically intact, answering questions appropriately.  She is moving all 4 extremities.  She was retching with approximately 5 cc of coffee-ground emesis.  History gastroparesis, poorly-controlled diabetic.      Medical record reviewed, recent admission to [facility] on 04/03, left AMA on 04/09 for hyperglycemia.  Has been out of insulin for several days.     Differential this time includes but not limited to DKA, HHNK, KIMBERLY, electrolyte derangement, metabolic disturbance, possible occult infection.    Labs reviewed, concerning for DKA with pH of 7.1, bicarb of 7, anion gap of 30, KIMBERLY, hyperkalemia.    Patient treated with IV fluids, started on insulin drip.  No obvious source of infection at this time, UA pending.  Medical ICU consulted, pending recommendations and plan    Patient admitted to medical ICU for further treatment and evaluation.      Amount and/or Complexity of Data Reviewed  External Data Reviewed: notes.     Details: Summarized in HPI  Labs: ordered. Decision-making details documented in ED Course.  Radiology: ordered and independent interpretation performed.  ECG/medicine tests: ordered and independent interpretation performed.  Discussion of management or test interpretation with external provider(s): Medical ICU    Risk  Prescription drug management.  Decision regarding hospitalization.              Attending Attestation:         Attending Critical Care:   Critical Care Times:   ==============================================================  Total Critical Care Time - exclusive of procedural time: 45  minutes.  ==============================================================  Critical care was necessary to treat or prevent imminent or life-threatening deterioration of the following conditions: metabolic crisis (DKA).   Critical care was time spent personally by me on the following activities: ordering lab, x-rays, and/or EKG, development of treatment plan with patient or relative, ordering and performing treatments and interventions, evaluation of patient's response to treatment, discussion with consultants and re-evaluation of patient's conition.   Critical Care Condition: life-threatening           ED Course as of 04/12/24 0449   Fri Apr 12, 2024 0147 POC PH(!!): 7.178 [GM]      ED Course User Index  [GM] Mariella Alatorre MD                           Clinical Impression:  Final diagnoses:  [R11.10] Emesis  [R73.9] Hyperglycemia  [E10.10] Diabetic ketoacidosis without coma associated with type 1 diabetes mellitus (Primary)          ED Disposition Condition    Admit Stable                Mariella Alatorre MD  04/12/24 0450
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
## 2024-04-12 NOTE — SUBJECTIVE & OBJECTIVE
Past Medical History:   Diagnosis Date    Anemia     Borderline intellectual functioning 2017    Diabetes mellitus     Mood disorder     Scoliosis     Substance use disorder        Past Surgical History:   Procedure Laterality Date     SECTION         Review of patient's allergies indicates:   Allergen Reactions    Clove oil Itching    Zofran (as hydrochloride) [ondansetron hcl] Hives       Family History    None       Tobacco Use    Smoking status: Every Day    Smokeless tobacco: Never   Substance and Sexual Activity    Alcohol use: Yes    Drug use: Yes     Types: Marijuana    Sexual activity: Never      Review of Systems   Respiratory:  Negative for shortness of breath.    Gastrointestinal:  Positive for abdominal pain, nausea and vomiting.   Endocrine: Positive for polyuria.   Genitourinary:  Positive for frequency.   All other systems reviewed and are negative.    Objective:     Vital Signs (Most Recent):  Temp: 98.9 °F (37.2 °C) (24 2329)  Pulse: 95 (24 0050)  Resp: 18 (24 0050)  BP: (!) 150/86 (24 0035)  SpO2: 100 % (24 0050) Vital Signs (24h Range):  Temp:  [98.9 °F (37.2 °C)] 98.9 °F (37.2 °C)  Pulse:  [83-95] 95  Resp:  [13-34] 18  SpO2:  [99 %-100 %] 100 %  BP: (148-150)/(85-86) 150/86   Weight: 61.2 kg (135 lb)  Body mass index is 21.14 kg/m².    No intake or output data in the 24 hours ending 24 0335       Physical Exam  Vitals and nursing note reviewed.   Constitutional:       General: She is not in acute distress.     Appearance: She is not toxic-appearing.   HENT:      Head: Normocephalic and atraumatic.      Right Ear: External ear normal.      Left Ear: External ear normal.      Nose: Nose normal.   Eyes:      Extraocular Movements: Extraocular movements intact.   Cardiovascular:      Rate and Rhythm: Normal rate and regular rhythm.      Heart sounds: Normal heart sounds.   Pulmonary:      Effort: Pulmonary effort is normal.      Breath sounds:  Normal breath sounds.   Abdominal:      Palpations: Abdomen is soft.      Tenderness: There is no abdominal tenderness. There is no guarding or rebound.   Musculoskeletal:         General: No deformity or signs of injury.      Cervical back: Neck supple.   Skin:     General: Skin is warm and dry.   Neurological:      Mental Status: She is alert and oriented to person, place, and time. Mental status is at baseline.   Psychiatric:         Mood and Affect: Mood normal.         Behavior: Behavior normal.            Vents:     Lines/Drains/Airways       Peripheral Intravenous Line  Duration                  Peripheral IV - Single Lumen 04/12/24 0036 20 G Left Antecubital <1 day                  Significant Labs:    CBC/Anemia Profile:  Recent Labs   Lab 04/12/24  0031 04/12/24  0042   WBC 13.35*  --    HGB 11.9*  --    HCT 40.2 43     --    MCV 87  --    RDW 16.1*  --         Chemistries:  Recent Labs   Lab 04/12/24  0031      K 5.6*      CO2 7*   BUN 28*   CREATININE 1.5*   CALCIUM 10.4   ALBUMIN 4.4   PROT 9.0*   BILITOT 0.3   ALKPHOS 156*   ALT 18   AST 24       All pertinent labs within the past 24 hours have been reviewed.    Significant Imaging: I have reviewed all pertinent imaging results/findings within the past 24 hours.

## 2024-04-12 NOTE — PROCEDURES
RAPID RESPONSE VASCULAR ACCESS NOTE       Single lumen 20G, 2.25IN AccuCath placed in the left brachial vein. Needle advanced into the vessel under real time ultrasound guidance.    Max dwell date: 4/22/2024   Lot number: [id number]

## 2024-04-12 NOTE — PLAN OF CARE
[facility] Card[facility] Medical [facility]  Initial Discharge Assessment       Primary Care Provider: Martin Rios MD    Admission Diagnosis: Emesis [R11.10]  Hyperglycemia [R73.9]  Diabetic ketoacidosis without coma associated with type 1 diabetes mellitus [E10.10]    Admission Date: 4/11/2024  Expected Discharge Date: 4/16/2024    Transition of Care Barriers: Homeless    Payor: [facility] CONNECTIONS / Plan: [facility] MARKETPLACE / Product Type: Commercial /     Extended Emergency Contact Information  Primary Emergency Contact: RichardJori  Mobile Phone: [phone]  Relation: Spouse   needed? No    Discharge Plan A: Shelter  Discharge Plan B: Psychiatric hospital [facility]  [address]  [place] [address]  Phone: [phone] Fax: [phone]    [facility] [address] [facility], [place] - [address]  [address]  [address]  [facility] [address]  Phone: [phone] Fax: [phone]    [id number] - [facility] [place] - [address] AT [facility] [address]  [address]  [place] [address]  Phone: [phone] Fax: [phone]      Initial Assessment (most recent)       Adult Discharge Assessment - 04/12/24 1535          Discharge Assessment    Assessment Type Discharge Planning Assessment     Confirmed/corrected address, phone number and insurance No     Reason Other (see comment)   Aunt (Margy Rome) [phone]    Source of Information family     If unable to respond/provide information was family/caregiver contacted? Yes     Contact Name/Number Margy Rome, aunt/cp# [phone]     When was your last doctors appointment? --   not sure!    Does patient/caregiver understand observation status No     Was observation education provided? No     Communicated THELMA with patient/caregiver Date not available/Unable to determine     Reason For Admission DKA     People in Home --   Per aunt, patient is basically  homeless    Facility Arrived From: n/a     Do you expect to return to your current living situation? Other (see comments)   Patient unable to answer    Do you have help at home or someone to help you manage your care at home? No     Prior to hospitilization cognitive status: Unable to Assess     Current cognitive status: Unable to Assess     Walking or Climbing Stairs Difficulty no     Dressing/Bathing Difficulty no     Equipment Currently Used at Home crutches     Readmission within 30 days? Yes     Patient currently being followed by outpatient case management? No     Do you currently have service(s) that help you manage your care at home? No     Do you take prescription medications? Yes     Do you have prescription coverage? Yes     Coverage [facility] Marketplace     Do you have any problems affording any of your prescribed medications? TBD     Is the patient taking medications as prescribed? no     If no, which medications is patient not taking? patient not taking insulin     Who is going to help you get home at discharge? will need a ride     How do you get to doctors appointments? health plan transportation     Are you on dialysis? No     Do you take coumadin? No     Discharge Plan A Shelter     Discharge Plan B Psychiatric hospital     DME Needed Upon Discharge  none     Discharge Plan discussed with: --   Aunt (Margy Rome)    Transition of Care Barriers Homeless        Physical Activity    On average, how many days per week do you engage in moderate to strenuous exercise (like a brisk walk)? Patient unable to answer     On average, how many minutes do you engage in exercise at this level? Patient unable to answer        Financial Resource Strain    How hard is it for you to pay for the very basics like food, housing, medical care, and heating? Patient unable to answer        Housing Stability    In the last 12 months, was there a time when you were not able to pay the  mortgage or rent on time? Patient unable to answer     In the last 12 months, how many places have you lived? --   patient is homeless.    In the last 12 months, was there a time when you did not have a steady place to sleep or slept in a shelter (including now)? Yes        Transportation Needs    In the past 12 months, has lack of transportation kept you from medical appointments or from getting medications? Patient unable to answer     In the past 12 months, has lack of transportation kept you from meetings, work, or from getting things needed for daily living? Patient unable to answer        Food Insecurity    Within the past 12 months, you worried that your food would run out before you got the money to buy more. Patient unable to answer     Within the past 12 months, the food you bought just didn't last and you didn't have money to get more. Patient unable to answer        Stress    Do you feel stress - tense, restless, nervous, or anxious, or unable to sleep at night because your mind is troubled all the time - these days? Patient unable to answer        Social Connections    In a typical week, how many times do you talk on the phone with family, friends, or neighbors? Patient unable to answer     How often do you get together with friends or relatives? Patient unable to answer     How often do you attend Spiritism or Nondenominational services? Patient unable to answer     Do you belong to any clubs or organizations such as Spiritism groups, unions, fraternal or athletic groups, or school groups? Patient unable to answer     How often do you attend meetings of the clubs or organizations you belong to? Patient unable to answer     Are you , , , , never , or living with a partner? Patient unable to answer        Alcohol Use    Q1: How often do you have a drink containing alcohol? Patient unable to answer     Q2: How many drinks containing alcohol do you have on a typical day when you are  drinking? Patient unable to answer     Q3: How often do you have six or more drinks on one occasion? Patient unable to answer                   SW unable to complete dc assessment with patient because she was too lethargic. SW contacted Margy Rome, paternal aunt (father sister) (cp# [phone]) who informed SW that patient's father is  and mother (Hollie Wilson) lives in [place] and her contact # is [phone].  Patient is named after her mother and her mother is also very sick.  Patient has a dgtr that was adopted by her cousin (Valeria Zamora but contact ph# is unknown).  Per aunt, patient's dgtr is in a group home in [place], [place] (name is unknown).  Margy mary, randy last known address for mail is [address] [place] [address]. [phone].     Discharge Plan A and Plan B have been determined by review of patient's clinical status, future medical and therapeutic needs, and coverage/benefits for post-acute care in coordination with multidisciplinary team members.    Abhinav Rodriguez, NATAN  [facility]  [phone]

## 2024-04-12 NOTE — ASSESSMENT & PLAN NOTE
Reported from home with hyperglycemia and emesis, reported to have been out of her insulin for 3 days.   and serum bicarb 7.  Acidotic with pH 7.17, BHB 6.9.     - DKA/HHS Pathway initiated  - HbA1c ordered  - Home DM regimen:   - Start insulin gtt per protocol with q1h accuchecks while on the drip.    - Once Bicarb >18, Anion gap <10, Glucose <200 on 2 readings and able to tolerate PO intake without nausea and vomiting, transition to subq insulin with a 1-2 h overlap with drip.    - Start LR at 125 cc/hr.  Once blood sugar is 250, change IVF to D5 1/2 NS at 50cc/h  - Monitor electrolytes with BMP q4h while on insulin gtt and place on tele  - Hold insulin gtt for K < 3.3

## 2024-04-13 LAB
ANION GAP SERPL CALC-SCNC: 10 MMOL/L (ref 8–16)
ANION GAP SERPL CALC-SCNC: 12 MMOL/L (ref 8–16)
BASOPHILS # BLD AUTO: 0.05 K/UL (ref 0–0.2)
BASOPHILS NFR BLD: 0.6 % (ref 0–1.9)
BUN SERPL-MCNC: 12 MG/DL (ref 6–20)
BUN SERPL-MCNC: 15 MG/DL (ref 6–20)
CALCIUM SERPL-MCNC: 9.1 MG/DL (ref 8.7–10.5)
CALCIUM SERPL-MCNC: 9.5 MG/DL (ref 8.7–10.5)
CHLORIDE SERPL-SCNC: 106 MMOL/L (ref 95–110)
CHLORIDE SERPL-SCNC: 107 MMOL/L (ref 95–110)
CO2 SERPL-SCNC: 20 MMOL/L (ref 23–29)
CO2 SERPL-SCNC: 23 MMOL/L (ref 23–29)
CREAT SERPL-MCNC: 0.8 MG/DL (ref 0.5–1.4)
CREAT SERPL-MCNC: 1.1 MG/DL (ref 0.5–1.4)
DIFFERENTIAL METHOD BLD: ABNORMAL
EOSINOPHIL # BLD AUTO: 0.1 K/UL (ref 0–0.5)
EOSINOPHIL NFR BLD: 1.4 % (ref 0–8)
ERYTHROCYTE [DISTWIDTH] IN BLOOD BY AUTOMATED COUNT: 15.9 % (ref 11.5–14.5)
EST. GFR  (NO RACE VARIABLE): >60 ML/MIN/1.73 M^2
EST. GFR  (NO RACE VARIABLE): >60 ML/MIN/1.73 M^2
GLUCOSE SERPL-MCNC: 312 MG/DL (ref 70–110)
GLUCOSE SERPL-MCNC: 64 MG/DL (ref 70–110)
HCT VFR BLD AUTO: 33.5 % (ref 37–48.5)
HGB BLD-MCNC: 10.8 G/DL (ref 12–16)
IMM GRANULOCYTES # BLD AUTO: 0.01 K/UL (ref 0–0.04)
IMM GRANULOCYTES NFR BLD AUTO: 0.1 % (ref 0–0.5)
LYMPHOCYTES # BLD AUTO: 2.8 K/UL (ref 1–4.8)
LYMPHOCYTES NFR BLD: 34.5 % (ref 18–48)
MAGNESIUM SERPL-MCNC: 1.8 MG/DL (ref 1.6–2.6)
MCH RBC QN AUTO: 26.4 PG (ref 27–31)
MCHC RBC AUTO-ENTMCNC: 32.2 G/DL (ref 32–36)
MCV RBC AUTO: 82 FL (ref 82–98)
MONOCYTES # BLD AUTO: 0.7 K/UL (ref 0.3–1)
MONOCYTES NFR BLD: 9.2 % (ref 4–15)
NEUTROPHILS # BLD AUTO: 4.3 K/UL (ref 1.8–7.7)
NEUTROPHILS NFR BLD: 54.2 % (ref 38–73)
NRBC BLD-RTO: 0 /100 WBC
PHOSPHATE SERPL-MCNC: 2.2 MG/DL (ref 2.7–4.5)
PLATELET # BLD AUTO: 303 K/UL (ref 150–450)
PMV BLD AUTO: 11.5 FL (ref 9.2–12.9)
POCT GLUCOSE: 329 MG/DL (ref 70–110)
POCT GLUCOSE: 384 MG/DL (ref 70–110)
POCT GLUCOSE: 393 MG/DL (ref 70–110)
POCT GLUCOSE: 61 MG/DL (ref 70–110)
POCT GLUCOSE: 85 MG/DL (ref 70–110)
POTASSIUM SERPL-SCNC: 3.7 MMOL/L (ref 3.5–5.1)
POTASSIUM SERPL-SCNC: 4.3 MMOL/L (ref 3.5–5.1)
RBC # BLD AUTO: 4.09 M/UL (ref 4–5.4)
SODIUM SERPL-SCNC: 138 MMOL/L (ref 136–145)
SODIUM SERPL-SCNC: 140 MMOL/L (ref 136–145)
WBC # BLD AUTO: 7.97 K/UL (ref 3.9–12.7)

## 2024-04-13 PROCEDURE — 84100 ASSAY OF PHOSPHORUS: CPT

## 2024-04-13 PROCEDURE — 25000003 PHARM REV CODE 250

## 2024-04-13 PROCEDURE — 20600001 HC STEP DOWN PRIVATE ROOM

## 2024-04-13 PROCEDURE — 63600175 PHARM REV CODE 636 W HCPCS

## 2024-04-13 PROCEDURE — 94761 N-INVAS EAR/PLS OXIMETRY MLT: CPT

## 2024-04-13 PROCEDURE — 85025 COMPLETE CBC W/AUTO DIFF WBC: CPT

## 2024-04-13 PROCEDURE — 80048 BASIC METABOLIC PNL TOTAL CA: CPT | Mod: 91

## 2024-04-13 PROCEDURE — 25000003 PHARM REV CODE 250: Performed by: INTERNAL MEDICINE

## 2024-04-13 PROCEDURE — 83735 ASSAY OF MAGNESIUM: CPT

## 2024-04-13 RX ORDER — MAGNESIUM SULFATE HEPTAHYDRATE 40 MG/ML
2 INJECTION, SOLUTION INTRAVENOUS ONCE
Status: COMPLETED | OUTPATIENT
Start: 2024-04-13 | End: 2024-04-13

## 2024-04-13 RX ORDER — INSULIN ASPART 100 [IU]/ML
4 INJECTION, SOLUTION INTRAVENOUS; SUBCUTANEOUS
Status: DISCONTINUED | OUTPATIENT
Start: 2024-04-13 | End: 2024-04-14 | Stop reason: HOSPADM

## 2024-04-13 RX ORDER — METOCLOPRAMIDE HYDROCHLORIDE 5 MG/ML
5 INJECTION INTRAMUSCULAR; INTRAVENOUS EVERY 6 HOURS PRN
Status: DISCONTINUED | OUTPATIENT
Start: 2024-04-13 | End: 2024-04-14 | Stop reason: HOSPADM

## 2024-04-13 RX ADMIN — INSULIN ASPART 10 UNITS: 100 INJECTION, SOLUTION INTRAVENOUS; SUBCUTANEOUS at 08:04

## 2024-04-13 RX ADMIN — INSULIN DETEMIR 6 UNITS: 100 INJECTION, SOLUTION SUBCUTANEOUS at 08:04

## 2024-04-13 RX ADMIN — POTASSIUM & SODIUM PHOSPHATES POWDER PACK 280-160-250 MG 1 PACKET: 280-160-250 PACK at 05:04

## 2024-04-13 RX ADMIN — RISPERIDONE 1 MG: 0.25 TABLET, FILM COATED ORAL at 08:04

## 2024-04-13 RX ADMIN — MAGNESIUM SULFATE HEPTAHYDRATE 2 G: 40 INJECTION, SOLUTION INTRAVENOUS at 12:04

## 2024-04-13 RX ADMIN — INSULIN ASPART 4 UNITS: 100 INJECTION, SOLUTION INTRAVENOUS; SUBCUTANEOUS at 12:04

## 2024-04-13 RX ADMIN — INSULIN ASPART 10 UNITS: 100 INJECTION, SOLUTION INTRAVENOUS; SUBCUTANEOUS at 11:04

## 2024-04-13 RX ADMIN — PANTOPRAZOLE SODIUM 40 MG: 40 TABLET, DELAYED RELEASE ORAL at 08:04

## 2024-04-13 RX ADMIN — MUPIROCIN: 20 OINTMENT TOPICAL at 03:04

## 2024-04-13 RX ADMIN — METOCLOPRAMIDE 5 MG: 5 INJECTION, SOLUTION INTRAMUSCULAR; INTRAVENOUS at 10:04

## 2024-04-13 RX ADMIN — FLUOXETINE HYDROCHLORIDE 60 MG: 20 CAPSULE ORAL at 08:04

## 2024-04-13 RX ADMIN — PROCHLORPERAZINE EDISYLATE 2.5 MG: 5 INJECTION INTRAMUSCULAR; INTRAVENOUS at 05:04

## 2024-04-13 RX ADMIN — PROCHLORPERAZINE EDISYLATE 2.5 MG: 5 INJECTION INTRAMUSCULAR; INTRAVENOUS at 12:04

## 2024-04-13 NOTE — ASSESSMENT & PLAN NOTE
Reported from home with hyperglycemia and emesis, reported to have been out of her insulin for 3 days.   and serum bicarb 7.  Acidotic with pH 7.17, BHB 6.9.     - Anion gap closed 4/12, transitioned to subcutaneous insulin  - HbA1c 9.5  - Detemir 8 units BID, aspart 4 units with meals; adjust for glucose goal < 200  - Start LR at 125 cc/hr.  Once blood sugar is 250, change IVF to D5 1/2 NS at 50cc/h  - Hold insulin gtt for K < 3.3

## 2024-04-13 NOTE — PROGRESS NOTES
[facility] Card[facility] Medical [facility]  Critical Care Medicine  Progress Note    Patient Name: Hollie Wilson  MRN: [id number]  Admission Date: 4/11/2024  Hospital Length of Stay: 1 days  Code Status: Full Code  Attending Provider: Padmini Alanis MD  Primary Care Provider: Martin Rios MD   Principal Problem: DKA (diabetic ketoacidosis)    Subjective:     HPI:  MsNell Wilson is a 33 y.o. woman with a past medical history intellectual dysfunction, poorly controlled diabetes, mood disorder, substance abuse, gastroparesis who presented to the ED by EMS for hyperglycemia and emesis. EMS states the call was initially for shortness of breath, they found the patient lying in bed naked. She was initially confused, they reported a GCS of 14 which resolved. She was able to then answer questions appropriately. She began vomiting enroute, noted to have dark, brown contents with retching. Person at the house had little information about the patient per EMS. Patient states she has been out of her insulin for a couple days. In ED found to have , serum bicarb 7 and pH 7.17. Patient given IVF and started on insulin gtt.  Critical Care Medicine consulted for DKA and patient admitted to MICU.      Hospital/ICU Course:  Admitted to MICU 4/12, started on IVF and insulin drip. On 4/12, anion gap closed at 7, HCO3 19. Transitioned off insulin drip to basal insulin + SSI evening of 4/12. Stepped down from MICU 4/13.    Interval History/Significant Events: NAEO    Review of Systems   Constitutional:  Negative for chills and fever.   Respiratory:  Negative for chest tightness and shortness of breath.    Cardiovascular:  Negative for chest pain, palpitations and leg swelling.   Gastrointestinal:  Positive for nausea. Negative for vomiting.   Genitourinary:  Negative for difficulty urinating, dysuria and hematuria.   Musculoskeletal:  Positive for arthralgias, back pain and myalgias. Negative for neck stiffness.   Neurological:   Positive for headaches. Negative for dizziness and numbness.   All other systems reviewed and are negative.    Objective:     Vital Signs (Most Recent):  Temp: 98.5 °F (36.9 °C) (04/13/24 1333)  Pulse: 91 (04/13/24 1333)  Resp: 15 (04/13/24 1333)  BP: 113/79 (04/13/24 1333)  SpO2: 98 % (04/13/24 1000) Vital Signs (24h Range):  Temp:  [98.2 °F (36.8 °C)-98.7 °F (37.1 °C)] 98.5 °F (36.9 °C)  Pulse:  [] 91  Resp:  [12-33] 15  SpO2:  [97 %-100 %] 98 %  BP: (113-136)/() 113/79   Weight: 55 kg (121 lb 4.1 oz)  Body mass index is 18.99 kg/m².      Intake/Output Summary (Last 24 hours) at 4/13/2024 1520  Last data filed at 4/13/2024 1244  Gross per 24 hour   Intake 942.7 ml   Output 475 ml   Net 467.7 ml          Physical Exam  Vitals and nursing note reviewed.   Constitutional:       Appearance: She is ill-appearing.   Eyes:      Extraocular Movements: Extraocular movements intact.      Conjunctiva/sclera: Conjunctivae normal.      Pupils: Pupils are equal, round, and reactive to light.   Cardiovascular:      Rate and Rhythm: Normal rate and regular rhythm.   Pulmonary:      Effort: Pulmonary effort is normal.      Breath sounds: Normal breath sounds.   Abdominal:      General: There is no distension.      Palpations: Abdomen is soft.      Tenderness: There is no abdominal tenderness.   Skin:     General: Skin is warm and dry.   Neurological:      General: No focal deficit present.      Mental Status: She is alert. Mental status is at baseline.      Cranial Nerves: No cranial nerve deficit.            Vents:     Lines/Drains/Airways       Drain  Duration             Female External Urinary Catheter w/ Suction 04/12/24 0530 1 day              Peripheral Intravenous Line  Duration                  Peripheral IV - Single Lumen 04/12/24 0341 20 G Anterior;Right Forearm 1 day         Peripheral IV - Single Lumen 04/12/24 1410 20 G Left Antecubital 1 day                  Significant Labs:    CBC/Anemia  Profile:  Recent Labs   Lab 04/12/24  0031 04/12/24  0042 04/13/24  0527   WBC 13.35*  --  7.97   HGB 11.9*  --  10.8*   HCT 40.2 43 33.5*     --  303   MCV 87  --  82   RDW 16.1*  --  15.9*        Chemistries:  Recent Labs   Lab 04/12/24  0031 04/12/24  0551 04/12/24  1222 04/12/24  1620 04/13/24  0527      < > 139 139 138   K 5.6*   < > 4.9 4.1 4.3      < > 112* 113* 106   CO2 7*   < > 16* 19* 20*   BUN 28*   < > 23* 19 15   CREATININE 1.5*   < > 1.3 0.9 1.1   CALCIUM 10.4   < > 9.0 8.9 9.1   ALBUMIN 4.4  --   --   --   --    PROT 9.0*  --   --   --   --    BILITOT 0.3  --   --   --   --    ALKPHOS 156*  --   --   --   --    ALT 18  --   --   --   --    AST 24  --   --   --   --    MG  --    < > 1.9 1.8 1.8   PHOS  --    < > 2.3* 2.0* 2.2*    < > = values in this interval not displayed.       All pertinent labs within the past 24 hours have been reviewed.    Significant Imaging:  I have reviewed all pertinent imaging results/findings within the past 24 hours.    ABG  Recent Labs   Lab 04/12/24  0941   PH 7.276*   PO2 43   PCO2 33.5*   HCO3 15.6*   BE -11*     Assessment/Plan:     Psychiatric  Polysubstance abuse  UDS positive for marijuana.    Endocrine  * DKA (diabetic ketoacidoses)  Reported from home with hyperglycemia and emesis, reported to have been out of her insulin for 3 days.   and serum bicarb 7.  Acidotic with pH 7.17, BHB 6.9.     - Anion gap closed 4/12, transitioned to subcutaneous insulin  - HbA1c 9.5  - Detemir 8 units BID, aspart 4 units with meals; adjust for glucose goal < 200  - Start LR at 125 cc/hr.  Once blood sugar is 250, change IVF to D5 1/2 NS at 50cc/h  - Hold insulin gtt for K < 3.3      GI  Gastroparesis  History of gastroparesis on home reglan.     --Check Qtc  --PRN antiemetics  --Report coffee ground emesis in ED, will continue PPI and trend CBC    Other  Tobacco abuse  Will need smoking cessation counseling when appropriate.         Critical Care Daily  Checklist:    A: Awake: RASS Goal/Actual Goal: RASS Goal: 0-->alert and calm  Actual:     B: Spontaneous Breathing Trial Performed?     C: SAT & SBT Coordinated?  N/A                      D: Delirium: CAM-ICU Overall CAM-ICU: Negative   E: Early Mobility Performed? Yes   F: Feeding Goal:    Status:     Current Diet Order   Procedures    Diet diabetic 1500 Calorie     Order Specific Question:   Total calories:     Answer:   1500 Calorie      AS: Analgesia/Sedation N/A   T: Thromboembolic Prophylaxis Lovenox   H: HOB > 300 Yes   U: Stress Ulcer Prophylaxis (if needed) Protonix   G: Glucose Control Basal insulin + SSI   B: Bowel Function     I: Indwelling Catheter (Lines & Carroll) Necessity PIVx2, PureWick   D: De-escalation of Antimicrobials/Pharmacotherapies N/A    Plan for the day/ETD Step down    Code Status:  Family/Goals of Care: Full Code         Critical secondary to Patient has a condition that poses threat to life and bodily function: DKA.      Critical care was time spent personally by me on the following activities: development of treatment plan with patient or surrogate and bedside caregivers, discussions with consultants, evaluation of patient's response to treatment, examination of patient, ordering and performing treatments and interventions, ordering and review of laboratory studies, ordering and review of radiographic studies, pulse oximetry, re-evaluation of patient's condition. This critical care time did not overlap with that of any other provider or involve time for any procedures.     Raheem Gaspar MD  Critical Care Medicine  [facility] - Cardiac Medical ICU

## 2024-04-13 NOTE — RESIDENT HANDOFF
Handoff     Primary Team: Networked reference to record [facility]  Room Number: 6074/6074 A     Patient Name: Hollie Wilson MRN: [id number]     Date of Birth: [birth year] Allergies: Clove oil and Zofran (as hydrochloride) [ondansetron hcl]     Age: 33 y.o. Admit Date: 4/11/2024     Sex: female  BMI: Body mass index is 18.99 kg/m².     Code Status: Full Code        Illness Level (current clinical status): Watcher - Yes - hyperglycemia leading to DKA    Reason for Admission: DKA (diabetic ketoacidosis)    Brief HPI (pertinent PMH and diagnosis or differential diagnosis): Ms. Hollie Wilson is a 33 y.o. woman with a past medical history intellectual dysfunction, poorly controlled diabetes, mood disorder, substance abuse, gastroparesis who presented to the ED by EMS for hyperglycemia and emesis. EMS states the call was initially for shortness of breath, they found the patient lying in bed naked. She was initially confused, they reported a GCS of 14 which resolved. She was able to then answer questions appropriately. She began vomiting enroute, noted to have dark, brown contents with retching. Person at the house had little information about the patient per EMS. Patient states she has been out of her insulin for a couple days. In ED found to have , serum bicarb 7 and pH 7.17. Patient given IVF and started on insulin gtt. Critical Care Medicine consulted for DKA and patient admitted to MICU.       Hospital Course (updated, brief assessment by system or problem, significant events): Admitted to MICU 4/12, started on IVF and insulin drip. On 4/12, anion gap closed at 7, HCO3 19. Transitioned off insulin drip to basal insulin + SSI evening of 4/12. Continued nausea, but no vomiting in MICU. Stepped down from MICU 4/13.    Tasks (specific, using if-then statements): If glucose > 250, increase SSI after meals  If K < 3.3, hold insulin  If Hgb < 7.0, transfuse 1 unit PRBC    Contingency Plan (special circumstances anticipated  and plan): If coffee ground emesis or other evidence of GI bleed, consult GI for evaluation    Estimated Discharge Date: 4/15/24    Discharge Disposition: Home or Self Care

## 2024-04-13 NOTE — SUBJECTIVE & OBJECTIVE
Interval History/Significant Events: NAEO    Review of Systems   Constitutional:  Negative for chills and fever.   Respiratory:  Negative for chest tightness and shortness of breath.    Cardiovascular:  Negative for chest pain, palpitations and leg swelling.   Gastrointestinal:  Positive for nausea. Negative for vomiting.   Genitourinary:  Negative for difficulty urinating, dysuria and hematuria.   Musculoskeletal:  Positive for arthralgias, back pain and myalgias. Negative for neck stiffness.   Neurological:  Positive for headaches. Negative for dizziness and numbness.   All other systems reviewed and are negative.    Objective:     Vital Signs (Most Recent):  Temp: 98.5 °F (36.9 °C) (04/13/24 1333)  Pulse: 91 (04/13/24 1333)  Resp: 15 (04/13/24 1333)  BP: 113/79 (04/13/24 1333)  SpO2: 98 % (04/13/24 1000) Vital Signs (24h Range):  Temp:  [98.2 °F (36.8 °C)-98.7 °F (37.1 °C)] 98.5 °F (36.9 °C)  Pulse:  [] 91  Resp:  [12-33] 15  SpO2:  [97 %-100 %] 98 %  BP: (113-136)/() 113/79   Weight: 55 kg (121 lb 4.1 oz)  Body mass index is 18.99 kg/m².      Intake/Output Summary (Last 24 hours) at 4/13/2024 1520  Last data filed at 4/13/2024 1244  Gross per 24 hour   Intake 942.7 ml   Output 475 ml   Net 467.7 ml          Physical Exam  Vitals and nursing note reviewed.   Constitutional:       Appearance: She is ill-appearing.   Eyes:      Extraocular Movements: Extraocular movements intact.      Conjunctiva/sclera: Conjunctivae normal.      Pupils: Pupils are equal, round, and reactive to light.   Cardiovascular:      Rate and Rhythm: Normal rate and regular rhythm.   Pulmonary:      Effort: Pulmonary effort is normal.      Breath sounds: Normal breath sounds.   Abdominal:      General: There is no distension.      Palpations: Abdomen is soft.      Tenderness: There is no abdominal tenderness.   Skin:     General: Skin is warm and dry.   Neurological:      General: No focal deficit present.      Mental Status: She  is alert. Mental status is at baseline.      Cranial Nerves: No cranial nerve deficit.            Vents:     Lines/Drains/Airways       Drain  Duration             Female External Urinary Catheter w/ Suction 04/12/24 0530 1 day              Peripheral Intravenous Line  Duration                  Peripheral IV - Single Lumen 04/12/24 0341 20 G Anterior;Right Forearm 1 day         Peripheral IV - Single Lumen 04/12/24 1410 20 G Left Antecubital 1 day                  Significant Labs:    CBC/Anemia Profile:  Recent Labs   Lab 04/12/24  0031 04/12/24  0042 04/13/24  0527   WBC 13.35*  --  7.97   HGB 11.9*  --  10.8*   HCT 40.2 43 33.5*     --  303   MCV 87  --  82   RDW 16.1*  --  15.9*        Chemistries:  Recent Labs   Lab 04/12/24  0031 04/12/24  0551 04/12/24  1222 04/12/24  1620 04/13/24  0527      < > 139 139 138   K 5.6*   < > 4.9 4.1 4.3      < > 112* 113* 106   CO2 7*   < > 16* 19* 20*   BUN 28*   < > 23* 19 15   CREATININE 1.5*   < > 1.3 0.9 1.1   CALCIUM 10.4   < > 9.0 8.9 9.1   ALBUMIN 4.4  --   --   --   --    PROT 9.0*  --   --   --   --    BILITOT 0.3  --   --   --   --    ALKPHOS 156*  --   --   --   --    ALT 18  --   --   --   --    AST 24  --   --   --   --    MG  --    < > 1.9 1.8 1.8   PHOS  --    < > 2.3* 2.0* 2.2*    < > = values in this interval not displayed.       All pertinent labs within the past 24 hours have been reviewed.    Significant Imaging:  I have reviewed all pertinent imaging results/findings within the past 24 hours.

## 2024-04-13 NOTE — HOSPITAL COURSE
Admitted to MICU 4/12, started on IV fluids and insulin drip. On 4/12, anion gap closed at 7, HCO3 19. Transitioned off insulin drip to basal insulin + SSI evening of 4/12. Stepped down from MICU 4/13. Patient continued to be stable after stepdown, one blood glucose of 312, but otherwise 60s to 200. Discharged 4/14 with strict instructions for PCP follow up, blood glucose control, return precautions.

## 2024-04-13 NOTE — NURSING
MICU DAILY GOALS     Family/Goals of care/Code Status   Code Status: Full Code    24H Vital Sign Range  Temp:  [98.2 °F (36.8 °C)-98.7 °F (37.1 °C)]   Pulse:  []   Resp:  [12-33]   BP: (113-136)/()   SpO2:  [97 %-100 %]      Shift Events (include procedures and significant events)   No acute events throughout shift    AWAKE RASS: Goal - RASS Goal: 0-->alert and calm  Actual - RASS (Finnegan Agitation-Sedation Scale): alert and calm    Restraint necessity: Not necessary   BREATHE SBT: Not attempted    Coordinate A & B, analgesics/sedatives Pain: managed   SAT: NA   Delirium CAM-ICU: Overall CAM-ICU: Negative   Early(intubated/ Progressive (non-intubated) Mobility MOVE Screen (INTUBATED ONLY): NA    Activity: Activity Management: Ambulated to bathroom - L4   Feeding/Nutrition Diet order: Diet/Nutrition Received: consistent carb/diabetic diet,     Thrombus DVT prophylaxis:     HOB Elevation Head of Bed (HOB) Positioning: HOB at 30 degrees   Ulcer Prophylaxis GI: yes   Glucose control managed Glycemic Management: blood glucose monitored, supplemental insulin given   Skin Skin assessed during: Daily Assessment    Sacrum intact/not altered? Yes  Heels intact/not altered? Yes  Surgical wound? No    CHECK ONE!   (no altered skin or altered skin) and sub boxes:  [x] No Altered Skin Integrity Present    []Prevention Measures Documented    [] Altered Skin Integrity Present or Discovered   [] LDA present in EPIC, daily doc completed              [] LDA added if not in EPIC (describe wound).                    When describing wound, do not stage, use descriptive words only.    [] Wound Image Taken (required on admit,                   transfer/discharge and every Tuesday)    Wound Care Consulted? No    4 EYES:  Attending Nurse (1st set of eyes):     Second RN/Staff Member (2nd set of eyes):    Bowel Function no issues    Indwelling Catheter Necessity         No     De-escalation Antibiotics No        VS and  assessment per flow sheet, patient progressing towards goals as tolerated, plan of care reviewed with family, all concerns addressed, will continue to monitor.

## 2024-04-13 NOTE — PLAN OF CARE
MICU DAILY GOALS     Family/Goals of care/Code Status   Code Status: Full Code    24H Vital Sign Range  Temp:  [98.1 °F (36.7 °C)-99 °F (37.2 °C)]   Pulse:  []   Resp:  [12-38]   BP: (114-160)/(60-95)   SpO2:  [97 %-100 %]      Shift Events (include procedures and significant events)   No acute events throughout shift    AWAKE RASS: Goal - RASS Goal: 0-->alert and calm  Actual - RASS (Finnegan Agitation-Sedation Scale): alert and calm    Restraint necessity: Not necessary   BREATHE SBT: Not intubated    Coordinate A & B, analgesics/sedatives Pain: managed   SAT: Not intubated   Delirium CAM-ICU: Overall CAM-ICU: Negative   Early(intubated/ Progressive (non-intubated) Mobility MOVE Screen (INTUBATED ONLY): Not intubated    Activity: Activity Management: Arm raise - L1   Feeding/Nutrition Diet order: Diet/Nutrition Received: consistent carb/diabetic diet,     Thrombus DVT prophylaxis:     HOB Elevation Head of Bed (HOB) Positioning: HOB at 20-30 degrees   Ulcer Prophylaxis GI: yes   Glucose control managed Glycemic Management: blood glucose monitored   Skin Skin assessed during: Q Shift Change    Sacrum intact/not altered? Yes  Heels intact/not altered? Yes  Surgical wound? No    CHECK ONE!   (no altered skin or altered skin) and sub boxes:  [x] No Altered Skin Integrity Present    []Prevention Measures Documented    [] Altered Skin Integrity Present or Discovered   [] LDA present in EPIC, daily doc completed              [] LDA added if not in EPIC (describe wound).                    When describing wound, do not stage, use descriptive words only.    [] Wound Image Taken (required on admit,                   transfer/discharge and every Tuesday)    Wound Care Consulted? No    4 EYES:  Attending Nurse (1st set of eyes): osiris Arreola    Second RN/Staff Member (2nd set of eyes): osiris Bellamy   Bowel Function no issues    Indwelling Catheter Necessity         N/a   De-escalation Antibiotics No        VS and assessment  per flow sheet, patient progressing towards goals as tolerated, plan of care reviewed with  patient , all concerns addressed, will continue to monitor.

## 2024-04-13 NOTE — ASSESSMENT & PLAN NOTE
History of gastroparesis on home reglan.     --Check Qtc  --PRN antiemetics  --Report coffee ground emesis in ED, will continue PPI and trend CBC

## 2024-04-14 VITALS
RESPIRATION RATE: 17 BRPM | TEMPERATURE: 98 F | HEART RATE: 85 BPM | OXYGEN SATURATION: 98 % | WEIGHT: 121.25 LBS | SYSTOLIC BLOOD PRESSURE: 102 MMHG | BODY MASS INDEX: 19.03 KG/M2 | DIASTOLIC BLOOD PRESSURE: 69 MMHG | HEIGHT: 67 IN

## 2024-04-14 PROBLEM — E11.10 DKA (DIABETIC KETOACIDOSIS): Status: RESOLVED | Noted: 2020-11-12 | Resolved: 2024-04-14

## 2024-04-14 LAB
ANION GAP SERPL CALC-SCNC: 12 MMOL/L (ref 8–16)
BUN SERPL-MCNC: 13 MG/DL (ref 6–20)
CALCIUM SERPL-MCNC: 9.6 MG/DL (ref 8.7–10.5)
CHLORIDE SERPL-SCNC: 108 MMOL/L (ref 95–110)
CO2 SERPL-SCNC: 20 MMOL/L (ref 23–29)
CREAT SERPL-MCNC: 0.8 MG/DL (ref 0.5–1.4)
EST. GFR  (NO RACE VARIABLE): >60 ML/MIN/1.73 M^2
GLUCOSE SERPL-MCNC: 171 MG/DL (ref 70–110)
MAGNESIUM SERPL-MCNC: 2.1 MG/DL (ref 1.6–2.6)
PHOSPHATE SERPL-MCNC: 3 MG/DL (ref 2.7–4.5)
POCT GLUCOSE: 142 MG/DL (ref 70–110)
POCT GLUCOSE: 164 MG/DL (ref 70–110)
POCT GLUCOSE: 265 MG/DL (ref 70–110)
POTASSIUM SERPL-SCNC: 4.1 MMOL/L (ref 3.5–5.1)
SODIUM SERPL-SCNC: 140 MMOL/L (ref 136–145)

## 2024-04-14 PROCEDURE — 84100 ASSAY OF PHOSPHORUS: CPT

## 2024-04-14 PROCEDURE — 63600175 PHARM REV CODE 636 W HCPCS

## 2024-04-14 PROCEDURE — 83735 ASSAY OF MAGNESIUM: CPT

## 2024-04-14 PROCEDURE — 99233 SBSQ HOSP IP/OBS HIGH 50: CPT | Mod: ,,, | Performed by: INTERNAL MEDICINE

## 2024-04-14 PROCEDURE — 25000003 PHARM REV CODE 250

## 2024-04-14 PROCEDURE — 80048 BASIC METABOLIC PNL TOTAL CA: CPT

## 2024-04-14 RX ADMIN — PANTOPRAZOLE SODIUM 40 MG: 40 TABLET, DELAYED RELEASE ORAL at 09:04

## 2024-04-14 RX ADMIN — FLUOXETINE HYDROCHLORIDE 60 MG: 20 CAPSULE ORAL at 09:04

## 2024-04-14 RX ADMIN — INSULIN ASPART 4 UNITS: 100 INJECTION, SOLUTION INTRAVENOUS; SUBCUTANEOUS at 12:04

## 2024-04-14 RX ADMIN — PROCHLORPERAZINE EDISYLATE 2.5 MG: 5 INJECTION INTRAMUSCULAR; INTRAVENOUS at 12:04

## 2024-04-14 NOTE — DISCHARGE SUMMARY
[facility] - Cardiac Medical ICU  Critical Care Medicine  Discharge Summary      Patient Name: Hollie Wilson  MRN: [id number]  Admission Date: 4/11/2024  Hospital Length of Stay: 2 days  Discharge Date and Time:  04/14/2024 12:41 PM  Attending Physician: Padmini Alanis MD   Discharging Provider: Raheem Gaspar MD  Primary Care Provider: Martin Rios MD  Reason for Admission: DKA    HPI:   Ms. Hollie Wilson is a 33 y.o. woman with a past medical history intellectual dysfunction, poorly controlled diabetes, mood disorder, substance abuse, gastroparesis who presented to the ED by EMS for hyperglycemia and emesis. EMS states the call was initially for shortness of breath, they found the patient lying in bed naked. She was initially confused, they reported a GCS of 14 which resolved. She was able to then answer questions appropriately. She began vomiting enroute, noted to have dark, brown contents with retching. Person at the house had little information about the patient per EMS. Patient states she has been out of her insulin for a couple days. In ED found to have , serum bicarb 7 and pH 7.17. Patient given IVF and started on insulin gtt.  Critical Care Medicine consulted for DKA and patient admitted to MICU.      * No surgery found *    Indwelling Lines/Drains at Time of Discharge:   Lines/Drains/Airways       Drain  Duration             Female External Urinary Catheter w/ Suction 04/12/24 0530 2 days                  Hospital Course:   Admitted to MICU 4/12, started on IV fluids and insulin drip. On 4/12, anion gap closed at 7, HCO3 19. Transitioned off insulin drip to basal insulin + SSI evening of 4/12. Stepped down from MICU 4/13. Patient continued to be stable after stepdown, one blood glucose of 312, but otherwise 60s to 200. Discharged 4/14 with strict instructions for PCP follow up, blood glucose control, return precautions.    Consults (From admission, onward)          Status Ordering Provider      Inpatient consult to Critical Care Medicine  Once        Provider:  (Not yet assigned)    Completed CARRI CROWLEY          Significant Labs:  All pertinent labs within the past 24 hours have been reviewed.    Significant Imaging:  I have reviewed all pertinent imaging results/findings within the past 24 hours.    Pending Diagnostic Studies:       Procedure Component Value Units Date/Time    CBC auto differential [[id number]]     Order Status: Sent Lab Status: No result     Specimen: Blood     CBC auto differential [[id number]] Collected: 04/12/24 0551    Order Status: Sent Lab Status: No result     Specimen: Blood     CBC auto differential [[id number]] Collected: 04/12/24 0551    Order Status: Sent Lab Status: No result     Specimen: Blood           Final Active Diagnoses:    Diagnosis Date Noted POA    Gastroparesis [K31.84] 04/12/2024 Yes    Tobacco abuse [Z72.0] 06/13/2019 Yes    Polysubstance abuse [F19.10] 06/13/2019 Yes      Problems Resolved During this Admission:    Diagnosis Date Noted Date Resolved POA    PRINCIPAL PROBLEM:  DKA (diabetic ketoacidoses) [E11.10] 11/12/2020 04/14/2024 Yes     No new Assessment & Plan notes have been filed under this hospital service since the last note was generated.  Service: Critical Care Medicine    Discharged Condition: fair    Disposition:       Patient Instructions:   No discharge procedures on file.  Medications:  Reconciled Home Medications:      Medication List        CONTINUE taking these medications      blood sugar diagnostic Strp  To check BG 4 times daily, to use with insurance preferred meter     DEXCOM G7 SENSOR Latanya  Generic drug: blood-glucose sensor  1 Device by Misc.(Non-Drug; Combo Route) route as needed (to check blood sugar).     DEXCOM  Misc  Generic drug: blood-glucose meter,continuous  1 Device by Misc.(Non-Drug; Combo Route) route as needed (to check blood sugar).     FLUoxetine 20 MG capsule  Take 3 capsules by mouth once daily.    "  gabapentin 300 MG capsule  Commonly known as: NEURONTIN  Take 1 capsule by mouth 3 (three) times daily.     glucagon 3 mg/actuation Spry  1 spray by Nasal route as needed (hypoglycemia).     glucose 4 GM chewable tablet  Take 4 tablets (16 g total) by mouth as needed for Low blood sugar.     insulin aspart U-100 100 unit/mL (3 mL) Inpn pen  Commonly known as: NovoLOG  Inject 3 Units into the skin 3 (three) times daily with meals.     insulin degludec 100 unit/mL (3 mL) insulin pen  Commonly known as: TRESIBA FLEXTOUCH U-100  8 units SC in the AM and 6 units SC in the PM     insulin glargine 100 units/mL SubQ pen  Inject 5 Units into the skin once daily.     insulin lispro 100 unit/mL pen  Inject into the skin.     lancets 30 gauge Misc  To check BG 4 times daily, to use with insurance preferred meter     metoclopramide HCl 10 MG tablet  Commonly known as: REGLAN  Take 1 tablet (10 mg total) by mouth every 6 (six) hours as needed (headache, nausea or vomiting).     pantoprazole 40 MG tablet  Commonly known as: PROTONIX  Take 1 tablet by mouth every morning.     pen needle, diabetic 32 gauge x 5/32" Ndle  Use 4 (four) times daily.     polyethylene glycol 17 gram Pwpk  Commonly known as: GLYCOLAX  Take 17 g by mouth once daily.     promethazine 25 MG suppository  Commonly known as: PHENERGAN  Place 1 suppository (25 mg total) rectally every 6 (six) hours as needed for Nausea (For severe nausea and vomiting not improved with oral medications).     risperiDONE 1 MG tablet  Commonly known as: RISPERDAL  Take 1 tablet by mouth 2 (two) times daily.               Raheem Gaspar MD  Critical Care Medicine  [facility] - Cardiac Medical ICU  "

## 2024-04-14 NOTE — PLAN OF CARE
MICU DAILY GOALS     Family/Goals of care/Code Status   Code Status: Full Code    24H Vital Sign Range  Temp:  [97.6 °F (36.4 °C)-98.7 °F (37.1 °C)]   Pulse:  []   Resp:  [14-25]   BP: (112-136)/()   SpO2:  [97 %-99 %]      Shift Events (include procedures and significant events)   No acute events throughout shift, patient refusing care, also refusing meds and refusing EKG leads, blood pressure cuff and pulse oxymetry. Blood pressure and pulse ox checked every four hours. Patient has step down orders, awaiting for bed.  Problem: Adult Inpatient Plan of Care  Goal: Readiness for Transition of Care  Outcome: Ongoing, Progressing   tly    AWAKE RASS: Goal - RASS Goal: 0-->alert and calm  Actual - RASS (Finnegan Agitation-Sedation Scale): alert and calm    Restraint necessity: Not necessary   BREATHE SBT: NA    Coordinate A & B, analgesics/sedatives Pain: managed   SAT: NA   Delirium CAM-ICU: Overall CAM-ICU: Negative   Early(intubated/ Progressive (non-intubated) Mobility MOVE Screen (INTUBATED ONLY): NA    Activity: Activity Management: Rolling - L1   Feeding/Nutrition Diet order: Diet/Nutrition Received: consistent carb/diabetic diet,     Thrombus DVT prophylaxis:     HOB Elevation Head of Bed (HOB) Positioning: HOB at 20-30 degrees   Ulcer Prophylaxis GI: yes   Glucose control managed Glycemic Management: blood glucose monitored, supplemental insulin given   Skin Skin assessed during: Q Shift Change    Sacrum intact/not altered? Yes  Heels intact/not altered? Yes  Surgical wound? No    CHECK ONE!   (no altered skin or altered skin) and sub boxes:  [x] No Altered Skin Integrity Present    [x]Prevention Measures Documented    [] Altered Skin Integrity Present or Discovered   [] LDA present in EPIC, daily doc completed              [] LDA added if not in EPIC (describe wound).                    When describing wound, do not stage, use descriptive words only.    [] Wound Image Taken (required on admit,                    transfer/discharge and every Tuesday)    Wound Care Consulted? No    4 EYES:  Attending Nurse (1st set of eyes): ELISABETH Werner    Second RN/Staff Member (2nd set of eyes):    Bowel Function no issues    Indwelling Catheter Necessity            De-escalation Antibiotics No        VS and assessment per flow sheet, patient progressing towards goals as tolerated, plan of care reviewed with Mrs. Wilson, all concerns addressed, will continue to monitor.

## 2024-04-14 NOTE — PLAN OF CARE
Patient will need a ride home at discharge. Patient has asked to get established with a PCP at [facility] in [facility]. Patient appt will be scheduled tomorrow.      Jameel is arriving at pickup in 4 min  [phone]  [facility]  [id number]           04/14/24 1233   Final Note   Assessment Type Final Discharge Note   Anticipated Discharge Disposition Home   Hospital Resources/Appts/Education Provided Provided patient/caregiver with written discharge plan information;Appointments scheduled and added to AVS   Post-Acute Status   Discharge Delays None known at this time     [facility] - Cardiac Medical ICU  Discharge Final Note    Primary Care Provider: Martin Rios MD    Expected Discharge Date: 4/16/2024    Final Discharge Note (most recent)       Final Note - 04/14/24 1233          Final Note    Assessment Type Final Discharge Note (P)      Anticipated Discharge Disposition Home or Self Care (P)      Hospital Resources/Appts/Education Provided Provided patient/caregiver with written discharge plan information;Appointments scheduled and added to AVS (P)         Post-Acute Status    Discharge Delays None known at this time (P)                      Important Message from Medicare

## 2024-04-14 NOTE — SUBJECTIVE & OBJECTIVE
Interval History/Significant Events: NAEO    Review of Systems   Constitutional:  Negative for chills and fever.   HENT:  Negative for congestion and sore throat.    Respiratory:  Negative for cough, chest tightness and shortness of breath.    Cardiovascular:  Negative for chest pain and leg swelling.   Gastrointestinal:  Positive for abdominal pain, constipation and nausea. Negative for vomiting.   Genitourinary:  Negative for difficulty urinating and dysuria.   Musculoskeletal:  Positive for arthralgias and myalgias. Negative for back pain and joint swelling.   Skin:  Negative for pallor and rash.   Neurological:  Negative for dizziness, light-headedness and headaches.     Objective:     Vital Signs (Most Recent):  Temp: 98.3 °F (36.8 °C) (04/14/24 0747)  Pulse: 85 (04/14/24 0747)  Resp: 17 (04/14/24 0747)  BP: 102/69 (04/14/24 0806)  SpO2: 98 % (04/14/24 0747) Vital Signs (24h Range):  Temp:  [97.6 °F (36.4 °C)-98.6 °F (37 °C)] 98.3 °F (36.8 °C)  Pulse:  [85-87] 85  Resp:  [17-18] 17  SpO2:  [98 %-99 %] 98 %  BP: (102-119)/(69-79) 102/69   Weight: 55 kg (121 lb 4.1 oz)  Body mass index is 18.99 kg/m².      Intake/Output Summary (Last 24 hours) at 4/14/2024 1358  Last data filed at 4/13/2024 1557  Gross per 24 hour   Intake --   Output 1 ml   Net -1 ml          Physical Exam  Vitals and nursing note reviewed.   Constitutional:       Appearance: She is underweight.   Eyes:      Extraocular Movements: Extraocular movements intact.      Conjunctiva/sclera: Conjunctivae normal.   Cardiovascular:      Rate and Rhythm: Normal rate and regular rhythm.      Heart sounds: Normal heart sounds.   Pulmonary:      Effort: Pulmonary effort is normal.      Breath sounds: Normal breath sounds.   Abdominal:      General: There is no distension.      Palpations: Abdomen is soft. There is no mass.      Tenderness: There is abdominal tenderness. There is no guarding or rebound.   Musculoskeletal:         General: No swelling or  deformity.   Skin:     General: Skin is warm and dry.   Neurological:      Mental Status: She is alert. Mental status is at baseline.            Vents:     Lines/Drains/Airways       None                 Significant Labs:    CBC/Anemia Profile:  Recent Labs   Lab 04/13/24  0527   WBC 7.97   HGB 10.8*   HCT 33.5*      MCV 82   RDW 15.9*        Chemistries:  Recent Labs   Lab 04/12/24  1620 04/13/24  0527 04/13/24  1548 04/14/24  0321    138 140 140   K 4.1 4.3 3.7 4.1   * 106 107 108   CO2 19* 20* 23 20*   BUN 19 15 12 13   CREATININE 0.9 1.1 0.8 0.8   CALCIUM 8.9 9.1 9.5 9.6   MG 1.8 1.8  --  2.1   PHOS 2.0* 2.2*  --  3.0       All pertinent labs within the past 24 hours have been reviewed.    Significant Imaging:  I have reviewed all pertinent imaging results/findings within the past 24 hours.

## 2024-04-14 NOTE — NURSING
Unable to do a full head to toe assessment, patient refusing. Patient also refusing EKG leads, blood pressure cuff and pulse ox probe. MD aware. Patient does have transfer order to the floor but no bed available. LEXIS.

## 2024-04-14 NOTE — PROGRESS NOTES
[facility] - Cardiac Medical ICU  Critical Care Medicine  Progress Note    Patient Name: Hollie Wlison  MRN: [id number]  Admission Date: 4/11/2024  Hospital Length of Stay: 2 days  Code Status: Full Code  Attending Provider: Padmini Alanis MD  Primary Care Provider: Martin Rios MD   Principal Problem: DKA (diabetic ketoacidosis)    Subjective:     HPI:  MsNell Wilson is a 33 y.o. woman with a past medical history intellectual dysfunction, poorly controlled diabetes, mood disorder, substance abuse, gastroparesis who presented to the ED by EMS for hyperglycemia and emesis. EMS states the call was initially for shortness of breath, they found the patient lying in bed naked. She was initially confused, they reported a GCS of 14 which resolved. She was able to then answer questions appropriately. She began vomiting enroute, noted to have dark, brown contents with retching. Person at the house had little information about the patient per EMS. Patient states she has been out of her insulin for a couple days. In ED found to have , serum bicarb 7 and pH 7.17. Patient given IVF and started on insulin gtt.  Critical Care Medicine consulted for DKA and patient admitted to MICU.      Hospital/ICU Course:  Admitted to MICU 4/12, started on IV fluids and insulin drip. On 4/12, anion gap closed at 7, HCO3 19. Transitioned off insulin drip to basal insulin + SSI evening of 4/12. Stepped down from MICU 4/13. Patient continued to be stable after stepdown, one blood glucose of 312, but otherwise 60s to 200. Discharged 4/14 with strict instructions for PCP follow up, blood glucose control, return precautions.    Interval History/Significant Events: NAEO    Review of Systems   Constitutional:  Negative for chills and fever.   HENT:  Negative for congestion and sore throat.    Respiratory:  Negative for cough, chest tightness and shortness of breath.    Cardiovascular:  Negative for chest pain and leg swelling.    Gastrointestinal:  Positive for abdominal pain, constipation and nausea. Negative for vomiting.   Genitourinary:  Negative for difficulty urinating and dysuria.   Musculoskeletal:  Positive for arthralgias and myalgias. Negative for back pain and joint swelling.   Skin:  Negative for pallor and rash.   Neurological:  Negative for dizziness, light-headedness and headaches.     Objective:     Vital Signs (Most Recent):  Temp: 98.3 °F (36.8 °C) (04/14/24 0747)  Pulse: 85 (04/14/24 0747)  Resp: 17 (04/14/24 0747)  BP: 102/69 (04/14/24 0806)  SpO2: 98 % (04/14/24 0747) Vital Signs (24h Range):  Temp:  [97.6 °F (36.4 °C)-98.6 °F (37 °C)] 98.3 °F (36.8 °C)  Pulse:  [85-87] 85  Resp:  [17-18] 17  SpO2:  [98 %-99 %] 98 %  BP: (102-119)/(69-79) 102/69   Weight: 55 kg (121 lb 4.1 oz)  Body mass index is 18.99 kg/m².      Intake/Output Summary (Last 24 hours) at 4/14/2024 1358  Last data filed at 4/13/2024 1557  Gross per 24 hour   Intake --   Output 1 ml   Net -1 ml          Physical Exam  Vitals and nursing note reviewed.   Constitutional:       Appearance: She is underweight.   Eyes:      Extraocular Movements: Extraocular movements intact.      Conjunctiva/sclera: Conjunctivae normal.   Cardiovascular:      Rate and Rhythm: Normal rate and regular rhythm.      Heart sounds: Normal heart sounds.   Pulmonary:      Effort: Pulmonary effort is normal.      Breath sounds: Normal breath sounds.   Abdominal:      General: There is no distension.      Palpations: Abdomen is soft. There is no mass.      Tenderness: There is abdominal tenderness. There is no guarding or rebound.   Musculoskeletal:         General: No swelling or deformity.   Skin:     General: Skin is warm and dry.   Neurological:      Mental Status: She is alert. Mental status is at baseline.            Vents:     Lines/Drains/Airways       None                 Significant Labs:    CBC/Anemia Profile:  Recent Labs   Lab 04/13/24  0527   WBC 7.97   HGB 10.8*   HCT  33.5*      MCV 82   RDW 15.9*        Chemistries:  Recent Labs   Lab 04/12/24  1620 04/13/24  0527 04/13/24  1548 04/14/24  0321    138 140 140   K 4.1 4.3 3.7 4.1   * 106 107 108   CO2 19* 20* 23 20*   BUN 19 15 12 13   CREATININE 0.9 1.1 0.8 0.8   CALCIUM 8.9 9.1 9.5 9.6   MG 1.8 1.8  --  2.1   PHOS 2.0* 2.2*  --  3.0       All pertinent labs within the past 24 hours have been reviewed.    Significant Imaging:  I have reviewed all pertinent imaging results/findings within the past 24 hours.    ABG  Recent Labs   Lab 04/12/24  0941   PH 7.276*   PO2 43   PCO2 33.5*   HCO3 15.6*   BE -11*     Assessment/Plan:     Psychiatric  Polysubstance abuse  UDS positive for marijuana.    GI  Gastroparesis  History of gastroparesis on home reglan.     --Check Qtc  --PRN antiemetics  --Report coffee ground emesis in ED, will continue PPI and trend CBC    Other  Tobacco abuse  Will need smoking cessation counseling when appropriate.         Critical Care Daily Checklist:    A: Awake: RASS Goal/Actual Goal: RASS Goal: 0-->alert and calm  Actual:     B: Spontaneous Breathing Trial Performed?     C: SAT & SBT Coordinated?  N/A                      D: Delirium: CAM-ICU Overall CAM-ICU: Negative   E: Early Mobility Performed? Yes   F: Feeding Goal:    Status:     Current Diet Order   Procedures    Diet diabetic 1500 Calorie     Order Specific Question:   Total calories:     Answer:   1500 Calorie      AS: Analgesia/Sedation N/A   T: Thromboembolic Prophylaxis Lovenox   H: HOB > 300 Yes   U: Stress Ulcer Prophylaxis (if needed) Protonix   G: Glucose Control Insulin   B: Bowel Function     I: Indwelling Catheter (Lines & Carroll) Necessity PIVE x 2, PureWick   D: De-escalation of Antimicrobials/Pharmacotherapies N/A    Plan for the day/ETD Discharge    Code Status:  Family/Goals of Care: Full Code         Critical secondary to Patient has a condition that poses threat to life and bodily function: DKA      Critical care  was time spent personally by me on the following activities: development of treatment plan with patient or surrogate and bedside caregivers, discussions with consultants, evaluation of patient's response to treatment, examination of patient, ordering and performing treatments and interventions, ordering and review of laboratory studies, ordering and review of radiographic studies, pulse oximetry, re-evaluation of patient's condition. This critical care time did not overlap with that of any other provider or involve time for any procedures.     Raheem Gaspar MD  Critical Care Medicine  [facility] - Cardiac Medical [facility]

## 2024-04-14 NOTE — DISCHARGE INSTRUCTIONS
You were treated for DKA (diabetic ketoacidosis). It is important that you follow up with your PCP as soon as possible to discuss adjustments to your insulin regimen to prevent future episodes of DKA. Go to the Emergency Department for any chest pain; shortness of breath; inability to tolerate oral fluids due to vomiting; blood in your urine, stool, or vomit; any other new or concerning symptoms.    For your Sliding Scale Insulin:  Blood Glucose  mg/dL   Pre-meal  2200  151-200  2 units   1 unit  201-250  4 units   2 units  251-300  6 units   3 units  301-350  8 units   4 units  >350 10 units 5 units  Administer subcutaneously if needed at times designated by monitoring  schedule.   DO NOT HOLD correction dose insulin for patients who are  NPO.

## 2024-04-19 ENCOUNTER — HOSPITAL ENCOUNTER (INPATIENT)
Facility: HOSPITAL | Age: 34
LOS: 3 days | Discharge: HOME | DRG: 638 | End: 2024-04-22
Attending: EMERGENCY MEDICINE | Admitting: STUDENT IN AN ORGANIZED HEALTH CARE EDUCATION/TRAINING PROGRAM
Payer: COMMERCIAL

## 2024-04-19 DIAGNOSIS — Z91.89 AT RISK FOR PROLONGED QT INTERVAL SYNDROME: ICD-10-CM

## 2024-04-19 DIAGNOSIS — R07.89 CHEST TIGHTNESS: ICD-10-CM

## 2024-04-19 DIAGNOSIS — R73.9 HYPERGLYCEMIA: ICD-10-CM

## 2024-04-19 DIAGNOSIS — E10.10 DIABETIC KETOACIDOSIS WITHOUT COMA ASSOCIATED WITH TYPE 1 DIABETES MELLITUS: Primary | ICD-10-CM

## 2024-04-19 LAB
ALBUMIN SERPL BCP-MCNC: 3.5 G/DL (ref 3.5–5.2)
ALBUMIN SERPL BCP-MCNC: 4.2 G/DL (ref 3.5–5.2)
ALLENS TEST: ABNORMAL
ALP SERPL-CCNC: 160 U/L (ref 55–135)
ALT SERPL W/O P-5'-P-CCNC: 15 U/L (ref 10–44)
ANION GAP SERPL CALC-SCNC: 15 MMOL/L (ref 8–16)
ANION GAP SERPL CALC-SCNC: 21 MMOL/L (ref 8–16)
ANION GAP SERPL CALC-SCNC: ABNORMAL MMOL/L (ref 8–16)
AST SERPL-CCNC: 18 U/L (ref 10–40)
B-OH-BUTYR BLD STRIP-SCNC: 5.9 MMOL/L (ref 0–0.5)
BACTERIA #/AREA URNS AUTO: NORMAL /HPF
BASOPHILS # BLD AUTO: 0.09 K/UL (ref 0–0.2)
BASOPHILS NFR BLD: 0.5 % (ref 0–1.9)
BILIRUB SERPL-MCNC: 0.3 MG/DL (ref 0.1–1)
BILIRUB UR QL STRIP: NEGATIVE
BUN SERPL-MCNC: 17 MG/DL (ref 6–20)
BUN SERPL-MCNC: 20 MG/DL (ref 6–20)
BUN SERPL-MCNC: 23 MG/DL (ref 6–20)
CALCIUM SERPL-MCNC: 10.3 MG/DL (ref 8.7–10.5)
CALCIUM SERPL-MCNC: 8.8 MG/DL (ref 8.7–10.5)
CALCIUM SERPL-MCNC: 9.1 MG/DL (ref 8.7–10.5)
CHLORIDE SERPL-SCNC: 104 MMOL/L (ref 95–110)
CHLORIDE SERPL-SCNC: 114 MMOL/L (ref 95–110)
CHLORIDE SERPL-SCNC: 114 MMOL/L (ref 95–110)
CLARITY UR REFRACT.AUTO: CLEAR
CO2 SERPL-SCNC: 10 MMOL/L (ref 23–29)
CO2 SERPL-SCNC: 5 MMOL/L (ref 23–29)
CO2 SERPL-SCNC: <5 MMOL/L (ref 23–29)
COLOR UR AUTO: ABNORMAL
CREAT SERPL-MCNC: 1.1 MG/DL (ref 0.5–1.4)
CREAT SERPL-MCNC: 1.3 MG/DL (ref 0.5–1.4)
CREAT SERPL-MCNC: 1.4 MG/DL (ref 0.5–1.4)
DIFFERENTIAL METHOD BLD: ABNORMAL
EOSINOPHIL # BLD AUTO: 0 K/UL (ref 0–0.5)
EOSINOPHIL NFR BLD: 0.2 % (ref 0–8)
ERYTHROCYTE [DISTWIDTH] IN BLOOD BY AUTOMATED COUNT: 16 % (ref 11.5–14.5)
EST. GFR  (NO RACE VARIABLE): 50.3 ML/MIN/1.73 M^2
EST. GFR  (NO RACE VARIABLE): 55 ML/MIN/1.73 M^2
EST. GFR  (NO RACE VARIABLE): >60 ML/MIN/1.73 M^2
GLUCOSE SERPL-MCNC: 121 MG/DL (ref 70–110)
GLUCOSE SERPL-MCNC: 211 MG/DL (ref 70–110)
GLUCOSE SERPL-MCNC: 490 MG/DL (ref 70–110)
GLUCOSE UR QL STRIP: ABNORMAL
HCG INTACT+B SERPL-ACNC: <2.4 MIU/ML
HCO3 UR-SCNC: 5 MMOL/L (ref 24–28)
HCT VFR BLD AUTO: 40.8 % (ref 37–48.5)
HCT VFR BLD CALC: 41 %PCV (ref 36–54)
HGB BLD-MCNC: 11.8 G/DL (ref 12–16)
HGB UR QL STRIP: NEGATIVE
IMM GRANULOCYTES # BLD AUTO: 0.22 K/UL (ref 0–0.04)
IMM GRANULOCYTES NFR BLD AUTO: 1.1 % (ref 0–0.5)
KETONES UR QL STRIP: ABNORMAL
LEUKOCYTE ESTERASE UR QL STRIP: NEGATIVE
LYMPHOCYTES # BLD AUTO: 1.5 K/UL (ref 1–4.8)
LYMPHOCYTES NFR BLD: 7.9 % (ref 18–48)
MCH RBC QN AUTO: 26.8 PG (ref 27–31)
MCHC RBC AUTO-ENTMCNC: 28.9 G/DL (ref 32–36)
MCV RBC AUTO: 93 FL (ref 82–98)
MICROSCOPIC COMMENT: NORMAL
MONOCYTES # BLD AUTO: 1.1 K/UL (ref 0.3–1)
MONOCYTES NFR BLD: 5.5 % (ref 4–15)
NEUTROPHILS # BLD AUTO: 16.2 K/UL (ref 1.8–7.7)
NEUTROPHILS NFR BLD: 84.8 % (ref 38–73)
NITRITE UR QL STRIP: NEGATIVE
NRBC BLD-RTO: 0 /100 WBC
OHS QRS DURATION: 130 MS
OHS QTC CALCULATION: 500 MS
PCO2 BLDA: 12.6 MMHG (ref 35–45)
PH SMN: 7.21 [PH] (ref 7.35–7.45)
PH UR STRIP: 5 [PH] (ref 5–8)
PHOSPHATE SERPL-MCNC: 2.1 MG/DL (ref 2.7–4.5)
PLATELET # BLD AUTO: 357 K/UL (ref 150–450)
PMV BLD AUTO: 12.8 FL (ref 9.2–12.9)
PO2 BLDA: 188 MMHG (ref 40–60)
POC BE: -23 MMOL/L
POC IONIZED CALCIUM: 1.09 MMOL/L (ref 1.06–1.42)
POC SATURATED O2: 99 % (ref 95–100)
POC TCO2: 5 MMOL/L (ref 24–29)
POCT GLUCOSE: 133 MG/DL (ref 70–110)
POCT GLUCOSE: 134 MG/DL (ref 70–110)
POCT GLUCOSE: 153 MG/DL (ref 70–110)
POCT GLUCOSE: 159 MG/DL (ref 70–110)
POCT GLUCOSE: 180 MG/DL (ref 70–110)
POCT GLUCOSE: 188 MG/DL (ref 70–110)
POCT GLUCOSE: 229 MG/DL (ref 70–110)
POCT GLUCOSE: 301 MG/DL (ref 70–110)
POCT GLUCOSE: 378 MG/DL (ref 70–110)
POCT GLUCOSE: 396 MG/DL (ref 70–110)
POCT GLUCOSE: 447 MG/DL (ref 70–110)
POTASSIUM BLD-SCNC: 4.7 MMOL/L (ref 3.5–5.1)
POTASSIUM SERPL-SCNC: 4.9 MMOL/L (ref 3.5–5.1)
POTASSIUM SERPL-SCNC: 5 MMOL/L (ref 3.5–5.1)
POTASSIUM SERPL-SCNC: 5 MMOL/L (ref 3.5–5.1)
PROT SERPL-MCNC: 8 G/DL (ref 6–8.4)
PROT UR QL STRIP: NEGATIVE
RBC # BLD AUTO: 4.41 M/UL (ref 4–5.4)
SAMPLE: ABNORMAL
SITE: ABNORMAL
SODIUM BLD-SCNC: 135 MMOL/L (ref 136–145)
SODIUM SERPL-SCNC: 139 MMOL/L (ref 136–145)
SODIUM SERPL-SCNC: 140 MMOL/L (ref 136–145)
SODIUM SERPL-SCNC: 141 MMOL/L (ref 136–145)
SP GR UR STRIP: 1.02 (ref 1–1.03)
SQUAMOUS #/AREA URNS AUTO: 0 /HPF
URN SPEC COLLECT METH UR: ABNORMAL
WBC # BLD AUTO: 19.14 K/UL (ref 3.9–12.7)
WBC #/AREA URNS AUTO: 0 /HPF (ref 0–5)
YEAST UR QL AUTO: NORMAL

## 2024-04-19 PROCEDURE — 80048 BASIC METABOLIC PNL TOTAL CA: CPT | Mod: XB | Performed by: PHYSICIAN ASSISTANT

## 2024-04-19 PROCEDURE — 80069 RENAL FUNCTION PANEL: CPT | Performed by: STUDENT IN AN ORGANIZED HEALTH CARE EDUCATION/TRAINING PROGRAM

## 2024-04-19 PROCEDURE — 82962 GLUCOSE BLOOD TEST: CPT

## 2024-04-19 PROCEDURE — 84295 ASSAY OF SERUM SODIUM: CPT

## 2024-04-19 PROCEDURE — 96365 THER/PROPH/DIAG IV INF INIT: CPT

## 2024-04-19 PROCEDURE — 84702 CHORIONIC GONADOTROPIN TEST: CPT | Performed by: PHYSICIAN ASSISTANT

## 2024-04-19 PROCEDURE — 81001 URINALYSIS AUTO W/SCOPE: CPT

## 2024-04-19 PROCEDURE — 82010 KETONE BODYS QUAN: CPT

## 2024-04-19 PROCEDURE — 63600175 PHARM REV CODE 636 W HCPCS

## 2024-04-19 PROCEDURE — 96367 TX/PROPH/DG ADDL SEQ IV INF: CPT

## 2024-04-19 PROCEDURE — 85025 COMPLETE CBC W/AUTO DIFF WBC: CPT

## 2024-04-19 PROCEDURE — 99900035 HC TECH TIME PER 15 MIN (STAT)

## 2024-04-19 PROCEDURE — 25000003 PHARM REV CODE 250: Performed by: PHYSICIAN ASSISTANT

## 2024-04-19 PROCEDURE — 96361 HYDRATE IV INFUSION ADD-ON: CPT

## 2024-04-19 PROCEDURE — 20600001 HC STEP DOWN PRIVATE ROOM

## 2024-04-19 PROCEDURE — 80047 BASIC METABLC PNL IONIZED CA: CPT

## 2024-04-19 PROCEDURE — 96366 THER/PROPH/DIAG IV INF ADDON: CPT

## 2024-04-19 PROCEDURE — 93010 ELECTROCARDIOGRAM REPORT: CPT | Mod: ,,, | Performed by: INTERNAL MEDICINE

## 2024-04-19 PROCEDURE — 82330 ASSAY OF CALCIUM: CPT

## 2024-04-19 PROCEDURE — 63600175 PHARM REV CODE 636 W HCPCS: Performed by: STUDENT IN AN ORGANIZED HEALTH CARE EDUCATION/TRAINING PROGRAM

## 2024-04-19 PROCEDURE — S5010 5% DEXTROSE AND 0.45% SALINE: HCPCS | Performed by: STUDENT IN AN ORGANIZED HEALTH CARE EDUCATION/TRAINING PROGRAM

## 2024-04-19 PROCEDURE — 96375 TX/PRO/DX INJ NEW DRUG ADDON: CPT

## 2024-04-19 PROCEDURE — 25000003 PHARM REV CODE 250: Performed by: STUDENT IN AN ORGANIZED HEALTH CARE EDUCATION/TRAINING PROGRAM

## 2024-04-19 PROCEDURE — 36415 COLL VENOUS BLD VENIPUNCTURE: CPT | Performed by: PHYSICIAN ASSISTANT

## 2024-04-19 PROCEDURE — 25000003 PHARM REV CODE 250

## 2024-04-19 PROCEDURE — 93005 ELECTROCARDIOGRAM TRACING: CPT

## 2024-04-19 PROCEDURE — 84132 ASSAY OF SERUM POTASSIUM: CPT

## 2024-04-19 PROCEDURE — 82800 BLOOD PH: CPT

## 2024-04-19 PROCEDURE — 85014 HEMATOCRIT: CPT

## 2024-04-19 PROCEDURE — 80053 COMPREHEN METABOLIC PANEL: CPT

## 2024-04-19 PROCEDURE — 36415 COLL VENOUS BLD VENIPUNCTURE: CPT | Performed by: STUDENT IN AN ORGANIZED HEALTH CARE EDUCATION/TRAINING PROGRAM

## 2024-04-19 PROCEDURE — 82803 BLOOD GASES ANY COMBINATION: CPT

## 2024-04-19 PROCEDURE — 99291 CRITICAL CARE FIRST HOUR: CPT

## 2024-04-19 RX ORDER — NICOTINE 7MG/24HR
1 PATCH, TRANSDERMAL 24 HOURS TRANSDERMAL DAILY
Status: DISCONTINUED | OUTPATIENT
Start: 2024-04-20 | End: 2024-04-22 | Stop reason: HOSPADM

## 2024-04-19 RX ORDER — HYDROCODONE BITARTRATE AND ACETAMINOPHEN 5; 325 MG/1; MG/1
1 TABLET ORAL EVERY 8 HOURS PRN
Status: DISCONTINUED | OUTPATIENT
Start: 2024-04-19 | End: 2024-04-22 | Stop reason: HOSPADM

## 2024-04-19 RX ORDER — DEXTROSE MONOHYDRATE AND SODIUM CHLORIDE 5; .45 G/100ML; G/100ML
INJECTION, SOLUTION INTRAVENOUS CONTINUOUS PRN
Status: DISCONTINUED | OUTPATIENT
Start: 2024-04-19 | End: 2024-04-19

## 2024-04-19 RX ORDER — POTASSIUM CHLORIDE 7.45 MG/ML
10 INJECTION INTRAVENOUS
Status: COMPLETED | OUTPATIENT
Start: 2024-04-19 | End: 2024-04-19

## 2024-04-19 RX ORDER — DEXTROSE MONOHYDRATE 100 MG/ML
INJECTION, SOLUTION INTRAVENOUS
Status: DISCONTINUED | OUTPATIENT
Start: 2024-04-19 | End: 2024-04-22 | Stop reason: HOSPADM

## 2024-04-19 RX ORDER — TALC
6 POWDER (GRAM) TOPICAL NIGHTLY PRN
Status: DISCONTINUED | OUTPATIENT
Start: 2024-04-19 | End: 2024-04-22 | Stop reason: HOSPADM

## 2024-04-19 RX ORDER — ACETAMINOPHEN 325 MG/1
650 TABLET ORAL EVERY 4 HOURS PRN
Status: DISCONTINUED | OUTPATIENT
Start: 2024-04-19 | End: 2024-04-22 | Stop reason: HOSPADM

## 2024-04-19 RX ORDER — FAMOTIDINE 20 MG/1
20 TABLET, FILM COATED ORAL DAILY
Status: DISCONTINUED | OUTPATIENT
Start: 2024-04-19 | End: 2024-04-22 | Stop reason: HOSPADM

## 2024-04-19 RX ORDER — GABAPENTIN 300 MG/1
300 CAPSULE ORAL 3 TIMES DAILY
Status: DISCONTINUED | OUTPATIENT
Start: 2024-04-19 | End: 2024-04-22 | Stop reason: HOSPADM

## 2024-04-19 RX ORDER — DEXTROSE MONOHYDRATE AND SODIUM CHLORIDE 5; .45 G/100ML; G/100ML
INJECTION, SOLUTION INTRAVENOUS CONTINUOUS
Status: DISCONTINUED | OUTPATIENT
Start: 2024-04-19 | End: 2024-04-19

## 2024-04-19 RX ORDER — LORAZEPAM 0.5 MG/1
0.5 TABLET ORAL EVERY 6 HOURS PRN
Status: DISCONTINUED | OUTPATIENT
Start: 2024-04-19 | End: 2024-04-19

## 2024-04-19 RX ORDER — METOCLOPRAMIDE HYDROCHLORIDE 5 MG/ML
5 INJECTION INTRAMUSCULAR; INTRAVENOUS EVERY 6 HOURS PRN
Status: DISCONTINUED | OUTPATIENT
Start: 2024-04-19 | End: 2024-04-19

## 2024-04-19 RX ORDER — DEXTROSE MONOHYDRATE AND SODIUM CHLORIDE 5; .45 G/100ML; G/100ML
125 INJECTION, SOLUTION INTRAVENOUS CONTINUOUS PRN
Status: DISCONTINUED | OUTPATIENT
Start: 2024-04-19 | End: 2024-04-19

## 2024-04-19 RX ORDER — SODIUM CHLORIDE 0.9 % (FLUSH) 0.9 %
10 SYRINGE (ML) INJECTION
Status: DISCONTINUED | OUTPATIENT
Start: 2024-04-19 | End: 2024-04-22 | Stop reason: HOSPADM

## 2024-04-19 RX ORDER — POLYETHYLENE GLYCOL 3350 17 G/17G
17 POWDER, FOR SOLUTION ORAL DAILY
Status: DISCONTINUED | OUTPATIENT
Start: 2024-04-19 | End: 2024-04-22 | Stop reason: HOSPADM

## 2024-04-19 RX ORDER — SODIUM CHLORIDE 9 MG/ML
1000 INJECTION, SOLUTION INTRAVENOUS CONTINUOUS
Status: DISCONTINUED | OUTPATIENT
Start: 2024-04-19 | End: 2024-04-19

## 2024-04-19 RX ORDER — DEXTROSE MONOHYDRATE AND SODIUM CHLORIDE 5; .45 G/100ML; G/100ML
INJECTION, SOLUTION INTRAVENOUS CONTINUOUS PRN
Status: CANCELLED | OUTPATIENT
Start: 2024-04-19

## 2024-04-19 RX ORDER — PROCHLORPERAZINE EDISYLATE 5 MG/ML
5 INJECTION INTRAMUSCULAR; INTRAVENOUS EVERY 6 HOURS PRN
Status: DISCONTINUED | OUTPATIENT
Start: 2024-04-19 | End: 2024-04-19

## 2024-04-19 RX ORDER — SODIUM CHLORIDE 9 MG/ML
125 INJECTION, SOLUTION INTRAVENOUS CONTINUOUS
Status: DISCONTINUED | OUTPATIENT
Start: 2024-04-19 | End: 2024-04-19

## 2024-04-19 RX ORDER — DEXTROSE MONOHYDRATE AND SODIUM CHLORIDE 5; .45 G/100ML; G/100ML
INJECTION, SOLUTION INTRAVENOUS CONTINUOUS
Status: DISCONTINUED | OUTPATIENT
Start: 2024-04-19 | End: 2024-04-20

## 2024-04-19 RX ORDER — LORAZEPAM 0.5 MG/1
0.5 TABLET ORAL EVERY 6 HOURS PRN
Status: DISCONTINUED | OUTPATIENT
Start: 2024-04-19 | End: 2024-04-22 | Stop reason: HOSPADM

## 2024-04-19 RX ORDER — FAMOTIDINE 20 MG/1
20 TABLET, FILM COATED ORAL 2 TIMES DAILY
Status: DISCONTINUED | OUTPATIENT
Start: 2024-04-19 | End: 2024-04-19

## 2024-04-19 RX ORDER — ALUMINUM HYDROXIDE, MAGNESIUM HYDROXIDE, AND SIMETHICONE 2400; 240; 2400 MG/30ML; MG/30ML; MG/30ML
30 SUSPENSION ORAL EVERY 6 HOURS PRN
Status: DISCONTINUED | OUTPATIENT
Start: 2024-04-19 | End: 2024-04-22 | Stop reason: HOSPADM

## 2024-04-19 RX ORDER — PROCHLORPERAZINE EDISYLATE 5 MG/ML
2.5 INJECTION INTRAMUSCULAR; INTRAVENOUS
Status: COMPLETED | OUTPATIENT
Start: 2024-04-19 | End: 2024-04-19

## 2024-04-19 RX ADMIN — GABAPENTIN 300 MG: 300 CAPSULE ORAL at 09:04

## 2024-04-19 RX ADMIN — DEXTROSE AND SODIUM CHLORIDE: 5; 450 INJECTION, SOLUTION INTRAVENOUS at 06:04

## 2024-04-19 RX ADMIN — POLYETHYLENE GLYCOL 3350 17 G: 17 POWDER, FOR SOLUTION ORAL at 04:04

## 2024-04-19 RX ADMIN — INSULIN HUMAN 0.1 UNITS/KG/HR: 1 INJECTION, SOLUTION INTRAVENOUS at 11:04

## 2024-04-19 RX ADMIN — PROCHLORPERAZINE EDISYLATE 2.5 MG: 5 INJECTION INTRAMUSCULAR; INTRAVENOUS at 10:04

## 2024-04-19 RX ADMIN — POTASSIUM CHLORIDE 10 MEQ: 7.46 INJECTION, SOLUTION INTRAVENOUS at 11:04

## 2024-04-19 RX ADMIN — SODIUM CHLORIDE 1000 ML: 9 INJECTION, SOLUTION INTRAVENOUS at 10:04

## 2024-04-19 RX ADMIN — SODIUM CHLORIDE 1000 ML: 9 INJECTION, SOLUTION INTRAVENOUS at 11:04

## 2024-04-19 RX ADMIN — SODIUM CHLORIDE 125 ML/HR: 9 INJECTION, SOLUTION INTRAVENOUS at 04:04

## 2024-04-19 RX ADMIN — FAMOTIDINE 20 MG: 20 TABLET ORAL at 02:04

## 2024-04-19 RX ADMIN — DEXTROSE AND SODIUM CHLORIDE: 5; 450 INJECTION, SOLUTION INTRAVENOUS at 07:04

## 2024-04-19 RX ADMIN — LORAZEPAM 0.5 MG: 0.5 TABLET ORAL at 02:04

## 2024-04-19 RX ADMIN — DEXTROSE AND SODIUM CHLORIDE: 5; 450 INJECTION, SOLUTION INTRAVENOUS at 10:04

## 2024-04-19 RX ADMIN — PROMETHAZINE HYDROCHLORIDE 12.5 MG: 25 INJECTION INTRAMUSCULAR; INTRAVENOUS at 09:04

## 2024-04-19 NOTE — PLAN OF CARE
Endocrinology Plan of Care  04/19/2024    Consult acknowledged. See brief plan of care below. Full consult note tomorrow, 4/20/2024.    32yo F with uncontrolled T1DM, bipolar disorder, schizoaffective disorder,  housing insecurity, intellectual disability with impulse control d/o, polysubstance abuse (cocaine, THC), MAI, frequent hospitalizations for DKA, HHS in the setting of medication access/compliance issues and also hx of hypoglycemia requiring D10 infusion including due to intentional insulin overdose who was brought in by EMS after fall at home. Presented with tachypnea, n/v, and hyperglycemia on POC. Workup in the ED c/w DKA. Admitted to hospital medicine. Endocrinology consulted for DKA/BG management.    Frequent admissions to various hospitals for DKA. Most recently admitted early 4/2024 to [facility] DKA  due to medication non-adherence in the setting of N/V. Seen by [facility] endocrinology with last outpatient recommendation Lantus 15u qhs, Novolog 7u AC + LDC AC. Due to frequent hypoglycemia they recommended to give prandial insulin when she starts eating (not to pre-bolus) and to avoid using LDC at bedtime to prevent o/n hypoglycemia. They also relaxed BG goals to allow for low 200s due to frequent hypoglycemia. More recently admitted to the [facility] MICU for DKA (4/2024) and regimen at d/c was Tresiba 8u qAM and 6u qPM and Novolog 3u AC. Unclear why tresiba was split as it is an ultra-long acting insulin. Endocrinology was not consulted that admission.      At this time still in DKA given parameters  Hospital medicine to continue to manage DKA    Recommendations  -  Consider using DKA Pathway to help with orders  -  Q1H accuchecks  -  Aggressive IVF, recommend checking renal function panel Q4 hr  -  Aggresively correction of electrolytes (K, Ma, Phos) during this time  -  Start sugar-free clear liquid diet for now if tolerating    Can convert to subcutaneous insulin only if all occur:  -  Once Bicarb >18  -  Anion  gap is <12  -  Glucose <200 x2 reads   -  Without requiring increasing insulin rate,  -  Tolerating diet w/o nausea/vomiting    When DKA resolves: Recommend Levemir 7 units BID (give 1 hours before turning off insulin drip) and Novolog 5 units TIDWM and low dose correction scale starting at  (sliding scale below)  mg/dL    Units    201-250 1 units    251-300 2 units    301-350 3 units    >350  4 units    -  Recommend diabetic diet at that time and POCT glucose check ac/hs/0200  -  Can consider D5 with 1/2 NS or NS fluid  (depending on sodium levels) in the interim to maintain glucose until those parameters are met  -  Recommendation rate of 50-75 ml/hr to avoid hyperglycemia  - defer IVF management to primary team       I will see this patient tomorrow, full consult note at that time.      Flower Molina MD  Endocrinology

## 2024-04-19 NOTE — HPI
"Reason for Consult: Management of T1DM, Hyperglycemia     Diabetes diagnosis year: "many years ago"    Home Diabetes Medications:    - Lantus 15u qhs  - Novolog 7u AC + Low dose correction starting at 200    How often checking glucose at home?  Infrequently    BG readings on regimen: not checking  Hypoglycemia on the regimen?  Yes  Missed doses on regimen?  Yes    Diabetes Complications include:     Hyperglycemia, Hypoglycemia , and Hypoglycemia unawareness    Complicating diabetes co morbidities:   Intellectual disability, housing insecurity, and polysubstance abuse       HPI:   34yo F with uncontrolled T1DM, bipolar disorder, schizoaffective disorder,  housing insecurity, intellectual disability with impulse control d/o, polysubstance abuse (cocaine, THC), MAI, frequent hospitalizations for DKA, HHS in the setting of medication access/compliance issues and also hx of hypoglycemia requiring D10 infusion including due to intentional insulin overdose who was brought in by EMS after fall at home. Presented with tachypnea, n/v, and hyperglycemia on POC. Workup in the ED c/w DKA. Admitted to hospital medicine. Endocrinology consulted for DKA/BG management.    Frequent admissions to various hospitals for DKA. Most recently admitted early 4/2024 to [facility]  due to medication non-adherence in the setting of N/V. Seen by [facility] endocrinology with last outpatient recommendation Lantus 15u qhs, Novolog 7u AC + LDC AC. Due to frequent hypoglycemia they recommended to give prandial insulin when she starts eating (not to pre-bolus) and to avoid using LDC at bedtime to prevent o/n hypoglycemia. They also relaxed BG goals to allow for low 200s due to frequent hypoglycemia. More recently admitted to the [facility] for DKA (4/2024) and regimen at d/c was Tresiba 8u qAM and 6u qPM and Novolog 3u AC. Unclear why tresiba was split as it is an ultra-long acting insulin.     Pt tells me inconsistent hx -- first she states she has been " compliant with basal but did not have prandial insulin. However later she states she did not have any insulin after discharge. She states she has somewhere to live right now but I am unable to understand where when asked multiple times. She does state she needs help taking care of herself.

## 2024-04-19 NOTE — SUBJECTIVE & OBJECTIVE
Past Medical History:   Diagnosis Date    Anemia     Borderline intellectual functioning 2017    Diabetes mellitus     Mood disorder     Scoliosis     Substance use disorder        Past Surgical History:   Procedure Laterality Date     SECTION         Review of patient's allergies indicates:   Allergen Reactions    Clove oil Itching    Zofran (as hydrochloride) [ondansetron hcl] Hives       Current Facility-Administered Medications   Medication Dose Route Frequency Provider Last Rate Last Admin    0.9%  NaCl infusion  1,000 mL Intravenous Continuous Mika Sarmiento  mL/hr at 24 1143 1,000 mL at 24 1143    0.9%  NaCl infusion  125 mL/hr Intravenous Continuous Chelle Perez, PA-MURTAZA        acetaminophen tablet 650 mg  650 mg Oral Q4H PRN Chelle Perez, PA-MURTAZA        dextrose 10 % infusion   Intravenous PRN Mika Sarmiento MD        dextrose 10 % infusion   Intravenous PRN Mika Sarmiento MD        dextrose 10% bolus 125 mL 125 mL  12.5 g Intravenous PRN Mika Sarmiento MD        dextrose 10% bolus 125 mL 125 mL  12.5 g Intravenous PRN Mika Sarmiento MD        dextrose 10% bolus 125 mL 125 mL  12.5 g Intravenous PRN Mika Sarmiento MD        dextrose 10% bolus 125 mL 125 mL  12.5 g Intravenous PRN Chelle Perez, PA-C        dextrose 10% bolus 250 mL 250 mL  25 g Intravenous PRN Mika Sarmiento MD        dextrose 10% bolus 250 mL 250 mL  25 g Intravenous PRN Mika Sarmiento MD        dextrose 10% bolus 250 mL 250 mL  25 g Intravenous PRN Mika Sarmiento MD        dextrose 10% bolus 250 mL 250 mL  25 g Intravenous PRN Chelle Perez, PA-C        dextrose 5 % and 0.45 % NaCl infusion   Intravenous Continuous PRN Mika Sarmiento MD        dextrose 5 % and 0.45 % NaCl infusion  125 mL/hr Intravenous Continuous PRN Chelle Perez, PA-C        famotidine tablet 20 mg  20 mg Oral Daily Ben Calabrese DO   20 mg at 24 1430    HYDROcodone-acetaminophen 5-325 mg per  tablet 1 tablet  1 tablet Oral Q8H PRN Chelle Perez, PA-C        insulin regular in 0.9 % NaCl 100 unit/100 mL (1 unit/mL) infusion  0-0.2 Units/kg/hr Intravenous Continuous Mika Sarmiento MD 5.5 mL/hr at 04/19/24 1344 0.1 Units/kg/hr at 04/19/24 1344    LORazepam tablet 0.5 mg  0.5 mg Oral Q6H PRN Chelle Perez, PA-C   0.5 mg at 04/19/24 1430    melatonin tablet 6 mg  6 mg Oral Nightly PRN Chelle Perez, PA-C        polyethylene glycol packet 17 g  17 g Oral Daily Chelle Perez, PA-C        sodium chloride 0.9% flush 10 mL  10 mL Intravenous PRN Mika Sarmiento MD        sodium chloride 0.9% flush 10 mL  10 mL Intravenous PRN Chelle Perez, PA-C         Current Outpatient Medications   Medication Sig Dispense Refill    blood sugar diagnostic Strp To check BG 4 times daily, to use with insurance preferred meter 200 each 2    blood-glucose meter,continuous (DEXCOM ) Misc 1 Device by Misc.(Non-Drug; Combo Route) route as needed (to check blood sugar). 1 each 0    blood-glucose sensor (DEXCOM G7 SENSOR) Latanya 1 Device by Misc.(Non-Drug; Combo Route) route as needed (to check blood sugar). 1 each 0    FLUoxetine 20 MG capsule Take 3 capsules by mouth once daily.      gabapentin (NEURONTIN) 300 MG capsule Take 1 capsule by mouth 3 (three) times daily.      glucagon 3 mg/actuation Spry 1 spray by Nasal route as needed (hypoglycemia). 2 each 2    glucose 4 GM chewable tablet Take 4 tablets (16 g total) by mouth as needed for Low blood sugar. 12 tablet 2    insulin aspart U-100 (NOVOLOG) 100 unit/mL (3 mL) InPn pen Inject 3 Units into the skin 3 (three) times daily with meals. 6 mL 2    insulin degludec (TRESIBA FLEXTOUCH U-100) 100 unit/mL (3 mL) insulin pen 8 units SC in the AM and 6 units SC in the PM 6 mL 2    insulin lispro 100 unit/mL pen Inject into the skin.      lancets 30 gauge Misc To check BG 4 times daily, to use with insurance preferred meter 200 each 2    metoclopramide HCl  "(REGLAN) 10 MG tablet Take 1 tablet (10 mg total) by mouth every 6 (six) hours as needed (headache, nausea or vomiting). 10 tablet 0    pantoprazole (PROTONIX) 40 MG tablet Take 1 tablet by mouth every morning.      pen needle, diabetic 32 gauge x 5/32" Ndle Use 4 (four) times daily. 200 each 2    polyethylene glycol (GLYCOLAX) 17 gram PwPk Take 17 g by mouth once daily. 30 each 0    promethazine (PHENERGAN) 25 MG suppository Place 1 suppository (25 mg total) rectally every 6 (six) hours as needed for Nausea (For severe nausea and vomiting not improved with oral medications). 10 suppository 0    risperiDONE (RISPERDAL) 1 MG tablet Take 1 tablet by mouth 2 (two) times daily.       Family History    None       Tobacco Use    Smoking status: Every Day    Smokeless tobacco: Never   Substance and Sexual Activity    Alcohol use: Yes    Drug use: Yes     Types: Marijuana    Sexual activity: Never     Review of Systems   Constitutional:  Positive for activity change, fatigue and fever. Negative for chills.   HENT:  Negative for congestion and rhinorrhea.    Respiratory:  Positive for chest tightness and shortness of breath. Negative for cough and wheezing.    Cardiovascular:  Negative for chest pain, palpitations and leg swelling.   Gastrointestinal:  Positive for nausea and vomiting. Negative for abdominal distention, constipation and diarrhea.   Genitourinary:  Negative for difficulty urinating, dysuria, flank pain and frequency.   Musculoskeletal:  Negative for arthralgias and back pain.   Neurological:  Positive for weakness and headaches. Negative for syncope.   Psychiatric/Behavioral:  The patient is nervous/anxious.      Objective:     Vital Signs (Most Recent):  Temp: 97 °F (36.1 °C) (04/19/24 1046)  Pulse: 107 (04/19/24 1306)  Resp: 17 (04/19/24 1306)  BP: (!) 163/93 (04/19/24 1051)  SpO2: 100 % (04/19/24 1306) Vital Signs (24h Range):  Temp:  [97 °F (36.1 °C)-98.5 °F (36.9 °C)] 97 °F (36.1 °C)  Pulse:  [] " 107  Resp:  [17-32] 17  SpO2:  [97 %-100 %] 100 %  BP: (160-163)/() 163/93     Weight: 55 kg (121 lb 4 oz)  Body mass index is 18.99 kg/m².     Physical Exam  Vitals and nursing note reviewed.   Constitutional:       General: She is in acute distress.      Appearance: She is ill-appearing.   HENT:      Head: Normocephalic and atraumatic.      Mouth/Throat:      Mouth: Mucous membranes are dry.   Eyes:      Extraocular Movements: Extraocular movements intact.      Pupils: Pupils are equal, round, and reactive to light.   Cardiovascular:      Rate and Rhythm: Regular rhythm. Tachycardia present.      Pulses: Normal pulses.      Heart sounds: Normal heart sounds.   Pulmonary:      Effort: Pulmonary effort is normal. No respiratory distress.      Breath sounds: Normal breath sounds. No wheezing or rales.   Chest:      Chest wall: No tenderness.   Abdominal:      General: Abdomen is flat. Bowel sounds are normal. There is no distension.      Palpations: Abdomen is soft.      Tenderness: There is abdominal tenderness. There is no guarding or rebound.   Musculoskeletal:      Right lower leg: No edema.      Left lower leg: No edema.   Skin:     General: Skin is warm and dry.      Capillary Refill: Capillary refill takes less than 2 seconds.   Neurological:      General: No focal deficit present.      Mental Status: She is alert and oriented to person, place, and time.   Psychiatric:         Mood and Affect: Mood is anxious.      Comments: Somewhat slowed, mumbling speech              CRANIAL NERVES     CN III, IV, VI   Pupils are equal, round, and reactive to light.       Significant Labs: All pertinent labs within the past 24 hours have been reviewed.  BMP:   Recent Labs   Lab 04/19/24  1044   *      K 5.0      CO2 <5*   BUN 23*   CREATININE 1.4   CALCIUM 10.3     CBC:   Recent Labs   Lab 04/19/24  1044 04/19/24  1059   WBC 19.14*  --    HGB 11.8*  --    HCT 40.8 41     --      Coagulation:  "No results for input(s): "PT", "INR", "APTT" in the last 48 hours.  Lactic Acid: No results for input(s): "LACTATE" in the last 48 hours.  Magnesium: No results for input(s): "MG" in the last 48 hours.  POCT Glucose:   Recent Labs   Lab 04/19/24  1139 04/19/24  1240 04/19/24  1446   POCTGLUCOSE 447* 396* 301*     Urine Studies:   Recent Labs   Lab 04/19/24  1142   COLORU Straw   APPEARANCEUA Clear   PHUR 5.0   SPECGRAV 1.020   PROTEINUA Negative   GLUCUA 3+*   KETONESU 3+*   BILIRUBINUA Negative   OCCULTUA Negative   NITRITE Negative   LEUKOCYTESUR Negative   WBCUA 0   BACTERIA None   SQUAMEPITHEL 0       Significant Imaging: I have reviewed all pertinent imaging results/findings within the past 24 hours.  "

## 2024-04-19 NOTE — ASSESSMENT & PLAN NOTE
Patient with acute kidney injury/acute renal failure likely due to pre-renal azotemia due to IVVD KIMBERLY is currently  noted . Baseline creatinine  1  - Labs reviewed- Renal function/electrolytes with Estimated Creatinine Clearance: 48.7 mL/min (based on SCr of 1.4 mg/dL). according to latest data. Monitor urine output and serial BMP and adjust therapy as needed. Avoid nephrotoxins and renally dose meds for GFR listed above.    - Cr 1.4 on admit, baseline 1  - 2/2 DKA  - given 1L IVF in ED, started on mIVF  - monitor with daily labs

## 2024-04-19 NOTE — NURSING
Patient oriented to room. White board explained. Questions encouraged and answered. Patient verbalized understanding. Patient's Aunt Margy notified of patient's admission per patient request. Insulin drip running at 5.5 mL/hour. NS @125/mL/hour. Patient's belongings at the bedside; phone plugged in to . Patient placed on tele box and monitor notified. Patient requesting a diaper to urinate in, encouraged to ambulate to toilet with RN. Patient able to ambulate with minimal assistance to the toilet.      PICC nurse to room to evaluate patient for a PICC line, requesting order clarification from MD; PICC line not placed at this time. PIV in place and functioning.     Bed in low position and locked. Call light in reach. Bed alarm set. No distress noted. Will continue to monitor, will continue with plan of care.

## 2024-04-19 NOTE — H&P
"  [facility] - Telemetry [facility] Medicine  History & Physical    Patient Name: Hollie Wilson  MRN: [id number]  Patient Class: IP- Inpatient  Admission Date: 4/19/2024  Attending Physician: Ben Calabrese DO   Primary Care Provider: Martin Rios MD         Patient information was obtained from patient and ER records.     Subjective:     Principal Problem:Diabetic ketoacidosis without coma associated with type 1 diabetes mellitus    Chief Complaint:   Chief Complaint   Patient presents with    Hyperglycemia     Increased resp rate. Hyperglycemic with EMS.         HPI: Hollie Wilson is a 33 y.o. female with history of DM1, tobacco abuse, gastroparesis, and developmental delay presents to the emergency department with chief concern for hyperglycemia and emesis. Patient with multiple admissions for DKA, most recently admitted to MICU 4/11-4/14 for DKA. She reports when she was discharged her short acting insulin was never filled. She took her lantus 25 mg BID X 1 day but didn't think it was working so she stopped taking it. She started with multiple episodes of bilious, dark emesis yesterday and overnight. None so far today. She endorses fever, headache, dehydration, weakness, abdominal pain, nausea, "feeling like she is going to die", shortness of breath, chest tightness. She was living with her boyfriend but they are no longer together and she cannot return to the house. Patient has family but does not think she can return to them. She also reports she should be in a R knee immobilizer for a tib/fib injury that occurred during a football game at [facility], but has not been able to get it on and off by herself. She is not wearing it currently. She was on prozac for anxiety but ran out of it 1 month ago. She smokes 2-3 cigarettes a day, and uses marijuana. No other drug use. No chills, chest pain, LE edema, numbness, tingling.    In ED, patient is ill appearing. VSS. Afebrile with leukocytosis of 19.14. " Glucose 490, Beta hydroxy 5.9, AG unable to be calculated 2/2 AG <5. pH 7.2/ 12.6/ 188/ 5. KIMBERLY 1.4, baseline 1. K+ 5. CXR unremarkable. Patient given 1L IVF, Kcl 10 mEqX1, and started on an insulin gtt and admitted to hospital medicine for DKA. Of note, patient is a hard stick and PICC team consulted in ED.     Past Medical History:   Diagnosis Date    Anemia     Borderline intellectual functioning 2017    Diabetes mellitus     Mood disorder     Scoliosis     Substance use disorder        Past Surgical History:   Procedure Laterality Date     SECTION         Review of patient's allergies indicates:   Allergen Reactions    Clove oil Itching    Zofran (as hydrochloride) [ondansetron hcl] Hives       Current Facility-Administered Medications   Medication Dose Route Frequency Provider Last Rate Last Admin    0.9%  NaCl infusion  1,000 mL Intravenous Continuous Mika Sarmiento  mL/hr at 24 1143 1,000 mL at 24 1143    0.9%  NaCl infusion  125 mL/hr Intravenous Continuous Chelle Perez, PA-C        acetaminophen tablet 650 mg  650 mg Oral Q4H PRN Chelle Perez, PA-C        dextrose 10 % infusion   Intravenous PRN Mika Sarmiento MD        dextrose 10 % infusion   Intravenous PRN Mika Sarmiento MD        dextrose 10% bolus 125 mL 125 mL  12.5 g Intravenous PRN Mika Sarmiento MD        dextrose 10% bolus 125 mL 125 mL  12.5 g Intravenous PRN Mika Sarmiento MD        dextrose 10% bolus 125 mL 125 mL  12.5 g Intravenous PRN Mika Sarmiento MD        dextrose 10% bolus 125 mL 125 mL  12.5 g Intravenous PRN Chelle Perez, PA-C        dextrose 10% bolus 250 mL 250 mL  25 g Intravenous PRN Mika Sarmiento MD        dextrose 10% bolus 250 mL 250 mL  25 g Intravenous PRN Mika Sarmiento MD        dextrose 10% bolus 250 mL 250 mL  25 g Intravenous PRN Mika Sarmiento MD        dextrose 10% bolus 250 mL 250 mL  25 g Intravenous PRN Chelle Perez, PA-C        dextrose 5 % and 0.45  % NaCl infusion   Intravenous Continuous PRN Mika Sarmiento MD        dextrose 5 % and 0.45 % NaCl infusion  125 mL/hr Intravenous Continuous PRN Chelle Perez PA-C        famotidine tablet 20 mg  20 mg Oral Daily Ben Calabrese DO   20 mg at 04/19/24 1430    HYDROcodone-acetaminophen 5-325 mg per tablet 1 tablet  1 tablet Oral Q8H PRN Chelle Perez PA-C        insulin regular in 0.9 % NaCl 100 unit/100 mL (1 unit/mL) infusion  0-0.2 Units/kg/hr Intravenous Continuous Mika Sarmiento MD 5.5 mL/hr at 04/19/24 1344 0.1 Units/kg/hr at 04/19/24 1344    LORazepam tablet 0.5 mg  0.5 mg Oral Q6H PRN Chelle Perez PA-C   0.5 mg at 04/19/24 1430    melatonin tablet 6 mg  6 mg Oral Nightly PRN Chelle Perez PA-C        polyethylene glycol packet 17 g  17 g Oral Daily Chelle Perez PA-C        sodium chloride 0.9% flush 10 mL  10 mL Intravenous PRN Mika Sarmiento MD        sodium chloride 0.9% flush 10 mL  10 mL Intravenous PRN Chelle Perez PA-C         Current Outpatient Medications   Medication Sig Dispense Refill    blood sugar diagnostic Strp To check BG 4 times daily, to use with insurance preferred meter 200 each 2    blood-glucose meter,continuous (DEXCOM ) Misc 1 Device by Misc.(Non-Drug; Combo Route) route as needed (to check blood sugar). 1 each 0    blood-glucose sensor (DEXCOM G7 SENSOR) Latanya 1 Device by Misc.(Non-Drug; Combo Route) route as needed (to check blood sugar). 1 each 0    FLUoxetine 20 MG capsule Take 3 capsules by mouth once daily.      gabapentin (NEURONTIN) 300 MG capsule Take 1 capsule by mouth 3 (three) times daily.      glucagon 3 mg/actuation Spry 1 spray by Nasal route as needed (hypoglycemia). 2 each 2    glucose 4 GM chewable tablet Take 4 tablets (16 g total) by mouth as needed for Low blood sugar. 12 tablet 2    insulin aspart U-100 (NOVOLOG) 100 unit/mL (3 mL) InPn pen Inject 3 Units into the skin 3 (three) times daily with meals. 6 mL 2     "insulin degludec (TRESIBA FLEXTOUCH U-100) 100 unit/mL (3 mL) insulin pen 8 units SC in the AM and 6 units SC in the PM 6 mL 2    insulin lispro 100 unit/mL pen Inject into the skin.      lancets 30 gauge Misc To check BG 4 times daily, to use with insurance preferred meter 200 each 2    metoclopramide HCl (REGLAN) 10 MG tablet Take 1 tablet (10 mg total) by mouth every 6 (six) hours as needed (headache, nausea or vomiting). 10 tablet 0    pantoprazole (PROTONIX) 40 MG tablet Take 1 tablet by mouth every morning.      pen needle, diabetic 32 gauge x 5/32" Ndle Use 4 (four) times daily. 200 each 2    polyethylene glycol (GLYCOLAX) 17 gram PwPk Take 17 g by mouth once daily. 30 each 0    promethazine (PHENERGAN) 25 MG suppository Place 1 suppository (25 mg total) rectally every 6 (six) hours as needed for Nausea (For severe nausea and vomiting not improved with oral medications). 10 suppository 0    risperiDONE (RISPERDAL) 1 MG tablet Take 1 tablet by mouth 2 (two) times daily.       Family History    None       Tobacco Use    Smoking status: Every Day    Smokeless tobacco: Never   Substance and Sexual Activity    Alcohol use: Yes    Drug use: Yes     Types: Marijuana    Sexual activity: Never     Review of Systems   Constitutional:  Positive for activity change, fatigue and fever. Negative for chills.   HENT:  Negative for congestion and rhinorrhea.    Respiratory:  Positive for chest tightness and shortness of breath. Negative for cough and wheezing.    Cardiovascular:  Negative for chest pain, palpitations and leg swelling.   Gastrointestinal:  Positive for nausea and vomiting. Negative for abdominal distention, constipation and diarrhea.   Genitourinary:  Negative for difficulty urinating, dysuria, flank pain and frequency.   Musculoskeletal:  Negative for arthralgias and back pain.   Neurological:  Positive for weakness and headaches. Negative for syncope.   Psychiatric/Behavioral:  The patient is " nervous/anxious.      Objective:     Vital Signs (Most Recent):  Temp: 97 °F (36.1 °C) (04/19/24 1046)  Pulse: 107 (04/19/24 1306)  Resp: 17 (04/19/24 1306)  BP: (!) 163/93 (04/19/24 1051)  SpO2: 100 % (04/19/24 1306) Vital Signs (24h Range):  Temp:  [97 °F (36.1 °C)-98.5 °F (36.9 °C)] 97 °F (36.1 °C)  Pulse:  [] 107  Resp:  [17-32] 17  SpO2:  [97 %-100 %] 100 %  BP: (160-163)/() 163/93     Weight: 55 kg (121 lb 4 oz)  Body mass index is 18.99 kg/m².     Physical Exam  Vitals and nursing note reviewed.   Constitutional:       General: She is in acute distress.      Appearance: She is ill-appearing.   HENT:      Head: Normocephalic and atraumatic.      Mouth/Throat:      Mouth: Mucous membranes are dry.   Eyes:      Extraocular Movements: Extraocular movements intact.      Pupils: Pupils are equal, round, and reactive to light.   Cardiovascular:      Rate and Rhythm: Regular rhythm. Tachycardia present.      Pulses: Normal pulses.      Heart sounds: Normal heart sounds.   Pulmonary:      Effort: Pulmonary effort is normal. No respiratory distress.      Breath sounds: Normal breath sounds. No wheezing or rales.   Chest:      Chest wall: No tenderness.   Abdominal:      General: Abdomen is flat. Bowel sounds are normal. There is no distension.      Palpations: Abdomen is soft.      Tenderness: There is abdominal tenderness. There is no guarding or rebound.   Musculoskeletal:      Right lower leg: No edema.      Left lower leg: No edema.   Skin:     General: Skin is warm and dry.      Capillary Refill: Capillary refill takes less than 2 seconds.   Neurological:      General: No focal deficit present.      Mental Status: She is alert and oriented to person, place, and time.   Psychiatric:         Mood and Affect: Mood is anxious.      Comments: Somewhat slowed, mumbling speech              CRANIAL NERVES     CN III, IV, VI   Pupils are equal, round, and reactive to light.       Significant Labs: All pertinent  "labs within the past 24 hours have been reviewed.  BMP:   Recent Labs   Lab 04/19/24  1044   *      K 5.0      CO2 <5*   BUN 23*   CREATININE 1.4   CALCIUM 10.3     CBC:   Recent Labs   Lab 04/19/24  1044 04/19/24  1059   WBC 19.14*  --    HGB 11.8*  --    HCT 40.8 41     --      Coagulation: No results for input(s): "PT", "INR", "APTT" in the last 48 hours.  Lactic Acid: No results for input(s): "LACTATE" in the last 48 hours.  Magnesium: No results for input(s): "MG" in the last 48 hours.  POCT Glucose:   Recent Labs   Lab 04/19/24  1139 04/19/24  1240 04/19/24  1446   POCTGLUCOSE 447* 396* 301*     Urine Studies:   Recent Labs   Lab 04/19/24  1142   COLORU Straw   APPEARANCEUA Clear   PHUR 5.0   SPECGRAV 1.020   PROTEINUA Negative   GLUCUA 3+*   KETONESU 3+*   BILIRUBINUA Negative   OCCULTUA Negative   NITRITE Negative   LEUKOCYTESUR Negative   WBCUA 0   BACTERIA None   SQUAMEPITHEL 0       Significant Imaging: I have reviewed all pertinent imaging results/findings within the past 24 hours.  Assessment/Plan:     * Diabetic ketoacidosis without coma associated with type 1 diabetes mellitus  Non-compliance with medication  Patient with frequent admissions for DKA/ medication non-complaince presents with DKA. Discharged 4 days ago and did not get her short acting insulin refilled. Tachycardic with leukocytosis on admit. Glucose 490, beta hydroxy 5.9, AG unable to be calculated with CO2 <5. VBG with pH 7.2/ 12.6/188/5. K 5.    - DKA pathway initiated  - currently on insulin gtt  - endocrinology consulted for outpatient recs  - mIVF  - NPO  - BMP q4h  - difficult dispo- patient seems to have some insight into insulin/ appropriate use but with frequent admissions for DKA, medication non-compliance and unstable housing,unclear if patient is able to manage on her own. will need SW/CM to help.    KIMBERLY (acute kidney injury)  Patient with acute kidney injury/acute renal failure likely due to " pre-renal azotemia due to IVVD KIMBERLY is currently  noted . Baseline creatinine  1  - Labs reviewed- Renal function/electrolytes with Estimated Creatinine Clearance: 48.7 mL/min (based on SCr of 1.4 mg/dL). according to latest data. Monitor urine output and serial BMP and adjust therapy as needed. Avoid nephrotoxins and renally dose meds for GFR listed above.    - Cr 1.4 on admit, baseline 1  - 2/2 DKA  - given 1L IVF in ED, started on mIVF  - monitor with daily labs    Gastroparesis  - Qtc 500  - daily EKGs  - will avoid starting reglan for now       Leukocytosis  - suspect reactive/ concentrated  - continue IVF  - afebrile without leukocytosis  - CXR, UA negative for infection  - continue to monitor      Intellectual disability  - Appreciate CM/SW assistance      Tobacco abuse  - ~3 cigarettes daily, requesting nicotine patch while in hospital  - educated for 3- 10 minutes on tobacco cessation      VTE Risk Mitigation (From admission, onward)           Ordered     IP VTE LOW RISK PATIENT  Once         04/19/24 1121                               Pharmacist Renal Dose Adjustment Note    Hollie Wilson is a 33 y.o. female being treated with the medication Famotidine    Patient Data:    Vital Signs (Most Recent):  Temp: 97 °F (36.1 °C) (04/19/24 1046)  Pulse: 107 (04/19/24 1306)  Resp: 17 (04/19/24 1306)  BP: (!) 163/93 (04/19/24 1051)  SpO2: 100 % (04/19/24 1306) Vital Signs (72h Range):  Temp:  [97 °F (36.1 °C)-98.5 °F (36.9 °C)]   Pulse:  []   Resp:  [17-32]   BP: (160-163)/()   SpO2:  [97 %-100 %]      Recent Labs   Lab 04/13/24  1548 04/14/24  0321 04/19/24  1044   CREATININE 0.8 0.8 1.4     Serum creatinine: 1.4 mg/dL 04/19/24 1044  Estimated creatinine clearance: 48.7 mL/min    Famotidine 20 mg twice a day will be changed to famotidine 20 mg daily. Per pharmacy renal dosing guidelines.         Pharmacist's Name: Michaelle Davies  Pharmacist's Extension: [phone]      Chelle Perez PA-C  Department  of [facility] Medicine  [facility] - Telemetry Stepdown

## 2024-04-19 NOTE — ASSESSMENT & PLAN NOTE
Non-compliance with medication  Patient with frequent admissions for DKA/ medication non-complaince presents with DKA. Discharged 4 days ago and did not get her short acting insulin refilled. Tachycardic with leukocytosis on admit. Glucose 490, beta hydroxy 5.9, AG unable to be calculated with CO2 <5. VBG with pH 7.2/ 12.6/188/5. K 5.    - DKA pathway initiated  - currently on insulin gtt  - endocrinology consulted for outpatient recs  - mIVF  - NPO  - BMP q4h  - difficult dispo- patient seems to have some insight into insulin/ appropriate use but with frequent admissions for DKA, medication non-compliance and unstable housing,unclear if patient is able to manage on her own. will need SW/CM to help.

## 2024-04-19 NOTE — ED TRIAGE NOTES
Pt. Is a 33 y.o. female presenting to the ED via EMS from home. Pt. Is a diabetic and has a hx. Of DKA. Pt. Presenting to ED today with tachypnea, CBG of 492, and vomiting.

## 2024-04-19 NOTE — ASSESSMENT & PLAN NOTE
- ~3 cigarettes daily, requesting nicotine patch while in hospital  - educated for 3- 10 minutes on tobacco cessation

## 2024-04-19 NOTE — ED PROVIDER NOTES
Source of History:  EMS    Chief complaint:  Hyperglycemia (Increased resp rate. Hyperglycemic with EMS. )      HPI:  Hollie Wilson is a 33 y.o. female with history of insulin-dependent diabetes and developmental delay presents to the emergency department with chief concern for hyperglycemia.  EMS was called to the patient was house by neighbors.  Found the patient hyperglycemic and tachypneic.  Patient endorses noncompliance with insulin use secondary to running out of medication.  Further history is limited by patient condition.    Review of patient's allergies indicates:   Allergen Reactions    Clove oil Itching    Zofran (as hydrochloride) [ondansetron hcl] Hives       No current facility-administered medications on file prior to encounter.     Current Outpatient Medications on File Prior to Encounter   Medication Sig Dispense Refill    insulin lispro 100 unit/mL pen Inject into the skin.      blood sugar diagnostic Strp To check BG 4 times daily, to use with insurance preferred meter 200 each 2    blood-glucose meter,continuous (DEXCOM ) Misc 1 Device by Misc.(Non-Drug; Combo Route) route as needed (to check blood sugar). 1 each 0    blood-glucose sensor (DEXCOM G7 SENSOR) Latanya 1 Device by Misc.(Non-Drug; Combo Route) route as needed (to check blood sugar). 1 each 0    FLUoxetine 20 MG capsule Take 3 capsules by mouth once daily.      gabapentin (NEURONTIN) 300 MG capsule Take 1 capsule by mouth 3 (three) times daily.      glucagon 3 mg/actuation Spry 1 spray by Nasal route as needed (hypoglycemia). 2 each 2    glucose 4 GM chewable tablet Take 4 tablets (16 g total) by mouth as needed for Low blood sugar. 12 tablet 2    insulin aspart U-100 (NOVOLOG) 100 unit/mL (3 mL) InPn pen Inject 3 Units into the skin 3 (three) times daily with meals. 6 mL 2    insulin degludec (TRESIBA FLEXTOUCH U-100) 100 unit/mL (3 mL) insulin pen 8 units SC in the AM and 6 units SC in the PM 6 mL 2    lancets 30 gauge Misc  "To check BG 4 times daily, to use with insurance preferred meter 200 each 2    metoclopramide HCl (REGLAN) 10 MG tablet Take 1 tablet (10 mg total) by mouth every 6 (six) hours as needed (headache, nausea or vomiting). 10 tablet 0    pantoprazole (PROTONIX) 40 MG tablet Take 1 tablet by mouth every morning.      pen needle, diabetic 32 gauge x 5/32" Ndle Use 4 (four) times daily. 200 each 2    polyethylene glycol (GLYCOLAX) 17 gram PwPk Take 17 g by mouth once daily. 30 each 0    promethazine (PHENERGAN) 25 MG suppository Place 1 suppository (25 mg total) rectally every 6 (six) hours as needed for Nausea (For severe nausea and vomiting not improved with oral medications). 10 suppository 0    risperiDONE (RISPERDAL) 1 MG tablet Take 1 tablet by mouth 2 (two) times daily.         PMH:  As per HPI and below:  Past Medical History:   Diagnosis Date    Anemia     Borderline intellectual functioning 2017    Diabetes mellitus     Mood disorder     Scoliosis     Substance use disorder      Past Surgical History:   Procedure Laterality Date     SECTION         Social History     Socioeconomic History    Marital status: Significant Other   Tobacco Use    Smoking status: Every Day    Smokeless tobacco: Never   Substance and Sexual Activity    Alcohol use: Yes    Drug use: Yes     Types: Marijuana    Sexual activity: Never     Social Determinants of Health     Financial Resource Strain: Patient Unable To Answer (2024)    Overall Financial Resource Strain (CARDIA)     Difficulty of Paying Living Expenses: Patient unable to answer   Recent Concern: Financial Resource Strain - High Risk (3/12/2024)    Received from [facility]    Overall Financial Resource Strain (CARDIA)     Difficulty of Paying Living Expenses: Very hard   Food Insecurity: Patient Unable To Answer (2024)    Hunger Vital Sign     Worried About Running Out of Food in the Last Year: Patient unable to answer     Ran Out of Food " in the Last Year: Patient unable to answer   Recent Concern: Food Insecurity - Food Insecurity Present (4/4/2024)    Received from [facility] [employer] [facility] [employer]    Hunger Vital Sign     Worried About Running Out of Food in the Last Year: Sometimes true     Ran Out of Food in the Last Year: Sometimes true   Transportation Needs: Patient Unable To Answer (4/12/2024)    PRAPARE - Transportation     Lack of Transportation (Medical): Patient unable to answer     Lack of Transportation (Non-Medical): Patient unable to answer   Recent Concern: Transportation Needs - Unmet Transportation Needs (4/4/2024)    Received from [facility] [employer] [facility] [employer]    PRAPARE - Transportation     Lack of Transportation (Medical): Yes     Lack of Transportation (Non-Medical): No   Physical Activity: Patient Unable To Answer (4/12/2024)    Exercise Vital Sign     Days of Exercise per Week: Patient unable to answer     Minutes of Exercise per Session: Patient unable to answer   Recent Concern: Physical Activity - Inactive (3/12/2024)    Received from [facility] [employer] [facility] [employer]    Exercise Vital Sign     Days of Exercise per Week: 0 days     Minutes of Exercise per Session: 0 min   Stress: Patient Unable To Answer (4/12/2024)    [employer] [place] [facility] [employer]ational [employer] - Occupational Stress Questionnaire     Feeling of Stress : Patient unable to answer   Recent Concern: Stress - Stress Concern Present (3/12/2024)    Received from [employer] [facility] [place] [facility] [employer] - Occupational Stress Questionnaire     Feeling of Stress : Very much   Social Connections: Patient Unable To Answer (4/12/2024)    Social Connection and Isolation Panel [NHANES]     Frequency of Communication with Friends and Family: Patient unable to answer     Frequency of Social Gatherings with Friends and Family: Patient unable to answer     Attends Religion Services: Patient unable to answer     Active Member of Clubs or Organizations: Patient unable to  answer     Attends Club or Organization Meetings: Patient unable to answer     Marital Status: Patient unable to answer   Recent Concern: Social Connections - Socially Isolated (3/12/2024)    Received from [facility]    Social Connection and Isolation Panel [NHANES]     Frequency of Communication with Friends and Family: More than three times a week     Frequency of Social Gatherings with Friends and Family: More than three times a week     Attends Judaism Services: Never     Active Member of Clubs or Organizations: No     Attends Club or Organization Meetings: Never     Marital Status: Never    Housing Stability: High Risk (4/12/2024)    Housing Stability Vital Sign     Unable to Pay for Housing in the Last Year: Patient unable to answer     Unstable Housing in the Last Year: Yes       No family history on file.    Physical Exam:      Vitals:    04/20/24 0912   BP:    Pulse:    Resp: 18   Temp:      Gen:  Chronically ill-appearing female.  Very anxious.  Very nauseous.  Hemodynamically stable  Mental Status:  Mental disability at baseline.  Skin: Warm, dry. No rashes seen.  Eyes: No conjunctival injection.  Pulm:  No increased work of breathing.  Tachypneic to 32 breaths per minute.  Lungs clear to auscultation    CV: Regular rate.   Abd: Soft.  Not distended.  Nontender.   MSK:  Patient endorses pain in her right knee.  No obvious deformity noted on physical exam.    Neuro: Awake. Speech normal. No focal neuro deficit observed.      Laboratory Studies:  Labs Reviewed   CBC W/ AUTO DIFFERENTIAL - Abnormal; Notable for the following components:       Result Value    WBC 19.14 (*)     Hemoglobin 11.8 (*)     MCH 26.8 (*)     MCHC 28.9 (*)     RDW 16.0 (*)     Immature Granulocytes 1.1 (*)     Gran # (ANC) 16.2 (*)     Immature Grans (Abs) 0.22 (*)     Mono # 1.1 (*)     Gran % 84.8 (*)     Lymph % 7.9 (*)     All other components within normal limits   COMPREHENSIVE METABOLIC PANEL - Abnormal;  Notable for the following components:    CO2 <5 (*)     Glucose 490 (*)     BUN 23 (*)     Alkaline Phosphatase 160 (*)     eGFR 50.3 (*)     All other components within normal limits   BETA - HYDROXYBUTYRATE, SERUM - Abnormal; Notable for the following components:    Beta-Hydroxybutyrate 5.9 (*)     All other components within normal limits   URINALYSIS, REFLEX TO URINE CULTURE - Abnormal; Notable for the following components:    Glucose, UA 3+ (*)     Ketones, UA 3+ (*)     All other components within normal limits    Narrative:     Specimen Source->Urine   ISTAT PROCEDURE - Abnormal; Notable for the following components:    POC PH 7.205 (*)     POC PCO2 12.6 (*)     POC PO2 188 (*)     POC HCO3 5.0 (*)     POC BE -23 (*)     POC Sodium 135 (*)     POC TCO2 5 (*)     All other components within normal limits   POCT GLUCOSE - Abnormal; Notable for the following components:    POCT Glucose 447 (*)     All other components within normal limits   POCT GLUCOSE - Abnormal; Notable for the following components:    POCT Glucose 396 (*)     All other components within normal limits   POCT GLUCOSE - Abnormal; Notable for the following components:    POCT Glucose 301 (*)     All other components within normal limits   URINALYSIS MICROSCOPIC    Narrative:     Specimen Source->Urine   POCT GLUCOSE MONITORING CONTINUOUS   POCT GLUCOSE MONITORING CONTINUOUS   POCT GLUCOSE MONITORING CONTINUOUS       EKG (independently interpreted by me):  Sinus arrhythmia.  Right bundle-branch block.  Left fascicular block.  No STEMI    X-rays (independently interpreted by me):  No cardiopulmonary pathology evident    Chart reviewed.  Patient was consistent history of presents to the emergency department with DKA.  This is likely secondary to medication noncompliance.  Which is secondary to patient was developmental display in lack of home help.  Additionally, Patient has nondisplaced fracture of her right knee    Imaging Results               X-Ray Chest AP Portable (Final result)  Result time 04/19/24 11:12:21      Final result by Ernesto Horan III, MD (04/19/24 11:12:21)                   Impression:      No acute process seen.      Electronically signed by: Ernesto Horan MD  Date:    04/19/2024  Time:    11:12               Narrative:    EXAMINATION:  XR CHEST AP PORTABLE    CLINICAL HISTORY:  hyperglycemia;    FINDINGS:  Chest one view AP portable.    Heart size is normal.  Lungs are clear.  The bones are noncontributory.                                      Medications Given:  Medications   sodium chloride 0.9% flush 10 mL (has no administration in time range)   dextrose 10 % infusion (has no administration in time range)   dextrose 10 % infusion (has no administration in time range)   insulin regular in 0.9 % NaCl 100 unit/100 mL (1 unit/mL) infusion (0.05 Units/kg/hr × 55 kg Intravenous Handoff 4/20/24 0750)   dextrose 10% bolus 125 mL 125 mL (has no administration in time range)   dextrose 10% bolus 250 mL 250 mL (has no administration in time range)   dextrose 10% bolus 125 mL 125 mL (has no administration in time range)   dextrose 10% bolus 250 mL 250 mL (has no administration in time range)   dextrose 10% bolus 125 mL 125 mL (has no administration in time range)   dextrose 10% bolus 250 mL 250 mL (has no administration in time range)   sodium chloride 0.9% flush 10 mL (has no administration in time range)   acetaminophen tablet 650 mg (0 mg Oral Return to Cabinet 4/20/24 0912)   melatonin tablet 6 mg (has no administration in time range)   polyethylene glycol packet 17 g (17 g Oral Given 4/20/24 0912)   HYDROcodone-acetaminophen 5-325 mg per tablet 1 tablet (1 tablet Oral Given 4/20/24 0912)   dextrose 10% bolus 125 mL 125 mL (has no administration in time range)   dextrose 10% bolus 250 mL 250 mL (has no administration in time range)   famotidine tablet 20 mg (20 mg Oral Given 4/20/24 0912)   nicotine 7 mg/24 hr 1 patch (1 patch  Transdermal Patch Applied 4/20/24 0912)   aluminum & magnesium hydroxide-simethicone 400-400-40 mg/5 mL suspension 30 mL (has no administration in time range)   gabapentin capsule 300 mg (300 mg Oral Given 4/20/24 0912)   LORazepam tablet 0.5 mg (0.5 mg Oral Given 4/20/24 0912)   dextrose 5 % and 0.45 % NaCl infusion ( Intravenous Rate/Dose Change 4/20/24 0919)   sodium chloride 0.9% bolus 1,000 mL 1,000 mL (0 mLs Intravenous Stopped 4/19/24 1146)   prochlorperazine injection Soln 2.5 mg (2.5 mg Intravenous Given 4/19/24 1052)   potassium chloride 10 mEq in 100 mL IVPB (0 mEq Intravenous Stopped 4/19/24 1244)   promethazine (PHENERGAN) 12.5 mg in dextrose 5 % (D5W) 50 mL IVPB (0 mg Intravenous Stopped 4/19/24 2202)   potassium, sodium phosphates 280-160-250 mg packet 1 packet (1 packet Oral Given 4/20/24 0912)       Discussed with:  Hospital Medicine.  They will admit the patient to their service for further management  MDM:    33 y.o. female with DKA    Workup is consistent for DKA presentation.  Patient was mildly ascitic at 7.204.  Beta hydroxybutyrate 5.9.  Glucose and ketones in urine.  Initial point of care blood glucose level was 447.     No sign of UTI, pneumonia, or other source of infection.  Chest x-ray unremarkable.  Patient was leukocytosis.  Uncertain significance.  The might be secondary to dehydration or stress associated with DKA presentation.  Patient has been afebrile and has no sign of systemic infection.  Patient does endorse right knee pain.  Chart review indicates patient was diagnosed with a nondisplaced fracture of tib-fib.    We initiated DKA treatment.  Provided fluid, potassium repletion, Compazine for nausea, insulin drip.  We have admitted the patient for further management.  Patient is safe and stable for admission.      Medical Decision Making  Amount and/or Complexity of Data Reviewed  Labs: ordered.  Radiology: ordered. Decision-making details documented in ED  Course.    Risk  Prescription drug management.  Decision regarding hospitalization.         Diagnostic Impression:    1. Diabetic ketoacidosis without coma associated with type 1 diabetes mellitus    2. Hyperglycemia    3. At risk for prolonged QT interval syndrome         ED Disposition Condition    Admit                Patient and/or family understands the plan and is in agreement, verbalized understanding, questions answered    V Jose R Sarmiento MD  Resident  Emergency Medicine         Mika Sarmiento MD  Resident  04/19/24 8682    Critical Care   Performed by: Tomi Del Valle MD   Authorized by: Tomi Del Valle MD    Total critical care time (exclusive of procedural time) : 30 minutes  Critical care was necessary to treat or prevent imminent or life-threatening deterioration of the following conditions:  DKA    Attending Note:  Physician Attestation Statement: I have personally seen and examined this patient. As the supervising MD I agree with the above history. As the supervising MD I agree with the above PE. As the supervising MD I agree with the above treatment, course, plan, and disposition.          Tomi Del Valle MD  04/20/24 1122

## 2024-04-19 NOTE — HPI
"Hollie Wilson is a 33 y.o. female with history of DM1, tobacco abuse, gastroparesis, and developmental delay presents to the emergency department with chief concern for hyperglycemia and emesis. Patient with multiple admissions for DKA, most recently admitted to MICU 4/11-4/14 for DKA. She reports when she was discharged her short acting insulin was never filled. She took her lantus 25 mg BID X 1 day but didn't think it was working so she stopped taking it. She started with multiple episodes of bilious, dark emesis yesterday and overnight. None so far today. She endorses fever, headache, dehydration, weakness, abdominal pain, nausea, "feeling like she is going to die", shortness of breath, chest tightness. She was living with her boyfriend but they are no longer together and she cannot return to the house. Patient has family but does not think she can return to them. She also reports she should be in a R knee immobilizer for a tib/fib injury that [place] but has not been able to get it on and off by herself. She is not wearing it currently. She was on prozac for anxiety but ran out of it 1 month ago. She smokes 2-3 cigarettes a day, and uses marijuana. No other drug use. No chills, chest pain, LE edema, numbness, tingling.    In ED, patient is ill appearing. VSS. Afebrile with leukocytosis of 19.14. Glucose 490, Beta hydroxy 5.9, AG unable to be calculated 2/2 AG <5. pH 7.2/ 12.6/ 188/ 5. KIMBERLY 1.4, baseline 1. K+ 5. CXR unremarkable. Patient given 1L IVF, Kcl 10 mEqX1, and started on an insulin gtt and admitted to hospital medicine for DKA. Of note, patient is a hard stick and PICC team consulted in ED.   "

## 2024-04-19 NOTE — ASSESSMENT & PLAN NOTE
- suspect reactive/ concentrated  - continue IVF  - afebrile without leukocytosis  - CXR, UA negative for infection  - continue to monitor

## 2024-04-19 NOTE — PLAN OF CARE
Patient remains free from falls and injury. NADN. VSS. Safety maintained; bed low and locked, call light in reach.  No complaint of pain, n/v, diarrhea, or SOB. Questions encouraged and answered. Plan of care reviewed with patient. Will continue to monitor, will continue with plan of care.      Problem: Diabetic Ketoacidosis  Goal: Fluid and Electrolyte Balance with Absence of Ketosis  Outcome: Ongoing, Progressing     Problem: Adult Inpatient Plan of Care  Goal: Plan of Care Review  Outcome: Ongoing, Progressing  Goal: Patient-Specific Goal (Individualized)  Outcome: Ongoing, Progressing  Goal: Absence of Hospital-Acquired Illness or Injury  Outcome: Ongoing, Progressing  Goal: Optimal Comfort and Wellbeing  Outcome: Ongoing, Progressing  Goal: Readiness for Transition of Care  Outcome: Ongoing, Progressing     Problem: Diabetes Comorbidity  Goal: Blood Glucose Level Within Targeted Range  Outcome: Ongoing, Progressing     Problem: Fluid and Electrolyte Imbalance (Acute Kidney Injury/Impairment)  Goal: Fluid and Electrolyte Balance  Outcome: Ongoing, Progressing     Problem: Oral Intake Inadequate (Acute Kidney Injury/Impairment)  Goal: Optimal Nutrition Intake  Outcome: Ongoing, Progressing     Problem: Renal Function Impairment (Acute Kidney Injury/Impairment)  Goal: Effective Renal Function  Outcome: Ongoing, Progressing     Problem: Skin Injury Risk Increased  Goal: Skin Health and Integrity  Outcome: Ongoing, Progressing

## 2024-04-19 NOTE — PROGRESS NOTES
Pharmacist Renal Dose Adjustment Note    Hollie Wilson is a 33 y.o. female being treated with the medication Famotidine    Patient Data:    Vital Signs (Most Recent):  Temp: 97 °F (36.1 °C) (04/19/24 1046)  Pulse: 107 (04/19/24 1306)  Resp: 17 (04/19/24 1306)  BP: (!) 163/93 (04/19/24 1051)  SpO2: 100 % (04/19/24 1306) Vital Signs (72h Range):  Temp:  [97 °F (36.1 °C)-98.5 °F (36.9 °C)]   Pulse:  []   Resp:  [17-32]   BP: (160-163)/()   SpO2:  [97 %-100 %]      Recent Labs   Lab 04/13/24  1548 04/14/24  0321 04/19/24  1044   CREATININE 0.8 0.8 1.4     Serum creatinine: 1.4 mg/dL 04/19/24 1044  Estimated creatinine clearance: 48.7 mL/min    Famotidine 20 mg twice a day will be changed to famotidine 20 mg daily. Per pharmacy renal dosing guidelines.         Pharmacist's Name: Michaelle Davies  Pharmacist's Extension: [phone]

## 2024-04-20 PROBLEM — E10.65 TYPE 1 DIABETES MELLITUS WITH HYPERGLYCEMIA: Status: ACTIVE | Noted: 2024-04-20

## 2024-04-20 LAB
ALBUMIN SERPL BCP-MCNC: 3 G/DL (ref 3.5–5.2)
ALBUMIN SERPL BCP-MCNC: 3.2 G/DL (ref 3.5–5.2)
ALBUMIN SERPL BCP-MCNC: 3.6 G/DL (ref 3.5–5.2)
ANION GAP SERPL CALC-SCNC: 13 MMOL/L (ref 8–16)
ANION GAP SERPL CALC-SCNC: 6 MMOL/L (ref 8–16)
ANION GAP SERPL CALC-SCNC: 8 MMOL/L (ref 8–16)
BASOPHILS # BLD AUTO: 0.04 K/UL (ref 0–0.2)
BASOPHILS NFR BLD: 0.4 % (ref 0–1.9)
BUN SERPL-MCNC: 11 MG/DL (ref 6–20)
BUN SERPL-MCNC: 12 MG/DL (ref 6–20)
BUN SERPL-MCNC: 15 MG/DL (ref 6–20)
CALCIUM SERPL-MCNC: 8.5 MG/DL (ref 8.7–10.5)
CALCIUM SERPL-MCNC: 8.7 MG/DL (ref 8.7–10.5)
CALCIUM SERPL-MCNC: 9.3 MG/DL (ref 8.7–10.5)
CHLORIDE SERPL-SCNC: 113 MMOL/L (ref 95–110)
CHLORIDE SERPL-SCNC: 113 MMOL/L (ref 95–110)
CHLORIDE SERPL-SCNC: 114 MMOL/L (ref 95–110)
CO2 SERPL-SCNC: 13 MMOL/L (ref 23–29)
CO2 SERPL-SCNC: 16 MMOL/L (ref 23–29)
CO2 SERPL-SCNC: 18 MMOL/L (ref 23–29)
CREAT SERPL-MCNC: 0.9 MG/DL (ref 0.5–1.4)
CREAT SERPL-MCNC: 1.1 MG/DL (ref 0.5–1.4)
CREAT SERPL-MCNC: 1.5 MG/DL (ref 0.5–1.4)
DIFFERENTIAL METHOD BLD: ABNORMAL
EOSINOPHIL # BLD AUTO: 0.1 K/UL (ref 0–0.5)
EOSINOPHIL NFR BLD: 0.9 % (ref 0–8)
ERYTHROCYTE [DISTWIDTH] IN BLOOD BY AUTOMATED COUNT: 16.4 % (ref 11.5–14.5)
EST. GFR  (NO RACE VARIABLE): 46.3 ML/MIN/1.73 M^2
EST. GFR  (NO RACE VARIABLE): >60 ML/MIN/1.73 M^2
EST. GFR  (NO RACE VARIABLE): >60 ML/MIN/1.73 M^2
GLUCOSE SERPL-MCNC: 100 MG/DL (ref 70–110)
GLUCOSE SERPL-MCNC: 132 MG/DL (ref 70–110)
GLUCOSE SERPL-MCNC: 175 MG/DL (ref 70–110)
HCT VFR BLD AUTO: 33.2 % (ref 37–48.5)
HGB BLD-MCNC: 10.3 G/DL (ref 12–16)
IMM GRANULOCYTES # BLD AUTO: 0.05 K/UL (ref 0–0.04)
IMM GRANULOCYTES NFR BLD AUTO: 0.5 % (ref 0–0.5)
LYMPHOCYTES # BLD AUTO: 2.5 K/UL (ref 1–4.8)
LYMPHOCYTES NFR BLD: 24.9 % (ref 18–48)
MCH RBC QN AUTO: 25.9 PG (ref 27–31)
MCHC RBC AUTO-ENTMCNC: 31 G/DL (ref 32–36)
MCV RBC AUTO: 84 FL (ref 82–98)
MONOCYTES # BLD AUTO: 1.1 K/UL (ref 0.3–1)
MONOCYTES NFR BLD: 10.7 % (ref 4–15)
NEUTROPHILS # BLD AUTO: 6.2 K/UL (ref 1.8–7.7)
NEUTROPHILS NFR BLD: 62.6 % (ref 38–73)
NRBC BLD-RTO: 0 /100 WBC
OHS QRS DURATION: 116 MS
OHS QTC CALCULATION: 507 MS
PHOSPHATE SERPL-MCNC: 1.6 MG/DL (ref 2.7–4.5)
PHOSPHATE SERPL-MCNC: 1.8 MG/DL (ref 2.7–4.5)
PLATELET # BLD AUTO: 318 K/UL (ref 150–450)
PMV BLD AUTO: 11.2 FL (ref 9.2–12.9)
POCT GLUCOSE: 105 MG/DL (ref 70–110)
POCT GLUCOSE: 109 MG/DL (ref 70–110)
POCT GLUCOSE: 121 MG/DL (ref 70–110)
POCT GLUCOSE: 133 MG/DL (ref 70–110)
POCT GLUCOSE: 150 MG/DL (ref 70–110)
POCT GLUCOSE: 153 MG/DL (ref 70–110)
POCT GLUCOSE: 156 MG/DL (ref 70–110)
POCT GLUCOSE: 164 MG/DL (ref 70–110)
POCT GLUCOSE: 165 MG/DL (ref 70–110)
POCT GLUCOSE: 170 MG/DL (ref 70–110)
POCT GLUCOSE: 173 MG/DL (ref 70–110)
POCT GLUCOSE: 81 MG/DL (ref 70–110)
POTASSIUM SERPL-SCNC: 3.6 MMOL/L (ref 3.5–5.1)
POTASSIUM SERPL-SCNC: 3.7 MMOL/L (ref 3.5–5.1)
POTASSIUM SERPL-SCNC: 4.5 MMOL/L (ref 3.5–5.1)
RBC # BLD AUTO: 3.97 M/UL (ref 4–5.4)
SODIUM SERPL-SCNC: 137 MMOL/L (ref 136–145)
SODIUM SERPL-SCNC: 138 MMOL/L (ref 136–145)
SODIUM SERPL-SCNC: 139 MMOL/L (ref 136–145)
WBC # BLD AUTO: 9.84 K/UL (ref 3.9–12.7)

## 2024-04-20 PROCEDURE — 20600001 HC STEP DOWN PRIVATE ROOM

## 2024-04-20 PROCEDURE — 36415 COLL VENOUS BLD VENIPUNCTURE: CPT | Performed by: STUDENT IN AN ORGANIZED HEALTH CARE EDUCATION/TRAINING PROGRAM

## 2024-04-20 PROCEDURE — S5010 5% DEXTROSE AND 0.45% SALINE: HCPCS | Performed by: STUDENT IN AN ORGANIZED HEALTH CARE EDUCATION/TRAINING PROGRAM

## 2024-04-20 PROCEDURE — 80069 RENAL FUNCTION PANEL: CPT | Performed by: STUDENT IN AN ORGANIZED HEALTH CARE EDUCATION/TRAINING PROGRAM

## 2024-04-20 PROCEDURE — 99223 1ST HOSP IP/OBS HIGH 75: CPT | Mod: ,,, | Performed by: INTERNAL MEDICINE

## 2024-04-20 PROCEDURE — S4991 NICOTINE PATCH NONLEGEND: HCPCS | Performed by: PHYSICIAN ASSISTANT

## 2024-04-20 PROCEDURE — 80069 RENAL FUNCTION PANEL: CPT | Mod: 91 | Performed by: STUDENT IN AN ORGANIZED HEALTH CARE EDUCATION/TRAINING PROGRAM

## 2024-04-20 PROCEDURE — 25000003 PHARM REV CODE 250: Performed by: STUDENT IN AN ORGANIZED HEALTH CARE EDUCATION/TRAINING PROGRAM

## 2024-04-20 PROCEDURE — 85025 COMPLETE CBC W/AUTO DIFF WBC: CPT | Performed by: PHYSICIAN ASSISTANT

## 2024-04-20 PROCEDURE — 93005 ELECTROCARDIOGRAM TRACING: CPT

## 2024-04-20 PROCEDURE — 63600175 PHARM REV CODE 636 W HCPCS: Performed by: STUDENT IN AN ORGANIZED HEALTH CARE EDUCATION/TRAINING PROGRAM

## 2024-04-20 PROCEDURE — 25000003 PHARM REV CODE 250: Performed by: PHYSICIAN ASSISTANT

## 2024-04-20 PROCEDURE — 93010 ELECTROCARDIOGRAM REPORT: CPT | Mod: ,,, | Performed by: INTERNAL MEDICINE

## 2024-04-20 RX ORDER — SODIUM,POTASSIUM PHOSPHATES 280-250MG
1 POWDER IN PACKET (EA) ORAL ONCE
Status: COMPLETED | OUTPATIENT
Start: 2024-04-20 | End: 2024-04-20

## 2024-04-20 RX ORDER — INSULIN ASPART 100 [IU]/ML
3 INJECTION, SOLUTION INTRAVENOUS; SUBCUTANEOUS
Status: DISCONTINUED | OUTPATIENT
Start: 2024-04-20 | End: 2024-04-20

## 2024-04-20 RX ORDER — INSULIN ASPART 100 [IU]/ML
2 INJECTION, SOLUTION INTRAVENOUS; SUBCUTANEOUS
Status: DISCONTINUED | OUTPATIENT
Start: 2024-04-20 | End: 2024-04-21

## 2024-04-20 RX ORDER — INSULIN ASPART 100 [IU]/ML
2 INJECTION, SOLUTION INTRAVENOUS; SUBCUTANEOUS
Status: DISCONTINUED | OUTPATIENT
Start: 2024-04-21 | End: 2024-04-20

## 2024-04-20 RX ORDER — GLUCAGON 1 MG
1 KIT INJECTION
Status: DISCONTINUED | OUTPATIENT
Start: 2024-04-20 | End: 2024-04-22 | Stop reason: HOSPADM

## 2024-04-20 RX ORDER — INSULIN ASPART 100 [IU]/ML
0-5 INJECTION, SOLUTION INTRAVENOUS; SUBCUTANEOUS
Status: DISCONTINUED | OUTPATIENT
Start: 2024-04-20 | End: 2024-04-22 | Stop reason: HOSPADM

## 2024-04-20 RX ORDER — IBUPROFEN 200 MG
16 TABLET ORAL
Status: DISCONTINUED | OUTPATIENT
Start: 2024-04-20 | End: 2024-04-22 | Stop reason: HOSPADM

## 2024-04-20 RX ORDER — IBUPROFEN 200 MG
24 TABLET ORAL
Status: DISCONTINUED | OUTPATIENT
Start: 2024-04-20 | End: 2024-04-22 | Stop reason: HOSPADM

## 2024-04-20 RX ADMIN — NICOTINE 1 PATCH: 7 PATCH, EXTENDED RELEASE TRANSDERMAL at 09:04

## 2024-04-20 RX ADMIN — GABAPENTIN 300 MG: 300 CAPSULE ORAL at 02:04

## 2024-04-20 RX ADMIN — GABAPENTIN 300 MG: 300 CAPSULE ORAL at 08:04

## 2024-04-20 RX ADMIN — LORAZEPAM 0.5 MG: 0.5 TABLET ORAL at 09:04

## 2024-04-20 RX ADMIN — HYDROCODONE BITARTRATE AND ACETAMINOPHEN 1 TABLET: 5; 325 TABLET ORAL at 05:04

## 2024-04-20 RX ADMIN — DEXTROSE AND SODIUM CHLORIDE: 5; 450 INJECTION, SOLUTION INTRAVENOUS at 03:04

## 2024-04-20 RX ADMIN — FAMOTIDINE 20 MG: 20 TABLET ORAL at 09:04

## 2024-04-20 RX ADMIN — POLYETHYLENE GLYCOL 3350 17 G: 17 POWDER, FOR SOLUTION ORAL at 09:04

## 2024-04-20 RX ADMIN — POTASSIUM & SODIUM PHOSPHATES POWDER PACK 280-160-250 MG 1 PACKET: 280-160-250 PACK at 09:04

## 2024-04-20 RX ADMIN — INSULIN ASPART 3 UNITS: 100 INJECTION, SOLUTION INTRAVENOUS; SUBCUTANEOUS at 02:04

## 2024-04-20 RX ADMIN — INSULIN DETEMIR 7 UNITS: 100 INJECTION, SOLUTION SUBCUTANEOUS at 12:04

## 2024-04-20 RX ADMIN — GABAPENTIN 300 MG: 300 CAPSULE ORAL at 09:04

## 2024-04-20 RX ADMIN — HYDROCODONE BITARTRATE AND ACETAMINOPHEN 1 TABLET: 5; 325 TABLET ORAL at 09:04

## 2024-04-20 NOTE — CONSULTS
Nutrition-Related Diabetes Education      Time Spent: 5 minutes    Learners: Patient    Current HbA1c: 9.5    Is patient aware of their A1c and their goal A1c? Yes    Nutrition Education with handouts: MyPlate, CHO counting     Comments: Pt lethargic at time of visit, but states she is familiar with the diabetic diet. Provided and explained handout detailing sources of carbohydrates, appropriate serving sizes, and the plate method for meal planning. Pt voiced understanding. All questions and concerns answered.    Barriers to Learning: Non-compliance     Pt tolerating clear liquid diet. Pt w/ UBW of 130# - will monitor energy intake upon diet advancement.     Follow up: Yes    Please consult as needed.    Thank you!  Ani MS, RD, LDN

## 2024-04-20 NOTE — PLAN OF CARE
[facility] - Telemetry Stepdown  Initial Discharge Assessment       Primary Care Provider: Martin iRos MD    Admission Diagnosis: Hyperglycemia [R73.9]  Diabetic ketoacidosis without coma associated with type 1 diabetes mellitus [E10.10]  At risk for prolonged QT interval syndrome [Z91.89]    Admission Date: 4/19/2024  Expected Discharge Date:     Transition of Care Barriers: None    Payor: [facility] CONNECTIONS / Plan: [facility] MARKETPLACE / Product Type: Commercial /     Extended Emergency Contact Information  Primary Emergency Contact: WestminsterHollie  Mobile Phone: [phone]  Relation: Mother   needed? No    Discharge Plan A: Home with family  Discharge Plan B: Home Health      [facility]  [address]  [facility] [address]  Phone: [phone] Fax: [phone]    [place] [address] [facility] [place] - [address]  [address]  [address]  [facility] [address]  Phone: [phone] Fax: [phone]    [id number] [place] [place] [facility] [address] AT [facility] [address]  [address]  [facility] [address]  Phone: [phone] Fax: [phone]      Initial Assessment (most recent)       Adult Discharge Assessment - 04/20/24 1612          Discharge Assessment    Assessment Type Discharge Planning Assessment     Confirmed/corrected address, phone number and insurance Yes     Confirmed Demographics Correct on Facesheet     Source of Information patient     Communicated THELMA with patient/caregiver Yes     People in Home alone     Do you expect to return to your current living situation? Yes     Do you have help at home or someone to help you manage your care at home? No     Prior to hospitilization cognitive status: Alert/Oriented     Current cognitive status: Alert/Oriented     Home Layout Able to live on 1st floor     Equipment Currently Used at Home walker, standard     Do you take prescription  medications? Yes     Do you have prescription coverage? No     Do you have any problems affording any of your prescribed medications? No     Is the patient taking medications as prescribed? yes     Who is going to help you get home at discharge? Mother     Are you on dialysis? No     Do you take coumadin? No     Discharge Plan A Home with family     Discharge Plan B Home Health     Discharge Plan discussed with: Patient     Transition of Care Barriers None     SDOH --   no       Physical Activity    On average, how many days per week do you engage in moderate to strenuous exercise (like a brisk walk)? 0 days     On average, how many minutes do you engage in exercise at this level? 0 min        Financial Resource Strain    How hard is it for you to pay for the very basics like food, housing, medical care, and heating? Not hard at all        Housing Stability    In the last 12 months, was there a time when you were not able to pay the mortgage or rent on time? No     At any time in the past 12 months, were you homeless or living in a shelter (including now)? No        Transportation Needs    In the past 12 months, has lack of transportation kept you from medical appointments or from getting medications? No     In the past 12 months, has lack of transportation kept you from meetings, work, or from getting things needed for daily living? No        Food Insecurity    Within the past 12 months, you worried that your food would run out before you got the money to buy more. Never true     Within the past 12 months, the food you bought just didn't last and you didn't have money to get more. Never true        Stress    Do you feel stress - tense, restless, nervous, or anxious, or unable to sleep at night because your mind is troubled all the time - these days? Not at all        Social Connections    In a typical week, how many times do you talk on the phone with family, friends, or neighbors? More than three times a week      How often do you get together with friends or relatives? More than three times a week     How often do you attend Anglican or Anglican services? More than 4 times per year     Do you belong to any clubs or organizations such as Anglican groups, unions, fraternal or athletic groups, or school groups? No     How often do you attend meetings of the clubs or organizations you belong to? More than 4 times per year     Are you , , , , never , or living with a partner? Never         Alcohol Use    Q1: How often do you have a drink containing alcohol? Never     Q2: How many drinks containing alcohol do you have on a typical day when you are drinking? Patient does not drink     Q3: How often do you have six or more drinks on one occasion? Never                   The CM met with the patient at bedside to complete the DPA. The CM placed name and contact information on the blackboard in the patient's room.  Use preferred pharmacy / bedside delivery for any necessary  medications at the time of discharge.The patient is independent with all ADLs. The patient is not on Dialysis or Coumadin. The patient's mother will provide assistance to the patient upon discharge. The patient's mother will provide transportation upon discharge .  The CM will continue to follow for course of hospitalization.

## 2024-04-20 NOTE — CONSULTS
"[facility] - Telemetry Stepdown  Endocrinology  Diabetes Consult Note    Consult Requested by: Ben Calabrese DO   Reason for admit: Diabetic ketoacidosis without coma associated with type 1 diabetes mellitus    HISTORY OF PRESENT ILLNESS:  Reason for Consult: Management of T1DM, Hyperglycemia     Diabetes diagnosis year: "many years ago"    Home Diabetes Medications:    - Lantus 15u qhs  - Novolog 7u AC + Low dose correction starting at 200    How often checking glucose at home?  Infrequently    BG readings on regimen: not checking  Hypoglycemia on the regimen?  Yes  Missed doses on regimen?  Yes    Diabetes Complications include:     Hyperglycemia, Hypoglycemia , and Hypoglycemia unawareness    Complicating diabetes co morbidities:   Intellectual disability, housing insecurity, and polysubstance abuse       HPI:   34yo F with uncontrolled T1DM, bipolar disorder, schizoaffective disorder,  housing insecurity, intellectual disability with impulse control d/o, polysubstance abuse (cocaine, THC), MAI, frequent hospitalizations for DKA, HHS in the setting of medication access/compliance issues and also hx of hypoglycemia requiring D10 infusion including due to intentional insulin overdose who was brought in by EMS after fall at home. Presented with tachypnea, n/v, and hyperglycemia on POC. Workup in the ED c/w DKA. Admitted to hospital medicine. Endocrinology consulted for DKA/BG management.    Frequent admissions to various hospitals for DKA. Most recently admitted early 4/2024 to [facility] DKA  due to medication non-adherence in the setting of N/V. Seen by [facility] endocrinology with last outpatient recommendation Lantus 15u qhs, Novolog 7u AC + LDC AC. Due to frequent hypoglycemia they recommended to give prandial insulin when she starts eating (not to pre-bolus) and to avoid using LDC at bedtime to prevent o/n hypoglycemia. They also relaxed BG goals to allow for low 200s due to frequent hypoglycemia. More recently " admitted to [facility] for DKA (2024) and regimen at d/c was Tresiba 8u qAM and 6u qPM and Novolog 3u AC. Unclear why tresiba was split as it is an ultra-long acting insulin.     Pt tells me inconsistent hx -- first she states she has been compliant with basal but did not have prandial insulin. However later she states she did not have any insulin after discharge. She states she has somewhere to live right now but I am unable to understand where when asked multiple times. She does state she needs help taking care of herself.    Interval HPI:   Overnight events: admitted and started on dka gtt last night.   Eatin% sugar-free CLD this AM  Nausea: No  Hypoglycemia and intervention: No  Fever: No  TPN and/or TF: No  If yes, type of TF/TPN and rate: n/a    PMH, PSH, FH, SH updated and reviewed     ROS:  Review of Systems   Constitutional:  Positive for activity change and fatigue. Negative for chills.   HENT:  Negative for congestion and rhinorrhea.    Eyes:  Negative for pain and visual disturbance.   Respiratory:  Negative for cough, chest tightness, shortness of breath and wheezing.    Cardiovascular:  Negative for chest pain, palpitations and leg swelling.   Gastrointestinal:  Positive for nausea and vomiting. Negative for abdominal distention, constipation and diarrhea.   Endocrine: Positive for polydipsia and polyuria.   Genitourinary:  Negative for difficulty urinating, dysuria, flank pain and frequency.   Musculoskeletal:  Negative for arthralgias and back pain.   Neurological:  Positive for weakness and headaches. Negative for syncope.   Psychiatric/Behavioral:  The patient is nervous/anxious.        Current Medications and/or Treatments Impacting Glycemic Control  Immunotherapy:    Immunosuppressants       None          Steroids:   Hormones (From admission, onward)      Start     Stop Route Frequency Ordered    24 1418  melatonin tablet 6 mg         -- Oral Nightly PRN 24 1322           Pressors:    Autonomic Drugs (From admission, onward)      None          Hyperglycemia/Diabetes Medications:   Antihyperglycemics (From admission, onward)      Start     Stop Route Frequency Ordered    04/20/24 1356  insulin aspart U-100 pen 0-5 Units         -- SubQ Before meals, nightly and at 0200 PRN 04/20/24 1257    04/20/24 1300  insulin aspart U-100 injection 3 Units         -- SubQ 3 times daily with meals 04/20/24 1254    04/20/24 1200  insulin detemir U-100 (Levemir) pen 7 Units         -- SubQ 2 times daily 04/20/24 1148             PHYSICAL EXAMINATION:  Vitals:    04/20/24 1227   BP: 132/82   Pulse: 82   Resp: 20   Temp: 98.8 °F (37.1 °C)     Body mass index is 18.79 kg/m².     Physical Exam  Vitals and nursing note reviewed.   Constitutional:       General: She is not in acute distress.     Appearance: She is normal weight. She is ill-appearing (chronically ill-appearing).   HENT:      Head: Normocephalic.      Mouth/Throat:      Mouth: Mucous membranes are moist.      Comments: Poor dentition, missing teeth  Eyes:      Conjunctiva/sclera: Conjunctivae normal.   Cardiovascular:      Rate and Rhythm: Normal rate and regular rhythm.   Pulmonary:      Effort: Pulmonary effort is normal.   Abdominal:      General: There is no distension.      Tenderness: There is no abdominal tenderness.   Musculoskeletal:      Cervical back: Normal range of motion and neck supple.      Right lower leg: No edema.      Left lower leg: No edema.   Skin:     General: Skin is warm and dry.   Neurological:      Mental Status: She is alert and oriented to person, place, and time.      Comments: Difficult to understand speech  Reportedly hx intellectual disability - suspect current mental status is baseline but this is my first time meeting this pt and there are no friends or family members in the room   Psychiatric:         Mood and Affect: Mood normal.         Behavior: Behavior normal.            Labs Reviewed and Include  "  Recent Labs   Lab 04/20/24  1201      CALCIUM 8.5*   ALBUMIN 3.0*      K 3.7   CO2 18*   *   BUN 11   CREATININE 0.9     Lab Results   Component Value Date    WBC 9.84 04/20/2024    HGB 10.3 (L) 04/20/2024    HCT 33.2 (L) 04/20/2024    MCV 84 04/20/2024     04/20/2024     No results for input(s): "TSH", "FREET4" in the last 168 hours.  Lab Results   Component Value Date    HGBA1C 9.5 (H) 04/12/2024       Nutritional status:   Body mass index is 18.79 kg/m².  Lab Results   Component Value Date    ALBUMIN 3.0 (L) 04/20/2024    ALBUMIN 3.2 (L) 04/20/2024    ALBUMIN 3.6 04/20/2024     No results found for: "PREALBUMIN"    Estimated Creatinine Clearance: 74.9 mL/min (based on SCr of 0.9 mg/dL).    Accu-Checks  Recent Labs     04/20/24  0054 04/20/24  0237 04/20/24  0337 04/20/24  0438 04/20/24  0539 04/20/24  0647 04/20/24  0749 04/20/24  0911 04/20/24  1121 04/20/24  1225   POCTGLUCOSE 165* 170* 153* 133* 164* 156* 150* 121* 105 109        ASSESSMENT and PLAN    Neuro  Intellectual disability  Complicates her ability to care for herself outpatient       Renal/  KIMBERLY (acute kidney injury)  Improving with management of DKA with IVF and IV insulin  Management per primary       Endocrine  * Diabetic ketoacidosis without coma associated with type 1 diabetes mellitus  DKA likely due to medication non-adherence  Suspect housing insecurity and intellectual disability interfere with her ability to manage her diabetes independently  Social work consult may benefit her  DKA now resolved after remaining on DKA gtt o/n      Type 1 diabetes mellitus with hyperglycemia  Endocrinology consulted for BG management.   DKA now resolved, will transition to MDI 4/20    Inpatient BG goal 140-180    - Levemir (Insulin Detemir) 7 units BID - since she is type 1, she will need levemir BID while inpatient. Levemir has been discontinued by the , and upon d/c she will need to go back to a combined once daily " dose of an alternate basal insulin. Please do not hold basal insulin for T1 diabetic. Contact on call endocrine fellow or ANNA MARIE for any question regarding insulin.  - Novolog (Insulin Aspart) 3 units TIDWM and prn for BG excursions low dose SSI (200/50) -- due to intellectual disability, hx severe hypoglycemia and hypoglycemia unawareness, I will start her correction scale at 200  - BG checks AC/HS  - Hypoglycemia protocol in place  - If blood glucose greater than 300, please ask patient not to eat food or drink anything other than water until correctional insulin has brought it back below 250    ** Please notify Endocrine for any change and/or advance in diet**  ** Please call Endocrine for any BG related issues **    Discharge Planning:   TBD. Please notify endocrinology prior to discharge.         Please keep our team updated on dispo planning for this pt.    Flower Molina MD  Endocrinology  [facility] - Telemetry Stepdown

## 2024-04-20 NOTE — ASSESSMENT & PLAN NOTE
Endocrinology consulted for BG management.   DKA now resolved, will transition to MDI 4/20    Inpatient BG goal 140-180    - Levemir (Insulin Detemir) 7 units BID - since she is type 1, she will need levemir BID while inpatient. Levemir has been discontinued by the , and upon d/c she will need to go back to a combined once daily dose of an alternate basal insulin. Please do not hold basal insulin for T1 diabetic. Contact on call endocrine fellow or ANNA MARIE for any question regarding insulin.  - Novolog (Insulin Aspart) 3 units TIDWM and prn for BG excursions low dose SSI (200/50) -- due to intellectual disability, hx severe hypoglycemia and hypoglycemia unawareness, I will start her correction scale at 200  - BG checks AC/HS  - Hypoglycemia protocol in place  - If blood glucose greater than 300, please ask patient not to eat food or drink anything other than water until correctional insulin has brought it back below 250    ** Please notify Endocrine for any change and/or advance in diet**  ** Please call Endocrine for any BG related issues **    Discharge Planning:   TBD. Please notify endocrinology prior to discharge.

## 2024-04-20 NOTE — ASSESSMENT & PLAN NOTE
DKA likely due to medication non-adherence  Suspect housing insecurity and intellectual disability interfere with her ability to manage her diabetes independently  Social work consult may benefit her  DKA now resolved after remaining on DKA gtt o/n

## 2024-04-20 NOTE — CARE UPDATE
RFP at midnight. Ordered q4h, but 4am RFP not done. AM labs not collected until nearly 9am, after Dr Calabrese and CORRINE notified the charge nurse, at which time she immediately requested lab to the bedside.    Insulin gtt rate increased from 0.02 u/kg/hr to 0.05 u/kg/hr at 5:45 am per algorithm.   At 8:43 AM POC . Per algorithm, -149 warranted a reduction in the gtt rate to 0.02 u/kg/hr. Rate was not reduced.     Dr Calabrese and CORRINE were notified at 9:15 am that POC BG was 121 at 9:11 am. Aside from requesting nurse to follow the algorithm, I increased the rate of D51/2NS from 150 mL/hr to 200 mL/hr.    We contacted the charge nurse at 11:15 am when there still had not been a repeat POC BG documented since 9:11 am. Pt's bedside nurse requested I call her, which I promptly did. She stated she had too much to do and was unable to do the q1h accucheck in the 10am hour. I recommended she ask her charge nurse for assistance since it is not safe to miss accuchecks while on the DKA insulin gtt.    At 11:21 am POC , documented by bedside nurse after our phone conversation, however, we were not notified despite asking her to let us know the glucose. Additionally, the gtt rate was not adjusted once again. Insulin gtt rate remained at 0.05 u/kg/hr. Per algorithm, with glucose 105, rate once again, should have been lowered to 0.02 u/kg/hr, but instead it remained at 0.05 u/kg/hr.     Dr Calabrese and CORRINE both immediately presented to the pt's bedside where we confirmed that the insulin gtt rate remained at 0.05 u/kg/hr. We asked the charge nurse to come to the pt's room, and she immediately responded and adjusted the gtt rate per protocol. This pt has an intellectual disability and a hx of severe hypoglycemia with hypoglycemia unawareness. This increases risk of harm to this patient. I also asked charge RN for the pt to be re-assigned to a different nurse due to my concern for ongoing safety issues, which she promptly  did.    Bedside nurse confronted me shortly after this, asking why I asked that the pt be reassigned. I explained that the DKA protocol orders were not being followed and that poses a threat for harm to the patient. I explained that aside from missing an accucheck, the gtt rate should have been lowered on the previous 3 accuchecks, but it was not. She stated that I should have discontinued the gtt earlier due to BG. I explained that although BG was normal, the DKA gtt should be continued until there is resolution of acidosis as well. She remained very angry with my request for a new nurse and stated she was going to SOS me for requesting that a new nurse be assigned. I stated that I was not going to argue with her and walked away.     Since RFP this morning with improvement in AG and borderline HCO3, I will go ahead and transition to Levemir since I am concerned for the pt's safety if we continue with this very aggressive insulin gtt without adequate supervision. Levemir 7u BID, first dose now. Given BG low 100s and her hx of significant hypoglycemia, will overlap by 30 min and then plan to discontinue insulin gtt and D51/2 NS. I communicated this plan to the charge nurse who states understanding, and enacted new orders right away.      12:45 PM  Updated labs at 12:01 pm with HCO3 18, AG 6, and . Continue with plan above to transition off the gtt to MDI.      Flower Molina MD  Endocrinology

## 2024-04-20 NOTE — PLAN OF CARE
Problem: Diabetic Ketoacidosis  Goal: Fluid and Electrolyte Balance with Absence of Ketosis  4/20/2024 1436 by Ariana Markham RN  Outcome: Ongoing, Progressing  4/20/2024 1436 by Ariana Markham RN  Outcome: Ongoing, Progressing     Problem: Adult Inpatient Plan of Care  Goal: Plan of Care Review  4/20/2024 1436 by Ariana Markham RN  Outcome: Ongoing, Progressing  4/20/2024 1436 by Ariana Markham RN  Outcome: Ongoing, Progressing  Goal: Patient-Specific Goal (Individualized)  4/20/2024 1436 by Ariana Markham RN  Outcome: Ongoing, Progressing  4/20/2024 1436 by Ariana Markham RN  Outcome: Ongoing, Progressing  Goal: Absence of Hospital-Acquired Illness or Injury  4/20/2024 1436 by Ariana Markham RN  Outcome: Ongoing, Progressing  4/20/2024 1436 by Ariana Markham RN  Outcome: Ongoing, Progressing  Goal: Optimal Comfort and Wellbeing  4/20/2024 1436 by Ariana Markham RN  Outcome: Ongoing, Progressing  4/20/2024 1436 by Ariana Markham RN  Outcome: Ongoing, Progressing  Goal: Readiness for Transition of Care  4/20/2024 1436 by Ariana Markham RN  Outcome: Ongoing, Progressing  4/20/2024 1436 by Ariana Markham RN  Outcome: Ongoing, Progressing     Problem: Diabetes Comorbidity  Goal: Blood Glucose Level Within Targeted Range  4/20/2024 1436 by Ariana Markham RN  Outcome: Ongoing, Progressing  4/20/2024 1436 by Ariana Markham RN  Outcome: Ongoing, Progressing     Problem: Fluid and Electrolyte Imbalance (Acute Kidney Injury/Impairment)  Goal: Fluid and Electrolyte Balance  4/20/2024 1436 by Ariana Markham RN  Outcome: Ongoing, Progressing  4/20/2024 1436 by Ariana Markham RN  Outcome: Ongoing, Progressing     Problem: Renal Function Impairment (Acute Kidney Injury/Impairment)  Goal: Effective Renal Function  4/20/2024 1436 by Ariana Markham RN  Outcome: Ongoing, Progressing  4/20/2024 1436 by Ariana Markham, RN  Outcome: Ongoing, Progressing      Problem: Skin Injury Risk Increased  Goal: Skin Health and Integrity  4/20/2024 1436 by Ariana Markham, RN  Outcome: Ongoing, Progressing  4/20/2024 1436 by Ariana Markham, RN  Outcome: Ongoing, Progressing

## 2024-04-20 NOTE — SUBJECTIVE & OBJECTIVE
Interval HPI:   Overnight events: admitted and started on dka gtt last night.   Eatin% sugar-free CLD this AM  Nausea: No  Hypoglycemia and intervention: No  Fever: No  TPN and/or TF: No  If yes, type of TF/TPN and rate: n/a    PMH, PSH, FH, SH updated and reviewed     ROS:  Review of Systems   Constitutional:  Positive for activity change and fatigue. Negative for chills.   HENT:  Negative for congestion and rhinorrhea.    Eyes:  Negative for pain and visual disturbance.   Respiratory:  Negative for cough, chest tightness, shortness of breath and wheezing.    Cardiovascular:  Negative for chest pain, palpitations and leg swelling.   Gastrointestinal:  Positive for nausea and vomiting. Negative for abdominal distention, constipation and diarrhea.   Endocrine: Positive for polydipsia and polyuria.   Genitourinary:  Negative for difficulty urinating, dysuria, flank pain and frequency.   Musculoskeletal:  Negative for arthralgias and back pain.   Neurological:  Positive for weakness and headaches. Negative for syncope.   Psychiatric/Behavioral:  The patient is nervous/anxious.        Current Medications and/or Treatments Impacting Glycemic Control  Immunotherapy:    Immunosuppressants       None          Steroids:   Hormones (From admission, onward)      Start     Stop Route Frequency Ordered    24 1418  melatonin tablet 6 mg         -- Oral Nightly PRN 24 1322          Pressors:    Autonomic Drugs (From admission, onward)      None          Hyperglycemia/Diabetes Medications:   Antihyperglycemics (From admission, onward)      Start     Stop Route Frequency Ordered    24 1356  insulin aspart U-100 pen 0-5 Units         -- SubQ Before meals, nightly and at 0200 PRN 24 1257    24 1300  insulin aspart U-100 injection 3 Units         -- SubQ 3 times daily with meals 24 1254    24 1200  insulin detemir U-100 (Levemir) pen 7 Units         -- SubQ 2 times daily 24 1148              PHYSICAL EXAMINATION:  Vitals:    04/20/24 1227   BP: 132/82   Pulse: 82   Resp: 20   Temp: 98.8 °F (37.1 °C)     Body mass index is 18.79 kg/m².     Physical Exam  Vitals and nursing note reviewed.   Constitutional:       General: She is not in acute distress.     Appearance: She is normal weight. She is ill-appearing (chronically ill-appearing).   HENT:      Head: Normocephalic.      Mouth/Throat:      Mouth: Mucous membranes are moist.      Comments: Poor dentition, missing teeth  Eyes:      Conjunctiva/sclera: Conjunctivae normal.   Cardiovascular:      Rate and Rhythm: Normal rate and regular rhythm.   Pulmonary:      Effort: Pulmonary effort is normal.   Abdominal:      General: There is no distension.      Tenderness: There is no abdominal tenderness.   Musculoskeletal:      Cervical back: Normal range of motion and neck supple.      Right lower leg: No edema.      Left lower leg: No edema.   Skin:     General: Skin is warm and dry.   Neurological:      Mental Status: She is alert and oriented to person, place, and time.      Comments: Difficult to understand speech  Reportedly hx intellectual disability - suspect current mental status is baseline but this is my first time meeting this pt and there are no friends or family members in the room   Psychiatric:         Mood and Affect: Mood normal.         Behavior: Behavior normal.

## 2024-04-20 NOTE — PLAN OF CARE
Problem: Diabetic Ketoacidosis  Goal: Fluid and Electrolyte Balance with Absence of Ketosis  Outcome: Ongoing, Progressing     Problem: Adult Inpatient Plan of Care  Goal: Plan of Care Review  Outcome: Ongoing, Progressing  Goal: Patient-Specific Goal (Individualized)  Outcome: Ongoing, Progressing  Goal: Absence of Hospital-Acquired Illness or Injury  Outcome: Ongoing, Progressing  Goal: Optimal Comfort and Wellbeing  Outcome: Ongoing, Progressing  Goal: Readiness for Transition of Care  Outcome: Ongoing, Progressing     Problem: Diabetes Comorbidity  Goal: Blood Glucose Level Within Targeted Range  Outcome: Ongoing, Progressing   Patient in bed rested well. No complaints voiced. NADN at this time. Safety measures in place. Call light in reach.

## 2024-04-21 PROBLEM — N17.9 AKI (ACUTE KIDNEY INJURY): Status: RESOLVED | Noted: 2019-06-13 | Resolved: 2024-04-21

## 2024-04-21 PROBLEM — E10.10 DIABETIC KETOACIDOSIS WITHOUT COMA ASSOCIATED WITH TYPE 1 DIABETES MELLITUS: Status: RESOLVED | Noted: 2018-12-21 | Resolved: 2024-04-21

## 2024-04-21 LAB
ANION GAP SERPL CALC-SCNC: 12 MMOL/L (ref 8–16)
BASOPHILS # BLD AUTO: 0.04 K/UL (ref 0–0.2)
BASOPHILS NFR BLD: 0.7 % (ref 0–1.9)
BUN SERPL-MCNC: 7 MG/DL (ref 6–20)
CALCIUM SERPL-MCNC: 9 MG/DL (ref 8.7–10.5)
CHLORIDE SERPL-SCNC: 111 MMOL/L (ref 95–110)
CO2 SERPL-SCNC: 14 MMOL/L (ref 23–29)
CREAT SERPL-MCNC: 0.9 MG/DL (ref 0.5–1.4)
DIFFERENTIAL METHOD BLD: ABNORMAL
EOSINOPHIL # BLD AUTO: 0.2 K/UL (ref 0–0.5)
EOSINOPHIL NFR BLD: 3.3 % (ref 0–8)
ERYTHROCYTE [DISTWIDTH] IN BLOOD BY AUTOMATED COUNT: 16.7 % (ref 11.5–14.5)
EST. GFR  (NO RACE VARIABLE): >60 ML/MIN/1.73 M^2
GLUCOSE SERPL-MCNC: 105 MG/DL (ref 70–110)
HCT VFR BLD AUTO: 36.3 % (ref 37–48.5)
HGB BLD-MCNC: 11.2 G/DL (ref 12–16)
IMM GRANULOCYTES # BLD AUTO: 0.02 K/UL (ref 0–0.04)
IMM GRANULOCYTES NFR BLD AUTO: 0.3 % (ref 0–0.5)
LYMPHOCYTES # BLD AUTO: 2.5 K/UL (ref 1–4.8)
LYMPHOCYTES NFR BLD: 41.5 % (ref 18–48)
MCH RBC QN AUTO: 26.5 PG (ref 27–31)
MCHC RBC AUTO-ENTMCNC: 30.9 G/DL (ref 32–36)
MCV RBC AUTO: 86 FL (ref 82–98)
MONOCYTES # BLD AUTO: 0.7 K/UL (ref 0.3–1)
MONOCYTES NFR BLD: 11.5 % (ref 4–15)
NEUTROPHILS # BLD AUTO: 2.6 K/UL (ref 1.8–7.7)
NEUTROPHILS NFR BLD: 42.7 % (ref 38–73)
NRBC BLD-RTO: 0 /100 WBC
OHS QRS DURATION: 118 MS
OHS QTC CALCULATION: 471 MS
PHOSPHATE SERPL-MCNC: 2.2 MG/DL (ref 2.7–4.5)
PLATELET # BLD AUTO: 242 K/UL (ref 150–450)
PMV BLD AUTO: 12.6 FL (ref 9.2–12.9)
POCT GLUCOSE: 131 MG/DL (ref 70–110)
POCT GLUCOSE: 138 MG/DL (ref 70–110)
POCT GLUCOSE: 139 MG/DL (ref 70–110)
POCT GLUCOSE: 156 MG/DL (ref 70–110)
POCT GLUCOSE: 258 MG/DL (ref 70–110)
POCT GLUCOSE: 72 MG/DL (ref 70–110)
POCT GLUCOSE: 88 MG/DL (ref 70–110)
POTASSIUM SERPL-SCNC: 4 MMOL/L (ref 3.5–5.1)
RBC # BLD AUTO: 4.23 M/UL (ref 4–5.4)
SODIUM SERPL-SCNC: 137 MMOL/L (ref 136–145)
TROPONIN I SERPL DL<=0.01 NG/ML-MCNC: <0.006 NG/ML (ref 0–0.03)
WBC # BLD AUTO: 5.98 K/UL (ref 3.9–12.7)

## 2024-04-21 PROCEDURE — 36415 COLL VENOUS BLD VENIPUNCTURE: CPT | Performed by: PHYSICIAN ASSISTANT

## 2024-04-21 PROCEDURE — S4991 NICOTINE PATCH NONLEGEND: HCPCS | Performed by: PHYSICIAN ASSISTANT

## 2024-04-21 PROCEDURE — 93010 ELECTROCARDIOGRAM REPORT: CPT | Mod: ,,, | Performed by: INTERNAL MEDICINE

## 2024-04-21 PROCEDURE — 63600175 PHARM REV CODE 636 W HCPCS: Performed by: STUDENT IN AN ORGANIZED HEALTH CARE EDUCATION/TRAINING PROGRAM

## 2024-04-21 PROCEDURE — 36415 COLL VENOUS BLD VENIPUNCTURE: CPT | Performed by: STUDENT IN AN ORGANIZED HEALTH CARE EDUCATION/TRAINING PROGRAM

## 2024-04-21 PROCEDURE — 25000003 PHARM REV CODE 250: Performed by: PHYSICIAN ASSISTANT

## 2024-04-21 PROCEDURE — 85025 COMPLETE CBC W/AUTO DIFF WBC: CPT | Performed by: PHYSICIAN ASSISTANT

## 2024-04-21 PROCEDURE — 25000003 PHARM REV CODE 250: Performed by: STUDENT IN AN ORGANIZED HEALTH CARE EDUCATION/TRAINING PROGRAM

## 2024-04-21 PROCEDURE — 84100 ASSAY OF PHOSPHORUS: CPT | Performed by: PHYSICIAN ASSISTANT

## 2024-04-21 PROCEDURE — 93005 ELECTROCARDIOGRAM TRACING: CPT

## 2024-04-21 PROCEDURE — 99232 SBSQ HOSP IP/OBS MODERATE 35: CPT | Mod: ,,, | Performed by: INTERNAL MEDICINE

## 2024-04-21 PROCEDURE — 80048 BASIC METABOLIC PNL TOTAL CA: CPT | Performed by: STUDENT IN AN ORGANIZED HEALTH CARE EDUCATION/TRAINING PROGRAM

## 2024-04-21 PROCEDURE — 84484 ASSAY OF TROPONIN QUANT: CPT | Performed by: STUDENT IN AN ORGANIZED HEALTH CARE EDUCATION/TRAINING PROGRAM

## 2024-04-21 PROCEDURE — 20600001 HC STEP DOWN PRIVATE ROOM

## 2024-04-21 RX ORDER — INSULIN ASPART 100 [IU]/ML
3 INJECTION, SOLUTION INTRAVENOUS; SUBCUTANEOUS
Status: DISCONTINUED | OUTPATIENT
Start: 2024-04-21 | End: 2024-04-22 | Stop reason: HOSPADM

## 2024-04-21 RX ORDER — ENOXAPARIN SODIUM 100 MG/ML
40 INJECTION SUBCUTANEOUS EVERY 24 HOURS
Status: DISCONTINUED | OUTPATIENT
Start: 2024-04-21 | End: 2024-04-22 | Stop reason: HOSPADM

## 2024-04-21 RX ADMIN — FAMOTIDINE 20 MG: 20 TABLET ORAL at 09:04

## 2024-04-21 RX ADMIN — GABAPENTIN 300 MG: 300 CAPSULE ORAL at 09:04

## 2024-04-21 RX ADMIN — INSULIN ASPART 3 UNITS: 100 INJECTION, SOLUTION INTRAVENOUS; SUBCUTANEOUS at 05:04

## 2024-04-21 RX ADMIN — GABAPENTIN 300 MG: 300 CAPSULE ORAL at 08:04

## 2024-04-21 RX ADMIN — NICOTINE 1 PATCH: 7 PATCH, EXTENDED RELEASE TRANSDERMAL at 09:04

## 2024-04-21 RX ADMIN — INSULIN ASPART 2 UNITS: 100 INJECTION, SOLUTION INTRAVENOUS; SUBCUTANEOUS at 08:04

## 2024-04-21 RX ADMIN — HYDROCODONE BITARTRATE AND ACETAMINOPHEN 1 TABLET: 5; 325 TABLET ORAL at 08:04

## 2024-04-21 RX ADMIN — POLYETHYLENE GLYCOL 3350 17 G: 17 POWDER, FOR SOLUTION ORAL at 09:04

## 2024-04-21 RX ADMIN — GABAPENTIN 300 MG: 300 CAPSULE ORAL at 03:04

## 2024-04-21 RX ADMIN — ENOXAPARIN SODIUM 40 MG: 40 INJECTION SUBCUTANEOUS at 05:04

## 2024-04-21 RX ADMIN — INSULIN ASPART 2 UNITS: 100 INJECTION, SOLUTION INTRAVENOUS; SUBCUTANEOUS at 01:04

## 2024-04-21 RX ADMIN — Medication 6 MG: at 08:04

## 2024-04-21 NOTE — SUBJECTIVE & OBJECTIVE
"Interval HPI:   Overnight events: Overall doing better this morning from a DM perspective but some mild SOB this AM.  Eatin%  Nausea: No  Hypoglycemia and intervention: No, but BG 70s o/n  Fever: No  TPN and/or TF: No  If yes, type of TF/TPN and rate: n/a    /84 (BP Location: Left arm, Patient Position: Lying)   Pulse 70   Temp 98.2 °F (36.8 °C) (Oral)   Resp 20   Ht 5' 7" (1.702 m)   Wt 54.4 kg (120 lb)   LMP  (Approximate)   SpO2 100%   Breastfeeding No   BMI 18.79 kg/m²     Labs Reviewed and Include    Recent Labs   Lab 24  0333      CALCIUM 9.0      K 4.0   CO2 14*   *   BUN 7   CREATININE 0.9     Lab Results   Component Value Date    WBC 5.98 2024    HGB 11.2 (L) 2024    HCT 36.3 (L) 2024    MCV 86 2024     2024     No results for input(s): "TSH", "FREET4" in the last 168 hours.  Lab Results   Component Value Date    HGBA1C 9.5 (H) 2024       Nutritional status:   Body mass index is 18.79 kg/m².  Lab Results   Component Value Date    ALBUMIN 3.0 (L) 2024    ALBUMIN 3.2 (L) 2024    ALBUMIN 3.6 2024     No results found for: "PREALBUMIN"    Estimated Creatinine Clearance: 74.9 mL/min (based on SCr of 0.9 mg/dL).    Accu-Checks  Recent Labs     24  0749 24  0911 24  1121 24  1225 24  1715 24  2021 24  0003 24  0210 24  0803 24  1244   POCTGLUCOSE 150* 121* 105 109 81 72 88 138* 156* 258*       Current Medications and/or Treatments Impacting Glycemic Control  Immunotherapy:    Immunosuppressants       None          Steroids:   Hormones (From admission, onward)      Start     Stop Route Frequency Ordered    24 1418  melatonin tablet 6 mg         -- Oral Nightly PRN 24 1322          Pressors:    Autonomic Drugs (From admission, onward)      None          Hyperglycemia/Diabetes Medications:   Antihyperglycemics (From admission, onward)      " Start     Stop Route Frequency Ordered    04/21/24 0900  insulin detemir U-100 (Levemir) pen 5 Units         -- SubQ 2 times daily 04/21/24 0810    04/20/24 1823  insulin aspart U-100 pen 2 Units         -- SubQ 3 times daily with meals 04/20/24 1723    04/20/24 1356  insulin aspart U-100 pen 0-5 Units         -- SubQ Before meals, nightly and at 0200 PRN 04/20/24 1257

## 2024-04-21 NOTE — PROGRESS NOTES
"[facility] - [facility]  Endocrinology  Progress Note    Admit Date: 4/19/2024     Reason for Consult: Management of T1DM, Hyperglycemia     Diabetes diagnosis year: "many years ago"    Home Diabetes Medications:    - Lantus 15u qhs  - Novolog 7u AC + Low dose correction starting at 200    How often checking glucose at home?  Infrequently    BG readings on regimen: not checking  Hypoglycemia on the regimen?  Yes  Missed doses on regimen?  Yes    Diabetes Complications include:     Hyperglycemia, Hypoglycemia , and Hypoglycemia unawareness    Complicating diabetes co morbidities:   Intellectual disability, housing insecurity, and polysubstance abuse       HPI:   34yo F with uncontrolled T1DM, bipolar disorder, schizoaffective disorder,  housing insecurity, intellectual disability with impulse control d/o, polysubstance abuse (cocaine, THC), MAI, frequent hospitalizations for DKA, HHS in the setting of medication access/compliance issues and also hx of hypoglycemia requiring D10 infusion including due to intentional insulin overdose who was brought in by EMS after fall at home. Presented with tachypnea, n/v, and hyperglycemia on POC. Workup in the ED c/w DKA. Admitted to hospital medicine. Endocrinology consulted for DKA/BG management.    Frequent admissions to various hospitals for DKA. Most recently admitted early 4/2024 to [facility] DKA  due to medication non-adherence in the setting of N/V. Seen by [facility] endocrinology with last outpatient recommendation Lantus 15u qhs, Novolog 7u AC + LDC AC. Due to frequent hypoglycemia they recommended to give prandial insulin when she starts eating (not to pre-bolus) and to avoid using LDC at bedtime to prevent o/n hypoglycemia. They also relaxed BG goals to allow for low 200s due to frequent hypoglycemia. More recently admitted to the [facility] MICU for DKA (4/2024) and regimen at d/c was Tresiba 8u qAM and 6u qPM and Novolog 3u AC. Unclear why tresiba was split as it is an " "ultra-long acting insulin.     Pt tells me inconsistent hx -- first she states she has been compliant with basal but did not have prandial insulin. However later she states she did not have any insulin after discharge. She states she has somewhere to live right now but I am unable to understand where when asked multiple times. She does state she needs help taking care of herself.    Interval HPI:   Overnight events: Overall doing better this morning from a DM perspective but some mild SOB this AM.  Eatin%  Nausea: No  Hypoglycemia and intervention: No, but BG 70s o/n  Fever: No  TPN and/or TF: No  If yes, type of TF/TPN and rate: n/a    /84 (BP Location: Left arm, Patient Position: Lying)   Pulse 70   Temp 98.2 °F (36.8 °C) (Oral)   Resp 20   Ht 5' 7" (1.702 m)   Wt 54.4 kg (120 lb)   LMP  (Approximate)   SpO2 100%   Breastfeeding No   BMI 18.79 kg/m²     Labs Reviewed and Include    Recent Labs   Lab 24  0333      CALCIUM 9.0      K 4.0   CO2 14*   *   BUN 7   CREATININE 0.9     Lab Results   Component Value Date    WBC 5.98 2024    HGB 11.2 (L) 2024    HCT 36.3 (L) 2024    MCV 86 2024     2024     No results for input(s): "TSH", "FREET4" in the last 168 hours.  Lab Results   Component Value Date    HGBA1C 9.5 (H) 2024       Nutritional status:   Body mass index is 18.79 kg/m².  Lab Results   Component Value Date    ALBUMIN 3.0 (L) 2024    ALBUMIN 3.2 (L) 2024    ALBUMIN 3.6 2024     No results found for: "PREALBUMIN"    Estimated Creatinine Clearance: 74.9 mL/min (based on SCr of 0.9 mg/dL).    Accu-Checks  Recent Labs     24  0749 24  0911 24  1121 24  1225 24  1715 24  20224  0003 24  0210 24  0803 24  1244   POCTGLUCOSE 150* 121* 105 109 81 72 88 138* 156* 258*       Current Medications and/or Treatments Impacting Glycemic " Control  Immunotherapy:    Immunosuppressants       None          Steroids:   Hormones (From admission, onward)      Start     Stop Route Frequency Ordered    04/19/24 1418  melatonin tablet 6 mg         -- Oral Nightly PRN 04/19/24 1322          Pressors:    Autonomic Drugs (From admission, onward)      None          Hyperglycemia/Diabetes Medications:   Antihyperglycemics (From admission, onward)      Start     Stop Route Frequency Ordered    04/21/24 0900  insulin detemir U-100 (Levemir) pen 5 Units         -- SubQ 2 times daily 04/21/24 0810    04/20/24 1823  insulin aspart U-100 pen 2 Units         -- SubQ 3 times daily with meals 04/20/24 1723    04/20/24 1356  insulin aspart U-100 pen 0-5 Units         -- SubQ Before meals, nightly and at 0200 PRN 04/20/24 1257            ASSESSMENT and PLAN    Neuro  Intellectual disability  Complicates her ability to care for herself outpatient       Renal/  KIMBERLY (acute kidney injury)-resolved as of 4/21/2024  Resolved with management of DKA with IVF and IV insulin  Management per primary       Endocrine  * Diabetic ketoacidosis without coma associated with type 1 diabetes mellitus-resolved as of 4/21/2024  DKA likely due to medication non-adherence  Suspect housing insecurity and intellectual disability interfere with her ability to manage her diabetes independently  Social work consult may benefit her  DKA now resolved after remaining on DKA gtt o/n      Type 1 diabetes mellitus with hyperglycemia  Endocrinology consulted for BG management.   DKA now resolved, will transition to MDI 4/20    Inpatient BG goal 140-180    Very labile. Attempted Levemir 7u BID with BG 70s o/n. Lowered dose to 5u BID with improvement this morning.    - Levemir (Insulin Detemir) 5 units BID - since she is type 1, she will need levemir BID while inpatient. Levemir has been discontinued by the , and upon d/c she will need to go back to a combined once daily dose of an alternate basal  insulin. Please do not hold basal insulin for T1 diabetic. Contact on call endocrine fellow or ANNA MARIE for any question regarding insulin.  - Novolog (Insulin Aspart) 2 units TIDWM and prn for BG excursions low dose SSI (200/50) -- due to intellectual disability, hx severe hypoglycemia and hypoglycemia unawareness, I will start her correction scale at 200  - BG checks AC/HS  - Hypoglycemia protocol in place  - If blood glucose greater than 300, please ask patient not to eat food or drink anything other than water until correctional insulin has brought it back below 250    ** Please notify Endocrine for any change and/or advance in diet**  ** Please call Endocrine for any BG related issues **    Discharge Planning:   TBD. Please notify endocrinology prior to discharge.           Flower Molina MD  Endocrinology  [facility]

## 2024-04-21 NOTE — PROGRESS NOTES
"[facility] - Tele[facility] Medicine  Progress Note    Patient Name: Hollie Wilson  MRN: [id number]  Patient Class: IP- Inpatient   Admission Date: 4/19/2024  Length of Stay: 1 days  Attending Physician: Ben Calabrese DO  Primary Care Provider: Martin Rios MD        Subjective:     Principal Problem:Diabetic ketoacidosis without coma associated with type 1 diabetes mellitus        HPI:  Hollie Wilson is a 33 y.o. female with history of DM1, tobacco abuse, gastroparesis, and developmental delay presents to the emergency department with chief concern for hyperglycemia and emesis. Patient with multiple admissions for DKA, most recently admitted to MICU 4/11-4/14 for DKA. She reports when she was discharged her short acting insulin was never filled. She took her lantus 25 mg BID X 1 day but didn't think it was working so she stopped taking it. She started with multiple episodes of bilious, dark emesis yesterday and overnight. None so far today. She endorses fever, headache, dehydration, weakness, abdominal pain, nausea, "feeling like she is going to die", shortness of breath, chest tightness. She was living with her boyfriend but they are no longer together and she cannot return to the house. Patient has family but does not think she can return to them. She also reports she should be in a R knee immobilizer for a tib/fib injury that occurred during a football game at [facility], but has not been able to get it on and off by herself. She is not wearing it currently. She was on prozac for anxiety but ran out of it 1 month ago. She smokes 2-3 cigarettes a day, and uses marijuana. No other drug use. No chills, chest pain, LE edema, numbness, tingling.    In ED, patient is ill appearing. VSS. Afebrile with leukocytosis of 19.14. Glucose 490, Beta hydroxy 5.9, AG unable to be calculated 2/2 AG <5. pH 7.2/ 12.6/ 188/ 5. KIMBERLY 1.4, baseline 1. K+ 5. CXR unremarkable. Patient given 1L IVF, Kcl 10 mEqX1, and " started on an insulin gtt and admitted to hospital medicine for DKA. Of note, patient is a hard stick and PICC team consulted in ED.     Overview/Hospital Course:  No notes on file    Interval History: discussed with patient her progress. Had a lengthy discussion about her living situation. Patient reports that she is living at a back house. Limited accessible to medications.       Objective:     Vital Signs (Most Recent):  Temp: 98.3 °F (36.8 °C) (04/20/24 1920)  Pulse: 91 (04/20/24 1920)  Resp: 18 (04/20/24 1920)  BP: 118/75 (04/20/24 1920)  SpO2: 99 % (04/20/24 1920) Vital Signs (24h Range):  Temp:  [98.3 °F (36.8 °C)-99.4 °F (37.4 °C)] 98.3 °F (36.8 °C)  Pulse:  [] 91  Resp:  [17-20] 18  SpO2:  [98 %-100 %] 99 %  BP: (112-132)/(65-84) 118/75     Weight: 54.4 kg (120 lb)  Body mass index is 18.79 kg/m².  No intake or output data in the 24 hours ending 04/20/24 2301      Physical Exam  Vitals and nursing note reviewed.   Constitutional:       General: She is not in acute distress.     Appearance: She is ill-appearing.   HENT:      Head: Normocephalic and atraumatic.      Mouth/Throat:      Mouth: Mucous membranes are dry.   Eyes:      Extraocular Movements: Extraocular movements intact.      Pupils: Pupils are equal, round, and reactive to light.   Cardiovascular:      Rate and Rhythm: Regular rhythm. Tachycardia present.      Pulses: Normal pulses.   Pulmonary:      Effort: Pulmonary effort is normal. No respiratory distress.   Abdominal:      General: Abdomen is flat. Bowel sounds are normal. There is no distension.      Palpations: Abdomen is soft.      Tenderness: There is no abdominal tenderness.   Musculoskeletal:      Right lower leg: No edema.      Left lower leg: No edema.   Skin:     General: Skin is warm and dry.      Capillary Refill: Capillary refill takes less than 2 seconds.   Neurological:      General: No focal deficit present.      Mental Status: She is alert and oriented to person, place,  and time.   Psychiatric:         Mood and Affect: Mood is anxious.      Comments: Somewhat slowed, mumbling speech             Significant Labs: All pertinent labs within the past 24 hours have been reviewed.    Significant Imaging: I have reviewed all pertinent imaging results/findings within the past 24 hours.    Assessment/Plan:      * Diabetic ketoacidosis without coma associated with type 1 diabetes mellitus  Non-compliance with medication  Patient with frequent admissions for DKA/ medication non-complaince presents with DKA. Discharged 4 days ago and did not get her short acting insulin refilled. Tachycardic with leukocytosis on admit. Glucose 490, beta hydroxy 5.9, AG unable to be calculated with CO2 <5. VBG with pH 7.2/ 12.6/188/5. K 5.    - DKA pathway initiated  - currently on insulin gtt  - endocrinology consulted for outpatient recs  - mIVF  - NPO  - BMP q4h  - difficult dispo- patient seems to have some insight into insulin/ appropriate use but with frequent admissions for DKA, medication non-compliance and unstable housing,unclear if patient is able to manage on her own. will need SW/CM to help.    Gastroparesis  - Qtc 500  - daily EKGs  - will avoid starting reglan for now       Leukocytosis  - suspect reactive/ concentrated  - continue IVF  - afebrile without leukocytosis  - CXR, UA negative for infection  - continue to monitor      Intellectual disability  - Appreciate CM/SW assistance      KIMBERLY (acute kidney injury)  Patient with acute kidney injury/acute renal failure likely due to pre-renal azotemia due to IVVD KIMBERLY is currently  noted . Baseline creatinine  1  - Labs reviewed- Renal function/electrolytes with Estimated Creatinine Clearance: 48.7 mL/min (based on SCr of 1.4 mg/dL). according to latest data. Monitor urine output and serial BMP and adjust therapy as needed. Avoid nephrotoxins and renally dose meds for GFR listed above.    - Cr 1.4 on admit, baseline 1  - 2/2 DKA  - given 1L IVF in  ED, started on mIVF  - monitor with daily labs    Tobacco abuse  - ~3 cigarettes daily, requesting nicotine patch while in hospital  - educated for 3- 10 minutes on tobacco cessation      VTE Risk Mitigation (From admission, onward)           Ordered     IP VTE LOW RISK PATIENT  Once         04/19/24 1121                    Discharge Planning   THELMA:      Code Status: Full Code   Is the patient medically ready for discharge?:     Reason for patient still in hospital (select all that apply): Patient trending condition and Treatment  Discharge Plan A: Home with family                  Ben Calabrese DO  Department of Hospital Medicine   [facility] Tele[facility] Step[facility]

## 2024-04-21 NOTE — SUBJECTIVE & OBJECTIVE
Interval History: discussed with patient her progress. Had a lengthy discussion about her living situation. Patient reports that she is living at a back house. Limited accessible to medications.       Objective:     Vital Signs (Most Recent):  Temp: 98.3 °F (36.8 °C) (04/20/24 1920)  Pulse: 91 (04/20/24 1920)  Resp: 18 (04/20/24 1920)  BP: 118/75 (04/20/24 1920)  SpO2: 99 % (04/20/24 1920) Vital Signs (24h Range):  Temp:  [98.3 °F (36.8 °C)-99.4 °F (37.4 °C)] 98.3 °F (36.8 °C)  Pulse:  [] 91  Resp:  [17-20] 18  SpO2:  [98 %-100 %] 99 %  BP: (112-132)/(65-84) 118/75     Weight: 54.4 kg (120 lb)  Body mass index is 18.79 kg/m².  No intake or output data in the 24 hours ending 04/20/24 2301      Physical Exam  Vitals and nursing note reviewed.   Constitutional:       General: She is not in acute distress.     Appearance: She is ill-appearing.   HENT:      Head: Normocephalic and atraumatic.      Mouth/Throat:      Mouth: Mucous membranes are dry.   Eyes:      Extraocular Movements: Extraocular movements intact.      Pupils: Pupils are equal, round, and reactive to light.   Cardiovascular:      Rate and Rhythm: Regular rhythm. Tachycardia present.      Pulses: Normal pulses.   Pulmonary:      Effort: Pulmonary effort is normal. No respiratory distress.   Abdominal:      General: Abdomen is flat. Bowel sounds are normal. There is no distension.      Palpations: Abdomen is soft.      Tenderness: There is no abdominal tenderness.   Musculoskeletal:      Right lower leg: No edema.      Left lower leg: No edema.   Skin:     General: Skin is warm and dry.      Capillary Refill: Capillary refill takes less than 2 seconds.   Neurological:      General: No focal deficit present.      Mental Status: She is alert and oriented to person, place, and time.   Psychiatric:         Mood and Affect: Mood is anxious.      Comments: Somewhat slowed, mumbling speech             Significant Labs: All pertinent labs within the past 24  hours have been reviewed.    Significant Imaging: I have reviewed all pertinent imaging results/findings within the past 24 hours.

## 2024-04-21 NOTE — ASSESSMENT & PLAN NOTE
Endocrinology consulted for BG management.   DKA now resolved, will transition to MDI 4/20    Inpatient BG goal 140-180    Very labile. Attempted Levemir 7u BID with BG 70s o/n. Lowered dose to 5u BID with improvement this morning.    - Levemir (Insulin Detemir) 5 units BID - since she is type 1, she will need levemir BID while inpatient. Levemir has been discontinued by the , and upon d/c she will need to go back to a combined once daily dose of an alternate basal insulin. Please do not hold basal insulin for T1 diabetic. Contact on call endocrine fellow or ANNA MARIE for any question regarding insulin.  - Novolog (Insulin Aspart) 2 units TIDWM and prn for BG excursions low dose SSI (200/50) -- due to intellectual disability, hx severe hypoglycemia and hypoglycemia unawareness, I will start her correction scale at 200  - BG checks AC/HS  - Hypoglycemia protocol in place  - If blood glucose greater than 300, please ask patient not to eat food or drink anything other than water until correctional insulin has brought it back below 250    ** Please notify Endocrine for any change and/or advance in diet**  ** Please call Endocrine for any BG related issues **    Discharge Planning:   TBD. Please notify endocrinology prior to discharge.

## 2024-04-22 VITALS
HEART RATE: 72 BPM | RESPIRATION RATE: 19 BRPM | TEMPERATURE: 98 F | HEIGHT: 67 IN | OXYGEN SATURATION: 100 % | DIASTOLIC BLOOD PRESSURE: 82 MMHG | BODY MASS INDEX: 18.83 KG/M2 | WEIGHT: 120 LBS | SYSTOLIC BLOOD PRESSURE: 136 MMHG

## 2024-04-22 LAB
ANION GAP SERPL CALC-SCNC: 10 MMOL/L (ref 8–16)
BASOPHILS # BLD AUTO: 0.02 K/UL (ref 0–0.2)
BASOPHILS NFR BLD: 0.6 % (ref 0–1.9)
BUN SERPL-MCNC: 8 MG/DL (ref 6–20)
CALCIUM SERPL-MCNC: 9.2 MG/DL (ref 8.7–10.5)
CHLORIDE SERPL-SCNC: 108 MMOL/L (ref 95–110)
CO2 SERPL-SCNC: 20 MMOL/L (ref 23–29)
CREAT SERPL-MCNC: 0.7 MG/DL (ref 0.5–1.4)
DIFFERENTIAL METHOD BLD: ABNORMAL
EOSINOPHIL # BLD AUTO: 0.1 K/UL (ref 0–0.5)
EOSINOPHIL NFR BLD: 3.8 % (ref 0–8)
ERYTHROCYTE [DISTWIDTH] IN BLOOD BY AUTOMATED COUNT: 16.2 % (ref 11.5–14.5)
EST. GFR  (NO RACE VARIABLE): >60 ML/MIN/1.73 M^2
GLUCOSE SERPL-MCNC: 160 MG/DL (ref 70–110)
HCT VFR BLD AUTO: 35.2 % (ref 37–48.5)
HGB BLD-MCNC: 11 G/DL (ref 12–16)
IMM GRANULOCYTES # BLD AUTO: 0.01 K/UL (ref 0–0.04)
IMM GRANULOCYTES NFR BLD AUTO: 0.3 % (ref 0–0.5)
LYMPHOCYTES # BLD AUTO: 1.5 K/UL (ref 1–4.8)
LYMPHOCYTES NFR BLD: 44.1 % (ref 18–48)
MCH RBC QN AUTO: 25.7 PG (ref 27–31)
MCHC RBC AUTO-ENTMCNC: 31.3 G/DL (ref 32–36)
MCV RBC AUTO: 82 FL (ref 82–98)
MONOCYTES # BLD AUTO: 0.4 K/UL (ref 0.3–1)
MONOCYTES NFR BLD: 10.9 % (ref 4–15)
NEUTROPHILS # BLD AUTO: 1.4 K/UL (ref 1.8–7.7)
NEUTROPHILS NFR BLD: 40.3 % (ref 38–73)
NRBC BLD-RTO: 0 /100 WBC
OHS QRS DURATION: 112 MS
OHS QTC CALCULATION: 461 MS
PHOSPHATE SERPL-MCNC: 3.6 MG/DL (ref 2.7–4.5)
PLATELET # BLD AUTO: 252 K/UL (ref 150–450)
PMV BLD AUTO: 12.3 FL (ref 9.2–12.9)
POCT GLUCOSE: 105 MG/DL (ref 70–110)
POCT GLUCOSE: 184 MG/DL (ref 70–110)
POCT GLUCOSE: 222 MG/DL (ref 70–110)
POCT GLUCOSE: 238 MG/DL (ref 70–110)
POCT GLUCOSE: 97 MG/DL (ref 70–110)
POTASSIUM SERPL-SCNC: 3.7 MMOL/L (ref 3.5–5.1)
RBC # BLD AUTO: 4.28 M/UL (ref 4–5.4)
SODIUM SERPL-SCNC: 138 MMOL/L (ref 136–145)
WBC # BLD AUTO: 3.38 K/UL (ref 3.9–12.7)

## 2024-04-22 PROCEDURE — 36415 COLL VENOUS BLD VENIPUNCTURE: CPT | Performed by: PHYSICIAN ASSISTANT

## 2024-04-22 PROCEDURE — 84100 ASSAY OF PHOSPHORUS: CPT | Performed by: PHYSICIAN ASSISTANT

## 2024-04-22 PROCEDURE — 63600175 PHARM REV CODE 636 W HCPCS: Performed by: STUDENT IN AN ORGANIZED HEALTH CARE EDUCATION/TRAINING PROGRAM

## 2024-04-22 PROCEDURE — 80048 BASIC METABOLIC PNL TOTAL CA: CPT | Performed by: STUDENT IN AN ORGANIZED HEALTH CARE EDUCATION/TRAINING PROGRAM

## 2024-04-22 PROCEDURE — 25000003 PHARM REV CODE 250: Performed by: PHYSICIAN ASSISTANT

## 2024-04-22 PROCEDURE — S4991 NICOTINE PATCH NONLEGEND: HCPCS | Performed by: PHYSICIAN ASSISTANT

## 2024-04-22 PROCEDURE — 85025 COMPLETE CBC W/AUTO DIFF WBC: CPT | Performed by: PHYSICIAN ASSISTANT

## 2024-04-22 PROCEDURE — 93010 ELECTROCARDIOGRAM REPORT: CPT | Mod: ,,, | Performed by: INTERNAL MEDICINE

## 2024-04-22 PROCEDURE — 93005 ELECTROCARDIOGRAM TRACING: CPT

## 2024-04-22 PROCEDURE — 25000003 PHARM REV CODE 250: Performed by: STUDENT IN AN ORGANIZED HEALTH CARE EDUCATION/TRAINING PROGRAM

## 2024-04-22 RX ORDER — INSULIN ASPART 100 [IU]/ML
0-5 INJECTION, SOLUTION INTRAVENOUS; SUBCUTANEOUS
Start: 2024-04-22 | End: 2024-05-08 | Stop reason: SDUPTHER

## 2024-04-22 RX ORDER — INSULIN ASPART 100 [IU]/ML
3 INJECTION, SOLUTION INTRAVENOUS; SUBCUTANEOUS 3 TIMES DAILY
Qty: 6 ML | Refills: 0 | Status: ON HOLD | OUTPATIENT
Start: 2024-04-22 | End: 2024-05-11

## 2024-04-22 RX ORDER — NICOTINE 7MG/24HR
1 PATCH, TRANSDERMAL 24 HOURS TRANSDERMAL DAILY
Qty: 30 PATCH | Refills: 0 | Status: SHIPPED | OUTPATIENT
Start: 2024-04-23 | End: 2024-04-22

## 2024-04-22 RX ORDER — INSULIN ASPART 100 [IU]/ML
0-5 INJECTION, SOLUTION INTRAVENOUS; SUBCUTANEOUS
Start: 2024-04-22 | End: 2024-04-22

## 2024-04-22 RX ORDER — NICOTINE 7MG/24HR
1 PATCH, TRANSDERMAL 24 HOURS TRANSDERMAL DAILY
Qty: 30 PATCH | Refills: 0 | Status: SHIPPED | OUTPATIENT
Start: 2024-04-23

## 2024-04-22 RX ORDER — INSULIN ASPART 100 [IU]/ML
3 INJECTION, SOLUTION INTRAVENOUS; SUBCUTANEOUS 3 TIMES DAILY
Qty: 6 ML | Refills: 0 | Status: SHIPPED | OUTPATIENT
Start: 2024-04-22 | End: 2024-04-22

## 2024-04-22 RX ADMIN — POLYETHYLENE GLYCOL 3350 17 G: 17 POWDER, FOR SOLUTION ORAL at 09:04

## 2024-04-22 RX ADMIN — INSULIN ASPART 3 UNITS: 100 INJECTION, SOLUTION INTRAVENOUS; SUBCUTANEOUS at 04:04

## 2024-04-22 RX ADMIN — FAMOTIDINE 20 MG: 20 TABLET ORAL at 09:04

## 2024-04-22 RX ADMIN — ENOXAPARIN SODIUM 40 MG: 40 INJECTION SUBCUTANEOUS at 04:04

## 2024-04-22 RX ADMIN — HYDROCODONE BITARTRATE AND ACETAMINOPHEN 1 TABLET: 5; 325 TABLET ORAL at 09:04

## 2024-04-22 RX ADMIN — NICOTINE 1 PATCH: 7 PATCH, EXTENDED RELEASE TRANSDERMAL at 09:04

## 2024-04-22 RX ADMIN — INSULIN ASPART 3 UNITS: 100 INJECTION, SOLUTION INTRAVENOUS; SUBCUTANEOUS at 09:04

## 2024-04-22 RX ADMIN — GABAPENTIN 300 MG: 300 CAPSULE ORAL at 04:04

## 2024-04-22 RX ADMIN — INSULIN ASPART 3 UNITS: 100 INJECTION, SOLUTION INTRAVENOUS; SUBCUTANEOUS at 12:04

## 2024-04-22 RX ADMIN — GABAPENTIN 300 MG: 300 CAPSULE ORAL at 09:04

## 2024-04-22 NOTE — ASSESSMENT & PLAN NOTE
Patient's FSGs are uncontrolled due to hyperglycemia on current medication regimen.  Last A1c reviewed-   Lab Results   Component Value Date    HGBA1C 9.5 (H) 04/12/2024     Most recent fingerstick glucose reviewed-   Recent Labs   Lab 04/21/24  0803 04/21/24  1244 04/21/24  1754 04/21/24 2009   POCTGLUCOSE 156* 258* 131* 139*     Current correctional scale  Medium  Maintain anti-hyperglycemic dose as follows-   Antihyperglycemics (From admission, onward)      Start     Stop Route Frequency Ordered    04/21/24 1645  insulin aspart U-100 pen 3 Units         -- SubQ 3 times daily with meals 04/21/24 1508    04/21/24 0900  insulin detemir U-100 (Levemir) pen 5 Units         -- SubQ 2 times daily 04/21/24 0810    04/20/24 1356  insulin aspart U-100 pen 0-5 Units         -- SubQ Before meals, nightly and at 0200 PRN 04/20/24 1257          Hold Oral hypoglycemics while patient is in the hospital.

## 2024-04-22 NOTE — PLAN OF CARE
[facility] - Telemetry Stepdown  Discharge Final Note    1603  SW met with patient to review discharge recommendation of home health and is agreeable to plan.    Patient/family provided list of facilities in-network with patient's payor plan. Providers that are owned, operated, or affiliated with [facility] are included on the list.     Notified that referral sent to below listed facilities from in-network list based on proximity to home/family support:     [facility] Phone: [phone]     Patient/family instructed to identify preference.    Preferred Facility: (if more than 1, listed in order of descending preference)    [facility] Phone: [phone]     If an additional preferred facility not listed above is identified, additional referral to be sent. If above facilities unable to accept, will send additional referrals to in-network providers.     Primary Care Provider: Martin Rios MD    Expected Discharge Date: 4/22/2024    Final Discharge Note (most recent)       Final Note - 04/22/24 1603          Final Note    Assessment Type Final Discharge Note (P)      Anticipated Discharge Disposition Home or Self Care (P)      What phone number can be called within the next 1-3 days to see how you are doing after discharge? [phone] (P)         Post-Acute Status    Post-Acute Authorization Home Health (P)      Home Health Status Referrals Sent (P)      Discharge Delays None known at this time (P)                      Important Message from Medicare           1623  ALIN informed by Oralia gibbs/SHAE that patient has been denied.    1709  ALIN informed by bedside nurse that patient no longer requires Lyft ride as patient's boyfriend has arrived to take her home.    Dorys Guillen, BENI, LMSW    Case Management Department  [facility]

## 2024-04-22 NOTE — PLAN OF CARE
[facility] - Telemetry Stepdown      HOME HEALTH ORDERS  FACE TO FACE ENCOUNTER    Patient Name: Hollie Wilson  YOB: 1990    PCP: Martin Rios MD   PCP Address: [address] [place] [address] / [place] [address]  PCP Phone Number: [phone]  PCP Fax: [phone]    Encounter Date: 4/19/24    Admit to Home Health    Diagnoses:  Active Hospital Problems    Diagnosis  POA    Type 1 diabetes mellitus with hyperglycemia [E10.65]  Yes    Gastroparesis [K31.84]  Yes    Leukocytosis [D72.829]  Yes     Appears to be likely reactive in concentration related.  She has been afebrile, UA is negative, will follow after appropriate IV fluids.        Intellectual disability [F79]  Yes    Tobacco abuse [Z72.0]  Yes    Noncompliance with medication regimen [Z91.148]  Not Applicable      Resolved Hospital Problems    Diagnosis Date Resolved POA    *Diabetic ketoacidosis without coma associated with type 1 diabetes mellitus [E10.10] 04/21/2024 Yes    KIMBERLY (acute kidney injury) [N17.9] 04/21/2024 Yes       Follow Up Appointments:  Future Appointments   Date Time Provider Department Center   4/26/2024 10:40 AM [facility]       Allergies:  Review of patient's allergies indicates:   Allergen Reactions    Clove oil Itching    Zofran (as hydrochloride) [ondansetron hcl] Hives       Medications: Review discharge medications with patient and family and provide education.    Current Facility-Administered Medications   Medication Dose Route Frequency Provider Last Rate Last Admin    acetaminophen tablet 650 mg  650 mg Oral Q4H PRN Chelle Perez PA-C        aluminum & magnesium hydroxide-simethicone 400-400-40 mg/5 mL suspension 30 mL  30 mL Oral Q6H PRN Chelle Perez PA-C        dextrose 10 % infusion   Intravenous PRN Mika Sarmiento MD        dextrose 10 % infusion   Intravenous PRN Mika Sarmiento MD        dextrose 10% bolus 125 mL 125 mL  12.5 g Intravenous PRN  Flower Molina MD        dextrose 10% bolus 250 mL 250 mL  25 g Intravenous PRN Flower Molina MD        enoxaparin injection 40 mg  40 mg Subcutaneous Q24H (prophylaxis, 1700) Ben Calabrese, DO   40 mg at 04/21/24 1755    famotidine tablet 20 mg  20 mg Oral Daily Ben Calabrese, DO   20 mg at 04/22/24 0933    gabapentin capsule 300 mg  300 mg Oral TID Chelle Perez PA-C   300 mg at 04/22/24 0918    glucagon (human recombinant) injection 1 mg  1 mg Intramuscular PRN Flower Molina MD        glucose chewable tablet 16 g  16 g Oral PRN Flower Molina MD        glucose chewable tablet 24 g  24 g Oral PRN Flower Molina MD        HYDROcodone-acetaminophen 5-325 mg per tablet 1 tablet  1 tablet Oral Q8H PRN Chelle Perez PA-C   1 tablet at 04/22/24 0918    insulin aspart U-100 pen 0-5 Units  0-5 Units Subcutaneous AC + HS + 0200 PRN Flower Molina MD   2 Units at 04/21/24 1301    insulin aspart U-100 pen 3 Units  3 Units Subcutaneous TIDWM Flower Molina MD   3 Units at 04/22/24 1213    insulin detemir U-100 (Levemir) pen 5 Units  5 Units Subcutaneous BID Flower Molina MD   5 Units at 04/22/24 0926    LORazepam tablet 0.5 mg  0.5 mg Oral Q6H PRN Chelle Perez PA-C   0.5 mg at 04/20/24 0912    melatonin tablet 6 mg  6 mg Oral Nightly PRN Chelle Perez PA-C   6 mg at 04/21/24 2009    nicotine 7 mg/24 hr 1 patch  1 patch Transdermal Daily Chelle Perez PA-C   1 patch at 04/22/24 0900    polyethylene glycol packet 17 g  17 g Oral Daily Chelle Perez PA-C   17 g at 04/22/24 0916    sodium chloride 0.9% flush 10 mL  10 mL Intravenous PRN Mika Sarmiento MD        sodium chloride 0.9% flush 10 mL  10 mL Intravenous PRN Chelle Perez, MARJORIE         Current Discharge Medication List        START taking these medications    Details   !! insulin aspart U-100 (NOVOLOG) 100 unit/mL (3 mL) InPn pen Inject 0-5 Units into the skin before meals, bedtime and 0200 as  needed ((see chart below)).      !! insulin aspart U-100 (NOVOLOG) 100 unit/mL (3 mL) InPn pen Inject 3 Units into the skin 3 (three) times daily.  Qty: 6 mL, Refills: 0      insulin detemir U-100, Levemir, 100 unit/mL (3 mL) SubQ InPn pen Inject 5 Units into the skin 2 (two) times daily.  Qty: 6 mL, Refills: 0      nicotine (NICODERM CQ) 7 mg/24 hr Place 1 patch onto the skin once daily.  Qty: 30 patch, Refills: 0       !! - Potential duplicate medications found. Please discuss with provider.        CONTINUE these medications which have NOT CHANGED    Details   blood sugar diagnostic Strp To check BG 4 times daily, to use with insurance preferred meter  Qty: 200 each, Refills: 2      blood-glucose meter,continuous (DEXCOM ) Misc 1 Device by Misc.(Non-Drug; Combo Route) route as needed (to check blood sugar).  Qty: 1 each, Refills: 0      blood-glucose sensor (DEXCOM G7 SENSOR) Latanya 1 Device by Misc.(Non-Drug; Combo Route) route as needed (to check blood sugar).  Qty: 1 each, Refills: 0      FLUoxetine 20 MG capsule Take 3 capsules by mouth once daily.      gabapentin (NEURONTIN) 300 MG capsule Take 1 capsule by mouth 3 (three) times daily.      glucagon 3 mg/actuation Spry 1 spray by Nasal route as needed (hypoglycemia).  Qty: 2 each, Refills: 2      glucose 4 GM chewable tablet Take 4 tablets (16 g total) by mouth as needed for Low blood sugar.  Qty: 12 tablet, Refills: 2      !! insulin aspart U-100 (NOVOLOG) 100 unit/mL (3 mL) InPn pen Inject 3 Units into the skin 3 (three) times daily with meals.  Qty: 6 mL, Refills: 2      lancets 30 gauge Misc To check BG 4 times daily, to use with insurance preferred meter  Qty: 200 each, Refills: 2      metoclopramide HCl (REGLAN) 10 MG tablet Take 1 tablet (10 mg total) by mouth every 6 (six) hours as needed (headache, nausea or vomiting).  Qty: 10 tablet, Refills: 0      pantoprazole (PROTONIX) 40 MG tablet Take 1 tablet by mouth every morning.      pen needle,  "diabetic 32 gauge x 5/32" Ndle Use 4 (four) times daily.  Qty: 200 each, Refills: 2      polyethylene glycol (GLYCOLAX) 17 gram PwPk Take 17 g by mouth once daily.  Qty: 30 each, Refills: 0      promethazine (PHENERGAN) 25 MG suppository Place 1 suppository (25 mg total) rectally every 6 (six) hours as needed for Nausea (For severe nausea and vomiting not improved with oral medications).  Qty: 10 suppository, Refills: 0      risperiDONE (RISPERDAL) 1 MG tablet Take 1 tablet by mouth 2 (two) times daily.       !! - Potential duplicate medications found. Please discuss with provider.        STOP taking these medications       insulin lispro 100 unit/mL pen Comments:   Reason for Stopping:         insulin degludec (TRESIBA FLEXTOUCH U-100) 100 unit/mL (3 mL) insulin pen Comments:   Reason for Stopping:                 I have seen and examined this patient within the last 30 days. My clinical findings that support the need for the home health skilled services and home bound status are the following:no   Patient with medication mismanagement issues requiring home bound status as evidenced by  Poor understanding of medication regimen/dosage, Poor adherence to medication regimen/dosage, and Uncontrolled hyperglycemic/hypoglycemic events.  Mental confusion making it unsafe for patient to leave home alone due to  Mental Retardation.     Diet:   diabetic diet 2000 calorie    Labs:  N/a    Referrals/ Consults   to evaluate for community resources/long-range planning.  Aide to provide assistance with personal care, ADLs, and vital signs.    Activities:   activity as tolerated    Nursing:   Agency to admit patient within 24 hours of hospital discharge unless specified on physician order or at patient request    SN to complete comprehensive assessment including routine vital signs. Instruct on disease process and s/s of complications to report to MD. Review/verify medication list sent home with the patient at time of " discharge  and instruct patient/caregiver as needed. Frequency may be adjusted depending on start of care date.     Skilled nurse to perform up to 3 visits PRN for symptoms related to diagnosis    Notify MD if SBP > 160 or < 90; DBP > 90 or < 50; HR > 120 or < 50; Temp > 101; O2 < 88%; Other:       Ok to schedule additional visits based on staff availability and patient request on consecutive days within the home health episode.    When multiple disciplines ordered:    Start of Care occurs on Sunday - Wednesday schedule remaining discipline evaluations as ordered on separate consecutive days following the start of care.    Thursday SOC -schedule subsequent evaluations Friday and Monday the following week.     Friday - Saturday SOC - schedule subsequent discipline evaluations on consecutive days starting Monday of the following week.    For all post-discharge communication and subsequent orders please contact patient's primary care physician.     Miscellaneous   Diabetic Care:   SN to perform and educate Diabetic management with blood glucose monitoring:, Fingerstick blood sugar AC and HS, and Report CBG < 60 or > 350 to physician.    Home Health Aide:  Nursing Three times weekly, Medical Social Work Three times weekly, and Home Health Aide Three times weekly    Wound Care Orders  no    I certify that this patient is confined to her home and needs intermittent skilled nursing care.

## 2024-04-22 NOTE — DISCHARGE SUMMARY
"[facility] - Telemetry [facility]  Discharge Summary      Patient Name: Hollie Wilson  MRN: [id number]  JAYSON: [id number]  Patient Class: IP- Inpatient  Admission Date: 4/19/2024  Hospital Length of Stay: 3 days  Discharge Date and Time:  04/22/2024 3:39 PM  Attending Physician: Ajay Brown MD   Discharging Provider: Ajay Brown MD  Primary Care Provider: Martin Rios MD  [facility] Medicine Team: [facility] HOSP MED  Ajay Brown MD  Primary Care Team: [facility]    HPI:   Hollie Wilson is a 33 y.o. female with history of DM1, tobacco abuse, gastroparesis, and developmental delay presents to the emergency department with chief concern for hyperglycemia and emesis. Patient with multiple admissions for DKA, most recently admitted to MICU 4/11-4/14 for DKA. She reports when she was discharged her short acting insulin was never filled. She took her lantus 25 mg BID X 1 day but didn't think it was working so she stopped taking it. She started with multiple episodes of bilious, dark emesis yesterday and overnight. None so far today. She endorses fever, headache, dehydration, weakness, abdominal pain, nausea, "feeling like she is going to die", shortness of breath, chest tightness. She was living with her boyfriend but they are no longer together and she cannot return to the house. Patient has family but does not think she can return to them. She also reports she should be in a R knee immobilizer for a tib/fib injury that occurred during a football game at [facility], but has not been able to get it on and off by herself. She is not wearing it currently. She was on prozac for anxiety but ran out of it 1 month ago. She smokes 2-3 cigarettes a day, and uses marijuana. No other drug use. No chills, chest pain, LE edema, numbness, tingling.    In ED, patient is ill appearing. VSS. Afebrile with leukocytosis of 19.14. Glucose 490, Beta hydroxy 5.9, AG unable to be calculated 2/2 AG <5. pH 7.2/ 12.6/ " 188/ 5. KIMBERLY 1.4, baseline 1. K+ 5. CXR unremarkable. Patient given 1L IVF, Kcl 10 mEqX1, and started on an insulin gtt and admitted to hospital medicine for DKA. Of note, patient is a hard stick and PICC team consulted in ED.     * No surgery found *      Hospital Course:   33 yoF who presented for DKA, started on drip and transitioned to subq. Hx of multiple admissions for hyper and hypoglycemia in patient with schizoaffective disorder and intellectual disability. Discussions had with patient to ensure full understanding of her diabetes and other health issues. Patient with history of intellectual disability. She was able to state that she was aware of her diabetes and understood the complications of uncontrolled blood sugars. She relayed her treatment plan including her home insulin regimen. It was determined that at that time she had capacity as it related to her diabetes diagnosis. Case management was consulted for referrals to home health to help manage her medications. Follow up with endocrinology outpatient.     Goals of Care Treatment Preferences:  Code Status: Full Code      Consults:   Consults (From admission, onward)          Status Ordering Provider     Inpatient consult to Endocrinology  Once        Provider:  (Not yet assigned)    Completed HANK WILSON     Inpatient consult to Social Work/Case Management  Once        Provider:  (Not yet assigned)    Completed HANK WILSON.     Inpatient consult to Registered Dietitian/Nutritionist  Once        Provider:  (Not yet assigned)    Completed HANK WILSON.          Physical Exam  Vitals and nursing note reviewed.   Constitutional:       General: She is not in acute distress.  HENT:      Head: Normocephalic and atraumatic.      Mouth/Throat:      Mouth: Mucous membranes are moist.  Eyes:      Extraocular Movements: Extraocular movements intact.      Pupils: Pupils are equal, round, and reactive to light.   Cardiovascular:      Rate and  Rhythm: Normal rate and regular rhythm.      Pulses: Normal pulses.   Pulmonary:      Effort: Pulmonary effort is normal. No respiratory distress.   Abdominal:      General: Abdomen is flat. Bowel sounds are normal. There is no distension.      Palpations: Abdomen is soft.      Tenderness: There is no abdominal tenderness.   Musculoskeletal:      Right lower leg: No edema.      Left lower leg: No edema.   Skin:     General: Skin is warm and dry.      Capillary Refill: Capillary refill takes less than 2 seconds.   Neurological:      General: No focal deficit present.      Mental Status: She is alert and oriented to person, place, and time.     No new Assessment & Plan notes have been filed under this hospital service since the last note was generated.  Service: Hospital Medicine    Final Active Diagnoses:    Diagnosis Date Noted POA    Type 1 diabetes mellitus with hyperglycemia [E10.65] 04/20/2024 Yes    Gastroparesis [K31.84] 04/12/2024 Yes    Leukocytosis [D72.829] 06/13/2023 Yes    Intellectual disability [F79] 01/27/2020 Yes    Tobacco abuse [Z72.0] 06/13/2019 Yes    Noncompliance with medication regimen [Z91.148] 05/26/2016 Not Applicable      Problems Resolved During this Admission:    Diagnosis Date Noted Date Resolved POA    PRINCIPAL PROBLEM:  Diabetic ketoacidosis without coma associated with type 1 diabetes mellitus [E10.10] 12/21/2018 04/21/2024 Yes    KIMBERLY (acute kidney injury) [N17.9] 06/13/2019 04/21/2024 Yes       Discharged Condition: good    Disposition: Home or Self Care    Follow Up:    Patient Instructions:      Ambulatory referral/consult to Endocrinology   Standing Status: Future   Referral Priority: Routine Referral Type: Consultation   Requested Specialty: Endocrinology   Number of Visits Requested: 1     Ambulatory referral/consult to Smoking Cessation Program   Standing Status: Future   Referral Priority: Routine Referral Type: Consultation   Referral Reason: Specialty Services Required    Requested Specialty: CTTS   Number of Visits Requested: 1     Diet diabetic     Notify your health care provider if you experience any of the following:  persistent nausea and vomiting or diarrhea     Notify your health care provider if you experience any of the following:  severe uncontrolled pain     Notify your health care provider if you experience any of the following:  difficulty breathing or increased cough     Notify your health care provider if you experience any of the following:  persistent dizziness, light-headedness, or visual disturbances     Notify your health care provider if you experience any of the following:  increased confusion or weakness     Activity as tolerated       Significant Diagnostic Studies: Labs: All labs within the past 24 hours have been reviewed    Pending Diagnostic Studies:       Procedure Component Value Units Date/Time    EKG 12-lead [[id number]]     Order Status: Sent Lab Status: No result     EKG 12-lead [[id number]]     Order Status: Sent Lab Status: No result            Medications:  Reconciled Home Medications:      Medication List        START taking these medications      insulin detemir U-100 (Levemir) 100 unit/mL (3 mL) Inpn pen  Inject 5 Units into the skin 2 (two) times daily.     nicotine 7 mg/24 hr  Commonly known as: NICODERM CQ  Place 1 patch onto the skin once daily.  Start taking on: April 23, 2024            CHANGE how you take these medications      * insulin aspart U-100 100 unit/mL (3 mL) Inpn pen  Commonly known as: NovoLOG  Inject 3 Units into the skin 3 (three) times daily with meals.  What changed: Another medication with the same name was added. Make sure you understand how and when to take each.     * insulin aspart U-100 100 unit/mL (3 mL) Inpn pen  Commonly known as: NovoLOG  Inject 0-5 Units into the skin before meals, bedtime and 0200 as needed ((see chart below)).  What changed: You were already taking a medication with the same name, and this  "prescription was added. Make sure you understand how and when to take each.     * insulin aspart U-100 100 unit/mL (3 mL) Inpn pen  Commonly known as: NovoLOG  Inject 3 Units into the skin 3 (three) times daily.  What changed: You were already taking a medication with the same name, and this prescription was added. Make sure you understand how and when to take each.           * This list has 3 medication(s) that are the same as other medications prescribed for you. Read the directions carefully, and ask your doctor or other care provider to review them with you.                CONTINUE taking these medications      blood sugar diagnostic Strp  To check BG 4 times daily, to use with insurance preferred meter     DEXCOM G7 SENSOR Latanya  Generic drug: blood-glucose sensor  1 Device by Misc.(Non-Drug; Combo Route) route as needed (to check blood sugar).     DEXCOM  Misc  Generic drug: blood-glucose meter,continuous  1 Device by Misc.(Non-Drug; Combo Route) route as needed (to check blood sugar).     FLUoxetine 20 MG capsule  Take 3 capsules by mouth once daily.     gabapentin 300 MG capsule  Commonly known as: NEURONTIN  Take 1 capsule by mouth 3 (three) times daily.     glucagon 3 mg/actuation Spry  1 spray by Nasal route as needed (hypoglycemia).     glucose 4 GM chewable tablet  Take 4 tablets (16 g total) by mouth as needed for Low blood sugar.     lancets 30 gauge Misc  To check BG 4 times daily, to use with insurance preferred meter     metoclopramide HCl 10 MG tablet  Commonly known as: REGLAN  Take 1 tablet (10 mg total) by mouth every 6 (six) hours as needed (headache, nausea or vomiting).     pantoprazole 40 MG tablet  Commonly known as: PROTONIX  Take 1 tablet by mouth every morning.     pen needle, diabetic 32 gauge x 5/32" Ndle  Use 4 (four) times daily.     polyethylene glycol 17 gram Pwpk  Commonly known as: GLYCOLAX  Take 17 g by mouth once daily.     promethazine 25 MG suppository  Commonly known " as: PHENERGAN  Place 1 suppository (25 mg total) rectally every 6 (six) hours as needed for Nausea (For severe nausea and vomiting not improved with oral medications).     risperiDONE 1 MG tablet  Commonly known as: RISPERDAL  Take 1 tablet by mouth 2 (two) times daily.            STOP taking these medications      insulin degludec 100 unit/mL (3 mL) insulin pen  Commonly known as: TRESIBA FLEXTOUCH U-100     insulin lispro 100 unit/mL pen              Indwelling Lines/Drains at time of discharge:   Lines/Drains/Airways       None                   Time spent on the discharge of patient: 35 minutes         Ajay Brown MD  Department of Hospital Medicine  [facility] - Telemetry Stepdown

## 2024-04-22 NOTE — PROGRESS NOTES
"[facility] - Telemetry [facility] Medicine  Progress Note    Patient Name: Hollie Wilson  MRN: [id number]  Patient Class: IP- Inpatient   Admission Date: 4/19/2024  Length of Stay: 2 days  Attending Physician: Ben Calabrese DO  Primary Care Provider: Martin Rios MD        Subjective:     Principal Problem:Diabetic ketoacidosis without coma associated with type 1 diabetes mellitus        HPI:  Hollie Wilson is a 33 y.o. female with history of DM1, tobacco abuse, gastroparesis, and developmental delay presents to the emergency department with chief concern for hyperglycemia and emesis. Patient with multiple admissions for DKA, most recently admitted to MICU 4/11-4/14 for DKA. She reports when she was discharged her short acting insulin was never filled. She took her lantus 25 mg BID X 1 day but didn't think it was working so she stopped taking it. She started with multiple episodes of bilious, dark emesis yesterday and overnight. None so far today. She endorses fever, headache, dehydration, weakness, abdominal pain, nausea, "feeling like she is going to die", shortness of breath, chest tightness. She was living with her boyfriend but they are no longer together and she cannot return to the house. Patient has family but does not think she can return to them. She also reports she should be in a R knee immobilizer for a tib/fib injury that occurred during a football game at [facility], but has not been able to get it on and off by herself. She is not wearing it currently. She was on prozac for anxiety but ran out of it 1 month ago. She smokes 2-3 cigarettes a day, and uses marijuana. No other drug use. No chills, chest pain, LE edema, numbness, tingling.    In ED, patient is ill appearing. VSS. Afebrile with leukocytosis of 19.14. Glucose 490, Beta hydroxy 5.9, AG unable to be calculated 2/2 AG <5. pH 7.2/ 12.6/ 188/ 5. KIMBERLY 1.4, baseline 1. K+ 5. CXR unremarkable. Patient given 1L IVF, Kcl 10 mEqX1, and " started on an insulin gtt and admitted to hospital medicine for DKA. Of note, patient is a hard stick and PICC team consulted in ED.     Overview/Hospital Course:  33 yoF who presented for DKA, started on drip and transitioned to subq. Dispo may be an issue moving forward.     DC planning: DKA resolving, endo following.     Interval History: pleasant, had some misconception and spent had a lengthy discussion. She initially refused food but I was able to educate her on the reason for starting the full liquids on admission.      Objective:     Vital Signs (Most Recent):  Temp: 97 °F (36.1 °C) (04/21/24 1955)  Pulse: 77 (04/21/24 1955)  Resp: 18 (04/21/24 1955)  BP: 104/67 (04/21/24 1955)  SpO2: 100 % (04/21/24 1955) Vital Signs (24h Range):  Temp:  [97 °F (36.1 °C)-98.7 °F (37.1 °C)] 97 °F (36.1 °C)  Pulse:  [70-95] 77  Resp:  [18-20] 18  SpO2:  [97 %-100 %] 100 %  BP: (104-130)/(67-84) 104/67     Weight: 54.4 kg (120 lb)  Body mass index is 18.79 kg/m².    Intake/Output Summary (Last 24 hours) at 4/21/2024 2142  Last data filed at 4/21/2024 0958  Gross per 24 hour   Intake --   Output 800 ml   Net -800 ml         Physical Exam  Vitals and nursing note reviewed.   Constitutional:       General: She is not in acute distress.  HENT:      Head: Normocephalic and atraumatic.      Mouth/Throat:      Mouth: Mucous membranes are dry.   Eyes:      Extraocular Movements: Extraocular movements intact.      Pupils: Pupils are equal, round, and reactive to light.   Cardiovascular:      Rate and Rhythm: Normal rate and regular rhythm.      Pulses: Normal pulses.   Pulmonary:      Effort: Pulmonary effort is normal. No respiratory distress.   Abdominal:      General: Abdomen is flat. Bowel sounds are normal. There is no distension.      Palpations: Abdomen is soft.      Tenderness: There is no abdominal tenderness.   Musculoskeletal:      Right lower leg: No edema.      Left lower leg: No edema.   Skin:     General: Skin is warm  and dry.      Capillary Refill: Capillary refill takes less than 2 seconds.   Neurological:      General: No focal deficit present.      Mental Status: She is alert and oriented to person, place, and time.             Significant Labs: All pertinent labs within the past 24 hours have been reviewed.    Significant Imaging: I have reviewed all pertinent imaging results/findings within the past 24 hours.    Assessment/Plan:      Type 1 diabetes mellitus with hyperglycemia  Patient's FSGs are uncontrolled due to hyperglycemia on current medication regimen.  Last A1c reviewed-   Lab Results   Component Value Date    HGBA1C 9.5 (H) 04/12/2024     Most recent fingerstick glucose reviewed-   Recent Labs   Lab 04/21/24  0803 04/21/24  1244 04/21/24  1754 04/21/24 2009   POCTGLUCOSE 156* 258* 131* 139*     Current correctional scale  Medium  Maintain anti-hyperglycemic dose as follows-   Antihyperglycemics (From admission, onward)      Start     Stop Route Frequency Ordered    04/21/24 1645  insulin aspart U-100 pen 3 Units         -- SubQ 3 times daily with meals 04/21/24 1508    04/21/24 0900  insulin detemir U-100 (Levemir) pen 5 Units         -- SubQ 2 times daily 04/21/24 0810    04/20/24 1356  insulin aspart U-100 pen 0-5 Units         -- SubQ Before meals, nightly and at 0200 PRN 04/20/24 1257          Hold Oral hypoglycemics while patient is in the hospital.    Gastroparesis  - Qtc 500  - daily EKGs  - will avoid starting reglan for now       Leukocytosis  - suspect reactive/ concentrated  - continue IVF  - afebrile without leukocytosis  - CXR, UA negative for infection  - continue to monitor      Intellectual disability  - Appreciate CM/SW assistance      Tobacco abuse  - ~3 cigarettes daily, requesting nicotine patch while in hospital  - educated for 3- 10 minutes on tobacco cessation      VTE Risk Mitigation (From admission, onward)           Ordered     enoxaparin injection 40 mg  Every 24 hours         04/21/24  1050     IP VTE LOW RISK PATIENT  Once         04/19/24 1121                    Discharge Planning   THELMA:      Code Status: Full Code   Is the patient medically ready for discharge?:     Reason for patient still in hospital (select all that apply): Patient trending condition and Treatment  Discharge Plan A: Home with family                  Ben Calabrese DO  Department of Hospital Medicine   [facility] - Telemetry Stepdown

## 2024-04-22 NOTE — NURSING
Patient discharged with all belongings to home, patient escorted in wheelchair by transport, where boyfriend awaited to drive patient home.  Skin war m and dry.  Respirations even and unlabored.  RA, IV removed.  AVS reviewed with patient, patient verbally agreed to instructions.

## 2024-04-22 NOTE — PLAN OF CARE
Endocrinology Plan of Care  04/22/2024    Reviewed BG trend over the preceding 24 hours.  Better control of BG with current MDI insulin regimen.  Has had BG mostly in the hospital goal of 140-180 in the past day.          ASSESSMENT and PLAN     Neuro  Intellectual disability  Complicates her ability to care for herself outpatient       Renal/  KIMBERLY (acute kidney injury)-resolved as of 4/21/2024  Resolved with management of DKA with IVF and IV insulin  Management per primary       Endocrine  * Diabetic ketoacidosis without coma associated with type 1 diabetes mellitus-resolved as of 4/21/2024  DKA likely due to medication non-adherence  Suspect housing insecurity and intellectual disability interfere with her ability to manage her diabetes independently  Social work consult may benefit her  DKA now resolved after remaining on DKA gtt o/n      Type 1 diabetes mellitus with hyperglycemia  Endocrinology consulted for BG management.   DKA now resolved, will transition to MDI 4/20     Inpatient BG goal 140-180     - Levemir (Insulin Detemir) 5 units BID - since she is type 1, she will need levemir BID while inpatient. Levemir has been discontinued by the , and upon d/c she will need to go back to a combined once daily dose of an alternate basal insulin. Please do not hold basal insulin for T1 diabetic. Contact on call endocrine fellow or ANNA MARIE for any question regarding insulin.  - Novolog (Insulin Aspart) 3 units TIDWM and prn for BG excursions low dose SSI (200/50) -- due to intellectual disability, hx severe hypoglycemia and hypoglycemia unawareness the scale should start at 200 for her.  - BG checks AC/HS  - Hypoglycemia protocol in place  - If blood glucose greater than 300, please ask patient not to eat food or drink anything other than water until correctional insulin has brought it back below 250     ** Please notify Endocrine for any change and/or advance in diet**  ** Please call Endocrine for any BG  related issues **     Discharge Planning:   Above regimen is recommended at discharge for her including basal insulin (Lantus, Tresiba, etc) 5 units BID, Novolog 3 units TIDAC and low dose correction starting at 200 (200/50).  We will arrange a clinic visit soon for her.        Jefferson Melendez MD  Endocrinology

## 2024-04-22 NOTE — PLAN OF CARE
Pt AAOx4,no c/o of pain reported. Pt iso n menstrual cycle currently, noted some blood in the urine. VSS, no acute distress noted. POC on going.    Problem: Diabetic Ketoacidosis  Goal: Fluid and Electrolyte Balance with Absence of Ketosis  Outcome: Ongoing, Progressing     Problem: Adult Inpatient Plan of Care  Goal: Plan of Care Review  Outcome: Ongoing, Progressing  Goal: Absence of Hospital-Acquired Illness or Injury  Outcome: Ongoing, Progressing  Goal: Optimal Comfort and Wellbeing  Outcome: Ongoing, Progressing  Goal: Readiness for Transition of Care  Outcome: Ongoing, Progressing     Problem: Diabetes Comorbidity  Goal: Blood Glucose Level Within Targeted Range  Outcome: Ongoing, Progressing

## 2024-04-22 NOTE — HOSPITAL COURSE
33 yoF who presented for DKA, started on drip and transitioned to subq. Hx of multiple admissions for hyper and hypoglycemia in patient with schizoaffective disorder and intellectual disability. Discussions had with patient to ensure full understanding of her diabetes and other health issues. Patient with history of intellectual disability. She was able to state that she was aware of her diabetes and understood the complications of uncontrolled blood sugars. She relayed her treatment plan including her home insulin regimen. It was determined that at that time she had capacity as it related to her diabetes diagnosis. Case management was consulted for referrals to home health to help manage her medications. Follow up with endocrinology outpatient.

## 2024-04-22 NOTE — SUBJECTIVE & OBJECTIVE
Interval History: pleasant, had some misconception and spent had a lengthy discussion. She initially refused food but I was able to educate her on the reason for starting the full liquids on admission.      Objective:     Vital Signs (Most Recent):  Temp: 97 °F (36.1 °C) (04/21/24 1955)  Pulse: 77 (04/21/24 1955)  Resp: 18 (04/21/24 1955)  BP: 104/67 (04/21/24 1955)  SpO2: 100 % (04/21/24 1955) Vital Signs (24h Range):  Temp:  [97 °F (36.1 °C)-98.7 °F (37.1 °C)] 97 °F (36.1 °C)  Pulse:  [70-95] 77  Resp:  [18-20] 18  SpO2:  [97 %-100 %] 100 %  BP: (104-130)/(67-84) 104/67     Weight: 54.4 kg (120 lb)  Body mass index is 18.79 kg/m².    Intake/Output Summary (Last 24 hours) at 4/21/2024 2142  Last data filed at 4/21/2024 0958  Gross per 24 hour   Intake --   Output 800 ml   Net -800 ml         Physical Exam  Vitals and nursing note reviewed.   Constitutional:       General: She is not in acute distress.  HENT:      Head: Normocephalic and atraumatic.      Mouth/Throat:      Mouth: Mucous membranes are dry.   Eyes:      Extraocular Movements: Extraocular movements intact.      Pupils: Pupils are equal, round, and reactive to light.   Cardiovascular:      Rate and Rhythm: Normal rate and regular rhythm.      Pulses: Normal pulses.   Pulmonary:      Effort: Pulmonary effort is normal. No respiratory distress.   Abdominal:      General: Abdomen is flat. Bowel sounds are normal. There is no distension.      Palpations: Abdomen is soft.      Tenderness: There is no abdominal tenderness.   Musculoskeletal:      Right lower leg: No edema.      Left lower leg: No edema.   Skin:     General: Skin is warm and dry.      Capillary Refill: Capillary refill takes less than 2 seconds.   Neurological:      General: No focal deficit present.      Mental Status: She is alert and oriented to person, place, and time.             Significant Labs: All pertinent labs within the past 24 hours have been reviewed.    Significant Imaging: I  have reviewed all pertinent imaging results/findings within the past 24 hours.

## 2024-04-22 NOTE — PLAN OF CARE
Problem: Adult Inpatient Plan of Care  Goal: Plan of Care Review  4/21/2024 1939 by Jennifer Moran RN  Outcome: Ongoing, Progressing  4/21/2024 1939 by Jennifer Moran RN  Outcome: Ongoing, Progressing  Goal: Patient-Specific Goal (Individualized)  4/21/2024 1939 by Jennifer Moran RN  Outcome: Ongoing, Progressing  4/21/2024 1939 by Jennifer Moran RN  Outcome: Ongoing, Progressing  Goal: Absence of Hospital-Acquired Illness or Injury  4/21/2024 1939 by Jennifer Moran RN  Outcome: Ongoing, Progressing  4/21/2024 1939 by Jennifer Moran RN  Outcome: Ongoing, Progressing  Goal: Optimal Comfort and Wellbeing  4/21/2024 1939 by Jennifer Moran RN  Outcome: Ongoing, Progressing  4/21/2024 1939 by Jennifer Moran RN  Outcome: Ongoing, Progressing  Goal: Readiness for Transition of Care  4/21/2024 1939 by Jennifer Moran RN  Outcome: Ongoing, Progressing  4/21/2024 1939 by Jennifer Moran RN  Outcome: Ongoing, Progressing

## 2024-04-23 LAB
OHS QRS DURATION: 116 MS
OHS QTC CALCULATION: 462 MS

## 2024-05-08 ENCOUNTER — HOSPITAL ENCOUNTER (OUTPATIENT)
Facility: HOSPITAL | Age: 34
Discharge: HOME | End: 2024-05-11
Attending: STUDENT IN AN ORGANIZED HEALTH CARE EDUCATION/TRAINING PROGRAM | Admitting: INTERNAL MEDICINE
Payer: COMMERCIAL

## 2024-05-08 DIAGNOSIS — E10.65 TYPE 1 DIABETES MELLITUS WITH HYPERGLYCEMIA: ICD-10-CM

## 2024-05-08 DIAGNOSIS — R94.31 QT PROLONGATION: ICD-10-CM

## 2024-05-08 DIAGNOSIS — R07.9 CHEST PAIN: ICD-10-CM

## 2024-05-08 DIAGNOSIS — R73.9 HYPERGLYCEMIA: Primary | ICD-10-CM

## 2024-05-08 DIAGNOSIS — E86.0 DEHYDRATION: ICD-10-CM

## 2024-05-08 PROBLEM — F25.9 SCHIZOAFFECTIVE DISORDER: Status: ACTIVE | Noted: 2024-05-08

## 2024-05-08 PROBLEM — E87.0 TYPE 1 DIABETES MELLITUS WITH HYPEROSMOLAR HYPERGLYCEMIC STATE (HHS): Status: ACTIVE | Noted: 2024-05-08

## 2024-05-08 PROBLEM — M79.2 NEUROPATHIC PAIN: Status: ACTIVE | Noted: 2024-05-08

## 2024-05-08 PROBLEM — E10.69 TYPE 1 DIABETES MELLITUS WITH HYPEROSMOLAR HYPERGLYCEMIC STATE (HHS): Status: ACTIVE | Noted: 2024-05-08

## 2024-05-08 PROBLEM — R11.0 NAUSEA: Status: ACTIVE | Noted: 2024-05-08

## 2024-05-08 PROBLEM — K21.9 GERD (GASTROESOPHAGEAL REFLUX DISEASE): Status: ACTIVE | Noted: 2024-05-08

## 2024-05-08 PROBLEM — E87.1 HYPONATREMIA: Status: ACTIVE | Noted: 2024-05-08

## 2024-05-08 LAB
ALBUMIN SERPL BCP-MCNC: 3.5 G/DL (ref 3.5–5.2)
ALLENS TEST: NORMAL
ALP SERPL-CCNC: 125 U/L (ref 55–135)
ALT SERPL W/O P-5'-P-CCNC: 9 U/L (ref 10–44)
AMPHET+METHAMPHET UR QL: NEGATIVE
ANION GAP SERPL CALC-SCNC: 11 MMOL/L (ref 8–16)
ANION GAP SERPL CALC-SCNC: 8 MMOL/L (ref 8–16)
AST SERPL-CCNC: 10 U/L (ref 10–40)
B-OH-BUTYR BLD STRIP-SCNC: 0 MMOL/L (ref 0–0.5)
BACTERIA #/AREA URNS AUTO: NORMAL /HPF
BARBITURATES UR QL SCN>200 NG/ML: NEGATIVE
BASOPHILS # BLD AUTO: 0.03 K/UL (ref 0–0.2)
BASOPHILS NFR BLD: 0.5 % (ref 0–1.9)
BENZODIAZ UR QL SCN>200 NG/ML: NEGATIVE
BILIRUB SERPL-MCNC: 0.2 MG/DL (ref 0.1–1)
BILIRUB UR QL STRIP: NEGATIVE
BUN SERPL-MCNC: 13 MG/DL (ref 6–20)
BUN SERPL-MCNC: 8 MG/DL (ref 6–20)
BZE UR QL SCN: NEGATIVE
CALCIUM SERPL-MCNC: 9 MG/DL (ref 8.7–10.5)
CALCIUM SERPL-MCNC: 9.6 MG/DL (ref 8.7–10.5)
CANNABINOIDS UR QL SCN: ABNORMAL
CHLORIDE SERPL-SCNC: 108 MMOL/L (ref 95–110)
CHLORIDE SERPL-SCNC: 97 MMOL/L (ref 95–110)
CLARITY UR REFRACT.AUTO: CLEAR
CO2 SERPL-SCNC: 21 MMOL/L (ref 23–29)
CO2 SERPL-SCNC: 23 MMOL/L (ref 23–29)
COLOR UR AUTO: ABNORMAL
CREAT SERPL-MCNC: 0.8 MG/DL (ref 0.5–1.4)
CREAT SERPL-MCNC: 1.2 MG/DL (ref 0.5–1.4)
CREAT UR-MCNC: 25 MG/DL (ref 15–325)
DIFFERENTIAL METHOD BLD: ABNORMAL
EOSINOPHIL # BLD AUTO: 0.1 K/UL (ref 0–0.5)
EOSINOPHIL NFR BLD: 2.2 % (ref 0–8)
ERYTHROCYTE [DISTWIDTH] IN BLOOD BY AUTOMATED COUNT: 15.9 % (ref 11.5–14.5)
EST. GFR  (NO RACE VARIABLE): >60 ML/MIN/1.73 M^2
EST. GFR  (NO RACE VARIABLE): >60 ML/MIN/1.73 M^2
ETHANOL UR-MCNC: <10 MG/DL
GLUCOSE SERPL-MCNC: 127 MG/DL (ref 70–110)
GLUCOSE SERPL-MCNC: 662 MG/DL (ref 70–110)
GLUCOSE SERPL-MCNC: 84 MG/DL (ref 70–110)
GLUCOSE UR QL STRIP: ABNORMAL
HCO3 UR-SCNC: 24.3 MMOL/L (ref 24–28)
HCT VFR BLD AUTO: 35.2 % (ref 37–48.5)
HGB BLD-MCNC: 10.9 G/DL (ref 12–16)
HGB UR QL STRIP: NEGATIVE
IMM GRANULOCYTES # BLD AUTO: 0.02 K/UL (ref 0–0.04)
IMM GRANULOCYTES NFR BLD AUTO: 0.4 % (ref 0–0.5)
KETONES UR QL STRIP: NEGATIVE
LEUKOCYTE ESTERASE UR QL STRIP: NEGATIVE
LYMPHOCYTES # BLD AUTO: 1.3 K/UL (ref 1–4.8)
LYMPHOCYTES NFR BLD: 22.9 % (ref 18–48)
MCH RBC QN AUTO: 26.2 PG (ref 27–31)
MCHC RBC AUTO-ENTMCNC: 31 G/DL (ref 32–36)
MCV RBC AUTO: 85 FL (ref 82–98)
METHADONE UR QL SCN>300 NG/ML: NEGATIVE
MICROSCOPIC COMMENT: NORMAL
MONOCYTES # BLD AUTO: 0.5 K/UL (ref 0.3–1)
MONOCYTES NFR BLD: 9 % (ref 4–15)
NEUTROPHILS # BLD AUTO: 3.6 K/UL (ref 1.8–7.7)
NEUTROPHILS NFR BLD: 65 % (ref 38–73)
NITRITE UR QL STRIP: NEGATIVE
NRBC BLD-RTO: 0 /100 WBC
OHS QRS DURATION: 132 MS
OHS QTC CALCULATION: 479 MS
OPIATES UR QL SCN: NEGATIVE
OSMOLALITY SERPL: 311 MOSM/KG (ref 275–295)
OSMOLALITY UR: 680 MOSM/KG (ref 50–1200)
PCO2 BLDA: 42.5 MMHG (ref 35–45)
PCP UR QL SCN>25 NG/ML: NEGATIVE
PH SMN: 7.37 [PH] (ref 7.35–7.45)
PH UR STRIP: 5 [PH] (ref 5–8)
PLATELET # BLD AUTO: 233 K/UL (ref 150–450)
PMV BLD AUTO: 11.7 FL (ref 9.2–12.9)
PO2 BLDA: 43 MMHG (ref 40–60)
POC BE: -1 MMOL/L
POC SATURATED O2: 76 % (ref 95–100)
POC TCO2: 26 MMOL/L (ref 24–29)
POCT GLUCOSE: 131 MG/DL (ref 70–110)
POCT GLUCOSE: 217 MG/DL (ref 70–110)
POCT GLUCOSE: 230 MG/DL (ref 70–110)
POCT GLUCOSE: 291 MG/DL (ref 70–110)
POCT GLUCOSE: 428 MG/DL (ref 70–110)
POCT GLUCOSE: 77 MG/DL (ref 70–110)
POCT GLUCOSE: 89 MG/DL (ref 70–110)
POCT GLUCOSE: >500 MG/DL (ref 70–110)
POTASSIUM SERPL-SCNC: 3.9 MMOL/L (ref 3.5–5.1)
POTASSIUM SERPL-SCNC: 4.4 MMOL/L (ref 3.5–5.1)
PROT SERPL-MCNC: 6.7 G/DL (ref 6–8.4)
PROT UR QL STRIP: NEGATIVE
RBC # BLD AUTO: 4.16 M/UL (ref 4–5.4)
RBC #/AREA URNS AUTO: 2 /HPF (ref 0–4)
SAMPLE: NORMAL
SITE: NORMAL
SODIUM SERPL-SCNC: 129 MMOL/L (ref 136–145)
SODIUM SERPL-SCNC: 139 MMOL/L (ref 136–145)
SODIUM UR-SCNC: 64 MMOL/L (ref 20–250)
SP GR UR STRIP: >=1.03 (ref 1–1.03)
SQUAMOUS #/AREA URNS AUTO: 2 /HPF
TOXICOLOGY INFORMATION: ABNORMAL
URN SPEC COLLECT METH UR: ABNORMAL
WBC # BLD AUTO: 5.55 K/UL (ref 3.9–12.7)
WBC #/AREA URNS AUTO: 5 /HPF (ref 0–5)
YEAST UR QL AUTO: NORMAL

## 2024-05-08 PROCEDURE — 96361 HYDRATE IV INFUSION ADD-ON: CPT

## 2024-05-08 PROCEDURE — 96365 THER/PROPH/DIAG IV INF INIT: CPT

## 2024-05-08 PROCEDURE — 80048 BASIC METABOLIC PNL TOTAL CA: CPT | Mod: XB

## 2024-05-08 PROCEDURE — 96376 TX/PRO/DX INJ SAME DRUG ADON: CPT

## 2024-05-08 PROCEDURE — 96366 THER/PROPH/DIAG IV INF ADDON: CPT

## 2024-05-08 PROCEDURE — G0378 HOSPITAL OBSERVATION PER HR: HCPCS

## 2024-05-08 PROCEDURE — 81001 URINALYSIS AUTO W/SCOPE: CPT | Mod: XB | Performed by: STUDENT IN AN ORGANIZED HEALTH CARE EDUCATION/TRAINING PROGRAM

## 2024-05-08 PROCEDURE — 63600175 PHARM REV CODE 636 W HCPCS: Performed by: STUDENT IN AN ORGANIZED HEALTH CARE EDUCATION/TRAINING PROGRAM

## 2024-05-08 PROCEDURE — 82803 BLOOD GASES ANY COMBINATION: CPT

## 2024-05-08 PROCEDURE — 99285 EMERGENCY DEPT VISIT HI MDM: CPT | Mod: 25

## 2024-05-08 PROCEDURE — 83935 ASSAY OF URINE OSMOLALITY: CPT

## 2024-05-08 PROCEDURE — 80053 COMPREHEN METABOLIC PANEL: CPT | Performed by: STUDENT IN AN ORGANIZED HEALTH CARE EDUCATION/TRAINING PROGRAM

## 2024-05-08 PROCEDURE — 93010 ELECTROCARDIOGRAM REPORT: CPT | Mod: ,,, | Performed by: INTERNAL MEDICINE

## 2024-05-08 PROCEDURE — 83930 ASSAY OF BLOOD OSMOLALITY: CPT | Performed by: STUDENT IN AN ORGANIZED HEALTH CARE EDUCATION/TRAINING PROGRAM

## 2024-05-08 PROCEDURE — 63600175 PHARM REV CODE 636 W HCPCS

## 2024-05-08 PROCEDURE — S5010 5% DEXTROSE AND 0.45% SALINE: HCPCS

## 2024-05-08 PROCEDURE — 96372 THER/PROPH/DIAG INJ SC/IM: CPT

## 2024-05-08 PROCEDURE — 94761 N-INVAS EAR/PLS OXIMETRY MLT: CPT | Mod: XB

## 2024-05-08 PROCEDURE — 82010 KETONE BODYS QUAN: CPT | Performed by: STUDENT IN AN ORGANIZED HEALTH CARE EDUCATION/TRAINING PROGRAM

## 2024-05-08 PROCEDURE — 85025 COMPLETE CBC W/AUTO DIFF WBC: CPT | Performed by: STUDENT IN AN ORGANIZED HEALTH CARE EDUCATION/TRAINING PROGRAM

## 2024-05-08 PROCEDURE — 25000003 PHARM REV CODE 250

## 2024-05-08 PROCEDURE — 80307 DRUG TEST PRSMV CHEM ANLYZR: CPT

## 2024-05-08 PROCEDURE — 84300 ASSAY OF URINE SODIUM: CPT

## 2024-05-08 PROCEDURE — 82962 GLUCOSE BLOOD TEST: CPT

## 2024-05-08 PROCEDURE — 25000003 PHARM REV CODE 250: Performed by: STUDENT IN AN ORGANIZED HEALTH CARE EDUCATION/TRAINING PROGRAM

## 2024-05-08 PROCEDURE — 99900035 HC TECH TIME PER 15 MIN (STAT)

## 2024-05-08 PROCEDURE — 93005 ELECTROCARDIOGRAM TRACING: CPT

## 2024-05-08 RX ORDER — SODIUM CHLORIDE 9 MG/ML
1000 INJECTION, SOLUTION INTRAVENOUS CONTINUOUS
Status: DISCONTINUED | OUTPATIENT
Start: 2024-05-08 | End: 2024-05-08

## 2024-05-08 RX ORDER — GLUCAGON 1 MG
1 KIT INJECTION
Status: DISCONTINUED | OUTPATIENT
Start: 2024-05-08 | End: 2024-05-11 | Stop reason: HOSPADM

## 2024-05-08 RX ORDER — INSULIN GLARGINE 100 [IU]/ML
3 INJECTION, SOLUTION SUBCUTANEOUS DAILY
Status: DISCONTINUED | OUTPATIENT
Start: 2024-05-08 | End: 2024-05-09

## 2024-05-08 RX ORDER — SODIUM CHLORIDE 0.9 % (FLUSH) 0.9 %
10 SYRINGE (ML) INJECTION
Status: DISCONTINUED | OUTPATIENT
Start: 2024-05-08 | End: 2024-05-11 | Stop reason: HOSPADM

## 2024-05-08 RX ORDER — IBUPROFEN 200 MG
24 TABLET ORAL
Status: DISCONTINUED | OUTPATIENT
Start: 2024-05-08 | End: 2024-05-11 | Stop reason: HOSPADM

## 2024-05-08 RX ORDER — IBUPROFEN 200 MG
16 TABLET ORAL
Status: DISCONTINUED | OUTPATIENT
Start: 2024-05-08 | End: 2024-05-11 | Stop reason: HOSPADM

## 2024-05-08 RX ORDER — TALC
6 POWDER (GRAM) TOPICAL NIGHTLY PRN
Status: DISCONTINUED | OUTPATIENT
Start: 2024-05-08 | End: 2024-05-11 | Stop reason: HOSPADM

## 2024-05-08 RX ORDER — PANTOPRAZOLE SODIUM 40 MG/1
40 TABLET, DELAYED RELEASE ORAL EVERY MORNING
Status: DISCONTINUED | OUTPATIENT
Start: 2024-05-08 | End: 2024-05-11 | Stop reason: HOSPADM

## 2024-05-08 RX ORDER — ENOXAPARIN SODIUM 100 MG/ML
40 INJECTION SUBCUTANEOUS EVERY 24 HOURS
Status: DISCONTINUED | OUTPATIENT
Start: 2024-05-08 | End: 2024-05-11 | Stop reason: HOSPADM

## 2024-05-08 RX ORDER — GABAPENTIN 300 MG/1
300 CAPSULE ORAL 3 TIMES DAILY
Status: DISCONTINUED | OUTPATIENT
Start: 2024-05-08 | End: 2024-05-11 | Stop reason: HOSPADM

## 2024-05-08 RX ORDER — INSULIN GLARGINE 100 [IU]/ML
INJECTION, SOLUTION SUBCUTANEOUS
Status: ON HOLD | COMMUNITY
End: 2024-05-11 | Stop reason: HOSPADM

## 2024-05-08 RX ORDER — NALOXONE HCL 0.4 MG/ML
0.02 VIAL (ML) INJECTION
Status: DISCONTINUED | OUTPATIENT
Start: 2024-05-08 | End: 2024-05-11 | Stop reason: HOSPADM

## 2024-05-08 RX ORDER — SODIUM CHLORIDE 0.9 % (FLUSH) 0.9 %
10 SYRINGE (ML) INJECTION EVERY 12 HOURS PRN
Status: DISCONTINUED | OUTPATIENT
Start: 2024-05-08 | End: 2024-05-08

## 2024-05-08 RX ORDER — OXYCODONE HYDROCHLORIDE 10 MG/1
10 TABLET ORAL EVERY 6 HOURS PRN
Status: DISCONTINUED | OUTPATIENT
Start: 2024-05-08 | End: 2024-05-11 | Stop reason: HOSPADM

## 2024-05-08 RX ORDER — AMOXICILLIN 250 MG
1 CAPSULE ORAL DAILY PRN
Status: DISCONTINUED | OUTPATIENT
Start: 2024-05-08 | End: 2024-05-11 | Stop reason: HOSPADM

## 2024-05-08 RX ORDER — FLUOXETINE HYDROCHLORIDE 20 MG/1
60 CAPSULE ORAL DAILY
Status: DISCONTINUED | OUTPATIENT
Start: 2024-05-08 | End: 2024-05-11 | Stop reason: HOSPADM

## 2024-05-08 RX ORDER — POLYETHYLENE GLYCOL 3350 17 G/17G
17 POWDER, FOR SOLUTION ORAL DAILY PRN
Status: DISCONTINUED | OUTPATIENT
Start: 2024-05-08 | End: 2024-05-11 | Stop reason: HOSPADM

## 2024-05-08 RX ORDER — DEXTROSE MONOHYDRATE 100 MG/ML
INJECTION, SOLUTION INTRAVENOUS
Status: DISCONTINUED | OUTPATIENT
Start: 2024-05-08 | End: 2024-05-11 | Stop reason: HOSPADM

## 2024-05-08 RX ORDER — DEXTROSE MONOHYDRATE AND SODIUM CHLORIDE 5; .45 G/100ML; G/100ML
INJECTION, SOLUTION INTRAVENOUS CONTINUOUS PRN
Status: DISCONTINUED | OUTPATIENT
Start: 2024-05-08 | End: 2024-05-11 | Stop reason: HOSPADM

## 2024-05-08 RX ORDER — INSULIN ASPART 100 [IU]/ML
0-5 INJECTION, SOLUTION INTRAVENOUS; SUBCUTANEOUS
Status: DISCONTINUED | OUTPATIENT
Start: 2024-05-08 | End: 2024-05-11 | Stop reason: HOSPADM

## 2024-05-08 RX ORDER — PROCHLORPERAZINE EDISYLATE 5 MG/ML
5 INJECTION INTRAMUSCULAR; INTRAVENOUS EVERY 6 HOURS PRN
Status: DISCONTINUED | OUTPATIENT
Start: 2024-05-08 | End: 2024-05-11 | Stop reason: HOSPADM

## 2024-05-08 RX ADMIN — GABAPENTIN 300 MG: 300 CAPSULE ORAL at 09:05

## 2024-05-08 RX ADMIN — PANTOPRAZOLE SODIUM 40 MG: 40 TABLET, DELAYED RELEASE ORAL at 07:05

## 2024-05-08 RX ADMIN — INSULIN GLARGINE 3 UNITS: 100 INJECTION, SOLUTION SUBCUTANEOUS at 04:05

## 2024-05-08 RX ADMIN — SODIUM CHLORIDE 1000 ML: 9 INJECTION, SOLUTION INTRAVENOUS at 04:05

## 2024-05-08 RX ADMIN — FLUOXETINE HYDROCHLORIDE 60 MG: 20 CAPSULE ORAL at 08:05

## 2024-05-08 RX ADMIN — INSULIN ASPART 3 UNITS: 100 INJECTION, SOLUTION INTRAVENOUS; SUBCUTANEOUS at 09:05

## 2024-05-08 RX ADMIN — DEXTROSE AND SODIUM CHLORIDE: 5; 450 INJECTION, SOLUTION INTRAVENOUS at 09:05

## 2024-05-08 RX ADMIN — INSULIN HUMAN 10 UNITS: 100 INJECTION, SOLUTION PARENTERAL at 04:05

## 2024-05-08 RX ADMIN — INSULIN ASPART 2 UNITS: 100 INJECTION, SOLUTION INTRAVENOUS; SUBCUTANEOUS at 05:05

## 2024-05-08 RX ADMIN — INSULIN HUMAN 0.1 UNITS/KG/HR: 1 INJECTION, SOLUTION INTRAVENOUS at 08:05

## 2024-05-08 RX ADMIN — GABAPENTIN 300 MG: 300 CAPSULE ORAL at 04:05

## 2024-05-08 RX ADMIN — GABAPENTIN 300 MG: 300 CAPSULE ORAL at 08:05

## 2024-05-08 NOTE — ASSESSMENT & PLAN NOTE
- Qtc 479, with prior in 502 (2 weeks ago)   - Daily EKGs  - Avoid reglan at the moment given history of prolonged Qtc and borderline values in this hospitalization

## 2024-05-08 NOTE — ED NOTES
I received report from ALEXANDRA Price, and assumed care of pt at this time. Pt resting comfortably and independently repositioned in stretcher with bed locked in lowest position for safety. NAD noted at this time. Respirations even and unlabored and visible chest rise noted.  Patient offered bathroom assistance and denies need at this time. Pt instructed to call if assistance is needed. Pt on continuous cardiac, BP, and O2 monitoring. Call light within reach. No needs at this time. Will continue to monitor.

## 2024-05-08 NOTE — ASSESSMENT & PLAN NOTE
Difficult to assess patient's decision making capacity given developmental delay. Will continue to engage in conversations with patient before considering psychiatry evaluation for decision making capacity. Will consider SW/CM for support.

## 2024-05-08 NOTE — ASSESSMENT & PLAN NOTE
Ms. Hollie Wilson is a 34 y.o. patient with frequent admissions in the past for DKA secondary to medication non-compliance that has required ICU management, who presented with symptoms and lab values suggesting hyperglycemic state in the setting of poorly control T1DM. As per patient, difficulty getting healthcare access to insulin in relation with poor familiar support may be the cause of present hospitalization.     Home medications: Insulin Detemir 5U SQ once daily; Aspart 3U TIDWM + LDSSI; metoclopramide 10mg Q6H PRN   Last Hgb A1C: 9.5 (4/12/24)   Diagnostics: Elevated plasma glucose 662, BHB 0.0. AG unable to quantify. Serum osm 311.    Plan   - Admit to Hospital Medicine    - NPO. Consider transitioning to bariatric clear liquid diet while on insulin gtt  - Insulin gtt; titrate as per nomogram  - mIVF NS 125cc/h    - POCT glucose Q1H  - BMP Q4H  - strict I/Os for better UOP monitoring   - Titrate K and Mg; within safe range of insulin management since K 3.3  - Consider transitioning off insulin gtt to subcutaneous insulin when plasma glucose persistently < 200  - Consider inpatient consult to endocrinology if difficulty controlling BG  - Consider PICC team consult, given previous history of difficult IV access

## 2024-05-08 NOTE — PLAN OF CARE
[facility] - Emergency Dept  Initial Discharge Assessment       Primary Care Provider: Martin Rios MD    Admission Diagnosis: Hyperglycemia [R73.9]    Admission Date: 5/8/2024  Expected Discharge Date:     Pt stated she was independent with her ambulation but then said she uses a walker.  Pt stated she is independent with her ADL's and does not require assistance    Pt to d/c home with no needs when ready    Transition of Care Barriers: (P) None    Payor: [facility] CONNECTIONS / Plan: [facility] MARKETPLACE / Product Type: Commercial /     Extended Emergency Contact Information  Primary Emergency Contact: Hollie Wilson  Mobile Phone: [phone]  Relation: Mother   needed? No    Discharge Plan A: (P) Home  Discharge Plan B: (P) Home      [facility]  [address]  [facility] [address]  Phone: [phone] Fax: [phone]    [facility] [address] [place], [place] - [address]  [address]  [facility] [address]  [facility] [address]  Phone: [phone] Fax: [phone]    [id number] [place] [facility] [address] AT [facility] [address]  [address]  [facility] [address]  Phone: [phone] Fax: [phone]      Initial Assessment (most recent)       Adult Discharge Assessment - 05/08/24 0819          Discharge Assessment    Assessment Type Discharge Planning Assessment (P)      Confirmed/corrected address, phone number and insurance Yes (P)      Confirmed Demographics Correct on Facesheet (P)      Source of Information patient (P)      Does patient/caregiver understand observation status Yes (P)      Reason For Admission Type 1 diabetes mellitus with hyperosmolar hyperglycemic state (HHS) (P)      People in Home friend(s) (P)      Facility Arrived From: home (P)      Do you expect to return to your current living situation? Yes (P)      Do you have help at home or someone to help you manage your care at home?  No (P)      Prior to hospitilization cognitive status: Alert/Oriented;No Deficits (P)      Current cognitive status: No Deficits;Alert/Oriented (P)      Walking or Climbing Stairs Difficulty no (P)      Dressing/Bathing Difficulty no (P)      Home Accessibility stairs to enter home (P)      Number of Stairs, Main Entrance one (P)      Home Layout Able to live on 1st floor (P)      Equipment Currently Used at Home walker, standard (P)      Patient currently being followed by outpatient case management? No (P)      Do you currently have service(s) that help you manage your care at home? No (P)      Do you have any problems affording any of your prescribed medications? No (P)      Is the patient taking medications as prescribed? yes (P)      Who is going to help you get home at discharge? family/friends (P)      How do you get to doctors appointments? car, drives self (P)      Are you on dialysis? No (P)      Do you take coumadin? No (P)      Discharge Plan A Home (P)      Discharge Plan B Home (P)      DME Needed Upon Discharge  none (P)      Discharge Plan discussed with: Patient (P)      Transition of Care Barriers None (P)         Physical Activity    On average, how many days per week do you engage in moderate to strenuous exercise (like a brisk walk)? 0 days (P)      On average, how many minutes do you engage in exercise at this level? 0 min (P)         Financial Resource Strain    How hard is it for you to pay for the very basics like food, housing, medical care, and heating? Not very hard (P)         Housing Stability    In the last 12 months, was there a time when you were not able to pay the mortgage or rent on time? No (P)      At any time in the past 12 months, were you homeless or living in a shelter (including now)? No (P)         Transportation Needs    Has the lack of transportation kept you from medical appointments, meetings, work or from getting things needed for daily living? No (P)         Food  Insecurity    Within the past 12 months, you worried that your food would run out before you got the money to buy more. Never true (P)      Within the past 12 months, the food you bought just didn't last and you didn't have money to get more. Never true (P)         Stress    Do you feel stress - tense, restless, nervous, or anxious, or unable to sleep at night because your mind is troubled all the time - these days? To some extent (P)         Social Isolation    How often do you feel lonely or isolated from those around you?  Rarely (P)         Alcohol Use    Q1: How often do you have a drink containing alcohol? Never (P)      Q2: How many drinks containing alcohol do you have on a typical day when you are drinking? Patient does not drink (P)      Q3: How often do you have six or more drinks on one occasion? Never (P)         Utilities    In the past 12 months has the electric, gas, oil, or water company threatened to shut off services in your home? No (P)         OTHER    Name(s) of People in Home friends (P)                       Bere Jay CD, MSW, LMSW, RSW   Case Management  [facility]  Email: [email or web address]

## 2024-05-08 NOTE — HPI
"Ms. Hollie Wilson is a 34 y.o. female with past medical history of developmental delay, schizoaffective disorder, polysubstance abuse, gastroparesis, and T1DM. Presented to [facility] ED via EMS with generalized weakness, polydipsia (drinking 2L of water at home in 2hs), polyuria since yesterday. En route to ED while in the EMS, blood glucose was read as "high" as per EMS report. Multiple admissions for DKA in the past, most recently discharged from [facility] on 4/22 that required MICU admission on 4/11-4/14 for management of DKA. Patient reports the last 2 hospitalizations once she was discharged, her short acting insulin was never filled. Denies vomit, and refers taking judiciously at home reglan. Of note, patient is a poor historian and primary team was unable to get collateral information from the Mother by phone.     Upon admission to the ED afebrile with stable vital signs. Relevant labs with normal H/H, hyponatremia 129, blood glucose 662 with BHB 0.0. UA 3+ glucose, serum osm 311 and AG unable to be calculated. CXR unremarkable.     Given 1L NS for volume optimization and 10U regular insulin for BG management.     Patient will be admitted to Hospital Medicine for further workup and management of HHS.   " IRF ADE Admission Assessment  [facility] Acute Rehab Unit    Patient:Armin Obando     Rehab Dx/Hx: H/O Spinal surgery [Z98.890]   :1955  [facility]:[id number]  Date of Admit: 2022     Pain Effect on Sleep:  Over the past 5 days, how much of the time has pain made it hard for you to sleep at night?  []  0. Does not apply - I have not had any pain or hurting in the past 5 days  [x]  1. Rarely or not at all  []  2. Occasionally  []  3. Frequently  []  4. Almost constantly  []  8. Unable to answer    Over the past 5 days, how often have you limited your participation in rehabilitation therapy sessions due to pain?  []  0. Does not apply - I have not received rehabilitation therapy in the past 5 days  [x]  1. Rarely or not at all  []  2. Occasionally  []  3. Frequently  []  4. Almost constantly  []  8. Unable to answer    Pain Interference with Day-to-Day Activities:  Over the past 5 days, how often have you limited your day-to-day activities (excluding rehabilitation therapy session)? [x]  1. Rarely or not at all  []  2. Occasionally  []  3. Frequently  []  4. Almost constantly  []  8. Unable to answer      High-Risk Drug Classes: Use and Indication   Is taking: Check if the pt is taking any medications by pharmacological classification  Indication noted: If column 1 is checked, check if there is an indication noted for all meds in the drug class Is taking  (check all that apply) Indication noted (check all that apply)   Antipsychotic [] []   Anticoagulant [] []   Antibiotic [] []   Opioid [x] [x]   Antiplatelet [x] [x]   Hypoglycemic (including insulin) [] []   None of the above [] []     Special Treatments, Procedures, and Programs   Check all of the following treatments, procedures, and programs that apply on admission. On admission (check all that apply)   Cancer Treatments    A1. Chemotherapy []   A2. IV []   A3. Oral []   A10. Other []   B1. Radiation []   Respiratory Therapies    C1.  Oxygen Therapy [x]   C2. Continuous [x]   C3. Intermittent []   C4. High-concentration []   D1. Suctioning []   D2. Scheduled []   D3. As needed []   E1. Tracheostomy Care []   F1. Invasive Mechanical Ventilator (ventilator or respirator) []   G1. Non-invasive Mechanical Ventilator []   G2. BiPAP []   G3. CPAP []   Other    H1. IV Medications []   H2. Vasoactive medications []   H3. Antibiotics []   H4. Anticoagulation []   H10. Other []   I1. Transfusions []   J1. Dialysis []   J2. Hemodialysis []   J3. Peritoneal dialysis []   O1. IV access []   O2. Peripheral []   O3. Midline []   O4.  Central (PICC, tunneled, port) []   None of the above []     Signature:  Collins Jansen RN

## 2024-05-08 NOTE — CONSULTS
DEBORA consulted for PIV insertion in real time using ultrasound guidance.    Indication: PVA  Gauge and length: 20 g x 1 3/4 inch   Location: RFA

## 2024-05-08 NOTE — ED TRIAGE NOTES
"Hollie Wilson, a 34 y.o. female presents to the ED w/ complaint of generalized weakness, polydipsia, polyuria, hx diabetes. CBG read high with EMS    Triage note:  Chief Complaint   Patient presents with    Hyperglycemia     Pt brought in by [facility] w/ CBG reading "high." +general malaise, +polydipsia, +polyuria, +polyphagia.     Review of patient's allergies indicates:   Allergen Reactions    Clove oil Itching    Zofran (as hydrochloride) [ondansetron hcl] Hives     Past Medical History:   Diagnosis Date    Anemia     Borderline intellectual functioning 08/08/2017    Diabetes mellitus     Mood disorder     Scoliosis     Substance use disorder       "

## 2024-05-08 NOTE — SUBJECTIVE & OBJECTIVE
Past Medical History:   Diagnosis Date    Anemia     Borderline intellectual functioning 2017    Diabetes mellitus     Mood disorder     Scoliosis     Substance use disorder        Past Surgical History:   Procedure Laterality Date     SECTION         Review of patient's allergies indicates:   Allergen Reactions    Clove oil Itching    Zofran (as hydrochloride) [ondansetron hcl] Hives       No current facility-administered medications on file prior to encounter.     Current Outpatient Medications on File Prior to Encounter   Medication Sig    blood sugar diagnostic Strp To check BG 4 times daily, to use with insurance preferred meter    blood-glucose meter,continuous (DEXCOM ) Misc 1 Device by Misc.(Non-Drug; Combo Route) route as needed (to check blood sugar).    blood-glucose sensor (DEXCOM G7 SENSOR) Latanya 1 Device by Misc.(Non-Drug; Combo Route) route as needed (to check blood sugar).    FLUoxetine 20 MG capsule Take 3 capsules by mouth once daily.    gabapentin (NEURONTIN) 300 MG capsule Take 1 capsule by mouth 3 (three) times daily.    glucagon 3 mg/actuation Spry 1 spray by Nasal route as needed (hypoglycemia).    glucose 4 GM chewable tablet Take 4 tablets (16 g total) by mouth as needed for Low blood sugar.    insulin aspart U-100 (NOVOLOG) 100 unit/mL (3 mL) InPn pen Inject 3 Units into the skin 3 (three) times daily with meals.    insulin aspart U-100 (NOVOLOG) 100 unit/mL (3 mL) InPn pen Inject 0-5 Units into the skin before meals, bedtime and 0200 as needed ((see chart below)).    insulin aspart U-100 (NOVOLOG) 100 unit/mL (3 mL) InPn pen Inject 3 Units into the skin 3 (three) times daily.    insulin detemir U-100, Levemir, 100 unit/mL (3 mL) SubQ InPn pen Inject 5 Units into the skin 2 (two) times daily.    lancets 30 gauge Misc To check BG 4 times daily, to use with insurance preferred meter    metoclopramide HCl (REGLAN) 10 MG tablet Take 1 tablet (10 mg total) by mouth every 6  "(six) hours as needed (headache, nausea or vomiting).    nicotine (NICODERM CQ) 7 mg/24 hr Place 1 patch onto the skin once daily.    pantoprazole (PROTONIX) 40 MG tablet Take 1 tablet by mouth every morning.    pen needle, diabetic 32 gauge x 5/32" Ndle Use 4 (four) times daily.    polyethylene glycol (GLYCOLAX) 17 gram PwPk Take 17 g by mouth once daily.    promethazine (PHENERGAN) 25 MG suppository Place 1 suppository (25 mg total) rectally every 6 (six) hours as needed for Nausea (For severe nausea and vomiting not improved with oral medications).     Family History    None       Tobacco Use    Smoking status: Every Day    Smokeless tobacco: Never   Substance and Sexual Activity    Alcohol use: Yes    Drug use: Yes     Types: Marijuana    Sexual activity: Never     Review of Systems   Constitutional:  Negative for diaphoresis and fever.   HENT:  Negative for facial swelling and trouble swallowing.    Eyes:  Negative for photophobia and visual disturbance.   Respiratory:  Negative for cough and shortness of breath.    Gastrointestinal:  Negative for abdominal pain and diarrhea.   Endocrine: Positive for polydipsia, polyphagia and polyuria.   Genitourinary:  Negative for dysuria and urgency.   Musculoskeletal:  Positive for myalgias. Negative for joint swelling.   Skin:  Negative for pallor and rash.   Neurological:  Negative for light-headedness and headaches.   Psychiatric/Behavioral:  Positive for decreased concentration.      Objective:     Vital Signs (Most Recent):  Temp: 98.9 °F (37.2 °C) (05/08/24 0315)  Pulse: 77 (05/08/24 0400)  Resp: 15 (05/08/24 0400)  BP: (!) 141/86 (05/08/24 0400)  SpO2: 99 % (05/08/24 0400) Vital Signs (24h Range):  Temp:  [98.9 °F (37.2 °C)] 98.9 °F (37.2 °C)  Pulse:  [77-78] 77  Resp:  [15-20] 15  SpO2:  [99 %-100 %] 99 %  BP: (122-141)/(81-86) 141/86        There is no height or weight on file to calculate BMI.     Physical Exam  Vitals and nursing note reviewed. "   Constitutional:       General: She is not in acute distress.     Appearance: She is ill-appearing.   HENT:      Head: Normocephalic.   Eyes:      General: No scleral icterus.  Cardiovascular:      Rate and Rhythm: Normal rate and regular rhythm.      Pulses: Normal pulses.      Heart sounds: Normal heart sounds. No murmur heard.  Pulmonary:      Effort: No respiratory distress.      Breath sounds: No rhonchi.   Abdominal:      General: There is no distension.      Tenderness: There is no abdominal tenderness.   Musculoskeletal:      Cervical back: Normal range of motion.      Right lower leg: No edema.      Left lower leg: No edema.   Skin:     Capillary Refill: Capillary refill takes 2 to 3 seconds.      Coloration: Skin is not pale.   Neurological:      Mental Status: She is oriented to person, place, and time. Mental status is at baseline.      Motor: Weakness present.   Psychiatric:      Comments: Decreased concentration and capacity to fully engage in conversation with treating team.                 Significant Labs: All pertinent labs within the past 24 hours have been reviewed.    Significant Imaging: I have reviewed all pertinent imaging results/findings within the past 24 hours.

## 2024-05-08 NOTE — ASSESSMENT & PLAN NOTE
History of polysubstance abuse in the past including cocaine, opioids and amphetamines.   - Pending Utox

## 2024-05-08 NOTE — ED NOTES
Assumed care of patient from ALEXANDRA EARLY. Patient observed in bed AAOx4 with normal respirations. All VS stable on a cardiac monitor. Pt appears very sleepy. CBG assessed. Result 77. Insulin drip discontinued. Primary team notified via secure chat and confirmed notification. Maintenance IVFs infusing. Bed rails up x . Call bell within reach.

## 2024-05-08 NOTE — ED NOTES
Unable to obtain  2nd IV, pt stuck multiple times. Hosp med made aware and waiting for further orders

## 2024-05-08 NOTE — ASSESSMENT & PLAN NOTE
- ~3 cigarettes daily, requesting nicotine patch while in hospital  - Educated for 10 minutes on tobacco cessation in the setting of hyperglycemic state   - Smoking cessation

## 2024-05-08 NOTE — ASSESSMENT & PLAN NOTE
Patient has hyponatremia which is controlled. Will aim to correct the sodium by 4-6mEq in 24 hours. We will monitor sodium Every 8 hours. The hyponatremia is probably due to polydipsia. We will obtain the following studies: Urine sodium, urine osmolality, serum osmolality (311).     - Continue on maintenance NS at 125cc/h and evaluate with repeat BMP.   - Pending urine studies     Recent Labs   Lab 05/08/24  0352   *

## 2024-05-08 NOTE — H&P
"  [facility] - Emergency Dept  Hospital Medicine  History & Physical    Patient Name: Hollie Wilson  MRN: [id number]  Patient Class: OP- Observation  Admission Date: 5/8/2024  Attending Physician: Erasto Abraham III*   Primary Care Provider: Martin Rios MD         Patient information was obtained from patient, EMS personnel, past medical records, and ER records.     Subjective:     Principal Problem:Type 1 diabetes mellitus with hyperosmolar hyperglycemic state (HHS)    Chief Complaint:   Chief Complaint   Patient presents with    Hyperglycemia     Pt brought in by [facility] w/ CBG reading "high." +general malaise, +polydipsia, +polyuria, +polyphagia.        HPI: Ms. Hollie Wilson is a 34 y.o. female with past medical history of developmental delay, schizoaffective disorder, polysubstance abuse, gastroparesis, and T1DM. [place] [facility] [place] with generalized weakness, polydipsia (drinking 2L of water at home in 2hs), polyuria since yesterday. En route to ED while in the EMS, blood glucose was read as "high" as per EMS report. Multiple admissions for DKA in the past, most recently [place] [facility] [place] that required MICU admission on 4/11-4/14 for management of DKA. Patient reports the last 2 hospitalizations once she was discharged, her short acting insulin was never filled. Denies vomit, and refers taking judiciously at home reglan. Of note, patient is a poor historian and primary team was unable to get collateral information from the Mother by phone.     Upon admission to the ED afebrile with stable vital signs. Relevant labs with normal H/H, hyponatremia 129, blood glucose 662 with BHB 0.0. UA 3+ glucose, serum osm 311 and AG unable to be calculated. CXR unremarkable.     Given 1L NS for volume optimization and 10U regular insulin for BG management.     Patient will be admitted to Hospital Medicine for further workup and management of HHS.     Past Medical History:   Diagnosis Date    Anemia "     Borderline intellectual functioning 2017    Diabetes mellitus     Mood disorder     Scoliosis     Substance use disorder        Past Surgical History:   Procedure Laterality Date     SECTION         Review of patient's allergies indicates:   Allergen Reactions    Clove oil Itching    Zofran (as hydrochloride) [ondansetron hcl] Hives       No current facility-administered medications on file prior to encounter.     Current Outpatient Medications on File Prior to Encounter   Medication Sig    blood sugar diagnostic Strp To check BG 4 times daily, to use with insurance preferred meter    blood-glucose meter,continuous (DEXCOM ) Misc 1 Device by Misc.(Non-Drug; Combo Route) route as needed (to check blood sugar).    blood-glucose sensor (DEXCOM G7 SENSOR) Latanya 1 Device by Misc.(Non-Drug; Combo Route) route as needed (to check blood sugar).    FLUoxetine 20 MG capsule Take 3 capsules by mouth once daily.    gabapentin (NEURONTIN) 300 MG capsule Take 1 capsule by mouth 3 (three) times daily.    glucagon 3 mg/actuation Spry 1 spray by Nasal route as needed (hypoglycemia).    glucose 4 GM chewable tablet Take 4 tablets (16 g total) by mouth as needed for Low blood sugar.    insulin aspart U-100 (NOVOLOG) 100 unit/mL (3 mL) InPn pen Inject 3 Units into the skin 3 (three) times daily with meals.    insulin aspart U-100 (NOVOLOG) 100 unit/mL (3 mL) InPn pen Inject 0-5 Units into the skin before meals, bedtime and 0200 as needed ((see chart below)).    insulin aspart U-100 (NOVOLOG) 100 unit/mL (3 mL) InPn pen Inject 3 Units into the skin 3 (three) times daily.    insulin detemir U-100, Levemir, 100 unit/mL (3 mL) SubQ InPn pen Inject 5 Units into the skin 2 (two) times daily.    lancets 30 gauge Misc To check BG 4 times daily, to use with insurance preferred meter    metoclopramide HCl (REGLAN) 10 MG tablet Take 1 tablet (10 mg total) by mouth every 6 (six) hours as needed (headache, nausea or  "vomiting).    nicotine (NICODERM CQ) 7 mg/24 hr Place 1 patch onto the skin once daily.    pantoprazole (PROTONIX) 40 MG tablet Take 1 tablet by mouth every morning.    pen needle, diabetic 32 gauge x 5/32" Ndle Use 4 (four) times daily.    polyethylene glycol (GLYCOLAX) 17 gram PwPk Take 17 g by mouth once daily.    promethazine (PHENERGAN) 25 MG suppository Place 1 suppository (25 mg total) rectally every 6 (six) hours as needed for Nausea (For severe nausea and vomiting not improved with oral medications).     Family History    None       Tobacco Use    Smoking status: Every Day    Smokeless tobacco: Never   Substance and Sexual Activity    Alcohol use: Yes    Drug use: Yes     Types: Marijuana    Sexual activity: Never     Review of Systems   Constitutional:  Negative for diaphoresis and fever.   HENT:  Negative for facial swelling and trouble swallowing.    Eyes:  Negative for photophobia and visual disturbance.   Respiratory:  Negative for cough and shortness of breath.    Gastrointestinal:  Negative for abdominal pain and diarrhea.   Endocrine: Positive for polydipsia, polyphagia and polyuria.   Genitourinary:  Negative for dysuria and urgency.   Musculoskeletal:  Positive for myalgias. Negative for joint swelling.   Skin:  Negative for pallor and rash.   Neurological:  Negative for light-headedness and headaches.   Psychiatric/Behavioral:  Positive for decreased concentration.      Objective:     Vital Signs (Most Recent):  Temp: 98.9 °F (37.2 °C) (05/08/24 0315)  Pulse: 77 (05/08/24 0400)  Resp: 15 (05/08/24 0400)  BP: (!) 141/86 (05/08/24 0400)  SpO2: 99 % (05/08/24 0400) Vital Signs (24h Range):  Temp:  [98.9 °F (37.2 °C)] 98.9 °F (37.2 °C)  Pulse:  [77-78] 77  Resp:  [15-20] 15  SpO2:  [99 %-100 %] 99 %  BP: (122-141)/(81-86) 141/86        There is no height or weight on file to calculate BMI.     Physical Exam  Vitals and nursing note reviewed.   Constitutional:       General: She is not in acute " distress.     Appearance: She is ill-appearing.   HENT:      Head: Normocephalic.   Eyes:      General: No scleral icterus.  Cardiovascular:      Rate and Rhythm: Normal rate and regular rhythm.      Pulses: Normal pulses.      Heart sounds: Normal heart sounds. No murmur heard.  Pulmonary:      Effort: No respiratory distress.      Breath sounds: No rhonchi.   Abdominal:      General: There is no distension.      Tenderness: There is no abdominal tenderness.   Musculoskeletal:      Cervical back: Normal range of motion.      Right lower leg: No edema.      Left lower leg: No edema.   Skin:     Capillary Refill: Capillary refill takes 2 to 3 seconds.      Coloration: Skin is not pale.   Neurological:      Mental Status: She is oriented to person, place, and time. Mental status is at baseline.      Motor: Weakness present.   Psychiatric:      Comments: Decreased concentration and capacity to fully engage in conversation with treating team.                 Significant Labs: All pertinent labs within the past 24 hours have been reviewed.    Significant Imaging: I have reviewed all pertinent imaging results/findings within the past 24 hours.  Assessment/Plan:     * Type 1 diabetes mellitus with hyperosmolar hyperglycemic state (HHS)  Ms. Hollie Wilson is a 34 y.o. patient with frequent admissions in the past for DKA secondary to medication non-compliance that has required ICU management, who presented with symptoms and lab values suggesting hyperglycemic state in the setting of poorly control T1DM. As per patient, difficulty getting healthcare access to insulin in relation with poor familiar support may be the cause of present hospitalization.     Home medications: Insulin Detemir 5U SQ once daily; Aspart 3U TIDWM + LDSSI; metoclopramide 10mg Q6H PRN   Last Hgb A1C: 9.5 (4/12/24)   Diagnostics: Elevated plasma glucose 662, BHB 0.0. AG unable to quantify. Serum osm 311.    Plan   - Admit to Hospital Medicine    -  NPO. Consider transitioning to bariatric clear liquid diet while on insulin gtt  - Insulin gtt; titrate as per nomogram  - mIVF NS 125cc/h    - POCT glucose Q1H  - BMP Q4H  - strict I/Os for better UOP monitoring   - Titrate K and Mg; within safe range of insulin management since K 3.3  - Consider transitioning off insulin gtt to subcutaneous insulin when plasma glucose persistently < 200  - Consider inpatient consult to endocrinology if difficulty controlling BG  - Consider PICC team consult, given previous history of difficult IV access    Hyponatremia  Patient has hyponatremia which is controlled. Will aim to correct the sodium by 4-6mEq in 24 hours. We will monitor sodium Every 8 hours. The hyponatremia is probably due to polydipsia. We will obtain the following studies: Urine sodium, urine osmolality, serum osmolality (311).     - Continue on maintenance NS at 125cc/h and evaluate with repeat BMP.   - Pending urine studies     Recent Labs   Lab 05/08/24  0352   *       Gastroparesis  - Qtc 479, with prior in 502 (2 weeks ago)   - Daily EKGs  - Avoid reglan at the moment given history of prolonged Qtc and borderline values in this hospitalization       Nausea  History of nausea/vomit in the past. Taking reglan at home.   -Compazine PRN given long QTc   -Avoid reglan and Zofran (given history of allergies in the past)     Schizoaffective disorder  -Resume home fluoxetine     Polysubstance abuse  History of polysubstance abuse in the past including cocaine, opioids and amphetamines.   - Pending Utox       Tobacco abuse  - ~3 cigarettes daily, requesting nicotine patch while in hospital  - Educated for 10 minutes on tobacco cessation in the setting of hyperglycemic state   - Smoking cessation     Neuropathic pain  - Resume home gabapentin 300mg TID       GERD (gastroesophageal reflux disease)  History of GERD. Will resume home protonix 40.       Intellectual disability  Difficult to assess patient's decision  making capacity given developmental delay. Will continue to engage in conversations with patient before considering psychiatry evaluation for decision making capacity. Will consider SW/CM for support.         VTE Risk Mitigation (From admission, onward)           Ordered     enoxaparin injection 40 mg  Daily         05/08/24 0551     IP VTE LOW RISK PATIENT  Once         05/08/24 0551                       On 05/08/2024, patient should be placed in hospital observation services under my care in collaboration with Hospital Medicine Team 2.         Giovany Cho MD  Department of Hospital Medicine  [facility] - Emergency Dept

## 2024-05-08 NOTE — PHARMACY MED REC
"      Admission Medication History     The home medication history was taken by Domo Anand.    You may go to "Admission" then "Reconcile Home Medications" tabs to review and/or act upon these items.     The home medication list has been updated by the Pharmacy department.   Please read ALL comments highlighted in yellow.   Please address this information as you see fit.    Feel free to contact us if you have any questions or require assistance.      UNABLE TO ASSESS PATIENT AT THIS TIME. UNABLE TO CONTACT MOTHER. LAST FILL DATES ENTERED.    Medications listed below were obtained from: [employer]- [employer]  Current Outpatient Medications on File Prior to Encounter   Medication Sig    blood sugar diagnostic Strp     To check BG 4 times daily, to use with insurance preferred meter    blood-glucose meter,continuous (DEXCOM ) Misc   1 Device by Misc.(Non-Drug; Combo Route) route as needed (to check blood sugar).    blood-glucose sensor (DEXCOM G7 SENSOR) Latanya   1 Device by Misc.(Non-Drug; Combo Route) route as needed (to check blood sugar).    FLUoxetine 20 MG capsule   Take 3 capsules by mouth once daily.  Last filled on 3/21/24 for 90/30 DS      gabapentin (NEURONTIN) 300 MG capsule   Take 1 capsule by mouth 3 (three) times daily.  Last filled on 3/21/24 for 90/30 DS    glucagon 3 mg/actuation Spry   Instill 1 spray by nasal route as needed (hypoglycemia).  Last filled on 3/21/24 for 1/30 DS      glucose 4 GM chewable tablet     Take 4 tablets (16 g total) by mouth as needed for Low blood sugar.    insulin aspart U-100 (NOVOLOG) 100 unit/mL (3 mL) InPn pen   Inject 3 Units into the skin 3 (three) times daily.  Last filled on 7/18/23 for 25 DS    insulin detemir U-100, Levemir, 100 unit/mL (3 mL) SubQ InPn pen   Inject 5 Units into the skin 2 (two) times daily.  No recent fill dates.    insulin glargine U-100, Lantus, (LANTUS SOLOSTAR U-100 INSULIN) 100 unit/mL (3 mL) InPn pen   Inject into the " "skin.  Last filled on 4/15/24 for 17 DS    lancets 30 gauge Misc     To check BG 4 times daily, to use with insurance preferred meter    metoclopramide HCl (REGLAN) 10 MG tablet   Take 1 tablet (10 mg total) by mouth every 6 (six) hours as needed for headache, nausea or vomiting.  No recent fill dates.      nicotine (NICODERM CQ) 7 mg/24 hr   Place 1 patch onto the skin once daily.    pantoprazole (PROTONIX) 40 MG tablet   Take 1 tablet by mouth every morning.  Last filled on 3/21/24 for 30/30 DS    pen needle, diabetic 32 gauge x 5/32" Ndle   Use 4 (four) times daily.    polyethylene glycol (GLYCOLAX) 17 gram PwPk   Take 17 g by mouth once daily.    promethazine (PHENERGAN) 25 MG suppository       Place 1 suppository (25 mg total) rectally every 6 (six) hours as needed for Nausea (For severe nausea and vomiting not improved with oral medications).  No recent fill dates.           Potential issues to be addressed PRIOR TO DISCHARGE  Please discuss with the patient barriers to adherence with medication treatment plans  Patient requires education regarding drug therapies     Domo Anand  EXT [id number]            .          " Yes

## 2024-05-08 NOTE — ED PROVIDER NOTES
"Encounter Date: 2024       History     Chief Complaint   Patient presents with    Hyperglycemia     Pt brought in by SHELBY w/ DEEPALIG reading "high." +general malaise, +polydipsia, +polyuria, +polyphagia.     34-year-old female with history of type 1 diabetes presents for polydipsia and generalized malaise.  She has history of borderline intellectual functioning, recurrent admissions for diabetic ketoacidosis due to medication noncompliance and difficulty managing her diabetes.  Over the past few days, she reports worsening of the symptoms.  She denies any pain.      Review of patient's allergies indicates:   Allergen Reactions    Clove oil Itching    Zofran (as hydrochloride) [ondansetron hcl] Hives     Past Medical History:   Diagnosis Date    Anemia     Borderline intellectual functioning 2017    Diabetes mellitus     Mood disorder     Scoliosis     Substance use disorder      Past Surgical History:   Procedure Laterality Date     SECTION       No family history on file.  Social History     Tobacco Use    Smoking status: Every Day    Smokeless tobacco: Never   Substance Use Topics    Alcohol use: Yes    Drug use: Yes     Types: Marijuana     Review of Systems   Constitutional:  Positive for fatigue.   Endocrine: Positive for polydipsia and polyuria. Negative for polyphagia.   Neurological:  Positive for weakness.       Physical Exam     Initial Vitals [24 0315]   BP Pulse Resp Temp SpO2   122/81 78 20 98.9 °F (37.2 °C) 100 %      MAP       --         Physical Exam    Nursing note and vitals reviewed.  Constitutional: She appears well-developed and well-nourished. She is not diaphoretic.   HENT:   Head: Normocephalic and atraumatic.   Dry oral mucosa   Eyes: EOM are normal. Pupils are equal, round, and reactive to light. Right eye exhibits no discharge. Left eye exhibits no discharge.   Neck: No tracheal deviation present.   Normal range of motion.  Cardiovascular:  Normal rate, regular rhythm " and intact distal pulses.           Pulmonary/Chest: No respiratory distress. She has no wheezes. She exhibits no tenderness.   Abdominal: Abdomen is soft. She exhibits no distension. There is no abdominal tenderness.   Musculoskeletal:         General: No tenderness or edema. Normal range of motion.      Cervical back: Normal range of motion.     Neurological: She is alert and oriented to person, place, and time. She has normal strength. No cranial nerve deficit or sensory deficit. GCS eye subscore is 4. GCS verbal subscore is 5. GCS motor subscore is 6.   Skin: Skin is warm and dry. No rash noted.   Psychiatric: She has a normal mood and affect. Her behavior is normal. Thought content normal.         ED Course   Critical Care    Date/Time: 5/8/2024 5:18 AM    Performed by: Clarence Bess MD  Authorized by: Clarence Bess MD  Direct patient critical care time: 15 minutes  Additional history critical care time: 5 minutes  Ordering / reviewing critical care time: 5 minutes  Documentation critical care time: 5 minutes  Total critical care time (exclusive of procedural time) : 30 minutes  Critical care was necessary to treat or prevent imminent or life-threatening deterioration of the following conditions: endocrine crisis.  Critical care was time spent personally by me on the following activities: examination of patient, obtaining history from patient or surrogate, ordering and performing treatments and interventions, ordering and review of laboratory studies, ordering and review of radiographic studies, pulse oximetry and re-evaluation of patient's condition.        Labs Reviewed   CBC W/ AUTO DIFFERENTIAL - Abnormal; Notable for the following components:       Result Value    Hemoglobin 10.9 (*)     Hematocrit 35.2 (*)     MCH 26.2 (*)     MCHC 31.0 (*)     RDW 15.9 (*)     All other components within normal limits   COMPREHENSIVE METABOLIC PANEL - Abnormal; Notable for the following components:    Sodium  129 (*)     CO2 21 (*)     Glucose 662 (*)     ALT 9 (*)     All other components within normal limits   URINALYSIS, REFLEX TO URINE CULTURE - Abnormal; Notable for the following components:    Specific Gravity, UA >=1.030 (*)     Glucose, UA 4+ (*)     All other components within normal limits    Narrative:     Specimen Source->Urine   OSMOLALITY, SERUM - Abnormal; Notable for the following components:    Osmolality 311 (*)     All other components within normal limits   POCT GLUCOSE - Abnormal; Notable for the following components:    POCT Glucose >500 (*)     All other components within normal limits   BETA - HYDROXYBUTYRATE, SERUM   OSMOLALITY, SERUM   URINALYSIS MICROSCOPIC    Narrative:     Specimen Source->Urine   ISTAT PROCEDURE   POCT GLUCOSE MONITORING CONTINUOUS     EKG Readings: (Independently Interpreted)   EKG with regular rate, regular rhythm, normal axis, no acute ST elevations or depressions, normal [place], QRS and QT interval. Interpreted by me.         Imaging Results              X-Ray Chest AP Portable (In process)                      Medications   sodium chloride 0.9% bolus 1,000 mL 1,000 mL (1,000 mLs Intravenous New Bag 5/8/24 0400)   insulin regular injection 10 Units 0.1 mL (10 Units Intravenous Given 5/8/24 0456)     Medical Decision Making  Differential diagnosis includes dehydration, uncontrolled diabetes, DKA, HHS    Amount and/or Complexity of Data Reviewed  Labs: ordered. Decision-making details documented in ED Course.  Radiology: ordered.    Risk  OTC drugs.               ED Course as of 05/08/24 0519   Wed May 08, 2024   0518 Osmolality, Serum(!) [BS]   0518 POCT glucose(!!) [BS]   0518 ISTAT PROCEDURE [BS]   0518 Urinalysis, Reflex to Urine Culture Urine, Clean Catch(!) [BS]   0518 Comprehensive metabolic panel(!!) [BS]   0518 CBC auto differential(!) [BS]   0518 Beta - Hydroxybutyrate, Serum [BS]   0518 Workup consistent with uncontrolled diabetes causing hyperglycemia.  VBG without  evidence of acidosis, beta hydroxybutyrate within normal limits, doubt DKA.  Serum osmoles 310, below threshold for HHS.  Will give patient IV fluids, 10 units insulin, admit to hospital medicine for management of her poorly-controlled diabetes causing hyperglycemia. [BS]      ED Course User Index  [BS] Clarence Bess MD                           Clinical Impression:  Final diagnoses:  [R73.9] Hyperglycemia (Primary)  [E86.0] Dehydration          ED Disposition Condition    Observation Stable                Clarence Bess MD  05/08/24 0519       Clarence Bess MD  05/08/24 0519

## 2024-05-08 NOTE — ASSESSMENT & PLAN NOTE
History of nausea/vomit in the past. Taking reglan at home.   -Compazine PRN given long QTc   -Avoid reglan and Zofran (given history of allergies in the past)

## 2024-05-09 LAB
ANION GAP SERPL CALC-SCNC: 11 MMOL/L (ref 8–16)
BUN SERPL-MCNC: 8 MG/DL (ref 6–20)
CALCIUM SERPL-MCNC: 8.9 MG/DL (ref 8.7–10.5)
CHLORIDE SERPL-SCNC: 103 MMOL/L (ref 95–110)
CO2 SERPL-SCNC: 20 MMOL/L (ref 23–29)
CREAT SERPL-MCNC: 0.9 MG/DL (ref 0.5–1.4)
ERYTHROCYTE [DISTWIDTH] IN BLOOD BY AUTOMATED COUNT: 15.7 % (ref 11.5–14.5)
EST. GFR  (NO RACE VARIABLE): >60 ML/MIN/1.73 M^2
GLUCOSE SERPL-MCNC: 393 MG/DL (ref 70–110)
HCT VFR BLD AUTO: 35.1 % (ref 37–48.5)
HGB BLD-MCNC: 11.1 G/DL (ref 12–16)
MCH RBC QN AUTO: 26.4 PG (ref 27–31)
MCHC RBC AUTO-ENTMCNC: 31.6 G/DL (ref 32–36)
MCV RBC AUTO: 83 FL (ref 82–98)
OHS QRS DURATION: 122 MS
OHS QTC CALCULATION: 478 MS
PLATELET # BLD AUTO: 227 K/UL (ref 150–450)
PMV BLD AUTO: 12 FL (ref 9.2–12.9)
POCT GLUCOSE: 141 MG/DL (ref 70–110)
POCT GLUCOSE: 169 MG/DL (ref 70–110)
POCT GLUCOSE: 181 MG/DL (ref 70–110)
POCT GLUCOSE: 233 MG/DL (ref 70–110)
POCT GLUCOSE: 366 MG/DL (ref 70–110)
POTASSIUM SERPL-SCNC: 4.3 MMOL/L (ref 3.5–5.1)
RBC # BLD AUTO: 4.21 M/UL (ref 4–5.4)
SODIUM SERPL-SCNC: 134 MMOL/L (ref 136–145)
WBC # BLD AUTO: 4.8 K/UL (ref 3.9–12.7)

## 2024-05-09 PROCEDURE — 93010 ELECTROCARDIOGRAM REPORT: CPT | Mod: ,,, | Performed by: INTERNAL MEDICINE

## 2024-05-09 PROCEDURE — 63600175 PHARM REV CODE 636 W HCPCS

## 2024-05-09 PROCEDURE — 80048 BASIC METABOLIC PNL TOTAL CA: CPT

## 2024-05-09 PROCEDURE — 85027 COMPLETE CBC AUTOMATED: CPT

## 2024-05-09 PROCEDURE — 94761 N-INVAS EAR/PLS OXIMETRY MLT: CPT

## 2024-05-09 PROCEDURE — 96372 THER/PROPH/DIAG INJ SC/IM: CPT

## 2024-05-09 PROCEDURE — 25000003 PHARM REV CODE 250

## 2024-05-09 PROCEDURE — 93005 ELECTROCARDIOGRAM TRACING: CPT

## 2024-05-09 PROCEDURE — G0378 HOSPITAL OBSERVATION PER HR: HCPCS

## 2024-05-09 PROCEDURE — 36415 COLL VENOUS BLD VENIPUNCTURE: CPT

## 2024-05-09 RX ORDER — INSULIN GLARGINE 100 [IU]/ML
5 INJECTION, SOLUTION SUBCUTANEOUS DAILY
Status: DISCONTINUED | OUTPATIENT
Start: 2024-05-09 | End: 2024-05-09

## 2024-05-09 RX ORDER — INSULIN GLARGINE 100 [IU]/ML
5 INJECTION, SOLUTION SUBCUTANEOUS NIGHTLY
Status: DISCONTINUED | OUTPATIENT
Start: 2024-05-09 | End: 2024-05-10

## 2024-05-09 RX ORDER — INSULIN ASPART 100 [IU]/ML
3 INJECTION, SOLUTION INTRAVENOUS; SUBCUTANEOUS
Status: DISCONTINUED | OUTPATIENT
Start: 2024-05-09 | End: 2024-05-10

## 2024-05-09 RX ORDER — INSULIN GLARGINE 100 [IU]/ML
5 INJECTION, SOLUTION SUBCUTANEOUS NIGHTLY
Status: DISCONTINUED | OUTPATIENT
Start: 2024-05-09 | End: 2024-05-09

## 2024-05-09 RX ADMIN — OXYCODONE HYDROCHLORIDE 10 MG: 10 TABLET ORAL at 09:05

## 2024-05-09 RX ADMIN — ENOXAPARIN SODIUM 40 MG: 40 INJECTION SUBCUTANEOUS at 05:05

## 2024-05-09 RX ADMIN — FLUOXETINE HYDROCHLORIDE 60 MG: 20 CAPSULE ORAL at 08:05

## 2024-05-09 RX ADMIN — INSULIN ASPART 3 UNITS: 100 INJECTION, SOLUTION INTRAVENOUS; SUBCUTANEOUS at 06:05

## 2024-05-09 RX ADMIN — GABAPENTIN 300 MG: 300 CAPSULE ORAL at 02:05

## 2024-05-09 RX ADMIN — INSULIN GLARGINE 5 UNITS: 100 INJECTION, SOLUTION SUBCUTANEOUS at 09:05

## 2024-05-09 RX ADMIN — INSULIN ASPART 5 UNITS: 100 INJECTION, SOLUTION INTRAVENOUS; SUBCUTANEOUS at 09:05

## 2024-05-09 RX ADMIN — INSULIN ASPART 3 UNITS: 100 INJECTION, SOLUTION INTRAVENOUS; SUBCUTANEOUS at 01:05

## 2024-05-09 RX ADMIN — PANTOPRAZOLE SODIUM 40 MG: 40 TABLET, DELAYED RELEASE ORAL at 08:05

## 2024-05-09 RX ADMIN — GABAPENTIN 300 MG: 300 CAPSULE ORAL at 08:05

## 2024-05-09 RX ADMIN — GABAPENTIN 300 MG: 300 CAPSULE ORAL at 09:05

## 2024-05-09 RX ADMIN — INSULIN ASPART 3 UNITS: 100 INJECTION, SOLUTION INTRAVENOUS; SUBCUTANEOUS at 09:05

## 2024-05-09 RX ADMIN — INSULIN ASPART 2 UNITS: 100 INJECTION, SOLUTION INTRAVENOUS; SUBCUTANEOUS at 01:05

## 2024-05-09 NOTE — ASSESSMENT & PLAN NOTE
Ms. Hollie Wilson is a 34 y.o. patient with frequent admissions in the past for DKA secondary to medication non-compliance that has required ICU management, who presented with symptoms and lab values suggesting hyperglycemic state in the setting of poorly control T1DM. As per patient, difficulty getting healthcare access to insulin in relation with poor familiar support may be the cause of present hospitalization.     Home medications: Insulin Detemir 5U SQ once daily; Aspart 3U TIDWM + LDSSI; metoclopramide 10mg Q6H PRN   Last Hgb A1C: 9.5 (4/12/24)   Diagnostics: Elevated plasma glucose 662, BHB 0.0. AG unable to quantify. Serum osm 311.    Plan   - admitted to Hospital Medicine for HHS.  - rapid improvement of BG levels after insulin gtt.  - continue management with the following subcutaneous insulin regimen:   --insulin lantus 5U nightly, aspart 3U TID WM, LDSSI  --IV fluids PRN, strict I's/O's  --POCT glucose 4 times daily (TID WM and QHS)  --will optimize basal/bolus insulin regimen as appropriate based on POCT glucose checks   --concern for inability to afford or manage insulin regimen at home previously,   --will work with CM/SW and pharmacy on finding resources that can help patient socially prior to discharge. Patient says copay for insulin $800?

## 2024-05-09 NOTE — ASSESSMENT & PLAN NOTE
Corrected sodium based on serum glucose wnl. Continue to monitor.   Recent Labs   Lab 05/09/24  0427   *

## 2024-05-09 NOTE — PLAN OF CARE
Problem: Adult Inpatient Plan of Care  Goal: Plan of Care Review  Outcome: Progressing  Goal: Patient-Specific Goal (Individualized)  Outcome: Progressing  Goal: Absence of Hospital-Acquired Illness or Injury  Outcome: Progressing  Goal: Optimal Comfort and Wellbeing  Outcome: Progressing  Goal: Readiness for Transition of Care  Outcome: Progressing     Problem: Diabetes Comorbidity  Goal: Blood Glucose Level Within Targeted Range  Outcome: Progressing   Pt Aox4, pt independent in room. No PRNs given. Call light within reach, safety measures in place rounded per facility policy.

## 2024-05-09 NOTE — SUBJECTIVE & OBJECTIVE
Interval History: NAEON. Pt reports she is feeling better this morning. BG elevated, continue home insulin regimen for now. CM/SW consult for discharge planning, patient would like to consider possibility of assisted living facilities.    Review of Systems   Constitutional:  Negative for chills, diaphoresis and fever.   HENT:  Negative for facial swelling and trouble swallowing.    Eyes:  Negative for photophobia and visual disturbance.   Respiratory:  Negative for cough, shortness of breath and wheezing.    Cardiovascular:  Negative for chest pain, palpitations and leg swelling.   Gastrointestinal:  Negative for abdominal pain and diarrhea.   Endocrine: Negative for polydipsia, polyphagia and polyuria.   Genitourinary:  Negative for dysuria and urgency.   Musculoskeletal:  Positive for arthralgias, back pain and myalgias. Negative for joint swelling.   Skin:  Negative for pallor and rash.   Neurological:  Negative for light-headedness and headaches.   Psychiatric/Behavioral:  Positive for decreased concentration.      Objective:     Vital Signs (Most Recent):  Temp: 98.1 °F (36.7 °C) (05/09/24 1228)  Pulse: 76 (05/09/24 1228)  Resp: 16 (05/09/24 0938)  BP: (!) 141/87 (05/09/24 1228)  SpO2: 100 % (05/09/24 1228) Vital Signs (24h Range):  Temp:  [97.5 °F (36.4 °C)-98.8 °F (37.1 °C)] 98.1 °F (36.7 °C)  Pulse:  [68-88] 76  Resp:  [11-18] 16  SpO2:  [98 %-100 %] 100 %  BP: (111-141)/(72-94) 141/87     Weight: 55.8 kg (123 lb)  Body mass index is 19.26 kg/m².    Intake/Output Summary (Last 24 hours) at 5/9/2024 1404  Last data filed at 5/9/2024 0900  Gross per 24 hour   Intake 280 ml   Output 1550 ml   Net -1270 ml         Physical Exam  Vitals and nursing note reviewed.   Constitutional:       General: She is not in acute distress.     Appearance: She is not ill-appearing or toxic-appearing.   HENT:      Head: Normocephalic and atraumatic.      Mouth/Throat:      Mouth: Mucous membranes are moist.      Pharynx:  Oropharynx is clear. No oropharyngeal exudate.   Eyes:      General: No scleral icterus.     Conjunctiva/sclera: Conjunctivae normal.   Cardiovascular:      Rate and Rhythm: Normal rate and regular rhythm.      Pulses: Normal pulses.      Heart sounds: Normal heart sounds. No murmur heard.  Pulmonary:      Effort: Pulmonary effort is normal. No respiratory distress.      Breath sounds: Normal breath sounds. No wheezing or rhonchi.   Abdominal:      General: There is no distension.      Tenderness: There is no abdominal tenderness. There is no guarding or rebound.   Musculoskeletal:      Cervical back: Normal range of motion.      Right lower leg: No edema.      Left lower leg: No edema.   Skin:     Capillary Refill: Capillary refill takes 2 to 3 seconds.      Coloration: Skin is not pale.   Neurological:      Mental Status: She is alert and oriented to person, place, and time. Mental status is at baseline.      Motor: No weakness.   Psychiatric:      Comments: Patient is attentive, fully engaged in conversation throughout duration of exam.            Significant Labs: All pertinent labs within the past 24 hours have been reviewed.  CBC:   Recent Labs   Lab 05/08/24  0352 05/09/24 0427   WBC 5.55 4.80   HGB 10.9* 11.1*   HCT 35.2* 35.1*    227     CMP:   Recent Labs   Lab 05/08/24  0352 05/08/24  1229 05/09/24  0427   * 139 134*   K 4.4 3.9 4.3   CL 97 108 103   CO2 21* 23 20*   * 127* 393*   BUN 13 8 8   CREATININE 1.2 0.8 0.9   CALCIUM 9.6 9.0 8.9   PROT 6.7  --   --    ALBUMIN 3.5  --   --    BILITOT 0.2  --   --    ALKPHOS 125  --   --    AST 10  --   --    ALT 9*  --   --    ANIONGAP 11 8 11     Urine Studies:   Recent Labs   Lab 05/08/24 0355   COLORU Straw   APPEARANCEUA Clear   PHUR 5.0   SPECGRAV >=1.030*   PROTEINUA Negative   GLUCUA 4+*   KETONESU Negative   BILIRUBINUA Negative   OCCULTUA Negative   NITRITE Negative   LEUKOCYTESUR Negative   RBCUA 2   WBCUA 5   BACTERIA Occasional    REENA 2       Significant Imaging: I have reviewed all pertinent imaging results/findings within the past 24 hours.

## 2024-05-09 NOTE — ASSESSMENT & PLAN NOTE
History of polysubstance abuse in the past including cocaine, opioids and amphetamines.   --UDS positive for THC, similar to previous.

## 2024-05-09 NOTE — PROGRESS NOTES
"[facility] - [facility] ([place] [address])  [facility] Medicine  Progress Note    Patient Name: Hollie Wilson  MRN: [id number]  Patient Class: OP- Observation   Admission Date: 5/8/2024  Length of Stay: 0 days  Attending Physician: Erasto Abraham III*  Primary Care Provider: Martin Rios MD    Subjective:     Principal Problem:Type 1 diabetes mellitus with hyperosmolar hyperglycemic state (HHS)    HPI:  Ms. Hollie Wilson is a 34 y.o. female with past medical history of developmental delay, schizoaffective disorder, polysubstance abuse, gastroparesis, and T1DM. Presented to [facility] ED via EMS with generalized weakness, polydipsia (drinking 2L of water at home in 2hs), polyuria since yesterday. En route to ED while in the EMS, blood glucose was read as "high" as per EMS report. Multiple admissions for DKA in the past, most recently discharged from [facility] on 4/22 that required MICU admission on 4/11-4/14 for management of DKA. Patient reports the last 2 hospitalizations once she was discharged, her short acting insulin was never filled. Denies vomit, and refers taking judiciously at home reglan. Of note, patient is a poor historian and primary team was unable to get collateral information from the Mother by phone.     Upon admission to the ED afebrile with stable vital signs. Relevant labs with normal H/H, hyponatremia 129, blood glucose 662 with BHB 0.0. UA 3+ glucose, serum osm 311 and AG unable to be calculated. CXR unremarkable.     Given 1L NS for volume optimization and 10U regular insulin for BG management.     Patient will be admitted to Hospital Medicine for further workup and management of HHS.     Overview/Hospital Course:  No notes on file    Interval History: NAEON. Pt reports she is feeling better this morning. BG elevated, continue home insulin regimen for now. CM/SW consult for discharge planning, patient would like to consider possibility of assisted living facilities.    Review of Systems "   Constitutional:  Negative for chills, diaphoresis and fever.   HENT:  Negative for facial swelling and trouble swallowing.    Eyes:  Negative for photophobia and visual disturbance.   Respiratory:  Negative for cough, shortness of breath and wheezing.    Cardiovascular:  Negative for chest pain, palpitations and leg swelling.   Gastrointestinal:  Negative for abdominal pain and diarrhea.   Endocrine: Negative for polydipsia, polyphagia and polyuria.   Genitourinary:  Negative for dysuria and urgency.   Musculoskeletal:  Positive for arthralgias, back pain and myalgias. Negative for joint swelling.   Skin:  Negative for pallor and rash.   Neurological:  Negative for light-headedness and headaches.   Psychiatric/Behavioral:  Positive for decreased concentration.      Objective:     Vital Signs (Most Recent):  Temp: 98.1 °F (36.7 °C) (05/09/24 1228)  Pulse: 76 (05/09/24 1228)  Resp: 16 (05/09/24 0938)  BP: (!) 141/87 (05/09/24 1228)  SpO2: 100 % (05/09/24 1228) Vital Signs (24h Range):  Temp:  [97.5 °F (36.4 °C)-98.8 °F (37.1 °C)] 98.1 °F (36.7 °C)  Pulse:  [68-88] 76  Resp:  [11-18] 16  SpO2:  [98 %-100 %] 100 %  BP: (111-141)/(72-94) 141/87     Weight: 55.8 kg (123 lb)  Body mass index is 19.26 kg/m².    Intake/Output Summary (Last 24 hours) at 5/9/2024 1404  Last data filed at 5/9/2024 0900  Gross per 24 hour   Intake 280 ml   Output 1550 ml   Net -1270 ml         Physical Exam  Vitals and nursing note reviewed.   Constitutional:       General: She is not in acute distress.     Appearance: She is not ill-appearing or toxic-appearing.   HENT:      Head: Normocephalic and atraumatic.      Mouth/Throat:      Mouth: Mucous membranes are moist.      Pharynx: Oropharynx is clear. No oropharyngeal exudate.   Eyes:      General: No scleral icterus.     Conjunctiva/sclera: Conjunctivae normal.   Cardiovascular:      Rate and Rhythm: Normal rate and regular rhythm.      Pulses: Normal pulses.      Heart sounds: Normal heart  sounds. No murmur heard.  Pulmonary:      Effort: Pulmonary effort is normal. No respiratory distress.      Breath sounds: Normal breath sounds. No wheezing or rhonchi.   Abdominal:      General: There is no distension.      Tenderness: There is no abdominal tenderness. There is no guarding or rebound.   Musculoskeletal:      Cervical back: Normal range of motion.      Right lower leg: No edema.      Left lower leg: No edema.   Skin:     Capillary Refill: Capillary refill takes 2 to 3 seconds.      Coloration: Skin is not pale.   Neurological:      Mental Status: She is alert and oriented to person, place, and time. Mental status is at baseline.      Motor: No weakness.   Psychiatric:      Comments: Patient is attentive, fully engaged in conversation throughout duration of exam.            Significant Labs: All pertinent labs within the past 24 hours have been reviewed.  CBC:   Recent Labs   Lab 05/08/24  0352 05/09/24 0427   WBC 5.55 4.80   HGB 10.9* 11.1*   HCT 35.2* 35.1*    227     CMP:   Recent Labs   Lab 05/08/24  0352 05/08/24  1229 05/09/24  0427   * 139 134*   K 4.4 3.9 4.3   CL 97 108 103   CO2 21* 23 20*   * 127* 393*   BUN 13 8 8   CREATININE 1.2 0.8 0.9   CALCIUM 9.6 9.0 8.9   PROT 6.7  --   --    ALBUMIN 3.5  --   --    BILITOT 0.2  --   --    ALKPHOS 125  --   --    AST 10  --   --    ALT 9*  --   --    ANIONGAP 11 8 11     Urine Studies:   Recent Labs   Lab 05/08/24  0355   COLORU Straw   APPEARANCEUA Clear   PHUR 5.0   SPECGRAV >=1.030*   PROTEINUA Negative   GLUCUA 4+*   KETONESU Negative   BILIRUBINUA Negative   OCCULTUA Negative   NITRITE Negative   LEUKOCYTESUR Negative   RBCUA 2   WBCUA 5   BACTERIA Occasional   SQUAMEPITHEL 2       Significant Imaging: I have reviewed all pertinent imaging results/findings within the past 24 hours.    Assessment/Plan:      * Type 1 diabetes mellitus with hyperosmolar hyperglycemic state (HHS)  Ms. Hollie Wilson is a 34 y.o. patient  with frequent admissions in the past for DKA secondary to medication non-compliance that has required ICU management, who presented with symptoms and lab values suggesting hyperglycemic state in the setting of poorly control T1DM. As per patient, difficulty getting healthcare access to insulin in relation with poor familiar support may be the cause of present hospitalization.     Home medications: Insulin Detemir 5U SQ once daily; Aspart 3U TIDWM + LDSSI; metoclopramide 10mg Q6H PRN   Last Hgb A1C: 9.5 (4/12/24)   Diagnostics: Elevated plasma glucose 662, BHB 0.0. AG unable to quantify. Serum osm 311.    Plan   - admitted to Hospital Medicine for HHS.  - rapid improvement of BG levels after insulin gtt.  - continue management with the following subcutaneous insulin regimen:   --insulin lantus 5U nightly, aspart 3U TID WM, LDSSI  --IV fluids PRN, strict I's/O's  --POCT glucose 4 times daily (TID WM and QHS)  --will optimize basal/bolus insulin regimen as appropriate based on POCT glucose checks   --concern for inability to afford or manage insulin regimen at home previously,   --will work with CM/SW and pharmacy on finding resources that can help patient socially prior to discharge. Patient says copay for insulin $800?    GERD (gastroesophageal reflux disease)  History of GERD. Will resume home protonix 40.       Neuropathic pain  - Resume home gabapentin 300mg TID       Hyponatremia  Corrected sodium based on serum glucose wnl. Continue to monitor.   Recent Labs   Lab 05/09/24  0427   *         Nausea  History of nausea/vomit in the past. Taking reglan at home.   -Compazine PRN given long QTc   -Avoid reglan and Zofran (given history of allergies in the past)     Schizoaffective disorder  -Resume home fluoxetine     Gastroparesis  - Qtc 479, with prior in 502 (2 weeks ago)   - Daily EKGs  - Avoid reglan at the moment given history of prolonged Qtc and borderline values in this hospitalization        Intellectual disability  Difficult to assess patient's decision making capacity given developmental delay. Will continue to engage in conversations with patient before considering psychiatry evaluation for decision making capacity. Will consider SW/CM for support with discharge planning. Amenable to assisted living facilities.     Appears to be at baseline mentation, fully attentive throughout exam. Voices concerns with safety if she were to be discharged home or to group homes like previous admissions. She is aware of possible consequences including risks of readmission for hyperglycemia if she were to discharge without a safe place to stay and manage her insulin properly.        Tobacco abuse  - ~3 cigarettes daily, requesting nicotine patch while in hospital  - Educated for 10 minutes on tobacco cessation in the setting of hyperglycemic state   - Smoking cessation     Polysubstance abuse  History of polysubstance abuse in the past including cocaine, opioids and amphetamines.   --UDS positive for THC, similar to previous.         VTE Risk Mitigation (From admission, onward)           Ordered     enoxaparin injection 40 mg  Daily         05/08/24 0551     IP VTE LOW RISK PATIENT  Once         05/08/24 0551                    Discharge Planning   THELMA: 5/10/2024     Code Status: Full Code   Is the patient medically ready for discharge?:     Reason for patient still in hospital (select all that apply): Patient trending condition and Pending disposition  Discharge Plan A: Home          Raissa Nichols MD  Department of Hospital Medicine   [facility] ([place])

## 2024-05-09 NOTE — PLAN OF CARE
Problem: Adult Inpatient Plan of Care  Goal: Plan of Care Review  Outcome: Progressing  Goal: Patient-Specific Goal (Individualized)  Outcome: Progressing  Goal: Absence of Hospital-Acquired Illness or Injury  Outcome: Progressing  Goal: Optimal Comfort and Wellbeing  Outcome: Progressing  Goal: Readiness for Transition of Care  Outcome: Progressing     Problem: Diabetes Comorbidity  Goal: Blood Glucose Level Within Targeted Range  Outcome: Progressing     Problem: Pain Acute  Goal: Optimal Pain Control and Function  Outcome: Progressing

## 2024-05-09 NOTE — ASSESSMENT & PLAN NOTE
Difficult to assess patient's decision making capacity given developmental delay. Will continue to engage in conversations with patient before considering psychiatry evaluation for decision making capacity. Will consider SW/CM for support with discharge planning. Amenable to assisted living facilities.     Appears to be at baseline mentation, fully attentive throughout exam. Voices concerns with safety if she were to be discharged home or to group homes like previous admissions. She is aware of possible consequences including risks of readmission for hyperglycemia if she were to discharge without a safe place to stay and manage her insulin properly.

## 2024-05-09 NOTE — NURSING
Pt AAO X4, vitals stable, on RA and tele box monitor. BG checked and insulin given as order. C/o back pain 10/10 this morning. PRNs given. Pt up to bedside commode with stand by assist. PW, dressing and lines changed.No acute events this shift. Bed low and locked. Personal items and call light in reach. Safe precaution maintained.

## 2024-05-10 LAB
ANION GAP SERPL CALC-SCNC: 9 MMOL/L (ref 8–16)
BUN SERPL-MCNC: 10 MG/DL (ref 6–20)
CALCIUM SERPL-MCNC: 8.7 MG/DL (ref 8.7–10.5)
CHLORIDE SERPL-SCNC: 102 MMOL/L (ref 95–110)
CO2 SERPL-SCNC: 21 MMOL/L (ref 23–29)
CREAT SERPL-MCNC: 0.9 MG/DL (ref 0.5–1.4)
ERYTHROCYTE [DISTWIDTH] IN BLOOD BY AUTOMATED COUNT: 15.5 % (ref 11.5–14.5)
EST. GFR  (NO RACE VARIABLE): >60 ML/MIN/1.73 M^2
GLUCOSE SERPL-MCNC: 322 MG/DL (ref 70–110)
HCT VFR BLD AUTO: 34.6 % (ref 37–48.5)
HGB BLD-MCNC: 10.6 G/DL (ref 12–16)
MCH RBC QN AUTO: 25.9 PG (ref 27–31)
MCHC RBC AUTO-ENTMCNC: 30.6 G/DL (ref 32–36)
MCV RBC AUTO: 85 FL (ref 82–98)
OHS QRS DURATION: 130 MS
OHS QTC CALCULATION: 446 MS
PLATELET # BLD AUTO: 234 K/UL (ref 150–450)
PMV BLD AUTO: 11.5 FL (ref 9.2–12.9)
POCT GLUCOSE: 162 MG/DL (ref 70–110)
POCT GLUCOSE: 189 MG/DL (ref 70–110)
POCT GLUCOSE: 198 MG/DL (ref 70–110)
POCT GLUCOSE: 321 MG/DL (ref 70–110)
POTASSIUM SERPL-SCNC: 4.3 MMOL/L (ref 3.5–5.1)
RBC # BLD AUTO: 4.09 M/UL (ref 4–5.4)
SODIUM SERPL-SCNC: 132 MMOL/L (ref 136–145)
WBC # BLD AUTO: 4.03 K/UL (ref 3.9–12.7)

## 2024-05-10 PROCEDURE — 63600175 PHARM REV CODE 636 W HCPCS

## 2024-05-10 PROCEDURE — 36415 COLL VENOUS BLD VENIPUNCTURE: CPT

## 2024-05-10 PROCEDURE — G0378 HOSPITAL OBSERVATION PER HR: HCPCS

## 2024-05-10 PROCEDURE — 96375 TX/PRO/DX INJ NEW DRUG ADDON: CPT

## 2024-05-10 PROCEDURE — 25000003 PHARM REV CODE 250

## 2024-05-10 PROCEDURE — 94761 N-INVAS EAR/PLS OXIMETRY MLT: CPT

## 2024-05-10 PROCEDURE — 80048 BASIC METABOLIC PNL TOTAL CA: CPT

## 2024-05-10 PROCEDURE — 96372 THER/PROPH/DIAG INJ SC/IM: CPT

## 2024-05-10 PROCEDURE — 85027 COMPLETE CBC AUTOMATED: CPT

## 2024-05-10 PROCEDURE — 93005 ELECTROCARDIOGRAM TRACING: CPT

## 2024-05-10 PROCEDURE — 93010 ELECTROCARDIOGRAM REPORT: CPT | Mod: ,,, | Performed by: INTERNAL MEDICINE

## 2024-05-10 RX ORDER — INSULIN GLARGINE 100 [IU]/ML
6 INJECTION, SOLUTION SUBCUTANEOUS NIGHTLY
Status: DISCONTINUED | OUTPATIENT
Start: 2024-05-10 | End: 2024-05-11 | Stop reason: HOSPADM

## 2024-05-10 RX ORDER — INSULIN GLARGINE 100 [IU]/ML
6 INJECTION, SOLUTION SUBCUTANEOUS DAILY
Status: DISCONTINUED | OUTPATIENT
Start: 2024-05-10 | End: 2024-05-11 | Stop reason: HOSPADM

## 2024-05-10 RX ORDER — INSULIN GLARGINE 100 [IU]/ML
6 INJECTION, SOLUTION SUBCUTANEOUS NIGHTLY
Status: DISCONTINUED | OUTPATIENT
Start: 2024-05-10 | End: 2024-05-10

## 2024-05-10 RX ORDER — INSULIN ASPART 100 [IU]/ML
4 INJECTION, SOLUTION INTRAVENOUS; SUBCUTANEOUS
Status: DISCONTINUED | OUTPATIENT
Start: 2024-05-10 | End: 2024-05-11 | Stop reason: HOSPADM

## 2024-05-10 RX ADMIN — OXYCODONE HYDROCHLORIDE 10 MG: 10 TABLET ORAL at 10:05

## 2024-05-10 RX ADMIN — FLUOXETINE HYDROCHLORIDE 60 MG: 20 CAPSULE ORAL at 08:05

## 2024-05-10 RX ADMIN — OXYCODONE HYDROCHLORIDE 10 MG: 10 TABLET ORAL at 03:05

## 2024-05-10 RX ADMIN — INSULIN ASPART 4 UNITS: 100 INJECTION, SOLUTION INTRAVENOUS; SUBCUTANEOUS at 08:05

## 2024-05-10 RX ADMIN — INSULIN GLARGINE 6 UNITS: 100 INJECTION, SOLUTION SUBCUTANEOUS at 09:05

## 2024-05-10 RX ADMIN — GABAPENTIN 300 MG: 300 CAPSULE ORAL at 08:05

## 2024-05-10 RX ADMIN — INSULIN ASPART 4 UNITS: 100 INJECTION, SOLUTION INTRAVENOUS; SUBCUTANEOUS at 01:05

## 2024-05-10 RX ADMIN — ENOXAPARIN SODIUM 40 MG: 40 INJECTION SUBCUTANEOUS at 05:05

## 2024-05-10 RX ADMIN — GABAPENTIN 300 MG: 300 CAPSULE ORAL at 09:05

## 2024-05-10 RX ADMIN — PANTOPRAZOLE SODIUM 40 MG: 40 TABLET, DELAYED RELEASE ORAL at 08:05

## 2024-05-10 RX ADMIN — INSULIN ASPART 3 UNITS: 100 INJECTION, SOLUTION INTRAVENOUS; SUBCUTANEOUS at 08:05

## 2024-05-10 RX ADMIN — PROCHLORPERAZINE EDISYLATE 5 MG: 5 INJECTION INTRAMUSCULAR; INTRAVENOUS at 08:05

## 2024-05-10 RX ADMIN — INSULIN ASPART 4 UNITS: 100 INJECTION, SOLUTION INTRAVENOUS; SUBCUTANEOUS at 06:05

## 2024-05-10 RX ADMIN — GABAPENTIN 300 MG: 300 CAPSULE ORAL at 03:05

## 2024-05-10 RX ADMIN — OXYCODONE HYDROCHLORIDE 10 MG: 10 TABLET ORAL at 08:05

## 2024-05-10 NOTE — PLAN OF CARE
[facility] - [facility] ([place])  Discharge Reassessment    Primary Care Provider: Martin Rios MD    Expected Discharge Date: 5/11/2024    Reassessment (most recent)       Discharge Reassessment - 05/10/24 1431          Discharge Reassessment    Assessment Type Discharge Planning Reassessment     Did the patient's condition or plan change since previous assessment? Yes     Discharge Plan discussed with: Patient     Communicated THELMA with patient/caregiver Yes     Discharge Plan A Group Home     Discharge Plan B Group home     DME Needed Upon Discharge  none     Transition of Care Barriers None     Why the patient remains in the hospital Requires continued medical care        Post-Acute Status    Post-Acute Authorization Other     Hospital Resources/Appts/Education Provided Appointments scheduled and added to AVS     Discharge Delays None known at this time                       Discharge Plan A and Plan B have been determined by review of patient's clinical status, future medical and therapeutic needs, and coverage/benefits for post-acute care in coordination with multidisciplinary team members.    Pt states that she needs someone to remind her when to take her medicine. She also stated that she doesn't have any family members to go live with. Pt will have to go to shelter.  Will follow up.    Kaylee Hurd MSW, CSW

## 2024-05-10 NOTE — PLAN OF CARE
"ALIN spoke with Radha Nieto from [facility] [place] and she stated that they'll accept the pt.  The cost is $600 and pt has SSI to cover the charges.   ALIN notifed pt of the above information and pt agreed to the plan.   Radha stated to call her on 5/11/24 once pt is being transferred to group home.  Radha stated that pt is allowed to come on weekends.      [facility] [place] information:    [address] [place] [address]    #- [phone]  Fax: [phone]    Kaylee Hurd MSW, CSW    "

## 2024-05-10 NOTE — SUBJECTIVE & OBJECTIVE
Interval History: NAEON. Pending dispo as no available housing.     Objective:     Vital Signs (Most Recent):  Temp: 98.1 °F (36.7 °C) (05/10/24 1131)  Pulse: 81 (05/10/24 1131)  Resp: 18 (05/10/24 1131)  BP: 112/77 (05/10/24 1131)  SpO2: 97 % (05/10/24 1300) Vital Signs (24h Range):  Temp:  [97.9 °F (36.6 °C)-98.6 °F (37 °C)] 98.1 °F (36.7 °C)  Pulse:  [74-91] 81  Resp:  [16-18] 18  SpO2:  [96 %-99 %] 97 %  BP: ()/(66-87) 112/77     Weight: 55.8 kg (123 lb)  Body mass index is 19.26 kg/m².    Intake/Output Summary (Last 24 hours) at 5/10/2024 1338  Last data filed at 5/10/2024 0943  Gross per 24 hour   Intake 520 ml   Output 600 ml   Net -80 ml         Physical Exam  Vitals and nursing note reviewed.   Constitutional:       General: She is not in acute distress.     Appearance: She is not ill-appearing or toxic-appearing.   HENT:      Head: Normocephalic and atraumatic.      Mouth/Throat:      Mouth: Mucous membranes are moist.      Pharynx: Oropharynx is clear. No oropharyngeal exudate.   Eyes:      General: No scleral icterus.     Conjunctiva/sclera: Conjunctivae normal.   Cardiovascular:      Rate and Rhythm: Normal rate and regular rhythm.      Pulses: Normal pulses.      Heart sounds: Normal heart sounds. No murmur heard.  Pulmonary:      Effort: Pulmonary effort is normal. No respiratory distress.      Breath sounds: Normal breath sounds. No wheezing or rhonchi.   Abdominal:      General: There is no distension.      Tenderness: There is no abdominal tenderness. There is no guarding or rebound.   Musculoskeletal:      Cervical back: Normal range of motion.      Right lower leg: No edema.      Left lower leg: No edema.   Skin:     Coloration: Skin is not pale.   Neurological:      Mental Status: She is alert and oriented to person, place, and time. Mental status is at baseline.      Motor: No weakness.             Significant Labs: All pertinent labs within the past 24 hours have been  reviewed.    Significant Imaging: I have reviewed all pertinent imaging results/findings within the past 24 hours.

## 2024-05-10 NOTE — NURSING
No acute events this shift. PT AAO x4, vitals stable. On RA and tele box monitor. C/o  nausea and pain, managed with PRNs.  BG monitored and covered with insulin. Pt independent with ADLs. Safety precaution in place. Bed low and locked. Personal items and call light in reach.

## 2024-05-10 NOTE — ASSESSMENT & PLAN NOTE
Ms. Hollie Wilson is a 34 y.o. patient with frequent admissions in the past for DKA secondary to medication non-compliance that has required ICU management, who presented with symptoms and lab values suggesting hyperglycemic state in the setting of poorly control T1DM. As per patient, difficulty getting healthcare access to insulin in relation with poor familiar support may be the cause of present hospitalization.     Home medications: Insulin Detemir 5U SQ once daily; Aspart 3U TIDWM + LDSSI; metoclopramide 10mg Q6H PRN   Last Hgb A1C: 9.5 (4/12/24)   Diagnostics: Elevated plasma glucose 662, BHB 0.0. AG unable to quantify. Serum osm 311.    Plan   - admitted to Hospital Medicine for HHS.  - rapid improvement of BG levels after insulin gtt.  - continue management with the following subcutaneous insulin regimen:   --insulin lantus 5U nightly, aspart 3U TID WM, LDSSI  --IV fluids PRN, strict I's/O's  --POCT glucose 4 times daily (TID WM and QHS)  --will optimize basal/bolus insulin regimen as appropriate based on POCT glucose checks   --concern for inability to afford or manage insulin regimen at home previously,   --will work with CM/SW and pharmacy on finding resources that can help patient socially prior to discharge. Patient says copay for insulin $800?    Regimen increased to 6U long acting BID and 4U TIDWM

## 2024-05-10 NOTE — ASSESSMENT & PLAN NOTE
Corrected sodium based on serum glucose wnl. Continue to monitor.   Recent Labs   Lab 05/10/24  0626   *       RESOLVED with improvement in BG

## 2024-05-10 NOTE — PLAN OF CARE
Patient with one complaint of pain. On phone with family member and watching television. VSS on RA. Safety precautions maintained and call light in reach.       Problem: Adult Inpatient Plan of Care  Goal: Plan of Care Review  Outcome: Progressing     Problem: Adult Inpatient Plan of Care  Goal: Patient-Specific Goal (Individualized)  Outcome: Progressing     Problem: Adult Inpatient Plan of Care  Goal: Absence of Hospital-Acquired Illness or Injury  Outcome: Progressing     Problem: Adult Inpatient Plan of Care  Goal: Optimal Comfort and Wellbeing  Outcome: Progressing      (4) rarely moist

## 2024-05-10 NOTE — PLAN OF CARE
Problem: Adult Inpatient Plan of Care  Goal: Plan of Care Review  Outcome: Progressing  Goal: Patient-Specific Goal (Individualized)  Outcome: Progressing  Goal: Absence of Hospital-Acquired Illness or Injury  Outcome: Progressing  Goal: Optimal Comfort and Wellbeing  Outcome: Progressing  Goal: Readiness for Transition of Care  Outcome: Progressing     Problem: Diabetes Comorbidity  Goal: Blood Glucose Level Within Targeted Range  Outcome: Progressing     Problem: Pain Acute  Goal: Optimal Pain Control and Function  Outcome: Progressing     Problem: Infection  Goal: Absence of Infection Signs and Symptoms  Outcome: Progressing

## 2024-05-10 NOTE — PROGRESS NOTES
"[facility] - Stepdown [facility] ([place] [address])  [facility] Medicine  Progress Note    Patient Name: Hollie Wilson  MRN: [id number]  Patient Class: OP- Observation   Admission Date: 5/8/2024  Length of Stay: 0 days  Attending Physician: Neo Wise MD  Primary Care Provider: Martin Rios MD      Subjective:     Principal Problem:Type 1 diabetes mellitus with hyperosmolar hyperglycemic state (HHS)      HPI:  Ms. Hollie Wilson is a 34 y.o. female with past medical history of developmental delay, schizoaffective disorder, polysubstance abuse, gastroparesis, and T1DM. Presented to [facility] ED via EMS with generalized weakness, polydipsia (drinking 2L of water at home in 2hs), polyuria since yesterday. En route to ED while in the EMS, blood glucose was read as "high" as per EMS report. Multiple admissions for DKA in the past, most recently discharged from [facility] on 4/22 that required MICU admission on 4/11-4/14 for management of DKA. Patient reports the last 2 hospitalizations once she was discharged, her short acting insulin was never filled. Denies vomit, and refers taking judiciously at home reglan. Of note, patient is a poor historian and primary team was unable to get collateral information from the Mother by phone.     Upon admission to the ED afebrile with stable vital signs. Relevant labs with normal H/H, hyponatremia 129, blood glucose 662 with BHB 0.0. UA 3+ glucose, serum osm 311 and AG unable to be calculated. CXR unremarkable.     Given 1L NS for volume optimization and 10U regular insulin for BG management.     Patient will be admitted to Hospital Medicine for further workup and management of HHS.     Overview/Hospital Course:  Patient with T1DM admitted for HHS and started on fluids then transitioned to basal-bolus. Complex social situation but lacking safe housing with ability to store home insulin.     Interval History: NAEON. Pending dispo as no available housing.     Objective:     Vital Signs (Most " Recent):  Temp: 98.1 °F (36.7 °C) (05/10/24 1131)  Pulse: 81 (05/10/24 1131)  Resp: 18 (05/10/24 1131)  BP: 112/77 (05/10/24 1131)  SpO2: 97 % (05/10/24 1300) Vital Signs (24h Range):  Temp:  [97.9 °F (36.6 °C)-98.6 °F (37 °C)] 98.1 °F (36.7 °C)  Pulse:  [74-91] 81  Resp:  [16-18] 18  SpO2:  [96 %-99 %] 97 %  BP: ()/(66-87) 112/77     Weight: 55.8 kg (123 lb)  Body mass index is 19.26 kg/m².    Intake/Output Summary (Last 24 hours) at 5/10/2024 1338  Last data filed at 5/10/2024 0943  Gross per 24 hour   Intake 520 ml   Output 600 ml   Net -80 ml         Physical Exam  Vitals and nursing note reviewed.   Constitutional:       General: She is not in acute distress.     Appearance: She is not ill-appearing or toxic-appearing.   HENT:      Head: Normocephalic and atraumatic.      Mouth/Throat:      Mouth: Mucous membranes are moist.      Pharynx: Oropharynx is clear. No oropharyngeal exudate.   Eyes:      General: No scleral icterus.     Conjunctiva/sclera: Conjunctivae normal.   Cardiovascular:      Rate and Rhythm: Normal rate and regular rhythm.      Pulses: Normal pulses.      Heart sounds: Normal heart sounds. No murmur heard.  Pulmonary:      Effort: Pulmonary effort is normal. No respiratory distress.      Breath sounds: Normal breath sounds. No wheezing or rhonchi.   Abdominal:      General: There is no distension.      Tenderness: There is no abdominal tenderness. There is no guarding or rebound.   Musculoskeletal:      Cervical back: Normal range of motion.      Right lower leg: No edema.      Left lower leg: No edema.   Skin:     Coloration: Skin is not pale.   Neurological:      Mental Status: She is alert and oriented to person, place, and time. Mental status is at baseline.      Motor: No weakness.             Significant Labs: All pertinent labs within the past 24 hours have been reviewed.    Significant Imaging: I have reviewed all pertinent imaging results/findings within the past 24  hours.    Assessment/Plan:      * Type 1 diabetes mellitus with hyperosmolar hyperglycemic state (HHS)  Ms. Hollie Wilson is a 34 y.o. patient with frequent admissions in the past for DKA secondary to medication non-compliance that has required ICU management, who presented with symptoms and lab values suggesting hyperglycemic state in the setting of poorly control T1DM. As per patient, difficulty getting healthcare access to insulin in relation with poor familiar support may be the cause of present hospitalization.     Home medications: Insulin Detemir 5U SQ once daily; Aspart 3U TIDWM + LDSSI; metoclopramide 10mg Q6H PRN   Last Hgb A1C: 9.5 (4/12/24)   Diagnostics: Elevated plasma glucose 662, BHB 0.0. AG unable to quantify. Serum osm 311.    Plan   - admitted to Hospital Medicine for HHS.  - rapid improvement of BG levels after insulin gtt.  - continue management with the following subcutaneous insulin regimen:   --insulin lantus 5U nightly, aspart 3U TID WM, LDSSI  --IV fluids PRN, strict I's/O's  --POCT glucose 4 times daily (TID WM and QHS)  --will optimize basal/bolus insulin regimen as appropriate based on POCT glucose checks   --concern for inability to afford or manage insulin regimen at home previously,   --will work with CM/SW and pharmacy on finding resources that can help patient socially prior to discharge. Patient says copay for insulin $800?    Regimen increased to 6U long acting BID and 4U TIDWM    GERD (gastroesophageal reflux disease)  History of GERD. Will resume home protonix 40.       Neuropathic pain  - Resume home gabapentin 300mg TID       Hyponatremia  Corrected sodium based on serum glucose wnl. Continue to monitor.   Recent Labs   Lab 05/10/24  0626   *       RESOLVED with improvement in BG    Nausea  History of nausea/vomit in the past. Taking reglan at home.   -Compazine PRN given long QTc   -Avoid reglan and Zofran (given history of allergies in the past)     Schizoaffective  disorder  -Resume home fluoxetine     Gastroparesis  - Qtc 479, with prior in 502 (2 weeks ago)   - Daily EKGs  - Avoid reglan at the moment given history of prolonged Qtc and borderline values in this hospitalization       Intellectual disability  Difficult to assess patient's decision making capacity given developmental delay. Will continue to engage in conversations with patient before considering psychiatry evaluation for decision making capacity. Will consider SW/CM for support with discharge planning. Amenable to assisted living facilities.     Appears to be at baseline mentation, fully attentive throughout exam. Voices concerns with safety if she were to be discharged home or to group homes like previous admissions. She is aware of possible consequences including risks of readmission for hyperglycemia if she were to discharge without a safe place to stay and manage her insulin properly.        Tobacco abuse  - ~3 cigarettes daily, requesting nicotine patch while in hospital  - Educated for 10 minutes on tobacco cessation in the setting of hyperglycemic state   - Smoking cessation     Polysubstance abuse  History of polysubstance abuse in the past including cocaine, opioids and amphetamines.   --UDS positive for THC, similar to previous.         VTE Risk Mitigation (From admission, onward)           Ordered     enoxaparin injection 40 mg  Daily         05/08/24 0551     IP VTE LOW RISK PATIENT  Once         05/08/24 0551                    Discharge Planning   THELMA: 5/10/2024     Code Status: Full Code   Is the patient medically ready for discharge?:     Reason for patient still in hospital (select all that apply): Pending disposition  Discharge Plan A: Home              Damon Bay MD  Department of Hospital Medicine   [facility] - Stepdown Flex ([place]

## 2024-05-10 NOTE — HOSPITAL COURSE
Patient with T1DM admitted for HHS and started on fluids then transitioned to basal-bolus. Complex social situation but lacking safe housing with ability to store home insulin. Stable for discharge on 5/11 to group home. Discharged with insulin regimen of long acting glargine 6U BID and short acting novolog 4U TIDWM.

## 2024-05-11 VITALS
HEART RATE: 99 BPM | TEMPERATURE: 98 F | OXYGEN SATURATION: 97 % | RESPIRATION RATE: 18 BRPM | DIASTOLIC BLOOD PRESSURE: 69 MMHG | WEIGHT: 123 LBS | BODY MASS INDEX: 19.26 KG/M2 | SYSTOLIC BLOOD PRESSURE: 103 MMHG

## 2024-05-11 LAB
ANION GAP SERPL CALC-SCNC: 11 MMOL/L (ref 8–16)
BUN SERPL-MCNC: 10 MG/DL (ref 6–20)
CALCIUM SERPL-MCNC: 9.4 MG/DL (ref 8.7–10.5)
CHLORIDE SERPL-SCNC: 104 MMOL/L (ref 95–110)
CO2 SERPL-SCNC: 22 MMOL/L (ref 23–29)
CREAT SERPL-MCNC: 0.8 MG/DL (ref 0.5–1.4)
ERYTHROCYTE [DISTWIDTH] IN BLOOD BY AUTOMATED COUNT: 15.5 % (ref 11.5–14.5)
EST. GFR  (NO RACE VARIABLE): >60 ML/MIN/1.73 M^2
GLUCOSE SERPL-MCNC: 231 MG/DL (ref 70–110)
HCT VFR BLD AUTO: 38.8 % (ref 37–48.5)
HGB BLD-MCNC: 12.4 G/DL (ref 12–16)
MCH RBC QN AUTO: 26.4 PG (ref 27–31)
MCHC RBC AUTO-ENTMCNC: 32 G/DL (ref 32–36)
MCV RBC AUTO: 83 FL (ref 82–98)
OHS QRS DURATION: 120 MS
OHS QTC CALCULATION: 481 MS
PLATELET # BLD AUTO: 248 K/UL (ref 150–450)
PMV BLD AUTO: 11.8 FL (ref 9.2–12.9)
POCT GLUCOSE: 240 MG/DL (ref 70–110)
POCT GLUCOSE: 312 MG/DL (ref 70–110)
POTASSIUM SERPL-SCNC: 3.8 MMOL/L (ref 3.5–5.1)
RBC # BLD AUTO: 4.7 M/UL (ref 4–5.4)
SODIUM SERPL-SCNC: 137 MMOL/L (ref 136–145)
WBC # BLD AUTO: 4.13 K/UL (ref 3.9–12.7)

## 2024-05-11 PROCEDURE — 85027 COMPLETE CBC AUTOMATED: CPT

## 2024-05-11 PROCEDURE — 93010 ELECTROCARDIOGRAM REPORT: CPT | Mod: ,,, | Performed by: INTERNAL MEDICINE

## 2024-05-11 PROCEDURE — 36415 COLL VENOUS BLD VENIPUNCTURE: CPT

## 2024-05-11 PROCEDURE — 25000003 PHARM REV CODE 250

## 2024-05-11 PROCEDURE — 80048 BASIC METABOLIC PNL TOTAL CA: CPT

## 2024-05-11 PROCEDURE — G0378 HOSPITAL OBSERVATION PER HR: HCPCS

## 2024-05-11 PROCEDURE — 93005 ELECTROCARDIOGRAM TRACING: CPT

## 2024-05-11 RX ORDER — INSULIN GLARGINE 100 [IU]/ML
6 INJECTION, SOLUTION SUBCUTANEOUS EVERY 12 HOURS
Qty: 3.6 ML | Refills: 11 | Status: ON HOLD | OUTPATIENT
Start: 2024-05-11 | End: 2024-05-19

## 2024-05-11 RX ORDER — INSULIN ASPART 100 [IU]/ML
4 INJECTION, SOLUTION INTRAVENOUS; SUBCUTANEOUS
Qty: 3.6 ML | Refills: 11 | Status: ON HOLD | OUTPATIENT
Start: 2024-05-11 | End: 2024-05-19

## 2024-05-11 RX ADMIN — INSULIN GLARGINE 6 UNITS: 100 INJECTION, SOLUTION SUBCUTANEOUS at 09:05

## 2024-05-11 RX ADMIN — INSULIN ASPART 4 UNITS: 100 INJECTION, SOLUTION INTRAVENOUS; SUBCUTANEOUS at 09:05

## 2024-05-11 RX ADMIN — GABAPENTIN 300 MG: 300 CAPSULE ORAL at 08:05

## 2024-05-11 RX ADMIN — INSULIN ASPART 4 UNITS: 100 INJECTION, SOLUTION INTRAVENOUS; SUBCUTANEOUS at 12:05

## 2024-05-11 RX ADMIN — PANTOPRAZOLE SODIUM 40 MG: 40 TABLET, DELAYED RELEASE ORAL at 08:05

## 2024-05-11 RX ADMIN — FLUOXETINE HYDROCHLORIDE 60 MG: 20 CAPSULE ORAL at 08:05

## 2024-05-11 RX ADMIN — INSULIN ASPART 2 UNITS: 100 INJECTION, SOLUTION INTRAVENOUS; SUBCUTANEOUS at 09:05

## 2024-05-11 NOTE — PLAN OF CARE
Discharge Plan A and Plan B have been determined by review of patient's clinical status, future medical and therapeutic needs, and coverage/benefits for post-acute care in coordination with multidisciplinary team members.      05/11/24 1232   Post-Acute Status   Post-Acute Authorization Placement   Post-Acute Placement Status Set-up Complete/Auth obtained   Hospital Resources/Appts/Education Provided Appointments scheduled and added to AVS   Discharge Delays None known at this time   Discharge Plan   Discharge Plan A Group Home  ([facility]  ##- [phone])     CM met with patient  to discuss any changes in discharge planning.  Patients plan is to dc to a group home. Patient will need transportation home   No changes in DC plans. THELMA: 5/11/24    0900 am  CM spoke with Can dice with [facility] and confirmed that patient has been accepted a    1230 pm  CM will receive medications to the bedside and no charge for medications    1:19 pm  [employer] Transportation set up  Miguel is arriving at [facility] in 7 min  [phone]  Vega Mcdonald  [id number]   Ride ID  [id number]     Total cost $  39.80    María Elena Capellan RN  Case Management  [facility]  [phone]

## 2024-05-11 NOTE — DISCHARGE SUMMARY
"[facility] - Stepdown Flex ([place] [address])  [facility] Medicine  Discharge Summary      Patient Name: Hollie Wilson  MRN: [id number]  JAYSON: [id number]  Patient Class: OP- Observation  Admission Date: 5/8/2024  Hospital Length of Stay: 0 days  Discharge Date and Time:  05/11/2024 10:19 AM  Attending Physician: Neo Wise MD   Discharging Provider: Raissa Nichols MD  Primary Care Provider: Martin Rios MD  [facility] Medicine Team: [facility] HOSP MED 2 Raissa Nichols MD  Primary Care Team: [facility] HOSP MED 2    HPI:   Ms. Hollie Wilson is a 34 y.o. female with past medical history of developmental delay, schizoaffective disorder, polysubstance abuse, gastroparesis, and T1DM. Presented to [facility] ED via EMS with generalized weakness, polydipsia (drinking 2L of water at home in 2hs), polyuria since yesterday. En route to ED while in the EMS, blood glucose was read as "high" as per EMS report. Multiple admissions for DKA in the past, most recently discharged from [facility] on 4/22 that required MICU admission on 4/11-4/14 for management of DKA. Patient reports the last 2 hospitalizations once she was discharged, her short acting insulin was never filled. Denies vomit, and refers taking judiciously at home reglan. Of note, patient is a poor historian and primary team was unable to get collateral information from the Mother by phone.     Upon admission to the ED afebrile with stable vital signs. Relevant labs with normal H/H, hyponatremia 129, blood glucose 662 with BHB 0.0. UA 3+ glucose, serum osm 311 and AG unable to be calculated. CXR unremarkable.     Given 1L NS for volume optimization and 10U regular insulin for BG management.     Patient will be admitted to Hospital Medicine for further workup and management of HHS.     * No surgery found *      Hospital Course:   Patient with T1DM admitted for HHS and started on fluids then transitioned to basal-bolus. Complex social situation but lacking safe housing with ability to store " home insulin. Stable for discharge on 5/11 to group home. Discharged with insulin regimen of long acting glargine 6U BID and short acting novolog 4U TIDWM.      Goals of Care Treatment Preferences:  Code Status: Full Code    Physical Exam  Vitals and nursing note reviewed.   Constitutional:       General: She is not in acute distress.     Appearance: She is not ill-appearing or toxic-appearing.   HENT:      Head: Normocephalic and atraumatic.      Mouth/Throat:      Mouth: Mucous membranes are moist.      Pharynx: Oropharynx is clear. No oropharyngeal exudate.   Eyes:      General: No scleral icterus.     Conjunctiva/sclera: Conjunctivae normal.   Cardiovascular:      Rate and Rhythm: Normal rate and regular rhythm.      Pulses: Normal pulses.      Heart sounds: Normal heart sounds. No murmur heard.  Pulmonary:      Effort: Pulmonary effort is normal. No respiratory distress.      Breath sounds: Normal breath sounds. No wheezing or rhonchi.   Abdominal:      General: There is no distension.      Tenderness: There is no abdominal tenderness. There is no guarding or rebound.   Musculoskeletal:      Cervical back: Normal range of motion.      Right lower leg: No edema.      Left lower leg: No edema.   Skin:     Coloration: Skin is not pale.   Neurological:      Mental Status: She is alert and oriented to person, place, and time. Mental status is at baseline.      Motor: No weakness.       Consults:   Consults (From admission, onward)          Status Ordering Provider     Inpatient consult to PICC team (DEBORA)  Once        Provider:  (Not yet assigned)    Completed ANA ROWLEY            Neuro  Neuropathic pain  - Resume home gabapentin 300mg TID       Intellectual disability  Difficult to assess patient's decision making capacity given developmental delay. Will continue to engage in conversations with patient before considering psychiatry evaluation for decision making capacity. Will consider SW/CM for support  with discharge planning. Amenable to assisted living facilities.     Appears to be at baseline mentation, fully attentive throughout exam. Voices concerns with safety if she were to be discharged home or to group homes like previous admissions. She is aware of possible consequences including risks of readmission for hyperglycemia if she were to discharge without a safe place to stay and manage her insulin properly.        Psychiatric  Schizoaffective disorder  -Resume home fluoxetine     Polysubstance abuse  History of polysubstance abuse in the past including cocaine, opioids and amphetamines.   --UDS positive for THC, similar to previous.       Endocrine  * Type 1 diabetes mellitus with hyperosmolar hyperglycemic state (HHS)  Ms. Hollie Wilson is a 34 y.o. patient with frequent admissions in the past for DKA secondary to medication non-compliance that has required ICU management, who presented with symptoms and lab values suggesting hyperglycemic state in the setting of poorly control T1DM. As per patient, difficulty getting healthcare access to insulin in relation with poor familiar support may be the cause of present hospitalization.     Home medications: Insulin Detemir 5U SQ once daily; Aspart 3U TIDWM + LDSSI; metoclopramide 10mg Q6H PRN   Last Hgb A1C: 9.5 (4/12/24)   Diagnostics: Elevated plasma glucose 662, BHB 0.0. AG unable to quantify. Serum osm 311.    Plan   - admitted to Hospital Medicine for HHS.  - rapid improvement of BG levels after insulin gtt.  - continue management with the following subcutaneous insulin regimen:   --insulin lantus 6U BID, aspart 4U TIDWM, LDSSI  --IV fluids PRN, strict I's/O's  --POCT glucose 4 times daily (TID WM and QHS)  --will optimize basal/bolus insulin regimen as appropriate based on POCT glucose checks   --concern for inability to afford or manage insulin regimen at home previously,   --will work with CM/SW and pharmacy on finding resources that can help patient  socially prior to discharge. Patient says copay for insulin $800?    Hyponatremia  Corrected sodium based on serum glucose wnl. Continue to monitor.   RESOLVED with improvement in BG    GI  GERD (gastroesophageal reflux disease)  History of GERD. Will resume home protonix 40.       Nausea  History of nausea/vomit in the past. Taking reglan at home.   -Compazine PRN given long QTc   -Avoid reglan and Zofran (given history of allergies in the past)     Gastroparesis  - Qtc 479, with prior in 502 (2 weeks ago)   - Daily EKGs  - Avoid reglan at the moment given history of prolonged Qtc and borderline values in this hospitalization       Other  Tobacco abuse  - ~3 cigarettes daily, requesting nicotine patch while in hospital  - Educated for 10 minutes on tobacco cessation in the setting of hyperglycemic state   - Smoking cessation       Final Active Diagnoses:    Diagnosis Date Noted POA    PRINCIPAL PROBLEM:  Type 1 diabetes mellitus with hyperosmolar hyperglycemic state (HHS) [E10.69, E10.65, E87.0] 05/08/2024 Unknown    Schizoaffective disorder [F25.9] 05/08/2024 Yes    Nausea [R11.0] 05/08/2024 Unknown    Hyponatremia [E87.1] 05/08/2024 Yes    Neuropathic pain [M79.2] 05/08/2024 Unknown    GERD (gastroesophageal reflux disease) [K21.9] 05/08/2024 Unknown    Gastroparesis [K31.84] 04/12/2024 Yes    Intellectual disability [F79] 01/27/2020 Yes    Tobacco abuse [Z72.0] 06/13/2019 Yes    Polysubstance abuse [F19.10] 06/13/2019 Yes      Problems Resolved During this Admission:       Discharged Condition: stable    Disposition: Home or Self Care    Follow Up:    Patient Instructions:      Diet diabetic     Notify your health care provider if you experience any of the following:  persistent nausea and vomiting or diarrhea     Notify your health care provider if you experience any of the following:  severe persistent headache     Notify your health care provider if you experience any of the following:  persistent dizziness,  light-headedness, or visual disturbances     Notify your health care provider if you experience any of the following:  increased confusion or weakness     Activity as tolerated       Significant Diagnostic Studies: Labs: CMP   Recent Labs   Lab 05/10/24  0626 05/11/24  0608   * 137   K 4.3 3.8    104   CO2 21* 22*   * 231*   BUN 10 10   CREATININE 0.9 0.8   CALCIUM 8.7 9.4   ANIONGAP 9 11   , CBC   Recent Labs   Lab 05/10/24  0626 05/11/24  0608   WBC 4.03 4.13   HGB 10.6* 12.4   HCT 34.6* 38.8    248   , and A1C:   Recent Labs   Lab 03/27/24  0325 04/03/24  1418 04/12/24  0551   HGBA1C 9.6* 9.2* 9.5*       Pending Diagnostic Studies:       None           Medications:  Reconciled Home Medications:      Medication List        CHANGE how you take these medications      insulin aspart U-100 100 unit/mL (3 mL) Inpn pen  Commonly known as: NovoLOG  Inject 4 Units into the skin 3 (three) times daily with meals.  What changed:   how much to take  when to take this     insulin glargine U-100 (Lantus) 100 unit/mL (3 mL) Inpn pen  Inject 6 Units into the skin every 12 (twelve) hours.  What changed:   how much to take  when to take this            CONTINUE taking these medications      blood sugar diagnostic Strp  To check BG 4 times daily, to use with insurance preferred meter     DEXCOM G7 SENSOR Latanya  Generic drug: blood-glucose sensor  1 Device by Misc.(Non-Drug; Combo Route) route as needed (to check blood sugar).     DEXCOM  Misc  Generic drug: blood-glucose meter,continuous  1 Device by Misc.(Non-Drug; Combo Route) route as needed (to check blood sugar).     FLUoxetine 20 MG capsule  Take 3 capsules by mouth once daily.     gabapentin 300 MG capsule  Commonly known as: NEURONTIN  Take 1 capsule by mouth 3 (three) times daily.     glucagon 3 mg/actuation Spry  1 spray by Nasal route as needed (hypoglycemia).     glucose 4 GM chewable tablet  Take 4 tablets (16 g total) by mouth as needed  "for Low blood sugar.     lancets 30 gauge Misc  To check BG 4 times daily, to use with insurance preferred meter     metoclopramide HCl 10 MG tablet  Commonly known as: REGLAN  Take 1 tablet (10 mg total) by mouth every 6 (six) hours as needed (headache, nausea or vomiting).     nicotine 7 mg/24 hr  Commonly known as: NICODERM CQ  Place 1 patch onto the skin once daily.     pantoprazole 40 MG tablet  Commonly known as: PROTONIX  Take 1 tablet by mouth every morning.     pen needle, diabetic 32 gauge x 5/32" Ndle  Use 4 (four) times daily.     polyethylene glycol 17 gram Pwpk  Commonly known as: GLYCOLAX  Take 17 g by mouth once daily.     promethazine 25 MG suppository  Commonly known as: PHENERGAN  Place 1 suppository (25 mg total) rectally every 6 (six) hours as needed for Nausea (For severe nausea and vomiting not improved with oral medications).            STOP taking these medications      insulin detemir U-100 (Levemir) 100 unit/mL (3 mL) Inpn pen              Indwelling Lines/Drains at time of discharge:   Lines/Drains/Airways       None                   Time spent on the discharge of patient: 35 minutes         Raissa Nichols MD  Department of Hospital Medicine  [facility] - Stepdown Flex ([place])  "

## 2024-05-11 NOTE — PLAN OF CARE
[facility] - Stepdown Flex ([place])  Discharge Final Note    Primary Care Provider: Martin Rios MD    Expected Discharge Date: 5/11/2024    Final Discharge Note (most recent)       Final Note - 05/11/24 1232          Final Note    Hospital Resources/Appts/Education Provided Appointments scheduled and added to AVS        Post-Acute Status    Post-Acute Authorization Placement     Post-Acute Placement Status Set-up Complete/Auth obtained     Discharge Delays None known at this time                              Future Appointments   Date Time Provider Department Center   5/29/2024 11:00 AM FE[facility] HOSPITAL DISCHARGE CLINIC [facility] ENDODIA [facility] CM met with patient  and discussed discharge plans, patient to DC to group home: [facility]. Patients   medications delivered to bedside.   No  HME/DME  delivered  to bedside and follow up appointment[s] scheduled.   Transportation provided by [employer].     María Elena Capellan RN  Case Management  [facility]  [phone]

## 2024-05-11 NOTE — ASSESSMENT & PLAN NOTE
Corrected sodium based on serum glucose wnl. Continue to monitor.   RESOLVED with improvement in BG

## 2024-05-11 NOTE — PLAN OF CARE
Patient in bed on phone. Safety precautions maintained and call light in reach.       Problem: Adult Inpatient Plan of Care  Goal: Plan of Care Review  Outcome: Progressing     Problem: Adult Inpatient Plan of Care  Goal: Patient-Specific Goal (Individualized)  Outcome: Progressing     Problem: Adult Inpatient Plan of Care  Goal: Absence of Hospital-Acquired Illness or Injury  Outcome: Progressing     Problem: Adult Inpatient Plan of Care  Goal: Optimal Comfort and Wellbeing  Outcome: Progressing

## 2024-05-11 NOTE — SUBJECTIVE & OBJECTIVE
Interval History: NAEON. Insulin regimen adjusted yesterday, continue to monitor BG. Pending dispo as no available housing. Will f/u CM/SW for discharge planning.      Objective:     Vital Signs (Most Recent):  Temp: 97.8 °F (36.6 °C) (05/11/24 0724)  Pulse: 76 (05/11/24 0724)  Resp: 18 (05/11/24 0724)  BP: 98/62 (05/11/24 0724)  SpO2: 96 % (05/11/24 0724) Vital Signs (24h Range):  Temp:  [97.8 °F (36.6 °C)-98.4 °F (36.9 °C)] 97.8 °F (36.6 °C)  Pulse:  [73-81] 76  Resp:  [16-18] 18  SpO2:  [96 %-98 %] 96 %  BP: ()/(62-77) 98/62     Weight: 55.8 kg (123 lb)  Body mass index is 19.26 kg/m².    Intake/Output Summary (Last 24 hours) at 5/11/2024 0758  Last data filed at 5/10/2024 1728  Gross per 24 hour   Intake 620 ml   Output 1500 ml   Net -880 ml         Physical Exam  Vitals and nursing note reviewed.   Constitutional:       General: She is not in acute distress.     Appearance: She is not ill-appearing or toxic-appearing.   HENT:      Head: Normocephalic and atraumatic.      Mouth/Throat:      Mouth: Mucous membranes are moist.      Pharynx: Oropharynx is clear. No oropharyngeal exudate.   Eyes:      General: No scleral icterus.     Conjunctiva/sclera: Conjunctivae normal.   Cardiovascular:      Rate and Rhythm: Normal rate and regular rhythm.      Pulses: Normal pulses.      Heart sounds: Normal heart sounds. No murmur heard.  Pulmonary:      Effort: Pulmonary effort is normal. No respiratory distress.      Breath sounds: Normal breath sounds. No wheezing or rhonchi.   Abdominal:      General: There is no distension.      Tenderness: There is no abdominal tenderness. There is no guarding or rebound.   Musculoskeletal:      Cervical back: Normal range of motion.      Right lower leg: No edema.      Left lower leg: No edema.   Skin:     Coloration: Skin is not pale.   Neurological:      Mental Status: She is alert and oriented to person, place, and time. Mental status is at baseline.      Motor: No weakness.            Significant Labs: All pertinent labs within the past 24 hours have been reviewed.    Significant Imaging: I have reviewed all pertinent imaging results/findings within the past 24 hours.

## 2024-05-11 NOTE — NURSING
Pt discharged. IV removed. Med delivered bedside. Denied any pain, SOB, discomfort. Wheeled pt to downstairs, helped to get into [employer].

## 2024-05-11 NOTE — ASSESSMENT & PLAN NOTE
Ms. Hollie Wilson is a 34 y.o. patient with frequent admissions in the past for DKA secondary to medication non-compliance that has required ICU management, who presented with symptoms and lab values suggesting hyperglycemic state in the setting of poorly control T1DM. As per patient, difficulty getting healthcare access to insulin in relation with poor familiar support may be the cause of present hospitalization.     Home medications: Insulin Detemir 5U SQ once daily; Aspart 3U TIDWM + LDSSI; metoclopramide 10mg Q6H PRN   Last Hgb A1C: 9.5 (4/12/24)   Diagnostics: Elevated plasma glucose 662, BHB 0.0. AG unable to quantify. Serum osm 311.    Plan   - admitted to Hospital Medicine for HHS.  - rapid improvement of BG levels after insulin gtt.  - continue management with the following subcutaneous insulin regimen:   --insulin lantus 6U BID, aspart 4U TIDWM, LDSSI  --IV fluids PRN, strict I's/O's  --POCT glucose 4 times daily (TID WM and QHS)  --will optimize basal/bolus insulin regimen as appropriate based on POCT glucose checks   --concern for inability to afford or manage insulin regimen at home previously,   --will work with CM/SW and pharmacy on finding resources that can help patient socially prior to discharge. Patient says copay for insulin $800?

## 2024-05-14 NOTE — ASSESSMENT & PLAN NOTE
- admitted at [facility] from 5/30-6/10 for DKA, which was complicated by symptomatic hypoglycemia, and was evaluated by Endocrinology who recommended discharge on Tresiba 8 units every morning with breakfast and 6 units every evening with dinner as well as SSI with meals.  Goal to maintain blood sugars 200-300.  She was admitted again for mild DKA at [facility] from 06/13- 6/14.  She was discharged on 25 units Levemir daily as well as 6 units aspart t.i.d. with meals.  - Chart review shows she has active prescriptions for Humulin 70/30, Tresiba, Lantus, Levemir, aspart and lispro insulins  - Overnight she required D10 infusion at 150 cc/hour to maintain glucose >70  - Discontinue D10 and continue POCT glucose checks every hour until stable for 6 accuchecks.  - Needs thorough pharmacy review  - Asked her  to bring all the insulin in the home - brought Levemir, Tresiba and aspart insulin.  - Chart review - she was prescribed Tresiba 8 units in the AM and 6 in the evening when she was discharged from [facility] on 5/30/2023.  Has follow up appointments scheduled with PCP and endocrinologist.  She is not on any prandial insulin in the meantime, has a history of hypoglycemic episodes.   The recording was initiated after a verbal consent was obtained from the patient to record this visit for documentation in their clinical record.

## 2024-05-19 PROBLEM — E11.10 DKA (DIABETIC KETOACIDOSIS): Status: RESOLVED | Noted: 2020-11-12 | Resolved: 2024-05-19

## 2024-05-24 ENCOUNTER — TELEPHONE (OUTPATIENT)
Dept: ADMINISTRATIVE | Facility: HOSPITAL | Age: 34
End: 2024-05-24
Payer: COMMERCIAL

## 2024-05-28 ENCOUNTER — HOSPITAL ENCOUNTER (INPATIENT)
Facility: HOSPITAL | Age: 34
LOS: 6 days | Discharge: PSYCHIATRIC OR SUBSTANCE USE FACILITY | DRG: 918 | End: 2024-06-04
Attending: EMERGENCY MEDICINE | Admitting: INTERNAL MEDICINE
Payer: COMMERCIAL

## 2024-05-28 DIAGNOSIS — F25.8 OTHER SCHIZOAFFECTIVE DISORDERS: ICD-10-CM

## 2024-05-28 DIAGNOSIS — F19.10 POLYSUBSTANCE ABUSE: Chronic | ICD-10-CM

## 2024-05-28 DIAGNOSIS — K31.84 GASTROPARESIS: ICD-10-CM

## 2024-05-28 DIAGNOSIS — Z91.89 AT RISK FOR LONG QT SYNDROME: ICD-10-CM

## 2024-05-28 DIAGNOSIS — T14.91XA SUICIDE ATTEMPT: ICD-10-CM

## 2024-05-28 DIAGNOSIS — T74.21XA SEXUAL ASSAULT OF ADULT, INITIAL ENCOUNTER: ICD-10-CM

## 2024-05-28 DIAGNOSIS — R00.0 TACHYCARDIA: ICD-10-CM

## 2024-05-28 DIAGNOSIS — E10.65 TYPE 1 DIABETES MELLITUS WITH HYPERGLYCEMIA: ICD-10-CM

## 2024-05-28 DIAGNOSIS — E16.2 HYPOGLYCEMIA: ICD-10-CM

## 2024-05-28 DIAGNOSIS — R73.9 HYPERGLYCEMIA: ICD-10-CM

## 2024-05-28 DIAGNOSIS — T38.3X2A INSULIN OVERDOSE, INTENTIONAL SELF-HARM, INITIAL ENCOUNTER: Primary | ICD-10-CM

## 2024-05-28 LAB
ALBUMIN SERPL BCP-MCNC: 3.8 G/DL (ref 3.5–5.2)
ALLENS TEST: ABNORMAL
ALP SERPL-CCNC: 99 U/L (ref 55–135)
ALT SERPL W/O P-5'-P-CCNC: 9 U/L (ref 10–44)
ANION GAP SERPL CALC-SCNC: 13 MMOL/L (ref 8–16)
APAP SERPL-MCNC: <3 UG/ML (ref 10–20)
AST SERPL-CCNC: 9 U/L (ref 10–40)
B-HCG UR QL: NEGATIVE
B-OH-BUTYR BLD STRIP-SCNC: 1.4 MMOL/L (ref 0–0.5)
BASOPHILS # BLD AUTO: 0.02 K/UL (ref 0–0.2)
BASOPHILS NFR BLD: 0.3 % (ref 0–1.9)
BILIRUB SERPL-MCNC: 0.5 MG/DL (ref 0.1–1)
BUN SERPL-MCNC: 11 MG/DL (ref 6–20)
CALCIUM SERPL-MCNC: 9.5 MG/DL (ref 8.7–10.5)
CHLORIDE SERPL-SCNC: 102 MMOL/L (ref 95–110)
CO2 SERPL-SCNC: 20 MMOL/L (ref 23–29)
CREAT SERPL-MCNC: 1 MG/DL (ref 0.5–1.4)
CTP QC/QA: YES
DIFFERENTIAL METHOD BLD: ABNORMAL
EOSINOPHIL # BLD AUTO: 0 K/UL (ref 0–0.5)
EOSINOPHIL NFR BLD: 0.5 % (ref 0–8)
ERYTHROCYTE [DISTWIDTH] IN BLOOD BY AUTOMATED COUNT: 15.9 % (ref 11.5–14.5)
EST. GFR  (NO RACE VARIABLE): >60 ML/MIN/1.73 M^2
ETHANOL SERPL-MCNC: <10 MG/DL
GLUCOSE SERPL-MCNC: 371 MG/DL (ref 70–110)
HCO3 UR-SCNC: 23.2 MMOL/L (ref 24–28)
HCT VFR BLD AUTO: 34.7 % (ref 37–48.5)
HGB BLD-MCNC: 10.8 G/DL (ref 12–16)
IMM GRANULOCYTES # BLD AUTO: 0.01 K/UL (ref 0–0.04)
IMM GRANULOCYTES NFR BLD AUTO: 0.2 % (ref 0–0.5)
LYMPHOCYTES # BLD AUTO: 1.2 K/UL (ref 1–4.8)
LYMPHOCYTES NFR BLD: 20.3 % (ref 18–48)
MAGNESIUM SERPL-MCNC: 1.8 MG/DL (ref 1.6–2.6)
MCH RBC QN AUTO: 25.9 PG (ref 27–31)
MCHC RBC AUTO-ENTMCNC: 31.1 G/DL (ref 32–36)
MCV RBC AUTO: 83 FL (ref 82–98)
MONOCYTES # BLD AUTO: 0.1 K/UL (ref 0.3–1)
MONOCYTES NFR BLD: 1.4 % (ref 4–15)
NEUTROPHILS # BLD AUTO: 4.5 K/UL (ref 1.8–7.7)
NEUTROPHILS NFR BLD: 77.3 % (ref 38–73)
NRBC BLD-RTO: 0 /100 WBC
PCO2 BLDA: 38.9 MMHG (ref 35–45)
PH SMN: 7.38 [PH] (ref 7.35–7.45)
PHOSPHATE SERPL-MCNC: 3.3 MG/DL (ref 2.7–4.5)
PLATELET # BLD AUTO: 300 K/UL (ref 150–450)
PMV BLD AUTO: 11.7 FL (ref 9.2–12.9)
PO2 BLDA: 49 MMHG (ref 40–60)
POC BE: -2 MMOL/L
POC SATURATED O2: 84 % (ref 95–100)
POC TCO2: 24 MMOL/L (ref 24–29)
POCT GLUCOSE: 161 MG/DL (ref 70–110)
POCT GLUCOSE: 264 MG/DL (ref 70–110)
POCT GLUCOSE: 358 MG/DL (ref 70–110)
POCT GLUCOSE: 467 MG/DL (ref 70–110)
POTASSIUM SERPL-SCNC: 3.6 MMOL/L (ref 3.5–5.1)
PROT SERPL-MCNC: 7 G/DL (ref 6–8.4)
RBC # BLD AUTO: 4.17 M/UL (ref 4–5.4)
SAMPLE: ABNORMAL
SITE: ABNORMAL
SODIUM SERPL-SCNC: 135 MMOL/L (ref 136–145)
WBC # BLD AUTO: 5.8 K/UL (ref 3.9–12.7)

## 2024-05-28 PROCEDURE — 82803 BLOOD GASES ANY COMBINATION: CPT

## 2024-05-28 PROCEDURE — 83735 ASSAY OF MAGNESIUM: CPT | Performed by: NURSE PRACTITIONER

## 2024-05-28 PROCEDURE — 84100 ASSAY OF PHOSPHORUS: CPT | Performed by: NURSE PRACTITIONER

## 2024-05-28 PROCEDURE — 25000003 PHARM REV CODE 250: Performed by: NURSE PRACTITIONER

## 2024-05-28 PROCEDURE — 84443 ASSAY THYROID STIM HORMONE: CPT | Performed by: NURSE PRACTITIONER

## 2024-05-28 PROCEDURE — 82962 GLUCOSE BLOOD TEST: CPT

## 2024-05-28 PROCEDURE — 81001 URINALYSIS AUTO W/SCOPE: CPT | Performed by: NURSE PRACTITIONER

## 2024-05-28 PROCEDURE — 93010 ELECTROCARDIOGRAM REPORT: CPT | Mod: ,,, | Performed by: INTERNAL MEDICINE

## 2024-05-28 PROCEDURE — 80143 DRUG ASSAY ACETAMINOPHEN: CPT | Performed by: PHYSICIAN ASSISTANT

## 2024-05-28 PROCEDURE — 85025 COMPLETE CBC W/AUTO DIFF WBC: CPT | Performed by: NURSE PRACTITIONER

## 2024-05-28 PROCEDURE — 96360 HYDRATION IV INFUSION INIT: CPT

## 2024-05-28 PROCEDURE — 80053 COMPREHEN METABOLIC PANEL: CPT | Performed by: NURSE PRACTITIONER

## 2024-05-28 PROCEDURE — 82010 KETONE BODYS QUAN: CPT | Performed by: NURSE PRACTITIONER

## 2024-05-28 PROCEDURE — 82077 ASSAY SPEC XCP UR&BREATH IA: CPT | Performed by: PHYSICIAN ASSISTANT

## 2024-05-28 PROCEDURE — 99900035 HC TECH TIME PER 15 MIN (STAT)

## 2024-05-28 PROCEDURE — 81025 URINE PREGNANCY TEST: CPT | Performed by: PHYSICIAN ASSISTANT

## 2024-05-28 PROCEDURE — 93005 ELECTROCARDIOGRAM TRACING: CPT

## 2024-05-28 PROCEDURE — 99291 CRITICAL CARE FIRST HOUR: CPT

## 2024-05-28 RX ORDER — DEXTROSE MONOHYDRATE 100 MG/ML
INJECTION, SOLUTION INTRAVENOUS CONTINUOUS
Status: DISCONTINUED | OUTPATIENT
Start: 2024-05-29 | End: 2024-05-29

## 2024-05-28 RX ADMIN — DEXTROSE MONOHYDRATE: 100 INJECTION, SOLUTION INTRAVENOUS at 11:05

## 2024-05-28 RX ADMIN — SODIUM CHLORIDE 1000 ML: 9 INJECTION, SOLUTION INTRAVENOUS at 10:05

## 2024-05-29 PROBLEM — E16.2 HYPOGLYCEMIA: Status: ACTIVE | Noted: 2024-05-29

## 2024-05-29 PROBLEM — T14.91XA SUICIDE ATTEMPT: Status: ACTIVE | Noted: 2024-05-29

## 2024-05-29 LAB
ALBUMIN SERPL BCP-MCNC: 3.7 G/DL (ref 3.5–5.2)
ALP SERPL-CCNC: 102 U/L (ref 55–135)
ALT SERPL W/O P-5'-P-CCNC: 10 U/L (ref 10–44)
AMPHET+METHAMPHET UR QL: ABNORMAL
ANION GAP SERPL CALC-SCNC: 10 MMOL/L (ref 8–16)
ANION GAP SERPL CALC-SCNC: 10 MMOL/L (ref 8–16)
AST SERPL-CCNC: 12 U/L (ref 10–40)
BACTERIA #/AREA URNS AUTO: NORMAL /HPF
BARBITURATES UR QL SCN>200 NG/ML: NEGATIVE
BASOPHILS # BLD AUTO: 0.04 K/UL (ref 0–0.2)
BASOPHILS NFR BLD: 0.5 % (ref 0–1.9)
BENZODIAZ UR QL SCN>200 NG/ML: NEGATIVE
BILIRUB SERPL-MCNC: 0.5 MG/DL (ref 0.1–1)
BILIRUB UR QL STRIP: NEGATIVE
BUN SERPL-MCNC: 5 MG/DL (ref 6–20)
BUN SERPL-MCNC: 9 MG/DL (ref 6–20)
BZE UR QL SCN: ABNORMAL
CALCIUM SERPL-MCNC: 9.2 MG/DL (ref 8.7–10.5)
CALCIUM SERPL-MCNC: 9.7 MG/DL (ref 8.7–10.5)
CANNABINOIDS UR QL SCN: ABNORMAL
CHLORIDE SERPL-SCNC: 104 MMOL/L (ref 95–110)
CHLORIDE SERPL-SCNC: 106 MMOL/L (ref 95–110)
CLARITY UR REFRACT.AUTO: CLEAR
CO2 SERPL-SCNC: 20 MMOL/L (ref 23–29)
CO2 SERPL-SCNC: 24 MMOL/L (ref 23–29)
COLOR UR AUTO: YELLOW
CREAT SERPL-MCNC: 0.7 MG/DL (ref 0.5–1.4)
CREAT SERPL-MCNC: 0.8 MG/DL (ref 0.5–1.4)
CREAT UR-MCNC: 18 MG/DL (ref 15–325)
DIFFERENTIAL METHOD BLD: ABNORMAL
EOSINOPHIL # BLD AUTO: 0.2 K/UL (ref 0–0.5)
EOSINOPHIL NFR BLD: 3.1 % (ref 0–8)
ERYTHROCYTE [DISTWIDTH] IN BLOOD BY AUTOMATED COUNT: 16.2 % (ref 11.5–14.5)
EST. GFR  (NO RACE VARIABLE): >60 ML/MIN/1.73 M^2
EST. GFR  (NO RACE VARIABLE): >60 ML/MIN/1.73 M^2
ETHANOL UR-MCNC: <10 MG/DL
GLUCOSE SERPL-MCNC: 118 MG/DL (ref 70–110)
GLUCOSE SERPL-MCNC: 94 MG/DL (ref 70–110)
GLUCOSE UR QL STRIP: ABNORMAL
HCT VFR BLD AUTO: 32.3 % (ref 37–48.5)
HGB BLD-MCNC: 9.9 G/DL (ref 12–16)
HGB UR QL STRIP: NEGATIVE
IMM GRANULOCYTES # BLD AUTO: 0.02 K/UL (ref 0–0.04)
IMM GRANULOCYTES NFR BLD AUTO: 0.3 % (ref 0–0.5)
KETONES UR QL STRIP: ABNORMAL
LEUKOCYTE ESTERASE UR QL STRIP: NEGATIVE
LYMPHOCYTES # BLD AUTO: 2 K/UL (ref 1–4.8)
LYMPHOCYTES NFR BLD: 26.8 % (ref 18–48)
MAGNESIUM SERPL-MCNC: 1.8 MG/DL (ref 1.6–2.6)
MAGNESIUM SERPL-MCNC: 2.3 MG/DL (ref 1.6–2.6)
MCH RBC QN AUTO: 25.8 PG (ref 27–31)
MCHC RBC AUTO-ENTMCNC: 30.7 G/DL (ref 32–36)
MCV RBC AUTO: 84 FL (ref 82–98)
METHADONE UR QL SCN>300 NG/ML: NEGATIVE
MICROSCOPIC COMMENT: NORMAL
MONOCYTES # BLD AUTO: 1.1 K/UL (ref 0.3–1)
MONOCYTES NFR BLD: 14.2 % (ref 4–15)
NEUTROPHILS # BLD AUTO: 4.1 K/UL (ref 1.8–7.7)
NEUTROPHILS NFR BLD: 55.1 % (ref 38–73)
NITRITE UR QL STRIP: NEGATIVE
NRBC BLD-RTO: 0 /100 WBC
OHS QRS DURATION: 120 MS
OHS QRS DURATION: 134 MS
OHS QTC CALCULATION: 523 MS
OHS QTC CALCULATION: 536 MS
OPIATES UR QL SCN: NEGATIVE
PCP UR QL SCN>25 NG/ML: NEGATIVE
PH UR STRIP: 6 [PH] (ref 5–8)
PHOSPHATE SERPL-MCNC: 1.9 MG/DL (ref 2.7–4.5)
PHOSPHATE SERPL-MCNC: 4.8 MG/DL (ref 2.7–4.5)
PLATELET # BLD AUTO: 262 K/UL (ref 150–450)
PMV BLD AUTO: 11.7 FL (ref 9.2–12.9)
POCT GLUCOSE: 109 MG/DL (ref 70–110)
POCT GLUCOSE: 109 MG/DL (ref 70–110)
POCT GLUCOSE: 123 MG/DL (ref 70–110)
POCT GLUCOSE: 133 MG/DL (ref 70–110)
POCT GLUCOSE: 135 MG/DL (ref 70–110)
POCT GLUCOSE: 137 MG/DL (ref 70–110)
POCT GLUCOSE: 183 MG/DL (ref 70–110)
POCT GLUCOSE: 227 MG/DL (ref 70–110)
POCT GLUCOSE: 235 MG/DL (ref 70–110)
POCT GLUCOSE: 35 MG/DL (ref 70–110)
POCT GLUCOSE: 41 MG/DL (ref 70–110)
POCT GLUCOSE: 42 MG/DL (ref 70–110)
POCT GLUCOSE: 44 MG/DL (ref 70–110)
POCT GLUCOSE: 45 MG/DL (ref 70–110)
POCT GLUCOSE: 48 MG/DL (ref 70–110)
POCT GLUCOSE: 48 MG/DL (ref 70–110)
POCT GLUCOSE: 57 MG/DL (ref 70–110)
POCT GLUCOSE: 62 MG/DL (ref 70–110)
POCT GLUCOSE: 64 MG/DL (ref 70–110)
POCT GLUCOSE: 67 MG/DL (ref 70–110)
POCT GLUCOSE: 69 MG/DL (ref 70–110)
POCT GLUCOSE: 70 MG/DL (ref 70–110)
POCT GLUCOSE: 73 MG/DL (ref 70–110)
POCT GLUCOSE: 78 MG/DL (ref 70–110)
POCT GLUCOSE: 82 MG/DL (ref 70–110)
POCT GLUCOSE: 84 MG/DL (ref 70–110)
POCT GLUCOSE: 84 MG/DL (ref 70–110)
POCT GLUCOSE: 85 MG/DL (ref 70–110)
POCT GLUCOSE: 86 MG/DL (ref 70–110)
POCT GLUCOSE: 87 MG/DL (ref 70–110)
POCT GLUCOSE: 92 MG/DL (ref 70–110)
POCT GLUCOSE: 92 MG/DL (ref 70–110)
POCT GLUCOSE: 93 MG/DL (ref 70–110)
POCT GLUCOSE: <20 MG/DL (ref 70–110)
POCT GLUCOSE: >500 MG/DL (ref 70–110)
POTASSIUM SERPL-SCNC: 3.7 MMOL/L (ref 3.5–5.1)
POTASSIUM SERPL-SCNC: 3.7 MMOL/L (ref 3.5–5.1)
PROT SERPL-MCNC: 6.8 G/DL (ref 6–8.4)
PROT UR QL STRIP: NEGATIVE
RBC # BLD AUTO: 3.83 M/UL (ref 4–5.4)
RBC #/AREA URNS AUTO: 1 /HPF (ref 0–4)
SODIUM SERPL-SCNC: 136 MMOL/L (ref 136–145)
SODIUM SERPL-SCNC: 138 MMOL/L (ref 136–145)
SP GR UR STRIP: >1.03 (ref 1–1.03)
SQUAMOUS #/AREA URNS AUTO: 3 /HPF
TOXICOLOGY INFORMATION: ABNORMAL
TSH SERPL DL<=0.005 MIU/L-ACNC: 0.47 UIU/ML (ref 0.4–4)
URN SPEC COLLECT METH UR: ABNORMAL
WBC # BLD AUTO: 7.4 K/UL (ref 3.9–12.7)
WBC #/AREA URNS AUTO: 1 /HPF (ref 0–5)
YEAST UR QL AUTO: NORMAL

## 2024-05-29 PROCEDURE — 76937 US GUIDE VASCULAR ACCESS: CPT

## 2024-05-29 PROCEDURE — 25000003 PHARM REV CODE 250: Performed by: NURSE PRACTITIONER

## 2024-05-29 PROCEDURE — 63600175 PHARM REV CODE 636 W HCPCS

## 2024-05-29 PROCEDURE — 20000000 HC ICU ROOM

## 2024-05-29 PROCEDURE — 83735 ASSAY OF MAGNESIUM: CPT | Performed by: NURSE PRACTITIONER

## 2024-05-29 PROCEDURE — 25000003 PHARM REV CODE 250

## 2024-05-29 PROCEDURE — 99291 CRITICAL CARE FIRST HOUR: CPT | Mod: ,,, | Performed by: INTERNAL MEDICINE

## 2024-05-29 PROCEDURE — 96361 HYDRATE IV INFUSION ADD-ON: CPT

## 2024-05-29 PROCEDURE — 93010 ELECTROCARDIOGRAM REPORT: CPT | Mod: ,,, | Performed by: INTERNAL MEDICINE

## 2024-05-29 PROCEDURE — 84100 ASSAY OF PHOSPHORUS: CPT | Mod: 91

## 2024-05-29 PROCEDURE — 25000003 PHARM REV CODE 250: Performed by: PHYSICIAN ASSISTANT

## 2024-05-29 PROCEDURE — 93005 ELECTROCARDIOGRAM TRACING: CPT

## 2024-05-29 PROCEDURE — 80053 COMPREHEN METABOLIC PANEL: CPT | Performed by: NURSE PRACTITIONER

## 2024-05-29 PROCEDURE — 85025 COMPLETE CBC W/AUTO DIFF WBC: CPT | Performed by: INTERNAL MEDICINE

## 2024-05-29 PROCEDURE — 84100 ASSAY OF PHOSPHORUS: CPT | Performed by: NURSE PRACTITIONER

## 2024-05-29 PROCEDURE — 36410 VNPNXR 3YR/> PHY/QHP DX/THER: CPT

## 2024-05-29 PROCEDURE — C1751 CATH, INF, PER/CENT/MIDLINE: HCPCS

## 2024-05-29 PROCEDURE — 25000003 PHARM REV CODE 250: Performed by: INTERNAL MEDICINE

## 2024-05-29 PROCEDURE — 94761 N-INVAS EAR/PLS OXIMETRY MLT: CPT

## 2024-05-29 PROCEDURE — 83735 ASSAY OF MAGNESIUM: CPT | Mod: 91

## 2024-05-29 PROCEDURE — 90792 PSYCH DIAG EVAL W/MED SRVCS: CPT | Mod: AF,HB,, | Performed by: PSYCHIATRY & NEUROLOGY

## 2024-05-29 PROCEDURE — 82962 GLUCOSE BLOOD TEST: CPT

## 2024-05-29 PROCEDURE — 80307 DRUG TEST PRSMV CHEM ANLYZR: CPT

## 2024-05-29 PROCEDURE — 80048 BASIC METABOLIC PNL TOTAL CA: CPT | Mod: XB

## 2024-05-29 RX ORDER — IBUPROFEN 200 MG
16 TABLET ORAL
Status: DISCONTINUED | OUTPATIENT
Start: 2024-05-29 | End: 2024-05-30

## 2024-05-29 RX ORDER — DEXTROSE 40 %
15 GEL (GRAM) ORAL ONCE
Status: COMPLETED | OUTPATIENT
Start: 2024-05-29 | End: 2024-05-29

## 2024-05-29 RX ORDER — IBUPROFEN 200 MG
16 TABLET ORAL
Status: DISCONTINUED | OUTPATIENT
Start: 2024-05-29 | End: 2024-05-29

## 2024-05-29 RX ORDER — LEVONORGESTREL 1.5 MG/1
1.5 TABLET ORAL ONCE
Status: CANCELLED | OUTPATIENT
Start: 2024-05-29 | End: 2024-05-29

## 2024-05-29 RX ORDER — POLYETHYLENE GLYCOL 3350 17 G/17G
17 POWDER, FOR SOLUTION ORAL DAILY
Status: DISCONTINUED | OUTPATIENT
Start: 2024-05-29 | End: 2024-05-31

## 2024-05-29 RX ORDER — GLUCAGON 1 MG
KIT INJECTION
Status: COMPLETED
Start: 2024-05-29 | End: 2024-05-30

## 2024-05-29 RX ORDER — IBUPROFEN 200 MG
24 TABLET ORAL
Status: DISCONTINUED | OUTPATIENT
Start: 2024-05-29 | End: 2024-05-30

## 2024-05-29 RX ORDER — SODIUM CHLORIDE 0.9 % (FLUSH) 0.9 %
10 SYRINGE (ML) INJECTION
Status: DISCONTINUED | OUTPATIENT
Start: 2024-05-29 | End: 2024-05-30

## 2024-05-29 RX ORDER — SODIUM CHLORIDE 0.9 % (FLUSH) 0.9 %
10 SYRINGE (ML) INJECTION EVERY 6 HOURS
Status: DISCONTINUED | OUTPATIENT
Start: 2024-05-30 | End: 2024-05-30

## 2024-05-29 RX ORDER — LEVONORGESTREL 1.5 MG/1
1.5 TABLET ORAL ONCE
Status: DISCONTINUED | OUTPATIENT
Start: 2024-05-29 | End: 2024-05-29

## 2024-05-29 RX ORDER — FLUOXETINE HYDROCHLORIDE 20 MG/1
60 CAPSULE ORAL DAILY
Status: DISCONTINUED | OUTPATIENT
Start: 2024-05-29 | End: 2024-06-04 | Stop reason: HOSPADM

## 2024-05-29 RX ORDER — MAGNESIUM SULFATE HEPTAHYDRATE 40 MG/ML
2 INJECTION, SOLUTION INTRAVENOUS ONCE
Status: COMPLETED | OUTPATIENT
Start: 2024-05-29 | End: 2024-05-29

## 2024-05-29 RX ORDER — PANTOPRAZOLE SODIUM 40 MG/1
40 TABLET, DELAYED RELEASE ORAL EVERY MORNING
Status: DISCONTINUED | OUTPATIENT
Start: 2024-05-29 | End: 2024-06-04 | Stop reason: HOSPADM

## 2024-05-29 RX ORDER — GLUCAGON 1 MG
1 KIT INJECTION
Status: DISCONTINUED | OUTPATIENT
Start: 2024-05-29 | End: 2024-05-30

## 2024-05-29 RX ORDER — GLYCERIN 1 G/1
1 SUPPOSITORY RECTAL ONCE
Status: COMPLETED | OUTPATIENT
Start: 2024-05-29 | End: 2024-05-29

## 2024-05-29 RX ORDER — METOCLOPRAMIDE 10 MG/1
10 TABLET ORAL EVERY 6 HOURS PRN
Status: DISCONTINUED | OUTPATIENT
Start: 2024-05-29 | End: 2024-06-04 | Stop reason: HOSPADM

## 2024-05-29 RX ORDER — ENOXAPARIN SODIUM 100 MG/ML
40 INJECTION SUBCUTANEOUS EVERY 24 HOURS
Status: DISCONTINUED | OUTPATIENT
Start: 2024-05-29 | End: 2024-06-04 | Stop reason: HOSPADM

## 2024-05-29 RX ORDER — SODIUM CHLORIDE 0.9 % (FLUSH) 0.9 %
10 SYRINGE (ML) INJECTION
Status: DISCONTINUED | OUTPATIENT
Start: 2024-05-29 | End: 2024-06-04 | Stop reason: HOSPADM

## 2024-05-29 RX ORDER — GABAPENTIN 300 MG/1
300 CAPSULE ORAL 3 TIMES DAILY
Status: DISCONTINUED | OUTPATIENT
Start: 2024-05-29 | End: 2024-05-29

## 2024-05-29 RX ORDER — LEVONORGESTREL 1.5 MG/1
1.5 TABLET ORAL ONCE
Status: COMPLETED | OUTPATIENT
Start: 2024-05-29 | End: 2024-05-29

## 2024-05-29 RX ORDER — POTASSIUM CHLORIDE 750 MG/1
30 CAPSULE, EXTENDED RELEASE ORAL ONCE
Status: COMPLETED | OUTPATIENT
Start: 2024-05-29 | End: 2024-05-29

## 2024-05-29 RX ORDER — ACETAMINOPHEN 500 MG
1000 TABLET ORAL EVERY 6 HOURS PRN
Status: DISCONTINUED | OUTPATIENT
Start: 2024-05-29 | End: 2024-06-04 | Stop reason: HOSPADM

## 2024-05-29 RX ADMIN — POTASSIUM PHOSPHATE, MONOBASIC AND POTASSIUM PHOSPHATE, DIBASIC 15 MMOL: 224; 236 INJECTION, SOLUTION, CONCENTRATE INTRAVENOUS at 09:05

## 2024-05-29 RX ADMIN — POTASSIUM CHLORIDE 30 MEQ: 10 CAPSULE, COATED, EXTENDED RELEASE ORAL at 09:05

## 2024-05-29 RX ADMIN — DEXTROSE 125 ML: 10 SOLUTION INTRAVENOUS at 11:05

## 2024-05-29 RX ADMIN — Medication 16 G: at 05:05

## 2024-05-29 RX ADMIN — MAGNESIUM SULFATE HEPTAHYDRATE 2 G: 40 INJECTION, SOLUTION INTRAVENOUS at 10:05

## 2024-05-29 RX ADMIN — ACETAMINOPHEN 1000 MG: 500 TABLET ORAL at 04:05

## 2024-05-29 RX ADMIN — Medication 16 G: at 10:05

## 2024-05-29 RX ADMIN — Medication 16 G: at 04:05

## 2024-05-29 RX ADMIN — Medication 16 G: at 03:05

## 2024-05-29 RX ADMIN — DEXTROSE 125 ML: 10 SOLUTION INTRAVENOUS at 08:05

## 2024-05-29 RX ADMIN — DEXTROSE 125 ML: 10 SOLUTION INTRAVENOUS at 03:05

## 2024-05-29 RX ADMIN — Medication 16 G: at 09:05

## 2024-05-29 RX ADMIN — Medication 16 G: at 08:05

## 2024-05-29 RX ADMIN — DEXTROSE 125 ML: 10 SOLUTION INTRAVENOUS at 09:05

## 2024-05-29 RX ADMIN — POLYETHYLENE GLYCOL 3350 17 G: 17 POWDER, FOR SOLUTION ORAL at 11:05

## 2024-05-29 RX ADMIN — DEXTROSE MONOHYDRATE 250 ML: 100 INJECTION, SOLUTION INTRAVENOUS at 07:05

## 2024-05-29 RX ADMIN — Medication 16 G: at 11:05

## 2024-05-29 RX ADMIN — Medication 15000 MG: at 04:05

## 2024-05-29 RX ADMIN — ENOXAPARIN SODIUM 40 MG: 40 INJECTION SUBCUTANEOUS at 04:05

## 2024-05-29 RX ADMIN — Medication 16 G: at 01:05

## 2024-05-29 RX ADMIN — DEXTROSE 125 ML: 10 SOLUTION INTRAVENOUS at 10:05

## 2024-05-29 RX ADMIN — Medication 16 G: at 07:05

## 2024-05-29 RX ADMIN — GLYCERIN 1 SUPPOSITORY: 2 SUPPOSITORY RECTAL at 11:05

## 2024-05-29 RX ADMIN — POTASSIUM BICARBONATE 40 MEQ: 391 TABLET, EFFERVESCENT ORAL at 05:05

## 2024-05-29 RX ADMIN — DEXTROSE MONOHYDRATE 200 ML/HR: 100 INJECTION, SOLUTION INTRAVENOUS at 12:05

## 2024-05-29 RX ADMIN — FLUOXETINE HYDROCHLORIDE 60 MG: 20 CAPSULE ORAL at 09:05

## 2024-05-29 RX ADMIN — Medication 16 G: at 06:05

## 2024-05-29 RX ADMIN — DEXTROSE MONOHYDRATE: 100 INJECTION, SOLUTION INTRAVENOUS at 05:05

## 2024-05-29 RX ADMIN — LEVONORGESTREL 1.5 MG: 1.5 TABLET ORAL at 12:05

## 2024-05-29 RX ADMIN — DEXTROSE MONOHYDRATE 200 ML/HR: 100 INJECTION, SOLUTION INTRAVENOUS at 04:05

## 2024-05-29 RX ADMIN — Medication 16 G: at 02:05

## 2024-05-29 RX ADMIN — Medication 16 G: at 12:05

## 2024-05-29 RX ADMIN — METOCLOPRAMIDE 10 MG: 5 TABLET ORAL at 11:05

## 2024-05-29 NOTE — PLAN OF CARE
"CM attempted to contact patient's "aunt Margy [phone]", that phone number is restricted. CM is unable to reach this person for information on patient. CM contacted Hollie Hood (mother) [phone], Ms. Erwin advised that patient in intellectually challenged all her life. Patient is homeless and has been for some years. Ms Hood advised she tried to help patient several years ago and patient did not want any help. Patient was not interested in speaking with [facility], which prompted the call to her mother. Discharge Planning Assessment to follow.        ,Aurelia Willams RN     [phone]    "

## 2024-05-29 NOTE — ASSESSMENT & PLAN NOTE
History of polysubstance abuse in the past including cocaine, opioids and amphetamines.     -f/u UTOX
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
## 2024-05-29 NOTE — ASSESSMENT & PLAN NOTE
Complication of uncontrolled DM  Qtc 523    -resume reglan with caution  -continuous telemetry  -monitor QTc
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
## 2024-05-29 NOTE — ED NOTES
Pt noted to have slurred speech when entering to help with restroom. POCT BG came back at 35. MD at bedside made aware. Gives verbal ordered to increase D10 gtt to 100ml/hr.  BG repeated 78. Sitter at bedside monitoring pt at all times Q 15 min checks. Pt in hospital gown due to medical necessity.

## 2024-05-29 NOTE — ASSESSMENT & PLAN NOTE
"Patient has history of non-adherence to medications  Frequent hospitalizations for DKA, most recently earlier this month  Discharged on glargine 12U daily and 4U aspart TID as needed  Has dexacom in place  Presenting today for nausea abdominal pain x 3 days, states she's run out of her medications  While in ED she reportedly self administered unknown amount of insulin to "get care she needed"  BGL initially >400, dropped to 30s  BHB 1.4, Bicarb 20, AG 13    -continue D10 infusion at 100cc/hr  -POCT glucose Q30 min  -discontinue D10 infusion once BGL >200 on 2 separate readings  -psych consultation  -continuous telemetry  -resume subQ insulin when clinically indicated  "

## 2024-05-29 NOTE — ED NOTES
Pt resting comfortably [employer] officer at bedside. Pt has no complaints AAOx4. Pt in hospital gown out of medical necessity. Sitter at bedside within reach q15 min checks.
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
## 2024-05-29 NOTE — PLAN OF CARE
[facility] - Cardiac Medical ICU  Initial Discharge Assessment       Primary Care Provider: Martin Rios MD    Admission Diagnosis: Hypoglycemia [E16.2]  Hyperglycemia [R73.9]    Admission Date: 5/28/2024  Expected Discharge Date: 6/3/2024    Transition of Care Barriers: Homeless, No family/friends to help, Does not adhere to care plan, Underinsured, Substance Abuse    Payor: [facility] CONNECTIONS / Plan: [facility] MARK[employer]PLACE / Product Type: Commercial /     Extended Emergency Contact Information  Primary Emergency Contact: Hollie Wilson  Mobile Phone: [phone]  Relation: Mother   needed? No    Discharge Plan A: Psychiatric hospital  Discharge Plan B: Psychiatric hospital [facility]  [address]  [facility] [address]  Phone: [phone] Fax: [phone]    [facility] [address] [facility], [place] - [address]  [address]  [facility] [address]  [facility] [address]  Phone: [phone] Fax: [phone]    [id number] - [place], [place] - [address] AT [facility] [address]  [address]  [facility] [address]  Phone: [phone] Fax: [phone]    [facility] [place], [place] - [address]  [address]  [facility] [address]  Phone: [phone] Fax: [phone]      Transferred from:     Past Medical History:   Diagnosis Date    Anemia     Borderline intellectual functioning 08/08/2017    Diabetes mellitus     Mood disorder     Scoliosis     Substance use disorder          CM met with patient in room for Discharge Planning Assessment.  Patient unable to answer questions due to nausea and would not talk to CM. CM attempted to call patient's Aunt Margy [phone], but phone number was restricted. CM could not leave message at phone number.  CM spoke with Hollie Hood (mother) [phone] via phone.  Per Ms Erwin, patient is homeless and lives on the street. Per Ms. Hood, patient  has been homeless for years. Ms. Hood explained she tried to help patient a few years ago and patient would not accept help.    Per Ms. Hood, patient was independent with ADLS and used no DME for ambulation as far as she knows. She has not seen patient in a couple of years. Per Ms. Hood, patient was not on dialysis, and CM verified via medical record that patient was not on Coumadin. Patient does not have home health, and has been hospitalized in past 30 days. Patient is currently PEC'd.  Patient will possibly go to psychiatric hospital upon discharge. Patient not able to take care of herself. Per Ms. Hood, patient has been intellectually challenged all her life.  Discharge Planning Booklet left in patient's room, and Discharge Planning discussed with Hollie Hood (mother) [phone] via phone.  All questions addressed.  CM will follow for needs.    Discharge Plan A and Plan B have been determined by review of patient's clinical status, future medical and therapeutic needs, and coverage/benefits for post-acute care in coordination with multidisciplinary team members.      Initial Assessment (most recent)       Adult Discharge Assessment - 05/29/24 1540          Discharge Assessment    Assessment Type Discharge Planning Assessment     Confirmed/corrected address, phone number and insurance Yes     Confirmed Demographics --   patient is homeless    Source of Information family     When was your last doctors appointment? 03/12/24     Does patient/caregiver understand observation status No     Was observation education provided? No   patient intellectually challenged    Communicated THELMA with patient/caregiver Date not available/Unable to determine     Reason For Admission Hypoglycemia     People in Home --   patient is homeless    Facility Arrived From: dropped off by a relative from the street     Do you expect to return to your current living situation? No     Do you have help at home or someone to help  you manage your care at home? No     Prior to hospitilization cognitive status: Alert/Oriented;Not Oriented to Time     Current cognitive status: Unable to Assess   patient was nauseated and did not want to talk to CM    Walking or Climbing Stairs Difficulty no     Dressing/Bathing Difficulty no     Equipment Currently Used at Home none     Readmission within 30 days? Yes     Patient currently being followed by outpatient case management? No     Do you currently have service(s) that help you manage your care at home? No     Do you take prescription medications? Yes     Do you have prescription coverage? Yes     Coverage [facility]     Do you have any problems affording any of your prescribed medications? TBD     Is the patient taking medications as prescribed? no     If no, which medications is patient not taking? insulin     Who is going to help you get home at discharge? it TBD at this time     How do you get to doctors appointments? other (see comments)   it is unknow at this time.    Are you on dialysis? No     Do you take coumadin? No     Discharge Plan A Psychiatric hospital     Discharge Plan B Psychiatric hospital     DME Needed Upon Discharge  other (see comments)   TBD    Discharge Plan discussed with: Parent(s)     Name(s) and Number(s) Hollie Wilson (mother) [phone]     Transition of Care Barriers Homeless;No family/friends to help;Does not adhere to care plan;Underinsured;Substance Abuse        Physical Activity    On average, how many days per week do you engage in moderate to strenuous exercise (like a brisk walk)? 0 days     On average, how many minutes do you engage in exercise at this level? 0 min        Financial Resource Strain    How hard is it for you to pay for the very basics like food, housing, medical care, and heating? Patient declined        Housing Stability    In the last 12 months, was there a time when you were not able  to pay the mortgage or rent on time? Patient declined     At any time in the past 12 months, were you homeless or living in a shelter (including now)? Patient declined        Transportation Needs    Has the lack of transportation kept you from medical appointments, meetings, work or from getting things needed for daily living? Patient declined        Food Insecurity    Within the past 12 months, you worried that your food would run out before you got the money to buy more. Patient declined     Within the past 12 months, the food you bought just didn't last and you didn't have money to get more. Patient declined        Social Isolation    How often do you feel lonely or isolated from those around you?  Patient declined        Alcohol Use    Q1: How often do you have a drink containing alcohol? Patient declined     Q2: How many drinks containing alcohol do you have on a typical day when you are drinking? Patient declined     Q3: How often do you have six or more drinks on one occasion? Patient declined        Utilities    In the past 12 months has the electric, gas, oil, or water company threatened to shut off services in your home? Patient declined        Health Literacy    How often do you need to have someone help you when you read instructions, pamphlets, or other written material from your doctor or pharmacy? Patient declines to respond                            PCP:  Martin Rios MD  [phone]        Pharmacy:    [facility]  [address]  [place] [address]  Phone: [phone] Fax: [phone]    [facility] [address] [facility], [place] - [address]  [address]  [facility] [address]  Phone: [phone] Fax: [phone]    [id number] - [place] - [address] AT [facility] [address]  [address]  [place] [address]  Phone: [phone] Fax: [phone]    [facility] [address] [place] [address] - [address]  Tia Weber  [facility] [address]  Phone: [phone] Fax: [phone]        Emergency Contacts:  Extended Emergency Contact Information  Primary Emergency Contact: InletHollie  Mobile Phone: [phone]  Relation: Mother   needed? No      Insurance:    Payor: [facility] CONNECTIONS / Plan: [facility] [employer] [facility] [employer]PLACE / Product Type: Commercial /     Aurelia Willams RN     [phone]      05/29/2024  4:15 PM
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
## 2024-05-29 NOTE — ASSESSMENT & PLAN NOTE
"Patient has history of non-adherence to medications  Frequent hospitalizations for DKA, most recently earlier this month  Discharged on glargine 12U daily and 4U aspart TID as needed  Has dexacom in place  Presenting today for nausea abdominal pain x 3 days, states she's run out of her medications  While in ED she reportedly self administered unknown amount of insulin to "get care she needed"  BGL initially >400, dropped to 30s  BHB 1.4, Bicarb 20, AG 13    -continue D10 infusion  -POCT glucose qHR  -psych consultation  -continuous telemetry  -resume subQ insulin when clinically indicated  "

## 2024-05-29 NOTE — ASSESSMENT & PLAN NOTE
Self administered unknown amount of insulin while in ED  BGL dropped to low of 30s, since recovered with D10 infusion  See Type I DM with hyperglycemia

## 2024-05-29 NOTE — ED NOTES
Patient Valuables & Belongings  **4 phones  *black jacket  *white shirt  *black & white joggers  *blue bag  *red bag (meds)  *black shoes  *black socks  *zip lock bag with meds  *ver[employer] book sack  *2 wallets  *headphone  *blue tooth speaker  *  *2 toenail clippers  *[place] ID  *bank card (capital one)  *food stamp card  *cash amy card
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
## 2024-05-29 NOTE — SUBJECTIVE & OBJECTIVE
Past Medical History:   Diagnosis Date    Anemia     Borderline intellectual functioning 2017    Diabetes mellitus     Mood disorder     Scoliosis     Substance use disorder        Past Surgical History:   Procedure Laterality Date     SECTION         Review of patient's allergies indicates:   Allergen Reactions    Clove oil Itching    Pork/porcine containing products Other (See Comments)     Pt does not eat Pork    Zofran (as hydrochloride) [ondansetron hcl] Hives       Family History    None       Tobacco Use    Smoking status: Every Day    Smokeless tobacco: Never   Substance and Sexual Activity    Alcohol use: Yes    Drug use: Yes     Types: Marijuana    Sexual activity: Not Currently      Review of Systems   Constitutional:  Positive for activity change and fatigue. Negative for chills.   HENT:  Negative for congestion.    Respiratory:  Negative for cough and shortness of breath.    Cardiovascular:  Negative for chest pain and leg swelling.   Gastrointestinal:  Positive for constipation. Negative for abdominal distention and abdominal pain.   Genitourinary:  Negative for difficulty urinating.   Psychiatric/Behavioral:  Positive for self-injury.      Objective:     Vital Signs (Most Recent):  Temp: 98.6 °F (37 °C) (24)  Pulse: 96 (24)  Resp: 16 (24 010)  BP: 133/82 (24)  SpO2: 99 % (24) Vital Signs (24h Range):  Temp:  [98.6 °F (37 °C)] 98.6 °F (37 °C)  Pulse:  [] 96  Resp:  [13-22] 16  SpO2:  [97 %-100 %] 99 %  BP: (108-133)/(72-87) 133/82   Weight: 54 kg (119 lb)  Body mass index is 18.64 kg/m².    No intake or output data in the 24 hours ending 24       Physical Exam  HENT:      Head: Normocephalic and atraumatic.      Mouth/Throat:      Mouth: Mucous membranes are moist.      Pharynx: Oropharynx is clear.   Cardiovascular:      Rate and Rhythm: Regular rhythm. Tachycardia present.      Heart sounds: Normal heart sounds. No  murmur heard.  Pulmonary:      Effort: Pulmonary effort is normal. No respiratory distress.   Abdominal:      General: Abdomen is flat. There is no distension.   Skin:     General: Skin is warm and dry.      Findings: Lesion present.   Psychiatric:      Comments: Flat affect            Vents:     Lines/Drains/Airways       Peripheral Intravenous Line  Duration                  Peripheral IV - Single Lumen 05/28/24 2240 20 G Distal;Posterior;Right Forearm <1 day                  Significant Labs:    CBC/Anemia Profile:  Recent Labs   Lab 05/28/24  2234   WBC 5.80   HGB 10.8*   HCT 34.7*      MCV 83   RDW 15.9*        Chemistries:  Recent Labs   Lab 05/28/24  2233   *   K 3.6      CO2 20*   BUN 11   CREATININE 1.0   CALCIUM 9.5   ALBUMIN 3.8   PROT 7.0   BILITOT 0.5   ALKPHOS 99   ALT 9*   AST 9*   MG 1.8   PHOS 3.3       All pertinent labs within the past 24 hours have been reviewed.    Significant Imaging: I have reviewed all pertinent imaging results/findings within the past 24 hours.

## 2024-05-29 NOTE — PLAN OF CARE
Please contact pt's aunt Margy at   [phone],  and pt's mom Hollie  [phone] when pt moves to a room.        Thanks              Alison Chen LMSW  Case Management  Emergency Department  [phone]

## 2024-05-29 NOTE — ED NOTES
Pt resting comfortable with SANE nurse and sitter at bedside doing q15 min checks. Pt in hospital gown due to medical necessity at this time.

## 2024-05-29 NOTE — ED NOTES
Charge nurse made aware of pt's complaint.     Pt also requesting to speak with SW when she gets to a room regarding placement for a shelter due to being homeless.

## 2024-05-29 NOTE — ED NOTES
Sitter at bedside under direct supervision q 15 min checks. Pt resting comfortably. Vital signs stable respirations even and unlabored. Pt in hospital gown due to medical necessity.

## 2024-05-29 NOTE — ED NOTES
Pt resting comfortably in hospital gown. Respirations even and unlabored vital signs stable. All unsafe items removed from room. Sitter at bedside with patient in sight charting q 15 min checks.

## 2024-05-29 NOTE — CONSULTS
Patient seen and evaluated by critical care medicine. To be admitted to ICU for further management. Full H&P to follow.     GAY Mora, [facility]  Pulmonary Critical Care Medicine   05/29/2024

## 2024-05-30 LAB
ALBUMIN SERPL BCP-MCNC: 3.3 G/DL (ref 3.5–5.2)
ALBUMIN SERPL BCP-MCNC: 4.1 G/DL (ref 3.5–5.2)
ALP SERPL-CCNC: 116 U/L (ref 55–135)
ALP SERPL-CCNC: 87 U/L (ref 55–135)
ALT SERPL W/O P-5'-P-CCNC: 8 U/L (ref 10–44)
ALT SERPL W/O P-5'-P-CCNC: 9 U/L (ref 10–44)
ANION GAP SERPL CALC-SCNC: 7 MMOL/L (ref 8–16)
ANION GAP SERPL CALC-SCNC: 9 MMOL/L (ref 8–16)
AST SERPL-CCNC: 11 U/L (ref 10–40)
AST SERPL-CCNC: 13 U/L (ref 10–40)
BASOPHILS # BLD AUTO: 0.03 K/UL (ref 0–0.2)
BASOPHILS NFR BLD: 0.5 % (ref 0–1.9)
BILIRUB SERPL-MCNC: 0.3 MG/DL (ref 0.1–1)
BILIRUB SERPL-MCNC: 0.5 MG/DL (ref 0.1–1)
BUN SERPL-MCNC: 3 MG/DL (ref 6–20)
BUN SERPL-MCNC: 4 MG/DL (ref 6–20)
C PEPTIDE SERPL-MCNC: <0.08 NG/ML (ref 0.78–5.19)
CALCIUM SERPL-MCNC: 9 MG/DL (ref 8.7–10.5)
CALCIUM SERPL-MCNC: 9.6 MG/DL (ref 8.7–10.5)
CHLORIDE SERPL-SCNC: 100 MMOL/L (ref 95–110)
CHLORIDE SERPL-SCNC: 106 MMOL/L (ref 95–110)
CO2 SERPL-SCNC: 26 MMOL/L (ref 23–29)
CO2 SERPL-SCNC: 27 MMOL/L (ref 23–29)
CREAT SERPL-MCNC: 0.8 MG/DL (ref 0.5–1.4)
CREAT SERPL-MCNC: 1 MG/DL (ref 0.5–1.4)
DIFFERENTIAL METHOD BLD: ABNORMAL
EOSINOPHIL # BLD AUTO: 0.3 K/UL (ref 0–0.5)
EOSINOPHIL NFR BLD: 5 % (ref 0–8)
ERYTHROCYTE [DISTWIDTH] IN BLOOD BY AUTOMATED COUNT: 15.8 % (ref 11.5–14.5)
EST. GFR  (NO RACE VARIABLE): >60 ML/MIN/1.73 M^2
EST. GFR  (NO RACE VARIABLE): >60 ML/MIN/1.73 M^2
GLUCOSE SERPL-MCNC: 178 MG/DL (ref 70–110)
GLUCOSE SERPL-MCNC: 32 MG/DL (ref 70–110)
HCT VFR BLD AUTO: 36.8 % (ref 37–48.5)
HGB BLD-MCNC: 12.2 G/DL (ref 12–16)
IMM GRANULOCYTES # BLD AUTO: 0.01 K/UL (ref 0–0.04)
IMM GRANULOCYTES NFR BLD AUTO: 0.2 % (ref 0–0.5)
LYMPHOCYTES # BLD AUTO: 3.1 K/UL (ref 1–4.8)
LYMPHOCYTES NFR BLD: 48.6 % (ref 18–48)
MAGNESIUM SERPL-MCNC: 2.1 MG/DL (ref 1.6–2.6)
MCH RBC QN AUTO: 26.6 PG (ref 27–31)
MCHC RBC AUTO-ENTMCNC: 33.2 G/DL (ref 32–36)
MCV RBC AUTO: 80 FL (ref 82–98)
MONOCYTES # BLD AUTO: 0.5 K/UL (ref 0.3–1)
MONOCYTES NFR BLD: 8 % (ref 4–15)
NEUTROPHILS # BLD AUTO: 2.4 K/UL (ref 1.8–7.7)
NEUTROPHILS NFR BLD: 37.7 % (ref 38–73)
NRBC BLD-RTO: 0 /100 WBC
PHOSPHATE SERPL-MCNC: 3.9 MG/DL (ref 2.7–4.5)
PLATELET # BLD AUTO: 315 K/UL (ref 150–450)
PMV BLD AUTO: 11 FL (ref 9.2–12.9)
POCT GLUCOSE: 103 MG/DL (ref 70–110)
POCT GLUCOSE: 107 MG/DL (ref 70–110)
POCT GLUCOSE: 115 MG/DL (ref 70–110)
POCT GLUCOSE: 122 MG/DL (ref 70–110)
POCT GLUCOSE: 144 MG/DL (ref 70–110)
POCT GLUCOSE: 151 MG/DL (ref 70–110)
POCT GLUCOSE: 180 MG/DL (ref 70–110)
POCT GLUCOSE: 190 MG/DL (ref 70–110)
POCT GLUCOSE: 218 MG/DL (ref 70–110)
POCT GLUCOSE: 29 MG/DL (ref 70–110)
POCT GLUCOSE: 33 MG/DL (ref 70–110)
POCT GLUCOSE: 50 MG/DL (ref 70–110)
POCT GLUCOSE: 53 MG/DL (ref 70–110)
POCT GLUCOSE: 67 MG/DL (ref 70–110)
POCT GLUCOSE: 74 MG/DL (ref 70–110)
POCT GLUCOSE: 75 MG/DL (ref 70–110)
POCT GLUCOSE: 76 MG/DL (ref 70–110)
POCT GLUCOSE: 78 MG/DL (ref 70–110)
POCT GLUCOSE: 83 MG/DL (ref 70–110)
POCT GLUCOSE: 87 MG/DL (ref 70–110)
POCT GLUCOSE: 89 MG/DL (ref 70–110)
POCT GLUCOSE: 95 MG/DL (ref 70–110)
POCT GLUCOSE: 98 MG/DL (ref 70–110)
POTASSIUM SERPL-SCNC: 3.4 MMOL/L (ref 3.5–5.1)
POTASSIUM SERPL-SCNC: 3.8 MMOL/L (ref 3.5–5.1)
PROT SERPL-MCNC: 6.2 G/DL (ref 6–8.4)
PROT SERPL-MCNC: 8 G/DL (ref 6–8.4)
RBC # BLD AUTO: 4.58 M/UL (ref 4–5.4)
SODIUM SERPL-SCNC: 136 MMOL/L (ref 136–145)
SODIUM SERPL-SCNC: 139 MMOL/L (ref 136–145)
WBC # BLD AUTO: 6.36 K/UL (ref 3.9–12.7)

## 2024-05-30 PROCEDURE — 84100 ASSAY OF PHOSPHORUS: CPT | Performed by: NURSE PRACTITIONER

## 2024-05-30 PROCEDURE — 80053 COMPREHEN METABOLIC PANEL: CPT | Performed by: NURSE PRACTITIONER

## 2024-05-30 PROCEDURE — 25000003 PHARM REV CODE 250

## 2024-05-30 PROCEDURE — 20000000 HC ICU ROOM

## 2024-05-30 PROCEDURE — 99291 CRITICAL CARE FIRST HOUR: CPT | Mod: ,,, | Performed by: INTERNAL MEDICINE

## 2024-05-30 PROCEDURE — A4216 STERILE WATER/SALINE, 10 ML: HCPCS | Performed by: INTERNAL MEDICINE

## 2024-05-30 PROCEDURE — 63600175 PHARM REV CODE 636 W HCPCS

## 2024-05-30 PROCEDURE — 93010 ELECTROCARDIOGRAM REPORT: CPT | Mod: ,,, | Performed by: INTERNAL MEDICINE

## 2024-05-30 PROCEDURE — 84681 ASSAY OF C-PEPTIDE: CPT | Performed by: PHYSICIAN ASSISTANT

## 2024-05-30 PROCEDURE — 63600175 PHARM REV CODE 636 W HCPCS: Mod: JZ,JG

## 2024-05-30 PROCEDURE — 25000003 PHARM REV CODE 250: Performed by: INTERNAL MEDICINE

## 2024-05-30 PROCEDURE — 94761 N-INVAS EAR/PLS OXIMETRY MLT: CPT

## 2024-05-30 PROCEDURE — 85025 COMPLETE CBC W/AUTO DIFF WBC: CPT | Performed by: NURSE PRACTITIONER

## 2024-05-30 PROCEDURE — 99223 1ST HOSP IP/OBS HIGH 75: CPT | Mod: ,,, | Performed by: PHYSICIAN ASSISTANT

## 2024-05-30 PROCEDURE — 83735 ASSAY OF MAGNESIUM: CPT | Performed by: NURSE PRACTITIONER

## 2024-05-30 PROCEDURE — 93005 ELECTROCARDIOGRAM TRACING: CPT

## 2024-05-30 PROCEDURE — 99232 SBSQ HOSP IP/OBS MODERATE 35: CPT | Mod: AF,HB,, | Performed by: PSYCHIATRY & NEUROLOGY

## 2024-05-30 PROCEDURE — 25000003 PHARM REV CODE 250: Performed by: NURSE PRACTITIONER

## 2024-05-30 PROCEDURE — 80053 COMPREHEN METABOLIC PANEL: CPT | Mod: 91 | Performed by: PHYSICIAN ASSISTANT

## 2024-05-30 RX ORDER — IBUPROFEN 200 MG
16 TABLET ORAL
Status: DISCONTINUED | OUTPATIENT
Start: 2024-05-30 | End: 2024-05-31

## 2024-05-30 RX ORDER — GLUCAGON 1 MG
1 KIT INJECTION
Status: DISCONTINUED | OUTPATIENT
Start: 2024-05-30 | End: 2024-05-31

## 2024-05-30 RX ORDER — PROCHLORPERAZINE EDISYLATE 5 MG/ML
5 INJECTION INTRAMUSCULAR; INTRAVENOUS ONCE
Status: COMPLETED | OUTPATIENT
Start: 2024-05-30 | End: 2024-05-30

## 2024-05-30 RX ORDER — HYDROXYZINE PAMOATE 25 MG/1
25 CAPSULE ORAL EVERY 8 HOURS PRN
Status: DISCONTINUED | OUTPATIENT
Start: 2024-05-30 | End: 2024-06-04 | Stop reason: HOSPADM

## 2024-05-30 RX ORDER — DEXTROSE 40 %
15 GEL (GRAM) ORAL
Status: DISCONTINUED | OUTPATIENT
Start: 2024-05-30 | End: 2024-06-04 | Stop reason: HOSPADM

## 2024-05-30 RX ORDER — DEXTROSE MONOHYDRATE 100 MG/ML
INJECTION, SOLUTION INTRAVENOUS CONTINUOUS
Status: DISCONTINUED | OUTPATIENT
Start: 2024-05-30 | End: 2024-06-01

## 2024-05-30 RX ORDER — IBUPROFEN 200 MG
24 TABLET ORAL
Status: DISCONTINUED | OUTPATIENT
Start: 2024-05-30 | End: 2024-05-31

## 2024-05-30 RX ORDER — INSULIN ASPART 100 [IU]/ML
0-4 INJECTION, SOLUTION INTRAVENOUS; SUBCUTANEOUS EVERY 4 HOURS PRN
Status: DISCONTINUED | OUTPATIENT
Start: 2024-05-30 | End: 2024-05-31

## 2024-05-30 RX ADMIN — ACETAMINOPHEN 1000 MG: 500 TABLET ORAL at 12:05

## 2024-05-30 RX ADMIN — Medication 10 ML: at 06:05

## 2024-05-30 RX ADMIN — ENOXAPARIN SODIUM 40 MG: 40 INJECTION SUBCUTANEOUS at 06:05

## 2024-05-30 RX ADMIN — Medication 16 G: at 03:05

## 2024-05-30 RX ADMIN — DEXTROSE: 10 SOLUTION INTRAVENOUS at 06:05

## 2024-05-30 RX ADMIN — DEXTROSE 125 ML: 10 SOLUTION INTRAVENOUS at 04:05

## 2024-05-30 RX ADMIN — Medication 16 G: at 01:05

## 2024-05-30 RX ADMIN — FLUOXETINE HYDROCHLORIDE 60 MG: 20 CAPSULE ORAL at 09:05

## 2024-05-30 RX ADMIN — GLUCAGON 1 MG: KIT at 04:05

## 2024-05-30 RX ADMIN — Medication 10 ML: at 12:05

## 2024-05-30 RX ADMIN — GLUCAGON HYDROCHLORIDE 1 MG: KIT at 04:05

## 2024-05-30 RX ADMIN — PROCHLORPERAZINE EDISYLATE 5 MG: 5 INJECTION INTRAMUSCULAR; INTRAVENOUS at 06:05

## 2024-05-30 RX ADMIN — ACETAMINOPHEN 1000 MG: 500 TABLET ORAL at 08:05

## 2024-05-30 RX ADMIN — PANTOPRAZOLE SODIUM 40 MG: 40 TABLET, DELAYED RELEASE ORAL at 09:05

## 2024-05-30 RX ADMIN — DEXTROSE: 10 SOLUTION INTRAVENOUS at 10:05

## 2024-05-30 RX ADMIN — HYDROXYZINE PAMOATE 25 MG: 25 CAPSULE ORAL at 08:05

## 2024-05-30 RX ADMIN — DEXTROSE 125 ML: 10 SOLUTION INTRAVENOUS at 08:05

## 2024-05-30 RX ADMIN — POTASSIUM BICARBONATE 40 MEQ: 391 TABLET, EFFERVESCENT ORAL at 07:05

## 2024-05-30 RX ADMIN — Medication 16 G: at 12:05

## 2024-05-30 RX ADMIN — PROCHLORPERAZINE EDISYLATE 5 MG: 5 INJECTION INTRAMUSCULAR; INTRAVENOUS at 07:05

## 2024-05-30 NOTE — PLAN OF CARE
MICU DAILY GOALS     Family/Goals of care/Code Status   Code Status: Full Code    24H Vital Sign Range  Temp:  [97.8 °F (36.6 °C)-98.6 °F (37 °C)]   Pulse:  []   Resp:  [12-32]   BP: (108-152)/()   SpO2:  [97 %-100 %]      Shift Events (include procedures and significant events)   Pt began to normalize CBG results.  Dr Meadows also with Aminah Farias adjusted orders as values were called to them by this RN.   Pt ws free from distress and  by end of shift was eating and laughing and joking with staff.      AWAKE RASS: Goal - RASS Goal: 0-->alert and calm  Actual - RASS (Finnegan Agitation-Sedation Scale): alert and calm    Restraint necessity: Not necessary   BREATHE SBT: Not intubated    Coordinate A & B, analgesics/sedatives Pain: managed   SAT: Not intubated   Delirium CAM-ICU:     Early(intubated/ Progressive (non-intubated) Mobility MOVE Screen (INTUBATED ONLY): Not intubated    Activity: Activity Management: Ankle pumps - L1, Arm raise - L1, Bridge - L1, Rolling - L1   Feeding/Nutrition Diet order: Diet/Nutrition Received: regular (PEC tray),     Thrombus DVT prophylaxis: VTE Required Core Measure: Pharmacological prophylaxis initiated/maintained   HOB Elevation Head of Bed (HOB) Positioning: HOB at 60-90 degrees   Ulcer Prophylaxis GI: yes   Glucose control uncontrolled     Skin Skin assessed during: Q Shift Change    Sacrum intact/not altered? Yes  Heels intact/not altered? Yes  Surgical wound? No    CHECK ONE!   (no altered skin or altered skin) and sub boxes:  [x] No Altered Skin Integrity Present    [x]Prevention Measures Documented    [] Altered Skin Integrity Present or Discovered   [] LDA present in EPIC, daily doc completed              [] LDA added if not in EPIC (describe wound).                    When describing wound, do not stage, use descriptive words only.    [] Wound Image Taken (required on admit,                   transfer/discharge and every Tuesday)    Wound Care Consulted?  No    4 EYES:  Attending Nurse (1st set of eyes): Kelly     Second RN/Staff Member (2nd set of eyes):Tere   Bowel Function constipation -gave suppository as rx with BM+   Indwelling Catheter Necessity         N/a   De-escalation Antibiotics N/a        VS and assessment per flow sheet, patient progressing towards goals as tolerated, plan of care reviewed with  patient - no family visited , Concerns addressed with pt and plan of care reviewed as the meds changed based on her CBG., Care assumed by pm rn.

## 2024-05-30 NOTE — PROGRESS NOTES
CONSULTATION LIAISON PSYCHIATRY PROGRESS NOTE    Patient Name: Hollie Wilson  MRN: [id number]  Patient Class: IP- Inpatient  Admission Date: 5/28/2024  Attending Physician: Basilio Chapin MD      SUBJECTIVE:   Hollie Wilson is a 34 y.o. 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 Per nursing, patient has been calm, but somewhat irritable with staff due to need for hourly glucose checks. Utox returned positive for cocaine, methamphetamine, and cannabis. This AM patient vomiting/nauseated. Refusing oral glucose as it has worsened nausea. Blood sugar remains unstable. Evaluated for midline placement.    Today, patient seen sleeping in bed. She is calm and cooperative though somewhat difficult to arouse and maintain focus on conversation. Patient states she is doing well currently. Slept well through the night. Appetite has been poor secondary to nausea. She denies current SI. States she feels safe in the hospital. Denies AVH and no evidence of delusions or responding to internal stimuli. Discussed results of urine tox. Patient unaware of cocaine and methamphetamine.    Attempted to contact patient's mother (Hollie Wilson, [phone]) at 0930: no answer.       OBJECTIVE:    Mental Status Exam:  General Appearance: appears stated age, well developed and nourished, adequately groomed and appropriately dressed, in no acute distress  Behavior: normal; cooperative; reasonably friendly, pleasant, and polite; appropriate eye-contact; under good behavioral control  Involuntary Movements and Motor Activity: no abnormal involuntary movements noted; no tics, no tremors, no akathisia, no dystonia, no evidence  "of tardive dyskinesia; no psychomotor agitation or retardation  Gait and Station: unable to assess - patient lying down or seated  Speech and Language: decreased spontaneity, slowed, paucity of speech  Mood: "Fine"  Affect: euthymic, constricted  Thought Process and Associations: difficult to assess due to poverty of thought  Thought Content and Perceptions:: no suicidal or homicidal ideation, no auditory or visual hallucinations, no paranoid ideation, no ideas of reference, no evidence of delusions or psychosis  Sensorium and Orientation: intact; alert with clear sensorium; oriented fully to person, place, time and situation  Recent and Remote Memory: grossly intact, able to recall relevant and salient information from the recent and remote past  Attention and Concentration:  somewhat inattentive today secondary to somnolence  Fund of Knowledge: vocabulary rudimentary, intellectually disabled  Insight: adequate  Judgment: poor, as evidenced by recent suicide attempt    CAM ICU positive? no      ASSESSMENT & RECOMMENDATIONS   Unspecified mood disorder, MRE depression  Generalized anxiety disorder  Cannabis use  Methamphetamine use  Cocaine use  History of unspecified psychosis     Unspecified mood disorder, MRE depression  PSYCH MEDICATIONS  Unclear history of bipolar disorder; will attempt to obtain collateral and monitor for symptoms of diony  Scheduled - Continue Prozac 60 mg PO daily for depression and anxiety  PRN - Continue Vistaril 25 mg q8 hours for anxiety     History of unspecified psychosis  PSYCH MEDICATIONS  Unclear if organic psychosis or triggered by substance use; history of non-compliance with antipsychotics/mood stabilizers; will monitor for emerging psychosis  PRN - consider Ativan 1-2 mg PO/IM for non-redirectable agitation; avoid Haldol or other Qtc prolonging agents given patient current Qtc (536). Would also avoid Zyprexa given difficulty with blood glucose    Cannabis/cocaine/methamphetamine " use  Counseled against further use of these substances  Denies regular use of cocaine and methamphetamine    RISK ASSESSMENT  NEEDS PEC because patient is in imminent danger of hurting self or others and is gravely disabled. & NEEDS 1:1 sitter    FOLLOW UP  Will follow up while in house     DISPOSITION - once medically cleared:   Seek involuntary inpatient psychiatric admission for stabilization of acute psychiatric symptoms and a safe disposition plan is enacted. The patient &/or their family was informed that the patient will be transferred to an inpatient unit per ED/primary placement team.    Please contact ON CALL psychiatry service (24/7) for any acute issues that may arise.    Dr. Michael Joseph   Psychiatry  [facility]  5/30/2024 9:47 AM        --------------------------------------------------------------------------------------------------------------------------------------------------------------------------------------------------------------------------------------    CONTINUED OBJECTIVE clinical data & findings reviewed and noted for above decision making    Current Medications:   Scheduled Meds:    enoxparin  40 mg Subcutaneous Q24H (prophylaxis, 1700)    FLUoxetine  60 mg Oral Daily    glucose  16 g Oral Q1H    pantoprazole  40 mg Oral QAM    polyethylene glycol  17 g Oral Daily    potassium bicarbonate  20 mEq Oral Once    sodium chloride 0.9%  10 mL Intravenous Q6H     PRN Meds:   Current Facility-Administered Medications:     acetaminophen, 1,000 mg, Oral, Q6H PRN    dextrose 10%, 12.5 g, Intravenous, PRN    dextrose 10%, 25 g, Intravenous, PRN    dextrose, 15 g, Oral, PRN    glucagon (human recombinant), 1 mg, Intramuscular, PRN    glucose, 16 g, Oral, PRN    glucose, 24 g, Oral, PRN    metoclopramide HCl, 10 mg, Oral, Q6H PRN    sodium chloride 0.9%, 10 mL, Intravenous, PRN    Flushing PICC/Midline Protocol, , , Until Discontinued **AND** sodium chloride 0.9%, 10 mL,  Intravenous, Q6H **AND** sodium chloride 0.9%, 10 mL, Intravenous, PRN    Allergies:   Review of patient's allergies indicates:   Allergen Reactions    Clove oil Itching    Pork/porcine containing products Other (See Comments)     Pt does not eat Pork    Zofran (as hydrochloride) [ondansetron hcl] Hives       Vitals  Vitals:    05/30/24 0800   BP: 124/83   Pulse: 105   Resp: (!) 26   Temp: 98.6 °F (37 °C)       Labs/Imaging/Studies:  Recent Results (from the past 24 hour(s))   POCT glucose    Collection Time: 05/29/24 10:09 AM   Result Value Ref Range    POCT Glucose 62 (L) 70 - 110 mg/dL   POCT glucose    Collection Time: 05/29/24 10:58 AM   Result Value Ref Range    POCT Glucose 69 (L) 70 - 110 mg/dL   Toxicology screen, urine    Collection Time: 05/29/24 10:59 AM   Result Value Ref Range    Alcohol, Urine <10 <10 mg/dL    Benzodiazepines Negative Negative    Methadone metabolites Negative Negative    Cocaine (Metab.) Presumptive Positive (A) Negative    Opiate Scrn, Ur Negative Negative    Barbiturate Screen, Ur Negative Negative    Amphetamine Screen, Ur Presumptive Positive (A) Negative    THC Presumptive Positive (A) Negative    Phencyclidine Negative Negative    Creatinine, Urine 18.0 15.0 - 325.0 mg/dL    Toxicology Information SEE COMMENT    POCT glucose    Collection Time: 05/29/24 11:35 AM   Result Value Ref Range    POCT Glucose 86 70 - 110 mg/dL   POCT glucose    Collection Time: 05/29/24 12:02 PM   Result Value Ref Range    POCT Glucose 82 70 - 110 mg/dL   POCT glucose    Collection Time: 05/29/24 12:31 PM   Result Value Ref Range    POCT Glucose 73 70 - 110 mg/dL   POCT glucose    Collection Time: 05/29/24  1:02 PM   Result Value Ref Range    POCT Glucose 82 70 - 110 mg/dL   EKG 12-lead    Collection Time: 05/29/24  1:36 PM   Result Value Ref Range    QRS Duration 134 ms    [facility] QTC Calculation 536 ms   POCT glucose    Collection Time: 05/29/24  1:46 PM   Result Value Ref Range    POCT Glucose 84 70 -  110 mg/dL   POCT glucose    Collection Time: 05/29/24  2:29 PM   Result Value Ref Range    POCT Glucose 85 70 - 110 mg/dL   POCT glucose    Collection Time: 05/29/24  3:08 PM   Result Value Ref Range    POCT Glucose 92 70 - 110 mg/dL   Magnesium    Collection Time: 05/29/24  3:25 PM   Result Value Ref Range    Magnesium 2.3 1.6 - 2.6 mg/dL   Basic metabolic panel    Collection Time: 05/29/24  3:25 PM   Result Value Ref Range    Sodium 138 136 - 145 mmol/L    Potassium 3.7 3.5 - 5.1 mmol/L    Chloride 104 95 - 110 mmol/L    CO2 24 23 - 29 mmol/L    Glucose 118 (H) 70 - 110 mg/dL    BUN 5 (L) 6 - 20 mg/dL    Creatinine 0.7 0.5 - 1.4 mg/dL    Calcium 9.2 8.7 - 10.5 mg/dL    Anion Gap 10 8 - 16 mmol/L    eGFR >60.0 >60 mL/min/1.73 m^2   Phosphorus    Collection Time: 05/29/24  3:25 PM   Result Value Ref Range    Phosphorus 4.8 (H) 2.7 - 4.5 mg/dL   POCT glucose    Collection Time: 05/29/24  3:34 PM   Result Value Ref Range    POCT Glucose 67 (L) 70 - 110 mg/dL   POCT glucose    Collection Time: 05/29/24  4:17 PM   Result Value Ref Range    POCT Glucose 82 70 - 110 mg/dL   POCT glucose    Collection Time: 05/29/24  5:03 PM   Result Value Ref Range    POCT Glucose 92 70 - 110 mg/dL   POCT glucose    Collection Time: 05/29/24  5:54 PM   Result Value Ref Range    POCT Glucose 183 (H) 70 - 110 mg/dL   POCT glucose    Collection Time: 05/29/24  6:32 PM   Result Value Ref Range    POCT Glucose 227 (H) 70 - 110 mg/dL   POCT glucose    Collection Time: 05/29/24  7:17 PM   Result Value Ref Range    POCT Glucose 235 (H) 70 - 110 mg/dL   POCT glucose    Collection Time: 05/29/24  8:15 PM   Result Value Ref Range    POCT Glucose 109 70 - 110 mg/dL   POCT glucose    Collection Time: 05/29/24  9:13 PM   Result Value Ref Range    POCT Glucose 87 70 - 110 mg/dL   POCT glucose    Collection Time: 05/29/24 10:10 PM   Result Value Ref Range    POCT Glucose 137 (H) 70 - 110 mg/dL   POCT glucose    Collection Time: 05/29/24 11:04 PM   Result  Value Ref Range    POCT Glucose 133 (H) 70 - 110 mg/dL   POCT glucose    Collection Time: 05/30/24 12:06 AM   Result Value Ref Range    POCT Glucose 151 (H) 70 - 110 mg/dL   POCT glucose    Collection Time: 05/30/24  1:57 AM   Result Value Ref Range    POCT Glucose 83 70 - 110 mg/dL   POCT glucose    Collection Time: 05/30/24  3:32 AM   Result Value Ref Range    POCT Glucose 33 (LL) 70 - 110 mg/dL   POCT glucose    Collection Time: 05/30/24  3:49 AM   Result Value Ref Range    POCT Glucose 29 (LL) 70 - 110 mg/dL   POCT glucose    Collection Time: 05/30/24  4:10 AM   Result Value Ref Range    POCT Glucose 89 70 - 110 mg/dL   Magnesium    Collection Time: 05/30/24  4:22 AM   Result Value Ref Range    Magnesium 2.1 1.6 - 2.6 mg/dL   Phosphorus    Collection Time: 05/30/24  4:22 AM   Result Value Ref Range    Phosphorus 3.9 2.7 - 4.5 mg/dL   Comprehensive metabolic panel    Collection Time: 05/30/24  4:22 AM   Result Value Ref Range    Sodium 139 136 - 145 mmol/L    Potassium 3.8 3.5 - 5.1 mmol/L    Chloride 106 95 - 110 mmol/L    CO2 26 23 - 29 mmol/L    Glucose 32 (LL) 70 - 110 mg/dL    BUN 4 (L) 6 - 20 mg/dL    Creatinine 0.8 0.5 - 1.4 mg/dL    Calcium 9.0 8.7 - 10.5 mg/dL    Total Protein 6.2 6.0 - 8.4 g/dL    Albumin 3.3 (L) 3.5 - 5.2 g/dL    Total Bilirubin 0.3 0.1 - 1.0 mg/dL    Alkaline Phosphatase 87 55 - 135 U/L    AST 11 10 - 40 U/L    ALT 8 (L) 10 - 44 U/L    eGFR >60.0 >60 mL/min/1.73 m^2    Anion Gap 7 (L) 8 - 16 mmol/L   CBC auto differential    Collection Time: 05/30/24  4:22 AM   Result Value Ref Range    WBC 6.36 3.90 - 12.70 K/uL    RBC 4.58 4.00 - 5.40 M/uL    Hemoglobin 12.2 12.0 - 16.0 g/dL    Hematocrit 36.8 (L) 37.0 - 48.5 %    MCV 80 (L) 82 - 98 fL    MCH 26.6 (L) 27.0 - 31.0 pg    MCHC 33.2 32.0 - 36.0 g/dL    RDW 15.8 (H) 11.5 - 14.5 %    Platelets 315 150 - 450 K/uL    MPV 11.0 9.2 - 12.9 fL    Immature Granulocytes 0.2 0.0 - 0.5 %    Gran # (ANC) 2.4 1.8 - 7.7 K/uL    Immature Grans (Abs)  0.01 0.00 - 0.04 K/uL    Lymph # 3.1 1.0 - 4.8 K/uL    Mono # 0.5 0.3 - 1.0 K/uL    Eos # 0.3 0.0 - 0.5 K/uL    Baso # 0.03 0.00 - 0.20 K/uL    nRBC 0 0 /100 WBC    Gran % 37.7 (L) 38.0 - 73.0 %    Lymph % 48.6 (H) 18.0 - 48.0 %    Mono % 8.0 4.0 - 15.0 %    Eosinophil % 5.0 0.0 - 8.0 %    Basophil % 0.5 0.0 - 1.9 %    Differential Method Automated    POCT glucose    Collection Time: 05/30/24  4:53 AM   Result Value Ref Range    POCT Glucose 218 (H) 70 - 110 mg/dL   POCT glucose    Collection Time: 05/30/24  6:05 AM   Result Value Ref Range    POCT Glucose 180 (H) 70 - 110 mg/dL     Imaging Results              X-Ray Chest AP Portable (Final result)  Result time 05/28/24 22:55:02      Final result by Giles Moncada MD (05/28/24 22:55:02)                   Impression:      No acute findings in the chest.      Electronically signed by: Giles Moncada MD  Date:    05/28/2024  Time:    22:55               Narrative:    EXAMINATION:  XR CHEST AP PORTABLE    CLINICAL HISTORY:  hyperglycemia;    TECHNIQUE:  Single frontal view of the chest was performed.    COMPARISON:  05/18/2024.    FINDINGS:  No consolidation, pleural effusion or pneumothorax.    Cardiomediastinal silhouette is unremarkable.

## 2024-05-30 NOTE — SUBJECTIVE & OBJECTIVE
Interval HPI:   Blood glucose below goal. Patient in room 6083/6083 A. Blood glucose improving. BG at and below goal on current insulin regimen (None). Steroid use- None .      Renal function- Normal   Vasopressors-  None     Diet Adult Regular (IDDSI Level 7) PEC/CEC - Styrofoam     Eatin%  Nausea: No  Hypoglycemia and intervention: No  Fever: No  TPN and/or TF: No    PMH, PSH, FH, SH updated and reviewed     ROS:    Review of Systems   Constitutional:  Negative for chills, fatigue and fever.   Respiratory:  Negative for cough.    Gastrointestinal:  Positive for nausea. Negative for vomiting.       Current Medications and/or Treatments Impacting Glycemic Control  Immunotherapy:    Immunosuppressants       None          Steroids:   Hormones (From admission, onward)      None          Pressors:    Autonomic Drugs (From admission, onward)      None          Hyperglycemia/Diabetes Medications:   Antihyperglycemics (From admission, onward)      None             PHYSICAL EXAMINATION:  Vitals:    24 0800   BP: 124/83   Pulse: 105   Resp: (!) 26   Temp: 98.6 °F (37 °C)     Body mass index is 18.64 kg/m².     Physical Exam  HENT:      Head: Normocephalic and atraumatic.   Pulmonary:      Effort: No respiratory distress.   Abdominal:      General: There is no distension.   Neurological:      Mental Status: She is alert.   Psychiatric:         Mood and Affect: Mood normal.         Behavior: Behavior normal.

## 2024-05-30 NOTE — HOSPITAL COURSE
Admitted to the ICU given unknown course of hypoglycemia. Started on D10 infusion with glucose tabs and diet.     B/G remains quite fluctuant on days 1 and 2 of ICU admission. Persistently requiring d10 at 200cc/hr    On 6/1 patient able to be wean off D10 drip. No longer vomiting. Ready for stepdown.

## 2024-05-30 NOTE — NURSING
Patient actively vomiting when resident rounded. Patient unable to tolerate glucose tabs, BG 95 on continuous D10 infusion with only one reliable peripheral IV access. IV compazine ordered.    Team aware that IV access is limited and there is a great need for central access.

## 2024-05-30 NOTE — CHAPLAIN
Staff  Visit by Aung Jones (ext. [id number])    Patient: Hollie Wilson  MRN: [id number]  : 1990  Age: 34 y.o.  Legal sex: female   Hospital Length of Stay: 1 days  Code Status: Full Code   Attending Provider: Basilio Chapin MD  Principal Problem: Hypoglycemia  Patient's Quaker: Scientologist  Length of my visit: 5    What prompted this follow-up visit?: General Rounding    Who was present during my visit: Pt was asleep w/no family at bedside. Will attempt at a later time to connect w/pt and/or family    Future (Spiritual Care Plan of Action): I will continue to provide support for this patient.     To [facility]: My SpectraLink is [phone] and my work hours are below. Don't hesitate to call me if Ms. Wilson or her family needs support. If I am unavailable, a staff  will gladly visit to offer support as needed. To reach them, please dial [phone] which is carried by an in-house designated  at all hours. A Spiritual Care Team  is in the building and rounding on patients . To reach them, call [phone]        Rev. Aung Jones M.Div.  Staff   Spiritual Care Department  [facility]    My SpectraLink: [id number]  Office: [phone]  Email: [email or web address]  Work hours: M-F [phone]    The care I provide is shaped by this Code of Ethics.       24 0834   Clinical Encounter Type   Visit Type Follow-up   Visit Category Critical Care   Visited With Health care provider   Length of Visit 5 Minutes   Patient Spiritual Encounters   Comments - Patient See my visit note
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
## 2024-05-30 NOTE — SUBJECTIVE & OBJECTIVE
Interval History/Significant Events:   Continues to have significant fluctuance in BG overnight. Continued on D10. Now with nausea and not tolerating oral.     Review of Systems  ROS negative except as noted in HPI     Objective:     Vital Signs (Most Recent):  Temp: 98.6 °F (37 °C) (05/30/24 0800)  Pulse: 105 (05/30/24 0800)  Resp: (!) 26 (05/30/24 0800)  BP: 124/83 (05/30/24 0800)  SpO2: 100 % (05/30/24 0800) Vital Signs (24h Range):  Temp:  [98 °F (36.7 °C)-98.6 °F (37 °C)] 98.6 °F (37 °C)  Pulse:  [] 105  Resp:  [12-32] 26  SpO2:  [98 %-100 %] 100 %  BP: (114-152)/() 124/83   Weight: 54 kg (119 lb)  Body mass index is 18.64 kg/m².      Intake/Output Summary (Last 24 hours) at 5/30/2024 1023  Last data filed at 5/30/2024 0645  Gross per 24 hour   Intake 3535.1 ml   Output 3150 ml   Net 385.1 ml          Physical Exam  Constitutional:       Appearance: She is ill-appearing.   HENT:      Head: Normocephalic and atraumatic.      Mouth/Throat:      Mouth: Mucous membranes are moist.      Pharynx: Oropharynx is clear.   Cardiovascular:      Rate and Rhythm: Regular rhythm. Tachycardia present.      Heart sounds: Normal heart sounds. No murmur heard.  Pulmonary:      Effort: Pulmonary effort is normal. No respiratory distress.   Abdominal:      General: Abdomen is flat. There is no distension.   Musculoskeletal:         General: No swelling.      Right lower leg: No edema.      Left lower leg: No edema.   Skin:     General: Skin is warm and dry.      Findings: Lesion present.   Neurological:      General: No focal deficit present.      Mental Status: She is alert and oriented to person, place, and time. Mental status is at baseline.   Psychiatric:      Comments: Flat affect              Vents:     Lines/Drains/Airways       Drain  Duration             Female External Urinary Catheter w/ Suction 05/29/24 0800 1 day              Peripheral Intravenous Line  Duration                  Midline Catheter - Single  Lumen 05/29/24 1905 Left brachial vein 20g x 8cm <1 day         Peripheral IV - Single Lumen 05/30/24 0400 22 G Anterior;Distal;Right Upper Arm <1 day                  Significant Labs:    CBC/Anemia Profile:  Recent Labs   Lab 05/28/24 2234 05/29/24  0606 05/30/24  0422   WBC 5.80 7.40 6.36   HGB 10.8* 9.9* 12.2   HCT 34.7* 32.3* 36.8*    262 315   MCV 83 84 80*   RDW 15.9* 16.2* 15.8*        Chemistries:  Recent Labs   Lab 05/28/24 2233 05/29/24  0439 05/29/24  1525 05/30/24  0422   * 136 138 139   K 3.6 3.7 3.7 3.8    106 104 106   CO2 20* 20* 24 26   BUN 11 9 5* 4*   CREATININE 1.0 0.8 0.7 0.8   CALCIUM 9.5 9.7 9.2 9.0   ALBUMIN 3.8 3.7  --  3.3*   PROT 7.0 6.8  --  6.2   BILITOT 0.5 0.5  --  0.3   ALKPHOS 99 102  --  87   ALT 9* 10  --  8*   AST 9* 12  --  11   MG 1.8 1.8 2.3 2.1   PHOS 3.3 1.9* 4.8* 3.9       All pertinent labs within the past 24 hours have been reviewed.    Significant Imaging:  I have reviewed all pertinent imaging results/findings within the past 24 hours.

## 2024-05-30 NOTE — ED NOTES
In pt's red bag of medications pt noted to have 9 different insulin pens in her possession with box of clean/unused insulin needles and syringes. MARJORIE Garnica made aware and assessed pens with RN and security at bedside

## 2024-05-30 NOTE — PLAN OF CARE
MICU DAILY GOALS     Family/Goals of care/Code Status   Code Status: Full Code    24H Vital Sign Range  Temp:  [97.8 °F (36.6 °C)-98.5 °F (36.9 °C)]   Pulse:  []   Resp:  [12-32]   BP: (114-152)/()   SpO2:  [98 %-100 %]      Shift Events (include procedures and significant events)   Blood glucose remains poorly managed overnight with hypoglycemic episode(blood glucose in the 30s) despite continuous D10 infusion and hourly scheduled glucose tabs. Pt also with minimal oral intake. Continue to encourage patient to drink and eat to better maintain a stable blood glucose level.     AWAKE RASS: Goal - RASS Goal: 0-->alert and calm  Actual - RASS (Finnegan Agitation-Sedation Scale): alert and calm    Restraint necessity: Not necessary   BREATHE SBT: Not intubated    Coordinate A & B, analgesics/sedatives Pain:  Persistent chronic back pain complaints. Tylenol prn.     SAT: Not intubated   Delirium CAM-ICU:     Early(intubated/ Progressive (non-intubated) Mobility MOVE Screen (INTUBATED ONLY): NA    Activity: Activity Management: Ankle pumps - L1, Arm raise - L1, Rolling - L1   Feeding/Nutrition Diet order: Diet/Nutrition Received: regular,     Thrombus DVT prophylaxis: VTE Required Core Measure: Pharmacological prophylaxis initiated/maintained   HOB Elevation Head of Bed (HOB) Positioning: HOB at 20-30 degrees   Ulcer Prophylaxis GI: yes   Glucose control uncontrolled     Skin Skin assessed during: Q Shift Change    Sacrum intact/not altered? Yes  Heels intact/not altered? Yes  Surgical wound? No    CHECK ONE!   (no altered skin or altered skin) and sub boxes:  [x] No Altered Skin Integrity Present    [x]Prevention Measures Documented    [] Altered Skin Integrity Present or Discovered   [] LDA present in EPIC, daily doc completed              [] LDA added if not in EPIC (describe wound).                    When describing wound, do not stage, use descriptive words only.    [] Wound Image Taken (required on  admit,                   transfer/discharge and every Tuesday)    Wound Care Consulted? No    4 EYES:  Attending Nurse (1st set of eyes):     Second RN/Staff Member (2nd set of eyes):    Bowel Function no issues    Indwelling Catheter Necessity         No indwelling catheter present   De-escalation Antibiotics NA        VS and assessment per flow sheet, patient progressing towards goals as tolerated, plan of care reviewed with [unfilled], all concerns addressed, will continue to monitor.
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
## 2024-05-30 NOTE — ASSESSMENT & PLAN NOTE
BG goal: 140-180  T1DM. Hx of multiple DKA episodes.  Injected unknown amount of insulin Lantus and Novolog in ED.  Continues to have episodes of hypoglycemia.  BG on low end although while on D10 drip at 200 cc/hr.  Recommend titrate down D10 drip. Plan to monitor with q1 hr checks and restart insulin on patient once bg over 125 mg reliably. Anticipate insulin should be close to clearing system.  Will monitor closely.     - Recommend Titrate down on D10 drip to 125 cc/hr than further 75 cc/hr  - Plan to start, Transition insulin drip at 0.3 units /hr once bg reliably above 125 mg/dl  - Start /50  - POCT Glucose every hour  - Hypoglycemia protocol in place  -Plan discussed with Dr. Dodge    ** Please notify Endocrine for any change and/or advance in diet**  ** Please call Endocrine for any BG related issues **       Discharge Planning:   TBD. Please notify endocrinology prior to discharge.

## 2024-05-30 NOTE — ASSESSMENT & PLAN NOTE
History of polysubstance abuse in the past including cocaine, opioids and amphetamines.     - Monitor for withdrawal symptoms
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
## 2024-05-30 NOTE — NURSING
Sitter at bedside under direct supervision q 15 min checks. Pt resting comfortably. Vital signs stable respirations even and unlabored. Pt in hospital gown due to medical necessity.       Per MD order
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
## 2024-05-30 NOTE — NURSING
BG went from 95 to 67 with continuous D10 infusing. Patient nauseous and intermittently vomiting despite compazine administration, refusing oral glucose tabs. D10 bolus administered through 22g PIV. Team made aware of BG drop, attending states there is no indication for central access at this time. Charge nurse currently attempting additional PIV access.

## 2024-05-30 NOTE — PLAN OF CARE
MICU DAILY GOALS     Family/Goals of care/Code Status   Code Status: Full Code    24H Vital Sign Range  Temp:  [98 °F (36.7 °C)-98.6 °F (37 °C)]   Pulse:  []   Resp:  [13-32]   BP: (114-156)/()   SpO2:  [98 %-100 %]      Shift Events (include procedures and significant events)   No acute events throughout shift    AWAKE RASS: Goal - RASS Goal: 0-->alert and calm  Actual - RASS (Finnegan Agitation-Sedation Scale): alert and calm    Restraint necessity: Not necessary   BREATHE SBT: Not intubated    Coordinate A & B, analgesics/sedatives Pain: managed   SAT: Not intubated   Delirium CAM-ICU:     Early(intubated/ Progressive (non-intubated) Mobility MOVE Screen (INTUBATED ONLY): Not intubated    Activity: Activity Management: Rolling - L1   Feeding/Nutrition Diet order: Diet/Nutrition Received: regular,     Thrombus DVT prophylaxis: VTE Required Core Measure: Pharmacological prophylaxis initiated/maintained   HOB Elevation Head of Bed (HOB) Positioning: HOB elevated   Ulcer Prophylaxis GI: yes   Glucose control uncontrolled     Skin Skin assessed during: Daily Assessment    Sacrum intact/not altered? Yes  Heels intact/not altered? Yes  Surgical wound? No    CHECK ONE!   (no altered skin or altered skin) and sub boxes:  [x] No Altered Skin Integrity Present    [x]Prevention Measures Documented    [] Altered Skin Integrity Present or Discovered   [] LDA present in EPIC, daily doc completed              [] LDA added if not in EPIC (describe wound).                    When describing wound, do not stage, use descriptive words only.    [] Wound Image Taken (required on admit,                   transfer/discharge and every Tuesday)    Wound Care Consulted? No    4 EYES:  Attending Nurse (1st set of eyes): ALEXANDRA Felipe    Second RN/Staff Member (2nd set of eyes): Shalom, PCT   Bowel Function diarrhea    Indwelling Catheter Necessity         na   De-escalation Antibiotics No        VS and assessment per flow sheet,  patient progressing towards goals as tolerated, plan of care reviewed with Hollie, all concerns addressed, will continue to monitor.

## 2024-05-31 LAB
ALBUMIN SERPL BCP-MCNC: 3 G/DL (ref 3.5–5.2)
ALP SERPL-CCNC: 91 U/L (ref 55–135)
ALT SERPL W/O P-5'-P-CCNC: 8 U/L (ref 10–44)
ANION GAP SERPL CALC-SCNC: 8 MMOL/L (ref 8–16)
AST SERPL-CCNC: 11 U/L (ref 10–40)
BASOPHILS # BLD AUTO: 0.04 K/UL (ref 0–0.2)
BASOPHILS NFR BLD: 0.8 % (ref 0–1.9)
BILIRUB SERPL-MCNC: 0.4 MG/DL (ref 0.1–1)
BUN SERPL-MCNC: <3 MG/DL (ref 6–20)
CALCIUM SERPL-MCNC: 8.6 MG/DL (ref 8.7–10.5)
CHLORIDE SERPL-SCNC: 106 MMOL/L (ref 95–110)
CO2 SERPL-SCNC: 23 MMOL/L (ref 23–29)
CREAT SERPL-MCNC: 0.7 MG/DL (ref 0.5–1.4)
DIFFERENTIAL METHOD BLD: ABNORMAL
EOSINOPHIL # BLD AUTO: 0.3 K/UL (ref 0–0.5)
EOSINOPHIL NFR BLD: 5.1 % (ref 0–8)
ERYTHROCYTE [DISTWIDTH] IN BLOOD BY AUTOMATED COUNT: 15.6 % (ref 11.5–14.5)
EST. GFR  (NO RACE VARIABLE): >60 ML/MIN/1.73 M^2
GLUCOSE SERPL-MCNC: 78 MG/DL (ref 70–110)
HCT VFR BLD AUTO: 37.2 % (ref 37–48.5)
HGB BLD-MCNC: 11.4 G/DL (ref 12–16)
IMM GRANULOCYTES # BLD AUTO: 0.01 K/UL (ref 0–0.04)
IMM GRANULOCYTES NFR BLD AUTO: 0.2 % (ref 0–0.5)
INSULIN COLLECTION INTERVAL: NORMAL
INSULIN SERPL-ACNC: 14.7 UU/ML
LYMPHOCYTES # BLD AUTO: 2.2 K/UL (ref 1–4.8)
LYMPHOCYTES NFR BLD: 40.7 % (ref 18–48)
MAGNESIUM SERPL-MCNC: 1.9 MG/DL (ref 1.6–2.6)
MCH RBC QN AUTO: 25.9 PG (ref 27–31)
MCHC RBC AUTO-ENTMCNC: 30.6 G/DL (ref 32–36)
MCV RBC AUTO: 85 FL (ref 82–98)
MONOCYTES # BLD AUTO: 0.5 K/UL (ref 0.3–1)
MONOCYTES NFR BLD: 9.3 % (ref 4–15)
NEUTROPHILS # BLD AUTO: 2.3 K/UL (ref 1.8–7.7)
NEUTROPHILS NFR BLD: 43.9 % (ref 38–73)
NRBC BLD-RTO: 0 /100 WBC
OHS QRS DURATION: 114 MS
OHS QTC CALCULATION: 545 MS
PHOSPHATE SERPL-MCNC: 4 MG/DL (ref 2.7–4.5)
PLATELET # BLD AUTO: 303 K/UL (ref 150–450)
PMV BLD AUTO: 11 FL (ref 9.2–12.9)
POCT GLUCOSE: 102 MG/DL (ref 70–110)
POCT GLUCOSE: 112 MG/DL (ref 70–110)
POCT GLUCOSE: 115 MG/DL (ref 70–110)
POCT GLUCOSE: 115 MG/DL (ref 70–110)
POCT GLUCOSE: 121 MG/DL (ref 70–110)
POCT GLUCOSE: 128 MG/DL (ref 70–110)
POCT GLUCOSE: 128 MG/DL (ref 70–110)
POCT GLUCOSE: 138 MG/DL (ref 70–110)
POCT GLUCOSE: 141 MG/DL (ref 70–110)
POCT GLUCOSE: 142 MG/DL (ref 70–110)
POCT GLUCOSE: 143 MG/DL (ref 70–110)
POCT GLUCOSE: 165 MG/DL (ref 70–110)
POCT GLUCOSE: 168 MG/DL (ref 70–110)
POCT GLUCOSE: 173 MG/DL (ref 70–110)
POCT GLUCOSE: 177 MG/DL (ref 70–110)
POCT GLUCOSE: 180 MG/DL (ref 70–110)
POCT GLUCOSE: 78 MG/DL (ref 70–110)
POCT GLUCOSE: 79 MG/DL (ref 70–110)
POCT GLUCOSE: 89 MG/DL (ref 70–110)
POCT GLUCOSE: 92 MG/DL (ref 70–110)
POCT GLUCOSE: 99 MG/DL (ref 70–110)
POTASSIUM SERPL-SCNC: 3.9 MMOL/L (ref 3.5–5.1)
PROT SERPL-MCNC: 5.8 G/DL (ref 6–8.4)
RBC # BLD AUTO: 4.4 M/UL (ref 4–5.4)
SODIUM SERPL-SCNC: 137 MMOL/L (ref 136–145)
WBC # BLD AUTO: 5.28 K/UL (ref 3.9–12.7)

## 2024-05-31 PROCEDURE — 20000000 HC ICU ROOM

## 2024-05-31 PROCEDURE — 94761 N-INVAS EAR/PLS OXIMETRY MLT: CPT

## 2024-05-31 PROCEDURE — 25000003 PHARM REV CODE 250

## 2024-05-31 PROCEDURE — 76937 US GUIDE VASCULAR ACCESS: CPT

## 2024-05-31 PROCEDURE — 85025 COMPLETE CBC W/AUTO DIFF WBC: CPT | Performed by: NURSE PRACTITIONER

## 2024-05-31 PROCEDURE — 99232 SBSQ HOSP IP/OBS MODERATE 35: CPT | Mod: AF,HB,, | Performed by: PSYCHIATRY & NEUROLOGY

## 2024-05-31 PROCEDURE — 63600175 PHARM REV CODE 636 W HCPCS

## 2024-05-31 PROCEDURE — 84100 ASSAY OF PHOSPHORUS: CPT | Performed by: NURSE PRACTITIONER

## 2024-05-31 PROCEDURE — 99233 SBSQ HOSP IP/OBS HIGH 50: CPT | Mod: ,,, | Performed by: PHYSICIAN ASSISTANT

## 2024-05-31 PROCEDURE — 80053 COMPREHEN METABOLIC PANEL: CPT | Performed by: NURSE PRACTITIONER

## 2024-05-31 PROCEDURE — 83525 ASSAY OF INSULIN: CPT | Performed by: PHYSICIAN ASSISTANT

## 2024-05-31 PROCEDURE — 99233 SBSQ HOSP IP/OBS HIGH 50: CPT | Mod: ,,, | Performed by: INTERNAL MEDICINE

## 2024-05-31 PROCEDURE — 83735 ASSAY OF MAGNESIUM: CPT | Performed by: NURSE PRACTITIONER

## 2024-05-31 RX ORDER — IBUPROFEN 200 MG
24 TABLET ORAL
Status: DISCONTINUED | OUTPATIENT
Start: 2024-05-31 | End: 2024-06-04 | Stop reason: HOSPADM

## 2024-05-31 RX ORDER — INSULIN ASPART 100 [IU]/ML
0-4 INJECTION, SOLUTION INTRAVENOUS; SUBCUTANEOUS EVERY 4 HOURS PRN
Status: DISCONTINUED | OUTPATIENT
Start: 2024-05-31 | End: 2024-06-02

## 2024-05-31 RX ORDER — GLUCAGON 1 MG
1 KIT INJECTION
Status: DISCONTINUED | OUTPATIENT
Start: 2024-05-31 | End: 2024-06-04 | Stop reason: HOSPADM

## 2024-05-31 RX ORDER — POLYETHYLENE GLYCOL 3350 17 G/17G
17 POWDER, FOR SOLUTION ORAL DAILY PRN
Status: DISCONTINUED | OUTPATIENT
Start: 2024-05-31 | End: 2024-06-04 | Stop reason: HOSPADM

## 2024-05-31 RX ORDER — MAGNESIUM SULFATE HEPTAHYDRATE 40 MG/ML
2 INJECTION, SOLUTION INTRAVENOUS ONCE
Status: COMPLETED | OUTPATIENT
Start: 2024-05-31 | End: 2024-05-31

## 2024-05-31 RX ORDER — IBUPROFEN 200 MG
16 TABLET ORAL
Status: DISCONTINUED | OUTPATIENT
Start: 2024-05-31 | End: 2024-06-04 | Stop reason: HOSPADM

## 2024-05-31 RX ADMIN — PANTOPRAZOLE SODIUM 40 MG: 40 TABLET, DELAYED RELEASE ORAL at 08:05

## 2024-05-31 RX ADMIN — DEXTROSE: 10 SOLUTION INTRAVENOUS at 11:05

## 2024-05-31 RX ADMIN — ACETAMINOPHEN 1000 MG: 500 TABLET ORAL at 08:05

## 2024-05-31 RX ADMIN — DEXTROSE: 10 SOLUTION INTRAVENOUS at 05:05

## 2024-05-31 RX ADMIN — ACETAMINOPHEN 1000 MG: 500 TABLET ORAL at 09:05

## 2024-05-31 RX ADMIN — HYDROXYZINE PAMOATE 25 MG: 25 CAPSULE ORAL at 08:05

## 2024-05-31 RX ADMIN — MAGNESIUM SULFATE HEPTAHYDRATE 2 G: 40 INJECTION, SOLUTION INTRAVENOUS at 12:05

## 2024-05-31 RX ADMIN — FLUOXETINE HYDROCHLORIDE 60 MG: 20 CAPSULE ORAL at 08:05

## 2024-05-31 RX ADMIN — DEXTROSE: 10 SOLUTION INTRAVENOUS at 09:05

## 2024-05-31 RX ADMIN — ENOXAPARIN SODIUM 40 MG: 40 INJECTION SUBCUTANEOUS at 05:05

## 2024-05-31 NOTE — PLAN OF CARE
MICU DAILY GOALS     Family/Goals of care/Code Status   Code Status: Full Code    24H Vital Sign Range  Temp:  [98.2 °F (36.8 °C)-99 °F (37.2 °C)]   Pulse:  []   Resp:  [12-26]   BP: ()/()   SpO2:  [98 %-100 %]      Shift Events (include procedures and significant events)   No acute events throughout shift. Pt remains on D10 continuous infusion. Blood glucose ranging from 70s-100s with minimal PO intake. Pt continues to be encouraged to eat/drink for blood glucose stability.     AWAKE RASS: Goal - RASS Goal: 0-->alert and calm  Actual - RASS (Finnegan Agitation-Sedation Scale): drowsy    Restraint necessity: Not necessary   BREATHE SBT: Not intubated    Coordinate A & B, analgesics/sedatives Pain: managed   SAT: Not intubated   Delirium CAM-ICU: Overall CAM-ICU: Negative   Early(intubated/ Progressive (non-intubated) Mobility MOVE Screen (INTUBATED ONLY): Not intubated    Activity: Activity Management: Ankle pumps - L1, Arm raise - L1, Bridge - L1, Leg kicks - L2, Rolling - L1   Feeding/Nutrition Diet order: Diet/Nutrition Received: regular,     Thrombus DVT prophylaxis: VTE Required Core Measure: Pharmacological prophylaxis initiated/maintained   HOB Elevation Head of Bed (HOB) Positioning: HOB at 20 degrees   Ulcer Prophylaxis GI: yes   Glucose control uncontrolled     Skin Skin assessed during: Q Shift Change    Sacrum intact/not altered? Yes  Heels intact/not altered? Yes  Surgical wound? No    CHECK ONE!   (no altered skin or altered skin) and sub boxes:  [] No Altered Skin Integrity Present    []Prevention Measures Documented    [x] Altered Skin Integrity Present or Discovered   [x] LDA present in EPIC, daily doc completed              [] LDA added if not in EPIC (describe wound).                    When describing wound, do not stage, use descriptive words only.    [] Wound Image Taken (required on admit,                   transfer/discharge and every Tuesday)    Wound Care Consulted?  No    4 EYES:  Attending Nurse (1st set of eyes):     Second RN/Staff Member (2nd set of eyes):    Bowel Function no issues    Indwelling Catheter Necessity            De-escalation Antibiotics No        VS and assessment per flow sheet, patient progressing towards goals as tolerated, plan of care reviewed with Patient, all concerns addressed, will continue to monitor.

## 2024-05-31 NOTE — ASSESSMENT & PLAN NOTE
"Patient has history of non-adherence to medications  Frequent hospitalizations for DKA, most recently earlier this month  While in ED she reportedly self administered unknown amount of insulin to "get care she needed"  BGL initially >400, dropped to 30s    -Endocrine consulted, appreciate recommendations  -continue D10 infusion  -POCT glucose qHR  -psych consultation  -continuous telemetry  -resume subQ insulin when clinically indicated  "

## 2024-05-31 NOTE — PROGRESS NOTES
CONSULTATION LIAISON PSYCHIATRY PROGRESS NOTE    Patient Name: Hollie Wilson  MRN: [id number]  Patient Class: IP- Inpatient  Admission Date: 5/28/2024  Attending Physician: Basilio Chapin MD      SUBJECTIVE:   Hollie Wilson is a 34 y.o. 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 Patient did receive PRN vistaril 25 mg at 2049. Blood sugars now beginning to stabilize, per nursing staff.    Today, patient seen sleeping in bed but awakens easily. She is calm and cooperative. Today she is more talkative. States she injected the insulin so that she would be admitted to the hospital. Understands that she would likely still benefit from inpatient psychiatric care. She is focused on getting her dog trained to be a therapy dog. Patient states she is doing well currently. Slept well through the night. Appetite has been improving somewhat. She denies current SI. States she feels safe in the hospital. Denies AVH and no evidence of delusions or responding to internal stimuli.      OBJECTIVE:    Mental Status Exam:  General Appearance: appears stated age, well developed and nourished, adequately groomed and appropriately dressed, in no acute distress  Behavior: normal; cooperative; reasonably friendly, pleasant, and polite; appropriate eye-contact; under good behavioral control  Involuntary Movements and Motor Activity: no abnormal involuntary movements noted; no tics, no tremors, no akathisia, no dystonia, no evidence of tardive dyskinesia; no psychomotor agitation or retardation  Gait and Station: unable to assess - patient lying down or seated  Speech and Language: decreased spontaneity, slowed, paucity of  "speech  Mood: "Fine"  Affect: euthymic, constricted  Thought Process and Associations: difficult to assess due to poverty of thought  Thought Content and Perceptions:: no suicidal or homicidal ideation, no auditory or visual hallucinations, no paranoid ideation, no ideas of reference, no evidence of delusions or psychosis  Sensorium and Orientation: intact; alert with clear sensorium; oriented fully to person, place, time and situation  Recent and Remote Memory: grossly intact, able to recall relevant and salient information from the recent and remote past  Attention and Concentration:  somewhat inattentive today secondary to somnolence  Fund of Knowledge: vocabulary rudimentary, intellectually disabled  Insight: adequate  Judgment: poor, as evidenced by recent suicide attempt    CAM ICU positive? no      ASSESSMENT & RECOMMENDATIONS   Unspecified mood disorder, MRE depression  Generalized anxiety disorder  Cannabis use  Methamphetamine use  Cocaine use  History of unspecified psychosis     Unspecified mood disorder, MRE depression  PSYCH MEDICATIONS  Unclear history of bipolar disorder; will attempt to obtain collateral and monitor for symptoms of diony  Scheduled - Continue Prozac 60 mg PO daily for depression and anxiety  PRN - Continue Vistaril 25 mg q8 hours for anxiety     History of unspecified psychosis  PSYCH MEDICATIONS  Unclear if organic psychosis or triggered by substance use; history of non-compliance with antipsychotics/mood stabilizers; will monitor for emerging psychosis  PRN - consider Ativan 1-2 mg PO/IM for non-redirectable agitation; avoid Haldol or other Qtc prolonging agents given patient current Qtc (536). Would also avoid Zyprexa given difficulty with blood glucose    Cannabis/cocaine/methamphetamine use  Counseled against further use of these substances  Denies regular use of cocaine and methamphetamine    RISK ASSESSMENT  NEEDS PEC because patient is in imminent danger of hurting self or " others and is gravely disabled. & NEEDS 1:1 sitter    FOLLOW UP  Will follow up while in house     DISPOSITION - once medically cleared:   Seek involuntary inpatient psychiatric admission for stabilization of acute psychiatric symptoms and a safe disposition plan is enacted. The patient &/or their family was informed that the patient will be transferred to an inpatient unit per ED/primary placement team.    Please contact ON CALL psychiatry service (24/7) for any acute issues that may arise.    Dr. Michael Joseph   Psychiatry  [facility]  5/31/2024 9:47 AM        --------------------------------------------------------------------------------------------------------------------------------------------------------------------------------------------------------------------------------------    CONTINUED OBJECTIVE clinical data & findings reviewed and noted for above decision making    Current Medications:   Scheduled Meds:    enoxparin  40 mg Subcutaneous Q24H (prophylaxis, 1700)    FLUoxetine  60 mg Oral Daily    pantoprazole  40 mg Oral QAM     PRN Meds:   Current Facility-Administered Medications:     acetaminophen, 1,000 mg, Oral, Q6H PRN    dextrose 10%, 12.5 g, Intravenous, PRN    dextrose 10%, 25 g, Intravenous, PRN    dextrose, 15 g, Oral, PRN    glucagon (human recombinant), 1 mg, Intramuscular, PRN    glucose, 16 g, Oral, PRN    glucose, 24 g, Oral, PRN    hydrOXYzine pamoate, 25 mg, Oral, Q8H PRN    insulin aspart U-100, 0-4 Units, Subcutaneous, Q4H PRN    metoclopramide HCl, 10 mg, Oral, Q6H PRN    polyethylene glycol, 17 g, Oral, Daily PRN    sodium chloride 0.9%, 10 mL, Intravenous, PRN    Allergies:   Review of patient's allergies indicates:   Allergen Reactions    Clove oil Itching    Pork/porcine containing products Other (See Comments)     Pt does not eat Pork    Zofran (as hydrochloride) [ondansetron hcl] Hives       Vitals  Vitals:    05/31/24 1600   BP: 113/73   Pulse:  105   Resp: 15   Temp:        Labs/Imaging/Studies:  Recent Results (from the past 24 hour(s))   POCT glucose    Collection Time: 05/30/24  6:10 PM   Result Value Ref Range    POCT Glucose 190 (H) 70 - 110 mg/dL   Comprehensive metabolic panel    Collection Time: 05/30/24  6:12 PM   Result Value Ref Range    Sodium 136 136 - 145 mmol/L    Potassium 3.4 (L) 3.5 - 5.1 mmol/L    Chloride 100 95 - 110 mmol/L    CO2 27 23 - 29 mmol/L    Glucose 178 (H) 70 - 110 mg/dL    BUN 3 (L) 6 - 20 mg/dL    Creatinine 1.0 0.5 - 1.4 mg/dL    Calcium 9.6 8.7 - 10.5 mg/dL    Total Protein 8.0 6.0 - 8.4 g/dL    Albumin 4.1 3.5 - 5.2 g/dL    Total Bilirubin 0.5 0.1 - 1.0 mg/dL    Alkaline Phosphatase 116 55 - 135 U/L    AST 13 10 - 40 U/L    ALT 9 (L) 10 - 44 U/L    eGFR >60.0 >60 mL/min/1.73 m^2    Anion Gap 9 8 - 16 mmol/L   C-peptide    Collection Time: 05/30/24  6:12 PM   Result Value Ref Range    C-Peptide <0.08 (L) 0.78 - 5.19 ng/mL   POCT glucose    Collection Time: 05/30/24  7:51 PM   Result Value Ref Range    POCT Glucose 144 (H) 70 - 110 mg/dL   POCT glucose    Collection Time: 05/30/24  8:48 PM   Result Value Ref Range    POCT Glucose 122 (H) 70 - 110 mg/dL   POCT glucose    Collection Time: 05/30/24 10:36 PM   Result Value Ref Range    POCT Glucose 98 70 - 110 mg/dL   POCT glucose    Collection Time: 05/30/24 11:19 PM   Result Value Ref Range    POCT Glucose 103 70 - 110 mg/dL   POCT glucose    Collection Time: 05/31/24 12:07 AM   Result Value Ref Range    POCT Glucose 92 70 - 110 mg/dL   POCT glucose    Collection Time: 05/31/24  1:04 AM   Result Value Ref Range    POCT Glucose 78 70 - 110 mg/dL   POCT glucose    Collection Time: 05/31/24  2:24 AM   Result Value Ref Range    POCT Glucose 115 (H) 70 - 110 mg/dL   POCT glucose    Collection Time: 05/31/24  3:09 AM   Result Value Ref Range    POCT Glucose 115 (H) 70 - 110 mg/dL   POCT glucose    Collection Time: 05/31/24  4:10 AM   Result Value Ref Range    POCT Glucose 89 70 -  110 mg/dL   Magnesium    Collection Time: 05/31/24  4:25 AM   Result Value Ref Range    Magnesium 1.9 1.6 - 2.6 mg/dL   Phosphorus    Collection Time: 05/31/24  4:25 AM   Result Value Ref Range    Phosphorus 4.0 2.7 - 4.5 mg/dL   Comprehensive metabolic panel    Collection Time: 05/31/24  4:25 AM   Result Value Ref Range    Sodium 137 136 - 145 mmol/L    Potassium 3.9 3.5 - 5.1 mmol/L    Chloride 106 95 - 110 mmol/L    CO2 23 23 - 29 mmol/L    Glucose 78 70 - 110 mg/dL    BUN <3 (L) 6 - 20 mg/dL    Creatinine 0.7 0.5 - 1.4 mg/dL    Calcium 8.6 (L) 8.7 - 10.5 mg/dL    Total Protein 5.8 (L) 6.0 - 8.4 g/dL    Albumin 3.0 (L) 3.5 - 5.2 g/dL    Total Bilirubin 0.4 0.1 - 1.0 mg/dL    Alkaline Phosphatase 91 55 - 135 U/L    AST 11 10 - 40 U/L    ALT 8 (L) 10 - 44 U/L    eGFR >60.0 >60 mL/min/1.73 m^2    Anion Gap 8 8 - 16 mmol/L   CBC auto differential    Collection Time: 05/31/24  4:25 AM   Result Value Ref Range    WBC 5.28 3.90 - 12.70 K/uL    RBC 4.40 4.00 - 5.40 M/uL    Hemoglobin 11.4 (L) 12.0 - 16.0 g/dL    Hematocrit 37.2 37.0 - 48.5 %    MCV 85 82 - 98 fL    MCH 25.9 (L) 27.0 - 31.0 pg    MCHC 30.6 (L) 32.0 - 36.0 g/dL    RDW 15.6 (H) 11.5 - 14.5 %    Platelets 303 150 - 450 K/uL    MPV 11.0 9.2 - 12.9 fL    Immature Granulocytes 0.2 0.0 - 0.5 %    Gran # (ANC) 2.3 1.8 - 7.7 K/uL    Immature Grans (Abs) 0.01 0.00 - 0.04 K/uL    Lymph # 2.2 1.0 - 4.8 K/uL    Mono # 0.5 0.3 - 1.0 K/uL    Eos # 0.3 0.0 - 0.5 K/uL    Baso # 0.04 0.00 - 0.20 K/uL    nRBC 0 0 /100 WBC    Gran % 43.9 38.0 - 73.0 %    Lymph % 40.7 18.0 - 48.0 %    Mono % 9.3 4.0 - 15.0 %    Eosinophil % 5.1 0.0 - 8.0 %    Basophil % 0.8 0.0 - 1.9 %    Differential Method Automated    POCT glucose    Collection Time: 05/31/24  5:03 AM   Result Value Ref Range    POCT Glucose 79 70 - 110 mg/dL   POCT glucose    Collection Time: 05/31/24  5:46 AM   Result Value Ref Range    POCT Glucose 102 70 - 110 mg/dL   POCT glucose    Collection Time: 05/31/24  7:42 AM    Result Value Ref Range    POCT Glucose 138 (H) 70 - 110 mg/dL   POCT glucose    Collection Time: 05/31/24  9:38 AM   Result Value Ref Range    POCT Glucose 180 (H) 70 - 110 mg/dL   Insulin, random    Collection Time: 05/31/24 11:52 AM   Result Value Ref Range    Insulin 14.7 <25.0 uU/mL    Insulin Collection Interval blood    POCT glucose    Collection Time: 05/31/24 12:03 PM   Result Value Ref Range    POCT Glucose 165 (H) 70 - 110 mg/dL   POCT glucose    Collection Time: 05/31/24  1:07 PM   Result Value Ref Range    POCT Glucose 142 (H) 70 - 110 mg/dL   POCT glucose    Collection Time: 05/31/24  2:04 PM   Result Value Ref Range    POCT Glucose 143 (H) 70 - 110 mg/dL   POCT glucose    Collection Time: 05/31/24  3:19 PM   Result Value Ref Range    POCT Glucose 173 (H) 70 - 110 mg/dL   POCT glucose    Collection Time: 05/31/24  4:04 PM   Result Value Ref Range    POCT Glucose 128 (H) 70 - 110 mg/dL     Imaging Results              X-Ray Chest AP Portable (Final result)  Result time 05/28/24 22:55:02      Final result by Giles Moncada MD (05/28/24 22:55:02)                   Impression:      No acute findings in the chest.      Electronically signed by: Giles Moncada MD  Date:    05/28/2024  Time:    22:55               Narrative:    EXAMINATION:  XR CHEST AP PORTABLE    CLINICAL HISTORY:  hyperglycemia;    TECHNIQUE:  Single frontal view of the chest was performed.    COMPARISON:  05/18/2024.    FINDINGS:  No consolidation, pleural effusion or pneumothorax.    Cardiomediastinal silhouette is unremarkable.

## 2024-05-31 NOTE — ASSESSMENT & PLAN NOTE
Persistent nausea complicates patient course given poor oral intake    - PRN anti-emetics, with daily EKG.   - Boost with meals.

## 2024-05-31 NOTE — SUBJECTIVE & OBJECTIVE
Interval History/Significant Events:   Intermittently with nausea and vomiting. Denies any concerns today. Admits less appetite    Review of Systems  ROS negative     Objective:     Vital Signs (Most Recent):  Temp: 99 °F (37.2 °C) (05/31/24 0400)  Pulse: 104 (05/31/24 0731)  Resp: 14 (05/31/24 0731)  BP: 125/76 (05/31/24 0700)  SpO2: 98 % (05/31/24 0731) Vital Signs (24h Range):  Temp:  [98.2 °F (36.8 °C)-99 °F (37.2 °C)] 99 °F (37.2 °C)  Pulse:  [] 104  Resp:  [12-25] 14  SpO2:  [98 %-100 %] 98 %  BP: ()/() 125/76   Weight: 54 kg (119 lb)  Body mass index is 18.64 kg/m².      Intake/Output Summary (Last 24 hours) at 5/31/2024 0823  Last data filed at 5/31/2024 0700  Gross per 24 hour   Intake 4871.58 ml   Output 1850 ml   Net 3021.58 ml          Physical Exam  Constitutional:       Appearance: She is not ill-appearing.   HENT:      Head: Normocephalic and atraumatic.      Mouth/Throat:      Mouth: Mucous membranes are moist.      Pharynx: Oropharynx is clear.   Cardiovascular:      Rate and Rhythm: Normal rate and regular rhythm.      Heart sounds: Normal heart sounds. No murmur heard.  Pulmonary:      Effort: Pulmonary effort is normal. No respiratory distress.   Abdominal:      General: Abdomen is flat. There is no distension.   Musculoskeletal:         General: No swelling.      Right lower leg: No edema.      Left lower leg: No edema.   Skin:     General: Skin is warm and dry.      Findings: Lesion present.   Neurological:      General: No focal deficit present.      Mental Status: She is alert and oriented to person, place, and time. Mental status is at baseline.   Psychiatric:         Mood and Affect: Mood normal.      Comments: Flat affect            Vents:     Lines/Drains/Airways       Drain  Duration             Female External Urinary Catheter w/ Suction 05/29/24 0800 2 days              Peripheral Intravenous Line  Duration                  Peripheral IV - Single Lumen 05/30/24 0400  22 G Anterior;Distal;Right Upper Arm 1 day                  Significant Labs:    CBC/Anemia Profile:  Recent Labs   Lab 05/30/24  0422 05/31/24  0425   WBC 6.36 5.28   HGB 12.2 11.4*   HCT 36.8* 37.2    303   MCV 80* 85   RDW 15.8* 15.6*        Chemistries:  Recent Labs   Lab 05/29/24  1525 05/30/24  0422 05/30/24  1812 05/31/24  0425    139 136 137   K 3.7 3.8 3.4* 3.9    106 100 106   CO2 24 26 27 23   BUN 5* 4* 3* <3*   CREATININE 0.7 0.8 1.0 0.7   CALCIUM 9.2 9.0 9.6 8.6*   ALBUMIN  --  3.3* 4.1 3.0*   PROT  --  6.2 8.0 5.8*   BILITOT  --  0.3 0.5 0.4   ALKPHOS  --  87 116 91   ALT  --  8* 9* 8*   AST  --  11 13 11   MG 2.3 2.1  --  1.9   PHOS 4.8* 3.9  --  4.0       All pertinent labs within the past 24 hours have been reviewed.    Significant Imaging:  I have reviewed all pertinent imaging results/findings within the past 24 hours.
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
## 2024-05-31 NOTE — ASSESSMENT & PLAN NOTE
Complication of uncontrolled DM    -resume reglan with caution  -continuous telemetry  -monitor Qtc, daily EKG

## 2024-05-31 NOTE — ASSESSMENT & PLAN NOTE
01/10/23 1500 01/11/23 5821   Appointment Info   Signing Clinician's Name / Credentials (PT)  --  Azeb Quinonez DPT   Rehab Comments (PT)  --  PT: co treat with OT   Therapeutic Activity   Therapeutic Activities: dynamic activities to improve functional performance Minutes (30928) 45 29   Symptoms Noted During/After Treatment  --  Increased pain   Treatment Detail/Skilled Intervention  --  PT: Co-Treat with OT for improving transfers and standing tolerance. set up wtih assist in much the same way that has already been documented (see flowsheet from 1/9), pt requires skilled assist for donning towels, etc for sling to aide in pain management. Once in chair via liko lift, brought to gym. Pt with higher pain levels today requires, support at hip and very slow lowering to floor so pt able to tolerate position of foot on floor. Attempted to IR hip for straight alignment but pt unable to tolerate. Performed 2 stands total with increased time to rest and recover due to fatigue and pain. Both stands were Mod A x 3. First stand pt able to stand ~30 sec with nearly upright posture. Second stand pt unable to come to full extension, instead is forward with lumbar flexion. Pt needs to sit after 5 sec. Pt requries increased skilled consideration for managing L LE throughout entire appoitnment. Brought back  and liko lifted into bed, increased skilled time for positioning for safety and comfort.   PT Discharge Planning   PT Plan  --  PT: Co-tx w/OT, Continue with attempting to  // bars, any other functional exercise that is tolerated   PT Discharge Recommendation (DC Rec)  --  Long term care facility   PT Rationale for DC Rec  --  PT: pt is not mobile at this point, will likely be NWB at discharge and has stairs he needs to perform daily to be in his home   PT Brief overview of current status  --  PT: dependent bed mobility and transfers, limited by size, shape, weakness and pain. Self limiting but blames soft bed,  History of polysubstance abuse in the past including cocaine, opioids and amphetamines.     - Monitor for withdrawal symptoms   uncomfortable w/c, arms to high on recliner.....   Total Session Time   Timed Code Treatment Minutes 45 45   Total Session Time (sum of timed and untimed services) 45 45   Post Acute Settings Only   What unit is patient on?  --  TCU   Transfers: Sit to Stand   Patient Performance  --  Total dependence (helper does ALL of the effort)   Staff Performance  --  Two +person assist (two plus persons physical assist)   Equipment Used  --  Other (comments)  (// bars)   Describe Performance  --  PT: see ther act   Transfers: Chair/Bed transfers   Patient Performance  --  Total dependence (helper does ALL of the effort)   Staff Performance  --  Two +person assist (two plus persons physical assist)   Equipment Used  --  Universal sling   Describe Performance  --  PT: liko lift A x 2

## 2024-05-31 NOTE — ASSESSMENT & PLAN NOTE
BG goal: 140-180  T1DM. Hx of multiple DKA episodes.  Injected unknown amount of insulin Lantus and Novolog in ED.  Continues to have episodes of hypoglycemia. D10 drip at 150 cc/hr. Pt appetite improved this morning. Recommend titrate down D10 drip to 75 cc/hr.  Once BG over 200 mg/dl would start on transition drip at 0.3 u/hr given she is t1dm w/ frequent DKA.  Will monitor closely.     - Recommend Titrate down on D10 drip to 125 cc/hr than further 75 cc/hr  - Plan to start, Transition insulin drip at 0.3 units /hr once bg reliably above 200 mg/dl  - Start /50  - POCT Glucose every hour  - Hypoglycemia protocol in place  -Plan discussed with Dr. Martinez   ** Please notify Endocrine for any change and/or advance in diet**  ** Please call Endocrine for any BG related issues **       Discharge Planning:   TBD. Please notify endocrinology prior to discharge.

## 2024-05-31 NOTE — SUBJECTIVE & OBJECTIVE
"Interval HPI:   No acute events overnight. Patient in room 6083/6083 A. Blood glucose stable. BG at goal on current insulin regimen (SSI ). Steroid use- None .      Renal function- Normal   Vasopressors-  None     Diet Adult Regular (IDDSI Level 7) PEC/CEC - Styrofoam     Eating:   <25%  Nausea: No  Hypoglycemia and intervention:Yes requiring D10 drip and boluses  Fever: No  TPN and/or TF: No      /76   Pulse 104   Temp 99 °F (37.2 °C) (Oral)   Resp 14   Wt 54 kg (119 lb)   LMP 05/10/2024 (Approximate)   SpO2 98%   BMI 18.64 kg/m²     Labs Reviewed and Include    Recent Labs   Lab 05/31/24  0425   GLU 78   CALCIUM 8.6*   ALBUMIN 3.0*   PROT 5.8*      K 3.9   CO2 23      BUN <3*   CREATININE 0.7   ALKPHOS 91   ALT 8*   AST 11   BILITOT 0.4     Lab Results   Component Value Date    WBC 5.28 05/31/2024    HGB 11.4 (L) 05/31/2024    HCT 37.2 05/31/2024    MCV 85 05/31/2024     05/31/2024     Recent Labs   Lab 05/28/24 2233   TSH 0.472     Lab Results   Component Value Date    HGBA1C 9.5 (H) 04/12/2024       Nutritional status:   Body mass index is 18.64 kg/m².  Lab Results   Component Value Date    ALBUMIN 3.0 (L) 05/31/2024    ALBUMIN 4.1 05/30/2024    ALBUMIN 3.3 (L) 05/30/2024     No results found for: "PREALBUMIN"    Estimated Creatinine Clearance: 94.7 mL/min (based on SCr of 0.7 mg/dL).    Accu-Checks  Recent Labs     05/30/24  2048 05/30/24  2236 05/30/24  2319 05/31/24  0007 05/31/24  0104 05/31/24  0224 05/31/24  0309 05/31/24  0410 05/31/24  0503 05/31/24  0546   POCTGLUCOSE 122* 98 103 92 78 115* 115* 89 79 102       Current Medications and/or Treatments Impacting Glycemic Control  Immunotherapy:    Immunosuppressants       None          Steroids:   Hormones (From admission, onward)      None          Pressors:    Autonomic Drugs (From admission, onward)      None          Hyperglycemia/Diabetes Medications:   Antihyperglycemics (From admission, onward)      Start     Stop " Route Frequency Ordered    05/30/24 1730  insulin regular in 0.9 % NaCl 100 unit/100 mL (1 unit/mL) infusion        Question:  Enter initial dose (Units/hr):  Answer:  0.3    -- IV Continuous 05/30/24 1623    05/30/24 1722  insulin aspart U-100 pen 0-4 Units         -- SubQ Every 4 hours PRN 05/30/24 1623

## 2024-05-31 NOTE — PROCEDURES
RAPID RESPONSE VASCULAR ACCESS NOTE       Bed:6083/6083 A    Single lumen 20G, 2.25IN AccuCath placed in the right brachial vein. Needle advanced into the vessel under real time ultrasound guidance.    Attempts: 1  Max dwell date: 6/06/2024  Lot number: [id number]      Call [phone] for concerns or assistance.    Oracio Gil RN
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
## 2024-06-01 PROBLEM — T38.3X1A INSULIN OVERDOSE: Status: ACTIVE | Noted: 2024-06-01

## 2024-06-01 LAB
ALBUMIN SERPL BCP-MCNC: 2.9 G/DL (ref 3.5–5.2)
ALP SERPL-CCNC: 104 U/L (ref 55–135)
ALT SERPL W/O P-5'-P-CCNC: 10 U/L (ref 10–44)
ANION GAP SERPL CALC-SCNC: 6 MMOL/L (ref 8–16)
AST SERPL-CCNC: 12 U/L (ref 10–40)
BASOPHILS # BLD AUTO: 0.02 K/UL (ref 0–0.2)
BASOPHILS NFR BLD: 0.5 % (ref 0–1.9)
BILIRUB SERPL-MCNC: 0.3 MG/DL (ref 0.1–1)
BUN SERPL-MCNC: 7 MG/DL (ref 6–20)
CALCIUM SERPL-MCNC: 8.3 MG/DL (ref 8.7–10.5)
CHLORIDE SERPL-SCNC: 104 MMOL/L (ref 95–110)
CO2 SERPL-SCNC: 23 MMOL/L (ref 23–29)
CREAT SERPL-MCNC: 0.9 MG/DL (ref 0.5–1.4)
DIFFERENTIAL METHOD BLD: ABNORMAL
EOSINOPHIL # BLD AUTO: 0.3 K/UL (ref 0–0.5)
EOSINOPHIL NFR BLD: 7.8 % (ref 0–8)
ERYTHROCYTE [DISTWIDTH] IN BLOOD BY AUTOMATED COUNT: 15.6 % (ref 11.5–14.5)
EST. GFR  (NO RACE VARIABLE): >60 ML/MIN/1.73 M^2
GLUCOSE SERPL-MCNC: 213 MG/DL (ref 70–110)
HCT VFR BLD AUTO: 33.6 % (ref 37–48.5)
HGB BLD-MCNC: 10.3 G/DL (ref 12–16)
IMM GRANULOCYTES # BLD AUTO: 0.01 K/UL (ref 0–0.04)
IMM GRANULOCYTES NFR BLD AUTO: 0.2 % (ref 0–0.5)
LYMPHOCYTES # BLD AUTO: 1.3 K/UL (ref 1–4.8)
LYMPHOCYTES NFR BLD: 31.3 % (ref 18–48)
MAGNESIUM SERPL-MCNC: 2 MG/DL (ref 1.6–2.6)
MCH RBC QN AUTO: 25.6 PG (ref 27–31)
MCHC RBC AUTO-ENTMCNC: 30.7 G/DL (ref 32–36)
MCV RBC AUTO: 83 FL (ref 82–98)
MONOCYTES # BLD AUTO: 0.6 K/UL (ref 0.3–1)
MONOCYTES NFR BLD: 15.2 % (ref 4–15)
NEUTROPHILS # BLD AUTO: 1.9 K/UL (ref 1.8–7.7)
NEUTROPHILS NFR BLD: 45 % (ref 38–73)
NRBC BLD-RTO: 0 /100 WBC
PHOSPHATE SERPL-MCNC: 2.9 MG/DL (ref 2.7–4.5)
PLATELET # BLD AUTO: 268 K/UL (ref 150–450)
PMV BLD AUTO: 11.4 FL (ref 9.2–12.9)
POCT GLUCOSE: 145 MG/DL (ref 70–110)
POCT GLUCOSE: 150 MG/DL (ref 70–110)
POCT GLUCOSE: 154 MG/DL (ref 70–110)
POCT GLUCOSE: 166 MG/DL (ref 70–110)
POCT GLUCOSE: 198 MG/DL (ref 70–110)
POCT GLUCOSE: 199 MG/DL (ref 70–110)
POCT GLUCOSE: 199 MG/DL (ref 70–110)
POCT GLUCOSE: 202 MG/DL (ref 70–110)
POCT GLUCOSE: 202 MG/DL (ref 70–110)
POCT GLUCOSE: 205 MG/DL (ref 70–110)
POCT GLUCOSE: 208 MG/DL (ref 70–110)
POCT GLUCOSE: 210 MG/DL (ref 70–110)
POCT GLUCOSE: 211 MG/DL (ref 70–110)
POCT GLUCOSE: 213 MG/DL (ref 70–110)
POCT GLUCOSE: 228 MG/DL (ref 70–110)
POCT GLUCOSE: 232 MG/DL (ref 70–110)
POCT GLUCOSE: 244 MG/DL (ref 70–110)
POCT GLUCOSE: 269 MG/DL (ref 70–110)
POCT GLUCOSE: 272 MG/DL (ref 70–110)
POCT GLUCOSE: 286 MG/DL (ref 70–110)
POCT GLUCOSE: 301 MG/DL (ref 70–110)
POCT GLUCOSE: 307 MG/DL (ref 70–110)
POTASSIUM SERPL-SCNC: 5.3 MMOL/L (ref 3.5–5.1)
PROT SERPL-MCNC: 5.6 G/DL (ref 6–8.4)
RBC # BLD AUTO: 4.03 M/UL (ref 4–5.4)
SODIUM SERPL-SCNC: 133 MMOL/L (ref 136–145)
WBC # BLD AUTO: 4.22 K/UL (ref 3.9–12.7)

## 2024-06-01 PROCEDURE — 20000000 HC ICU ROOM

## 2024-06-01 PROCEDURE — 25000003 PHARM REV CODE 250: Performed by: PHYSICIAN ASSISTANT

## 2024-06-01 PROCEDURE — 83735 ASSAY OF MAGNESIUM: CPT | Performed by: NURSE PRACTITIONER

## 2024-06-01 PROCEDURE — 99233 SBSQ HOSP IP/OBS HIGH 50: CPT | Mod: ,,, | Performed by: PHYSICIAN ASSISTANT

## 2024-06-01 PROCEDURE — 63600175 PHARM REV CODE 636 W HCPCS

## 2024-06-01 PROCEDURE — 25000003 PHARM REV CODE 250

## 2024-06-01 PROCEDURE — 80053 COMPREHEN METABOLIC PANEL: CPT | Performed by: NURSE PRACTITIONER

## 2024-06-01 PROCEDURE — 94761 N-INVAS EAR/PLS OXIMETRY MLT: CPT

## 2024-06-01 PROCEDURE — 99232 SBSQ HOSP IP/OBS MODERATE 35: CPT | Mod: AF,HB,, | Performed by: PSYCHIATRY & NEUROLOGY

## 2024-06-01 PROCEDURE — 63600175 PHARM REV CODE 636 W HCPCS: Performed by: PHYSICIAN ASSISTANT

## 2024-06-01 PROCEDURE — 99233 SBSQ HOSP IP/OBS HIGH 50: CPT | Mod: ,,, | Performed by: INTERNAL MEDICINE

## 2024-06-01 PROCEDURE — 84100 ASSAY OF PHOSPHORUS: CPT | Performed by: NURSE PRACTITIONER

## 2024-06-01 PROCEDURE — 93005 ELECTROCARDIOGRAM TRACING: CPT

## 2024-06-01 PROCEDURE — 85025 COMPLETE CBC W/AUTO DIFF WBC: CPT | Performed by: NURSE PRACTITIONER

## 2024-06-01 PROCEDURE — 93010 ELECTROCARDIOGRAM REPORT: CPT | Mod: ,,, | Performed by: INTERNAL MEDICINE

## 2024-06-01 RX ORDER — INSULIN ASPART 100 [IU]/ML
1-3 INJECTION, SOLUTION INTRAVENOUS; SUBCUTANEOUS
Status: DISCONTINUED | OUTPATIENT
Start: 2024-06-01 | End: 2024-06-03

## 2024-06-01 RX ORDER — LORAZEPAM 0.5 MG/1
0.5 TABLET ORAL ONCE
Status: COMPLETED | OUTPATIENT
Start: 2024-06-01 | End: 2024-06-01

## 2024-06-01 RX ORDER — PROCHLORPERAZINE EDISYLATE 5 MG/ML
2.5 INJECTION INTRAMUSCULAR; INTRAVENOUS EVERY 6 HOURS PRN
Status: DISCONTINUED | OUTPATIENT
Start: 2024-06-01 | End: 2024-06-04 | Stop reason: HOSPADM

## 2024-06-01 RX ADMIN — HYDROXYZINE PAMOATE 25 MG: 25 CAPSULE ORAL at 07:06

## 2024-06-01 RX ADMIN — ACETAMINOPHEN 1000 MG: 500 TABLET ORAL at 09:06

## 2024-06-01 RX ADMIN — INSULIN ASPART 1 UNITS: 100 INJECTION, SOLUTION INTRAVENOUS; SUBCUTANEOUS at 10:06

## 2024-06-01 RX ADMIN — ENOXAPARIN SODIUM 40 MG: 40 INJECTION SUBCUTANEOUS at 04:06

## 2024-06-01 RX ADMIN — ACETAMINOPHEN 1000 MG: 500 TABLET ORAL at 06:06

## 2024-06-01 RX ADMIN — INSULIN ASPART 3 UNITS: 100 INJECTION, SOLUTION INTRAVENOUS; SUBCUTANEOUS at 06:06

## 2024-06-01 RX ADMIN — PROCHLORPERAZINE EDISYLATE 2.5 MG: 5 INJECTION INTRAMUSCULAR; INTRAVENOUS at 07:06

## 2024-06-01 RX ADMIN — INSULIN ASPART 3 UNITS: 100 INJECTION, SOLUTION INTRAVENOUS; SUBCUTANEOUS at 09:06

## 2024-06-01 RX ADMIN — PANTOPRAZOLE SODIUM 40 MG: 40 TABLET, DELAYED RELEASE ORAL at 06:06

## 2024-06-01 RX ADMIN — FLUOXETINE HYDROCHLORIDE 60 MG: 20 CAPSULE ORAL at 08:06

## 2024-06-01 RX ADMIN — INSULIN ASPART 3 UNITS: 100 INJECTION, SOLUTION INTRAVENOUS; SUBCUTANEOUS at 02:06

## 2024-06-01 RX ADMIN — PROCHLORPERAZINE EDISYLATE 2.5 MG: 5 INJECTION INTRAMUSCULAR; INTRAVENOUS at 04:06

## 2024-06-01 RX ADMIN — LORAZEPAM 0.5 MG: 0.5 TABLET ORAL at 08:06

## 2024-06-01 RX ADMIN — INSULIN HUMAN 0.3 UNITS/HR: 1 INJECTION, SOLUTION INTRAVENOUS at 08:06
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
## 2024-06-01 NOTE — ASSESSMENT & PLAN NOTE
"Patient has history of non-adherence to medications  Frequent hospitalizations for DKA, most recently earlier this month  While in ED she reportedly self administered unknown amount of insulin to "get care she needed"  BGL initially >400, dropped to 30s    -Endocrine consulted, appreciate recommendations  -Start insulin drip today, continue diet  -POCT glucose qHR  -resume subQ insulin when clinically indicated  "
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
## 2024-06-01 NOTE — SUBJECTIVE & OBJECTIVE
"Interval HPI:   No acute events overnight. Patient in room 6083/6083 A. Blood glucose stable. BG at goal on current insulin regimen (SSI ). Steroid use- None .      Renal function- Normal   Vasopressors-  None     Diet Adult Regular (IDDSI Level 7) PEC/CEC - Styrofoam     Eating:   <25%  Nausea: No  Hypoglycemia and intervention: No  Fever: No  TPN and/or TF: No      /83   Pulse 89   Temp 98.3 °F (36.8 °C) (Oral)   Resp 16   Wt 54 kg (119 lb)   LMP 05/10/2024 (Approximate)   SpO2 96%   BMI 18.64 kg/m²     Labs Reviewed and Include    No results for input(s): "GLU", "CALCIUM", "ALBUMIN", "PROT", "NA", "K", "CO2", "CL", "BUN", "CREATININE", "ALKPHOS", "ALT", "AST", "BILITOT" in the last 24 hours.  Lab Results   Component Value Date    WBC 4.22 06/01/2024    HGB 10.3 (L) 06/01/2024    HCT 33.6 (L) 06/01/2024    MCV 83 06/01/2024     06/01/2024     Recent Labs   Lab 05/28/24  2233   TSH 0.472     Lab Results   Component Value Date    HGBA1C 9.5 (H) 04/12/2024       Nutritional status:   Body mass index is 18.64 kg/m².  Lab Results   Component Value Date    ALBUMIN 3.0 (L) 05/31/2024    ALBUMIN 4.1 05/30/2024    ALBUMIN 3.3 (L) 05/30/2024     No results found for: "PREALBUMIN"    Estimated Creatinine Clearance: 94.7 mL/min (based on SCr of 0.7 mg/dL).    Accu-Checks  Recent Labs     05/31/24  1937 05/31/24  2020 05/31/24  2106 05/31/24  2205 05/31/24  2340 06/01/24  0051 06/01/24  0108 06/01/24  0206 06/01/24  0306 06/01/24  0515   POCTGLUCOSE 121* 128* 141* 168* 177* 198* 199* 199* 202* 210*       Current Medications and/or Treatments Impacting Glycemic Control  Immunotherapy:    Immunosuppressants       None          Steroids:   Hormones (From admission, onward)      None          Pressors:    Autonomic Drugs (From admission, onward)      None          Hyperglycemia/Diabetes Medications:   Antihyperglycemics (From admission, onward)      Start     Stop Route Frequency Ordered    05/31/24 8455  " insulin regular in 0.9 % NaCl 100 unit/100 mL (1 unit/mL) infusion        Question:  Enter initial dose (Units/hr):  Answer:  0.3    -- IV Continuous 05/31/24 1548    05/31/24 1644  insulin aspart U-100 pen 0-4 Units         -- SubQ Every 4 hours PRN 05/31/24 1542

## 2024-06-01 NOTE — PLAN OF CARE
Problem: Adult Inpatient Plan of Care  Goal: Plan of Care Review  Outcome: Progressing  Goal: Patient-Specific Goal (Individualized)  Outcome: Progressing  Goal: Optimal Comfort and Wellbeing  Outcome: Progressing  Goal: Readiness for Transition of Care  Outcome: Progressing     Problem: Diabetes Comorbidity  Goal: Blood Glucose Level Within Targeted Range  Outcome: Progressing   Reviewed plan of care with pt and answered any questions. Initiated insulin gtt this morning and correctional doses given per order. Pt remained under direct observation by sitter for entirety of day.

## 2024-06-01 NOTE — SUBJECTIVE & OBJECTIVE
Interval History/Significant Events:   Lost access again however blood sugars stabilizing        Objective:     Vital Signs (Most Recent):  Temp: 98.4 °F (36.9 °C) (06/01/24 0701)  Pulse: 89 (06/01/24 0748)  Resp: (!) 25 (06/01/24 0748)  BP: 110/68 (06/01/24 0701)  SpO2: 99 % (06/01/24 0748) Vital Signs (24h Range):  Temp:  [97.9 °F (36.6 °C)-98.9 °F (37.2 °C)] 98.4 °F (36.9 °C)  Pulse:  [] 89  Resp:  [12-41] 25  SpO2:  [96 %-100 %] 99 %  BP: ()/(59-89) 110/68   Weight: 54 kg (119 lb)  Body mass index is 18.64 kg/m².      Intake/Output Summary (Last 24 hours) at 6/1/2024 0841  Last data filed at 6/1/2024 0600  Gross per 24 hour   Intake 2650.28 ml   Output 2500 ml   Net 150.28 ml          Physical Exam  Constitutional:       Appearance: She is not ill-appearing.   HENT:      Head: Normocephalic and atraumatic.      Mouth/Throat:      Mouth: Mucous membranes are moist.      Pharynx: Oropharynx is clear.   Cardiovascular:      Rate and Rhythm: Normal rate and regular rhythm.      Heart sounds: Normal heart sounds. No murmur heard.  Pulmonary:      Effort: Pulmonary effort is normal. No respiratory distress.   Abdominal:      General: Abdomen is flat. There is no distension.   Musculoskeletal:         General: No swelling.      Right lower leg: No edema.      Left lower leg: No edema.   Skin:     General: Skin is warm and dry.      Findings: Lesion present.   Neurological:      General: No focal deficit present.      Mental Status: She is alert and oriented to person, place, and time. Mental status is at baseline.   Psychiatric:         Mood and Affect: Mood normal.      Comments: Flat affect              Vents:     Lines/Drains/Airways       Drain  Duration             Female External Urinary Catheter w/ Suction 05/29/24 0800 3 days              Peripheral Intravenous Line  Duration                  Peripheral IV - Single Lumen 05/30/24 0400 22 G Anterior;Distal;Right Upper Arm 2 days         Peripheral IV  - Single Lumen 05/30/24 1701 20 G Anterior;Proximal;Right Forearm 1 day         Peripheral IV - Single Lumen 06/01/24 0611 20 G;1 3/4 in Anterior;Left;Proximal Upper Arm <1 day                  Significant Labs:    CBC/Anemia Profile:  Recent Labs   Lab 05/31/24  0425 06/01/24  0612   WBC 5.28 4.22   HGB 11.4* 10.3*   HCT 37.2 33.6*    268   MCV 85 83   RDW 15.6* 15.6*        Chemistries:  Recent Labs   Lab 05/30/24  1812 05/31/24 0425 06/01/24 0612    137 133*   K 3.4* 3.9 5.3*    106 104   CO2 27 23 23   BUN 3* <3* 7   CREATININE 1.0 0.7 0.9   CALCIUM 9.6 8.6* 8.3*   ALBUMIN 4.1 3.0* 2.9*   PROT 8.0 5.8* 5.6*   BILITOT 0.5 0.4 0.3   ALKPHOS 116 91 104   ALT 9* 8* 10   AST 13 11 12   MG  --  1.9 2.0   PHOS  --  4.0 2.9       All pertinent labs within the past 24 hours have been reviewed.    Significant Imaging:  I have reviewed all pertinent imaging results/findings within the past 24 hours.

## 2024-06-01 NOTE — ASSESSMENT & PLAN NOTE
BG goal: 140-180  T1DM. Hx of multiple DKA episodes.  Injected unknown amount of insulin Lantus and Novolog in ED. 72 hours out requiring insulin  D10 drip but now at 25 cc/hr. Pt appetite improved. Recommend  d/c d10 drip and start on transition drip at 0.3 u/hr given she is t1dm w/ frequent DKA.  Will monitor closely.     - Recommend stop D10 drip  - Start Transition insulin drip at 0.3 units /hr once bg reliably above 200 mg/dl  - Start /50  - POCT Glucose every hour  - Hypoglycemia protocol in place  ** Please notify Endocrine for any change and/or advance in diet**  ** Please call Endocrine for any BG related issues **       Discharge Planning:   TBD. Please notify endocrinology prior to discharge.

## 2024-06-01 NOTE — PROGRESS NOTES
"CONSULTATION LIAISON PSYCHIATRY PROGRESS NOTE    Patient Name: Hollie Wilson  MRN: [id number]  Patient Class: IP- Inpatient  Admission Date: 5/28/2024  Attending Physician: Basilio Chapin MD      SUBJECTIVE:   Hollie Wilson is a 34 y.o. 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 She did not require any prn medications for agitation or aggression. She was compliant with scheduled medications. Her vital signs were stable.     Today, patient is greeted at the bedside. She is laying in bed sleeping but arouses to the interviewer's greeting.  She states she is sleeping well and eating well. She denies any side effects effects of medications.     She states her mood today is "fine". She denies any suicidal ideation, homicidal ideation, auditory hallucinations, visual hallucinations, or paranoia at this time.      OBJECTIVE:    Mental Status Exam:  General Appearance: appears stated age, well developed and nourished, adequately groomed and appropriately dressed, in no acute distress  Behavior: normal; cooperative; reasonably friendly, pleasant, and polite; appropriate eye-contact; under good behavioral control  Involuntary Movements and Motor Activity: no abnormal involuntary movements noted; no tics, no tremors, no akathisia, no dystonia, no evidence of tardive dyskinesia; no psychomotor agitation or retardation  Gait and Station: unable to assess - patient lying down or seated  Speech and Language: decreased spontaneity, slowed, paucity of speech  Mood: "Fine"  Affect: euthymic, constricted  Thought Process and Associations: difficult to assess due to poverty of thought  Thought Content and Perceptions:: " no suicidal or homicidal ideation, no auditory or visual hallucinations, no paranoid ideation, no ideas of reference, no evidence of delusions or psychosis  Sensorium and Orientation: intact; alert with clear sensorium; oriented fully to person, place, time and situation  Recent and Remote Memory: grossly intact, able to recall relevant and salient information from the recent and remote past  Attention and Concentration:  somewhat inattentive today secondary to somnolence  Fund of Knowledge: vocabulary rudimentary, intellectually disabled  Insight: adequate  Judgment: poor, as evidenced by recent suicide attempt    CAM ICU positive? no    ASSESSMENT & RECOMMENDATIONS   Unspecified mood disorder, MRE depression  Generalized anxiety disorder  Cannabis use  Methamphetamine use  Cocaine use  History of unspecified psychosis     Unspecified mood disorder, MRE depression  PSYCH MEDICATIONS  Unclear history of bipolar disorder; will attempt to obtain collateral and monitor for symptoms of diony  Scheduled - Continue Prozac 60 mg PO daily for depression and anxiety  PRN - Continue Vistaril 25 mg q8 hours for anxiety     History of unspecified psychosis  PSYCH MEDICATIONS  Unclear if organic psychosis or triggered by substance use; history of non-compliance with antipsychotics/mood stabilizers; will monitor for emerging psychosis  PRN - consider Ativan 1-2 mg PO/IM for non-redirectable agitation; avoid Haldol or other Qtc prolonging agents given patient current Qtc (536). Would also avoid Zyprexa given difficulty with blood glucose    Cannabis/cocaine/methamphetamine use  Counseled against further use of these substances  Denies regular use of cocaine and methamphetamine    RISK ASSESSMENT  NEEDS PEC because patient is in imminent danger of hurting self or others and is gravely disabled. & NEEDS 1:1 sitter    FOLLOW UP  Will follow up while in house     DISPOSITION - once medically cleared:   Seek involuntary inpatient  psychiatric admission for stabilization of acute psychiatric symptoms and a safe disposition plan is enacted. The patient &/or their family was informed that the patient will be transferred to an inpatient unit per ED/primary placement team.    Please contact ON CALL psychiatry service (24/7) for any acute issues that may arise.    Dr. Gerard Kamara   Psychiatry  [facility]  6/1/2024 9:47 AM        --------------------------------------------------------------------------------------------------------------------------------------------------------------------------------------------------------------------------------------    CONTINUED OBJECTIVE clinical data & findings reviewed and noted for above decision making    Current Medications:   Scheduled Meds:    enoxparin  40 mg Subcutaneous Q24H (prophylaxis, 1700)    FLUoxetine  60 mg Oral Daily    LORazepam  0.5 mg Oral Once    pantoprazole  40 mg Oral QAM     PRN Meds:   Current Facility-Administered Medications:     acetaminophen, 1,000 mg, Oral, Q6H PRN    dextrose 10%, 12.5 g, Intravenous, PRN    dextrose 10%, 25 g, Intravenous, PRN    dextrose, 15 g, Oral, PRN    glucagon (human recombinant), 1 mg, Intramuscular, PRN    glucose, 16 g, Oral, PRN    glucose, 24 g, Oral, PRN    hydrOXYzine pamoate, 25 mg, Oral, Q8H PRN    insulin aspart U-100, 0-4 Units, Subcutaneous, Q4H PRN    metoclopramide HCl, 10 mg, Oral, Q6H PRN    polyethylene glycol, 17 g, Oral, Daily PRN    prochlorperazine, 2.5 mg, Intravenous, Q6H PRN    sodium chloride 0.9%, 10 mL, Intravenous, PRN    Allergies:   Review of patient's allergies indicates:   Allergen Reactions    Clove oil Itching    Pork/porcine containing products Other (See Comments)     Pt does not eat Pork    Zofran (as hydrochloride) [ondansetron hcl] Hives       Vitals  Vitals:    06/01/24 0748   BP:    Pulse: 89   Resp: (!) 25   Temp:        Labs/Imaging/Studies:  Recent Results (from the past 24 hour(s))    POCT glucose    Collection Time: 05/31/24  9:38 AM   Result Value Ref Range    POCT Glucose 180 (H) 70 - 110 mg/dL   Insulin, random    Collection Time: 05/31/24 11:52 AM   Result Value Ref Range    Insulin 14.7 <25.0 uU/mL    Insulin Collection Interval blood    POCT glucose    Collection Time: 05/31/24 12:03 PM   Result Value Ref Range    POCT Glucose 165 (H) 70 - 110 mg/dL   POCT glucose    Collection Time: 05/31/24  1:07 PM   Result Value Ref Range    POCT Glucose 142 (H) 70 - 110 mg/dL   POCT glucose    Collection Time: 05/31/24  2:04 PM   Result Value Ref Range    POCT Glucose 143 (H) 70 - 110 mg/dL   POCT glucose    Collection Time: 05/31/24  3:19 PM   Result Value Ref Range    POCT Glucose 173 (H) 70 - 110 mg/dL   POCT glucose    Collection Time: 05/31/24  4:04 PM   Result Value Ref Range    POCT Glucose 128 (H) 70 - 110 mg/dL   POCT glucose    Collection Time: 05/31/24  4:58 PM   Result Value Ref Range    POCT Glucose 112 (H) 70 - 110 mg/dL   POCT glucose    Collection Time: 05/31/24  6:14 PM   Result Value Ref Range    POCT Glucose 99 70 - 110 mg/dL   POCT glucose    Collection Time: 05/31/24  7:37 PM   Result Value Ref Range    POCT Glucose 121 (H) 70 - 110 mg/dL   POCT glucose    Collection Time: 05/31/24  8:20 PM   Result Value Ref Range    POCT Glucose 128 (H) 70 - 110 mg/dL   POCT glucose    Collection Time: 05/31/24  9:06 PM   Result Value Ref Range    POCT Glucose 141 (H) 70 - 110 mg/dL   POCT glucose    Collection Time: 05/31/24 10:05 PM   Result Value Ref Range    POCT Glucose 168 (H) 70 - 110 mg/dL   POCT glucose    Collection Time: 05/31/24 11:40 PM   Result Value Ref Range    POCT Glucose 177 (H) 70 - 110 mg/dL   POCT glucose    Collection Time: 06/01/24 12:51 AM   Result Value Ref Range    POCT Glucose 198 (H) 70 - 110 mg/dL   POCT glucose    Collection Time: 06/01/24  1:08 AM   Result Value Ref Range    POCT Glucose 199 (H) 70 - 110 mg/dL   POCT glucose    Collection Time: 06/01/24  2:06 AM    Result Value Ref Range    POCT Glucose 199 (H) 70 - 110 mg/dL   POCT glucose    Collection Time: 06/01/24  3:06 AM   Result Value Ref Range    POCT Glucose 202 (H) 70 - 110 mg/dL   POCT glucose    Collection Time: 06/01/24  5:15 AM   Result Value Ref Range    POCT Glucose 210 (H) 70 - 110 mg/dL   Magnesium    Collection Time: 06/01/24  6:12 AM   Result Value Ref Range    Magnesium 2.0 1.6 - 2.6 mg/dL   Phosphorus    Collection Time: 06/01/24  6:12 AM   Result Value Ref Range    Phosphorus 2.9 2.7 - 4.5 mg/dL   Comprehensive metabolic panel    Collection Time: 06/01/24  6:12 AM   Result Value Ref Range    Sodium 133 (L) 136 - 145 mmol/L    Potassium 5.3 (H) 3.5 - 5.1 mmol/L    Chloride 104 95 - 110 mmol/L    CO2 23 23 - 29 mmol/L    Glucose 213 (H) 70 - 110 mg/dL    BUN 7 6 - 20 mg/dL    Creatinine 0.9 0.5 - 1.4 mg/dL    Calcium 8.3 (L) 8.7 - 10.5 mg/dL    Total Protein 5.6 (L) 6.0 - 8.4 g/dL    Albumin 2.9 (L) 3.5 - 5.2 g/dL    Total Bilirubin 0.3 0.1 - 1.0 mg/dL    Alkaline Phosphatase 104 55 - 135 U/L    AST 12 10 - 40 U/L    ALT 10 10 - 44 U/L    eGFR >60.0 >60 mL/min/1.73 m^2    Anion Gap 6 (L) 8 - 16 mmol/L   CBC auto differential    Collection Time: 06/01/24  6:12 AM   Result Value Ref Range    WBC 4.22 3.90 - 12.70 K/uL    RBC 4.03 4.00 - 5.40 M/uL    Hemoglobin 10.3 (L) 12.0 - 16.0 g/dL    Hematocrit 33.6 (L) 37.0 - 48.5 %    MCV 83 82 - 98 fL    MCH 25.6 (L) 27.0 - 31.0 pg    MCHC 30.7 (L) 32.0 - 36.0 g/dL    RDW 15.6 (H) 11.5 - 14.5 %    Platelets 268 150 - 450 K/uL    MPV 11.4 9.2 - 12.9 fL    Immature Granulocytes 0.2 0.0 - 0.5 %    Gran # (ANC) 1.9 1.8 - 7.7 K/uL    Immature Grans (Abs) 0.01 0.00 - 0.04 K/uL    Lymph # 1.3 1.0 - 4.8 K/uL    Mono # 0.6 0.3 - 1.0 K/uL    Eos # 0.3 0.0 - 0.5 K/uL    Baso # 0.02 0.00 - 0.20 K/uL    nRBC 0 0 /100 WBC    Gran % 45.0 38.0 - 73.0 %    Lymph % 31.3 18.0 - 48.0 %    Mono % 15.2 (H) 4.0 - 15.0 %    Eosinophil % 7.8 0.0 - 8.0 %    Basophil % 0.5 0.0 - 1.9 %     Differential Method Automated    POCT glucose    Collection Time: 06/01/24  7:24 AM   Result Value Ref Range    POCT Glucose 228 (H) 70 - 110 mg/dL     Imaging Results              X-Ray Chest AP Portable (Final result)  Result time 05/28/24 22:55:02      Final result by Giles Moncada MD (05/28/24 22:55:02)                   Impression:      No acute findings in the chest.      Electronically signed by: Giles Moncada MD  Date:    05/28/2024  Time:    22:55               Narrative:    EXAMINATION:  XR CHEST AP PORTABLE    CLINICAL HISTORY:  hyperglycemia;    TECHNIQUE:  Single frontal view of the chest was performed.    COMPARISON:  05/18/2024.    FINDINGS:  No consolidation, pleural effusion or pneumothorax.    Cardiomediastinal silhouette is unremarkable.

## 2024-06-02 PROBLEM — T38.3X5A HYPOGLYCEMIA DUE TO INSULIN: Status: ACTIVE | Noted: 2024-05-29

## 2024-06-02 PROBLEM — E10.65 TYPE 1 DIABETES MELLITUS WITH HYPERGLYCEMIA: Chronic | Status: ACTIVE | Noted: 2024-04-20

## 2024-06-02 PROBLEM — K21.9 GERD (GASTROESOPHAGEAL REFLUX DISEASE): Chronic | Status: ACTIVE | Noted: 2024-05-08

## 2024-06-02 PROBLEM — E87.0 TYPE 1 DIABETES MELLITUS WITH HYPEROSMOLAR HYPERGLYCEMIC STATE (HHS): Status: RESOLVED | Noted: 2024-05-08 | Resolved: 2024-06-02

## 2024-06-02 PROBLEM — E16.0 HYPOGLYCEMIA DUE TO INSULIN: Status: ACTIVE | Noted: 2024-05-29

## 2024-06-02 PROBLEM — E10.65 TYPE 1 DIABETES MELLITUS WITH HYPEROSMOLAR HYPERGLYCEMIC STATE (HHS): Status: RESOLVED | Noted: 2024-05-08 | Resolved: 2024-06-02

## 2024-06-02 PROBLEM — K31.84 GASTROPARESIS: Chronic | Status: ACTIVE | Noted: 2024-04-12

## 2024-06-02 PROBLEM — M79.2 NEUROPATHIC PAIN: Chronic | Status: ACTIVE | Noted: 2024-05-08

## 2024-06-02 PROBLEM — D72.829 LEUKOCYTOSIS: Status: RESOLVED | Noted: 2023-06-13 | Resolved: 2024-06-02

## 2024-06-02 PROBLEM — E87.1 HYPONATREMIA: Status: RESOLVED | Noted: 2024-05-08 | Resolved: 2024-06-02

## 2024-06-02 PROBLEM — F19.10 POLYSUBSTANCE ABUSE: Chronic | Status: ACTIVE | Noted: 2019-06-13

## 2024-06-02 PROBLEM — F79 INTELLECTUAL DISABILITY: Chronic | Status: ACTIVE | Noted: 2020-01-27

## 2024-06-02 PROBLEM — E10.69 TYPE 1 DIABETES MELLITUS WITH HYPEROSMOLAR HYPERGLYCEMIC STATE (HHS): Status: RESOLVED | Noted: 2024-05-08 | Resolved: 2024-06-02

## 2024-06-02 PROBLEM — F25.9 SCHIZOAFFECTIVE DISORDER: Chronic | Status: ACTIVE | Noted: 2024-05-08

## 2024-06-02 LAB
ALBUMIN SERPL BCP-MCNC: 3.1 G/DL (ref 3.5–5.2)
ALP SERPL-CCNC: 130 U/L (ref 55–135)
ALT SERPL W/O P-5'-P-CCNC: 9 U/L (ref 10–44)
ANION GAP SERPL CALC-SCNC: 10 MMOL/L (ref 8–16)
ANION GAP SERPL CALC-SCNC: 9 MMOL/L (ref 8–16)
AST SERPL-CCNC: 18 U/L (ref 10–40)
BASOPHILS # BLD AUTO: 0.03 K/UL (ref 0–0.2)
BASOPHILS NFR BLD: 0.6 % (ref 0–1.9)
BILIRUB SERPL-MCNC: 0.3 MG/DL (ref 0.1–1)
BUN SERPL-MCNC: 15 MG/DL (ref 6–20)
BUN SERPL-MCNC: 19 MG/DL (ref 6–20)
CALCIUM SERPL-MCNC: 10 MG/DL (ref 8.7–10.5)
CALCIUM SERPL-MCNC: 9 MG/DL (ref 8.7–10.5)
CHLORIDE SERPL-SCNC: 101 MMOL/L (ref 95–110)
CHLORIDE SERPL-SCNC: 103 MMOL/L (ref 95–110)
CO2 SERPL-SCNC: 22 MMOL/L (ref 23–29)
CO2 SERPL-SCNC: 26 MMOL/L (ref 23–29)
CREAT SERPL-MCNC: 0.9 MG/DL (ref 0.5–1.4)
CREAT SERPL-MCNC: 0.9 MG/DL (ref 0.5–1.4)
DIFFERENTIAL METHOD BLD: ABNORMAL
EOSINOPHIL # BLD AUTO: 0.3 K/UL (ref 0–0.5)
EOSINOPHIL NFR BLD: 6.3 % (ref 0–8)
ERYTHROCYTE [DISTWIDTH] IN BLOOD BY AUTOMATED COUNT: 15.5 % (ref 11.5–14.5)
EST. GFR  (NO RACE VARIABLE): >60 ML/MIN/1.73 M^2
EST. GFR  (NO RACE VARIABLE): >60 ML/MIN/1.73 M^2
GLUCOSE SERPL-MCNC: 191 MG/DL (ref 70–110)
GLUCOSE SERPL-MCNC: 303 MG/DL (ref 70–110)
HCT VFR BLD AUTO: 36.4 % (ref 37–48.5)
HGB BLD-MCNC: 11.1 G/DL (ref 12–16)
IMM GRANULOCYTES # BLD AUTO: 0.02 K/UL (ref 0–0.04)
IMM GRANULOCYTES NFR BLD AUTO: 0.4 % (ref 0–0.5)
LYMPHOCYTES # BLD AUTO: 1.6 K/UL (ref 1–4.8)
LYMPHOCYTES NFR BLD: 29.2 % (ref 18–48)
MAGNESIUM SERPL-MCNC: 1.8 MG/DL (ref 1.6–2.6)
MCH RBC QN AUTO: 25.6 PG (ref 27–31)
MCHC RBC AUTO-ENTMCNC: 30.5 G/DL (ref 32–36)
MCV RBC AUTO: 84 FL (ref 82–98)
MONOCYTES # BLD AUTO: 0.5 K/UL (ref 0.3–1)
MONOCYTES NFR BLD: 9.6 % (ref 4–15)
NEUTROPHILS # BLD AUTO: 2.9 K/UL (ref 1.8–7.7)
NEUTROPHILS NFR BLD: 53.9 % (ref 38–73)
NRBC BLD-RTO: 0 /100 WBC
OHS QRS DURATION: 118 MS
OHS QTC CALCULATION: 503 MS
PHOSPHATE SERPL-MCNC: 3.9 MG/DL (ref 2.7–4.5)
PLATELET # BLD AUTO: 270 K/UL (ref 150–450)
PMV BLD AUTO: 11.5 FL (ref 9.2–12.9)
POCT GLUCOSE: 213 MG/DL (ref 70–110)
POCT GLUCOSE: 213 MG/DL (ref 70–110)
POCT GLUCOSE: 214 MG/DL (ref 70–110)
POCT GLUCOSE: 214 MG/DL (ref 70–110)
POCT GLUCOSE: 221 MG/DL (ref 70–110)
POCT GLUCOSE: 235 MG/DL (ref 70–110)
POCT GLUCOSE: 246 MG/DL (ref 70–110)
POCT GLUCOSE: 264 MG/DL (ref 70–110)
POCT GLUCOSE: 279 MG/DL (ref 70–110)
POCT GLUCOSE: 290 MG/DL (ref 70–110)
POCT GLUCOSE: 294 MG/DL (ref 70–110)
POCT GLUCOSE: 297 MG/DL (ref 70–110)
POTASSIUM SERPL-SCNC: 4.7 MMOL/L (ref 3.5–5.1)
POTASSIUM SERPL-SCNC: 6.1 MMOL/L (ref 3.5–5.1)
PROT SERPL-MCNC: 6.3 G/DL (ref 6–8.4)
RBC # BLD AUTO: 4.33 M/UL (ref 4–5.4)
SODIUM SERPL-SCNC: 135 MMOL/L (ref 136–145)
SODIUM SERPL-SCNC: 136 MMOL/L (ref 136–145)
WBC # BLD AUTO: 5.42 K/UL (ref 3.9–12.7)

## 2024-06-02 PROCEDURE — 21400001 HC TELEMETRY ROOM

## 2024-06-02 PROCEDURE — 84100 ASSAY OF PHOSPHORUS: CPT | Performed by: NURSE PRACTITIONER

## 2024-06-02 PROCEDURE — 25000003 PHARM REV CODE 250

## 2024-06-02 PROCEDURE — 80053 COMPREHEN METABOLIC PANEL: CPT | Performed by: NURSE PRACTITIONER

## 2024-06-02 PROCEDURE — 99233 SBSQ HOSP IP/OBS HIGH 50: CPT | Mod: ,,, | Performed by: PHYSICIAN ASSISTANT

## 2024-06-02 PROCEDURE — 63600175 PHARM REV CODE 636 W HCPCS

## 2024-06-02 PROCEDURE — 94761 N-INVAS EAR/PLS OXIMETRY MLT: CPT

## 2024-06-02 PROCEDURE — 83735 ASSAY OF MAGNESIUM: CPT | Performed by: NURSE PRACTITIONER

## 2024-06-02 PROCEDURE — 80048 BASIC METABOLIC PNL TOTAL CA: CPT | Mod: XB

## 2024-06-02 PROCEDURE — 85025 COMPLETE CBC W/AUTO DIFF WBC: CPT | Performed by: NURSE PRACTITIONER

## 2024-06-02 PROCEDURE — 99233 SBSQ HOSP IP/OBS HIGH 50: CPT | Mod: ,,, | Performed by: INTERNAL MEDICINE

## 2024-06-02 RX ORDER — INSULIN ASPART 100 [IU]/ML
0-4 INJECTION, SOLUTION INTRAVENOUS; SUBCUTANEOUS
Status: DISCONTINUED | OUTPATIENT
Start: 2024-06-02 | End: 2024-06-04 | Stop reason: HOSPADM

## 2024-06-02 RX ADMIN — HYDROXYZINE PAMOATE 25 MG: 25 CAPSULE ORAL at 08:06

## 2024-06-02 RX ADMIN — INSULIN ASPART 2 UNITS: 100 INJECTION, SOLUTION INTRAVENOUS; SUBCUTANEOUS at 06:06

## 2024-06-02 RX ADMIN — INSULIN ASPART 2 UNITS: 100 INJECTION, SOLUTION INTRAVENOUS; SUBCUTANEOUS at 12:06

## 2024-06-02 RX ADMIN — INSULIN ASPART 3 UNITS: 100 INJECTION, SOLUTION INTRAVENOUS; SUBCUTANEOUS at 05:06

## 2024-06-02 RX ADMIN — INSULIN ASPART 2 UNITS: 100 INJECTION, SOLUTION INTRAVENOUS; SUBCUTANEOUS at 05:06

## 2024-06-02 RX ADMIN — INSULIN ASPART 2 UNITS: 100 INJECTION, SOLUTION INTRAVENOUS; SUBCUTANEOUS at 02:06

## 2024-06-02 RX ADMIN — ENOXAPARIN SODIUM 40 MG: 40 INJECTION SUBCUTANEOUS at 05:06

## 2024-06-02 RX ADMIN — ACETAMINOPHEN 1000 MG: 500 TABLET ORAL at 08:06

## 2024-06-02 RX ADMIN — FLUOXETINE HYDROCHLORIDE 60 MG: 20 CAPSULE ORAL at 08:06

## 2024-06-02 RX ADMIN — INSULIN ASPART 1 UNITS: 100 INJECTION, SOLUTION INTRAVENOUS; SUBCUTANEOUS at 08:06

## 2024-06-02 RX ADMIN — ACETAMINOPHEN 1000 MG: 500 TABLET ORAL at 05:06

## 2024-06-02 RX ADMIN — PROCHLORPERAZINE EDISYLATE 2.5 MG: 5 INJECTION INTRAMUSCULAR; INTRAVENOUS at 03:06

## 2024-06-02 RX ADMIN — INSULIN ASPART 1 UNITS: 100 INJECTION, SOLUTION INTRAVENOUS; SUBCUTANEOUS at 10:06

## 2024-06-02 RX ADMIN — PANTOPRAZOLE SODIUM 40 MG: 40 TABLET, DELAYED RELEASE ORAL at 05:06

## 2024-06-02 RX ADMIN — INSULIN ASPART 3 UNITS: 100 INJECTION, SOLUTION INTRAVENOUS; SUBCUTANEOUS at 12:06
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
## 2024-06-02 NOTE — ASSESSMENT & PLAN NOTE
BG goal: 140-180  T1DM. Hx of multiple DKA episodes.  Injected unknown amount of insulin Lantus and Novolog in ED. On D10 drip post injection, able to wean off yesterday and start transition drip.. Pt appetite improved. Mealtime insulin added yesterday.  Will monitor closely.     -Continue Transition insulin drip at 0.3 units /hr once bg reliably above 200 mg/dl  - Continue Novolog 1-3 units based on intake  - Continue /50  - POCT Glucose before meals, at bedtime and at 2 am  - Hypoglycemia protocol in place  ** Please notify Endocrine for any change and/or advance in diet**  ** Please call Endocrine for any BG related issues **       Discharge Planning:   TBD. Please notify endocrinology prior to discharge.

## 2024-06-02 NOTE — SUBJECTIVE & OBJECTIVE
Interval History/Significant Events:   No events overnight     Review of Systems  Negative except as noted in HPI     Objective:     Vital Signs (Most Recent):  Temp: 98.6 °F (37 °C) (06/02/24 0701)  Pulse: 97 (06/02/24 1000)  Resp: 18 (06/02/24 1000)  BP: 103/74 (06/02/24 1000)  SpO2: 100 % (06/02/24 1000) Vital Signs (24h Range):  Temp:  [98.3 °F (36.8 °C)-99.1 °F (37.3 °C)] 98.6 °F (37 °C)  Pulse:  [] 97  Resp:  [10-38] 18  SpO2:  [96 %-100 %] 100 %  BP: (101-144)/(65-86) 103/74   Weight: 54 kg (119 lb)  Body mass index is 18.64 kg/m².      Intake/Output Summary (Last 24 hours) at 6/2/2024 1106  Last data filed at 6/2/2024 0901  Gross per 24 hour   Intake 306.57 ml   Output 550 ml   Net -243.43 ml          Physical Exam  Constitutional:       Appearance: She is not ill-appearing.   HENT:      Head: Normocephalic and atraumatic.      Mouth/Throat:      Mouth: Mucous membranes are moist.      Pharynx: Oropharynx is clear.   Cardiovascular:      Rate and Rhythm: Normal rate and regular rhythm.      Heart sounds: Normal heart sounds. No murmur heard.  Pulmonary:      Effort: Pulmonary effort is normal. No respiratory distress.   Abdominal:      General: Abdomen is flat. There is no distension.   Musculoskeletal:         General: No swelling.      Right lower leg: No edema.      Left lower leg: No edema.   Skin:     General: Skin is warm and dry.      Findings: Lesion present.   Neurological:      General: No focal deficit present.      Mental Status: She is alert and oriented to person, place, and time. Mental status is at baseline.   Psychiatric:         Mood and Affect: Mood normal.      Comments: Flat affect                Vents:     Lines/Drains/Airways       Peripheral Intravenous Line  Duration                  Peripheral IV - Single Lumen 06/01/24 1800 20 G Anterior;Right Upper Arm <1 day         Peripheral IV - Single Lumen 06/02/24 0830 20 G Left;Posterior Forearm <1 day                  Significant  Labs:    CBC/Anemia Profile:  Recent Labs   Lab 06/01/24  0612 06/02/24  0239   WBC 4.22 5.42   HGB 10.3* 11.1*   HCT 33.6* 36.4*    270   MCV 83 84   RDW 15.6* 15.5*        Chemistries:  Recent Labs   Lab 06/01/24  0612 06/02/24  0239 06/02/24  0834   * 135* 136   K 5.3* 6.1* 4.7    103 101   CO2 23 22* 26   BUN 7 19 15   CREATININE 0.9 0.9 0.9   CALCIUM 8.3* 9.0 10.0   ALBUMIN 2.9* 3.1*  --    PROT 5.6* 6.3  --    BILITOT 0.3 0.3  --    ALKPHOS 104 130  --    ALT 10 9*  --    AST 12 18  --    MG 2.0 1.8  --    PHOS 2.9 3.9  --        All pertinent labs within the past 24 hours have been reviewed.    Significant Imaging:  I have reviewed all pertinent imaging results/findings within the past 24 hours.

## 2024-06-02 NOTE — HOSPITAL COURSE
10% dextrose infusion was continued. Psychiatry was consulted and recommended PEC and involuntary psychiatric admission. She was given glucose tablets and food. Blood glucose fluctuated on the first 2 days in the ICU. On 6/1/2024 she was able to be weaned off the 10% dextrose. Nausea and vomiting improved. She was put on transitional insulin drip. Endocrinology was consulted for further management. They switched transitional insulin drip to bolus insulin dosing on 6/3/2024. She was transferred to Hospital Medicine Team R on 6/4/2025 medically stable for transfer to a psychiatric facility. She was discharged to [facility].

## 2024-06-02 NOTE — SUBJECTIVE & OBJECTIVE
"Interval HPI:   No acute events overnight. Patient in room 6083/6083 A. Blood glucose stable. BG at goal on current insulin regimen (Transition Insulin Drip). Steroid use- None .      Renal function- Normal   Vasopressors-  None     Diet Adult Regular (IDDSI Level 7) PEC/CEC - Styrofoam     Eating:   <25%  Nausea: No  Hypoglycemia and intervention: No  Fever: No  TPN and/or TF: No    /69   Pulse 100   Temp 98.6 °F (37 °C) (Oral)   Resp 18   Wt 54 kg (119 lb)   LMP 05/10/2024 (Approximate)   SpO2 97%   BMI 18.64 kg/m²     Labs Reviewed and Include    Recent Labs   Lab 06/02/24  0239   *   CALCIUM 9.0   ALBUMIN 3.1*   PROT 6.3   *   K 6.1*   CO2 22*      BUN 19   CREATININE 0.9   ALKPHOS 130   ALT 9*   AST 18   BILITOT 0.3     Lab Results   Component Value Date    WBC 5.42 06/02/2024    HGB 11.1 (L) 06/02/2024    HCT 36.4 (L) 06/02/2024    MCV 84 06/02/2024     06/02/2024     Recent Labs   Lab 05/28/24  2233   TSH 0.472     Lab Results   Component Value Date    HGBA1C 9.5 (H) 04/12/2024       Nutritional status:   Body mass index is 18.64 kg/m².  Lab Results   Component Value Date    ALBUMIN 3.1 (L) 06/02/2024    ALBUMIN 2.9 (L) 06/01/2024    ALBUMIN 3.0 (L) 05/31/2024     No results found for: "PREALBUMIN"    Estimated Creatinine Clearance: 73.7 mL/min (based on SCr of 0.9 mg/dL).    Accu-Checks  Recent Labs     06/01/24  2116 06/01/24  2222 06/01/24  2324 06/02/24  0026 06/02/24  0129 06/02/24  0232 06/02/24  0330 06/02/24  0437 06/02/24  0523 06/02/24  0612   POCTGLUCOSE 154* 232* 208* 213* 235* 297* 264* 213* 214* 279*       Current Medications and/or Treatments Impacting Glycemic Control  Immunotherapy:    Immunosuppressants       None          Steroids:   Hormones (From admission, onward)      None          Pressors:    Autonomic Drugs (From admission, onward)      None          Hyperglycemia/Diabetes Medications:   Antihyperglycemics (From admission, onward)      Start     " Stop Route Frequency Ordered    06/02/24 0748  insulin aspart U-100 pen 0-4 Units         -- SubQ Before meals, nightly and at 0200 PRN 06/02/24 0648    06/01/24 1645  insulin aspart U-100 pen 1-3 Units         -- SubQ 3 times daily with meals 06/01/24 1442    05/31/24 1645  insulin regular in 0.9 % NaCl 100 unit/100 mL (1 unit/mL) infusion        Question:  Enter initial dose (Units/hr):  Answer:  0.3    -- IV Continuous 05/31/24 0166

## 2024-06-02 NOTE — PLAN OF CARE
MICU DAILY GOALS     Family/Goals of care/Code Status   Code Status: Full Code    24H Vital Sign Range  Temp:  [98.2 °F (36.8 °C)-99.1 °F (37.3 °C)]   Pulse:  []   Resp:  [14-33]   BP: (101-144)/(65-86)   SpO2:  [97 %-100 %]      Shift Events (include procedures and significant events)   No acute events throughout shift    AWAKE RASS: Goal - RASS Goal: 0-->alert and calm  Actual - RASS (Finnegan Agitation-Sedation Scale): alert and calm    Restraint necessity: Not necessary   BREATHE SBT: Not intubated    Coordinate A & B, analgesics/sedatives Pain: managed   SAT: Not intubated   Delirium CAM-ICU: Overall CAM-ICU: Negative   Early(intubated/ Progressive (non-intubated) Mobility MOVE Screen (INTUBATED ONLY): Not intubated    Activity: Activity Management: Ankle pumps - L1, Arm raise - L1, Rolling - L1   Feeding/Nutrition Diet order: Diet/Nutrition Received: regular,     Thrombus DVT prophylaxis: VTE Required Core Measure: Pharmacological prophylaxis initiated/maintained   HOB Elevation Head of Bed (HOB) Positioning: HOB at 20 degrees   Ulcer Prophylaxis GI: yes   Glucose control managed Glycemic Management: blood glucose monitored, insulin infusion initiated, supplemental insulin given   Skin Skin assessed during: Q Shift Change    Sacrum intact/not altered? Yes  Heels intact/not altered? Yes  Surgical wound? No    CHECK ONE!   (no altered skin or altered skin) and sub boxes:  [x] No Altered Skin Integrity Present    [x]Prevention Measures Documented    [] Altered Skin Integrity Present or Discovered   [] LDA present in EPIC, daily doc completed              [] LDA added if not in EPIC (describe wound).                    When describing wound, do not stage, use descriptive words only.    [] Wound Image Taken (required on admit,                   transfer/discharge and every Tuesday)    Wound Care Consulted? No    4 EYES:  Attending Nurse (1st set of eyes): ALEXANDRA Youngblood    Second RN/Staff Member (2nd set of eyes):  KVNG Jaime   Bowel Function no issues    Indwelling Catheter Necessity            De-escalation Antibiotics No        VS and assessment per flow sheet, patient progressing towards goals as tolerated, plan of care reviewed with  patient , all concerns addressed, will continue to monitor.    Problem: Adult Inpatient Plan of Care  Goal: Plan of Care Review  Outcome: Progressing  Goal: Patient-Specific Goal (Individualized)  Outcome: Progressing  Goal: Absence of Hospital-Acquired Illness or Injury  Outcome: Progressing  Goal: Optimal Comfort and Wellbeing  Outcome: Progressing  Goal: Readiness for Transition of Care  Outcome: Progressing     Problem: Suicide Risk  Goal: Absence of Self-Harm  Outcome: Progressing     Problem: Diabetes Comorbidity  Goal: Blood Glucose Level Within Targeted Range  Outcome: Progressing     Problem: Skin Injury Risk Increased  Goal: Skin Health and Integrity  Outcome: Progressing     Problem: Fall Injury Risk  Goal: Absence of Fall and Fall-Related Injury  Outcome: Progressing     Problem: Infection  Goal: Absence of Infection Signs and Symptoms  Outcome: Progressing
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
## 2024-06-02 NOTE — PLAN OF CARE
Problem: Adult Inpatient Plan of Care  Goal: Plan of Care Review  Outcome: Progressing  Goal: Patient-Specific Goal (Individualized)  Outcome: Progressing  Goal: Absence of Hospital-Acquired Illness or Injury  Outcome: Progressing  Goal: Optimal Comfort and Wellbeing  Outcome: Progressing  Goal: Readiness for Transition of Care  Outcome: Progressing   Reviewed plan of care with patient, answered any questions. Insulin gtt increased to 0.4 units/hr. BG monitored and insulin administered per orders. Transfer order placed by primary team.

## 2024-06-03 LAB
ALBUMIN SERPL BCP-MCNC: 3 G/DL (ref 3.5–5.2)
ALP SERPL-CCNC: 116 U/L (ref 55–135)
ALT SERPL W/O P-5'-P-CCNC: 8 U/L (ref 10–44)
ANION GAP SERPL CALC-SCNC: 12 MMOL/L (ref 8–16)
ANISOCYTOSIS BLD QL SMEAR: SLIGHT
AST SERPL-CCNC: 15 U/L (ref 10–40)
BASOPHILS # BLD AUTO: 0.02 K/UL (ref 0–0.2)
BASOPHILS NFR BLD: 0.6 % (ref 0–1.9)
BILIRUB SERPL-MCNC: 0.3 MG/DL (ref 0.1–1)
BUN SERPL-MCNC: 17 MG/DL (ref 6–20)
BURR CELLS BLD QL SMEAR: ABNORMAL
CALCIUM SERPL-MCNC: 8.7 MG/DL (ref 8.7–10.5)
CHLORIDE SERPL-SCNC: 103 MMOL/L (ref 95–110)
CO2 SERPL-SCNC: 23 MMOL/L (ref 23–29)
CREAT SERPL-MCNC: 0.9 MG/DL (ref 0.5–1.4)
DIFFERENTIAL METHOD BLD: ABNORMAL
EOSINOPHIL # BLD AUTO: 0.2 K/UL (ref 0–0.5)
EOSINOPHIL NFR BLD: 6.7 % (ref 0–8)
ERYTHROCYTE [DISTWIDTH] IN BLOOD BY AUTOMATED COUNT: 15.6 % (ref 11.5–14.5)
EST. GFR  (NO RACE VARIABLE): >60 ML/MIN/1.73 M^2
GLUCOSE SERPL-MCNC: 222 MG/DL (ref 70–110)
HCT VFR BLD AUTO: 34.7 % (ref 37–48.5)
HGB BLD-MCNC: 10.9 G/DL (ref 12–16)
IMM GRANULOCYTES # BLD AUTO: 0 K/UL (ref 0–0.04)
IMM GRANULOCYTES NFR BLD AUTO: 0 % (ref 0–0.5)
LYMPHOCYTES # BLD AUTO: 1.8 K/UL (ref 1–4.8)
LYMPHOCYTES NFR BLD: 50 % (ref 18–48)
MAGNESIUM SERPL-MCNC: 1.6 MG/DL (ref 1.6–2.6)
MCH RBC QN AUTO: 25.5 PG (ref 27–31)
MCHC RBC AUTO-ENTMCNC: 31.4 G/DL (ref 32–36)
MCV RBC AUTO: 81 FL (ref 82–98)
MONOCYTES # BLD AUTO: 0.4 K/UL (ref 0.3–1)
MONOCYTES NFR BLD: 11.8 % (ref 4–15)
NEUTROPHILS # BLD AUTO: 1.1 K/UL (ref 1.8–7.7)
NEUTROPHILS NFR BLD: 30.9 % (ref 38–73)
NRBC BLD-RTO: 0 /100 WBC
OVALOCYTES BLD QL SMEAR: ABNORMAL
PHOSPHATE SERPL-MCNC: 4 MG/DL (ref 2.7–4.5)
PLATELET # BLD AUTO: 232 K/UL (ref 150–450)
PLATELET BLD QL SMEAR: ABNORMAL
PMV BLD AUTO: 11.2 FL (ref 9.2–12.9)
POCT GLUCOSE: 113 MG/DL (ref 70–110)
POCT GLUCOSE: 175 MG/DL (ref 70–110)
POCT GLUCOSE: 211 MG/DL (ref 70–110)
POCT GLUCOSE: 290 MG/DL (ref 70–110)
POCT GLUCOSE: 398 MG/DL (ref 70–110)
POCT GLUCOSE: 416 MG/DL (ref 70–110)
POCT GLUCOSE: 99 MG/DL (ref 70–110)
POIKILOCYTOSIS BLD QL SMEAR: SLIGHT
POTASSIUM SERPL-SCNC: 5.1 MMOL/L (ref 3.5–5.1)
PROT SERPL-MCNC: 6.1 G/DL (ref 6–8.4)
RBC # BLD AUTO: 4.27 M/UL (ref 4–5.4)
SODIUM SERPL-SCNC: 138 MMOL/L (ref 136–145)
WBC # BLD AUTO: 3.56 K/UL (ref 3.9–12.7)

## 2024-06-03 PROCEDURE — 63600175 PHARM REV CODE 636 W HCPCS

## 2024-06-03 PROCEDURE — 84100 ASSAY OF PHOSPHORUS: CPT | Performed by: NURSE PRACTITIONER

## 2024-06-03 PROCEDURE — 25000003 PHARM REV CODE 250

## 2024-06-03 PROCEDURE — 85025 COMPLETE CBC W/AUTO DIFF WBC: CPT | Performed by: NURSE PRACTITIONER

## 2024-06-03 PROCEDURE — 63600175 PHARM REV CODE 636 W HCPCS: Performed by: PHYSICIAN ASSISTANT

## 2024-06-03 PROCEDURE — 21400001 HC TELEMETRY ROOM

## 2024-06-03 PROCEDURE — 80053 COMPREHEN METABOLIC PANEL: CPT | Performed by: NURSE PRACTITIONER

## 2024-06-03 PROCEDURE — 99233 SBSQ HOSP IP/OBS HIGH 50: CPT | Mod: ,,, | Performed by: INTERNAL MEDICINE

## 2024-06-03 PROCEDURE — 83735 ASSAY OF MAGNESIUM: CPT | Performed by: NURSE PRACTITIONER

## 2024-06-03 PROCEDURE — 94761 N-INVAS EAR/PLS OXIMETRY MLT: CPT

## 2024-06-03 PROCEDURE — 99232 SBSQ HOSP IP/OBS MODERATE 35: CPT | Mod: AF,HB,, | Performed by: PSYCHIATRY & NEUROLOGY

## 2024-06-03 PROCEDURE — 99232 SBSQ HOSP IP/OBS MODERATE 35: CPT | Mod: ,,, | Performed by: PHYSICIAN ASSISTANT

## 2024-06-03 RX ORDER — MAGNESIUM SULFATE HEPTAHYDRATE 40 MG/ML
2 INJECTION, SOLUTION INTRAVENOUS ONCE
Status: COMPLETED | OUTPATIENT
Start: 2024-06-03 | End: 2024-06-03

## 2024-06-03 RX ORDER — INSULIN GLARGINE 100 [IU]/ML
9 INJECTION, SOLUTION SUBCUTANEOUS DAILY
Status: DISCONTINUED | OUTPATIENT
Start: 2024-06-03 | End: 2024-06-04 | Stop reason: HOSPADM

## 2024-06-03 RX ORDER — INSULIN ASPART 100 [IU]/ML
2-4 INJECTION, SOLUTION INTRAVENOUS; SUBCUTANEOUS
Status: DISCONTINUED | OUTPATIENT
Start: 2024-06-03 | End: 2024-06-04 | Stop reason: HOSPADM

## 2024-06-03 RX ADMIN — FLUOXETINE HYDROCHLORIDE 60 MG: 20 CAPSULE ORAL at 08:06

## 2024-06-03 RX ADMIN — INSULIN ASPART 2 UNITS: 100 INJECTION, SOLUTION INTRAVENOUS; SUBCUTANEOUS at 09:06

## 2024-06-03 RX ADMIN — INSULIN ASPART 4 UNITS: 100 INJECTION, SOLUTION INTRAVENOUS; SUBCUTANEOUS at 01:06

## 2024-06-03 RX ADMIN — INSULIN ASPART 4 UNITS: 100 INJECTION, SOLUTION INTRAVENOUS; SUBCUTANEOUS at 09:06

## 2024-06-03 RX ADMIN — ACETAMINOPHEN 1000 MG: 500 TABLET ORAL at 05:06

## 2024-06-03 RX ADMIN — PROCHLORPERAZINE EDISYLATE 2.5 MG: 5 INJECTION INTRAMUSCULAR; INTRAVENOUS at 12:06

## 2024-06-03 RX ADMIN — INSULIN ASPART 4 UNITS: 100 INJECTION, SOLUTION INTRAVENOUS; SUBCUTANEOUS at 06:06

## 2024-06-03 RX ADMIN — METOCLOPRAMIDE 10 MG: 5 TABLET ORAL at 08:06

## 2024-06-03 RX ADMIN — ACETAMINOPHEN 1000 MG: 500 TABLET ORAL at 09:06

## 2024-06-03 RX ADMIN — ENOXAPARIN SODIUM 40 MG: 40 INJECTION SUBCUTANEOUS at 06:06

## 2024-06-03 RX ADMIN — PANTOPRAZOLE SODIUM 40 MG: 40 TABLET, DELAYED RELEASE ORAL at 05:06

## 2024-06-03 RX ADMIN — HYDROXYZINE PAMOATE 25 MG: 25 CAPSULE ORAL at 09:06

## 2024-06-03 RX ADMIN — MAGNESIUM SULFATE HEPTAHYDRATE 2 G: 40 INJECTION, SOLUTION INTRAVENOUS at 05:06

## 2024-06-03 RX ADMIN — INSULIN GLARGINE 9 UNITS: 100 INJECTION, SOLUTION SUBCUTANEOUS at 09:06
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
## 2024-06-03 NOTE — SUBJECTIVE & OBJECTIVE
Interval History/Significant Events:   No events overnight       Objective:     Vital Signs (Most Recent):  Temp: 98.1 °F (36.7 °C) (06/03/24 0701)  Pulse: 95 (06/03/24 1100)  Resp: 12 (06/03/24 0301)  BP: 114/71 (06/03/24 1100)  SpO2: 98 % (06/03/24 1100) Vital Signs (24h Range):  Temp:  [98.1 °F (36.7 °C)-98.8 °F (37.1 °C)] 98.1 °F (36.7 °C)  Pulse:  [] 95  Resp:  [12-22] 12  SpO2:  [96 %-100 %] 98 %  BP: (103-130)/(65-83) 114/71   Weight: 54 kg (119 lb)  Body mass index is 18.64 kg/m².      Intake/Output Summary (Last 24 hours) at 6/3/2024 1357  Last data filed at 6/3/2024 1000  Gross per 24 hour   Intake 318.44 ml   Output --   Net 318.44 ml          Physical Exam  Constitutional:       Appearance: She is not ill-appearing.   HENT:      Head: Normocephalic and atraumatic.      Mouth/Throat:      Mouth: Mucous membranes are moist.      Pharynx: Oropharynx is clear.   Cardiovascular:      Rate and Rhythm: Normal rate and regular rhythm.      Heart sounds: Normal heart sounds. No murmur heard.  Pulmonary:      Effort: Pulmonary effort is normal. No respiratory distress.   Abdominal:      General: Abdomen is flat. There is no distension.   Musculoskeletal:         General: No swelling.      Right lower leg: No edema.      Left lower leg: No edema.   Skin:     General: Skin is warm and dry.   Neurological:      General: No focal deficit present.      Mental Status: She is alert and oriented to person, place, and time. Mental status is at baseline.   Psychiatric:         Mood and Affect: Mood normal.      Comments: Flat affect                Vents:     Lines/Drains/Airways       Peripheral Intravenous Line  Duration                  Peripheral IV - Single Lumen 06/01/24 1800 20 G Anterior;Right Upper Arm 1 day         Peripheral IV - Single Lumen 06/02/24 0830 20 G Left;Posterior Forearm 1 day                  Significant Labs:    CBC/Anemia Profile:  Recent Labs   Lab 06/02/24  0239 06/03/24  0335   WBC 5.42  3.56*   HGB 11.1* 10.9*   HCT 36.4* 34.7*    232   MCV 84 81*   RDW 15.5* 15.6*        Chemistries:  Recent Labs   Lab 06/02/24  0239 06/02/24  0834 06/03/24  0335   * 136 138   K 6.1* 4.7 5.1    101 103   CO2 22* 26 23   BUN 19 15 17   CREATININE 0.9 0.9 0.9   CALCIUM 9.0 10.0 8.7   ALBUMIN 3.1*  --  3.0*   PROT 6.3  --  6.1   BILITOT 0.3  --  0.3   ALKPHOS 130  --  116   ALT 9*  --  8*   AST 18  --  15   MG 1.8  --  1.6   PHOS 3.9  --  4.0       All pertinent labs within the past 24 hours have been reviewed.    Significant Imaging:  I have reviewed all pertinent imaging results/findings within the past 24 hours.
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
## 2024-06-03 NOTE — ASSESSMENT & PLAN NOTE
Complication of uncontrolled DM    -resume reglan  -continuous telemetry  -monitor Qtc, daily EKG  - ppi 40 daily

## 2024-06-03 NOTE — ASSESSMENT & PLAN NOTE
BG goal: 140-180  T1DM. Hx of multiple DKA episodes.  Injected unknown amount of insulin Lantus and Novolog in ED. On D10 drip post injection, able to wean off and start transition drip. Pt appetite improved. Blood sugar now elevated.  Will switch off transition drip to basal insulin and strengthen mealtime.    - D/c Transition insulin drip 2 hours after Lantus admin  - Start Lantus 9 units daily  - Increase Novolog 2-4 units based on intake  - Continue /50  - POCT Glucose before meals, at bedtime and at 2 am  - Hypoglycemia protocol in place  ** Please notify Endocrine for any change and/or advance in diet**  ** Please call Endocrine for any BG related issues **       Discharge Planning:   TBD. Please notify endocrinology prior to discharge.

## 2024-06-03 NOTE — SUBJECTIVE & OBJECTIVE
"Interval HPI:   No acute events overnight. Patient in room 6083/6083 A. Blood glucose variable. BG at and above goal on current insulin regimen (Transition Insulin Drip). Steroid use- None .      Renal function- Normal   Vasopressors-  None     Diet Adult Regular (IDDSI Level 7) PEC/CEC - Styrofoam     Eatin%  Nausea: No  Hypoglycemia and intervention: No  Fever: No  TPN and/or TF: No    /81 (Patient Position: Lying)   Pulse 96   Temp 98.5 °F (36.9 °C) (Oral)   Resp 12   Wt 54 kg (119 lb)   LMP 05/10/2024 (Approximate)   SpO2 97%   BMI 18.64 kg/m²     Labs Reviewed and Include    Recent Labs   Lab 24  0335   *   CALCIUM 8.7   ALBUMIN 3.0*   PROT 6.1      K 5.1   CO2 23      BUN 17   CREATININE 0.9   ALKPHOS 116   ALT 8*   AST 15   BILITOT 0.3     Lab Results   Component Value Date    WBC 3.56 (L) 2024    HGB 10.9 (L) 2024    HCT 34.7 (L) 2024    MCV 81 (L) 2024     2024     Recent Labs   Lab 24  2233   TSH 0.472     Lab Results   Component Value Date    HGBA1C 9.5 (H) 2024       Nutritional status:   Body mass index is 18.64 kg/m².  Lab Results   Component Value Date    ALBUMIN 3.0 (L) 2024    ALBUMIN 3.1 (L) 2024    ALBUMIN 2.9 (L) 2024     No results found for: "PREALBUMIN"    Estimated Creatinine Clearance: 73.7 mL/min (based on SCr of 0.9 mg/dL).    Accu-Checks  Recent Labs     24  0330 24  0437 24  0523 24  0612 24  0709 24  0858 24  1214 24  1718 24  2241 24  0206   POCTGLUCOSE 264* 213* 214* 279* 214* 221* 290* 294* 246* 175*       Current Medications and/or Treatments Impacting Glycemic Control  Immunotherapy:    Immunosuppressants       None          Steroids:   Hormones (From admission, onward)      None          Pressors:    Autonomic Drugs (From admission, onward)      None          Hyperglycemia/Diabetes Medications: "   Antihyperglycemics (From admission, onward)      Start     Stop Route Frequency Ordered    06/03/24 0715  insulin aspart U-100 pen 2-4 Units         -- SubQ 3 times daily with meals 06/03/24 0642    06/02/24 0748  insulin aspart U-100 pen 0-4 Units         -- SubQ Before meals, nightly and at 0200 PRN 06/02/24 0648    05/31/24 1645  insulin regular in 0.9 % NaCl 100 unit/100 mL (1 unit/mL) infusion        Question:  Enter initial dose (Units/hr):  Answer:  0.4    -- IV Continuous 05/31/24 5543

## 2024-06-03 NOTE — ASSESSMENT & PLAN NOTE
"Patient has history of non-adherence to medications  Frequent hospitalizations for DKA, most recently earlier this month  While in ED she reportedly self administered unknown amount of insulin to "get care she needed"  BGL initially >400, dropped to 30s    -Endocrine consulted, appreciate insulin recommendations  -Transitional insulin now on 9 of lantis  -POCT glucose with meals and at night.   "
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
## 2024-06-04 VITALS
WEIGHT: 119 LBS | SYSTOLIC BLOOD PRESSURE: 114 MMHG | DIASTOLIC BLOOD PRESSURE: 77 MMHG | HEART RATE: 100 BPM | TEMPERATURE: 99 F | RESPIRATION RATE: 18 BRPM | BODY MASS INDEX: 18.64 KG/M2 | OXYGEN SATURATION: 100 %

## 2024-06-04 PROBLEM — E16.2 HYPOGLYCEMIA: Status: RESOLVED | Noted: 2024-05-29 | Resolved: 2024-06-04

## 2024-06-04 PROBLEM — F14.10 COCAINE ABUSE: Chronic | Status: ACTIVE | Noted: 2017-10-31

## 2024-06-04 PROBLEM — T38.3X1A INSULIN OVERDOSE: Status: RESOLVED | Noted: 2024-06-01 | Resolved: 2024-06-04

## 2024-06-04 PROBLEM — R11.0 NAUSEA: Status: RESOLVED | Noted: 2024-05-08 | Resolved: 2024-06-04

## 2024-06-04 PROBLEM — F12.10 MARIJUANA ABUSE: Chronic | Status: ACTIVE | Noted: 2020-11-12

## 2024-06-04 PROBLEM — F15.10 AMPHETAMINE ABUSE: Chronic | Status: ACTIVE | Noted: 2020-11-12

## 2024-06-04 LAB
ALBUMIN SERPL BCP-MCNC: 3.3 G/DL (ref 3.5–5.2)
ALP SERPL-CCNC: 107 U/L (ref 55–135)
ALT SERPL W/O P-5'-P-CCNC: 7 U/L (ref 10–44)
ANION GAP SERPL CALC-SCNC: 12 MMOL/L (ref 8–16)
AST SERPL-CCNC: 10 U/L (ref 10–40)
BASOPHILS # BLD AUTO: 0.02 K/UL (ref 0–0.2)
BASOPHILS NFR BLD: 0.5 % (ref 0–1.9)
BILIRUB SERPL-MCNC: 0.3 MG/DL (ref 0.1–1)
BUN SERPL-MCNC: 19 MG/DL (ref 6–20)
CALCIUM SERPL-MCNC: 9.3 MG/DL (ref 8.7–10.5)
CHLORIDE SERPL-SCNC: 97 MMOL/L (ref 95–110)
CO2 SERPL-SCNC: 24 MMOL/L (ref 23–29)
CREAT SERPL-MCNC: 0.9 MG/DL (ref 0.5–1.4)
DIFFERENTIAL METHOD BLD: ABNORMAL
EOSINOPHIL # BLD AUTO: 0.2 K/UL (ref 0–0.5)
EOSINOPHIL NFR BLD: 5 % (ref 0–8)
ERYTHROCYTE [DISTWIDTH] IN BLOOD BY AUTOMATED COUNT: 15.3 % (ref 11.5–14.5)
EST. GFR  (NO RACE VARIABLE): >60 ML/MIN/1.73 M^2
GLUCOSE SERPL-MCNC: 339 MG/DL (ref 70–110)
HCT VFR BLD AUTO: 35.9 % (ref 37–48.5)
HGB BLD-MCNC: 11.3 G/DL (ref 12–16)
IMM GRANULOCYTES # BLD AUTO: 0.01 K/UL (ref 0–0.04)
IMM GRANULOCYTES NFR BLD AUTO: 0.3 % (ref 0–0.5)
LYMPHOCYTES # BLD AUTO: 1.6 K/UL (ref 1–4.8)
LYMPHOCYTES NFR BLD: 40.8 % (ref 18–48)
MAGNESIUM SERPL-MCNC: 1.8 MG/DL (ref 1.6–2.6)
MCH RBC QN AUTO: 25.6 PG (ref 27–31)
MCHC RBC AUTO-ENTMCNC: 31.5 G/DL (ref 32–36)
MCV RBC AUTO: 81 FL (ref 82–98)
MONOCYTES # BLD AUTO: 0.5 K/UL (ref 0.3–1)
MONOCYTES NFR BLD: 11.6 % (ref 4–15)
NEUTROPHILS # BLD AUTO: 1.7 K/UL (ref 1.8–7.7)
NEUTROPHILS NFR BLD: 41.8 % (ref 38–73)
NRBC BLD-RTO: 0 /100 WBC
PHOSPHATE SERPL-MCNC: 3.7 MG/DL (ref 2.7–4.5)
PLATELET # BLD AUTO: 222 K/UL (ref 150–450)
PMV BLD AUTO: 12.4 FL (ref 9.2–12.9)
POCT GLUCOSE: 147 MG/DL (ref 70–110)
POCT GLUCOSE: 181 MG/DL (ref 70–110)
POCT GLUCOSE: 341 MG/DL (ref 70–110)
POTASSIUM SERPL-SCNC: 4.5 MMOL/L (ref 3.5–5.1)
PROT SERPL-MCNC: 6.5 G/DL (ref 6–8.4)
RBC # BLD AUTO: 4.41 M/UL (ref 4–5.4)
SODIUM SERPL-SCNC: 133 MMOL/L (ref 136–145)
WBC # BLD AUTO: 3.97 K/UL (ref 3.9–12.7)

## 2024-06-04 PROCEDURE — 99232 SBSQ HOSP IP/OBS MODERATE 35: CPT | Mod: ,,, | Performed by: PHYSICIAN ASSISTANT

## 2024-06-04 PROCEDURE — 36415 COLL VENOUS BLD VENIPUNCTURE: CPT | Performed by: NURSE PRACTITIONER

## 2024-06-04 PROCEDURE — 84100 ASSAY OF PHOSPHORUS: CPT | Performed by: NURSE PRACTITIONER

## 2024-06-04 PROCEDURE — 63600175 PHARM REV CODE 636 W HCPCS: Performed by: PHYSICIAN ASSISTANT

## 2024-06-04 PROCEDURE — 83735 ASSAY OF MAGNESIUM: CPT | Performed by: NURSE PRACTITIONER

## 2024-06-04 PROCEDURE — 85025 COMPLETE CBC W/AUTO DIFF WBC: CPT | Performed by: NURSE PRACTITIONER

## 2024-06-04 PROCEDURE — 80053 COMPREHEN METABOLIC PANEL: CPT | Performed by: NURSE PRACTITIONER

## 2024-06-04 PROCEDURE — 99232 SBSQ HOSP IP/OBS MODERATE 35: CPT | Mod: AF,HB,, | Performed by: PSYCHIATRY & NEUROLOGY

## 2024-06-04 PROCEDURE — 25000003 PHARM REV CODE 250

## 2024-06-04 RX ADMIN — INSULIN GLARGINE 9 UNITS: 100 INJECTION, SOLUTION SUBCUTANEOUS at 08:06

## 2024-06-04 RX ADMIN — PANTOPRAZOLE SODIUM 40 MG: 40 TABLET, DELAYED RELEASE ORAL at 06:06

## 2024-06-04 RX ADMIN — INSULIN ASPART 4 UNITS: 100 INJECTION, SOLUTION INTRAVENOUS; SUBCUTANEOUS at 12:06

## 2024-06-04 RX ADMIN — INSULIN ASPART 4 UNITS: 100 INJECTION, SOLUTION INTRAVENOUS; SUBCUTANEOUS at 08:06

## 2024-06-04 RX ADMIN — INSULIN ASPART 3 UNITS: 100 INJECTION, SOLUTION INTRAVENOUS; SUBCUTANEOUS at 08:06

## 2024-06-04 RX ADMIN — FLUOXETINE HYDROCHLORIDE 60 MG: 20 CAPSULE ORAL at 09:06

## 2024-06-04 NOTE — PROGRESS NOTES
"CONSULTATION LIAISON PSYCHIATRY PROGRESS NOTE    Patient Name: Hollie Wilson  MRN: [id number]  Patient Class: IP- Inpatient  Admission Date: 5/28/2024  Attending Physician: Adam Raines MD      SUBJECTIVE:   Hollie Wilson is a 34 y.o. 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 She did not require any prn medications for agitation or aggression. She was compliant with scheduled medications. Her vital signs were stable. Received Vistaril 25 mg @ 2106 overnight for anxiety.    Patient seen at bedside lying on side. She smiles upon introduction. She reports sleeping well overnight and denies any issues with sleep. She reports her mood as "okay." She cites having a visitor friend yesterday and reports this visit went well. She reports talking on the phone with her mother yesterday as well and provided her with update. Discussed events leading to presentation and patient continues to endorse fear of people trying to harm her outside of the hospital but reports she feels safe now at this time. Discussed plans for inpatient Psychiatry placement and she reports understanding. Discussed possibility of inpatient team making changes to her regimen and she reports understanding. She denies SI/HI or AVH at this time.         OBJECTIVE:    Mental Status Exam:  General Appearance: appears stated age, well developed and nourished, adequately groomed and appropriately dressed, in no acute distress  Behavior: normal; cooperative; reasonably friendly, pleasant, and polite; appropriate eye-contact; under good behavioral control  Involuntary Movements and Motor Activity: no abnormal involuntary movements noted; no tics, no " "tremors, no akathisia, no dystonia, no evidence of tardive dyskinesia; no psychomotor agitation or retardation  Gait and Station: unable to assess - patient lying down or seated  Speech and Language: decreased spontaneity, slowed, paucity of speech  Mood: "okay"  Affect: normal, euthymic, reactive, full-range, mood-congruent, appropriate to situation and context  Thought Process and Associations: goal-directed, concrete (proverbs & similarities)  Thought Content and Perceptions:: no suicidal ideation, no homicidal ideation, possible paranoid ideation, no auditory hallucinations, no visual hallucinations  Sensorium and Orientation: intact; alert with clear sensorium; oriented fully to person, place, time and situation  Recent and Remote Memory: grossly intact, able to recall relevant and salient information from the recent and remote past  Attention and Concentration: intact to conversation  Fund of Knowledge: vocabulary rudimentary, intellectually disabled  Insight: adequate  Judgment: poor, as evidenced by recent suicide attempt    CAM ICU positive? no    ASSESSMENT & RECOMMENDATIONS   Unspecified mood disorder, MRE depression  Generalized anxiety disorder  Cannabis use  Methamphetamine use  Cocaine use  History of unspecified psychosis     Unspecified mood disorder, MRE depression  PSYCH MEDICATIONS  Unclear history of bipolar disorder/schizoaffective disorder; will monitor for symptoms of diony  Scheduled - Continue Prozac 60 mg PO daily for depression and anxiety  PRN - Continue Vistaril 25 mg q8 hours for anxiety     History of unspecified psychosis  PSYCH MEDICATIONS  Unclear if organic psychosis or triggered by substance use; history of non-compliance with antipsychotics/mood stabilizers; will monitor for emerging psychosis  Defer anti-psychotic medications at this time given Qtc 503 (6/1/24) and need for glucose stabilization, will continue to monitor for paranoia and positive symptomology of psychosis.  PRN " - consider Ativan 1-2 mg PO/IM for non-redirectable agitation; avoid Haldol or other Qtc prolonging agents given patient current Qtc (536). Would also avoid Zyprexa given difficulty with blood glucose    Cannabis/cocaine/methamphetamine use  Counseled against further use of these substances  Denies regular use of cocaine and methamphetamine    RISK ASSESSMENT  NEEDS PEC because patient is in imminent danger of hurting self or others and is gravely disabled. & NEEDS 1:1 sitter    FOLLOW UP  Will follow up while in house     DISPOSITION - once medically cleared:   Seek involuntary inpatient psychiatric admission for stabilization of acute psychiatric symptoms and a safe disposition plan is enacted. The patient &/or their family was informed that the patient will be transferred to an inpatient unit per ED/primary placement team.    Please contact ON CALL psychiatry service (24/7) for any acute issues that may arise.    Dr. Loly Lozada   Psychiatry  [facility]  6/4/2024 9:47 AM        --------------------------------------------------------------------------------------------------------------------------------------------------------------------------------------------------------------------------------------    CONTINUED OBJECTIVE clinical data & findings reviewed and noted for above decision making    Current Medications:   Scheduled Meds:    enoxparin  40 mg Subcutaneous Q24H (prophylaxis, 1700)    FLUoxetine  60 mg Oral Daily    insulin aspart U-100  2-4 Units Subcutaneous TIDWM    insulin glargine U-100  9 Units Subcutaneous Daily    pantoprazole  40 mg Oral QAM     PRN Meds:   Current Facility-Administered Medications:     acetaminophen, 1,000 mg, Oral, Q6H PRN    dextrose 10%, 12.5 g, Intravenous, PRN    dextrose 10%, 25 g, Intravenous, PRN    dextrose, 15 g, Oral, PRN    glucagon (human recombinant), 1 mg, Intramuscular, PRN    glucose, 16 g, Oral, PRN    glucose, 24 g, Oral, PRN     hydrOXYzine pamoate, 25 mg, Oral, Q8H PRN    insulin aspart U-100, 0-4 Units, Subcutaneous, AC + HS + 0200 PRN    metoclopramide HCl, 10 mg, Oral, Q6H PRN    polyethylene glycol, 17 g, Oral, Daily PRN    prochlorperazine, 2.5 mg, Intravenous, Q6H PRN    sodium chloride 0.9%, 10 mL, Intravenous, PRN    Allergies:   Review of patient's allergies indicates:   Allergen Reactions    Clove oil Itching    Pork/porcine containing products Other (See Comments)     Pt does not eat Pork    Zofran (as hydrochloride) [ondansetron hcl] Hives       Vitals  Vitals:    06/04/24 0736   BP: 103/63   Pulse: 92   Resp: 19   Temp: 98.1 °F (36.7 °C)       Labs/Imaging/Studies:  Recent Results (from the past 24 hour(s))   POCT glucose    Collection Time: 06/03/24 11:59 AM   Result Value Ref Range    POCT Glucose 113 (H) 70 - 110 mg/dL   POCT glucose    Collection Time: 06/03/24  1:14 PM   Result Value Ref Range    POCT Glucose 211 (H) 70 - 110 mg/dL   POCT glucose    Collection Time: 06/03/24  5:16 PM   Result Value Ref Range    POCT Glucose 416 (H) 70 - 110 mg/dL   POCT glucose    Collection Time: 06/03/24  5:20 PM   Result Value Ref Range    POCT Glucose 398 (H) 70 - 110 mg/dL   POCT glucose    Collection Time: 06/03/24  9:09 PM   Result Value Ref Range    POCT Glucose 99 70 - 110 mg/dL   POCT glucose    Collection Time: 06/04/24 12:00 AM   Result Value Ref Range    POCT Glucose 147 (H) 70 - 110 mg/dL   Magnesium    Collection Time: 06/04/24  7:43 AM   Result Value Ref Range    Magnesium 1.8 1.6 - 2.6 mg/dL   Phosphorus    Collection Time: 06/04/24  7:43 AM   Result Value Ref Range    Phosphorus 3.7 2.7 - 4.5 mg/dL   Comprehensive metabolic panel    Collection Time: 06/04/24  7:43 AM   Result Value Ref Range    Sodium 133 (L) 136 - 145 mmol/L    Potassium 4.5 3.5 - 5.1 mmol/L    Chloride 97 95 - 110 mmol/L    CO2 24 23 - 29 mmol/L    Glucose 339 (H) 70 - 110 mg/dL    BUN 19 6 - 20 mg/dL    Creatinine 0.9 0.5 - 1.4 mg/dL    Calcium 9.3  8.7 - 10.5 mg/dL    Total Protein 6.5 6.0 - 8.4 g/dL    Albumin 3.3 (L) 3.5 - 5.2 g/dL    Total Bilirubin 0.3 0.1 - 1.0 mg/dL    Alkaline Phosphatase 107 55 - 135 U/L    AST 10 10 - 40 U/L    ALT 7 (L) 10 - 44 U/L    eGFR >60.0 >60 mL/min/1.73 m^2    Anion Gap 12 8 - 16 mmol/L   CBC auto differential    Collection Time: 06/04/24  7:43 AM   Result Value Ref Range    WBC 3.97 3.90 - 12.70 K/uL    RBC 4.41 4.00 - 5.40 M/uL    Hemoglobin 11.3 (L) 12.0 - 16.0 g/dL    Hematocrit 35.9 (L) 37.0 - 48.5 %    MCV 81 (L) 82 - 98 fL    MCH 25.6 (L) 27.0 - 31.0 pg    MCHC 31.5 (L) 32.0 - 36.0 g/dL    RDW 15.3 (H) 11.5 - 14.5 %    Platelets 222 150 - 450 K/uL    MPV 12.4 9.2 - 12.9 fL    Immature Granulocytes 0.3 0.0 - 0.5 %    Gran # (ANC) 1.7 (L) 1.8 - 7.7 K/uL    Immature Grans (Abs) 0.01 0.00 - 0.04 K/uL    Lymph # 1.6 1.0 - 4.8 K/uL    Mono # 0.5 0.3 - 1.0 K/uL    Eos # 0.2 0.0 - 0.5 K/uL    Baso # 0.02 0.00 - 0.20 K/uL    nRBC 0 0 /100 WBC    Gran % 41.8 38.0 - 73.0 %    Lymph % 40.8 18.0 - 48.0 %    Mono % 11.6 4.0 - 15.0 %    Eosinophil % 5.0 0.0 - 8.0 %    Basophil % 0.5 0.0 - 1.9 %    Differential Method Automated    POCT glucose    Collection Time: 06/04/24  7:59 AM   Result Value Ref Range    POCT Glucose 341 (H) 70 - 110 mg/dL     Imaging Results              X-Ray Chest AP Portable (Final result)  Result time 05/28/24 22:55:02      Final result by Giles Moncada MD (05/28/24 22:55:02)                   Impression:      No acute findings in the chest.      Electronically signed by: Giles Moncada MD  Date:    05/28/2024  Time:    22:55               Narrative:    EXAMINATION:  XR CHEST AP PORTABLE    CLINICAL HISTORY:  hyperglycemia;    TECHNIQUE:  Single frontal view of the chest was performed.    COMPARISON:  05/18/2024.    FINDINGS:  No consolidation, pleural effusion or pneumothorax.    Cardiomediastinal silhouette is unremarkable.
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
## 2024-06-04 NOTE — PLAN OF CARE
MICU DAILY GOALS     Family/Goals of care/Code Status   Code Status: Full Code    24H Vital Sign Range  Temp:  [97.9 °F (36.6 °C)-98.8 °F (37.1 °C)]   Pulse:  []   Resp:  [12-38]   BP: (100-128)/(60-83)   SpO2:  [97 %-100 %]      Shift Events (include procedures and significant events)   No acute events throughout shift    AWAKE RASS: Goal - RASS Goal: 0-->alert and calm  Actual - RASS (Finnegan Agitation-Sedation Scale): alert and calm    Restraint necessity: Not necessary   BREATHE SBT: Not intubated    Coordinate A & B, analgesics/sedatives Pain: managed   SAT: Not intubated   Delirium CAM-ICU: Overall CAM-ICU: (P) Negative   Early(intubated/ Progressive (non-intubated) Mobility MOVE Screen (INTUBATED ONLY): Not intubated    Activity: Activity Management: Ambulated to bathroom - L4   Feeding/Nutrition Diet order: Diet/Nutrition Received: (P) consistent carb/diabetic diet,     Thrombus DVT prophylaxis: VTE Required Core Measure: Pharmacological prophylaxis initiated/maintained   HOB Elevation Head of Bed (HOB) Positioning: HOB at 30-45 degrees   Ulcer Prophylaxis GI: no   Glucose control uncontrolled Glycemic Management: blood glucose monitored   Skin Skin assessed during: Q Shift Change    Sacrum intact/not altered? Yes  Heels intact/not altered? Yes  Surgical wound? No    CHECK ONE!   (no altered skin or altered skin) and sub boxes:  [] No Altered Skin Integrity Present    []Prevention Measures Documented    [] Altered Skin Integrity Present or Discovered   [] LDA present in EPIC, daily doc completed              [] LDA added if not in EPIC (describe wound).                    When describing wound, do not stage, use descriptive words only.    [] Wound Image Taken (required on admit,                   transfer/discharge and every Tuesday)    Wound Care Consulted? No    4 EYES:  Attending Nurse (1st set of eyes): ALEXANDRA Mendoza    Second RN/Staff Member (2nd set of eyes):    Bowel Function no issues    Indwelling  Catheter Necessity         N/a   De-escalation Antibiotics No        VS and assessment per flow sheet, patient progressing towards goals as tolerated, plan of care reviewed with  patient only today.  Visitor came but pt talked with her and told her what was happening. Concerns addressed,pt aware she is awating transfer to the floor and then ps hospital afterwards will continue to monitor.

## 2024-06-04 NOTE — NURSING TRANSFER
Nursing Transfer Note      6/4/2024   12:30 AM    Nurse giving handoff: ALEXANDRA Youngblood  Nurse receiving handoff: ALEXANDRA Burrell    Reason patient is being transferred: Stepdown    Transfer To: [facility] [address]85    Transfer via wheelchair    Transfer with 1 to 1 sitter, and security r/t CEC order    Transported by ALEXANDRA Youngblood    Transfer Vital Signs:  Blood Pressure:114/72  Heart Rate: 89  O2: 98  Temperature: 98.3  Respirations: 13    Telemetry:   Order for Tele Monitor?     Additional Lines:     4eyes on Skin: yes    Medicines sent: Insulin Aspart, Lantus, Glucose tables    Any special needs or follow-up needed:     Patient belongings transferred with patient: Yes    Chart send with patient: Yes    Notified: Unable to reach    Patient reassessed at: 6/4, 0000     Upon arrival to floor: cardiac monitor applied, patient oriented to room, call bell in reach, and bed in lowest position

## 2024-06-04 NOTE — NURSING
Nurses Note -- 4 Eyes      6/4/2024   12:18 AM      Skin assessed during: Transfer      [x] No Altered Skin Integrity Present    []Prevention Measures Documented      [] Yes- Altered Skin Integrity Present or Discovered   [] LDA Added if Not in Epic (Describe Wound)   [] New Altered Skin Integrity was Present on Admit and Documented in LDA   [] Wound Image Taken    Wound Care Consulted? No    Attending Nurse:  Ashanti Kan RN/Staff Member: Thea MORRIS

## 2024-06-04 NOTE — PLAN OF CARE
[facility] - Tele[facility] Stepdown  Discharge Final Note    Primary Care Provider: Martin Rios MD    Expected Discharge Date: 6/4/2024    Final Discharge Note (most recent)       Final Note - 06/04/24 1224          Final Note    Assessment Type Final Discharge Note     Anticipated Discharge Disposition [facility]        Post-Acute Status    Post-Acute Authorization Placement     Post-Acute Placement Status Set-up Complete/Auth obtained   [facility] [place]             Contact Info       psychiatry        Next Steps: Follow up    [facility]   Specialty: Psychiatry, Behavioral Health    [address] [facility] [address]   Phone: [phone]       Next Steps: Follow up            Jennifer Taylor RN  Ext [phone]

## 2024-06-04 NOTE — SUBJECTIVE & OBJECTIVE
"Interval HPI:   No acute events overnight. Patient in room 8085 A. Blood glucose variable. BG below,  at and above goal on current insulin regimen (Transition Insulin Drip). Steroid use- None .   Renal function- Normal   Vasopressors-  None      Diet Adult Regular (IDDSI Level 7) PEC/CEC - Styrofoam      Eatin%  Nausea: No  Hypoglycemia and intervention: No  Fever: No  TPN and/or TF: No    /63 (BP Location: Left arm, Patient Position: Lying)   Pulse 92   Temp 98.1 °F (36.7 °C) (Oral)   Resp 19   Wt 54 kg (119 lb)   LMP 05/10/2024 (Approximate)   SpO2 99%   BMI 18.64 kg/m²     Labs Reviewed and Include    No results for input(s): "GLU", "CALCIUM", "ALBUMIN", "PROT", "NA", "K", "CO2", "CL", "BUN", "CREATININE", "ALKPHOS", "ALT", "AST", "BILITOT" in the last 24 hours.  Lab Results   Component Value Date    WBC 3.56 (L) 2024    HGB 10.9 (L) 2024    HCT 34.7 (L) 2024    MCV 81 (L) 2024     2024     Recent Labs   Lab 24  2233   TSH 0.472     Lab Results   Component Value Date    HGBA1C 9.5 (H) 2024       Nutritional status:   Body mass index is 18.64 kg/m².  Lab Results   Component Value Date    ALBUMIN 3.0 (L) 2024    ALBUMIN 3.1 (L) 2024    ALBUMIN 2.9 (L) 2024     No results found for: "PREALBUMIN"    Estimated Creatinine Clearance: 73.7 mL/min (based on SCr of 0.9 mg/dL).    Accu-Checks  Recent Labs     24  2241 24  0206 24  0822 24  1159 24  1314 24  1716 24  1720 24  2109 24  0000 24  0759   POCTGLUCOSE 246* 175* 290* 113* 211* 416* 398* 99 147* 341*       Current Medications and/or Treatments Impacting Glycemic Control  Immunotherapy:    Immunosuppressants       None          Steroids:   Hormones (From admission, onward)      None          Pressors:    Autonomic Drugs (From admission, onward)      None          Hyperglycemia/Diabetes Medications:   Antihyperglycemics " (From admission, onward)      Start     Stop Route Frequency Ordered    06/03/24 1015  insulin glargine U-100 (Lantus) pen 9 Units         -- SubQ Daily 06/03/24 0915    06/03/24 0715  insulin aspart U-100 pen 2-4 Units         -- SubQ 3 times daily with meals 06/03/24 0642    06/02/24 0748  insulin aspart U-100 pen 0-4 Units         -- SubQ Before meals, nightly and at 0200 PRN 06/02/24 0648

## 2024-06-04 NOTE — ASSESSMENT & PLAN NOTE
Intentional insulin overdose. Psychiatry recommended PEC and involuntary psychiatric facility admission.

## 2024-06-04 NOTE — NURSING
Removed PIV. Patient understands discharge instructions. Ambulance is arranged for patient transportation. Security escorted patient from the floor.

## 2024-06-04 NOTE — ASSESSMENT & PLAN NOTE
She is prescribed insulin glargine 12 units daily, insulin aspart 4 units TID with meals. Appreciate Endocrinology. On insulin glargine 9 units daily, insulin aspart 2-4 units TID with meals. Monitor Accuchecks.

## 2024-06-04 NOTE — SUBJECTIVE & OBJECTIVE
Interval History: Stepped down from ICU. Medically stable for transfer to a psychiatric facility.    Review of Systems   Constitutional:  Negative for chills and fever.   Gastrointestinal:  Negative for nausea and vomiting.   Neurological:  Negative for seizures and syncope.     Objective:     Vital Signs (Most Recent):  Temp: 99 °F (37.2 °C) (06/04/24 0359)  Pulse: 84 (06/04/24 0036)  Resp: 13 (06/03/24 2330)  BP: 114/72 (06/03/24 2330)  SpO2: 99 % (06/04/24 0500) Vital Signs (24h Range):  Temp:  [42.8 °F (6 °C)-99 °F (37.2 °C)] 99 °F (37.2 °C)  Pulse:  [] 84  Resp:  [13-38] 13  SpO2:  [96 %-100 %] 99 %  BP: (100-125)/(60-83) 114/72     Weight: 54 kg (119 lb)  Body mass index is 18.64 kg/m².    Intake/Output Summary (Last 24 hours) at 6/4/2024 0638  Last data filed at 6/3/2024 2301  Gross per 24 hour   Intake 602.03 ml   Output 1125 ml   Net -522.97 ml         Physical Exam  Vitals and nursing note reviewed.   Constitutional:       General: She is not in acute distress.     Appearance: She is well-developed and normal weight. She is not diaphoretic.      Interventions: She is not intubated.  Pulmonary:      Effort: Pulmonary effort is normal. No accessory muscle usage or respiratory distress. She is not intubated.   Skin:     General: Skin is warm and dry.      Coloration: Skin is not jaundiced or pale.   Neurological:      Mental Status: She is alert and oriented to person, place, and time. Mental status is at baseline.      Motor: No seizure activity.             Significant Labs: All pertinent labs within the past 24 hours have been reviewed.    Significant Imaging: I have reviewed all pertinent imaging results/findings within the past 24 hours.

## 2024-06-04 NOTE — PLAN OF CARE
Problem: Adult Inpatient Plan of Care  Goal: Plan of Care Review  Outcome: Progressing  Flowsheets (Taken 6/4/2024 0459)  Plan of Care Reviewed With: patient  Goal: Patient-Specific Goal (Individualized)  Outcome: Progressing  Goal: Absence of Hospital-Acquired Illness or Injury  Outcome: Progressing  Intervention: Identify and Manage Fall Risk  Flowsheets (Taken 6/4/2024 0459)  Safety Promotion/Fall Prevention:   assistive device/personal item within reach   side rails raised x 2  Goal: Optimal Comfort and Wellbeing  Outcome: Progressing  Intervention: Provide Person-Centered Care  Flowsheets (Taken 6/4/2024 0459)  Trust Relationship/Rapport: care explained  Goal: Readiness for Transition of Care  Outcome: Progressing

## 2024-06-04 NOTE — ASSESSMENT & PLAN NOTE
Amphetamine abuse   Cocaine abuse   Marijuana abuse   Known history. Urine drug screen was positive for cocaine, marijuana, and amphetamines on admission.

## 2024-06-04 NOTE — ASSESSMENT & PLAN NOTE
BG goal: 140-180  T1DM. Hx of multiple DKA episodes.  Injected unknown amount of insulin Lantus and Novolog in ED. On D10 drip post injection, able to wean off and start transition drip. Pt appetite improved.  Now on MDI.  Snacks between meals at times.  Will monitor on current regimen    - Continue Lantus 9 units daily  - Continue Novolog 2-4 units based on intake  - Continue /50  - POCT Glucose before meals, at bedtime and at 2 am  - Hypoglycemia protocol in place  ** Please notify Endocrine for any change and/or advance in diet**  ** Please call Endocrine for any BG related issues **       Discharge Planning:   TBD. Please notify endocrinology prior to discharge.
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
## 2024-08-12 ENCOUNTER — HOSPITAL ENCOUNTER (INPATIENT)
Facility: HOSPITAL | Age: 34
LOS: 7 days | Discharge: LEFT BEFORE DISCHARGE | DRG: 871 | End: 2024-08-19
Attending: EMERGENCY MEDICINE | Admitting: STUDENT IN AN ORGANIZED HEALTH CARE EDUCATION/TRAINING PROGRAM
Payer: MEDICAID

## 2024-08-12 DIAGNOSIS — E11.10 DKA (DIABETIC KETOACIDOSIS): ICD-10-CM

## 2024-08-12 DIAGNOSIS — F99 PSYCHIATRIC DISORDER: Primary | ICD-10-CM

## 2024-08-12 DIAGNOSIS — R78.81 BACTEREMIA: ICD-10-CM

## 2024-08-12 DIAGNOSIS — R78.81 BACTEREMIA DUE TO GRAM-POSITIVE BACTERIA: ICD-10-CM

## 2024-08-12 PROBLEM — R50.9 FEVER: Status: ACTIVE | Noted: 2024-08-12

## 2024-08-12 PROBLEM — F17.200 TOBACCO DEPENDENCY: Status: ACTIVE | Noted: 2024-08-12

## 2024-08-12 LAB
ALBUMIN SERPL BCP-MCNC: 4.4 G/DL (ref 3.5–5.2)
ALLENS TEST: ABNORMAL
ALP SERPL-CCNC: 124 U/L (ref 55–135)
ALT SERPL W/O P-5'-P-CCNC: 14 U/L (ref 10–44)
AMPHET+METHAMPHET UR QL: NEGATIVE
ANION GAP SERPL CALC-SCNC: 14 MMOL/L (ref 8–16)
ANION GAP SERPL CALC-SCNC: 19 MMOL/L (ref 8–16)
ANION GAP SERPL CALC-SCNC: 19 MMOL/L (ref 8–16)
AST SERPL-CCNC: 22 U/L (ref 10–40)
B-HCG UR QL: NEGATIVE
B-OH-BUTYR BLD STRIP-SCNC: 2.4 MMOL/L (ref 0–0.5)
BACTERIA #/AREA URNS HPF: NORMAL /HPF
BARBITURATES UR QL SCN>200 NG/ML: NEGATIVE
BASOPHILS # BLD AUTO: 0.05 K/UL (ref 0–0.2)
BASOPHILS NFR BLD: 0.8 % (ref 0–1.9)
BENZODIAZ UR QL SCN>200 NG/ML: NEGATIVE
BILIRUB SERPL-MCNC: 0.6 MG/DL (ref 0.1–1)
BILIRUB UR QL STRIP: NEGATIVE
BNP SERPL-MCNC: 48 PG/ML (ref 0–99)
BUN SERPL-MCNC: 28 MG/DL (ref 6–20)
BUN SERPL-MCNC: 32 MG/DL (ref 6–20)
BUN SERPL-MCNC: 32 MG/DL (ref 6–20)
BZE UR QL SCN: NEGATIVE
CALCIUM SERPL-MCNC: 10.6 MG/DL (ref 8.7–10.5)
CALCIUM SERPL-MCNC: 9.1 MG/DL (ref 8.7–10.5)
CALCIUM SERPL-MCNC: 9.6 MG/DL (ref 8.7–10.5)
CANNABINOIDS UR QL SCN: ABNORMAL
CHLORIDE SERPL-SCNC: 105 MMOL/L (ref 95–110)
CHLORIDE SERPL-SCNC: 109 MMOL/L (ref 95–110)
CHLORIDE SERPL-SCNC: 98 MMOL/L (ref 95–110)
CLARITY UR: CLEAR
CO2 SERPL-SCNC: 14 MMOL/L (ref 23–29)
CO2 SERPL-SCNC: 15 MMOL/L (ref 23–29)
CO2 SERPL-SCNC: 21 MMOL/L (ref 23–29)
COLOR UR: COLORLESS
CREAT SERPL-MCNC: 1.1 MG/DL (ref 0.5–1.4)
CREAT SERPL-MCNC: 1.4 MG/DL (ref 0.5–1.4)
CREAT SERPL-MCNC: 1.5 MG/DL (ref 0.5–1.4)
CREAT UR-MCNC: 31.1 MG/DL (ref 15–325)
CTP QC/QA: YES
DIFFERENTIAL METHOD BLD: ABNORMAL
EOSINOPHIL # BLD AUTO: 0.1 K/UL (ref 0–0.5)
EOSINOPHIL NFR BLD: 1.5 % (ref 0–8)
ERYTHROCYTE [DISTWIDTH] IN BLOOD BY AUTOMATED COUNT: 16.5 % (ref 11.5–14.5)
EST. GFR  (NO RACE VARIABLE): 46 ML/MIN/1.73 M^2
EST. GFR  (NO RACE VARIABLE): 50 ML/MIN/1.73 M^2
EST. GFR  (NO RACE VARIABLE): >60 ML/MIN/1.73 M^2
GLUCOSE SERPL-MCNC: 264 MG/DL (ref 70–110)
GLUCOSE SERPL-MCNC: 454 MG/DL (ref 70–110)
GLUCOSE SERPL-MCNC: 663 MG/DL (ref 70–110)
GLUCOSE UR QL STRIP: ABNORMAL
HCG INTACT+B SERPL-ACNC: <1.2 MIU/ML
HCO3 UR-SCNC: 14.9 MMOL/L (ref 24–28)
HCT VFR BLD AUTO: 40.5 % (ref 37–48.5)
HGB BLD-MCNC: 13 G/DL (ref 12–16)
HGB UR QL STRIP: NEGATIVE
IMM GRANULOCYTES # BLD AUTO: 0.02 K/UL (ref 0–0.04)
IMM GRANULOCYTES NFR BLD AUTO: 0.3 % (ref 0–0.5)
KETONES UR QL STRIP: ABNORMAL
LACTATE SERPL-SCNC: 2.6 MMOL/L (ref 0.5–2.2)
LEUKOCYTE ESTERASE UR QL STRIP: NEGATIVE
LIPASE SERPL-CCNC: 21 U/L (ref 4–60)
LYMPHOCYTES # BLD AUTO: 1.4 K/UL (ref 1–4.8)
LYMPHOCYTES NFR BLD: 22.5 % (ref 18–48)
MAGNESIUM SERPL-MCNC: 2.5 MG/DL (ref 1.6–2.6)
MCH RBC QN AUTO: 25.7 PG (ref 27–31)
MCHC RBC AUTO-ENTMCNC: 32.1 G/DL (ref 32–36)
MCV RBC AUTO: 80 FL (ref 82–98)
METHADONE UR QL SCN>300 NG/ML: NEGATIVE
MICROSCOPIC COMMENT: NORMAL
MONOCYTES # BLD AUTO: 0.4 K/UL (ref 0.3–1)
MONOCYTES NFR BLD: 6 % (ref 4–15)
NEUTROPHILS # BLD AUTO: 4.2 K/UL (ref 1.8–7.7)
NEUTROPHILS NFR BLD: 68.9 % (ref 38–73)
NITRITE UR QL STRIP: NEGATIVE
NRBC BLD-RTO: 0 /100 WBC
OPIATES UR QL SCN: NEGATIVE
PCO2 BLDA: 31.8 MMHG (ref 35–45)
PCP UR QL SCN>25 NG/ML: NEGATIVE
PH SMN: 7.28 [PH] (ref 7.35–7.45)
PH UR STRIP: 5 [PH] (ref 5–8)
PLATELET # BLD AUTO: 308 K/UL (ref 150–450)
PMV BLD AUTO: 12.1 FL (ref 9.2–12.9)
PO2 BLDA: 28 MMHG (ref 40–60)
POC BE: -11 MMOL/L
POC MOLECULAR INFLUENZA A AGN: NEGATIVE
POC MOLECULAR INFLUENZA B AGN: NEGATIVE
POC SATURATED O2: 45 % (ref 95–100)
POC TCO2: 16 MMOL/L (ref 24–29)
POCT GLUCOSE: 182 MG/DL (ref 70–110)
POCT GLUCOSE: 249 MG/DL (ref 70–110)
POCT GLUCOSE: 274 MG/DL (ref 70–110)
POCT GLUCOSE: 367 MG/DL (ref 70–110)
POCT GLUCOSE: 413 MG/DL (ref 70–110)
POCT GLUCOSE: 453 MG/DL (ref 70–110)
POCT GLUCOSE: 478 MG/DL (ref 70–110)
POCT GLUCOSE: >500 MG/DL (ref 70–110)
POTASSIUM SERPL-SCNC: 4.1 MMOL/L (ref 3.5–5.1)
POTASSIUM SERPL-SCNC: 4.6 MMOL/L (ref 3.5–5.1)
POTASSIUM SERPL-SCNC: 5.5 MMOL/L (ref 3.5–5.1)
PROT SERPL-MCNC: 8.2 G/DL (ref 6–8.4)
PROT UR QL STRIP: NEGATIVE
RBC # BLD AUTO: 5.06 M/UL (ref 4–5.4)
SAMPLE: ABNORMAL
SARS-COV-2 RDRP RESP QL NAA+PROBE: NEGATIVE
SITE: ABNORMAL
SODIUM SERPL-SCNC: 132 MMOL/L (ref 136–145)
SODIUM SERPL-SCNC: 138 MMOL/L (ref 136–145)
SODIUM SERPL-SCNC: 144 MMOL/L (ref 136–145)
SP GR UR STRIP: >1.03 (ref 1–1.03)
TOXICOLOGY INFORMATION: ABNORMAL
TROPONIN I SERPL DL<=0.01 NG/ML-MCNC: <0.006 NG/ML (ref 0–0.03)
URN SPEC COLLECT METH UR: ABNORMAL
UROBILINOGEN UR STRIP-ACNC: NEGATIVE EU/DL
WBC # BLD AUTO: 6.14 K/UL (ref 3.9–12.7)
YEAST URNS QL MICRO: NORMAL

## 2024-08-12 PROCEDURE — 83880 ASSAY OF NATRIURETIC PEPTIDE: CPT | Performed by: EMERGENCY MEDICINE

## 2024-08-12 PROCEDURE — 63600175 PHARM REV CODE 636 W HCPCS: Performed by: EMERGENCY MEDICINE

## 2024-08-12 PROCEDURE — 83605 ASSAY OF LACTIC ACID: CPT | Performed by: PHYSICIAN ASSISTANT

## 2024-08-12 PROCEDURE — 84484 ASSAY OF TROPONIN QUANT: CPT | Performed by: EMERGENCY MEDICINE

## 2024-08-12 PROCEDURE — 84702 CHORIONIC GONADOTROPIN TEST: CPT | Performed by: PHYSICIAN ASSISTANT

## 2024-08-12 PROCEDURE — 80048 BASIC METABOLIC PNL TOTAL CA: CPT | Mod: XB | Performed by: PHYSICIAN ASSISTANT

## 2024-08-12 PROCEDURE — 25000003 PHARM REV CODE 250: Performed by: PHYSICIAN ASSISTANT

## 2024-08-12 PROCEDURE — 81000 URINALYSIS NONAUTO W/SCOPE: CPT | Mod: 59 | Performed by: EMERGENCY MEDICINE

## 2024-08-12 PROCEDURE — 93005 ELECTROCARDIOGRAM TRACING: CPT

## 2024-08-12 PROCEDURE — 82962 GLUCOSE BLOOD TEST: CPT

## 2024-08-12 PROCEDURE — 99291 CRITICAL CARE FIRST HOUR: CPT

## 2024-08-12 PROCEDURE — 87635 SARS-COV-2 COVID-19 AMP PRB: CPT | Performed by: EMERGENCY MEDICINE

## 2024-08-12 PROCEDURE — 83036 HEMOGLOBIN GLYCOSYLATED A1C: CPT | Performed by: PHYSICIAN ASSISTANT

## 2024-08-12 PROCEDURE — 81025 URINE PREGNANCY TEST: CPT | Performed by: EMERGENCY MEDICINE

## 2024-08-12 PROCEDURE — 20000000 HC ICU ROOM

## 2024-08-12 PROCEDURE — 96374 THER/PROPH/DIAG INJ IV PUSH: CPT

## 2024-08-12 PROCEDURE — 63600175 PHARM REV CODE 636 W HCPCS: Performed by: PHYSICIAN ASSISTANT

## 2024-08-12 PROCEDURE — 96375 TX/PRO/DX INJ NEW DRUG ADDON: CPT

## 2024-08-12 PROCEDURE — 93010 ELECTROCARDIOGRAM REPORT: CPT | Mod: 59,,, | Performed by: INTERNAL MEDICINE

## 2024-08-12 PROCEDURE — C1751 CATH, INF, PER/CENT/MIDLINE: HCPCS

## 2024-08-12 PROCEDURE — 83735 ASSAY OF MAGNESIUM: CPT | Performed by: EMERGENCY MEDICINE

## 2024-08-12 PROCEDURE — 93010 ELECTROCARDIOGRAM REPORT: CPT | Mod: ,,, | Performed by: INTERNAL MEDICINE

## 2024-08-12 PROCEDURE — 96361 HYDRATE IV INFUSION ADD-ON: CPT

## 2024-08-12 PROCEDURE — 36410 VNPNXR 3YR/> PHY/QHP DX/THER: CPT

## 2024-08-12 PROCEDURE — 87154 CUL TYP ID BLD PTHGN 6+ TRGT: CPT | Performed by: EMERGENCY MEDICINE

## 2024-08-12 PROCEDURE — 25000003 PHARM REV CODE 250: Performed by: EMERGENCY MEDICINE

## 2024-08-12 PROCEDURE — 83690 ASSAY OF LIPASE: CPT | Performed by: EMERGENCY MEDICINE

## 2024-08-12 PROCEDURE — S5010 5% DEXTROSE AND 0.45% SALINE: HCPCS | Performed by: PHYSICIAN ASSISTANT

## 2024-08-12 PROCEDURE — 80307 DRUG TEST PRSMV CHEM ANLYZR: CPT | Performed by: PHYSICIAN ASSISTANT

## 2024-08-12 PROCEDURE — 82803 BLOOD GASES ANY COMBINATION: CPT

## 2024-08-12 PROCEDURE — 80053 COMPREHEN METABOLIC PANEL: CPT | Performed by: EMERGENCY MEDICINE

## 2024-08-12 PROCEDURE — 99900035 HC TECH TIME PER 15 MIN (STAT)

## 2024-08-12 PROCEDURE — 87186 SC STD MICRODIL/AGAR DIL: CPT | Performed by: EMERGENCY MEDICINE

## 2024-08-12 PROCEDURE — 82010 KETONE BODYS QUAN: CPT | Performed by: EMERGENCY MEDICINE

## 2024-08-12 PROCEDURE — 87040 BLOOD CULTURE FOR BACTERIA: CPT | Mod: 59 | Performed by: EMERGENCY MEDICINE

## 2024-08-12 PROCEDURE — 85025 COMPLETE CBC W/AUTO DIFF WBC: CPT | Performed by: EMERGENCY MEDICINE

## 2024-08-12 RX ORDER — SODIUM CHLORIDE 0.9 % (FLUSH) 0.9 %
10 SYRINGE (ML) INJECTION
Status: DISCONTINUED | OUTPATIENT
Start: 2024-08-12 | End: 2024-08-20 | Stop reason: HOSPADM

## 2024-08-12 RX ORDER — DEXTROSE MONOHYDRATE 100 MG/ML
INJECTION, SOLUTION INTRAVENOUS
Status: DISCONTINUED | OUTPATIENT
Start: 2024-08-12 | End: 2024-08-12

## 2024-08-12 RX ORDER — DEXTROSE MONOHYDRATE AND SODIUM CHLORIDE 5; .45 G/100ML; G/100ML
125 INJECTION, SOLUTION INTRAVENOUS CONTINUOUS PRN
Status: DISCONTINUED | OUTPATIENT
Start: 2024-08-12 | End: 2024-08-13

## 2024-08-12 RX ORDER — SODIUM CHLORIDE 9 MG/ML
1000 INJECTION, SOLUTION INTRAVENOUS CONTINUOUS
Status: DISCONTINUED | OUTPATIENT
Start: 2024-08-12 | End: 2024-08-12

## 2024-08-12 RX ORDER — SODIUM CHLORIDE 9 MG/ML
125 INJECTION, SOLUTION INTRAVENOUS CONTINUOUS
Status: DISCONTINUED | OUTPATIENT
Start: 2024-08-12 | End: 2024-08-13

## 2024-08-12 RX ORDER — SODIUM CHLORIDE 0.9 % (FLUSH) 0.9 %
10 SYRINGE (ML) INJECTION
Status: DISCONTINUED | OUTPATIENT
Start: 2024-08-12 | End: 2024-08-13

## 2024-08-12 RX ORDER — IBUPROFEN 200 MG
1 TABLET ORAL DAILY
Status: DISCONTINUED | OUTPATIENT
Start: 2024-08-13 | End: 2024-08-20 | Stop reason: HOSPADM

## 2024-08-12 RX ORDER — DEXTROSE MONOHYDRATE AND SODIUM CHLORIDE 5; .45 G/100ML; G/100ML
INJECTION, SOLUTION INTRAVENOUS CONTINUOUS PRN
Status: DISCONTINUED | OUTPATIENT
Start: 2024-08-12 | End: 2024-08-12

## 2024-08-12 RX ORDER — SODIUM CHLORIDE 0.9 % (FLUSH) 0.9 %
10 SYRINGE (ML) INJECTION EVERY 6 HOURS
Status: DISCONTINUED | OUTPATIENT
Start: 2024-08-13 | End: 2024-08-20 | Stop reason: HOSPADM

## 2024-08-12 RX ORDER — DROPERIDOL 2.5 MG/ML
0.62 INJECTION, SOLUTION INTRAMUSCULAR; INTRAVENOUS
Status: COMPLETED | OUTPATIENT
Start: 2024-08-12 | End: 2024-08-12

## 2024-08-12 RX ORDER — ACETAMINOPHEN 325 MG/1
650 TABLET ORAL EVERY 4 HOURS PRN
Status: DISCONTINUED | OUTPATIENT
Start: 2024-08-12 | End: 2024-08-20 | Stop reason: HOSPADM

## 2024-08-12 RX ORDER — SODIUM CHLORIDE 0.9 % (FLUSH) 0.9 %
10 SYRINGE (ML) INJECTION
Status: DISCONTINUED | OUTPATIENT
Start: 2024-08-12 | End: 2024-08-12

## 2024-08-12 RX ADMIN — INSULIN HUMAN 5 UNITS: 100 INJECTION, SOLUTION PARENTERAL at 03:08

## 2024-08-12 RX ADMIN — SODIUM CHLORIDE 1000 ML: 9 INJECTION, SOLUTION INTRAVENOUS at 02:08

## 2024-08-12 RX ADMIN — PROMETHAZINE HYDROCHLORIDE 6.25 MG: 25 INJECTION INTRAMUSCULAR; INTRAVENOUS at 07:08

## 2024-08-12 RX ADMIN — DEXTROSE AND SODIUM CHLORIDE 125 ML/HR: 5; 450 INJECTION, SOLUTION INTRAVENOUS at 11:08

## 2024-08-12 RX ADMIN — DROPERIDOL 0.62 MG: 2.5 INJECTION, SOLUTION INTRAMUSCULAR; INTRAVENOUS at 03:08

## 2024-08-12 RX ADMIN — CEFTRIAXONE 2 G: 2 INJECTION, POWDER, FOR SOLUTION INTRAMUSCULAR; INTRAVENOUS at 07:08

## 2024-08-12 RX ADMIN — Medication 10 ML: at 11:08

## 2024-08-12 RX ADMIN — SODIUM CHLORIDE 125 ML/HR: 9 INJECTION, SOLUTION INTRAVENOUS at 07:08

## 2024-08-12 RX ADMIN — INSULIN HUMAN 0.1 UNITS/KG/HR: 1 INJECTION, SOLUTION INTRAVENOUS at 05:08

## 2024-08-12 RX ADMIN — VANCOMYCIN HYDROCHLORIDE 1250 MG: 1.25 INJECTION, POWDER, LYOPHILIZED, FOR SOLUTION INTRAVENOUS at 09:08

## 2024-08-12 NOTE — ASSESSMENT & PLAN NOTE
KIMBERLY is likely due to pre-renal azotemia due to dehydration. Baseline creatinine is  0.9 . Most recent creatinine and eGFR are listed below.  Recent Labs     08/12/24  1500   CREATININE 1.5*   EGFRNORACEVR 46*      Plan  - KIMBERLY is stable  - Avoid nephrotoxins and renally dose meds for GFR listed above  - Monitor urine output, serial BMP, and adjust therapy as needed  - suspect related to vomiting from DKA and osmotic diuresis. Started on IVF, on IV insulin for associated mild hyperkalemia.

## 2024-08-12 NOTE — ASSESSMENT & PLAN NOTE
Smokes less than 1/2ppd. Greater than 3min spent counseling patient on dangers of continued tobacco abuse

## 2024-08-12 NOTE — ASSESSMENT & PLAN NOTE
She reports THC use only, denies other recreational drug use.   Check UDS, counseled on THC cessation

## 2024-08-12 NOTE — SUBJECTIVE & OBJECTIVE
Past Medical History:   Diagnosis Date    Anemia     Borderline intellectual functioning 2017    Diabetes mellitus     Insulin overdose 2024    Mood disorder     Scoliosis     Substance use disorder        Past Surgical History:   Procedure Laterality Date     SECTION         Review of patient's allergies indicates:   Allergen Reactions    Clove oil Itching    Pork/porcine containing products Other (See Comments)     Pt does not eat Pork    Zofran (as hydrochloride) [ondansetron hcl] Hives       No current facility-administered medications on file prior to encounter.     Current Outpatient Medications on File Prior to Encounter   Medication Sig    blood sugar diagnostic Strp To check BG 4 times daily, to use with insurance preferred meter    blood-glucose meter kit To check BG 4 times daily, to use with insurance preferred meter    blood-glucose meter,continuous (DEXCOM ) Misc 1 Device by Misc.(Non-Drug; Combo Route) route as needed (to check blood sugar).    glucose 4 GM chewable tablet Take 4 tablets (16 g total) by mouth as needed for Low blood sugar.    insulin aspart U-100 (NOVOLOG) 100 unit/mL (3 mL) InPn pen Inject 4 Units into the skin 3 (three) times daily as needed (blood glucose greater than 250).    insulin glargine U-100, Lantus, 100 unit/mL (3 mL) SubQ InPn pen Inject 12 Units into the skin once daily.    lancets 30 gauge Misc To check BG 4 times daily, to use with insurance preferred meter    lancets Misc To check BG 4 times daily, to use with insurance preferred meter    blood-glucose sensor (DEXCOM G7 SENSOR) Latanya 1 Device by Misc.(Non-Drug; Combo Route) route as needed (to check blood sugar).    FLUoxetine 20 MG capsule Take 3 capsules by mouth once daily.    gabapentin (NEURONTIN) 300 MG capsule Take 1 capsule by mouth 3 (three) times daily.    glucagon 3 mg/actuation Spry 1 spray by Nasal route as needed (hypoglycemia).    metoclopramide HCl (REGLAN) 10 MG tablet Take  "1 tablet (10 mg total) by mouth every 6 (six) hours as needed (nausea or vomiting).    nicotine (NICODERM CQ) 7 mg/24 hr Place 1 patch onto the skin once daily.    pantoprazole (PROTONIX) 40 MG tablet Take 1 tablet (40 mg total) by mouth every morning.    pen needle, diabetic 32 gauge x 5/32" Ndle 1 each by Misc.(Non-Drug; Combo Route) route 4 (four) times daily.    polyethylene glycol (GLYCOLAX) 17 gram PwPk Take 17 g by mouth once daily.    promethazine (PHENERGAN) 25 MG suppository Place 1 suppository (25 mg total) rectally every 6 (six) hours as needed for Nausea (For severe nausea and vomiting not improved with oral medications).     Family History    None       Tobacco Use    Smoking status: Every Day     Current packs/day: 1.00     Types: Cigarettes    Smokeless tobacco: Never   Substance and Sexual Activity    Alcohol use: Not Currently    Drug use: Yes     Types: Marijuana    Sexual activity: Not Currently     Review of Systems   Constitutional:  Positive for fever. Negative for chills.   HENT:  Negative for nosebleeds and tinnitus.    Eyes:  Negative for photophobia and visual disturbance.   Respiratory:  Negative for shortness of breath and wheezing.    Cardiovascular:  Negative for chest pain, palpitations and leg swelling.   Gastrointestinal:  Positive for nausea and vomiting. Negative for abdominal distention.   Genitourinary:  Negative for dysuria, flank pain and hematuria.   Musculoskeletal:  Positive for back pain. Negative for gait problem and joint swelling.   Skin:  Negative for rash and wound.   Neurological:  Negative for seizures and syncope.     Objective:     Vital Signs (Most Recent):  Temp: 98.9 °F (37.2 °C) (08/12/24 1527)  Pulse: 83 (08/12/24 1527)  Resp: 16 (08/12/24 1527)  BP: 138/71 (08/12/24 1503)  SpO2: 100 % (08/12/24 1527) Vital Signs (24h Range):  Temp:  [98.9 °F (37.2 °C)-100.9 °F (38.3 °C)] 98.9 °F (37.2 °C)  Pulse:  [69-83] 83  Resp:  [16-18] 16  SpO2:  [99 %-100 %] 100 %  BP: " (124-138)/(71-90) 138/71     Weight: 54 kg (119 lb)  Body mass index is 18.64 kg/m².     Physical Exam  Vitals and nursing note reviewed.   Constitutional:       General: She is not in acute distress.     Appearance: She is well-developed. She is not diaphoretic.   HENT:      Head: Normocephalic and atraumatic.      Right Ear: External ear normal.      Left Ear: External ear normal.   Eyes:      General:         Right eye: No discharge.         Left eye: No discharge.      Conjunctiva/sclera: Conjunctivae normal.   Neck:      Thyroid: No thyromegaly.   Cardiovascular:      Rate and Rhythm: Normal rate and regular rhythm.      Heart sounds: No murmur heard.  Pulmonary:      Effort: Pulmonary effort is normal. No respiratory distress.      Breath sounds: Normal breath sounds.   Abdominal:      General: Bowel sounds are normal. There is no distension.      Palpations: Abdomen is soft. There is no mass.      Tenderness: There is no abdominal tenderness.   Musculoskeletal:         General: No deformity.      Cervical back: Normal range of motion and neck supple.      Right lower leg: No edema.      Left lower leg: No edema.      Comments: 5/5 strength BLE, no sensation deficits.    Skin:     General: Skin is warm and dry.   Neurological:      Mental Status: She is alert and oriented to person, place, and time.      Sensory: No sensory deficit.   Psychiatric:         Mood and Affect: Mood normal.         Behavior: Behavior normal.                Significant Labs: CBC:   Recent Labs   Lab 08/12/24  1500   WBC 6.14   HGB 13.0   HCT 40.5        CMP:   Recent Labs   Lab 08/12/24  1500   *   K 5.5*   CL 98   CO2 15*   *   BUN 32*   CREATININE 1.5*   CALCIUM 10.6*   PROT 8.2   ALBUMIN 4.4   BILITOT 0.6   ALKPHOS 124   AST 22   ALT 14   ANIONGAP 19*     Lipase:   Recent Labs   Lab 08/12/24  1500   LIPASE 21     Troponin:   Recent Labs   Lab 08/12/24  1500   TROPONINI <0.006     Urine Studies:   Recent Labs    Lab 08/12/24  1445   COLORU Colorless*   APPEARANCEUA Clear   PHUR 5.0   SPECGRAV >1.030*   PROTEINUA Negative   GLUCUA 4+*   KETONESU 2+*   BILIRUBINUA Negative   OCCULTUA Negative   NITRITE Negative   UROBILINOGEN Negative   LEUKOCYTESUR Negative   BACTERIA None       Significant Imaging:   Imaging Results              X-Ray Chest AP Portable (Final result)  Result time 08/12/24 15:32:21      Final result by Ernesto Horan III, MD (08/12/24 15:32:21)                   Impression:      No acute process seen in the chest.    Possible mild subluxation of the right shoulder.      Electronically signed by: Ernesto Horan MD  Date:    08/12/2024  Time:    15:32               Narrative:    EXAMINATION:  XR CHEST AP PORTABLE    CLINICAL HISTORY:  SOB;    FINDINGS:  Heart size is normal.  Lungs are clear.  There is subluxation of the right shoulder.

## 2024-08-12 NOTE — HPI
Patient is a 34-year-old female with past medical history of type 1 diabetes with gastroparesis/neuropathy, developmental delay, schizoaffective disorder, polysubstance use disorder (cocaine, amphetamines, opiates, marijuana), tobacco use disorder and GERD who presented to [facility] ER on 08/12/2024 for further evaluation of vomiting.  She reports 2 days of associated nausea and vomiting with associated subjective fevers.  She finds her nausea has improved with treatment in the ER.  She reports she was not had her home insulin last 2 days as she was not had food in her house and was afraid to take it.  She also has had 2-3 weeks low atraumatic back pain.  She reports she was smokes marijuana and cigarettes, but denies other recreational drug use.  She denies leg swelling melena hematuria hematemesis incontinence numbness weakness in extremity.      During evaluation in the ER, patient is hemodynamically stable anion gap 19 CO2 15 blood glucose 663 creatinine 1.5 potassium 5.5 beta hydroxy 2.4 pH 7.278 UA without leukocytes or nitrites chest x-ray without acute process.

## 2024-08-12 NOTE — ASSESSMENT & PLAN NOTE
Presents with Nausea/vomiting. pH 7.278 on arrival with beta of 2.4, anion gap 19, CO2 15. Reports did not have insulin for last 2 days as endorses food insecurity at home and missed insulin due to this. Also arrived febrile.   Started on IV insulin with IVF  Repeat BMP q4  Repeat A1c

## 2024-08-12 NOTE — ED NOTES
Spoke with Ashley in Lab who reported slightly hemolyzed CMP collections resulting lab results as follows; Glucose 633 mg/dL and Potassium of 5.5. Dr. Hugo notified.

## 2024-08-12 NOTE — H&P
[facility]  [facility] Medicine  History & Physical    Patient Name: Hollie Wilson  MRN: [id number]  Patient Class: IP- Inpatient  Admission Date: 8/12/2024  Attending Physician: Kay Tolbert,*   Primary Care Provider: Martin Rios MD         Patient information was obtained from patient, past medical records, and ER records.     Subjective:     Principal Problem:DKA (diabetic ketoacidosis)    Chief Complaint:   Chief Complaint   Patient presents with    Emesis     Presents to the ED via EMS with c/o flu like s/s, N/V x 2 days. Reports also testing positive on a UPT x 2 days ago. Hx of T1DM.  via EMS.         HPI: Patient is a 34-year-old female with past medical history of type 1 diabetes with gastroparesis/neuropathy, developmental delay, schizoaffective disorder, polysubstance use disorder (cocaine, amphetamines, opiates, marijuana), tobacco use disorder and GERD who presented to [facility] ER on 08/12/2024 for further evaluation of vomiting.  She reports 2 days of associated nausea and vomiting with associated subjective fevers.  She finds her nausea has improved with treatment in the ER.  She reports she was not had her home insulin last 2 days as she was not had food in her house and was afraid to take it.  She also has had 2-3 weeks low atraumatic back pain.  She reports she was smokes marijuana and cigarettes, but denies other recreational drug use.  She denies leg swelling melena hematuria hematemesis incontinence numbness weakness in extremity.      During evaluation in the ER, patient is hemodynamically stable anion gap 19 CO2 15 blood glucose 663 creatinine 1.5 potassium 5.5 beta hydroxy 2.4 pH 7.278 UA without leukocytes or nitrites chest x-ray without acute process.    Past Medical History:   Diagnosis Date    Anemia     Borderline intellectual functioning 08/08/2017    Diabetes mellitus     Insulin overdose 06/01/2024    Mood disorder     Scoliosis      Substance use disorder        Past Surgical History:   Procedure Laterality Date     SECTION         Review of patient's allergies indicates:   Allergen Reactions    Clove oil Itching    Pork/porcine containing products Other (See Comments)     Pt does not eat Pork    Zofran (as hydrochloride) [ondansetron hcl] Hives       No current facility-administered medications on file prior to encounter.     Current Outpatient Medications on File Prior to Encounter   Medication Sig    blood sugar diagnostic Strp To check BG 4 times daily, to use with insurance preferred meter    blood-glucose meter kit To check BG 4 times daily, to use with insurance preferred meter    blood-glucose meter,continuous (DEXCOM ) Misc 1 Device by Misc.(Non-Drug; Combo Route) route as needed (to check blood sugar).    glucose 4 GM chewable tablet Take 4 tablets (16 g total) by mouth as needed for Low blood sugar.    insulin aspart U-100 (NOVOLOG) 100 unit/mL (3 mL) InPn pen Inject 4 Units into the skin 3 (three) times daily as needed (blood glucose greater than 250).    insulin glargine U-100, Lantus, 100 unit/mL (3 mL) SubQ InPn pen Inject 12 Units into the skin once daily.    lancets 30 gauge Misc To check BG 4 times daily, to use with insurance preferred meter    lancets Misc To check BG 4 times daily, to use with insurance preferred meter    blood-glucose sensor (DEXCOM G7 SENSOR) Latanya 1 Device by Misc.(Non-Drug; Combo Route) route as needed (to check blood sugar).    FLUoxetine 20 MG capsule Take 3 capsules by mouth once daily.    gabapentin (NEURONTIN) 300 MG capsule Take 1 capsule by mouth 3 (three) times daily.    glucagon 3 mg/actuation Spry 1 spray by Nasal route as needed (hypoglycemia).    metoclopramide HCl (REGLAN) 10 MG tablet Take 1 tablet (10 mg total) by mouth every 6 (six) hours as needed (nausea or vomiting).    nicotine (NICODERM CQ) 7 mg/24 hr Place 1 patch onto the skin once daily.    pantoprazole (PROTONIX)  "40 MG tablet Take 1 tablet (40 mg total) by mouth every morning.    pen needle, diabetic 32 gauge x 5/32" Ndle 1 each by Misc.(Non-Drug; Combo Route) route 4 (four) times daily.    polyethylene glycol (GLYCOLAX) 17 gram PwPk Take 17 g by mouth once daily.    promethazine (PHENERGAN) 25 MG suppository Place 1 suppository (25 mg total) rectally every 6 (six) hours as needed for Nausea (For severe nausea and vomiting not improved with oral medications).     Family History    None       Tobacco Use    Smoking status: Every Day     Current packs/day: 1.00     Types: Cigarettes    Smokeless tobacco: Never   Substance and Sexual Activity    Alcohol use: Not Currently    Drug use: Yes     Types: Marijuana    Sexual activity: Not Currently     Review of Systems   Constitutional:  Positive for fever. Negative for chills.   HENT:  Negative for nosebleeds and tinnitus.    Eyes:  Negative for photophobia and visual disturbance.   Respiratory:  Negative for shortness of breath and wheezing.    Cardiovascular:  Negative for chest pain, palpitations and leg swelling.   Gastrointestinal:  Positive for nausea and vomiting. Negative for abdominal distention.   Genitourinary:  Negative for dysuria, flank pain and hematuria.   Musculoskeletal:  Positive for back pain. Negative for gait problem and joint swelling.   Skin:  Negative for rash and wound.   Neurological:  Negative for seizures and syncope.     Objective:     Vital Signs (Most Recent):  Temp: 98.9 °F (37.2 °C) (08/12/24 1527)  Pulse: 83 (08/12/24 1527)  Resp: 16 (08/12/24 1527)  BP: 138/71 (08/12/24 1503)  SpO2: 100 % (08/12/24 1527) Vital Signs (24h Range):  Temp:  [98.9 °F (37.2 °C)-100.9 °F (38.3 °C)] 98.9 °F (37.2 °C)  Pulse:  [69-83] 83  Resp:  [16-18] 16  SpO2:  [99 %-100 %] 100 %  BP: (124-138)/(71-90) 138/71     Weight: 54 kg (119 lb)  Body mass index is 18.64 kg/m².     Physical Exam  Vitals and nursing note reviewed.   Constitutional:       General: She is not in " acute distress.     Appearance: She is well-developed. She is not diaphoretic.   HENT:      Head: Normocephalic and atraumatic.      Right Ear: External ear normal.      Left Ear: External ear normal.   Eyes:      General:         Right eye: No discharge.         Left eye: No discharge.      Conjunctiva/sclera: Conjunctivae normal.   Neck:      Thyroid: No thyromegaly.   Cardiovascular:      Rate and Rhythm: Normal rate and regular rhythm.      Heart sounds: No murmur heard.  Pulmonary:      Effort: Pulmonary effort is normal. No respiratory distress.      Breath sounds: Normal breath sounds.   Abdominal:      General: Bowel sounds are normal. There is no distension.      Palpations: Abdomen is soft. There is no mass.      Tenderness: There is no abdominal tenderness.   Musculoskeletal:    No wounds to feet seen     General: No deformity.      Cervical back: Normal range of motion and neck supple.      Right lower leg: No edema.      Left lower leg: No edema.      Comments: 5/5 strength BLE, no sensation deficits.    Skin:     General: Skin is warm and dry.   Neurological:      Mental Status: She is alert and oriented to person, place, and time.      Sensory: No sensory deficit.   Psychiatric:         Mood and Affect: Mood normal.         Behavior: Behavior normal.                Significant Labs: CBC:   Recent Labs   Lab 08/12/24  1500   WBC 6.14   HGB 13.0   HCT 40.5        CMP:   Recent Labs   Lab 08/12/24  1500   *   K 5.5*   CL 98   CO2 15*   *   BUN 32*   CREATININE 1.5*   CALCIUM 10.6*   PROT 8.2   ALBUMIN 4.4   BILITOT 0.6   ALKPHOS 124   AST 22   ALT 14   ANIONGAP 19*     Lipase:   Recent Labs   Lab 08/12/24  1500   LIPASE 21     Troponin:   Recent Labs   Lab 08/12/24  1500   TROPONINI <0.006     Urine Studies:   Recent Labs   Lab 08/12/24  1445   COLORU Colorless*   APPEARANCEUA Clear   PHUR 5.0   SPECGRAV >1.030*   PROTEINUA Negative   GLUCUA 4+*   KETONESU 2+*   BILIRUBINUA Negative    OCCULTUA Negative   NITRITE Negative   UROBILINOGEN Negative   LEUKOCYTESUR Negative   BACTERIA None       Significant Imaging:   Imaging Results              X-Ray Chest AP Portable (Final result)  Result time 08/12/24 15:32:21      Final result by Ernesto Horan III, MD (08/12/24 15:32:21)                   Impression:      No acute process seen in the chest.    Possible mild subluxation of the right shoulder.      Electronically signed by: Ernesto Horan MD  Date:    08/12/2024  Time:    15:32               Narrative:    EXAMINATION:  XR CHEST AP PORTABLE    CLINICAL HISTORY:  SOB;    FINDINGS:  Heart size is normal.  Lungs are clear.  There is subluxation of the right shoulder.                                      Assessment/Plan:     * DKA (diabetic ketoacidoses)  Presents with Nausea/vomiting. pH 7.278 on arrival with beta of 2.4, anion gap 19, CO2 15. Reports did not have insulin for last 2 days as endorses food insecurity at home and missed insulin due to this. Also arrived febrile.   Started on IV insulin with IVF  Repeat BMP q4  Repeat A1c    Fever  Presents with fever of 100.9, and fond to be in DKA. Reports intermittent fevers for last 2 days. Also endorses low back pain without red flag symptoms. UA and chest x-ray without source.   Will start Rocephin/Vanc pending cultures  Blood cultures pending  Check COVID/Flu  Should fever continue to be persistent will check imaging of spine given history of polysubstance use and back pain    Schizoaffective disorder  Continue home prozac/vistaril    Prolonged Q-T interval on ECG  Chronically prolonged near 500 per chart review.  Will monitor on telemetry, and attempt to minimize anti-emetics to as needed for DKA    KIMBERLY (acute kidney injury)  KIMBERLY is likely due to pre-renal azotemia due to dehydration. Baseline creatinine is  0.9 . Most recent creatinine and eGFR are listed below.  Recent Labs     08/12/24  1500   CREATININE 1.5*   EGFRNORACEVR 46*       Plan  - KIMBERLY is stable  - Avoid nephrotoxins and renally dose meds for GFR listed above  - Monitor urine output, serial BMP, and adjust therapy as needed  - suspect related to vomiting from DKA and osmotic diuresis. Started on IVF, on IV insulin for associated mild hyperkalemia.     Tobacco abuse  Smokes less than 1/2ppd. Greater than 3min spent counseling patient on dangers of continued tobacco abuse    Polysubstance abuse  She reports THC use only, denies other recreational drug use.   Check UDS, counseled on THC cessation      VTE Risk Mitigation (From admission, onward)           Ordered     IP VTE LOW RISK PATIENT  Once         08/12/24 1558     Place TRISTAN hose  Until discontinued         08/12/24 1558                     Discussed with ED physician.    VTE:tristan  Code: Full  Diet: NPO  Dispo: pending improvement in DKA and culture results  As clarification, on 8/12/2024, patient should be admitted to inpatient services under my care in collaboration with Kay Tolbert MD. Giovany Dao PA-C    Critical care time spent on the evaluation and treatment of severe organ dysfunction, review of pertinent labs and imaging studies, discussions with consulting providers and discussions with patient/family: 60 minutes.           Giovany Dao PA-C  Department of Hospital Medicine  [facility] [place]

## 2024-08-12 NOTE — ED NOTES
Aung RN at bedside for PICC line insertion procedure. Pt verbalized understanding and signed consent form.

## 2024-08-12 NOTE — ED TRIAGE NOTES
Pt BIB EMS from home for evaluation of increased blood glucose level, urinary frequency, nausea and vomiting. Pt denies diarrhea, dysuria, CP, or SOB. Pt's CBG was assessed by EMS at 593. Pt also reports testing positive for pregnancy at home, UPT negative assessed in the ED. Allergy to Zofran. No meds PTA. Pt actively vomiting. Pmhx of DM, substance abuse disorder, and borderline intellectual functioning.

## 2024-08-12 NOTE — ASSESSMENT & PLAN NOTE
Presents with fever of 100.9, and fond to be in DKA. Reports intermittent fevers for last 2 days. Also endorses low back pain without red flag symptoms. UA and chest x-ray without source.   Will start Rocephin/Vanc pending cultures  Blood cultures pending  Check COVID/Flu  Should fever continue to be persistent will check imaging of spine given history of polysubstance use and back pain

## 2024-08-13 PROBLEM — R78.81 BACTEREMIA: Status: ACTIVE | Noted: 2024-08-13

## 2024-08-13 LAB
ACINETOBACTER CALCOACETICUS/BAUMANNII COMPLEX: NOT DETECTED
ANION GAP SERPL CALC-SCNC: 10 MMOL/L (ref 8–16)
ANION GAP SERPL CALC-SCNC: 14 MMOL/L (ref 8–16)
ANION GAP SERPL CALC-SCNC: 7 MMOL/L (ref 8–16)
ANION GAP SERPL CALC-SCNC: 9 MMOL/L (ref 8–16)
ANION GAP SERPL CALC-SCNC: 9 MMOL/L (ref 8–16)
AORTIC ROOT ANNULUS: 3.16 CM
AORTIC VALVE CUSP SEPERATION: 2.03 CM
AV INDEX (PROSTH): 0.78
AV MEAN GRADIENT: 5 MMHG
AV PEAK GRADIENT: 10 MMHG
AV VALVE AREA BY VELOCITY RATIO: 2.48 CM²
AV VALVE AREA: 2.53 CM²
AV VELOCITY RATIO: 0.77
BACTEROIDES FRAGILIS: NOT DETECTED
BASOPHILS # BLD AUTO: 0.04 K/UL (ref 0–0.2)
BASOPHILS # BLD AUTO: 0.05 K/UL (ref 0–0.2)
BASOPHILS NFR BLD: 0.5 % (ref 0–1.9)
BASOPHILS NFR BLD: 0.5 % (ref 0–1.9)
BSA FOR ECHO PROCEDURE: 1.64 M2
BUN SERPL-MCNC: 13 MG/DL (ref 6–20)
BUN SERPL-MCNC: 15 MG/DL (ref 6–20)
BUN SERPL-MCNC: 17 MG/DL (ref 6–20)
BUN SERPL-MCNC: 22 MG/DL (ref 6–20)
BUN SERPL-MCNC: 23 MG/DL (ref 6–20)
CALCIUM SERPL-MCNC: 8.5 MG/DL (ref 8.7–10.5)
CALCIUM SERPL-MCNC: 8.6 MG/DL (ref 8.7–10.5)
CALCIUM SERPL-MCNC: 8.7 MG/DL (ref 8.7–10.5)
CALCIUM SERPL-MCNC: 8.7 MG/DL (ref 8.7–10.5)
CALCIUM SERPL-MCNC: 8.9 MG/DL (ref 8.7–10.5)
CANDIDA ALBICANS: NOT DETECTED
CANDIDA AURIS: NOT DETECTED
CANDIDA GLABRATA: NOT DETECTED
CANDIDA KRUSEI: NOT DETECTED
CANDIDA PARAPSILOSIS: NOT DETECTED
CANDIDA TROPICALIS: NOT DETECTED
CHLORIDE SERPL-SCNC: 101 MMOL/L (ref 95–110)
CHLORIDE SERPL-SCNC: 106 MMOL/L (ref 95–110)
CHLORIDE SERPL-SCNC: 107 MMOL/L (ref 95–110)
CHLORIDE SERPL-SCNC: 108 MMOL/L (ref 95–110)
CHLORIDE SERPL-SCNC: 109 MMOL/L (ref 95–110)
CO2 SERPL-SCNC: 18 MMOL/L (ref 23–29)
CO2 SERPL-SCNC: 21 MMOL/L (ref 23–29)
CO2 SERPL-SCNC: 22 MMOL/L (ref 23–29)
CO2 SERPL-SCNC: 22 MMOL/L (ref 23–29)
CO2 SERPL-SCNC: 23 MMOL/L (ref 23–29)
CREAT SERPL-MCNC: 0.9 MG/DL (ref 0.5–1.4)
CREAT SERPL-MCNC: 1 MG/DL (ref 0.5–1.4)
CREAT SERPL-MCNC: 1.1 MG/DL (ref 0.5–1.4)
CREAT UR-MCNC: 143.6 MG/DL (ref 15–325)
CRYPTOCOCCUS NEOFORMANS/GATTII: NOT DETECTED
CTX-M GENE (ESBL PRODUCER): ABNORMAL
CV ECHO LV RWT: 0.68 CM
DIFFERENTIAL METHOD BLD: ABNORMAL
DIFFERENTIAL METHOD BLD: ABNORMAL
DOP CALC AO PEAK VEL: 1.58 M/S
DOP CALC AO VTI: 30.8 CM
DOP CALC LVOT AREA: 3.2 CM2
DOP CALC LVOT DIAMETER: 2.03 CM
DOP CALC LVOT PEAK VEL: 1.21 M/S
DOP CALC LVOT STROKE VOLUME: 77.96 CM3
DOP CALCLVOT PEAK VEL VTI: 24.1 CM
E WAVE DECELERATION TIME: 160.84 MSEC
E/A RATIO: 0.94
E/E' RATIO: 8.57 M/S
ECHO LV POSTERIOR WALL: 1.23 CM (ref 0.6–1.1)
ENTEROBACTER CLOACAE COMPLEX: NOT DETECTED
ENTEROBACTERALES: NOT DETECTED
ENTEROCOCCUS FAECALIS: NOT DETECTED
ENTEROCOCCUS FAECIUM: NOT DETECTED
EOSINOPHIL # BLD AUTO: 0 K/UL (ref 0–0.5)
EOSINOPHIL # BLD AUTO: 0.1 K/UL (ref 0–0.5)
EOSINOPHIL NFR BLD: 0.5 % (ref 0–8)
EOSINOPHIL NFR BLD: 0.8 % (ref 0–8)
ERYTHROCYTE [DISTWIDTH] IN BLOOD BY AUTOMATED COUNT: 16.3 % (ref 11.5–14.5)
ERYTHROCYTE [DISTWIDTH] IN BLOOD BY AUTOMATED COUNT: 16.3 % (ref 11.5–14.5)
ESCHERICHIA COLI: NOT DETECTED
EST. GFR  (NO RACE VARIABLE): >60 ML/MIN/1.73 M^2
ESTIMATED AVG GLUCOSE: 260 MG/DL (ref 68–131)
FENTANYL UR QL SCN: <0
FRACTIONAL SHORTENING: 28 % (ref 28–44)
GLUCOSE SERPL-MCNC: 174 MG/DL (ref 70–110)
GLUCOSE SERPL-MCNC: 189 MG/DL (ref 70–110)
GLUCOSE SERPL-MCNC: 258 MG/DL (ref 70–110)
GLUCOSE SERPL-MCNC: 314 MG/DL (ref 70–110)
GLUCOSE SERPL-MCNC: 494 MG/DL (ref 70–110)
HAEMOPHILUS INFLUENZAE: NOT DETECTED
HBA1C MFR BLD: 10.7 % (ref 4–5.6)
HCT VFR BLD AUTO: 30.4 % (ref 37–48.5)
HCT VFR BLD AUTO: 31.7 % (ref 37–48.5)
HGB BLD-MCNC: 10.3 G/DL (ref 12–16)
HGB BLD-MCNC: 9.5 G/DL (ref 12–16)
IMM GRANULOCYTES # BLD AUTO: 0.03 K/UL (ref 0–0.04)
IMM GRANULOCYTES # BLD AUTO: 0.03 K/UL (ref 0–0.04)
IMM GRANULOCYTES NFR BLD AUTO: 0.3 % (ref 0–0.5)
IMM GRANULOCYTES NFR BLD AUTO: 0.4 % (ref 0–0.5)
IMP GENE (CARBAPENEM RESISTANT): ABNORMAL
INTERVENTRICULAR SEPTUM: 1.12 CM (ref 0.6–1.1)
IVC DIAMETER: 1.59 CM
IVRT: 102.76 MSEC
KLEBSIELLA AEROGENES: NOT DETECTED
KLEBSIELLA OXYTOCA: NOT DETECTED
KLEBSIELLA PNEUMONIAE GROUP: NOT DETECTED
KPC RESISTANCE GENE (CARBAPENEM): ABNORMAL
LA MAJOR: 4.78 CM
LA MINOR: 4.47 CM
LA WIDTH: 3.7 CM
LACTATE SERPL-SCNC: 1.3 MMOL/L (ref 0.5–2.2)
LEFT ATRIUM SIZE: 3.88 CM
LEFT ATRIUM VOLUME INDEX: 34 ML/M2
LEFT ATRIUM VOLUME: 56.37 CM3
LEFT INTERNAL DIMENSION IN SYSTOLE: 2.59 CM (ref 2.1–4)
LEFT VENTRICLE DIASTOLIC VOLUME INDEX: 32.88 ML/M2
LEFT VENTRICLE DIASTOLIC VOLUME: 54.58 ML
LEFT VENTRICLE MASS INDEX: 83 G/M2
LEFT VENTRICLE SYSTOLIC VOLUME INDEX: 14.7 ML/M2
LEFT VENTRICLE SYSTOLIC VOLUME: 24.4 ML
LEFT VENTRICULAR INTERNAL DIMENSION IN DIASTOLE: 3.6 CM (ref 3.5–6)
LEFT VENTRICULAR MASS: 137.04 G
LISTERIA MONOCYTOGENES: NOT DETECTED
LV LATERAL E/E' RATIO: 6.92 M/S
LV SEPTAL E/E' RATIO: 11.25 M/S
LVED V (TEICH): 54.58 ML
LVES V (TEICH): 24.4 ML
LVOT MG: 3.49 MMHG
LVOT MV: 0.89 CM/S
LYMPHOCYTES # BLD AUTO: 1.2 K/UL (ref 1–4.8)
LYMPHOCYTES # BLD AUTO: 2.9 K/UL (ref 1–4.8)
LYMPHOCYTES NFR BLD: 14.8 % (ref 18–48)
LYMPHOCYTES NFR BLD: 29.1 % (ref 18–48)
MAGNESIUM SERPL-MCNC: 1.6 MG/DL (ref 1.6–2.6)
MAGNESIUM SERPL-MCNC: 1.8 MG/DL (ref 1.6–2.6)
MAGNESIUM SERPL-MCNC: 2.2 MG/DL (ref 1.6–2.6)
MCH RBC QN AUTO: 25.1 PG (ref 27–31)
MCH RBC QN AUTO: 25.9 PG (ref 27–31)
MCHC RBC AUTO-ENTMCNC: 31.3 G/DL (ref 32–36)
MCHC RBC AUTO-ENTMCNC: 32.5 G/DL (ref 32–36)
MCR-1: ABNORMAL
MCV RBC AUTO: 80 FL (ref 82–98)
MCV RBC AUTO: 80 FL (ref 82–98)
MEC A/C AND MREJ (MRSA): ABNORMAL
MEC A/C: ABNORMAL
MONOCYTES # BLD AUTO: 0.5 K/UL (ref 0.3–1)
MONOCYTES # BLD AUTO: 1 K/UL (ref 0.3–1)
MONOCYTES NFR BLD: 6.1 % (ref 4–15)
MONOCYTES NFR BLD: 9.8 % (ref 4–15)
MV PEAK A VEL: 0.96 M/S
MV PEAK E VEL: 0.9 M/S
MV STENOSIS PRESSURE HALF TIME: 46.64 MS
MV VALVE AREA P 1/2 METHOD: 4.72 CM2
NDM GENE (CARBAPENEM RESISTANT): ABNORMAL
NEISSERIA MENINGITIDIS: NOT DETECTED
NEUTROPHILS # BLD AUTO: 6 K/UL (ref 1.8–7.7)
NEUTROPHILS # BLD AUTO: 6.4 K/UL (ref 1.8–7.7)
NEUTROPHILS NFR BLD: 59.5 % (ref 38–73)
NEUTROPHILS NFR BLD: 77.7 % (ref 38–73)
NRBC BLD-RTO: 0 /100 WBC
NRBC BLD-RTO: 0 /100 WBC
OHS CV RV/LV RATIO: 0.99 CM
OHS QRS DURATION: 122 MS
OHS QTC CALCULATION: 515 MS
OXA-48-LIKE (CARBAPENEM RESISTANT): ABNORMAL
PHOSPHATE SERPL-MCNC: 2.5 MG/DL (ref 2.7–4.5)
PHOSPHATE SERPL-MCNC: 2.6 MG/DL (ref 2.7–4.5)
PHOSPHATE SERPL-MCNC: 3.3 MG/DL (ref 2.7–4.5)
PISA TR MAX VEL: 2.34 M/S
PLATELET # BLD AUTO: 239 K/UL (ref 150–450)
PLATELET # BLD AUTO: 302 K/UL (ref 150–450)
PMV BLD AUTO: 11.9 FL (ref 9.2–12.9)
PMV BLD AUTO: 12.3 FL (ref 9.2–12.9)
POCT GLUCOSE: 100 MG/DL (ref 70–110)
POCT GLUCOSE: 112 MG/DL (ref 70–110)
POCT GLUCOSE: 117 MG/DL (ref 70–110)
POCT GLUCOSE: 120 MG/DL (ref 70–110)
POCT GLUCOSE: 123 MG/DL (ref 70–110)
POCT GLUCOSE: 129 MG/DL (ref 70–110)
POCT GLUCOSE: 138 MG/DL (ref 70–110)
POCT GLUCOSE: 149 MG/DL (ref 70–110)
POCT GLUCOSE: 152 MG/DL (ref 70–110)
POCT GLUCOSE: 160 MG/DL (ref 70–110)
POCT GLUCOSE: 181 MG/DL (ref 70–110)
POCT GLUCOSE: 182 MG/DL (ref 70–110)
POCT GLUCOSE: 183 MG/DL (ref 70–110)
POCT GLUCOSE: 188 MG/DL (ref 70–110)
POCT GLUCOSE: 196 MG/DL (ref 70–110)
POCT GLUCOSE: 259 MG/DL (ref 70–110)
POCT GLUCOSE: 280 MG/DL (ref 70–110)
POCT GLUCOSE: 329 MG/DL (ref 70–110)
POCT GLUCOSE: 339 MG/DL (ref 70–110)
POCT GLUCOSE: 340 MG/DL (ref 70–110)
POCT GLUCOSE: 84 MG/DL (ref 70–110)
POCT GLUCOSE: 88 MG/DL (ref 70–110)
POTASSIUM SERPL-SCNC: 3.4 MMOL/L (ref 3.5–5.1)
POTASSIUM SERPL-SCNC: 3.7 MMOL/L (ref 3.5–5.1)
POTASSIUM SERPL-SCNC: 3.8 MMOL/L (ref 3.5–5.1)
POTASSIUM SERPL-SCNC: 4 MMOL/L (ref 3.5–5.1)
POTASSIUM SERPL-SCNC: 4.1 MMOL/L (ref 3.5–5.1)
PROTEUS SPECIES: NOT DETECTED
PSEUDOMONAS AERUGINOSA: NOT DETECTED
PULM VEIN S/D RATIO: 1.16
PV PEAK D VEL: 0.43 M/S
PV PEAK GRADIENT: 3 MMHG
PV PEAK S VEL: 0.5 M/S
PV PEAK VELOCITY: 0.93 M/S
RA MAJOR: 4.49 CM
RA PRESSURE ESTIMATED: 8 MMHG
RA WIDTH: 3.2 CM
RBC # BLD AUTO: 3.78 M/UL (ref 4–5.4)
RBC # BLD AUTO: 3.98 M/UL (ref 4–5.4)
RIGHT VENTRICULAR END-DIASTOLIC DIMENSION: 3.57 CM
RV TB RVSP: 10 MMHG
RV TISSUE DOPPLER FREE WALL SYSTOLIC VELOCITY 1 (APICAL 4 CHAMBER VIEW): 11.08 CM/S
SALMONELLA SP: NOT DETECTED
SERRATIA MARCESCENS: NOT DETECTED
SINUS: 3.32 CM
SODIUM SERPL-SCNC: 133 MMOL/L (ref 136–145)
SODIUM SERPL-SCNC: 137 MMOL/L (ref 136–145)
SODIUM SERPL-SCNC: 137 MMOL/L (ref 136–145)
SODIUM SERPL-SCNC: 139 MMOL/L (ref 136–145)
SODIUM SERPL-SCNC: 140 MMOL/L (ref 136–145)
STAPHYLOCOCCUS AUREUS: NOT DETECTED
STAPHYLOCOCCUS EPIDERMIDIS: NOT DETECTED
STAPHYLOCOCCUS LUGDUNESIS: NOT DETECTED
STAPHYLOCOCCUS SPECIES: NOT DETECTED
STENOTROPHOMONAS MALTOPHILIA: NOT DETECTED
STJ: 2.69 CM
STREPTOCOCCUS AGALACTIAE: NOT DETECTED
STREPTOCOCCUS PNEUMONIAE: NOT DETECTED
STREPTOCOCCUS PYOGENES: NOT DETECTED
STREPTOCOCCUS SPECIES: DETECTED
TDI LATERAL: 0.13 M/S
TDI SEPTAL: 0.08 M/S
TDI: 0.11 M/S
TR MAX PG: 22 MMHG
TRICUSPID ANNULAR PLANE SYSTOLIC EXCURSION: 1.65 CM
TV REST PULMONARY ARTERY PRESSURE: 30 MMHG
VAN A/B (VRE GENE): ABNORMAL
VIM GENE (CARBAPENEM RESISTANT): ABNORMAL
WBC # BLD AUTO: 10.12 K/UL (ref 3.9–12.7)
WBC # BLD AUTO: 8.22 K/UL (ref 3.9–12.7)
Z-SCORE OF LEFT VENTRICULAR DIMENSION IN END DIASTOLE: -2.56
Z-SCORE OF LEFT VENTRICULAR DIMENSION IN END SYSTOLE: -0.84

## 2024-08-13 PROCEDURE — 25000003 PHARM REV CODE 250: Performed by: STUDENT IN AN ORGANIZED HEALTH CARE EDUCATION/TRAINING PROGRAM

## 2024-08-13 PROCEDURE — 85025 COMPLETE CBC W/AUTO DIFF WBC: CPT | Performed by: PHYSICIAN ASSISTANT

## 2024-08-13 PROCEDURE — 25000003 PHARM REV CODE 250: Performed by: PHYSICIAN ASSISTANT

## 2024-08-13 PROCEDURE — S4991 NICOTINE PATCH NONLEGEND: HCPCS | Performed by: EMERGENCY MEDICINE

## 2024-08-13 PROCEDURE — S5010 5% DEXTROSE AND 0.45% SALINE: HCPCS | Performed by: PHYSICIAN ASSISTANT

## 2024-08-13 PROCEDURE — 99291 CRITICAL CARE FIRST HOUR: CPT | Mod: ,,, | Performed by: INTERNAL MEDICINE

## 2024-08-13 PROCEDURE — 84100 ASSAY OF PHOSPHORUS: CPT | Mod: 91 | Performed by: STUDENT IN AN ORGANIZED HEALTH CARE EDUCATION/TRAINING PROGRAM

## 2024-08-13 PROCEDURE — 63600175 PHARM REV CODE 636 W HCPCS: Performed by: PHYSICIAN ASSISTANT

## 2024-08-13 PROCEDURE — 85025 COMPLETE CBC W/AUTO DIFF WBC: CPT | Mod: 91 | Performed by: STUDENT IN AN ORGANIZED HEALTH CARE EDUCATION/TRAINING PROGRAM

## 2024-08-13 PROCEDURE — 93005 ELECTROCARDIOGRAM TRACING: CPT

## 2024-08-13 PROCEDURE — 99223 1ST HOSP IP/OBS HIGH 75: CPT | Mod: ,,, | Performed by: NURSE PRACTITIONER

## 2024-08-13 PROCEDURE — 63600175 PHARM REV CODE 636 W HCPCS: Mod: JZ,JG | Performed by: STUDENT IN AN ORGANIZED HEALTH CARE EDUCATION/TRAINING PROGRAM

## 2024-08-13 PROCEDURE — 80048 BASIC METABOLIC PNL TOTAL CA: CPT | Performed by: PHYSICIAN ASSISTANT

## 2024-08-13 PROCEDURE — 11000001 HC ACUTE MED/SURG PRIVATE ROOM

## 2024-08-13 PROCEDURE — 93010 ELECTROCARDIOGRAM REPORT: CPT | Mod: ,,, | Performed by: INTERNAL MEDICINE

## 2024-08-13 PROCEDURE — 83605 ASSAY OF LACTIC ACID: CPT | Performed by: STUDENT IN AN ORGANIZED HEALTH CARE EDUCATION/TRAINING PROGRAM

## 2024-08-13 PROCEDURE — 80048 BASIC METABOLIC PNL TOTAL CA: CPT | Mod: 91 | Performed by: STUDENT IN AN ORGANIZED HEALTH CARE EDUCATION/TRAINING PROGRAM

## 2024-08-13 PROCEDURE — 25000003 PHARM REV CODE 250

## 2024-08-13 PROCEDURE — 63600175 PHARM REV CODE 636 W HCPCS: Performed by: HOSPITALIST

## 2024-08-13 PROCEDURE — A4216 STERILE WATER/SALINE, 10 ML: HCPCS | Performed by: EMERGENCY MEDICINE

## 2024-08-13 PROCEDURE — 25000003 PHARM REV CODE 250: Performed by: EMERGENCY MEDICINE

## 2024-08-13 PROCEDURE — S5010 5% DEXTROSE AND 0.45% SALINE: HCPCS | Performed by: STUDENT IN AN ORGANIZED HEALTH CARE EDUCATION/TRAINING PROGRAM

## 2024-08-13 PROCEDURE — 95819 EEG AWAKE AND ASLEEP: CPT | Mod: 26,,, | Performed by: INTERNAL MEDICINE

## 2024-08-13 PROCEDURE — 83735 ASSAY OF MAGNESIUM: CPT | Performed by: HOSPITALIST

## 2024-08-13 PROCEDURE — 84100 ASSAY OF PHOSPHORUS: CPT | Performed by: HOSPITALIST

## 2024-08-13 PROCEDURE — 95819 EEG AWAKE AND ASLEEP: CPT

## 2024-08-13 PROCEDURE — 63600175 PHARM REV CODE 636 W HCPCS: Performed by: STUDENT IN AN ORGANIZED HEALTH CARE EDUCATION/TRAINING PROGRAM

## 2024-08-13 PROCEDURE — 83735 ASSAY OF MAGNESIUM: CPT | Mod: 91 | Performed by: STUDENT IN AN ORGANIZED HEALTH CARE EDUCATION/TRAINING PROGRAM

## 2024-08-13 PROCEDURE — 80354 DRUG SCREENING FENTANYL: CPT | Performed by: STUDENT IN AN ORGANIZED HEALTH CARE EDUCATION/TRAINING PROGRAM

## 2024-08-13 PROCEDURE — 80048 BASIC METABOLIC PNL TOTAL CA: CPT | Mod: 91 | Performed by: PHYSICIAN ASSISTANT

## 2024-08-13 RX ORDER — INSULIN ASPART 100 [IU]/ML
3 INJECTION, SOLUTION INTRAVENOUS; SUBCUTANEOUS
Status: DISCONTINUED | OUTPATIENT
Start: 2024-08-13 | End: 2024-08-13

## 2024-08-13 RX ORDER — DEXMEDETOMIDINE HYDROCHLORIDE 4 UG/ML
0-1.4 INJECTION, SOLUTION INTRAVENOUS CONTINUOUS
Status: DISCONTINUED | OUTPATIENT
Start: 2024-08-13 | End: 2024-08-14

## 2024-08-13 RX ORDER — METRONIDAZOLE 500 MG/100ML
500 INJECTION, SOLUTION INTRAVENOUS
Status: DISCONTINUED | OUTPATIENT
Start: 2024-08-13 | End: 2024-08-17

## 2024-08-13 RX ORDER — SODIUM CHLORIDE 9 MG/ML
INJECTION, SOLUTION INTRAVENOUS CONTINUOUS
Status: DISCONTINUED | OUTPATIENT
Start: 2024-08-13 | End: 2024-08-14

## 2024-08-13 RX ORDER — PANTOPRAZOLE SODIUM 40 MG/10ML
80 INJECTION, POWDER, LYOPHILIZED, FOR SOLUTION INTRAVENOUS ONCE
Status: COMPLETED | OUTPATIENT
Start: 2024-08-13 | End: 2024-08-13

## 2024-08-13 RX ORDER — POTASSIUM CHLORIDE 7.45 MG/ML
10 INJECTION INTRAVENOUS
Status: COMPLETED | OUTPATIENT
Start: 2024-08-13 | End: 2024-08-13

## 2024-08-13 RX ORDER — ENOXAPARIN SODIUM 100 MG/ML
40 INJECTION SUBCUTANEOUS EVERY 24 HOURS
Status: DISCONTINUED | OUTPATIENT
Start: 2024-08-13 | End: 2024-08-20 | Stop reason: HOSPADM

## 2024-08-13 RX ORDER — IBUPROFEN 200 MG
16 TABLET ORAL
Status: DISCONTINUED | OUTPATIENT
Start: 2024-08-13 | End: 2024-08-14

## 2024-08-13 RX ORDER — IBUPROFEN 200 MG
24 TABLET ORAL
Status: DISCONTINUED | OUTPATIENT
Start: 2024-08-13 | End: 2024-08-14

## 2024-08-13 RX ORDER — INSULIN GLARGINE 100 [IU]/ML
15 INJECTION, SOLUTION SUBCUTANEOUS DAILY
Status: DISCONTINUED | OUTPATIENT
Start: 2024-08-13 | End: 2024-08-13

## 2024-08-13 RX ORDER — INSULIN ASPART 100 [IU]/ML
0-5 INJECTION, SOLUTION INTRAVENOUS; SUBCUTANEOUS
Status: DISCONTINUED | OUTPATIENT
Start: 2024-08-13 | End: 2024-08-14

## 2024-08-13 RX ORDER — PANTOPRAZOLE SODIUM 40 MG/10ML
40 INJECTION, POWDER, LYOPHILIZED, FOR SOLUTION INTRAVENOUS 2 TIMES DAILY
Status: DISCONTINUED | OUTPATIENT
Start: 2024-08-14 | End: 2024-08-16

## 2024-08-13 RX ORDER — MUPIROCIN 20 MG/G
OINTMENT TOPICAL 2 TIMES DAILY
Status: COMPLETED | OUTPATIENT
Start: 2024-08-13 | End: 2024-08-17

## 2024-08-13 RX ORDER — DEXMEDETOMIDINE HYDROCHLORIDE 4 UG/ML
INJECTION, SOLUTION INTRAVENOUS
Status: COMPLETED
Start: 2024-08-13 | End: 2024-08-13

## 2024-08-13 RX ORDER — GLUCAGON 1 MG
1 KIT INJECTION
Status: DISCONTINUED | OUTPATIENT
Start: 2024-08-13 | End: 2024-08-14

## 2024-08-13 RX ORDER — DEXTROSE MONOHYDRATE AND SODIUM CHLORIDE 5; .45 G/100ML; G/100ML
INJECTION, SOLUTION INTRAVENOUS CONTINUOUS
Status: DISCONTINUED | OUTPATIENT
Start: 2024-08-13 | End: 2024-08-14

## 2024-08-13 RX ORDER — SODIUM CHLORIDE 9 MG/ML
INJECTION, SOLUTION INTRAVENOUS CONTINUOUS
Status: DISCONTINUED | OUTPATIENT
Start: 2024-08-13 | End: 2024-08-13

## 2024-08-13 RX ADMIN — MUPIROCIN: 20 OINTMENT TOPICAL at 08:08

## 2024-08-13 RX ADMIN — POTASSIUM CHLORIDE 10 MEQ: 7.46 INJECTION, SOLUTION INTRAVENOUS at 06:08

## 2024-08-13 RX ADMIN — Medication 10 ML: at 05:08

## 2024-08-13 RX ADMIN — NICOTINE 1 PATCH: 14 PATCH TRANSDERMAL at 08:08

## 2024-08-13 RX ADMIN — INSULIN ASPART 3 UNITS: 100 INJECTION, SOLUTION INTRAVENOUS; SUBCUTANEOUS at 11:08

## 2024-08-13 RX ADMIN — Medication 10 ML: at 06:08

## 2024-08-13 RX ADMIN — INSULIN GLARGINE 15 UNITS: 100 INJECTION, SOLUTION SUBCUTANEOUS at 08:08

## 2024-08-13 RX ADMIN — POTASSIUM CHLORIDE 10 MEQ: 7.46 INJECTION, SOLUTION INTRAVENOUS at 03:08

## 2024-08-13 RX ADMIN — INSULIN HUMAN 0.05 UNITS/KG/HR: 1 INJECTION, SOLUTION INTRAVENOUS at 03:08

## 2024-08-13 RX ADMIN — METRONIDAZOLE 500 MG: 500 INJECTION, SOLUTION INTRAVENOUS at 04:08

## 2024-08-13 RX ADMIN — POTASSIUM CHLORIDE 10 MEQ: 7.46 INJECTION, SOLUTION INTRAVENOUS at 02:08

## 2024-08-13 RX ADMIN — POTASSIUM CHLORIDE 10 MEQ: 7.46 INJECTION, SOLUTION INTRAVENOUS at 07:08

## 2024-08-13 RX ADMIN — SODIUM CHLORIDE: 9 INJECTION, SOLUTION INTRAVENOUS at 02:08

## 2024-08-13 RX ADMIN — DEXTROSE AND SODIUM CHLORIDE: 5; 450 INJECTION, SOLUTION INTRAVENOUS at 11:08

## 2024-08-13 RX ADMIN — DEXTROSE AND SODIUM CHLORIDE 125 ML/HR: 5; 450 INJECTION, SOLUTION INTRAVENOUS at 07:08

## 2024-08-13 RX ADMIN — PANTOPRAZOLE SODIUM 80 MG: 40 INJECTION, POWDER, FOR SOLUTION INTRAVENOUS at 11:08

## 2024-08-13 RX ADMIN — POTASSIUM CHLORIDE 10 MEQ: 7.46 INJECTION, SOLUTION INTRAVENOUS at 09:08

## 2024-08-13 RX ADMIN — CEFTRIAXONE 2 G: 2 INJECTION, POWDER, FOR SOLUTION INTRAMUSCULAR; INTRAVENOUS at 06:08

## 2024-08-13 RX ADMIN — VANCOMYCIN HYDROCHLORIDE 750 MG: 750 INJECTION, POWDER, LYOPHILIZED, FOR SOLUTION INTRAVENOUS at 11:08

## 2024-08-13 RX ADMIN — DEXMEDETOMIDINE HYDROCHLORIDE 0.2 MCG/KG/HR: 4 INJECTION, SOLUTION INTRAVENOUS at 10:08

## 2024-08-13 RX ADMIN — POTASSIUM CHLORIDE 10 MEQ: 7.46 INJECTION, SOLUTION INTRAVENOUS at 04:08

## 2024-08-13 RX ADMIN — INSULIN ASPART 4 UNITS: 100 INJECTION, SOLUTION INTRAVENOUS; SUBCUTANEOUS at 11:08

## 2024-08-13 RX ADMIN — Medication 10 ML: at 11:08

## 2024-08-13 RX ADMIN — SODIUM CHLORIDE: 9 INJECTION, SOLUTION INTRAVENOUS at 09:08

## 2024-08-13 RX ADMIN — ENOXAPARIN SODIUM 40 MG: 40 INJECTION SUBCUTANEOUS at 04:08

## 2024-08-13 RX ADMIN — POTASSIUM CHLORIDE 10 MEQ: 7.46 INJECTION, SOLUTION INTRAVENOUS at 08:08

## 2024-08-13 RX ADMIN — DEXTROSE AND SODIUM CHLORIDE: 5; 450 INJECTION, SOLUTION INTRAVENOUS at 02:08

## 2024-08-13 RX ADMIN — METRONIDAZOLE 500 MG: 500 INJECTION, SOLUTION INTRAVENOUS at 09:08

## 2024-08-13 RX ADMIN — ACETAMINOPHEN 650 MG: 325 TABLET ORAL at 11:08

## 2024-08-13 NOTE — CLINICAL REVIEW
IP Sepsis Screen (most recent)       Sepsis Screen (IP) - 08/13/24 0755       Is the patient's history or complaint suggestive of a possible infection? Yes  -    Are there at least two of the following signs and symptoms present? Yes   TMax 100.9 -    Sepsis signs/symptoms - Hyper or Hypothermia Hyperthermia >100.4 or Hypothermia < 96.8  -    Sepsis signs/symptoms - Tachycardia Tachycardia     >90  -    Are any of the following organ dysfunction criteria present and not considered to be due to a chronic condition? Yes  -    Organ Dysfunction Criteria Lactate > 2.0  -    Initiate Sepsis Protocol No  -JH    Reason sepsis not considered Pt. receiving appropriate management   +BC, on abx -              User Key  (r) = Recorded By, (t) = Taken By, (c) = Cosigned By      Initials Name    Bree Farley RN

## 2024-08-13 NOTE — ED PROVIDER NOTES
Encounter Date: 2024       History     Chief Complaint   Patient presents with    Emesis     Presents to the ED via EMS with c/o flu like s/s, N/V x 2 days. Reports also testing positive on a UPT x 2 days ago. Hx of T1DM.  via EMS.      34-year-old female with history of diabetes, history of substance use disorder per chart and regular marijuana use per  presents complaining of abdominal pain nausea vomiting and shortness a breath starting yesterday.  No fever.  No URI symptoms.  No significant change in mental status.  No other infectious etiology.      Review of patient's allergies indicates:   Allergen Reactions    Clove oil Itching    Pork/porcine containing products Other (See Comments)     Pt does not eat Pork    Zofran (as hydrochloride) [ondansetron hcl] Hives     Past Medical History:   Diagnosis Date    Anemia     Borderline intellectual functioning 2017    Diabetes mellitus     Insulin overdose 2024    Mood disorder     Scoliosis     Substance use disorder      Past Surgical History:   Procedure Laterality Date     SECTION       No family history on file.  Social History     Tobacco Use    Smoking status: Every Day     Current packs/day: 1.00     Types: Cigarettes    Smokeless tobacco: Never   Substance Use Topics    Alcohol use: Not Currently    Drug use: Yes     Types: Marijuana     Review of Systems    Physical Exam     Initial Vitals [24 1326]   BP Pulse Resp Temp SpO2   (!) 124/90 79 18 (!) 100.9 °F (38.3 °C) 99 %      MAP       --         Physical Exam    Nursing note and vitals reviewed.  Constitutional: She appears well-developed and well-nourished.   HENT:   Head: Atraumatic.   Dry oropharynx   Eyes: EOM are normal. Pupils are equal, round, and reactive to light.   Neck: Neck supple. No thyromegaly present. No JVD present.   Normal range of motion.  Cardiovascular:  Normal rate, regular rhythm, normal heart sounds and intact distal pulses.     Exam  reveals no gallop and no friction rub.       No murmur heard.  Pulmonary/Chest: Breath sounds normal. No respiratory distress. She has no wheezes. She has no rhonchi.   Abdominal: Abdomen is soft. Bowel sounds are normal. She exhibits no distension. There is abdominal tenderness. There is no rebound and no guarding.   Musculoskeletal:         General: No tenderness or edema. Normal range of motion.      Cervical back: Normal range of motion and neck supple.     Neurological: She is alert and oriented to person, place, and time. She has normal strength.   Skin: Skin is warm and dry.         ED Course   Critical Care    Date/Time: 8/12/2024 7:03 PM    Performed by: Omid Hugo MD  Authorized by: Omid Hugo MD  Direct patient critical care time: 20 minutes  Additional history critical care time: 5 minutes  Ordering / reviewing critical care time: 10 minutes  Documentation critical care time: 10 minutes  Consulting other physicians critical care time: 10 minutes  Total critical care time (exclusive of procedural time) : 55 minutes  Critical care time was exclusive of teaching time and separately billable procedures and treating other patients.  Critical care was necessary to treat or prevent imminent or life-threatening deterioration of the following conditions: dehydration and metabolic crisis.  Critical care was time spent personally by me on the following activities: development of treatment plan with patient or surrogate, discussions with consultants, evaluation of patient's response to treatment, examination of patient, obtaining history from patient or surrogate, ordering and performing treatments and interventions, ordering and review of laboratory studies, ordering and review of radiographic studies, pulse oximetry, re-evaluation of patient's condition and review of old charts.        Labs Reviewed   CBC W/ AUTO DIFFERENTIAL - Abnormal       Result Value    WBC 6.14      RBC 5.06       Hemoglobin 13.0      Hematocrit 40.5      MCV 80 (*)     MCH 25.7 (*)     MCHC 32.1      RDW 16.5 (*)     Platelets 308      MPV 12.1      Immature Granulocytes 0.3      Gran # (ANC) 4.2      Immature Grans (Abs) 0.02      Lymph # 1.4      Mono # 0.4      Eos # 0.1      Baso # 0.05      nRBC 0      Gran % 68.9      Lymph % 22.5      Mono % 6.0      Eosinophil % 1.5      Basophil % 0.8      Differential Method Automated     COMPREHENSIVE METABOLIC PANEL - Abnormal    Sodium 132 (*)     Potassium 5.5 (*)     Chloride 98      CO2 15 (*)     Glucose 663 (*)     BUN 32 (*)     Creatinine 1.5 (*)     Calcium 10.6 (*)     Total Protein 8.2      Albumin 4.4      Total Bilirubin 0.6      Alkaline Phosphatase 124      AST 22      ALT 14      eGFR 46 (*)     Anion Gap 19 (*)     Narrative:      GLUCOSE critical result(s) called and verbal readback obtained from   NILO WALTER  by LB1 08/12/2024 15:55   URINALYSIS, REFLEX TO URINE CULTURE - Abnormal    Specimen UA Urine, Clean Catch      Color, UA Colorless (*)     Appearance, UA Clear      pH, UA 5.0      Specific Gravity, UA >1.030 (*)     Protein, UA Negative      Glucose, UA 4+ (*)     Ketones, UA 2+ (*)     Bilirubin (UA) Negative      Occult Blood UA Negative      Nitrite, UA Negative      Urobilinogen, UA Negative      Leukocytes, UA Negative      Narrative:     Specimen Source->Urine   BETA - HYDROXYBUTYRATE, SERUM - Abnormal    Beta-Hydroxybutyrate 2.4 (*)    LACTIC ACID, PLASMA - Abnormal    Lactate (Lactic Acid) 2.6 (*)    POCT GLUCOSE - Abnormal    POCT Glucose >500 (*)    POCT GLUCOSE - Abnormal    POCT Glucose 413 (*)    ISTAT PROCEDURE - Abnormal    POC PH 7.278 (*)     POC PCO2 31.8 (*)     POC PO2 28 (*)     POC HCO3 14.9 (*)     POC BE -11 (*)     POC SATURATED O2 45      POC TCO2 16 (*)     Sample VENOUS      Site Joy/UAC      Allens Test N/A     POCT GLUCOSE - Abnormal    POCT Glucose 453 (*)    CULTURE, BLOOD   CULTURE, BLOOD   INFLUENZA A & B BY  MOLECULAR   TROPONIN I    Troponin I <0.006     LIPASE    Lipase 21     MAGNESIUM    Magnesium 2.5     BETA - HYDROXYBUTYRATE, SERUM   URINALYSIS MICROSCOPIC    Bacteria None      Yeast, UA None      Microscopic Comment SEE COMMENT      Narrative:     Specimen Source->Urine   HCG, QUANTITATIVE    HCG Quant <1.2      Narrative:     If not done in ED for female under 55   B-TYPE NATRIURETIC PEPTIDE   BASIC METABOLIC PANEL   BASIC METABOLIC PANEL   HEMOGLOBIN A1C   DRUG SCREEN PANEL, URINE EMERGENCY   POCT URINE PREGNANCY    POC Preg Test, Ur Negative       Acceptable Yes     SARS-COV-2 RDRP GENE    POC Rapid COVID Negative       Acceptable Yes     POCT INFLUENZA A/B MOLECULAR    POC Molecular Influenza A Ag Negative      POC Molecular Influenza B Ag Negative       Acceptable Yes     POCT GLUCOSE MONITORING CONTINUOUS          Imaging Results              X-Ray Chest AP Portable (Final result)  Result time 08/12/24 15:32:21      Final result by Ernesto Horan III, MD (08/12/24 15:32:21)                   Impression:      No acute process seen in the chest.    Possible mild subluxation of the right shoulder.      Electronically signed by: Ernesto Horan MD  Date:    08/12/2024  Time:    15:32               Narrative:    EXAMINATION:  XR CHEST AP PORTABLE    CLINICAL HISTORY:  SOB;    FINDINGS:  Heart size is normal.  Lungs are clear.  There is subluxation of the right shoulder.                                       Medications   sodium chloride 0.9% flush 10 mL (has no administration in time range)   0.9%  NaCl infusion (0 mL/hr Intravenous Hold 8/12/24 1815)   dextrose 5 % and 0.45 % NaCl infusion (has no administration in time range)   acetaminophen tablet 650 mg (has no administration in time range)   insulin regular in 0.9 % NaCl 100 unit/100 mL (1 unit/mL) infusion (0.1 Units/kg/hr × 54 kg Intravenous New Bag 8/12/24 1728)   cefTRIAXone (ROCEPHIN) 2 g in D5W 100 mL  IVPB (MB+) (2 g Intravenous New Bag 8/12/24 1901)   vancomycin - pharmacy to dose (has no administration in time range)   vancomycin 1,250 mg in D5W 250 mL IVPB (Vial-Mate) (0 mg Intravenous Hold 8/12/24 1745)   nicotine 14 mg/24 hr 1 patch (has no administration in time range)   sodium chloride 0.9% bolus 1,000 mL 1,000 mL (0 mLs Intravenous Stopped 8/12/24 1833)   insulin regular injection 5 Units 0.05 mL (5 Units Intravenous Given 8/12/24 1516)   droPERidol injection 0.625 mg (0.625 mg Intravenous Given 8/12/24 1500)     Medical Decision Making  Amount and/or Complexity of Data Reviewed  Labs: ordered.  Radiology: ordered.    Risk  OTC drugs.  Prescription drug management.  Decision regarding hospitalization.    Patient appears to be dehydrated and in diabetic ketoacidosis.  IV fluids given.  IV insulin given.  Insulin infusion started.  Discuss with Hospital Medicine who accepted to the ICU.                                  Clinical Impression:  Final diagnoses:  [E11.10] DKA (diabetic ketoacidosis)          ED Disposition Condition    Admit                 Omid Hugo MD  08/12/24 3991

## 2024-08-13 NOTE — PROCEDURES
EEG Extended Monitoring up to one hour    Date/Time: 8/12/2024 1:28 PM    Performed by: Patrick North MD  Authorized by: Patrick North MD      ELECTROENCEPHALOGRAM REPORT    DATE OF SERVICE: 08/13/2024  EEG NUMBER: OW   LOCATION OF SERVICE:  ICU    METHODOLOGY   Electroencephalographic (EEG) recording is with electrodes placed according to the International 10-20 placement system.  Thirty two (32) channels of digital signal (sampling rate of 512/sec) including T1 and T2 was simultaneously recorded from the scalp and may include  EKG, EMG, and/or eye monitors.  Recording band pass was 0.1 to 512 hz.  Digital video recording of the patient is simultaneously recorded with the EEG.  The patient is instructed report clinical symptoms which may occur during the recording session.  EEG and video recording is stored and archived in digital format. Activation procedures which include photic stimulation, hyperventilation and instructing patients to perform simple task are done in selected patients.    The EEG is displayed on a monitor screen and can be reviewed using different montages.  Computer assisted analysis is employed to detect spike and electrographic seizure activity.   The entire record is submitted for computer analysis.  The entire recording is visually reviewed and the times identified by computer analysis as being spikes or seizures are reviewed again.  Compresses spectral analysis (CSA) is also performed on the activity recorded from each individual channel.  This is displayed as a power display of frequencies from 0 to 30 Hz over time.   The CSA is reviewed looking for asymmetries in power between homologous areas of the scalp and then compared with the original EEG recording.     [employer] software was also utilized in the review of this study.  This software suite analyzes the EEG recording in multiple domains.  Coherence and rhythmicity is computed to identify EEG sections which may contain  organized seizures.  Each channel undergoes analysis to detect presence of spike and sharp waves which have special and morphological characteristic of epileptic activity.  The routine EEG recording is converted from spacial into frequency domain.  This is then displayed comparing homologous areas to identify areas of significant asymmetry.  Algorithm to identify non-cortically generated artifact is used to separate eye movement, EMG and other artifact from the EEG      EEG FINDINGS  During the maximally alert state, a 8-9 Hz posterior dominant rhythm was seen which was symmetrical, well-regulated and briskly attenuated to eye opening. In the more anterior head regions, symmetric frontocentral beta frequencies predominated. As drowsiness occurred, the posterior dominant rhythm attenuated, slow rolling eye movements appeared, and symmetrical vertex sharp transients were seen. Stage N2 sleep was reached and was characterized proc by high amplitude K-complexes and 12-15 Hz symmetrical and synchronous frontocentral sleep spindles.    No epileptiform findings were noted, no electrographic seizures were seen, and no clinical events were reported.    No activation procedures were performed during this recording       IMPRESSION:  Normal awake and asleep    CLINICAL CORRELATION:  This is a normal EEG in awake and asleep states. There were no epileptiform findings, electrographic seizures, or no clinical events recorded. An EEG without epileptiform findings does not exclude the possibility of epilepsy. If the clinical suspicion of epilepsy is high, a repeat EEG after sleep depreivation, following a seizure, or a prolonged EEG may increase the frequency of detecting epileptiform discharges.      Patrick North MD  Neurology  [facility]

## 2024-08-13 NOTE — PLAN OF CARE
"Case Management Assessment     PCP: Martin Rios MD   Pharmacy:   [facility]  [address]  [place] [address]  Phone: [phone] Fax: [phone]    [id number] @ [facility], [place] - [address]  [address]  [address]  [facility] [address]  Phone: [phone] Fax: [phone]    [id number] [place] - [address]  [address]  [facility] [address]  Phone: [phone] Fax: [phone]    [facility] - [place], [place] - [address]  [address]  [place] [address]  Phone: [phone] Fax: [phone]       Patient Arrived From: home  Existing Help at Home: Jori CARDENAS    Barriers to Discharge: none    Discharge Plan:    A. home   B. Home health      To patient's room to complete DC needs assessment.  Patient vomiting.  CM spoke with mother on the phone who assisted with assessment.  She stated that patient lives with SO ad is independent.  She mentioned that patient sees a psychiatrist, Dr Lozada but does not know her diagnosis.  She stated that she was recently at [facility] for "sugar" but is not sure of the dates.  Her SO will help her to get home.           08/13/24 1433   Discharge Assessment   Assessment Type Discharge Planning Assessment   Confirmed/corrected address, phone number and insurance Yes   Confirmed Demographics Correct on Facesheet   Source of Information family   If unable to respond/provide information was family/caregiver contacted? Yes   Contact Name/Number Rosa cotton  [phone]   Communicated THELMA with patient/caregiver Date not available/Unable to determine   People in Home significant other   Do you expect to return to your current living situation? Yes   Do you have help at home or someone to help you manage your care at home? Yes   Prior to hospitilization cognitive status: Unable to Assess   Current cognitive status: " Unable to Assess  (see comment)   Walking or Climbing Stairs Difficulty no   Dressing/Bathing Difficulty no   Home Layout Able to live on 1st floor   Equipment Currently Used at Home glucometer   Readmission within 30 days? No   Patient currently being followed by outpatient case management? No   Do you currently have service(s) that help you manage your care at home? No   Do you take prescription medications? Yes   Do you have prescription coverage? Yes   Do you have any problems affording any of your prescribed medications? No   Is the patient taking medications as prescribed? yes   How do you get to doctors appointments? public transportation;family or friend will provide   Are you on dialysis? No   Do you take coumadin? No   Discharge Plan A Home   Discharge Plan B Home Health   DME Needed Upon Discharge    (tbd)   Discharge Plan discussed with: Parent(s)   Transition of Care Barriers None

## 2024-08-13 NOTE — PLAN OF CARE
Pt remains in ICU on insulin gtt. D5 0.45% NS infusing. AAO. VSS. No nausea reported. Purewick in place, urine output noted. Free of falls, injury, and skin breakdown. Plan of care reviewed.     Problem: Adult Inpatient Plan of Care  Goal: Plan of Care Review  Outcome: Progressing  Goal: Patient-Specific Goal (Individualized)  Outcome: Progressing  Goal: Absence of Hospital-Acquired Illness or Injury  Outcome: Progressing  Goal: Optimal Comfort and Wellbeing  Outcome: Progressing  Goal: Readiness for Transition of Care  Outcome: Progressing     Problem: Diabetic Ketoacidosis  Goal: Optimal Coping  Outcome: Progressing  Goal: Fluid and Electrolyte Balance with Absence of Ketosis  Outcome: Progressing     Problem: Diabetes Comorbidity  Goal: Blood Glucose Level Within Targeted Range  Outcome: Progressing     Problem: Acute Kidney Injury/Impairment  Goal: Fluid and Electrolyte Balance  Outcome: Progressing  Goal: Improved Oral Intake  Outcome: Progressing  Goal: Effective Renal Function  Outcome: Progressing     Problem: Infection  Goal: Absence of Infection Signs and Symptoms  Outcome: Progressing

## 2024-08-13 NOTE — PLAN OF CARE
Pt remains in ICU pt had multiple episodes of vomiting notified Dr. Painter, vomit color was yellow/clear and then became red/brown. Pt began with rapid eye movement and unable to answer questions. Responding to sternal rub. Dr. Painter and Dr. Godinez at bedside. New orders given. PT taken to CT safely. Pt now alert and answers questions appropriately. Pt back on insulin infusion after glucose reading in the 400s. K is being replaced. Voiding in purwick. Pt had 1 BM this shift. On RA and NS on monitor. Pt remains NPO. No falls, injuries, or skin breakdown.         Problem: Adult Inpatient Plan of Care  Goal: Plan of Care Review  Outcome: Not Progressing  Goal: Patient-Specific Goal (Individualized)  Outcome: Not Progressing  Goal: Absence of Hospital-Acquired Illness or Injury  Outcome: Not Progressing  Goal: Optimal Comfort and Wellbeing  Outcome: Not Progressing  Goal: Readiness for Transition of Care  Outcome: Not Progressing     Problem: Diabetic Ketoacidosis  Goal: Optimal Coping  Outcome: Not Progressing  Goal: Fluid and Electrolyte Balance with Absence of Ketosis  Outcome: Not Progressing     Problem: Diabetes Comorbidity  Goal: Blood Glucose Level Within Targeted Range  Outcome: Not Progressing     Problem: Acute Kidney Injury/Impairment  Goal: Fluid and Electrolyte Balance  Outcome: Not Progressing  Goal: Improved Oral Intake  Outcome: Not Progressing  Goal: Effective Renal Function  Outcome: Not Progressing     Problem: Infection  Goal: Absence of Infection Signs and Symptoms  Outcome: Not Progressing

## 2024-08-13 NOTE — ED NOTES
Pt actively vomiting. DEBBI Alatorre notified. PRN antiemetic requested. Allergy to Zofran communicated.

## 2024-08-13 NOTE — PROGRESS NOTES
Pharmacokinetic Initial Assessment: IV Vancomycin    Assessment/Plan:    Initiate intravenous vancomycin with loading dose of 1250 mg once followed by a maintenance dose of vancomycin 750mg IV every 12 hours  Desired empiric serum trough concentration is 10 to 20 mcg/mL  Draw vancomycin trough level 60 min prior to fourth dose on 8/14 at approximately 1000  Pharmacy will continue to follow and monitor vancomycin.      Please contact pharmacy at extension [phone] with any questions regarding this assessment.     Thank you for the consult,   Giles Nichols       Patient brief summary:  Hollie Wilson is a 34 y.o. female initiated on antimicrobial therapy with IV Vancomycin for treatment of suspected sepsis    Drug Allergies:   Review of patient's allergies indicates:   Allergen Reactions    Clove oil Itching    Pork/porcine containing products Other (See Comments)     Pt does not eat Pork    Zofran (as hydrochloride) [ondansetron hcl] Hives       Actual Body Weight:   57.1 kg    Renal Function:   Estimated Creatinine Clearance: 63.7 mL/min (based on SCr of 1.1 mg/dL).,     Dialysis Method (if applicable):  N/A    CBC (last 72 hours):  Recent Labs   Lab Result Units 08/12/24  1500   WBC K/uL 6.14   Hemoglobin g/dL 13.0   Hematocrit % 40.5   Platelets K/uL 308   Gran % % 68.9   Lymph % % 22.5   Mono % % 6.0   Eosinophil % % 1.5   Basophil % % 0.8   Differential Method  Automated       Metabolic Panel (last 72 hours):  Recent Labs   Lab Result Units 08/12/24  1445 08/12/24  1500 08/12/24  1855 08/12/24  2105   Sodium mmol/L  --  132* 138 144   Potassium mmol/L  --  5.5* 4.6 4.1   Chloride mmol/L  --  98 105 109   CO2 mmol/L  --  15* 14* 21*   Glucose mg/dL  --  663* 454* 264*   Glucose, UA  4+*  --   --   --    BUN mg/dL  --  32* 32* 28*   Creatinine mg/dL  --  1.5* 1.4 1.1   Creatinine, Urine mg/dL 31.1  --   --   --    Albumin g/dL  --  4.4  --   --    Total Bilirubin mg/dL  --  0.6  --   --    Alkaline  "Phosphatase U/L  --  124  --   --    AST U/L  --  22  --   --    ALT U/L  --  14  --   --    Magnesium mg/dL  --  2.5  --   --        Drug levels (last 3 results):  No results for input(s): "VANCOMYCINRA", "VANCORANDOM", "VANCOMYCINPE", "VANCOPEAK", "VANCOMYCINTR", "VANCOTROUGH" in the last 72 hours.    Microbiologic Results:  Microbiology Results (last 7 days)       Procedure Component Value Units Date/Time    Blood culture #1 [[id number]] Collected: 08/12/24 1513    Order Status: Completed Specimen: Blood from Peripheral, Forearm, Right Updated: 08/12/24 2312     Blood Culture, Routine No Growth to date    Narrative:      Blood Culture #1    Blood culture #2 [[id number]] Collected: 08/12/24 1512    Order Status: Completed Specimen: Blood from Peripheral, Antecubital, Right Updated: 08/12/24 2312     Blood Culture, Routine No Growth to date    Narrative:      Blood Culture #2    Influenza A & B by Molecular [[id number]] Collected: 08/12/24 1850    Order Status: Canceled Specimen: Nasopharyngeal Swab             "

## 2024-08-13 NOTE — NURSING
Pt admitted to ICU from ED. Pt able to move from stretcher to bed independently. Insulin gtt @ 0.1, handoff completed.

## 2024-08-13 NOTE — PROGRESS NOTES
[facility]  [facility] Medicine  Progress Note    Patient Name: Hollie Wilson  MRN: [id number]  Patient Class: IP- Inpatient   Admission Date: 8/12/2024  Length of Stay: 1 days  Attending Physician: Rickie Painter MD  Primary Care Provider: Martin Rios MD        Subjective:     Principal Problem:DKA (diabetic ketoacidosis)        HPI:  Patient is a 34-year-old female with past medical history of type 1 diabetes with gastroparesis/neuropathy, developmental delay, schizoaffective disorder, polysubstance use disorder (cocaine, amphetamines, opiates, marijuana), tobacco use disorder and GERD who presented to [facility] ER on 08/12/2024 for further evaluation of vomiting.  She reports 2 days of associated nausea and vomiting with associated subjective fevers.  She finds her nausea has improved with treatment in the ER.  She reports she was not had her home insulin last 2 days as she was not had food in her house and was afraid to take it.  She also has had 2-3 weeks low atraumatic back pain.  She reports she was smokes marijuana and cigarettes, but denies other recreational drug use.  She denies leg swelling melena hematuria hematemesis incontinence numbness weakness in extremity.      During evaluation in the ER, patient is hemodynamically stable anion gap 19 CO2 15 blood glucose 663 creatinine 1.5 potassium 5.5 beta hydroxy 2.4 pH 7.278 UA without leukocytes or nitrites chest x-ray without acute process.    Overview/Hospital Course:  DKA 2/2 sepsis. Bacteremia with GPC most likely due to repeat finger sticks without alcohol swabs and without changing finger stick needle, low supplies. Has had +meth and +cocaine in her system in past, adamently denies IVDU says only smokes with glass pipe. Only MJ on this tox screen. Gap closed and bicarb back up, will switch to subcut insulin. Qtc long, hold anti-nausea meds. ECHO ordered to r/o veg. National shortage, will order bl cx q24-48h. Also with clue  cells/bact vag will add flagyl, continue Vanc + ceft until speciation/sens.    Interval History:  NAEON.  No new issues.   CC- Fatigue and nausea  All questions answered and updates on care given.       ROS:  General: Negative for fevers   Cardiac: Negative for chest pain   Pulmonary: Negative for wheezing  GI: Negative for abdominal distention      Vitals:    08/13/24 0600 08/13/24 0700 08/13/24 0715 08/13/24 0748   BP: 105/70 132/84     Pulse: 91 89  91   Resp: (!) 21 17 12   Temp:   98.3 °F (36.8 °C)    TempSrc:   Oral    SpO2: 100% 99%  99%   Weight:       Height:              Body mass index is 19.72 kg/m².      PHYSICAL EXAM:  GENERAL APPEARANCE: alert and cooperative     HEAD: NC/AT  CARDIAC: There is no cyanosis or pallor.   LUNGS: Not in respiratory distress, no apparent wheezing or stridor  ABDOMEN: Non-distended. No guarding.  MSK: No joint erythema or tenderness.   EXTREMITIES: No significant new deformity or new joint abnormality.   NEUROLOGICAL: CN II-XII grossly intact.   SKIN: No lesions or eruptions.  PSYCHIATRIC: No tangential speech. No Hyperactive features.        Recent Results (from the past 24 hour(s))   POCT urine pregnancy    Collection Time: 08/12/24  2:07 PM   Result Value Ref Range    POC Preg Test, Ur Negative Negative     Acceptable Yes    POCT glucose    Collection Time: 08/12/24  2:44 PM   Result Value Ref Range    POCT Glucose >500 (HH) 70 - 110 mg/dL   Urinalysis, Reflex to Urine Culture Urine, Clean Catch    Collection Time: 08/12/24  2:45 PM    Specimen: Urine   Result Value Ref Range    Specimen UA Urine, Clean Catch     Color, UA Colorless (A) Yellow, Straw, Daphne    Appearance, UA Clear Clear    pH, UA 5.0 5.0 - 8.0    Specific Gravity, UA >1.030 (A) 1.005 - 1.030    Protein, UA Negative Negative    Glucose, UA 4+ (A) Negative    Ketones, UA 2+ (A) Negative    Bilirubin (UA) Negative Negative    Occult Blood UA Negative Negative    Nitrite, UA Negative Negative     Urobilinogen, UA Negative <2.0 EU/dL    Leukocytes, UA Negative Negative   Urinalysis Microscopic    Collection Time: 08/12/24  2:45 PM   Result Value Ref Range    Bacteria None None-Occ /hpf    Yeast, UA None None    Microscopic Comment SEE COMMENT    Drug screen panel, emergency    Collection Time: 08/12/24  2:45 PM   Result Value Ref Range    Benzodiazepines Negative Negative    Methadone metabolites Negative Negative    Cocaine (Metab.) Negative Negative    Opiate Scrn, Ur Negative Negative    Barbiturate Screen, Ur Negative Negative    Amphetamine Screen, Ur Negative Negative    THC Presumptive Positive (A) Negative    Phencyclidine Negative Negative    Creatinine, Urine 31.1 15.0 - 325.0 mg/dL    Toxicology Information SEE COMMENT    CBC auto differential    Collection Time: 08/12/24  3:00 PM   Result Value Ref Range    WBC 6.14 3.90 - 12.70 K/uL    RBC 5.06 4.00 - 5.40 M/uL    Hemoglobin 13.0 12.0 - 16.0 g/dL    Hematocrit 40.5 37.0 - 48.5 %    MCV 80 (L) 82 - 98 fL    MCH 25.7 (L) 27.0 - 31.0 pg    MCHC 32.1 32.0 - 36.0 g/dL    RDW 16.5 (H) 11.5 - 14.5 %    Platelets 308 150 - 450 K/uL    MPV 12.1 9.2 - 12.9 fL    Immature Granulocytes 0.3 0.0 - 0.5 %    Gran # (ANC) 4.2 1.8 - 7.7 K/uL    Immature Grans (Abs) 0.02 0.00 - 0.04 K/uL    Lymph # 1.4 1.0 - 4.8 K/uL    Mono # 0.4 0.3 - 1.0 K/uL    Eos # 0.1 0.0 - 0.5 K/uL    Baso # 0.05 0.00 - 0.20 K/uL    nRBC 0 0 /100 WBC    Gran % 68.9 38.0 - 73.0 %    Lymph % 22.5 18.0 - 48.0 %    Mono % 6.0 4.0 - 15.0 %    Eosinophil % 1.5 0.0 - 8.0 %    Basophil % 0.8 0.0 - 1.9 %    Differential Method Automated    Comprehensive metabolic panel    Collection Time: 08/12/24  3:00 PM   Result Value Ref Range    Sodium 132 (L) 136 - 145 mmol/L    Potassium 5.5 (H) 3.5 - 5.1 mmol/L    Chloride 98 95 - 110 mmol/L    CO2 15 (L) 23 - 29 mmol/L    Glucose 663 (HH) 70 - 110 mg/dL    BUN 32 (H) 6 - 20 mg/dL    Creatinine 1.5 (H) 0.5 - 1.4 mg/dL    Calcium 10.6 (H) 8.7 - 10.5 mg/dL     Total Protein 8.2 6.0 - 8.4 g/dL    Albumin 4.4 3.5 - 5.2 g/dL    Total Bilirubin 0.6 0.1 - 1.0 mg/dL    Alkaline Phosphatase 124 55 - 135 U/L    AST 22 10 - 40 U/L    ALT 14 10 - 44 U/L    eGFR 46 (A) >60 mL/min/1.73 m^2    Anion Gap 19 (H) 8 - 16 mmol/L   Troponin I    Collection Time: 08/12/24  3:00 PM   Result Value Ref Range    Troponin I <0.006 0.000 - 0.026 ng/mL   B-Type natriuretic peptide (BNP)    Collection Time: 08/12/24  3:00 PM   Result Value Ref Range    BNP 48 0 - 99 pg/mL   Lipase    Collection Time: 08/12/24  3:00 PM   Result Value Ref Range    Lipase 21 4 - 60 U/L   Magnesium    Collection Time: 08/12/24  3:00 PM   Result Value Ref Range    Magnesium 2.5 1.6 - 2.6 mg/dL   Blood culture #2    Collection Time: 08/12/24  3:12 PM    Specimen: Peripheral, Antecubital, Right; Blood   Result Value Ref Range    Blood Culture, Routine       Gram stain kuldip bottle: Gram positive cocci in chains resembling Strep    Blood Culture, Routine       Positive results previously called 03:50  08/13/2024    Blood Culture, Routine       Gram stain aer bottle: Gram positive cocci in chains resembling Strep    Blood Culture, Routine       Positive results previously called 04:45  08/13/2024   Blood culture #1    Collection Time: 08/12/24  3:13 PM    Specimen: Peripheral, Forearm, Right; Blood   Result Value Ref Range    Blood Culture, Routine       Gram stain aer bottle: Gram positive cocci in chains resembling Strep    Blood Culture, Routine       Results called to and read back by: KAROLINA MORIN  03:15  08/13/2024   POCT glucose    Collection Time: 08/12/24  4:22 PM   Result Value Ref Range    POCT Glucose 413 (H) 70 - 110 mg/dL   ISTAT PROCEDURE    Collection Time: 08/12/24  4:23 PM   Result Value Ref Range    POC PH 7.278 (LL) 7.35 - 7.45    POC PCO2 31.8 (L) 35 - 45 mmHg    POC PO2 28 (LL) 40 - 60 mmHg    POC HCO3 14.9 (L) 24 - 28 mmol/L    POC BE -11 (L) -2 to 2 mmol/L    POC SATURATED O2 45 95 - 100 %    POC TCO2 16  (L) 24 - 29 mmol/L    Sample VENOUS     [facility]     Allens Test N/A    Beta-Hydroxybutyrate, Serum    Collection Time: 08/12/24  4:30 PM   Result Value Ref Range    Beta-Hydroxybutyrate 2.4 (H) 0.0 - 0.5 mmol/L   HCG, Quantitative    Collection Time: 08/12/24  4:30 PM   Result Value Ref Range    HCG Quant <1.2 See Text mIU/mL   POCT glucose    Collection Time: 08/12/24  5:25 PM   Result Value Ref Range    POCT Glucose 453 (HH) 70 - 110 mg/dL   POCT COVID-19 Rapid Screening    Collection Time: 08/12/24  5:35 PM   Result Value Ref Range    POC Rapid COVID Negative Negative     Acceptable Yes    Lactic acid, plasma    Collection Time: 08/12/24  5:57 PM   Result Value Ref Range    Lactate (Lactic Acid) 2.6 (H) 0.5 - 2.2 mmol/L   POCT Influenza A/B Molecular    Collection Time: 08/12/24  6:52 PM   Result Value Ref Range    POC Molecular Influenza A Ag Negative Negative    POC Molecular Influenza B Ag Negative Negative     Acceptable Yes    Basic metabolic panel    Collection Time: 08/12/24  6:55 PM   Result Value Ref Range    Sodium 138 136 - 145 mmol/L    Potassium 4.6 3.5 - 5.1 mmol/L    Chloride 105 95 - 110 mmol/L    CO2 14 (L) 23 - 29 mmol/L    Glucose 454 (HH) 70 - 110 mg/dL    BUN 32 (H) 6 - 20 mg/dL    Creatinine 1.4 0.5 - 1.4 mg/dL    Calcium 9.6 8.7 - 10.5 mg/dL    Anion Gap 19 (H) 8 - 16 mmol/L    eGFR 50 (A) >60 mL/min/1.73 m^2   POCT glucose    Collection Time: 08/12/24  7:01 PM   Result Value Ref Range    POCT Glucose 478 (HH) 70 - 110 mg/dL   POCT glucose    Collection Time: 08/12/24  7:56 PM   Result Value Ref Range    POCT Glucose 367 (H) 70 - 110 mg/dL   POCT glucose    Collection Time: 08/12/24  8:59 PM   Result Value Ref Range    POCT Glucose 274 (H) 70 - 110 mg/dL   Basic metabolic panel    Collection Time: 08/12/24  9:05 PM   Result Value Ref Range    Sodium 144 136 - 145 mmol/L    Potassium 4.1 3.5 - 5.1 mmol/L    Chloride 109 95 - 110 mmol/L    CO2 21 (L) 23 -  29 mmol/L    Glucose 264 (H) 70 - 110 mg/dL    BUN 28 (H) 6 - 20 mg/dL    Creatinine 1.1 0.5 - 1.4 mg/dL    Calcium 9.1 8.7 - 10.5 mg/dL    Anion Gap 14 8 - 16 mmol/L    eGFR >60 >60 mL/min/1.73 m^2   POCT glucose    Collection Time: 08/12/24  9:57 PM   Result Value Ref Range    POCT Glucose 249 (H) 70 - 110 mg/dL   POCT glucose    Collection Time: 08/12/24 11:17 PM   Result Value Ref Range    POCT Glucose 182 (H) 70 - 110 mg/dL   POCT glucose    Collection Time: 08/13/24 12:25 AM   Result Value Ref Range    POCT Glucose 160 (H) 70 - 110 mg/dL   Basic metabolic panel    Collection Time: 08/13/24  1:49 AM   Result Value Ref Range    Sodium 140 136 - 145 mmol/L    Potassium 4.0 3.5 - 5.1 mmol/L    Chloride 108 95 - 110 mmol/L    CO2 22 (L) 23 - 29 mmol/L    Glucose 189 (H) 70 - 110 mg/dL    BUN 23 (H) 6 - 20 mg/dL    Creatinine 0.9 0.5 - 1.4 mg/dL    Calcium 8.9 8.7 - 10.5 mg/dL    Anion Gap 10 8 - 16 mmol/L    eGFR >60 >60 mL/min/1.73 m^2   POCT glucose    Collection Time: 08/13/24  1:50 AM   Result Value Ref Range    POCT Glucose 182 (H) 70 - 110 mg/dL   POCT glucose    Collection Time: 08/13/24  1:50 AM   Result Value Ref Range    POCT Glucose 188 (H) 70 - 110 mg/dL   POCT glucose    Collection Time: 08/13/24  2:54 AM   Result Value Ref Range    POCT Glucose 112 (H) 70 - 110 mg/dL   Phosphorus    Collection Time: 08/13/24  4:00 AM   Result Value Ref Range    Phosphorus 3.3 2.7 - 4.5 mg/dL   Magnesium    Collection Time: 08/13/24  4:00 AM   Result Value Ref Range    Magnesium 2.2 1.6 - 2.6 mg/dL   POCT glucose    Collection Time: 08/13/24  4:00 AM   Result Value Ref Range    POCT Glucose 183 (H) 70 - 110 mg/dL   Basic metabolic panel    Collection Time: 08/13/24  5:11 AM   Result Value Ref Range    Sodium 139 136 - 145 mmol/L    Potassium 3.7 3.5 - 5.1 mmol/L    Chloride 109 95 - 110 mmol/L    CO2 23 23 - 29 mmol/L    Glucose 174 (H) 70 - 110 mg/dL    BUN 22 (H) 6 - 20 mg/dL    Creatinine 0.9 0.5 - 1.4 mg/dL     Calcium 8.7 8.7 - 10.5 mg/dL    Anion Gap 7 (L) 8 - 16 mmol/L    eGFR >60 >60 mL/min/1.73 m^2   CBC auto differential    Collection Time: 08/13/24  5:11 AM   Result Value Ref Range    WBC 10.12 3.90 - 12.70 K/uL    RBC 3.98 (L) 4.00 - 5.40 M/uL    Hemoglobin 10.3 (L) 12.0 - 16.0 g/dL    Hematocrit 31.7 (L) 37.0 - 48.5 %    MCV 80 (L) 82 - 98 fL    MCH 25.9 (L) 27.0 - 31.0 pg    MCHC 32.5 32.0 - 36.0 g/dL    RDW 16.3 (H) 11.5 - 14.5 %    Platelets 302 150 - 450 K/uL    MPV 11.9 9.2 - 12.9 fL    Immature Granulocytes 0.3 0.0 - 0.5 %    Gran # (ANC) 6.0 1.8 - 7.7 K/uL    Immature Grans (Abs) 0.03 0.00 - 0.04 K/uL    Lymph # 2.9 1.0 - 4.8 K/uL    Mono # 1.0 0.3 - 1.0 K/uL    Eos # 0.1 0.0 - 0.5 K/uL    Baso # 0.05 0.00 - 0.20 K/uL    nRBC 0 0 /100 WBC    Gran % 59.5 38.0 - 73.0 %    Lymph % 29.1 18.0 - 48.0 %    Mono % 9.8 4.0 - 15.0 %    Eosinophil % 0.8 0.0 - 8.0 %    Basophil % 0.5 0.0 - 1.9 %    Differential Method Automated    POCT glucose    Collection Time: 08/13/24  5:11 AM   Result Value Ref Range    POCT Glucose 196 (H) 70 - 110 mg/dL   POCT glucose    Collection Time: 08/13/24  6:11 AM   Result Value Ref Range    POCT Glucose 84 70 - 110 mg/dL   POCT glucose    Collection Time: 08/13/24  6:39 AM   Result Value Ref Range    POCT Glucose 88 70 - 110 mg/dL   POCT glucose    Collection Time: 08/13/24  7:36 AM   Result Value Ref Range    POCT Glucose 152 (H) 70 - 110 mg/dL       Microbiology Results (last 7 days)       Procedure Component Value Units Date/Time    Rapid Organism ID by PCR (from Blood culture) [[id number]] Collected: 08/12/24 1513    Order Status: No result Updated: 08/13/24 0810    Blood culture #2 [[id number]] Collected: 08/12/24 1512    Order Status: Completed Specimen: Blood from Peripheral, Antecubital, Right Updated: 08/13/24 0445     Blood Culture, Routine Gram stain kuldip bottle: Gram positive cocci in chains resembling Strep      Positive results previously called 03:50  08/13/2024      Gram  stain aer bottle: Gram positive cocci in chains resembling Strep      Positive results previously called 04:45  08/13/2024    Narrative:      Blood Culture #2    Blood culture #1 [[id number]] Collected: 08/12/24 1513    Order Status: Completed Specimen: Blood from Peripheral, Forearm, Right Updated: 08/13/24 0315     Blood Culture, Routine Gram stain aer bottle: Gram positive cocci in chains resembling Strep      Results called to and read back by: KAROLINA MORIN  03:15  08/13/2024    Narrative:      Blood Culture #1    Influenza A & B by Molecular [[id number]] Collected: 08/12/24 1850    Order Status: Canceled Specimen: Nasopharyngeal Swab              Imaging Results              X-Ray Chest AP Portable (Final result)  Result time 08/12/24 15:32:21      Final result by Ernesto Horan III, MD (08/12/24 15:32:21)                   Impression:      No acute process seen in the chest.    Possible mild subluxation of the right shoulder.      Electronically signed by: Ernesto Horan MD  Date:    08/12/2024  Time:    15:32               Narrative:    EXAMINATION:  XR CHEST AP PORTABLE    CLINICAL HISTORY:  SOB;    FINDINGS:  Heart size is normal.  Lungs are clear.  There is subluxation of the right shoulder.                                             Assessment/Plan:      * Bacteremia due to Gram-positive bacteria  Likely cause of DKA  GPC in multiple bottles  Bacteremia with GPC most likely due to repeat finger sticks without alcohol swabs and without changing finger stick needle, low supplies. Has had +meth and +cocaine in her system in past, adamently denies IVDU says only smokes with glass pipe. Only MJ on this tox screen.  ECHO ordered to r/o veg.   National shortage, will order bl cx q24-48h.   Also with clue cells/bact vag will add flagyl, continue Vanc + ceft until speciation/sens.      DKA (diabetic ketoacidoses)  Presents with Nausea/vomiting. pH 7.278 on arrival with beta of 2.4, anion gap 19, CO2 15. Reports did  not have insulin for last 2 days as endorses food insecurity at home and missed insulin due to this. Also arrived febrile.   Started on IV insulin with IVF  Repeat BMP q4  Repeat A1c  RESOLVED, switched to subcut insulin    Fever  Presents with fever of 100.9, and fond to be in DKA. Reports intermittent fevers for last 2 days. Also endorses low back pain without red flag symptoms. UA and chest x-ray without source.   Will start Rocephin/Vanc pending cultures  Blood cultures pending  Check COVID/Flu  Should fever continue to be persistent will check imaging of spine given history of polysubstance use and back pain    Schizoaffective disorder  Continue home prozac/vistaril    Prolonged Q-T interval on ECG  Chronically prolonged near 500 per chart review.  Will monitor on telemetry, and attempt to minimize anti-emetics to as needed for DKA  Qtc long, hold anti-nausea meds.     KIMBERLY (acute kidney injury)  KIMBERLY is likely due to pre-renal azotemia due to dehydration. Baseline creatinine is  0.9 . Most recent creatinine and eGFR are listed below.  Recent Labs     08/12/24  1500   CREATININE 1.5*   EGFRNORACEVR 46*      Plan  - KIMBERLY is stable  - Avoid nephrotoxins and renally dose meds for GFR listed above  - Monitor urine output, serial BMP, and adjust therapy as needed  - suspect related to vomiting from DKA and osmotic diuresis. Started on IVF, on IV insulin for associated mild hyperkalemia.     Tobacco abuse  Smokes less than 1/2ppd. Greater than 3min spent counseling patient on dangers of continued tobacco abuse    Polysubstance abuse  She reports THC use only, denies other recreational drug use.   Check UDS, counseled on THC cessation      VTE Risk Mitigation (From admission, onward)           Ordered     enoxaparin injection 40 mg  Every 24 hours         08/13/24 0144     IP VTE LOW RISK PATIENT  Once         08/12/24 1558     Place TRISTAN hose  Until discontinued         08/12/24 1558                    Discharge Planning    THELMA:      Code Status: Full Code   Is the patient medically ready for discharge?:     Reason for patient still in hospital (select all that apply): Patient trending condition and Treatment               Critical care time spent on the evaluation and treatment of severe organ dysfunction, review of pertinent labs and imaging studies, discussions with consulting providers and discussions with patient/family: 35 minutes.      Rickie Painter MD  Department of Hospital Medicine   [facility] - Intensive Care

## 2024-08-13 NOTE — CONSULTS
[facility] - Intensive Care  Neurology Consult Note    Patient Name: Hollie Wilson  Age: 34 y.o.  MRN: [id number]  Admission Date: 8/12/2024  Reason for consult: seizure like activity    CC:  dka    HPI:   Hollie Wilson is a 34 y.o. year old female with past medical history of type 1 diabetes with gastroparesis/neuropathy, developmental delay, schizoaffective disorder, polysubstance use disorder (cocaine, amphetamines, opiates, marijuana), tobacco use disorder and GERD who presented to the ER yesterday with nausea and vomiting.  Neurology was consulted for sudden onset of altered mental status seizure-like activity this morning.  History obtained from patient and chart review.    Patient presented with nausea and vomiting with nausea and vomiting for 2 days for which she presented to the ER.  Has been noncompliant with home insulin.  In the ER, vitals were stable, afebrile.  Neurologically intact on exam.  Initial blood work concerning for blood glucose 663 with it anion gap of 19.  UDS positive for marijuana.  Patient was admitted for DKA.  Patient was admitted to ICU.  Blood cultures concerning for bacteremia.  Patient has been maintained on vanc and ceftriaxone for the treatment of sepsis.  Per report, patient had an episode of clenching her teeth with rapid horizontal eye movement concerning for seizure-like activity along with bloody emesis.  Neurology was therefore consulted for further evaluation.  Patient seemed to be agitated after this event and had to be placed on Precedex drip.  CT head obtained after has been unremarkable.  Upon my evaluation of the patient she is calm and able to answer questions appropriately.  Only complaining of lower back pain during the encounter.  Patient does not have a known history of seizures.  Patient does have history of IV drug abuse but declines it during this admission.  Declines alcohol dependence.  Patient has multiple mood disorder diagnoses.  Not under care with  psychiatrist as outpatient.  Usually obtains refills from ER visits/hospitalization.  From discharge summary from recent hospitalization in June, appears that patient is supposed to be on fluoxetine and hydroxyzine, which have been held during the hospitalization.      Histories:  Allergies:  Clove oil, Pork/porcine containing products, and Zofran (as hydrochloride) [ondansetron hcl]    Current Medications:    Current Facility-Administered Medications   Medication Dose Route Frequency Provider Last Rate Last Admin    0.9%  NaCl infusion   Intravenous Continuous Rickie Painter MD   Stopped at 08/13/24 0914    acetaminophen tablet 650 mg  650 mg Oral Q4H PRN Giovany Dao PA-C        cefTRIAXone (ROCEPHIN) 2 g in D5W 100 mL IVPB (MB+)  2 g Intravenous Q24H Giovany Dao PA-C   Stopped at 08/12/24 1933    dexmedetomidine (PRECEDEX) 400mcg/100mL 0.9% NaCL infusion  0-1.4 mcg/kg/hr Intravenous Continuous Rickie Painter MD 2.86 mL/hr at 08/13/24 1057 0.2 mcg/kg/hr at 08/13/24 1057    dextrose 5 % and 0.45 % NaCl infusion  125 mL/hr Intravenous Continuous PRN Giovany Dao PA-C   Stopped at 08/13/24 0909    enoxaparin injection 40 mg  40 mg Subcutaneous Q24H (prophylaxis, 1700) Shane Michael MD        glucagon (human recombinant) injection 1 mg  1 mg Intramuscular PRN Rickie Painter MD        glucose chewable tablet 16 g  16 g Oral PRN Rickie Painter MD        glucose chewable tablet 24 g  24 g Oral PRN Rickie Painter MD        insulin aspart U-100 pen 0-5 Units  0-5 Units Subcutaneous QID (AC + HS) PRN Rickie Painter MD   4 Units at 08/13/24 1119    insulin aspart U-100 pen 3 Units  3 Units Subcutaneous TIDWM Rickie Painter MD   3 Units at 08/13/24 1118    insulin glargine U-100 (Lantus) pen 15 Units  15 Units Subcutaneous Daily Rickie Painter MD   15 Units at 08/13/24 0827    metronidazole IVPB 500 mg  500 mg Intravenous Q8H Rickie Painter MD   Stopped at 08/13/24 1014    mupirocin 2 % ointment   Nasal BID Bell,  "Rickie HERNANDEZ MD   Given at 24 0846    nicotine 14 mg/24 hr 1 patch  1 patch Transdermal Daily Omid Hugo MD   1 patch at 24 0846    [START ON 2024] pantoprazole injection 40 mg  40 mg Intravenous BID Rickie Painter MD        sodium chloride 0.9% flush 10 mL  10 mL Intravenous Q6H Omid Hugo MD   10 mL at 24 1116    And    sodium chloride 0.9% flush 10 mL  10 mL Intravenous PRN Omid Hugo MD        vancomycin - pharmacy to dose   Intravenous pharmacy to manage frequency Showers, MARJORIE Adair        vancomycin 750 mg in D5W 250 mL IVPB (Vial-Mate)  750 mg Intravenous Q12H Kay Tolbert .7 mL/hr at 24 1115 750 mg at 24 1115       Medical:   Past Medical History:   Diagnosis Date    Anemia     Borderline intellectual functioning 2017    Diabetes mellitus     Insulin overdose 2024    Mood disorder     Scoliosis     Substance use disorder         Surgeries:   Past Surgical History:   Procedure Laterality Date     SECTION          Family: No family history on file.    Tobacco Use    Smoking status: Every Day     Current packs/day: 1.00     Types: Cigarettes    Smokeless tobacco: Never   Substance and Sexual Activity    Alcohol use: Not Currently    Drug use: Yes     Types: Marijuana    Sexual activity: Not Currently         Physical Exam:     Physical Examination  /82   Pulse 86   Temp 97.9 °F (36.6 °C) (Oral)   Resp 14   Ht 5' 7" (1.702 m)   Wt 57.1 kg (125 lb 14.1 oz)   SpO2 100%   Breastfeeding No   BMI 19.72 kg/m²   Body mass index is 19.72 kg/m².    Neurological Exam  Mental Status:   Alert and oriented to name, date, location, president  Able to tell me she has 1 child, 16 years old  Recent/remote memory, registration, attention span/concentration, fund of knowledge intact by history.    CN:   II, III, IV, VI: PERRL, EOMI, no nystagmus, fundus not visualized due to inadequate dilation.V: intact to fine " touch, temperature, pain.VII: symmetrical facial movement, nice smile, no drooping.VIII: grossly intact to hearing XII: tongue midline    Motor:   5/5 in all 4 extremities, no drift                Reflexes:   No Clonus, down going toes b/l.    Sensation: on both UEs and LEs    Intact to all modalities tested    Coordination:    Tremor: Absent.    Gait:    Not tested      Significant Labs:   Recent Lab Results  (Last 5 results in the past 24 hours)        08/13/24  1700   08/13/24  1608   08/13/24  1602   08/13/24  1448   08/13/24  1230        Anion Gap     9     14       Baso #         0.04       Basophil %         0.5       BUN     15     17       Calcium     8.6     8.7       Chloride     106     101       CO2     22     18       Creatinine     1.0     1.1       Differential Method         Automated       eGFR     >60     >60       Eos #         0.0       Eos %         0.5       Glucose     314     494  Comment: GLUC critical result(s) called and verbal readback obtained from   Maira Wylie. by [email or web address] 08/13/2024 13:31         Gran # (ANC)         6.4       Gran %         77.7       Hematocrit         30.4       Hemoglobin         9.5       Immature Grans (Abs)         0.03  Comment: Mild elevation in immature granulocytes is non specific and   can be seen in a variety of conditions including stress response,   acute inflammation, trauma and pregnancy. Correlation with other   laboratory and clinical findings is essential.         Immature Granulocytes         0.4       Lactic Acid Level         1.3  Comment: Falsely low lactic acid results can be found in samples   containing >=13.0 mg/dL total bilirubin and/or >=3.5 mg/dL   direct bilirubin.         Lymph #         1.2       Lymph %         14.8       Magnesium      1.8           MCH         25.1       MCHC         31.3       MCV         80       Mono #         0.5       Mono %         6.1       MPV         12.3       nRBC         0       Phosphorus Level     2.5            Platelet Count         239       POCT Glucose 129   340     181         Potassium     3.8     3.4       RBC         3.78       RDW         16.3       Sodium     137     133       WBC         8.22                              Significant Imaging:   Images were personally reviewed and interpreted:   CT head without contrast: NAICA    EEG normal awake and asleep    Assessment and Plan:    34 y.o. year old female with past medical history of type 1 diabetes with gastroparesis/neuropathy, developmental delay, schizoaffective disorder, polysubstance use disorder (cocaine, amphetamines, opiates, marijuana), tobacco use disorder and GERD who presented to the ER yesterday with nausea and vomiting.  Was diagnosed with DKA secondary to sepsis and was admitted to ICU.  Had an episode of tightening of jaw with horizontal eye movement earlier this morning followed by agitation needing to be placed on Precedex drip.  Unclear etiology for this sudden change in mental status, however has remained calm and doing well on the Precedex drip.  EEG is normal.  Given extensive psych history, we will likely be a psychogenic event although does not have a diagnosis of PNES in the past. Given sudden onset, less likely to be medication withdrawal or infectious/metabolic encephalopathy    Plan:   - no further neurological testing at this time.  - patient on Flexeril 60 mg daily and hydroxyzine 50 mg t.i.d.., currently held. Agree with Psychiatry consult  - kindly reach out to Neurology with any further events.  - continue  treating metabolic/infectious derangements per hospital medicine    Thank you for your consult. I will sign off. Please contact us if you have any additional questions.    Time spent on this encounter: 30 minutes. This includes face to face time and non-face to face time preparing to see the patient (eg, review of tests), obtaining and/or reviewing separately obtained history, documenting clinical information in the  electronic or other health record, independently interpreting results and communicating results to the patient/family/caregiver, or care coordinator.     Patrick North MD  Neurology  [facility]

## 2024-08-13 NOTE — CONSULTS
[facility] Consultation Note    Reason for consult: sudden hematemesis and MS change, perhaps MW tear.    PCP:   Martin Rios       LOS: 1        Initial History of Present Illness (HPI):  This is a 34 y.o. female consulted to GI service for sudden hematemesis and MS change, perhaps MW tear. PMH 1 diabetes with gastroparesis/neuropathy, developmental delay, schizoaffective disorder, polysubstance use disorder (cocaine, amphetamines, opiates, marijuana), tobacco use disorder and GERD. Patient is non communicative at this time and writing severely, being transported to CT scan. Information from chart.   DKA 2/2 sepsis. Bacteremia with GPC most likely due to repeat finger sticks without alcohol swabs and without changing finger stick needle, low supplies. Has had +meth and +cocaine in her system in past, adamently denies IVDU says only smokes with glass pipe. Only MJ on this tox screen. Gap closed and bicarb back up, will switch to subcut insulin. Qtc long, hold anti-nausea meds. ECHO ordered to r/o veg. National shortage, will order bl cx q24-48h. Also with clue cells/bact vag will add flagyl, continue Vanc + ceft until speciation/sens.   Hosp Med reports patient with persistent n/v and progress to blood streaked vomiting, then progressed to hematemesis with suspected mental status change.     Hgb 10.3      Medical History:  has a past medical history of Anemia, Borderline intellectual functioning (2017), Diabetes mellitus, Insulin overdose (2024), Mood disorder, Scoliosis, and Substance use disorder.    Surgical History:  has a past surgical history that includes  section.      Objective Findings:    Vital Signs:  Temp:  [98.3 °F (36.8 °C)-100.9 °F (38.3 °C)]   Pulse:  []   Resp:  [12-30]   BP: (105-157)/()   SpO2:  [68 %-100 %]   Body mass index is 19.72 kg/m².      Physical Exam  Vitals and nursing note reviewed.   Constitutional:       General: She is in acute  distress.      Appearance: She is underweight. She is ill-appearing.   HENT:      Head: Normocephalic.   Pulmonary:      Effort: Pulmonary effort is normal.   Abdominal:      General: Bowel sounds are normal.      Palpations: Abdomen is soft.   Skin:     General: Skin is warm and dry.   Neurological:      Mental Status: She is disoriented.   Psychiatric:         Attention and Perception: She is inattentive.         Speech: She is noncommunicative.               Labs:  Lab Results   Component Value Date    WBC 10.12 08/13/2024    HGB 10.3 (L) 08/13/2024    HCT 31.7 (L) 08/13/2024     08/13/2024    ALT 14 08/12/2024    AST 22 08/12/2024     08/13/2024    K 3.7 08/13/2024     08/13/2024    CREATININE 0.9 08/13/2024    BUN 22 (H) 08/13/2024    CO2 23 08/13/2024    TSH 1.77 06/19/2024    INR 0.9 03/01/2024    HGBA1C 11.4 (H) 06/26/2024             Imaging:       Endoscopy:          Hematemesis.Altered mental status. Nausea and vomiting. Bacteremia. DKA.   Plan/ Recommendations:  1.  Patient with distant stare, clenched teeth and writing, not responding to verbal ques. CT in progress. Hgb stable. Upon entry to room, patient with metallic GI bleed smell, nurse reports she just had a jelly like stool. Agree with ppi. Patient unable to utilize antiemetics to to prolonged qtc. Rec ativan and benadryl to optimize nausea and vomiting. Will await imaging results. Inpatient endoscopy tbd on clinical course.        Thank you so much for allowing us to participate in the care of Hollie Wilson . Please contact us if you have any additional questions.    Xin Miranda NP  Gastroenterology  [facility] - Med Surg

## 2024-08-13 NOTE — CONSULTS
Food & Nutrition  Education    Diet Education: Dm edu   Time Spent: 15 minutes   Learners: pt       Nutrition Education provided with handouts: Carbohydrate counting for DM       Comments: Unable to educate pt at this time, pt asleep at both visits. Will attempt to follow up with pt at a later date. Attached educations to pt's discharge notes.       All questions and concerns answered. Dietitian's contact information provided.       Follow-Up: 1x/weekly    Please Re-consult as needed        Thanks!

## 2024-08-13 NOTE — ASSESSMENT & PLAN NOTE
Chronically prolonged near 500 per chart review.  Will monitor on telemetry, and attempt to minimize anti-emetics to as needed for DKA  Qtc chronically prolonged and with chronic RBBB which will cause QRS widening  - monitor closely with reglan

## 2024-08-13 NOTE — NURSING
[facility]  Nurses Note -- 4 Eyes      8/13/2024       Skin assessed on: Q Shift      [x] No Pressure Injuries Present    [x]Prevention Measures Documented    [] Yes LDA  for Pressure Injury Previously documented     [] Yes New Pressure Injury Discovered   [] LDA for New Pressure Injury Added      Attending RN:  Maira Bautista RN     Second RN:  Zara MORRIS

## 2024-08-13 NOTE — PROGRESS NOTES
VANCOMYCIN DOSING BY PHARMACY DISCONTINUATION NOTE    Hollie Wilson is a 34 y.o. female who had been consulted for vancomycin dosing.    The pharmacy consult for vancomycin dosing has been discontinued.     Vancomycin Dosing by Pharmacy Consult will sign-off. Please reconsult if necessary. Thank you for allowing us to participate in this patient's care.       Anali Nichols, PharmD  [phone]

## 2024-08-13 NOTE — PROGRESS NOTES
eICU Brief Admission Note       Briefly, 34-year-old female with history of diabetes, history of substance use disorder per chart and regular marijuana use per  presents complaining of abdominal pain nausea vomiting and shortness a breath starting yesterday. No fever. No URI symptoms. No significant change in mental status. No other infectious etiology.     Video assessment :  Lying comfortably in bed     Vitals reviewed   febrile, stable vitals     LABs reviewed       Radiology reviewed         Assessment / Plan :  DKA , h/o DKA   KIMBERLY  Electrolyte imbalance   Possible sepsis, Lactic acidosis -- unknown source   Drug screen positive for Marijuana   - Insulin drip & labs as per protocol   - on empiric Vancomycin + Ceftriaxone; f/u cultures, lactate   - on Nicotine patch       DVT Px : Lovenox SQ   GI Px : PPI

## 2024-08-13 NOTE — ASSESSMENT & PLAN NOTE
Likely cause of DKA  GPC in multiple bottles - strep viridans growing  Repeat cultures negative to date  TTE with no obvious vegetations  Currently on ceftriaxone, suspect will need about 2 weeks

## 2024-08-13 NOTE — NURSING
[facility]  [place]es [place] -- 4 Eyes      8/13/2024       Skin assessed on: Admit      [x] No Pressure Injuries Present    [x]Prevention Measures Documented    [] Yes LDA  for Pressure Injury Previously documented     [] Yes New Pressure Injury Discovered   [] LDA for New Pressure Injury Added      Attending RN:  Zara Chen RN     Second RN:  ALEXANDRA Loo

## 2024-08-13 NOTE — ASSESSMENT & PLAN NOTE
Presents with Nausea/vomiting. pH 7.278 on arrival with beta of 2.4, anion gap 19, CO2 15. Reports did not have insulin for last 2 days as endorses food insecurity at home and missed insulin due to this. Also arrived febrile.   Started on IV insulin with IVF  Repeat BMP q4  Repeat A1c  RESOLVED, switched to subcut insulin

## 2024-08-13 NOTE — ASSESSMENT & PLAN NOTE
Chronically prolonged near 500 per chart review.  Will monitor on telemetry, and attempt to minimize anti-emetics to as needed for DKA

## 2024-08-13 NOTE — HOSPITAL COURSE
Ms. Martin is a 34-year-old female who was admitted with DKA secondary to sepsis.  She was found to have bacteremia with strep viridans.  She denies IV drug use.  She was started on IV antibiotics, IV fluids and IV insulin.  She was then transitioned to subcutaneous insulin.  Echocardiogram was ordered with out any vegetations.  Repeat cultures negative to date.  Currently on Flagyl for bacterial vaginosis.    Had neurological change on 08/13 and mild hematemesis.  CT head with no acute issues and CT abdomen and pelvis without any concerning pathology.  She does have history of gastroparesis and reports being on Reglan.  Id is following. Reglan improving and she is now tolerating diet. Recommending Ceftriaxone until 8/28/24. CM assisting with placement for IV antibiotics.    Patient has left AMA. Please see Dr. Johnny Nichols note for details.

## 2024-08-14 LAB
ANION GAP SERPL CALC-SCNC: 10 MMOL/L (ref 8–16)
ANION GAP SERPL CALC-SCNC: 11 MMOL/L (ref 8–16)
ANION GAP SERPL CALC-SCNC: 11 MMOL/L (ref 8–16)
ANION GAP SERPL CALC-SCNC: 9 MMOL/L (ref 8–16)
ANION GAP SERPL CALC-SCNC: 9 MMOL/L (ref 8–16)
BACTERIA BLD CULT: ABNORMAL
BASOPHILS # BLD AUTO: 0.03 K/UL (ref 0–0.2)
BASOPHILS NFR BLD: 0.4 % (ref 0–1.9)
BUN SERPL-MCNC: 10 MG/DL (ref 6–20)
BUN SERPL-MCNC: 6 MG/DL (ref 6–20)
BUN SERPL-MCNC: 7 MG/DL (ref 6–20)
BUN SERPL-MCNC: 7 MG/DL (ref 6–20)
BUN SERPL-MCNC: 8 MG/DL (ref 6–20)
CALCIUM SERPL-MCNC: 8.5 MG/DL (ref 8.7–10.5)
CALCIUM SERPL-MCNC: 8.7 MG/DL (ref 8.7–10.5)
CALCIUM SERPL-MCNC: 8.8 MG/DL (ref 8.7–10.5)
CHLORIDE SERPL-SCNC: 107 MMOL/L (ref 95–110)
CHLORIDE SERPL-SCNC: 110 MMOL/L (ref 95–110)
CHLORIDE SERPL-SCNC: 111 MMOL/L (ref 95–110)
CO2 SERPL-SCNC: 15 MMOL/L (ref 23–29)
CO2 SERPL-SCNC: 18 MMOL/L (ref 23–29)
CO2 SERPL-SCNC: 19 MMOL/L (ref 23–29)
CREAT SERPL-MCNC: 0.8 MG/DL (ref 0.5–1.4)
CREAT SERPL-MCNC: 0.9 MG/DL (ref 0.5–1.4)
CREAT SERPL-MCNC: 0.9 MG/DL (ref 0.5–1.4)
DIFFERENTIAL METHOD BLD: ABNORMAL
EOSINOPHIL # BLD AUTO: 0.2 K/UL (ref 0–0.5)
EOSINOPHIL NFR BLD: 2.8 % (ref 0–8)
ERYTHROCYTE [DISTWIDTH] IN BLOOD BY AUTOMATED COUNT: 16.5 % (ref 11.5–14.5)
EST. GFR  (NO RACE VARIABLE): >60 ML/MIN/1.73 M^2
GLUCOSE SERPL-MCNC: 104 MG/DL (ref 70–110)
GLUCOSE SERPL-MCNC: 112 MG/DL (ref 70–110)
GLUCOSE SERPL-MCNC: 193 MG/DL (ref 70–110)
GLUCOSE SERPL-MCNC: 201 MG/DL (ref 70–110)
GLUCOSE SERPL-MCNC: 230 MG/DL (ref 70–110)
HCT VFR BLD AUTO: 32.3 % (ref 37–48.5)
HGB BLD-MCNC: 10.3 G/DL (ref 12–16)
IMM GRANULOCYTES # BLD AUTO: 0.01 K/UL (ref 0–0.04)
IMM GRANULOCYTES NFR BLD AUTO: 0.1 % (ref 0–0.5)
LYMPHOCYTES # BLD AUTO: 2.6 K/UL (ref 1–4.8)
LYMPHOCYTES NFR BLD: 34.8 % (ref 18–48)
MAGNESIUM SERPL-MCNC: 1.7 MG/DL (ref 1.6–2.6)
MAGNESIUM SERPL-MCNC: 1.7 MG/DL (ref 1.6–2.6)
MAGNESIUM SERPL-MCNC: 1.8 MG/DL (ref 1.6–2.6)
MCH RBC QN AUTO: 25.9 PG (ref 27–31)
MCHC RBC AUTO-ENTMCNC: 31.9 G/DL (ref 32–36)
MCV RBC AUTO: 81 FL (ref 82–98)
MONOCYTES # BLD AUTO: 0.6 K/UL (ref 0.3–1)
MONOCYTES NFR BLD: 7.9 % (ref 4–15)
NEUTROPHILS # BLD AUTO: 4 K/UL (ref 1.8–7.7)
NEUTROPHILS NFR BLD: 54 % (ref 38–73)
NRBC BLD-RTO: 0 /100 WBC
PHOSPHATE SERPL-MCNC: 2.1 MG/DL (ref 2.7–4.5)
PHOSPHATE SERPL-MCNC: 2.4 MG/DL (ref 2.7–4.5)
PHOSPHATE SERPL-MCNC: 2.8 MG/DL (ref 2.7–4.5)
PHOSPHATE SERPL-MCNC: 2.9 MG/DL (ref 2.7–4.5)
PHOSPHATE SERPL-MCNC: 3.1 MG/DL (ref 2.7–4.5)
PLATELET # BLD AUTO: 284 K/UL (ref 150–450)
PMV BLD AUTO: 11.7 FL (ref 9.2–12.9)
POCT GLUCOSE: 100 MG/DL (ref 70–110)
POCT GLUCOSE: 103 MG/DL (ref 70–110)
POCT GLUCOSE: 106 MG/DL (ref 70–110)
POCT GLUCOSE: 113 MG/DL (ref 70–110)
POCT GLUCOSE: 116 MG/DL (ref 70–110)
POCT GLUCOSE: 116 MG/DL (ref 70–110)
POCT GLUCOSE: 117 MG/DL (ref 70–110)
POCT GLUCOSE: 121 MG/DL (ref 70–110)
POCT GLUCOSE: 174 MG/DL (ref 70–110)
POCT GLUCOSE: 211 MG/DL (ref 70–110)
POCT GLUCOSE: 213 MG/DL (ref 70–110)
POCT GLUCOSE: 214 MG/DL (ref 70–110)
POCT GLUCOSE: 230 MG/DL (ref 70–110)
POCT GLUCOSE: 54 MG/DL (ref 70–110)
POTASSIUM SERPL-SCNC: 3.7 MMOL/L (ref 3.5–5.1)
POTASSIUM SERPL-SCNC: 3.8 MMOL/L (ref 3.5–5.1)
POTASSIUM SERPL-SCNC: 3.9 MMOL/L (ref 3.5–5.1)
POTASSIUM SERPL-SCNC: 3.9 MMOL/L (ref 3.5–5.1)
POTASSIUM SERPL-SCNC: 4.3 MMOL/L (ref 3.5–5.1)
RBC # BLD AUTO: 3.97 M/UL (ref 4–5.4)
SODIUM SERPL-SCNC: 135 MMOL/L (ref 136–145)
SODIUM SERPL-SCNC: 137 MMOL/L (ref 136–145)
SODIUM SERPL-SCNC: 139 MMOL/L (ref 136–145)
SODIUM SERPL-SCNC: 139 MMOL/L (ref 136–145)
SODIUM SERPL-SCNC: 140 MMOL/L (ref 136–145)
WBC # BLD AUTO: 7.38 K/UL (ref 3.9–12.7)

## 2024-08-14 PROCEDURE — 80048 BASIC METABOLIC PNL TOTAL CA: CPT | Mod: 91 | Performed by: STUDENT IN AN ORGANIZED HEALTH CARE EDUCATION/TRAINING PROGRAM

## 2024-08-14 PROCEDURE — 25000003 PHARM REV CODE 250: Performed by: EMERGENCY MEDICINE

## 2024-08-14 PROCEDURE — 63600175 PHARM REV CODE 636 W HCPCS: Performed by: STUDENT IN AN ORGANIZED HEALTH CARE EDUCATION/TRAINING PROGRAM

## 2024-08-14 PROCEDURE — 99223 1ST HOSP IP/OBS HIGH 75: CPT | Mod: ,,, | Performed by: INTERNAL MEDICINE

## 2024-08-14 PROCEDURE — 21400001 HC TELEMETRY ROOM

## 2024-08-14 PROCEDURE — S4991 NICOTINE PATCH NONLEGEND: HCPCS | Performed by: EMERGENCY MEDICINE

## 2024-08-14 PROCEDURE — 63600175 PHARM REV CODE 636 W HCPCS: Performed by: HOSPITALIST

## 2024-08-14 PROCEDURE — 99233 SBSQ HOSP IP/OBS HIGH 50: CPT | Mod: ,,, | Performed by: NURSE PRACTITIONER

## 2024-08-14 PROCEDURE — 85025 COMPLETE CBC W/AUTO DIFF WBC: CPT | Performed by: STUDENT IN AN ORGANIZED HEALTH CARE EDUCATION/TRAINING PROGRAM

## 2024-08-14 PROCEDURE — 99232 SBSQ HOSP IP/OBS MODERATE 35: CPT | Mod: AF,HB,95, | Performed by: PSYCHIATRY & NEUROLOGY

## 2024-08-14 PROCEDURE — 25000003 PHARM REV CODE 250: Performed by: STUDENT IN AN ORGANIZED HEALTH CARE EDUCATION/TRAINING PROGRAM

## 2024-08-14 PROCEDURE — 83735 ASSAY OF MAGNESIUM: CPT | Mod: 91 | Performed by: STUDENT IN AN ORGANIZED HEALTH CARE EDUCATION/TRAINING PROGRAM

## 2024-08-14 PROCEDURE — 36415 COLL VENOUS BLD VENIPUNCTURE: CPT | Mod: XB | Performed by: STUDENT IN AN ORGANIZED HEALTH CARE EDUCATION/TRAINING PROGRAM

## 2024-08-14 PROCEDURE — 25000003 PHARM REV CODE 250: Performed by: PHYSICIAN ASSISTANT

## 2024-08-14 PROCEDURE — 94761 N-INVAS EAR/PLS OXIMETRY MLT: CPT

## 2024-08-14 PROCEDURE — A4216 STERILE WATER/SALINE, 10 ML: HCPCS | Performed by: EMERGENCY MEDICINE

## 2024-08-14 PROCEDURE — S5010 5% DEXTROSE AND 0.45% SALINE: HCPCS | Performed by: STUDENT IN AN ORGANIZED HEALTH CARE EDUCATION/TRAINING PROGRAM

## 2024-08-14 PROCEDURE — 84100 ASSAY OF PHOSPHORUS: CPT | Performed by: STUDENT IN AN ORGANIZED HEALTH CARE EDUCATION/TRAINING PROGRAM

## 2024-08-14 PROCEDURE — 63600175 PHARM REV CODE 636 W HCPCS: Performed by: PHYSICIAN ASSISTANT

## 2024-08-14 PROCEDURE — 84100 ASSAY OF PHOSPHORUS: CPT | Mod: 91 | Performed by: STUDENT IN AN ORGANIZED HEALTH CARE EDUCATION/TRAINING PROGRAM

## 2024-08-14 PROCEDURE — 63600175 PHARM REV CODE 636 W HCPCS: Mod: JZ,JG | Performed by: STUDENT IN AN ORGANIZED HEALTH CARE EDUCATION/TRAINING PROGRAM

## 2024-08-14 PROCEDURE — 87040 BLOOD CULTURE FOR BACTERIA: CPT | Performed by: STUDENT IN AN ORGANIZED HEALTH CARE EDUCATION/TRAINING PROGRAM

## 2024-08-14 RX ORDER — IBUPROFEN 200 MG
16 TABLET ORAL
Status: DISCONTINUED | OUTPATIENT
Start: 2024-08-14 | End: 2024-08-20 | Stop reason: HOSPADM

## 2024-08-14 RX ORDER — GLUCAGON 1 MG
1 KIT INJECTION
Status: DISCONTINUED | OUTPATIENT
Start: 2024-08-14 | End: 2024-08-20 | Stop reason: HOSPADM

## 2024-08-14 RX ORDER — INSULIN ASPART 100 [IU]/ML
5 INJECTION, SOLUTION INTRAVENOUS; SUBCUTANEOUS
Status: DISCONTINUED | OUTPATIENT
Start: 2024-08-14 | End: 2024-08-20 | Stop reason: HOSPADM

## 2024-08-14 RX ORDER — FLUOXETINE HYDROCHLORIDE 20 MG/1
20 CAPSULE ORAL DAILY
Status: DISCONTINUED | OUTPATIENT
Start: 2024-08-15 | End: 2024-08-15

## 2024-08-14 RX ORDER — FLUOXETINE HYDROCHLORIDE 20 MG/1
60 CAPSULE ORAL DAILY
Status: DISCONTINUED | OUTPATIENT
Start: 2024-08-14 | End: 2024-08-14

## 2024-08-14 RX ORDER — IBUPROFEN 200 MG
24 TABLET ORAL
Status: DISCONTINUED | OUTPATIENT
Start: 2024-08-14 | End: 2024-08-20 | Stop reason: HOSPADM

## 2024-08-14 RX ORDER — INSULIN ASPART 100 [IU]/ML
0-10 INJECTION, SOLUTION INTRAVENOUS; SUBCUTANEOUS
Status: DISCONTINUED | OUTPATIENT
Start: 2024-08-14 | End: 2024-08-20 | Stop reason: HOSPADM

## 2024-08-14 RX ORDER — FLUOXETINE HYDROCHLORIDE 20 MG/1
40 CAPSULE ORAL DAILY
Status: DISCONTINUED | OUTPATIENT
Start: 2024-08-29 | End: 2024-08-15

## 2024-08-14 RX ORDER — FLUOXETINE HYDROCHLORIDE 20 MG/1
60 CAPSULE ORAL DAILY
Status: DISCONTINUED | OUTPATIENT
Start: 2024-09-12 | End: 2024-08-15

## 2024-08-14 RX ORDER — HYDROXYZINE HYDROCHLORIDE 25 MG/1
50 TABLET, FILM COATED ORAL 2 TIMES DAILY
Status: DISCONTINUED | OUTPATIENT
Start: 2024-08-14 | End: 2024-08-15

## 2024-08-14 RX ORDER — INSULIN GLARGINE 100 [IU]/ML
15 INJECTION, SOLUTION SUBCUTANEOUS DAILY
Status: DISCONTINUED | OUTPATIENT
Start: 2024-08-14 | End: 2024-08-20 | Stop reason: HOSPADM

## 2024-08-14 RX ADMIN — Medication 10 ML: at 11:08

## 2024-08-14 RX ADMIN — ACETAMINOPHEN 650 MG: 325 TABLET ORAL at 06:08

## 2024-08-14 RX ADMIN — METRONIDAZOLE 500 MG: 500 INJECTION, SOLUTION INTRAVENOUS at 11:08

## 2024-08-14 RX ADMIN — ACETAMINOPHEN 650 MG: 325 TABLET ORAL at 09:08

## 2024-08-14 RX ADMIN — Medication 10 ML: at 06:08

## 2024-08-14 RX ADMIN — MUPIROCIN: 20 OINTMENT TOPICAL at 09:08

## 2024-08-14 RX ADMIN — PANTOPRAZOLE SODIUM 40 MG: 40 INJECTION, POWDER, FOR SOLUTION INTRAVENOUS at 07:08

## 2024-08-14 RX ADMIN — Medication 10 ML: at 05:08

## 2024-08-14 RX ADMIN — METRONIDAZOLE 500 MG: 500 INJECTION, SOLUTION INTRAVENOUS at 04:08

## 2024-08-14 RX ADMIN — PROMETHAZINE HYDROCHLORIDE 12.5 MG: 25 INJECTION INTRAMUSCULAR; INTRAVENOUS at 10:08

## 2024-08-14 RX ADMIN — INSULIN ASPART 4 UNITS: 100 INJECTION, SOLUTION INTRAVENOUS; SUBCUTANEOUS at 04:08

## 2024-08-14 RX ADMIN — DEXTROSE AND SODIUM CHLORIDE: 5; 450 INJECTION, SOLUTION INTRAVENOUS at 07:08

## 2024-08-14 RX ADMIN — INSULIN ASPART 5 UNITS: 100 INJECTION, SOLUTION INTRAVENOUS; SUBCUTANEOUS at 04:08

## 2024-08-14 RX ADMIN — INSULIN ASPART 4 UNITS: 100 INJECTION, SOLUTION INTRAVENOUS; SUBCUTANEOUS at 11:08

## 2024-08-14 RX ADMIN — METRONIDAZOLE 500 MG: 500 INJECTION, SOLUTION INTRAVENOUS at 12:08

## 2024-08-14 RX ADMIN — Medication 10 ML: at 12:08

## 2024-08-14 RX ADMIN — HYDROXYZINE HYDROCHLORIDE 50 MG: 25 TABLET, FILM COATED ORAL at 09:08

## 2024-08-14 RX ADMIN — NICOTINE 1 PATCH: 14 PATCH TRANSDERMAL at 08:08

## 2024-08-14 RX ADMIN — ENOXAPARIN SODIUM 40 MG: 40 INJECTION SUBCUTANEOUS at 04:08

## 2024-08-14 RX ADMIN — METRONIDAZOLE 500 MG: 500 INJECTION, SOLUTION INTRAVENOUS at 08:08

## 2024-08-14 RX ADMIN — INSULIN GLARGINE 15 UNITS: 100 INJECTION, SOLUTION SUBCUTANEOUS at 09:08

## 2024-08-14 RX ADMIN — FLUOXETINE HYDROCHLORIDE 60 MG: 20 CAPSULE ORAL at 08:08

## 2024-08-14 RX ADMIN — HYDROXYZINE HYDROCHLORIDE 50 MG: 25 TABLET, FILM COATED ORAL at 08:08

## 2024-08-14 RX ADMIN — INSULIN ASPART 5 UNITS: 100 INJECTION, SOLUTION INTRAVENOUS; SUBCUTANEOUS at 11:08

## 2024-08-14 RX ADMIN — CEFTRIAXONE 2 G: 2 INJECTION, POWDER, FOR SOLUTION INTRAMUSCULAR; INTRAVENOUS at 05:08

## 2024-08-14 RX ADMIN — PANTOPRAZOLE SODIUM 40 MG: 40 INJECTION, POWDER, FOR SOLUTION INTRAVENOUS at 08:08

## 2024-08-14 NOTE — PROGRESS NOTES
[facility]  [facility]  Progress Note    Patient Name: Hollie Wilson  MRN: [id number]  Patient Class: IP- Inpatient   Admission Date: 8/12/2024  Length of Stay: 2 days  Attending Physician: Rickie Painter MD  Primary Care Provider: Martin Rios MD        Subjective:     Principal Problem:Bacteremia due to Gram-positive bacteria        HPI:  Patient is a 34-year-old female with past medical history of type 1 diabetes with gastroparesis/neuropathy, developmental delay, schizoaffective disorder, polysubstance use disorder (cocaine, amphetamines, opiates, marijuana), tobacco use disorder and GERD who presented to [facility] on 08/12/2024 for further evaluation of vomiting.  She reports 2 days of associated nausea and vomiting with associated subjective fevers.  She finds her nausea has improved with treatment in the ER.  She reports she was not had her home insulin last 2 days as she was not had food in her house and was afraid to take it.  She also has had 2-3 weeks low atraumatic back pain.  She reports she was smokes marijuana and cigarettes, but denies other recreational drug use.  She denies leg swelling melena hematuria hematemesis incontinence numbness weakness in extremity.      During evaluation in the ER, patient is hemodynamically stable anion gap 19 CO2 15 blood glucose 663 creatinine 1.5 potassium 5.5 beta hydroxy 2.4 pH 7.278 UA without leukocytes or nitrites chest x-ray without acute process.    Overview/Hospital Course:  DKA 2/2 sepsis. Bacteremia with GPC most likely due to repeat finger sticks without alcohol swabs and without changing finger stick needle, low supplies. Has had +meth and +cocaine in her system in past, adamently denies IVDU says only smokes with glass pipe. Only MJ on this tox screen. Gap closed and bicarb back up, will switch to subcut insulin. Qtc long, hold anti-nausea meds. ECHO ordered to r/o veg. National shortage, will order bl cx q24-48h.  Also with clue cells/bact vag will add flagyl, continue Vanc + ceft until speciation/sens.     Patient with sudden neurological change 8/13 mid-day and mild hematemasis. CTH and CT a/p w/o appreciable pathology. Suspect prabhu andrews tear. Perhaps had psych episode. GI, Neuro and Psyche were consulted. No veg on TTE, will see if blood clear. GAS in blood and bacterial vag, on ceft and flagyl, vanc stopped 8/13. ID consult placed.     Interval History:  NAEON.  No new issues.   CC- Fatigue and nausea  All questions answered and updates on care given.       ROS:  General: Negative for fevers   Cardiac: Negative for chest pain   Pulmonary: Negative for wheezing  GI: Negative for abdominal distention      Vitals:    08/14/24 0300 08/14/24 0400 08/14/24 0500 08/14/24 0600   BP: 112/74 114/77 124/82 122/82   Pulse: 71 71 70 69   Resp: 14 (!) 26 18 18   Temp:  97.5 °F (36.4 °C)     TempSrc:  Oral     SpO2: 100% 99% 100% 100%   Weight:       Height:              Body mass index is 19.72 kg/m².      PHYSICAL EXAM:  GENERAL APPEARANCE: alert and cooperative     HEAD: NC/AT  CARDIAC: There is no cyanosis or pallor.   LUNGS: Not in respiratory distress, no apparent wheezing or stridor  ABDOMEN: Non-distended. No guarding.  MSK: No joint erythema or tenderness.   EXTREMITIES: No significant new deformity or new joint abnormality.   NEUROLOGICAL: CN II-XII grossly intact.   SKIN: No lesions or eruptions.  PSYCHIATRIC: No tangential speech. No Hyperactive features.        Recent Results (from the past 24 hour(s))   POCT glucose    Collection Time: 08/13/24  7:36 AM   Result Value Ref Range    POCT Glucose 152 (H) 70 - 110 mg/dL   POCT glucose    Collection Time: 08/13/24  8:48 AM   Result Value Ref Range    POCT Glucose 138 (H) 70 - 110 mg/dL   Echo    Collection Time: 08/13/24  9:13 AM   Result Value Ref Range    BSA 1.64 m2    LVOT stroke volume 77.96 cm3    LVIDd 3.60 3.5 - 6.0 cm    LV Systolic Volume 24.40 mL    LV Systolic  Volume Index 14.7 mL/m2    LVIDs 2.59 2.1 - 4.0 cm    LV Diastolic Volume 54.58 mL    LV Diastolic Volume Index 32.88 mL/m2    Left Ventricular End Systolic Volume by Teichholz Method 24.40 mL    Left Ventricular End Diastolic Volume by Teichholz Method 54.58 mL    IVS 1.12 (A) 0.6 - 1.1 cm    LVOT diameter 2.03 cm    LVOT area 3.2 cm2    FS 28 28 - 44 %    Left Ventricle Relative Wall Thickness 0.68 cm    Posterior Wall 1.23 (A) 0.6 - 1.1 cm    LV mass 137.04 g    LV Mass Index 83 g/m2    MV Peak E Raul 0.90 m/s    TDI LATERAL 0.13 m/s    TDI SEPTAL 0.08 m/s    E/E' ratio 8.57 m/s    MV Peak A Raul 0.96 m/s    TR Max Raul 2.34 m/s    E/A ratio 0.94     IVRT 102.76 msec    E wave deceleration time 160.84 msec    LV SEPTAL E/E' RATIO 11.25 m/s    LV LATERAL E/E' RATIO 6.92 m/s    PV Peak S Raul 0.50 m/s    PV Peak D Raul 0.43 m/s    Pulm vein S/D ratio 1.16     LVOT peak raul 1.21 m/s    Left Ventricular Outflow Tract Mean Velocity 0.89 cm/s    Left Ventricular Outflow Tract Mean Gradient 3.49 mmHg    RV S' 11.08 cm/s    TAPSE 1.65 cm    RV/LV Ratio 0.99 cm    LA size 3.88 cm    Left Atrium Minor Axis 4.47 cm    Left Atrium Major Axis 4.78 cm    RA Major Axis 4.49 cm    AV mean gradient 5 mmHg    AV peak gradient 10 mmHg    Ao peak raul 1.58 m/s    Ao VTI 30.80 cm    LVOT peak VTI 24.10 cm    AV valve area 2.53 cm²    AV Velocity Ratio 0.77     AV index (prosthetic) 0.78     GYALA by Velocity Ratio 2.48 cm²    MV stenosis pressure 1/2 time 46.64 ms    MV valve area p 1/2 method 4.72 cm2    Triscuspid Valve Regurgitation Peak Gradient 22 mmHg    PV PEAK VELOCITY 0.93 m/s    PV peak gradient 3 mmHg    Ao root annulus 3.16 cm    Sinus 3.32 cm    STJ 2.69 cm    IVC diameter 1.59 cm    Mean e' 0.11 m/s    ZLVIDS -0.84     ZLVIDD -2.56     RVDD 3.57 cm    AORTIC VALVE CUSP SEPERATION 2.03 cm    LA Volume Index 34.0 mL/m2    LA volume 56.37 cm3    LA WIDTH 3.7 cm    RA Width 3.2 cm    TV resting pulmonary artery pressure 30 mmHg     RV TB RVSP 10 mmHg    Est. RA pres 8 mmHg   POCT glucose    Collection Time: 08/13/24 10:31 AM   Result Value Ref Range    POCT Glucose 259 (H) 70 - 110 mg/dL   Fentanyl, Urine    Collection Time: 08/13/24 11:08 AM   Result Value Ref Range    Fentanyl, Urine <0.00 Negative    Creatinine, Urine 143.6 15.0 - 325.0 mg/dL   POCT glucose    Collection Time: 08/13/24 11:18 AM   Result Value Ref Range    POCT Glucose 329 (H) 70 - 110 mg/dL   POCT glucose    Collection Time: 08/13/24 12:16 PM   Result Value Ref Range    POCT Glucose 339 (H) 70 - 110 mg/dL   Basic Metabolic Panel    Collection Time: 08/13/24 12:30 PM   Result Value Ref Range    Sodium 133 (L) 136 - 145 mmol/L    Potassium 3.4 (L) 3.5 - 5.1 mmol/L    Chloride 101 95 - 110 mmol/L    CO2 18 (L) 23 - 29 mmol/L    Glucose 494 (HH) 70 - 110 mg/dL    BUN 17 6 - 20 mg/dL    Creatinine 1.1 0.5 - 1.4 mg/dL    Calcium 8.7 8.7 - 10.5 mg/dL    Anion Gap 14 8 - 16 mmol/L    eGFR >60 >60 mL/min/1.73 m^2   CBC Auto Differential    Collection Time: 08/13/24 12:30 PM   Result Value Ref Range    WBC 8.22 3.90 - 12.70 K/uL    RBC 3.78 (L) 4.00 - 5.40 M/uL    Hemoglobin 9.5 (L) 12.0 - 16.0 g/dL    Hematocrit 30.4 (L) 37.0 - 48.5 %    MCV 80 (L) 82 - 98 fL    MCH 25.1 (L) 27.0 - 31.0 pg    MCHC 31.3 (L) 32.0 - 36.0 g/dL    RDW 16.3 (H) 11.5 - 14.5 %    Platelets 239 150 - 450 K/uL    MPV 12.3 9.2 - 12.9 fL    Immature Granulocytes 0.4 0.0 - 0.5 %    Gran # (ANC) 6.4 1.8 - 7.7 K/uL    Immature Grans (Abs) 0.03 0.00 - 0.04 K/uL    Lymph # 1.2 1.0 - 4.8 K/uL    Mono # 0.5 0.3 - 1.0 K/uL    Eos # 0.0 0.0 - 0.5 K/uL    Baso # 0.04 0.00 - 0.20 K/uL    nRBC 0 0 /100 WBC    Gran % 77.7 (H) 38.0 - 73.0 %    Lymph % 14.8 (L) 18.0 - 48.0 %    Mono % 6.1 4.0 - 15.0 %    Eosinophil % 0.5 0.0 - 8.0 %    Basophil % 0.5 0.0 - 1.9 %    Differential Method Automated    Lactic Acid, Plasma    Collection Time: 08/13/24 12:30 PM   Result Value Ref Range    Lactate (Lactic Acid) 1.3 0.5 - 2.2 mmol/L    POCT glucose    Collection Time: 08/13/24  2:48 PM   Result Value Ref Range    POCT Glucose 181 (H) 70 - 110 mg/dL   Phosphorus    Collection Time: 08/13/24  4:02 PM   Result Value Ref Range    Phosphorus 2.5 (L) 2.7 - 4.5 mg/dL   Magnesium    Collection Time: 08/13/24  4:02 PM   Result Value Ref Range    Magnesium 1.8 1.6 - 2.6 mg/dL   Basic Metabolic Panel    Collection Time: 08/13/24  4:02 PM   Result Value Ref Range    Sodium 137 136 - 145 mmol/L    Potassium 3.8 3.5 - 5.1 mmol/L    Chloride 106 95 - 110 mmol/L    CO2 22 (L) 23 - 29 mmol/L    Glucose 314 (H) 70 - 110 mg/dL    BUN 15 6 - 20 mg/dL    Creatinine 1.0 0.5 - 1.4 mg/dL    Calcium 8.6 (L) 8.7 - 10.5 mg/dL    Anion Gap 9 8 - 16 mmol/L    eGFR >60 >60 mL/min/1.73 m^2   POCT glucose    Collection Time: 08/13/24  4:08 PM   Result Value Ref Range    POCT Glucose 340 (H) 70 - 110 mg/dL   POCT glucose    Collection Time: 08/13/24  5:00 PM   Result Value Ref Range    POCT Glucose 129 (H) 70 - 110 mg/dL   POCT glucose    Collection Time: 08/13/24  6:08 PM   Result Value Ref Range    POCT Glucose 117 (H) 70 - 110 mg/dL   POCT glucose    Collection Time: 08/13/24  7:12 PM   Result Value Ref Range    POCT Glucose 149 (H) 70 - 110 mg/dL   POCT glucose    Collection Time: 08/13/24  8:12 PM   Result Value Ref Range    POCT Glucose 280 (H) 70 - 110 mg/dL   Phosphorus    Collection Time: 08/13/24  8:14 PM   Result Value Ref Range    Phosphorus 2.6 (L) 2.7 - 4.5 mg/dL   Magnesium    Collection Time: 08/13/24  8:14 PM   Result Value Ref Range    Magnesium 1.6 1.6 - 2.6 mg/dL   Basic Metabolic Panel    Collection Time: 08/13/24  8:14 PM   Result Value Ref Range    Sodium 137 136 - 145 mmol/L    Potassium 4.1 3.5 - 5.1 mmol/L    Chloride 107 95 - 110 mmol/L    CO2 21 (L) 23 - 29 mmol/L    Glucose 258 (H) 70 - 110 mg/dL    BUN 13 6 - 20 mg/dL    Creatinine 0.9 0.5 - 1.4 mg/dL    Calcium 8.5 (L) 8.7 - 10.5 mg/dL    Anion Gap 9 8 - 16 mmol/L    eGFR >60 >60 mL/min/1.73 m^2    POCT glucose    Collection Time: 08/13/24  9:22 PM   Result Value Ref Range    POCT Glucose 123 (H) 70 - 110 mg/dL   POCT glucose    Collection Time: 08/13/24 10:11 PM   Result Value Ref Range    POCT Glucose 100 70 - 110 mg/dL   POCT glucose    Collection Time: 08/13/24 11:01 PM   Result Value Ref Range    POCT Glucose 120 (H) 70 - 110 mg/dL   POCT glucose    Collection Time: 08/14/24 12:06 AM   Result Value Ref Range    POCT Glucose 213 (H) 70 - 110 mg/dL   Phosphorus    Collection Time: 08/14/24 12:11 AM   Result Value Ref Range    Phosphorus 2.1 (L) 2.7 - 4.5 mg/dL   Magnesium    Collection Time: 08/14/24 12:11 AM   Result Value Ref Range    Magnesium 1.7 1.6 - 2.6 mg/dL   Basic Metabolic Panel    Collection Time: 08/14/24 12:11 AM   Result Value Ref Range    Sodium 135 (L) 136 - 145 mmol/L    Potassium 3.9 3.5 - 5.1 mmol/L    Chloride 107 95 - 110 mmol/L    CO2 19 (L) 23 - 29 mmol/L    Glucose 201 (H) 70 - 110 mg/dL    BUN 10 6 - 20 mg/dL    Creatinine 0.8 0.5 - 1.4 mg/dL    Calcium 8.5 (L) 8.7 - 10.5 mg/dL    Anion Gap 9 8 - 16 mmol/L    eGFR >60 >60 mL/min/1.73 m^2   POCT glucose    Collection Time: 08/14/24  1:04 AM   Result Value Ref Range    POCT Glucose 116 (H) 70 - 110 mg/dL   POCT glucose    Collection Time: 08/14/24  1:58 AM   Result Value Ref Range    POCT Glucose 103 70 - 110 mg/dL   POCT glucose    Collection Time: 08/14/24  3:07 AM   Result Value Ref Range    POCT Glucose 106 70 - 110 mg/dL   POCT glucose    Collection Time: 08/14/24  4:10 AM   Result Value Ref Range    POCT Glucose 121 (H) 70 - 110 mg/dL   CBC Auto Differential    Collection Time: 08/14/24  4:13 AM   Result Value Ref Range    WBC 7.38 3.90 - 12.70 K/uL    RBC 3.97 (L) 4.00 - 5.40 M/uL    Hemoglobin 10.3 (L) 12.0 - 16.0 g/dL    Hematocrit 32.3 (L) 37.0 - 48.5 %    MCV 81 (L) 82 - 98 fL    MCH 25.9 (L) 27.0 - 31.0 pg    MCHC 31.9 (L) 32.0 - 36.0 g/dL    RDW 16.5 (H) 11.5 - 14.5 %    Platelets 284 150 - 450 K/uL    MPV 11.7 9.2 -  12.9 fL    Immature Granulocytes 0.1 0.0 - 0.5 %    Gran # (ANC) 4.0 1.8 - 7.7 K/uL    Immature Grans (Abs) 0.01 0.00 - 0.04 K/uL    Lymph # 2.6 1.0 - 4.8 K/uL    Mono # 0.6 0.3 - 1.0 K/uL    Eos # 0.2 0.0 - 0.5 K/uL    Baso # 0.03 0.00 - 0.20 K/uL    nRBC 0 0 /100 WBC    Gran % 54.0 38.0 - 73.0 %    Lymph % 34.8 18.0 - 48.0 %    Mono % 7.9 4.0 - 15.0 %    Eosinophil % 2.8 0.0 - 8.0 %    Basophil % 0.4 0.0 - 1.9 %    Differential Method Automated    Phosphorus    Collection Time: 08/14/24  4:13 AM   Result Value Ref Range    Phosphorus 2.4 (L) 2.7 - 4.5 mg/dL   Magnesium    Collection Time: 08/14/24  4:13 AM   Result Value Ref Range    Magnesium 1.8 1.6 - 2.6 mg/dL   Basic Metabolic Panel    Collection Time: 08/14/24  4:13 AM   Result Value Ref Range    Sodium 139 136 - 145 mmol/L    Potassium 3.7 3.5 - 5.1 mmol/L    Chloride 111 (H) 95 - 110 mmol/L    CO2 19 (L) 23 - 29 mmol/L    Glucose 112 (H) 70 - 110 mg/dL    BUN 8 6 - 20 mg/dL    Creatinine 0.8 0.5 - 1.4 mg/dL    Calcium 8.8 8.7 - 10.5 mg/dL    Anion Gap 9 8 - 16 mmol/L    eGFR >60 >60 mL/min/1.73 m^2   POCT glucose    Collection Time: 08/14/24  5:10 AM   Result Value Ref Range    POCT Glucose 116 (H) 70 - 110 mg/dL   POCT glucose    Collection Time: 08/14/24  6:05 AM   Result Value Ref Range    POCT Glucose 117 (H) 70 - 110 mg/dL   POCT glucose    Collection Time: 08/14/24  7:03 AM   Result Value Ref Range    POCT Glucose 113 (H) 70 - 110 mg/dL       Microbiology Results (last 7 days)       Procedure Component Value Units Date/Time    Blood culture [[id number]] Collected: 08/14/24 0433    Order Status: Sent Specimen: Blood from Peripheral, Hand, Right Updated: 08/14/24 0536    Blood culture [[id number]] Collected: 08/14/24 0432    Order Status: Sent Specimen: Blood from Peripheral, Antecubital, Right Updated: 08/14/24 0536    Blood culture #2 [[id number]]  (Abnormal) Collected: 08/12/24 1512    Order Status: Completed Specimen: Blood from Peripheral,  Antecubital, Right Updated: 08/13/24 1427     Blood Culture, Routine Gram stain kuldip bottle: Gram positive cocci in chains resembling Strep      Positive results previously called 03:50  08/13/2024      Gram stain aer bottle: Gram positive cocci in chains resembling Strep      Positive results previously called 04:45  08/13/2024      ALPHA HEMOLYTIC STREP  Identification and susceptibility pending      Narrative:      Blood Culture #2    Rapid Organism ID by PCR (from Blood culture) [[id number]]  (Abnormal) Collected: 08/12/24 1513    Order Status: Completed Updated: 08/13/24 1135     Enterococcus faecalis Not Detected     Enterococcus faecium Not Detected     Listeria monocytogenes Not Detected     Staphylococcus spp. Not Detected     Staphylococcus aureus Not Detected     Staphylococcus epidermidis Not Detected     Staphylococcus lugdunensis Not Detected     Streptococcus species Detected     Streptococcus agalactiae Not Detected     Streptococcus pneumoniae Not Detected     Streptococcus pyogenes Not Detected     Acinetobacter calcoaceticus/baumannii complex Not Detected     Bacteroides fragilis Not Detected     Enterobacterales Not Detected     Enterobacter cloacae complex Not Detected     Escherichia coli Not Detected     Klebsiella aerogenes Not Detected     Klebsiella oxytoca Not Detected     Klebsiella pneumoniae group Not Detected     Proteus Not Detected     Salmonella sp Not Detected     Serratia marcescens Not Detected     Haemophilus influenzae Not Detected     Neisseria meningtidis Not Detected     Pseudomonas aeruginosa Not Detected     Stenotrophomonas maltophilia Not Detected     Candida albicans Not Detected     Candida auris Not Detected     Candida glabrata Not Detected     Candida krusei Not Detected     Candida parapsilosis Not Detected     Candida tropicalis Not Detected     Cryptococcus neoformans/gattii Not Detected     CTX-M (ESBL ) Test Not Applicable     IMP (Carbapenem resistant)  Test Not Applicable     KPC resistance gene (Carbapenem resistant) Test Not Applicable     mcr-1  Test Not Applicable     mec A/C  Test Not Applicable     mec A/C and MREJ (MRSA) gene Test Not Applicable     NDM (Carbapenem resistant) Test Not Applicable     OXA-48-like (Carbapenem resistant) Test Not Applicable     van A/B (VRE gene) Test Not Applicable     VIM (Carbapenem resistant) Test Not Applicable    Narrative:      Blood Culture #1    Blood culture #1 [[id number]] Collected: 08/12/24 1513    Order Status: Completed Specimen: Blood from Peripheral, Forearm, Right Updated: 08/13/24 0315     Blood Culture, Routine Gram stain aer bottle: Gram positive cocci in chains resembling Strep      Results called to and read back by: KAROLINA MORIN  03:15  08/13/2024    Narrative:      Blood Culture #1    Influenza A & B by Molecular [[id number]] Collected: 08/12/24 1850    Order Status: Canceled Specimen: Nasopharyngeal Swab              Imaging Results              X-Ray Chest AP Portable (Final result)  Result time 08/12/24 15:32:21      Final result by Ernesto Horan III, MD (08/12/24 15:32:21)                   Impression:      No acute process seen in the chest.    Possible mild subluxation of the right shoulder.      Electronically signed by: Ernesto Horan MD  Date:    08/12/2024  Time:    15:32               Narrative:    EXAMINATION:  XR CHEST AP PORTABLE    CLINICAL HISTORY:  SOB;    FINDINGS:  Heart size is normal.  Lungs are clear.  There is subluxation of the right shoulder.                                             Assessment/Plan:      * Bacteremia due to Gram-positive bacteria  Likely cause of DKA  GPC in multiple bottles  Bacteremia with GPC most likely due to repeat finger sticks without alcohol swabs and without changing finger stick needle, low supplies. Has had +meth and +cocaine in her system in past, adamently denies IVDU says only smokes with glass pipe. Only MJ on this tox screen.  ECHO ordered to  r/o veg.   National shortage, will order bl cx q24-48h.   Also with clue cells/bact vag will add flagyl, continue Vanc + ceft until speciation/sens.      DKA (diabetic ketoacidoses)  Presents with Nausea/vomiting. pH 7.278 on arrival with beta of 2.4, anion gap 19, CO2 15. Reports did not have insulin for last 2 days as endorses food insecurity at home and missed insulin due to this. Also arrived febrile.   Started on IV insulin with IVF  Repeat BMP q4  Repeat A1c  RESOLVED, switched to subcut insulin    Fever  Presents with fever of 100.9, and fond to be in DKA. Reports intermittent fevers for last 2 days. Also endorses low back pain without red flag symptoms. UA and chest x-ray without source.   Will start Rocephin/Vanc pending cultures  Blood cultures pending  Check COVID/Flu  Should fever continue to be persistent will check imaging of spine given history of polysubstance use and back pain    Schizoaffective disorder  Continue home prozac/vistaril    Prolonged Q-T interval on ECG  Chronically prolonged near 500 per chart review.  Will monitor on telemetry, and attempt to minimize anti-emetics to as needed for DKA  Qtc long, hold anti-nausea meds.     KIMBERLY (acute kidney injury)  KIMBERLY is likely due to pre-renal azotemia due to dehydration. Baseline creatinine is  0.9 . Most recent creatinine and eGFR are listed below.  Recent Labs     08/12/24  1500   CREATININE 1.5*   EGFRNORACEVR 46*      Plan  - KIMBERLY is stable  - Avoid nephrotoxins and renally dose meds for GFR listed above  - Monitor urine output, serial BMP, and adjust therapy as needed  - suspect related to vomiting from DKA and osmotic diuresis. Started on IVF, on IV insulin for associated mild hyperkalemia.     Tobacco abuse  Smokes less than 1/2ppd. Greater than 3min spent counseling patient on dangers of continued tobacco abuse    Polysubstance abuse  She reports THC use only, denies other recreational drug use.   Check UDS, counseled on THC  cessation      VTE Risk Mitigation (From admission, onward)           Ordered     enoxaparin injection 40 mg  Every 24 hours         08/13/24 0144     IP VTE LOW RISK PATIENT  Once         08/12/24 1558     Place TRISTAN hose  Until discontinued         08/12/24 1558                    Discharge Planning   THELMA:      Code Status: Full Code   Is the patient medically ready for discharge?:     Reason for patient still in hospital (select all that apply): Patient trending condition and Treatment  Discharge Plan A: Home            Critical care time spent on the evaluation and treatment of severe organ dysfunction, review of pertinent labs and imaging studies, discussions with consulting providers and discussions with patient/family: 35 minutes.      Rickie Painter MD  Department of Hospital Medicine   [facility] [place] [facility] [facility]

## 2024-08-14 NOTE — PLAN OF CARE
This patient has been screened for Case Management needs.  Based on (documentation in medical record/communication with attending physician/communication with nursing), patient's treatment in ICU is ongoing.  Patient is off precedex.  Psych eval completed. Continue tx-patient on abx.    Discharge plan:  Home    Case Management/Social Work remains available if a need arises, please enter consult for assistance.

## 2024-08-14 NOTE — NURSING
[facility] [place]es [place] -- 4 Eyes      8/14/2024       Skin assessed on: Q Shift      [x] No Pressure Injuries Present    [x]Prevention Measures Documented    [] Yes LDA  for Pressure Injury Previously documented     [] Yes New Pressure Injury Discovered   [] LDA for New Pressure Injury Added      Attending RN:  Mandi Chen, RN     Second RN:  Zara

## 2024-08-14 NOTE — PROGRESS NOTES
[facility] Progress Note    Reason for consult: sudden hematemesis and MS change, perhaps MW tear.    PCP:   Martin Rios       LOS: 2        Initial History of Present Illness (HPI):  This is a 34 y.o. female consulted to GI service for sudden hematemesis and MS change, perhaps MW tear. PMH 1 diabetes with gastroparesis/neuropathy, developmental delay, schizoaffective disorder, polysubstance use disorder (cocaine, amphetamines, opiates, marijuana), tobacco use disorder and GERD. Patient is non communicative at this time and writing severely, being transported to CT scan. Information from chart.   DKA 2/2 sepsis. Bacteremia with GPC most likely due to repeat finger sticks without alcohol swabs and without changing finger stick needle, low supplies. Has had +meth and +cocaine in her system in past, adamently denies IVDU says only smokes with glass pipe. Only MJ on this tox screen. Gap closed and bicarb back up, will switch to subcut insulin. Qtc long, hold anti-nausea meds. ECHO ordered to r/o veg. National shortage, will order bl cx q24-48h. Also with clue cells/bact vag will add flagyl, continue Vanc + ceft until speciation/sens.   [facility]osp [facility] reports patient with persistent n/v and progress to blood streaked vomiting, then progressed to hematemesis with suspected mental status change.     Hgb 10.3    Interval Hx  Patient awake and alert, denies vomiting or abdominal pain. Reports leg pain. Hgb improved.     Medical History:  has a past medical history of Anemia, Borderline intellectual functioning (2017), Diabetes mellitus, Insulin overdose (2024), Mood disorder, Scoliosis, and Substance use disorder.    Surgical History:  has a past surgical history that includes  section.      Objective Findings:    Vital Signs:  Temp:  [96.3 °F (35.7 °C)-98.2 °F (36.8 °C)]   Pulse:  [68-86]   Resp:  [11-27]   BP: ()/(57-87)   SpO2:  [99 %-100 %]   Body mass index is 19.72  kg/m².      Physical Exam  Vitals and nursing note reviewed.   Constitutional:       General: She is in acute distress.      Appearance: She is underweight. She is ill-appearing.   HENT:      Head: Normocephalic.   Pulmonary:      Effort: Pulmonary effort is normal.   Abdominal:      General: Bowel sounds are normal.      Palpations: Abdomen is soft.   Skin:     General: Skin is warm and dry.   Neurological:      Mental Status: She is disoriented.   Psychiatric:         Attention and Perception: She is inattentive.         Speech: She is noncommunicative.               Labs:  Lab Results   Component Value Date    WBC 7.38 08/14/2024    HGB 10.3 (L) 08/14/2024    HCT 32.3 (L) 08/14/2024     08/14/2024    ALT 14 08/12/2024    AST 22 08/12/2024     08/14/2024    K 3.9 08/14/2024     (H) 08/14/2024    CREATININE 0.8 08/14/2024    BUN 7 08/14/2024    CO2 18 (L) 08/14/2024    TSH 1.77 06/19/2024    INR 0.9 03/01/2024    HGBA1C 10.7 (H) 08/12/2024              Hematemesis.Altered mental status. Nausea and vomiting. Bacteremia. DKA.   Plan/ Recommendations:  1.  Patient oriented and alert today. Denies any vomiting or abdominal pain this am. CT negative for acute findings. Hgb improved. Continue  ppi bid x8 weeks, ok to transition to po. Patient unable to utilize antiemetics to to prolonged qtc. Rec ativan and benadryl to optimize nausea and vomiting. No plan for urgent inpatient endoscopy at this time.    Will sign off    Thank you so much for allowing us to participate in the care of Hollie Wilson . Please contact us if you have any additional questions.    Xin Miranda NP  Gastroenterology  [facility] [place] [facility]
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
## 2024-08-14 NOTE — PLAN OF CARE
Problem: Adult Inpatient Plan of Care  Goal: Plan of Care Review  Outcome: Progressing  Goal: Patient-Specific Goal (Individualized)  Outcome: Progressing  Goal: Absence of Hospital-Acquired Illness or Injury  Outcome: Progressing  Goal: Optimal Comfort and Wellbeing  Outcome: Progressing  Goal: Readiness for Transition of Care  Outcome: Progressing     Problem: Diabetic Ketoacidosis  Goal: Optimal Coping  Outcome: Progressing  Goal: Fluid and Electrolyte Balance with Absence of Ketosis  Outcome: Progressing     Problem: Diabetes Comorbidity  Goal: Blood Glucose Level Within Targeted Range  Outcome: Progressing     Problem: Acute Kidney Injury/Impairment  Goal: Fluid and Electrolyte Balance  Outcome: Progressing  Goal: Improved Oral Intake  Outcome: Progressing  Goal: Effective Renal Function  Outcome: Progressing     Problem: Infection  Goal: Absence of Infection Signs and Symptoms  Outcome: Progressing     Problem: Skin Injury Risk Increased  Goal: Skin Health and Integrity  Outcome: Progressing

## 2024-08-14 NOTE — ASSESSMENT & PLAN NOTE
33y/o F with hx of polysubstance use disorder, T1DM presented with DKA and found to have alpha hemolytic strep in bacteremia.     BCx 8/12 strep sp in 3/4 bottles.  TTE w/o veg.    Recommendations:  --f/u ID of strep and results of repeat BCx  --continue ceftriaxone

## 2024-08-14 NOTE — CONSULTS
[facility] - Intensive Care  Infectious Disease  Consult Note    Patient Name: Hollie Wilson  MRN: [id number]  Admission Date: 8/12/2024  Hospital Length of Stay: 2 days  Attending Physician: Rickie Painter MD  Primary Care Provider: Martin Rios MD     Isolation Status: No active isolations    Patient information was obtained from patient, past medical records, and ER records.      Inpatient consult to Infectious Diseases  Consult performed by: Lyric Umana MD  Consult ordered by: Rickie Painter MD        Assessment/Plan:     ID  * Bacteremia due to Gram-positive bacteria  33y/o F with hx of polysubstance use disorder, T1DM presented with DKA and found to have alpha hemolytic strep viridans in bacteremia.     BCx 8/12 strep sp in 3/4 bottles.  TTE w/o veg. CT a/p with L hydronephrosis, bladder thickening. Source of bacteremia likely skin (injections?) vs odontogenic?.    Recommendations:  --f/u  results of repeat BCx  --continue ceftriaxone  --hiv and hep c ab with AM labs      Hx of bacterial vaginosis  3/26/24 vaginal screen with clue cells and trichomonas. No recent vaginal swab/ probe on epic.       Thank you for your consult. I will follow-up with patient. Please contact us if you have any additional questions.    Lyric Umana MD  Infectious Disease  [facility] - Intensive Care    Subjective:     Principal Problem: Bacteremia due to Gram-positive bacteria    HPI: 33y/o F pt with hx of type 1 diabetes with gastroparesis/neuropathy, developmental delay, schizoaffective disorder, polysubstance use disorder, tobacco use disorder and GERD who presented to [facility] ER on 08/12/2024 for further evaluation of vomiting.     Pt reported 2 days of nausea and vomiting with associated subjective fevers.  She also has had 2-3 weeks low atraumatic back pain.  Denies cough, sob, skin breakdown, joint pains, dental issues. Denies IVDU.    In the ED pt was afebrile. Labs remarkable for   blood glucose 663 creatinine 1.5 potassium 5.5 beta hydroxy 2.4 pH 7.278 UA without leukocytes or nitrites chest x-ray without acute process.       ID consulted for: bacteremia GAS and bacterial vaginosis     Past Medical History:   Diagnosis Date    Anemia     Borderline intellectual functioning 2017    Diabetes mellitus     Insulin overdose 2024    Mood disorder     Scoliosis     Substance use disorder        Past Surgical History:   Procedure Laterality Date     SECTION         Review of patient's allergies indicates:   Allergen Reactions    Clove oil Itching    Pork/porcine containing products Other (See Comments)     Pt does not eat Pork    Zofran (as hydrochloride) [ondansetron hcl] Hives       Medications:  Medications Prior to Admission   Medication Sig    blood sugar diagnostic Strp To check BG 4 times daily, to use with insurance preferred meter    blood-glucose meter kit To check BG 4 times daily, to use with insurance preferred meter    blood-glucose meter,continuous (DEXCOM ) Misc 1 Device by Misc.(Non-Drug; Combo Route) route as needed (to check blood sugar).    glucose 4 GM chewable tablet Take 4 tablets (16 g total) by mouth as needed for Low blood sugar.    insulin aspart U-100 (NOVOLOG) 100 unit/mL (3 mL) InPn pen Inject 4 Units into the skin 3 (three) times daily as needed (blood glucose greater than 250).    insulin glargine U-100, Lantus, 100 unit/mL (3 mL) SubQ InPn pen Inject 12 Units into the skin once daily.    lancets 30 gauge Misc To check BG 4 times daily, to use with insurance preferred meter    lancets Misc To check BG 4 times daily, to use with insurance preferred meter    blood-glucose sensor (DEXCOM G7 SENSOR) Latanya 1 Device by Misc.(Non-Drug; Combo Route) route as needed (to check blood sugar).    FLUoxetine 20 MG capsule Take 3 capsules by mouth once daily.    gabapentin (NEURONTIN) 300 MG capsule Take 1 capsule by mouth 3 (three) times daily.    glucagon  "3 mg/actuation Spry 1 spray by Nasal route as needed (hypoglycemia).    metoclopramide HCl (REGLAN) 10 MG tablet Take 1 tablet (10 mg total) by mouth every 6 (six) hours as needed (nausea or vomiting).    nicotine (NICODERM CQ) 7 mg/24 hr Place 1 patch onto the skin once daily.    pantoprazole (PROTONIX) 40 MG tablet Take 1 tablet (40 mg total) by mouth every morning.    pen needle, diabetic 32 gauge x 5/32" Ndle 1 each by Misc.(Non-Drug; Combo Route) route 4 (four) times daily.    polyethylene glycol (GLYCOLAX) 17 gram PwPk Take 17 g by mouth once daily.    promethazine (PHENERGAN) 25 MG suppository Place 1 suppository (25 mg total) rectally every 6 (six) hours as needed for Nausea (For severe nausea and vomiting not improved with oral medications).     Antibiotics (From admission, onward)      Start     Stop Route Frequency Ordered    08/13/24 0930  mupirocin 2 % ointment         08/18/24 0859 Nasl 2 times daily 08/13/24 0816    08/13/24 0803  metronidazole IVPB 500 mg         08/23/24 0814 IV Every 8 hours (non-standard times) 08/13/24 0803    08/12/24 1830  cefTRIAXone (ROCEPHIN) 2 g in D5W 100 mL IVPB (MB+)         -- IV Every 24 hours (non-standard times) 08/12/24 1723          Antifungals (From admission, onward)      None          Antivirals (From admission, onward)      None               There is no immunization history on file for this patient.    Family History    None       Social History     Socioeconomic History    Marital status: Significant Other   Tobacco Use    Smoking status: Every Day     Current packs/day: 1.00     Types: Cigarettes    Smokeless tobacco: Never   Substance and Sexual Activity    Alcohol use: Not Currently    Drug use: Yes     Types: Marijuana    Sexual activity: Not Currently     Social Determinants of Health     Financial Resource Strain: Low Risk  (8/12/2024)    Overall Financial Resource Strain (CARDIA)     Difficulty of Paying Living Expenses: Not very hard   Recent Concern: " Financial Resource Strain - High Risk (6/17/2024)    Received from [facility]    Overall Financial Resource Strain (CARDIA)     Difficulty of Paying Living Expenses: Hard   Food Insecurity: Food Insecurity Present (8/12/2024)    Hunger Vital Sign     Worried About Running Out of Food in the Last Year: Sometimes true     Ran Out of Food in the Last Year: Sometimes true   Transportation Needs: No Transportation Needs (8/12/2024)    TRANSPORTATION NEEDS     Transportation : No   Recent Concern: Transportation Needs - Unmet Transportation Needs (6/15/2024)    Received from [facility]    PRAPARE - Transportation     Lack of Transportation (Medical): Yes     Lack of Transportation (Non-Medical): Yes   Physical Activity: Sufficiently Active (6/17/2024)    Received from [facility]    Exercise Vital Sign     Days of Exercise per Week: 7 days     Minutes of Exercise per Session: 60 min   Recent Concern: Physical Activity - Inactive (5/29/2024)    Exercise Vital Sign     Days of Exercise per Week: 0 days     Minutes of Exercise per Session: 0 min   Stress: No Stress Concern Present (8/12/2024)    [employer] - Occupational Stress Questionnaire     Feeling of Stress : Only a little   Recent Concern: Stress - Stress Concern Present (6/17/2024)    Received from [facility]    [employer] - Occupational Stress Questionnaire     Feeling of Stress : Very much   Housing Stability: Low Risk  (8/12/2024)    Housing Stability Vital Sign     Unable to Pay for Housing in the Last Year: No     Homeless in the Last Year: No   Recent Concern: Housing Stability - High Risk (5/30/2024)    Housing Stability Vital Sign     Unable to Pay for Housing in the Last Year: Yes     Homeless in the Last Year: Yes     Review of Systems   Constitutional:  Positive for chills, fatigue and fever.   Respiratory:  Negative for cough and shortness of breath.    Gastrointestinal:  Positive for nausea.    Genitourinary:  Negative for dysuria.   All other systems reviewed and are negative.    Objective:     Vital Signs (Most Recent):  Temp: 97.7 °F (36.5 °C) (08/14/24 0815)  Pulse: 68 (08/14/24 0900)  Resp: (!) 26 (08/14/24 0900)  BP: 110/76 (08/14/24 0900)  SpO2: 100 % (08/14/24 0900) Vital Signs (24h Range):  Temp:  [97.5 °F (36.4 °C)-98.2 °F (36.8 °C)] 97.7 °F (36.5 °C)  Pulse:  [68-86] 68  Resp:  [11-30] 26  SpO2:  [99 %-100 %] 100 %  BP: (104-215)/() 110/76     Weight: 57.1 kg (125 lb 14.1 oz)  Body mass index is 19.72 kg/m².    Estimated Creatinine Clearance: 87.6 mL/min (based on SCr of 0.8 mg/dL).     Physical Exam  Constitutional:       General: She is not in acute distress.     Appearance: Normal appearance. She is well-developed.   HENT:      Head: Normocephalic and atraumatic.   Eyes:      Conjunctiva/sclera: Conjunctivae normal.      Pupils: Pupils are equal, round, and reactive to light.   Cardiovascular:      Rate and Rhythm: Normal rate and regular rhythm.      Heart sounds: Normal heart sounds.   Pulmonary:      Effort: Pulmonary effort is normal. No respiratory distress.      Breath sounds: Normal breath sounds.   Abdominal:      General: Bowel sounds are normal. There is no distension.      Palpations: Abdomen is soft.   Musculoskeletal:         General: Normal range of motion.      Cervical back: Normal range of motion and neck supple.   Skin:     General: Skin is warm and dry.      Comments: No skin discoloration or ulceration noted.   Neurological:      General: No focal deficit present.      Mental Status: She is alert and oriented to person, place, and time.      Cranial Nerves: No cranial nerve deficit.   Psychiatric:         Mood and Affect: Mood normal.         Behavior: Behavior normal.          Significant Labs: Blood Culture:   Recent Labs   Lab 05/18/24  0221 05/18/24  0222 08/12/24  1512 08/12/24  1513   LABBLOO No growth after 5 days. No growth after 5 days. Gram stain kuldip  bottle: Gram positive cocci in chains resembling Strep  Positive results previously called 03:50  08/13/2024  Gram stain aer bottle: Gram positive cocci in chains resembling Strep  Positive results previously called 04:45  08/13/2024  ALPHA HEMOLYTIC STREP  Identification and susceptibility pending  * Gram stain aer bottle: Gram positive cocci in chains resembling Strep  Results called to and read back by: KAROLINA MORIN  03:15  08/13/2024     All pertinent labs within the past 24 hours have been reviewed.    Significant Imaging: I have reviewed all pertinent imaging results/findings within the past 24 hours.

## 2024-08-14 NOTE — HPI
35y/o F pt with hx of type 1 diabetes with gastroparesis/neuropathy, developmental delay, schizoaffective disorder, polysubstance use disorder, tobacco use disorder and GERD who presented to [facility] ER on 08/12/2024 for further evaluation of vomiting.     Pt reported 2 days of nausea and vomiting with associated subjective fevers.  She also has had 2-3 weeks low atraumatic back pain.  Denies cough, sob, skin breakdown, joint pains, dental issues. Denies IVDU.    In the ED pt was afebrile. Labs remarkable for  blood glucose 663 creatinine 1.5 potassium 5.5 beta hydroxy 2.4 pH 7.278 UA without leukocytes or nitrites chest x-ray without acute process.       ID consulted for: bacteremia GAS and bacterial vaginosis

## 2024-08-14 NOTE — NURSING
[facility]  Nurses Note -- 4 Eyes      8/13/2024       Skin assessed on: Q Shift      [x] No Pressure Injuries Present    [x]Prevention Measures Documented    [] Yes LDA  for Pressure Injury Previously documented     [] Yes New Pressure Injury Discovered   [] LDA for New Pressure Injury Added      Attending RN:  Zara Chen RN     Second RN:  ALEXANDRA Rao

## 2024-08-14 NOTE — SUBJECTIVE & OBJECTIVE
Past Medical History:   Diagnosis Date    Anemia     Borderline intellectual functioning 2017    Diabetes mellitus     Insulin overdose 2024    Mood disorder     Scoliosis     Substance use disorder        Past Surgical History:   Procedure Laterality Date     SECTION         Review of patient's allergies indicates:   Allergen Reactions    Clove oil Itching    Pork/porcine containing products Other (See Comments)     Pt does not eat Pork    Zofran (as hydrochloride) [ondansetron hcl] Hives       Medications:  Medications Prior to Admission   Medication Sig    blood sugar diagnostic Strp To check BG 4 times daily, to use with insurance preferred meter    blood-glucose meter kit To check BG 4 times daily, to use with insurance preferred meter    blood-glucose meter,continuous (DEXCOM ) Misc 1 Device by Misc.(Non-Drug; Combo Route) route as needed (to check blood sugar).    glucose 4 GM chewable tablet Take 4 tablets (16 g total) by mouth as needed for Low blood sugar.    insulin aspart U-100 (NOVOLOG) 100 unit/mL (3 mL) InPn pen Inject 4 Units into the skin 3 (three) times daily as needed (blood glucose greater than 250).    insulin glargine U-100, Lantus, 100 unit/mL (3 mL) SubQ InPn pen Inject 12 Units into the skin once daily.    lancets 30 gauge Misc To check BG 4 times daily, to use with insurance preferred meter    lancets Misc To check BG 4 times daily, to use with insurance preferred meter    blood-glucose sensor (DEXCOM G7 SENSOR) Latanya 1 Device by Misc.(Non-Drug; Combo Route) route as needed (to check blood sugar).    FLUoxetine 20 MG capsule Take 3 capsules by mouth once daily.    gabapentin (NEURONTIN) 300 MG capsule Take 1 capsule by mouth 3 (three) times daily.    glucagon 3 mg/actuation Spry 1 spray by Nasal route as needed (hypoglycemia).    metoclopramide HCl (REGLAN) 10 MG tablet Take 1 tablet (10 mg total) by mouth every 6 (six) hours as needed (nausea or vomiting).     "nicotine (NICODERM CQ) 7 mg/24 hr Place 1 patch onto the skin once daily.    pantoprazole (PROTONIX) 40 MG tablet Take 1 tablet (40 mg total) by mouth every morning.    pen needle, diabetic 32 gauge x 5/32" Ndle 1 each by Misc.(Non-Drug; Combo Route) route 4 (four) times daily.    polyethylene glycol (GLYCOLAX) 17 gram PwPk Take 17 g by mouth once daily.    promethazine (PHENERGAN) 25 MG suppository Place 1 suppository (25 mg total) rectally every 6 (six) hours as needed for Nausea (For severe nausea and vomiting not improved with oral medications).     Antibiotics (From admission, onward)      Start     Stop Route Frequency Ordered    08/13/24 0930  mupirocin 2 % ointment         08/18/24 0859 Nasl 2 times daily 08/13/24 0816    08/13/24 0803  metronidazole IVPB 500 mg         08/23/24 0814 IV Every 8 hours (non-standard times) 08/13/24 0803    08/12/24 1830  cefTRIAXone (ROCEPHIN) 2 g in D5W 100 mL IVPB (MB+)         -- IV Every 24 hours (non-standard times) 08/12/24 1723          Antifungals (From admission, onward)      None          Antivirals (From admission, onward)      None               There is no immunization history on file for this patient.    Family History    None       Social History     Socioeconomic History    Marital status: Significant Other   Tobacco Use    Smoking status: Every Day     Current packs/day: 1.00     Types: Cigarettes    Smokeless tobacco: Never   Substance and Sexual Activity    Alcohol use: Not Currently    Drug use: Yes     Types: Marijuana    Sexual activity: Not Currently     Social Determinants of Health     Financial Resource Strain: Low Risk  (8/12/2024)    Overall Financial Resource Strain (CARDIA)     Difficulty of Paying Living Expenses: Not very hard   Recent Concern: Financial Resource Strain - High Risk (6/17/2024)    Received from [facility]    Overall Financial Resource Strain (CARDIA)     Difficulty of Paying Living Expenses: Hard   Food Insecurity: Food " Insecurity Present (8/12/2024)    Hunger Vital Sign     Worried About Running Out of Food in the Last Year: Sometimes true     Ran Out of Food in the Last Year: Sometimes true   Transportation Needs: No Transportation Needs (8/12/2024)    TRANSPORTATION NEEDS     Transportation : No   Recent Concern: Transportation Needs - Unmet Transportation Needs (6/15/2024)    Received from [facility]    PRAPARE - Transportation     Lack of Transportation (Medical): Yes     Lack of Transportation (Non-Medical): Yes   Physical Activity: Sufficiently Active (6/17/2024)    Received from [facility]    Exercise Vital Sign     Days of Exercise per Week: 7 days     Minutes of Exercise per Session: 60 min   Recent Concern: Physical Activity - Inactive (5/29/2024)    Exercise Vital Sign     Days of Exercise per Week: 0 days     Minutes of Exercise per Session: 0 min   Stress: No Stress Concern Present (8/12/2024)    [employer] - Occupational Stress Questionnaire     Feeling of Stress : Only a little   Recent Concern: Stress - Stress Concern Present (6/17/2024)    Received from [facility]    [employer] - Occupational Stress Questionnaire     Feeling of Stress : Very much   Housing Stability: Low Risk  (8/12/2024)    Housing Stability Vital Sign     Unable to Pay for Housing in the Last Year: No     Homeless in the Last Year: No   Recent Concern: Housing Stability - High Risk (5/30/2024)    Housing Stability Vital Sign     Unable to Pay for Housing in the Last Year: Yes     Homeless in the Last Year: Yes     Review of Systems   Constitutional:  Positive for chills, fatigue and fever.   Respiratory:  Negative for cough and shortness of breath.    Gastrointestinal:  Positive for nausea.   Genitourinary:  Negative for dysuria.   All other systems reviewed and are negative.    Objective:     Vital Signs (Most Recent):  Temp: 97.7 °F (36.5 °C) (08/14/24 0815)  Pulse: 68 (08/14/24  0900)  Resp: (!) 26 (08/14/24 0900)  BP: 110/76 (08/14/24 0900)  SpO2: 100 % (08/14/24 0900) Vital Signs (24h Range):  Temp:  [97.5 °F (36.4 °C)-98.2 °F (36.8 °C)] 97.7 °F (36.5 °C)  Pulse:  [68-86] 68  Resp:  [11-30] 26  SpO2:  [99 %-100 %] 100 %  BP: (104-215)/() 110/76     Weight: 57.1 kg (125 lb 14.1 oz)  Body mass index is 19.72 kg/m².    Estimated Creatinine Clearance: 87.6 mL/min (based on SCr of 0.8 mg/dL).     Physical Exam  Constitutional:       General: She is not in acute distress.     Appearance: Normal appearance. She is well-developed.   HENT:      Head: Normocephalic and atraumatic.   Eyes:      Conjunctiva/sclera: Conjunctivae normal.      Pupils: Pupils are equal, round, and reactive to light.   Cardiovascular:      Rate and Rhythm: Normal rate and regular rhythm.      Heart sounds: Normal heart sounds.   Pulmonary:      Effort: Pulmonary effort is normal. No respiratory distress.      Breath sounds: Normal breath sounds.   Abdominal:      General: Bowel sounds are normal. There is no distension.      Palpations: Abdomen is soft.   Musculoskeletal:         General: Normal range of motion.      Cervical back: Normal range of motion and neck supple.   Skin:     General: Skin is warm and dry.      Comments: No skin discoloration or ulceration noted.   Neurological:      General: No focal deficit present.      Mental Status: She is alert and oriented to person, place, and time.      Cranial Nerves: No cranial nerve deficit.   Psychiatric:         Mood and Affect: Mood normal.         Behavior: Behavior normal.          Significant Labs: Blood Culture:   Recent Labs   Lab 05/18/24  0221 05/18/24  0222 08/12/24  1512 08/12/24  1513   LABBLOO No growth after 5 days. No growth after 5 days. Gram stain kuldip bottle: Gram positive cocci in chains resembling Strep  Positive results previously called 03:50  08/13/2024  Gram stain aer bottle: Gram positive cocci in chains resembling Strep  Positive  results previously called 04:45  08/13/2024  ALPHA HEMOLYTIC STREP  Identification and susceptibility pending  * Gram stain aer bottle: Gram positive cocci in chains resembling Strep  Results called to and read back by: KAROLINA MORIN  03:15  08/13/2024     All pertinent labs within the past 24 hours have been reviewed.    Significant Imaging: I have reviewed all pertinent imaging results/findings within the past 24 hours.

## 2024-08-15 LAB
ANION GAP SERPL CALC-SCNC: 10 MMOL/L (ref 8–16)
BASOPHILS # BLD AUTO: 0.03 K/UL (ref 0–0.2)
BASOPHILS NFR BLD: 0.5 % (ref 0–1.9)
BUN SERPL-MCNC: 7 MG/DL (ref 6–20)
CALCIUM SERPL-MCNC: 9.1 MG/DL (ref 8.7–10.5)
CHLORIDE SERPL-SCNC: 112 MMOL/L (ref 95–110)
CO2 SERPL-SCNC: 19 MMOL/L (ref 23–29)
CREAT SERPL-MCNC: 0.8 MG/DL (ref 0.5–1.4)
DIFFERENTIAL METHOD BLD: ABNORMAL
EOSINOPHIL # BLD AUTO: 0.3 K/UL (ref 0–0.5)
EOSINOPHIL NFR BLD: 4.4 % (ref 0–8)
ERYTHROCYTE [DISTWIDTH] IN BLOOD BY AUTOMATED COUNT: 16.5 % (ref 11.5–14.5)
EST. GFR  (NO RACE VARIABLE): >60 ML/MIN/1.73 M^2
GLUCOSE SERPL-MCNC: 121 MG/DL (ref 70–110)
HCT VFR BLD AUTO: 31.6 % (ref 37–48.5)
HCV AB SERPL QL IA: NORMAL
HGB BLD-MCNC: 10.1 G/DL (ref 12–16)
HIV 1+2 AB+HIV1 P24 AG SERPL QL IA: NORMAL
IMM GRANULOCYTES # BLD AUTO: 0.03 K/UL (ref 0–0.04)
IMM GRANULOCYTES NFR BLD AUTO: 0.5 % (ref 0–0.5)
LYMPHOCYTES # BLD AUTO: 1.6 K/UL (ref 1–4.8)
LYMPHOCYTES NFR BLD: 29 % (ref 18–48)
MAGNESIUM SERPL-MCNC: 1.7 MG/DL (ref 1.6–2.6)
MCH RBC QN AUTO: 25.6 PG (ref 27–31)
MCHC RBC AUTO-ENTMCNC: 32 G/DL (ref 32–36)
MCV RBC AUTO: 80 FL (ref 82–98)
MONOCYTES # BLD AUTO: 0.6 K/UL (ref 0.3–1)
MONOCYTES NFR BLD: 10.7 % (ref 4–15)
NEUTROPHILS # BLD AUTO: 3.1 K/UL (ref 1.8–7.7)
NEUTROPHILS NFR BLD: 54.9 % (ref 38–73)
NRBC BLD-RTO: 0 /100 WBC
PHOSPHATE SERPL-MCNC: 3.8 MG/DL (ref 2.7–4.5)
PLATELET # BLD AUTO: 310 K/UL (ref 150–450)
PMV BLD AUTO: 11.2 FL (ref 9.2–12.9)
POCT GLUCOSE: 130 MG/DL (ref 70–110)
POCT GLUCOSE: 150 MG/DL (ref 70–110)
POCT GLUCOSE: 228 MG/DL (ref 70–110)
POCT GLUCOSE: 292 MG/DL (ref 70–110)
POCT GLUCOSE: 57 MG/DL (ref 70–110)
POTASSIUM SERPL-SCNC: 3.8 MMOL/L (ref 3.5–5.1)
RBC # BLD AUTO: 3.94 M/UL (ref 4–5.4)
SODIUM SERPL-SCNC: 141 MMOL/L (ref 136–145)
WBC # BLD AUTO: 5.62 K/UL (ref 3.9–12.7)

## 2024-08-15 PROCEDURE — 21400001 HC TELEMETRY ROOM

## 2024-08-15 PROCEDURE — 25000003 PHARM REV CODE 250: Performed by: EMERGENCY MEDICINE

## 2024-08-15 PROCEDURE — 94761 N-INVAS EAR/PLS OXIMETRY MLT: CPT

## 2024-08-15 PROCEDURE — 25000003 PHARM REV CODE 250: Performed by: PHYSICIAN ASSISTANT

## 2024-08-15 PROCEDURE — 63600175 PHARM REV CODE 636 W HCPCS: Mod: JZ,JG | Performed by: STUDENT IN AN ORGANIZED HEALTH CARE EDUCATION/TRAINING PROGRAM

## 2024-08-15 PROCEDURE — S4991 NICOTINE PATCH NONLEGEND: HCPCS | Performed by: EMERGENCY MEDICINE

## 2024-08-15 PROCEDURE — 99233 SBSQ HOSP IP/OBS HIGH 50: CPT | Mod: ,,, | Performed by: INTERNAL MEDICINE

## 2024-08-15 PROCEDURE — 85025 COMPLETE CBC W/AUTO DIFF WBC: CPT | Performed by: INTERNAL MEDICINE

## 2024-08-15 PROCEDURE — 80048 BASIC METABOLIC PNL TOTAL CA: CPT | Performed by: INTERNAL MEDICINE

## 2024-08-15 PROCEDURE — 25000003 PHARM REV CODE 250: Performed by: STUDENT IN AN ORGANIZED HEALTH CARE EDUCATION/TRAINING PROGRAM

## 2024-08-15 PROCEDURE — 63600175 PHARM REV CODE 636 W HCPCS: Performed by: PHYSICIAN ASSISTANT

## 2024-08-15 PROCEDURE — 86803 HEPATITIS C AB TEST: CPT | Performed by: INTERNAL MEDICINE

## 2024-08-15 PROCEDURE — 84100 ASSAY OF PHOSPHORUS: CPT | Performed by: INTERNAL MEDICINE

## 2024-08-15 PROCEDURE — 36415 COLL VENOUS BLD VENIPUNCTURE: CPT | Performed by: INTERNAL MEDICINE

## 2024-08-15 PROCEDURE — A4216 STERILE WATER/SALINE, 10 ML: HCPCS | Performed by: EMERGENCY MEDICINE

## 2024-08-15 PROCEDURE — 63600175 PHARM REV CODE 636 W HCPCS: Performed by: STUDENT IN AN ORGANIZED HEALTH CARE EDUCATION/TRAINING PROGRAM

## 2024-08-15 PROCEDURE — 87389 HIV-1 AG W/HIV-1&-2 AB AG IA: CPT | Performed by: INTERNAL MEDICINE

## 2024-08-15 PROCEDURE — 83735 ASSAY OF MAGNESIUM: CPT | Performed by: INTERNAL MEDICINE

## 2024-08-15 RX ORDER — FLUOXETINE HYDROCHLORIDE 20 MG/1
20 CAPSULE ORAL DAILY
Status: DISCONTINUED | OUTPATIENT
Start: 2024-08-16 | End: 2024-08-20 | Stop reason: HOSPADM

## 2024-08-15 RX ORDER — METOCLOPRAMIDE HYDROCHLORIDE 5 MG/ML
10 INJECTION INTRAMUSCULAR; INTRAVENOUS
Status: DISCONTINUED | OUTPATIENT
Start: 2024-08-15 | End: 2024-08-16

## 2024-08-15 RX ORDER — HYDROXYZINE HYDROCHLORIDE 25 MG/1
50 TABLET, FILM COATED ORAL 2 TIMES DAILY PRN
Status: DISCONTINUED | OUTPATIENT
Start: 2024-08-15 | End: 2024-08-20 | Stop reason: HOSPADM

## 2024-08-15 RX ORDER — FLUOXETINE HYDROCHLORIDE 20 MG/1
40 CAPSULE ORAL DAILY
Status: DISCONTINUED | OUTPATIENT
Start: 2024-08-29 | End: 2024-08-20 | Stop reason: HOSPADM

## 2024-08-15 RX ADMIN — CEFTRIAXONE 2 G: 2 INJECTION, POWDER, FOR SOLUTION INTRAMUSCULAR; INTRAVENOUS at 06:08

## 2024-08-15 RX ADMIN — METOCLOPRAMIDE 10 MG: 5 INJECTION, SOLUTION INTRAMUSCULAR; INTRAVENOUS at 05:08

## 2024-08-15 RX ADMIN — FLUOXETINE 20 MG: 20 CAPSULE ORAL at 08:08

## 2024-08-15 RX ADMIN — METRONIDAZOLE 500 MG: 500 INJECTION, SOLUTION INTRAVENOUS at 08:08

## 2024-08-15 RX ADMIN — INSULIN ASPART 5 UNITS: 100 INJECTION, SOLUTION INTRAVENOUS; SUBCUTANEOUS at 08:08

## 2024-08-15 RX ADMIN — PANTOPRAZOLE SODIUM 40 MG: 40 INJECTION, POWDER, FOR SOLUTION INTRAVENOUS at 08:08

## 2024-08-15 RX ADMIN — MUPIROCIN: 20 OINTMENT TOPICAL at 09:08

## 2024-08-15 RX ADMIN — PROMETHAZINE HYDROCHLORIDE 12.5 MG: 25 INJECTION INTRAMUSCULAR; INTRAVENOUS at 04:08

## 2024-08-15 RX ADMIN — HYDROXYZINE HYDROCHLORIDE 50 MG: 25 TABLET, FILM COATED ORAL at 08:08

## 2024-08-15 RX ADMIN — INSULIN GLARGINE 15 UNITS: 100 INJECTION, SOLUTION SUBCUTANEOUS at 08:08

## 2024-08-15 RX ADMIN — Medication 10 ML: at 11:08

## 2024-08-15 RX ADMIN — MUPIROCIN: 20 OINTMENT TOPICAL at 08:08

## 2024-08-15 RX ADMIN — INSULIN ASPART 5 UNITS: 100 INJECTION, SOLUTION INTRAVENOUS; SUBCUTANEOUS at 11:08

## 2024-08-15 RX ADMIN — Medication 10 ML: at 06:08

## 2024-08-15 RX ADMIN — Medication 10 ML: at 05:08

## 2024-08-15 RX ADMIN — Medication 10 ML: at 09:08

## 2024-08-15 RX ADMIN — PANTOPRAZOLE SODIUM 40 MG: 40 INJECTION, POWDER, FOR SOLUTION INTRAVENOUS at 09:08

## 2024-08-15 RX ADMIN — Medication 16 G: at 04:08

## 2024-08-15 RX ADMIN — INSULIN ASPART 4 UNITS: 100 INJECTION, SOLUTION INTRAVENOUS; SUBCUTANEOUS at 08:08

## 2024-08-15 RX ADMIN — METRONIDAZOLE 500 MG: 500 INJECTION, SOLUTION INTRAVENOUS at 05:08

## 2024-08-15 RX ADMIN — METOCLOPRAMIDE 10 MG: 5 INJECTION, SOLUTION INTRAMUSCULAR; INTRAVENOUS at 09:08

## 2024-08-15 RX ADMIN — METOCLOPRAMIDE 10 MG: 5 INJECTION, SOLUTION INTRAMUSCULAR; INTRAVENOUS at 11:08

## 2024-08-15 NOTE — NURSING
Patient arrived to floor from ICU. Oriented patient and  to room. Current orders reviewed.    [id number] with milk given.

## 2024-08-15 NOTE — ASSESSMENT & PLAN NOTE
KIMBERLY is likely due to pre-renal azotemia due to dehydration. Baseline creatinine is  0.9 . Most recent creatinine and eGFR are listed below.  Recent Labs     08/14/24  1155 08/14/24  1606 08/15/24  0456   CREATININE 0.9 0.9 0.8   EGFRNORACEVR >60 >60 >60        Plan  - KIMBERLY is  resolved  - Avoid nephrotoxins and renally dose meds for GFR listed above  - Monitor urine output, serial BMP, and adjust therapy as needed  - suspect related to vomiting from DKA and osmotic diuresis. Started on IVF, on IV insulin for associated mild hyperkalemia.

## 2024-08-15 NOTE — SUBJECTIVE & OBJECTIVE
Interval History: remained afebrile overnight. Notes having had nausea overnight.     Review of Systems   Constitutional:  Positive for chills and fatigue. Negative for fever.   Respiratory:  Negative for cough and shortness of breath.    Gastrointestinal:  Positive for nausea.   Genitourinary:  Negative for dysuria.   All other systems reviewed and are negative.    Objective:     Vital Signs (Most Recent):  Temp: 98.8 °F (37.1 °C) (08/15/24 1117)  Pulse: 90 (08/15/24 1117)  Resp: 16 (08/15/24 1117)  BP: 120/75 (08/15/24 1117)  SpO2: 98 % (08/15/24 1117) Vital Signs (24h Range):  Temp:  [96.9 °F (36.1 °C)-98.8 °F (37.1 °C)] 98.8 °F (37.1 °C)  Pulse:  [76-94] 90  Resp:  [16-24] 16  SpO2:  [98 %-100 %] 98 %  BP: (103-137)/(59-92) 120/75     Weight: 57.1 kg (125 lb 14.1 oz)  Body mass index is 19.72 kg/m².    Estimated Creatinine Clearance: 87.6 mL/min (based on SCr of 0.8 mg/dL).     Physical Exam  Constitutional:       General: She is not in acute distress.     Appearance: Normal appearance. She is well-developed.   HENT:      Head: Normocephalic and atraumatic.   Eyes:      Conjunctiva/sclera: Conjunctivae normal.      Pupils: Pupils are equal, round, and reactive to light.   Cardiovascular:      Rate and Rhythm: Normal rate and regular rhythm.      Heart sounds: Normal heart sounds.   Pulmonary:      Effort: Pulmonary effort is normal. No respiratory distress.      Breath sounds: Normal breath sounds.   Abdominal:      General: Bowel sounds are normal. There is no distension.      Palpations: Abdomen is soft.   Musculoskeletal:         General: Normal range of motion.      Cervical back: Normal range of motion and neck supple.   Skin:     General: Skin is warm and dry.      Comments: No skin discoloration or ulceration noted.   Neurological:      General: No focal deficit present.      Mental Status: She is alert and oriented to person, place, and time.      Cranial Nerves: No cranial nerve deficit.   Psychiatric:          Mood and Affect: Mood normal.         Behavior: Behavior normal.          Significant Labs: Blood Culture:   Recent Labs   Lab 05/18/24  0222 08/12/24  1512 08/12/24  1513 08/14/24  0432 08/14/24  0433   LABBLOO No growth after 5 days. Gram stain kuldip bottle: Gram positive cocci in chains resembling Strep  Positive results previously called 03:50  08/13/2024  Gram stain aer bottle: Gram positive cocci in chains resembling Strep  Positive results previously called 04:45  08/13/2024  VIRIDANS STREPTOCOCCUS GROUP* Gram stain aer bottle: Gram positive cocci in chains resembling Strep  Results called to and read back by: KAROLINA MORIN  03:15  08/13/2024  VIRIDANS STREPTOCOCCUS GROUP  For susceptibility see order # [id number]  * No Growth to date  No Growth to date No Growth to date  No Growth to date     All pertinent labs within the past 24 hours have been reviewed.    Significant Imaging: I have reviewed all pertinent imaging results/findings within the past 24 hours.

## 2024-08-15 NOTE — PROGRESS NOTES
[facility] Medicine  Progress Note    Patient Name: Hollie Wilson  MRN: [id number]  Patient Class: IP- Inpatient   Admission Date: 8/12/2024  Length of Stay: 3 days  Attending Physician: Santhosh Anand MD  Primary Care Provider: Martin Rios MD        Subjective:     Principal Problem:Bacteremia due to Gram-positive bacteria        HPI:  Patient is a 34-year-old female with past medical history of type 1 diabetes with gastroparesis/neuropathy, developmental delay, schizoaffective disorder, polysubstance use disorder (cocaine, amphetamines, opiates, marijuana), tobacco use disorder and GERD who presented to [facility] ER on 08/12/2024 for further evaluation of vomiting.  She reports 2 days of associated nausea and vomiting with associated subjective fevers.  She finds her nausea has improved with treatment in the ER.  She reports she was not had her home insulin last 2 days as she was not had food in her house and was afraid to take it.  She also has had 2-3 weeks low atraumatic back pain.  She reports she was smokes marijuana and cigarettes, but denies other recreational drug use.  She denies leg swelling melena hematuria hematemesis incontinence numbness weakness in extremity.      During evaluation in the ER, patient is hemodynamically stable anion gap 19 CO2 15 blood glucose 663 creatinine 1.5 potassium 5.5 beta hydroxy 2.4 pH 7.278 UA without leukocytes or nitrites chest x-ray without acute process.    Overview/Hospital Course:  Ms. Martin is a 34-year-old female who was admitted with DKA secondary to sepsis.  She was found to have bacteremia with strep viridans.  She denies IV drug use.  She was started on IV antibiotics, IV fluids and IV insulin.  She was then transitioned to subcutaneous insulin.  Echocardiogram was ordered with out any vegetations.  Repeat cultures negative to date.  Currently on Flagyl for bacterial vaginosis.    Had neurological change on 08/13 and mild  hematemesis.  CT head with no acute issues and CT abdomen and pelvis without any concerning pathology.  She does have history of gastroparesis and reports being on Reglan.  Id is following.    Interval History:  No acute issues, she feels nauseated and feels that even drinking water it comes back up.  She reports being on Reglan at home and this has helped her in the past.    Review of Systems  Objective:     Vital Signs (Most Recent):  Temp: 98.8 °F (37.1 °C) (08/15/24 1117)  Pulse: 90 (08/15/24 1117)  Resp: 16 (08/15/24 1117)  BP: 120/75 (08/15/24 1117)  SpO2: 98 % (08/15/24 1117) Vital Signs (24h Range):  Temp:  [96.9 °F (36.1 °C)-98.8 °F (37.1 °C)] 98.8 °F (37.1 °C)  Pulse:  [76-94] 90  Resp:  [16-24] 16  SpO2:  [98 %-100 %] 98 %  BP: (103-137)/(59-92) 120/75     Weight: 57.1 kg (125 lb 14.1 oz)  Body mass index is 19.72 kg/m².    Intake/Output Summary (Last 24 hours) at 8/15/2024 1316  Last data filed at 8/15/2024 0830  Gross per 24 hour   Intake 655.52 ml   Output 500 ml   Net 155.52 ml         Physical Exam  Vitals and nursing note reviewed.   Constitutional:       General: She is not in acute distress.     Appearance: She is ill-appearing.   Cardiovascular:      Rate and Rhythm: Normal rate and regular rhythm.      Pulses: Normal pulses.   Pulmonary:      Effort: Pulmonary effort is normal.   Abdominal:      General: Abdomen is flat. There is no distension.      Palpations: Abdomen is soft.      Tenderness: There is no abdominal tenderness.   Musculoskeletal:         General: No swelling.   Skin:     General: Skin is warm.   Neurological:      General: No focal deficit present.      Mental Status: She is alert and oriented to person, place, and time.   Psychiatric:         Mood and Affect: Mood normal.         Thought Content: Thought content normal.             Significant Labs: All pertinent labs within the past 24 hours have been reviewed.    Significant Imaging: I have reviewed all pertinent imaging  results/findings within the past 24 hours.    Assessment/Plan:      * Bacteremia due to Gram-positive bacteria  Likely cause of DKA  GPC in multiple bottles - strep viridans growing  Repeat cultures negative to date  TTE with no obvious vegetations  Currently on ceftriaxone, suspect will need about 2 weeks      Fever  Presents with fever of 100.9, and fond to be in DKA. Reports intermittent fevers for last 2 days. Also endorses low back pain without red flag symptoms. UA and chest x-ray without source.   Will start Rocephin/Vanc pending cultures  Blood cultures pending  Check COVID/Flu  Should fever continue to be persistent will check imaging of spine given history of polysubstance use and back pain    Schizoaffective disorder  Continue home prozac/vistaril    Prolonged Q-T interval on ECG  Chronically prolonged near 500 per chart review.  Will monitor on telemetry, and attempt to minimize anti-emetics to as needed for DKA  Qtc chronically prolonged and with chronic RBBB which will cause QRS widening  - monitor closely with reglan      DKA (diabetic ketoacidoses)  Presents with Nausea/vomiting. pH 7.278 on arrival with beta of 2.4, anion gap 19, CO2 15. Reports did not have insulin for last 2 days as endorses food insecurity at home and missed insulin due to this. Also arrived febrile.   Started on IV insulin with IVF  Repeat BMP q4  Repeat A1c  RESOLVED, switched to subcut insulin    KIMBERLY (acute kidney injury)  KIMBERLY is likely due to pre-renal azotemia due to dehydration. Baseline creatinine is  0.9 . Most recent creatinine and eGFR are listed below.  Recent Labs     08/14/24  1155 08/14/24  1606 08/15/24  0456   CREATININE 0.9 0.9 0.8   EGFRNORACEVR >60 >60 >60        Plan  - KIMBERLY is  resolved  - Avoid nephrotoxins and renally dose meds for GFR listed above  - Monitor urine output, serial BMP, and adjust therapy as needed  - suspect related to vomiting from DKA and osmotic diuresis. Started on IVF, on IV insulin for  associated mild hyperkalemia.     Tobacco abuse  Smokes less than 1/2ppd. Greater than 3min spent counseling patient on dangers of continued tobacco abuse    Polysubstance abuse  She reports THC use only, denies other recreational drug use.   Check UDS, counseled on THC cessation      VTE Risk Mitigation (From admission, onward)           Ordered     enoxaparin injection 40 mg  Every 24 hours         08/13/24 0144     IP VTE LOW RISK PATIENT  Once         08/12/24 1558     Place TRISTAN hose  Until discontinued         08/12/24 1558                    Discharge Planning   THELMA:      Code Status: Full Code   Is the patient medically ready for discharge?:     Reason for patient still in hospital (select all that apply): Treatment  Discharge Plan A: Home                  Santhosh Anand MD  Department of Hospital Medicine   [facility]

## 2024-08-15 NOTE — SUBJECTIVE & OBJECTIVE
Interval History:  No acute issues, she feels nauseated and feels that even drinking water it comes back up.  She reports being on Reglan at home and this has helped her in the past.    Review of Systems  Objective:     Vital Signs (Most Recent):  Temp: 98.8 °F (37.1 °C) (08/15/24 1117)  Pulse: 90 (08/15/24 1117)  Resp: 16 (08/15/24 1117)  BP: 120/75 (08/15/24 1117)  SpO2: 98 % (08/15/24 1117) Vital Signs (24h Range):  Temp:  [96.9 °F (36.1 °C)-98.8 °F (37.1 °C)] 98.8 °F (37.1 °C)  Pulse:  [76-94] 90  Resp:  [16-24] 16  SpO2:  [98 %-100 %] 98 %  BP: (103-137)/(59-92) 120/75     Weight: 57.1 kg (125 lb 14.1 oz)  Body mass index is 19.72 kg/m².    Intake/Output Summary (Last 24 hours) at 8/15/2024 1316  Last data filed at 8/15/2024 0830  Gross per 24 hour   Intake 655.52 ml   Output 500 ml   Net 155.52 ml         Physical Exam  Vitals and nursing note reviewed.   Constitutional:       General: She is not in acute distress.     Appearance: She is ill-appearing.   Cardiovascular:      Rate and Rhythm: Normal rate and regular rhythm.      Pulses: Normal pulses.   Pulmonary:      Effort: Pulmonary effort is normal.   Abdominal:      General: Abdomen is flat. There is no distension.      Palpations: Abdomen is soft.      Tenderness: There is no abdominal tenderness.   Musculoskeletal:         General: No swelling.   Skin:     General: Skin is warm.   Neurological:      General: No focal deficit present.      Mental Status: She is alert and oriented to person, place, and time.   Psychiatric:         Mood and Affect: Mood normal.         Thought Content: Thought content normal.             Significant Labs: All pertinent labs within the past 24 hours have been reviewed.    Significant Imaging: I have reviewed all pertinent imaging results/findings within the past 24 hours.

## 2024-08-15 NOTE — PROGRESS NOTES
[facility] [place] [facility] [place]  Infectious Disease  Progress Note    Patient Name: Hollie Wilson  MRN: [id number]  Admission Date: 8/12/2024  Length of Stay: 3 days  Attending Physician: Santhosh Anand MD  Primary Care Provider: Martin Rios MD    Isolation Status: No active isolations  Assessment/Plan:      ID  * Bacteremia due to Gram-positive bacteria  35y/o F with hx of polysubstance use disorder, T1DM presented with DKA and found to have strep viridans bacteremia. Suspect bacteremia due to gut translocation (N/V at home) vs skin vs oral cavity.    BCx 8/12 strep sp in 3/4 bottles.  TTE w/o veg. CT a/p with bladder wall thickening and L hydronephrosis, no bowel inflammation.    Recommendations:    --continue ceftriaxone 2g IV q 24h x 14 days from date of neg blood cx. Last day 9/28.    --monitor weekly cbc, cmp while on IV abx    --pt is not an opat candidate. Will need placement for IV abx therapy.     --f/u HIV and Hep C ab          A/p discussed with primary team.     Anticipated Disposition: tbd    Thank you for your consult. I will sign off. Please contact us if you have any additional questions.    Lyric Umana MD  Infectious Disease  [facility] [place] [facility]    Subjective:     Principal Problem:Bacteremia due to Gram-positive bacteria    HPI: 35y/o F pt with hx of type 1 diabetes with gastroparesis/neuropathy, developmental delay, schizoaffective disorder, polysubstance use disorder, tobacco use disorder and GERD who presented to [facility] ER on 08/12/2024 for further evaluation of vomiting.     Pt reported 2 days of nausea and vomiting with associated subjective fevers.  She also has had 2-3 weeks low atraumatic back pain.  Denies cough, sob, skin breakdown, joint pains, dental issues. Denies IVDU.    In the ED pt was afebrile. Labs remarkable for  blood glucose 663 creatinine 1.5 potassium 5.5 beta hydroxy 2.4 pH 7.278 UA without leukocytes or nitrites chest x-ray without acute process.        ID consulted for: bacteremia GAS and bacterial vaginosis   Interval History: remained afebrile overnight. Notes having had nausea overnight.     Review of Systems   Constitutional:  Positive for chills and fatigue. Negative for fever.   Respiratory:  Negative for cough and shortness of breath.    Gastrointestinal:  Positive for nausea.   Genitourinary:  Negative for dysuria.   All other systems reviewed and are negative.    Objective:     Vital Signs (Most Recent):  Temp: 98.8 °F (37.1 °C) (08/15/24 1117)  Pulse: 90 (08/15/24 1117)  Resp: 16 (08/15/24 1117)  BP: 120/75 (08/15/24 1117)  SpO2: 98 % (08/15/24 1117) Vital Signs (24h Range):  Temp:  [96.9 °F (36.1 °C)-98.8 °F (37.1 °C)] 98.8 °F (37.1 °C)  Pulse:  [76-94] 90  Resp:  [16-24] 16  SpO2:  [98 %-100 %] 98 %  BP: (103-137)/(59-92) 120/75     Weight: 57.1 kg (125 lb 14.1 oz)  Body mass index is 19.72 kg/m².    Estimated Creatinine Clearance: 87.6 mL/min (based on SCr of 0.8 mg/dL).     Physical Exam  Constitutional:       General: She is not in acute distress.     Appearance: Normal appearance. She is well-developed.   HENT:      Head: Normocephalic and atraumatic.   Eyes:      Conjunctiva/sclera: Conjunctivae normal.      Pupils: Pupils are equal, round, and reactive to light.   Cardiovascular:      Rate and Rhythm: Normal rate and regular rhythm.      Heart sounds: Normal heart sounds.   Pulmonary:      Effort: Pulmonary effort is normal. No respiratory distress.      Breath sounds: Normal breath sounds.   Abdominal:      General: Bowel sounds are normal. There is no distension.      Palpations: Abdomen is soft.   Musculoskeletal:         General: Normal range of motion.      Cervical back: Normal range of motion and neck supple.   Skin:     General: Skin is warm and dry.      Comments: No skin discoloration or ulceration noted.   Neurological:      General: No focal deficit present.      Mental Status: She is alert and oriented to person, place, and time.       Cranial Nerves: No cranial nerve deficit.   Psychiatric:         Mood and Affect: Mood normal.         Behavior: Behavior normal.          Significant Labs: Blood Culture:   Recent Labs   Lab 05/18/24  0222 08/12/24  1512 08/12/24  1513 08/14/24  0432 08/14/24  0433   LABBLOO No growth after 5 days. Gram stain kuldip bottle: Gram positive cocci in chains resembling Strep  Positive results previously called 03:50  08/13/2024  Gram stain aer bottle: Gram positive cocci in chains resembling Strep  Positive results previously called 04:45  08/13/2024  [facility]* Gram stain aer bottle: Gram positive cocci in chains resembling Strep  Results called to and read back by: KAROLINA MORIN  03:15  08/13/2024  [facility]  For susceptibility see order # [id number]  * No Growth to date  No Growth to date No Growth to date  No Growth to date     All pertinent labs within the past 24 hours have been reviewed.    Significant Imaging: I have reviewed all pertinent imaging results/findings within the past 24 hours.   general general

## 2024-08-15 NOTE — ASSESSMENT & PLAN NOTE
35y/o F with hx of polysubstance use disorder, T1DM presented with DKA and found to have strep viridans bacteremia. Suspect bacteremia due to gut translocation (N/V at home) vs skin vs oral cavity.    BCx 8/12 strep sp in 3/4 bottles.  TTE w/o veg. CT a/p with bladder wall thickening and L hydronephrosis, no bowel inflammation.    Recommendations:    --continue ceftriaxone 2g IV q 24h x 14 days from date of neg blood cx. Last day 9/28.    --monitor weekly cbc, cmp while on IV abx    --pt is not an opat candidate. Will need placement for IV abx therapy.

## 2024-08-15 NOTE — PLAN OF CARE
Problem: Adult Inpatient Plan of Care  Goal: Plan of Care Review  Outcome: Progressing  Flowsheets (Taken 8/15/2024 2878)  Plan of Care Reviewed With: patient    Patient remains free from injury and falls this shift. BG 54 at start of shift. Resolved after eating cereal. IV abx administered as ordered. Patient resting comfortably, able to make needs known. Plan of care continued.

## 2024-08-15 NOTE — PLAN OF CARE
Problem: Adult Inpatient Plan of Care  Goal: Plan of Care Review  Outcome: Progressing  Flowsheets (Taken 8/15/2024 1157)  Plan of Care Reviewed With: patient  Goal: Absence of Hospital-Acquired Illness or Injury  Outcome: Progressing  Intervention: Identify and Manage Fall Risk  Flowsheets (Taken 8/15/2024 1157)  Safety Promotion/Fall Prevention:   assistive device/personal item within reach   instructed to call staff for mobility   nonskid shoes/socks when out of bed   room near unit station   side rails raised x 2   medications reviewed  Goal: Optimal Comfort and Wellbeing  Outcome: Progressing  Intervention: Monitor Pain and Promote Comfort  Flowsheets (Taken 8/15/2024 1157)  Pain Management Interventions:   care clustered   quiet environment facilitated   relaxation techniques promoted  Intervention: Provide Person-Centered Care  Flowsheets (Taken 8/15/2024 1157)  Trust Relationship/Rapport:   care explained   choices provided   emotional support provided   empathic listening provided   questions answered   questions encouraged   reassurance provided   thoughts/feelings acknowledged

## 2024-08-15 NOTE — NURSING TRANSFER
Nursing Transfer Note      8/14/2024   8:04 PM    Reason patient is being transferred: No longer in need of ICU/CCU    [facility]    Transfer via wheelchair    Transfer with cardiac monitoring    Transported by ICU RN    Order for Tele Monitor? Yes    4eyes on Skin: yes    Patient belongings transferred with patient: Yes    Chart send with patient: Yes    Notified: spouse    Upon arrival to floor: cardiac monitor applied, patient oriented to room, call bell in reach, and bed in lowest position

## 2024-08-15 NOTE — NURSING
.[facility], [facility] [place]  Nurses Note -- 4 Eyes      8/14/2024      Skin assessed on: Transfer      [x] No Pressure Injuries Present    []Prevention Measures Documented    [] Yes LDA  for Pressure Injury Previously documented     [] Yes New Pressure Injury Discovered   [] LDA for New Pressure Injury Added      Attending RN:  Asia Zazueta, RN     Second RN:  ICU, RN

## 2024-08-15 NOTE — NURSING
[facility], [facility]  Nurses Note -- 4 Eyes      8/14/2024       Skin assessed on: Q Shift      [x] No Pressure Injuries Present    [x]Prevention Measures Documented    [] Yes LDA  for Pressure Injury Previously documented     [] Yes New Pressure Injury Discovered   [] LDA for New Pressure Injury Added      Attending RN:  Cinda Prescott RN     Second RN:  ALEXANDRA Raymond

## 2024-08-15 NOTE — NURSING
[facility], [facility] [place]  Nurses Note -- 4 Eyes      8/15/2024       Skin assessed on: Q Shift      [x] No Pressure Injuries Present    []Prevention Measures Documented    [] Yes LDA  for Pressure Injury Previously documented     [] Yes New Pressure Injury Discovered   [] LDA for New Pressure Injury Added      Attending RN:  Janice Villagomez RN     Second RN:  ALEXANDRA Betancourt

## 2024-08-16 LAB
POCT GLUCOSE: 113 MG/DL (ref 70–110)
POCT GLUCOSE: 144 MG/DL (ref 70–110)
POCT GLUCOSE: 227 MG/DL (ref 70–110)
POCT GLUCOSE: 228 MG/DL (ref 70–110)

## 2024-08-16 PROCEDURE — 25000003 PHARM REV CODE 250: Performed by: PHYSICIAN ASSISTANT

## 2024-08-16 PROCEDURE — A4216 STERILE WATER/SALINE, 10 ML: HCPCS | Performed by: EMERGENCY MEDICINE

## 2024-08-16 PROCEDURE — 94761 N-INVAS EAR/PLS OXIMETRY MLT: CPT

## 2024-08-16 PROCEDURE — 21400001 HC TELEMETRY ROOM

## 2024-08-16 PROCEDURE — 63600175 PHARM REV CODE 636 W HCPCS: Performed by: PHYSICIAN ASSISTANT

## 2024-08-16 PROCEDURE — 25000003 PHARM REV CODE 250: Performed by: EMERGENCY MEDICINE

## 2024-08-16 PROCEDURE — 25000003 PHARM REV CODE 250: Performed by: STUDENT IN AN ORGANIZED HEALTH CARE EDUCATION/TRAINING PROGRAM

## 2024-08-16 PROCEDURE — 63600175 PHARM REV CODE 636 W HCPCS: Performed by: STUDENT IN AN ORGANIZED HEALTH CARE EDUCATION/TRAINING PROGRAM

## 2024-08-16 RX ORDER — PANTOPRAZOLE SODIUM 40 MG/1
40 TABLET, DELAYED RELEASE ORAL DAILY
Status: DISCONTINUED | OUTPATIENT
Start: 2024-08-17 | End: 2024-08-20 | Stop reason: HOSPADM

## 2024-08-16 RX ORDER — METOCLOPRAMIDE HYDROCHLORIDE 5 MG/ML
5 INJECTION INTRAMUSCULAR; INTRAVENOUS
Status: DISCONTINUED | OUTPATIENT
Start: 2024-08-16 | End: 2024-08-16

## 2024-08-16 RX ADMIN — MUPIROCIN: 20 OINTMENT TOPICAL at 08:08

## 2024-08-16 RX ADMIN — INSULIN ASPART 5 UNITS: 100 INJECTION, SOLUTION INTRAVENOUS; SUBCUTANEOUS at 04:08

## 2024-08-16 RX ADMIN — METOCLOPRAMIDE 5 MG: 5 INJECTION, SOLUTION INTRAMUSCULAR; INTRAVENOUS at 11:08

## 2024-08-16 RX ADMIN — FLUOXETINE 20 MG: 20 CAPSULE ORAL at 07:08

## 2024-08-16 RX ADMIN — METRONIDAZOLE 500 MG: 500 INJECTION, SOLUTION INTRAVENOUS at 08:08

## 2024-08-16 RX ADMIN — INSULIN ASPART 4 UNITS: 100 INJECTION, SOLUTION INTRAVENOUS; SUBCUTANEOUS at 04:08

## 2024-08-16 RX ADMIN — CEFTRIAXONE 2 G: 2 INJECTION, POWDER, FOR SOLUTION INTRAMUSCULAR; INTRAVENOUS at 06:08

## 2024-08-16 RX ADMIN — METRONIDAZOLE 500 MG: 500 INJECTION, SOLUTION INTRAVENOUS at 04:08

## 2024-08-16 RX ADMIN — Medication 10 ML: at 07:08

## 2024-08-16 RX ADMIN — ACETAMINOPHEN 650 MG: 325 TABLET ORAL at 08:08

## 2024-08-16 RX ADMIN — Medication 10 ML: at 11:08

## 2024-08-16 RX ADMIN — PANTOPRAZOLE SODIUM 40 MG: 40 INJECTION, POWDER, FOR SOLUTION INTRAVENOUS at 08:08

## 2024-08-16 RX ADMIN — METRONIDAZOLE 500 MG: 500 INJECTION, SOLUTION INTRAVENOUS at 12:08

## 2024-08-16 RX ADMIN — INSULIN ASPART 5 UNITS: 100 INJECTION, SOLUTION INTRAVENOUS; SUBCUTANEOUS at 11:08

## 2024-08-16 RX ADMIN — METOCLOPRAMIDE 10 MG: 5 INJECTION, SOLUTION INTRAMUSCULAR; INTRAVENOUS at 06:08

## 2024-08-16 RX ADMIN — Medication 10 ML: at 12:08

## 2024-08-16 RX ADMIN — INSULIN ASPART 4 UNITS: 100 INJECTION, SOLUTION INTRAVENOUS; SUBCUTANEOUS at 07:08

## 2024-08-16 RX ADMIN — INSULIN ASPART 5 UNITS: 100 INJECTION, SOLUTION INTRAVENOUS; SUBCUTANEOUS at 07:08

## 2024-08-16 RX ADMIN — INSULIN GLARGINE 15 UNITS: 100 INJECTION, SOLUTION SUBCUTANEOUS at 07:08

## 2024-08-16 NOTE — ASSESSMENT & PLAN NOTE
Likely cause of DKA  GPC in multiple bottles - strep viridans growing  Repeat cultures negative to date  TTE with no obvious vegetations  Currently on ceftriaxone, plan IV Cetriaxone until 8/28 (2 weeks from last neg culture)  - CM working on placement as she is not a candidate for outpatient IV antibiotics

## 2024-08-16 NOTE — NURSING
Received report from off going nurse, Janice. Patient received AAO X 3. Resp even and unlabored. Tele box # 1061 is in use and is visible on the screen, SR 83. Bed locked in the lowest position with SR up X 2. Call light within reach. Midline noted to the pts BRITTANY.  [facility], [place]  Nurses Note -- 4 Eyes      8/15/2024       Skin assessed on: Q Shift      [x] No Pressure Injuries Present    []Prevention Measures Documented    [] Yes LDA  for Pressure Injury Previously documented     [] Yes New Pressure Injury Discovered   [] LDA for New Pressure Injury Added      Attending RN:  Bon Olson, RN     Second RN:  Janice

## 2024-08-16 NOTE — PLAN OF CARE
CM sent referral to [facility].    Patient has declined to select a preferred provider and elects placement with the first accepting in network provider that is available to provide services as ordered by the physician.       08/16/24 1321   Post-Acute Status   Post-Acute Authorization Placement   Post-Acute Placement Status Pending payor review/awaiting authorization (if required)   Patient choice form signed by patient/caregiver List from System Post-Acute Care   Discharge Delays (!) Post-Acute Set-up   Discharge Plan   Discharge Plan A Long-term acute care facility (LTAC)   Discharge Plan B Home with family

## 2024-08-16 NOTE — PLAN OF CARE
Problem: Adult Inpatient Plan of Care  Goal: Plan of Care Review  Outcome: Progressing  Flowsheets (Taken 8/16/2024 1424)  Plan of Care Reviewed With: patient  Goal: Absence of Hospital-Acquired Illness or Injury  Outcome: Progressing  Intervention: Identify and Manage Fall Risk  Flowsheets (Taken 8/16/2024 1424)  Safety Promotion/Fall Prevention:   assistive device/personal item within reach   side rails raised x 2   nonskid shoes/socks when out of bed   room near unit station   instructed to call staff for mobility   medications reviewed  Intervention: Prevent Skin Injury  Flowsheets (Taken 8/16/2024 1424)  Body Position: position changed independently  Device Skin Pressure Protection: adhesive use limited  Goal: Optimal Comfort and Wellbeing  Outcome: Progressing  Intervention: Monitor Pain and Promote Comfort  Flowsheets (Taken 8/16/2024 1424)  Pain Management Interventions:   care clustered   pain management plan reviewed with patient/caregiver   pillow support provided   quiet environment facilitated  Intervention: Provide Person-Centered Care  Flowsheets (Taken 8/16/2024 1424)  Trust Relationship/Rapport:   care explained   choices provided   emotional support provided   empathic listening provided   questions answered   questions encouraged   reassurance provided   thoughts/feelings acknowledged

## 2024-08-16 NOTE — PLAN OF CARE
Problem: Diabetes Comorbidity  Goal: Blood Glucose Level Within Targeted Range  Outcome: Progressing  Intervention: Monitor and Manage Glycemia  Flowsheets (Taken 8/15/2024 2214)  Glycemic Management: blood glucose monitored     Problem: Infection  Goal: Absence of Infection Signs and Symptoms  Outcome: Progressing  Intervention: Prevent or Manage Infection  Flowsheets (Taken 8/15/2024 2214)  Fever Reduction/Comfort Measures: lightweight bedding  Infection Management: aseptic technique maintained  Isolation Precautions: precautions maintained

## 2024-08-16 NOTE — PROGRESS NOTES
Food & Nutrition  Education    Diet Education: DM diet   Time Spent: 15 minutes   Learners: pt's family       Nutrition Education provided with handouts: Carb Counting for Diabetics       Comments: Educated pt's family member on importance of serving sizes, carb counting, and recommended foods. Left handout with pt's family at bedside.       All questions and concerns answered. Dietitian's contact information provided.       Follow-Up: 1x/weekly    Please Re-consult as needed        Thanks!

## 2024-08-16 NOTE — NURSING
[facility], [facility] [place]  Nurses Note -- 4 Eyes      8/16/2024       Skin assessed on: Q Shift      [x] No Pressure Injuries Present    []Prevention Measures Documented    [] Yes LDA  for Pressure Injury Previously documented     [] Yes New Pressure Injury Discovered   [] LDA for New Pressure Injury Added      Attending RN:  Janice Villagomez RN     Second RN:  ALEXANDRA Crockett

## 2024-08-16 NOTE — PROGRESS NOTES
[facility] Medicine  Progress Note    Patient Name: Hollie Wilson  MRN: [id number]  Patient Class: IP- Inpatient   Admission Date: 8/12/2024  Length of Stay: 4 days  Attending Physician: Santhosh Anand MD  Primary Care Provider: Martin Rios MD        Subjective:     Principal Problem:Bacteremia due to Gram-positive bacteria        HPI:  Patient is a 34-year-old female with past medical history of type 1 diabetes with gastroparesis/neuropathy, developmental delay, schizoaffective disorder, polysubstance use disorder (cocaine, amphetamines, opiates, marijuana), tobacco use disorder and GERD who presented to [facility] ER on 08/12/2024 for further evaluation of vomiting.  She reports 2 days of associated nausea and vomiting with associated subjective fevers.  She finds her nausea has improved with treatment in the ER.  She reports she was not had her home insulin last 2 days as she was not had food in her house and was afraid to take it.  She also has had 2-3 weeks low atraumatic back pain.  She reports she was smokes marijuana and cigarettes, but denies other recreational drug use.  She denies leg swelling melena hematuria hematemesis incontinence numbness weakness in extremity.      During evaluation in the ER, patient is hemodynamically stable anion gap 19 CO2 15 blood glucose 663 creatinine 1.5 potassium 5.5 beta hydroxy 2.4 pH 7.278 UA without leukocytes or nitrites chest x-ray without acute process.    Overview/Hospital Course:  Ms. Martin is a 34-year-old female who was admitted with DKA secondary to sepsis.  She was found to have bacteremia with strep viridans.  She denies IV drug use.  She was started on IV antibiotics, IV fluids and IV insulin.  She was then transitioned to subcutaneous insulin.  Echocardiogram was ordered with out any vegetations.  Repeat cultures negative to date.  Currently on Flagyl for bacterial vaginosis.    Had neurological change on 08/13 and mild  hematemesis.  CT head with no acute issues and CT abdomen and pelvis without any concerning pathology.  She does have history of gastroparesis and reports being on Reglan.  Id is following. Reglan improving and she is now tolerating diet. Recommending Ceftriaxone until 8/28/24. CM assisting with placement for IV antibiotics.        Interval History:  she is feeling much better today, tolerating diet and ambulating well. We discussed her needing to go to another facility to get IV antibiotics as she is not a candidate to go home with this. She is in agreement and I spoke with her aunt as well and updated her.    Review of Systems  Objective:     Vital Signs (Most Recent):  Temp: 98.3 °F (36.8 °C) (08/16/24 1053)  Pulse: 82 (08/16/24 1108)  Resp: 16 (08/16/24 1053)  BP: 132/84 (08/16/24 1053)  SpO2: 100 % (08/16/24 1053) Vital Signs (24h Range):  Temp:  [98.1 °F (36.7 °C)-99 °F (37.2 °C)] 98.3 °F (36.8 °C)  Pulse:  [75-89] 82  Resp:  [16-20] 16  SpO2:  [98 %-100 %] 100 %  BP: ()/(54-84) 132/84     Weight: 57.1 kg (125 lb 14.1 oz)  Body mass index is 19.72 kg/m².    Intake/Output Summary (Last 24 hours) at 8/16/2024 1355  Last data filed at 8/16/2024 1230  Gross per 24 hour   Intake 844.29 ml   Output --   Net 844.29 ml         Physical Exam  Vitals and nursing note reviewed.   Constitutional:       General: She is not in acute distress.  Cardiovascular:      Rate and Rhythm: Normal rate and regular rhythm.      Pulses: Normal pulses.   Pulmonary:      Effort: Pulmonary effort is normal.   Abdominal:      General: Abdomen is flat. There is no distension.      Palpations: Abdomen is soft.      Tenderness: There is no abdominal tenderness.   Musculoskeletal:         General: No swelling.   Skin:     General: Skin is warm.   Neurological:      General: No focal deficit present.      Mental Status: She is alert and oriented to person, place, and time.   Psychiatric:         Mood and Affect: Mood normal.          Thought Content: Thought content normal.             Significant Labs: All pertinent labs within the past 24 hours have been reviewed.    Significant Imaging: I have reviewed all pertinent imaging results/findings within the past 24 hours.    Assessment/Plan:      * Bacteremia due to Gram-positive bacteria  Likely cause of DKA  GPC in multiple bottles - strep viridans growing  Repeat cultures negative to date  TTE with no obvious vegetations  Currently on ceftriaxone, plan IV Cetriaxone until 8/28 (2 weeks from last neg culture)  - CM working on placement as she is not a candidate for outpatient IV antibiotics      Fever  Presents with fever of 100.9, and fond to be in DKA. Reports intermittent fevers for last 2 days. Also endorses low back pain without red flag symptoms. UA and chest x-ray without source.   Will start Rocephin/Vanc pending cultures  Blood cultures pending  Check COVID/Flu  Should fever continue to be persistent will check imaging of spine given history of polysubstance use and back pain    Schizoaffective disorder  Continue home prozac/vistaril    Prolonged Q-T interval on ECG  Chronically prolonged near 500 per chart review.  Will monitor on telemetry, and attempt to minimize anti-emetics to as needed for DKA  Qtc chronically prolonged and with chronic RBBB which will cause QRS widening  - monitor closely with reglan      DKA (diabetic ketoacidoses)  Presents with Nausea/vomiting. pH 7.278 on arrival with beta of 2.4, anion gap 19, CO2 15. Reports did not have insulin for last 2 days as endorses food insecurity at home and missed insulin due to this. Also arrived febrile.   Started on IV insulin with IVF  Repeat BMP q4  Repeat A1c  RESOLVED, switched to subcut insulin    KIMBERLY (acute kidney injury)  KIMBERLY is likely due to pre-renal azotemia due to dehydration. Baseline creatinine is  0.9 . Most recent creatinine and eGFR are listed below.  Recent Labs     08/14/24  1155 08/14/24  1606 08/15/24  0553    CREATININE 0.9 0.9 0.8   EGFRNORACEVR >60 >60 >60        Plan  - KIMBERLY is  resolved  - Avoid nephrotoxins and renally dose meds for GFR listed above  - Monitor urine output, serial BMP, and adjust therapy as needed  - suspect related to vomiting from DKA and osmotic diuresis. Started on IVF, on IV insulin for associated mild hyperkalemia.     Tobacco abuse  Smokes less than 1/2ppd. Greater than 3min spent counseling patient on dangers of continued tobacco abuse    Polysubstance abuse  She reports THC use only, denies other recreational drug use.   Check UDS, counseled on THC cessation      VTE Risk Mitigation (From admission, onward)           Ordered     enoxaparin injection 40 mg  Every 24 hours         08/13/24 0144     IP VTE LOW RISK PATIENT  Once         08/12/24 1558     Place TRISTAN hose  Until discontinued         08/12/24 1558                    Discharge Planning   THELMA:      Code Status: Full Code   Is the patient medically ready for discharge?:     Reason for patient still in hospital (select all that apply): Treatment  Discharge Plan A: Long-term acute care facility (LTAC)   Discharge Delays: (!) Post-Acute Set-up              Santhosh Anand MD  Department of Hospital Medicine   [facility] Surg

## 2024-08-16 NOTE — SUBJECTIVE & OBJECTIVE
Interval History:  she is feeling much better today, tolerating diet and ambulating well. We discussed her needing to go to another facility to get IV antibiotics as she is not a candidate to go home with this. She is in agreement and I spoke with her aunt as well and updated her.    Review of Systems  Objective:     Vital Signs (Most Recent):  Temp: 98.3 °F (36.8 °C) (08/16/24 1053)  Pulse: 82 (08/16/24 1108)  Resp: 16 (08/16/24 1053)  BP: 132/84 (08/16/24 1053)  SpO2: 100 % (08/16/24 1053) Vital Signs (24h Range):  Temp:  [98.1 °F (36.7 °C)-99 °F (37.2 °C)] 98.3 °F (36.8 °C)  Pulse:  [75-89] 82  Resp:  [16-20] 16  SpO2:  [98 %-100 %] 100 %  BP: ()/(54-84) 132/84     Weight: 57.1 kg (125 lb 14.1 oz)  Body mass index is 19.72 kg/m².    Intake/Output Summary (Last 24 hours) at 8/16/2024 1355  Last data filed at 8/16/2024 1230  Gross per 24 hour   Intake 844.29 ml   Output --   Net 844.29 ml         Physical Exam  Vitals and nursing note reviewed.   Constitutional:       General: She is not in acute distress.  Cardiovascular:      Rate and Rhythm: Normal rate and regular rhythm.      Pulses: Normal pulses.   Pulmonary:      Effort: Pulmonary effort is normal.   Abdominal:      General: Abdomen is flat. There is no distension.      Palpations: Abdomen is soft.      Tenderness: There is no abdominal tenderness.   Musculoskeletal:         General: No swelling.   Skin:     General: Skin is warm.   Neurological:      General: No focal deficit present.      Mental Status: She is alert and oriented to person, place, and time.   Psychiatric:         Mood and Affect: Mood normal.         Thought Content: Thought content normal.             Significant Labs: All pertinent labs within the past 24 hours have been reviewed.    Significant Imaging: I have reviewed all pertinent imaging results/findings within the past 24 hours.

## 2024-08-16 NOTE — PLAN OF CARE
"CM sent LTAC referral to [facility]. Pt has no complaints. CM will assist as needed.    2:03 pm  Elena with [facility] stated they don't accept pt's insurance.    CM informed pt and CM explained they have facilities on [employer]. Pt stated " ok, you could send it." CM extended search to [employer].     08/16/24 4589   Discharge Reassessment   Assessment Type Discharge Planning Reassessment   Did the patient's condition or plan change since previous assessment? No   Discharge Plan discussed with: Spouse/sig other;Patient   Name(s) and Number(s) Eric- significant other   Communicated THELMA with patient/caregiver Yes   Discharge Plan A Long-term acute care facility (LTAC)   Discharge Plan B Home with family   DME Needed Upon Discharge  none   Transition of Care Barriers None   Why the patient remains in the hospital Requires continued medical care   Post-Acute Status   Discharge Delays (!) Post-Acute Set-up       "

## 2024-08-17 LAB
POCT GLUCOSE: 100 MG/DL (ref 70–110)
POCT GLUCOSE: 110 MG/DL (ref 70–110)
POCT GLUCOSE: 124 MG/DL (ref 70–110)
POCT GLUCOSE: 205 MG/DL (ref 70–110)
POCT GLUCOSE: 79 MG/DL (ref 70–110)

## 2024-08-17 PROCEDURE — 25000003 PHARM REV CODE 250: Performed by: PHYSICIAN ASSISTANT

## 2024-08-17 PROCEDURE — A4216 STERILE WATER/SALINE, 10 ML: HCPCS | Performed by: EMERGENCY MEDICINE

## 2024-08-17 PROCEDURE — 94760 N-INVAS EAR/PLS OXIMETRY 1: CPT

## 2024-08-17 PROCEDURE — 63600175 PHARM REV CODE 636 W HCPCS: Mod: JZ,JG | Performed by: STUDENT IN AN ORGANIZED HEALTH CARE EDUCATION/TRAINING PROGRAM

## 2024-08-17 PROCEDURE — 63600175 PHARM REV CODE 636 W HCPCS: Performed by: PHYSICIAN ASSISTANT

## 2024-08-17 PROCEDURE — C1751 CATH, INF, PER/CENT/MIDLINE: HCPCS

## 2024-08-17 PROCEDURE — 25000003 PHARM REV CODE 250: Performed by: STUDENT IN AN ORGANIZED HEALTH CARE EDUCATION/TRAINING PROGRAM

## 2024-08-17 PROCEDURE — 36410 VNPNXR 3YR/> PHY/QHP DX/THER: CPT

## 2024-08-17 PROCEDURE — 21400001 HC TELEMETRY ROOM

## 2024-08-17 PROCEDURE — 25000003 PHARM REV CODE 250: Performed by: EMERGENCY MEDICINE

## 2024-08-17 RX ORDER — METRONIDAZOLE 500 MG/1
500 TABLET ORAL EVERY 8 HOURS
Status: COMPLETED | OUTPATIENT
Start: 2024-08-17 | End: 2024-08-19

## 2024-08-17 RX ORDER — METRONIDAZOLE 500 MG/1
500 TABLET ORAL EVERY 8 HOURS
Status: DISCONTINUED | OUTPATIENT
Start: 2024-08-17 | End: 2024-08-17

## 2024-08-17 RX ADMIN — INSULIN ASPART 5 UNITS: 100 INJECTION, SOLUTION INTRAVENOUS; SUBCUTANEOUS at 11:08

## 2024-08-17 RX ADMIN — METRONIDAZOLE 500 MG: 500 TABLET ORAL at 01:08

## 2024-08-17 RX ADMIN — INSULIN ASPART 5 UNITS: 100 INJECTION, SOLUTION INTRAVENOUS; SUBCUTANEOUS at 07:08

## 2024-08-17 RX ADMIN — HYDROXYZINE HYDROCHLORIDE 50 MG: 25 TABLET ORAL at 10:08

## 2024-08-17 RX ADMIN — INSULIN ASPART 4 UNITS: 100 INJECTION, SOLUTION INTRAVENOUS; SUBCUTANEOUS at 07:08

## 2024-08-17 RX ADMIN — MUPIROCIN: 20 OINTMENT TOPICAL at 09:08

## 2024-08-17 RX ADMIN — PANTOPRAZOLE SODIUM 40 MG: 40 TABLET, DELAYED RELEASE ORAL at 07:08

## 2024-08-17 RX ADMIN — INSULIN GLARGINE 15 UNITS: 100 INJECTION, SOLUTION SUBCUTANEOUS at 07:08

## 2024-08-17 RX ADMIN — MUPIROCIN: 20 OINTMENT TOPICAL at 08:08

## 2024-08-17 RX ADMIN — Medication 10 ML: at 12:08

## 2024-08-17 RX ADMIN — Medication 10 ML: at 05:08

## 2024-08-17 RX ADMIN — METRONIDAZOLE 500 MG: 500 TABLET ORAL at 09:08

## 2024-08-17 RX ADMIN — CEFTRIAXONE 2 G: 2 INJECTION, POWDER, FOR SOLUTION INTRAMUSCULAR; INTRAVENOUS at 05:08

## 2024-08-17 RX ADMIN — METRONIDAZOLE 500 MG: 500 INJECTION, SOLUTION INTRAVENOUS at 12:08

## 2024-08-17 RX ADMIN — METRONIDAZOLE 500 MG: 500 INJECTION, SOLUTION INTRAVENOUS at 08:08

## 2024-08-17 RX ADMIN — FLUOXETINE 20 MG: 20 CAPSULE ORAL at 07:08

## 2024-08-17 NOTE — NURSING
[facility] [place] 4 [place]      8/17/2024       Skin assessed on: Q Shift      [x] No Pressure Injuries Present    []Prevention Measures Documented    [] Yes LDA  for Pressure Injury Previously documented     [] Yes New Pressure Injury Discovered   [] LDA for New Pressure Injury Added      Attending RN:  Hannah Nelson RN     Second RN:  Bon

## 2024-08-17 NOTE — NURSING
Received report from off going nurse, Jancie. Patient received AAO X 3. Resp even and unlabored. Tele box # [id number] is in use and is visible on the screen. Bed locked in the lowest position with SR up X 2. Call light within reach. Midline noted to the pts BRITTANY.   [facility] [place]  Nurses Note -- 4 Eyes      8/16/2024       Skin assessed on: Q Shift      [x] No Pressure Injuries Present    []Prevention Measures Documented    [] Yes LDA  for Pressure Injury Previously documented     [] Yes New Pressure Injury Discovered   [] LDA for New Pressure Injury Added      Attending RN:  Bon Olson, RN     Second RN:  Janice

## 2024-08-17 NOTE — PROGRESS NOTES
[facility] Medicine  Progress Note    Patient Name: Hollie Wilson  MRN: [id number]  Patient Class: IP- Inpatient   Admission Date: 8/12/2024  Length of Stay: 5 days  Attending Physician: Santhosh Anand MD  Primary Care Provider: Martin Rios MD        Subjective:     Principal Problem:Bacteremia due to Gram-positive bacteria        HPI:  Patient is a 34-year-old female with past medical history of type 1 diabetes with gastroparesis/neuropathy, developmental delay, schizoaffective disorder, polysubstance use disorder (cocaine, amphetamines, opiates, marijuana), tobacco use disorder and GERD who presented to [facility] ER on 08/12/2024 for further evaluation of vomiting.  She reports 2 days of associated nausea and vomiting with associated subjective fevers.  She finds her nausea has improved with treatment in the ER.  She reports she was not had her home insulin last 2 days as she was not had food in her house and was afraid to take it.  She also has had 2-3 weeks low atraumatic back pain.  She reports she was smokes marijuana and cigarettes, but denies other recreational drug use.  She denies leg swelling melena hematuria hematemesis incontinence numbness weakness in extremity.      During evaluation in the ER, patient is hemodynamically stable anion gap 19 CO2 15 blood glucose 663 creatinine 1.5 potassium 5.5 beta hydroxy 2.4 pH 7.278 UA without leukocytes or nitrites chest x-ray without acute process.    Overview/Hospital Course:  Ms. Martin is a 34-year-old female who was admitted with DKA secondary to sepsis.  She was found to have bacteremia with strep viridans.  She denies IV drug use.  She was started on IV antibiotics, IV fluids and IV insulin.  She was then transitioned to subcutaneous insulin.  Echocardiogram was ordered with out any vegetations.  Repeat cultures negative to date.  Currently on Flagyl for bacterial vaginosis.    Had neurological change on 08/13 and mild  hematemesis.  CT head with no acute issues and CT abdomen and pelvis without any concerning pathology.  She does have history of gastroparesis and reports being on Reglan.  Id is following. Reglan improving and she is now tolerating diet. Recommending Ceftriaxone until 8/28/24. CM assisting with placement for IV antibiotics.        Interval History:  no acute issues, she is sleeping well. Tolerating diet thus far. Awaiting placement for IV antibiotics.    Review of Systems  Objective:     Vital Signs (Most Recent):  Temp: 98.6 °F (37 °C) (08/17/24 0746)  Pulse: 88 (08/17/24 0746)  Resp: 17 (08/17/24 0746)  BP: 104/72 (08/17/24 0746)  SpO2: 100 % (08/17/24 0752) Vital Signs (24h Range):  Temp:  [98.2 °F (36.8 °C)-98.7 °F (37.1 °C)] 98.6 °F (37 °C)  Pulse:  [78-88] 88  Resp:  [16-18] 17  SpO2:  [97 %-100 %] 100 %  BP: (104-132)/(59-84) 104/72     Weight: 57.1 kg (125 lb 14.1 oz)  Body mass index is 19.72 kg/m².    Intake/Output Summary (Last 24 hours) at 8/17/2024 1012  Last data filed at 8/17/2024 0931  Gross per 24 hour   Intake 809.51 ml   Output --   Net 809.51 ml         Physical Exam  Vitals and nursing note reviewed.   Constitutional:       General: She is not in acute distress.  Cardiovascular:      Rate and Rhythm: Normal rate and regular rhythm.      Pulses: Normal pulses.   Pulmonary:      Effort: Pulmonary effort is normal.   Abdominal:      General: Abdomen is flat. There is no distension.      Palpations: Abdomen is soft.      Tenderness: There is no abdominal tenderness.   Musculoskeletal:         General: No swelling.   Skin:     General: Skin is warm.   Neurological:      General: No focal deficit present.      Mental Status: She is alert and oriented to person, place, and time.   Psychiatric:         Mood and Affect: Mood normal.         Thought Content: Thought content normal.             Significant Labs: All pertinent labs within the past 24 hours have been reviewed.    Significant Imaging: I have  reviewed all pertinent imaging results/findings within the past 24 hours.    Assessment/Plan:      * Bacteremia due to Gram-positive bacteria  Likely cause of DKA  GPC in multiple bottles - strep viridans growing  Repeat cultures negative to date  TTE with no obvious vegetations  Currently on ceftriaxone, plan IV Cetriaxone until 8/28 (2 weeks from last neg culture)  - CM working on placement as she is not a candidate for outpatient IV antibiotics  - She has LEFT midline in place for IV antibiotics      Fever  See Bacteremia    Schizoaffective disorder  Continue home prozac/vistaril    Prolonged Q-T interval on ECG  Chronically prolonged near 500 per chart review.  Will monitor on telemetry, and attempt to minimize anti-emetics to as needed for DKA  Qtc chronically prolonged and with chronic RBBB which will cause QRS widening  - monitor closely with reglan      DKA (diabetic ketoacidoses)  Presents with Nausea/vomiting. pH 7.278 on arrival with beta of 2.4, anion gap 19, CO2 15. Reports did not have insulin for last 2 days as endorses food insecurity at home and missed insulin due to this. Also arrived febrile.   Started on IV insulin with IVF  Repeat BMP q4  Repeat A1c  RESOLVED, switched to subcut insulin    KIMBERLY (acute kidney injury)  KIMBERLY is likely due to pre-renal azotemia due to dehydration. Baseline creatinine is  0.9 . Most recent creatinine and eGFR are listed below.  Recent Labs     08/14/24  1155 08/14/24  1606 08/15/24  0456   CREATININE 0.9 0.9 0.8   EGFRNORACEVR >60 >60 >60        Plan  - KIMBERLY is  resolved  - Avoid nephrotoxins and renally dose meds for GFR listed above  - Monitor urine output, serial BMP, and adjust therapy as needed  - suspect related to vomiting from DKA and osmotic diuresis. Started on IVF, on IV insulin for associated mild hyperkalemia.     Tobacco abuse  Smokes less than 1/2ppd. Greater than 3min spent counseling patient on dangers of continued tobacco abuse    Polysubstance  abuse  She reports THC use only, denies other recreational drug use.   Check UDS, counseled on THC cessation      VTE Risk Mitigation (From admission, onward)           Ordered     enoxaparin injection 40 mg  Every 24 hours         08/13/24 0144     IP VTE LOW RISK PATIENT  Once         08/12/24 1558     Place TRISTAN hose  Until discontinued         08/12/24 1558                    Discharge Planning   THELMA:      Code Status: Full Code   Is the patient medically ready for discharge?:     Reason for patient still in hospital (select all that apply): Treatment  Discharge Plan A: Long-term acute care facility (LTAC)   Discharge Delays: (!) Post-Acute Set-up              Santhosh Anand MD  Department of Hospital Medicine   [facility] Surg

## 2024-08-17 NOTE — SUBJECTIVE & OBJECTIVE
Interval History:  no acute issues, she is sleeping well. Tolerating diet thus far. Awaiting placement for IV antibiotics.    Review of Systems  Objective:     Vital Signs (Most Recent):  Temp: 98.6 °F (37 °C) (08/17/24 0746)  Pulse: 88 (08/17/24 0746)  Resp: 17 (08/17/24 0746)  BP: 104/72 (08/17/24 0746)  SpO2: 100 % (08/17/24 0752) Vital Signs (24h Range):  Temp:  [98.2 °F (36.8 °C)-98.7 °F (37.1 °C)] 98.6 °F (37 °C)  Pulse:  [78-88] 88  Resp:  [16-18] 17  SpO2:  [97 %-100 %] 100 %  BP: (104-132)/(59-84) 104/72     Weight: 57.1 kg (125 lb 14.1 oz)  Body mass index is 19.72 kg/m².    Intake/Output Summary (Last 24 hours) at 8/17/2024 1012  Last data filed at 8/17/2024 0931  Gross per 24 hour   Intake 809.51 ml   Output --   Net 809.51 ml         Physical Exam  Vitals and nursing note reviewed.   Constitutional:       General: She is not in acute distress.  Cardiovascular:      Rate and Rhythm: Normal rate and regular rhythm.      Pulses: Normal pulses.   Pulmonary:      Effort: Pulmonary effort is normal.   Abdominal:      General: Abdomen is flat. There is no distension.      Palpations: Abdomen is soft.      Tenderness: There is no abdominal tenderness.   Musculoskeletal:         General: No swelling.   Skin:     General: Skin is warm.   Neurological:      General: No focal deficit present.      Mental Status: She is alert and oriented to person, place, and time.   Psychiatric:         Mood and Affect: Mood normal.         Thought Content: Thought content normal.             Significant Labs: All pertinent labs within the past 24 hours have been reviewed.    Significant Imaging: I have reviewed all pertinent imaging results/findings within the past 24 hours.

## 2024-08-17 NOTE — ASSESSMENT & PLAN NOTE
Likely cause of DKA  GPC in multiple bottles - strep viridans growing  Repeat cultures negative to date  TTE with no obvious vegetations  Currently on ceftriaxone, plan IV Cetriaxone until 8/28 (2 weeks from last neg culture)  - CM working on placement as she is not a candidate for outpatient IV antibiotics  - She has LEFT midline in place for IV antibiotics

## 2024-08-17 NOTE — PLAN OF CARE
Problem: Adult Inpatient Plan of Care  Goal: Plan of Care Review  Outcome: Progressing  Flowsheets (Taken 8/17/2024 0800)  Plan of Care Reviewed With: patient  Goal: Patient-Specific Goal (Individualized)  Outcome: Progressing     Problem: Diabetes Comorbidity  Goal: Blood Glucose Level Within Targeted Range  Outcome: Progressing  Intervention: Monitor and Manage Glycemia  Flowsheets (Taken 8/17/2024 0815)  Glycemic Management:   blood glucose monitored   carbohydrate replacement provided   oral hydration promoted   supplemental insulin given     Problem: Adult Inpatient Plan of Care  Goal: Plan of Care Review  Outcome: Progressing  Flowsheets (Taken 8/17/2024 0800)  Plan of Care Reviewed With: patient     Problem: Adult Inpatient Plan of Care  Goal: Plan of Care Review  Outcome: Progressing  Flowsheets (Taken 8/17/2024 0800)  Plan of Care Reviewed With: patient     Problem: Adult Inpatient Plan of Care  Goal: Patient-Specific Goal (Individualized)  Outcome: Progressing     Problem: Adult Inpatient Plan of Care  Goal: Patient-Specific Goal (Individualized)  Outcome: Progressing     Problem: Diabetes Comorbidity  Goal: Blood Glucose Level Within Targeted Range  Outcome: Progressing  Intervention: Monitor and Manage Glycemia  Flowsheets (Taken 8/17/2024 0815)  Glycemic Management:   blood glucose monitored   carbohydrate replacement provided   oral hydration promoted   supplemental insulin given     Problem: Diabetes Comorbidity  Goal: Blood Glucose Level Within Targeted Range  Outcome: Progressing     Problem: Diabetes Comorbidity  Goal: Blood Glucose Level Within Targeted Range  Intervention: Monitor and Manage Glycemia  Flowsheets (Taken 8/17/2024 0815)  Glycemic Management:   blood glucose monitored   carbohydrate replacement provided   oral hydration promoted   supplemental insulin given     Problem: Diabetes Comorbidity  Goal: Blood Glucose Level Within Targeted Range  Intervention: Monitor and Manage  Glycemia  Flowsheets (Taken 8/17/2024 0815)  Glycemic Management:   blood glucose monitored   carbohydrate replacement provided   oral hydration promoted   supplemental insulin given

## 2024-08-18 LAB
ANION GAP SERPL CALC-SCNC: 10 MMOL/L (ref 8–16)
BACTERIA BLD CULT: NORMAL
BACTERIA BLD CULT: NORMAL
BASOPHILS # BLD AUTO: 0.04 K/UL (ref 0–0.2)
BASOPHILS NFR BLD: 0.7 % (ref 0–1.9)
BUN SERPL-MCNC: 8 MG/DL (ref 6–20)
CALCIUM SERPL-MCNC: 9.1 MG/DL (ref 8.7–10.5)
CHLORIDE SERPL-SCNC: 107 MMOL/L (ref 95–110)
CO2 SERPL-SCNC: 22 MMOL/L (ref 23–29)
CREAT SERPL-MCNC: 0.8 MG/DL (ref 0.5–1.4)
DIFFERENTIAL METHOD BLD: ABNORMAL
EOSINOPHIL # BLD AUTO: 0.3 K/UL (ref 0–0.5)
EOSINOPHIL NFR BLD: 6 % (ref 0–8)
ERYTHROCYTE [DISTWIDTH] IN BLOOD BY AUTOMATED COUNT: 16.7 % (ref 11.5–14.5)
EST. GFR  (NO RACE VARIABLE): >60 ML/MIN/1.73 M^2
GLUCOSE SERPL-MCNC: 105 MG/DL (ref 70–110)
HCT VFR BLD AUTO: 31.9 % (ref 37–48.5)
HGB BLD-MCNC: 10 G/DL (ref 12–16)
IMM GRANULOCYTES # BLD AUTO: 0.03 K/UL (ref 0–0.04)
IMM GRANULOCYTES NFR BLD AUTO: 0.6 % (ref 0–0.5)
LYMPHOCYTES # BLD AUTO: 2.1 K/UL (ref 1–4.8)
LYMPHOCYTES NFR BLD: 38.2 % (ref 18–48)
MCH RBC QN AUTO: 25.5 PG (ref 27–31)
MCHC RBC AUTO-ENTMCNC: 31.3 G/DL (ref 32–36)
MCV RBC AUTO: 81 FL (ref 82–98)
MONOCYTES # BLD AUTO: 0.5 K/UL (ref 0.3–1)
MONOCYTES NFR BLD: 10.1 % (ref 4–15)
NEUTROPHILS # BLD AUTO: 2.4 K/UL (ref 1.8–7.7)
NEUTROPHILS NFR BLD: 44.4 % (ref 38–73)
NRBC BLD-RTO: 0 /100 WBC
PLATELET # BLD AUTO: 230 K/UL (ref 150–450)
PMV BLD AUTO: 12.3 FL (ref 9.2–12.9)
POCT GLUCOSE: 134 MG/DL (ref 70–110)
POCT GLUCOSE: 173 MG/DL (ref 70–110)
POCT GLUCOSE: 85 MG/DL (ref 70–110)
POCT GLUCOSE: 89 MG/DL (ref 70–110)
POTASSIUM SERPL-SCNC: 4 MMOL/L (ref 3.5–5.1)
RBC # BLD AUTO: 3.92 M/UL (ref 4–5.4)
SODIUM SERPL-SCNC: 139 MMOL/L (ref 136–145)
WBC # BLD AUTO: 5.36 K/UL (ref 3.9–12.7)

## 2024-08-18 PROCEDURE — 21400001 HC TELEMETRY ROOM

## 2024-08-18 PROCEDURE — 25000003 PHARM REV CODE 250: Performed by: EMERGENCY MEDICINE

## 2024-08-18 PROCEDURE — 36415 COLL VENOUS BLD VENIPUNCTURE: CPT | Performed by: STUDENT IN AN ORGANIZED HEALTH CARE EDUCATION/TRAINING PROGRAM

## 2024-08-18 PROCEDURE — 25000003 PHARM REV CODE 250: Performed by: STUDENT IN AN ORGANIZED HEALTH CARE EDUCATION/TRAINING PROGRAM

## 2024-08-18 PROCEDURE — 25000003 PHARM REV CODE 250: Performed by: PHYSICIAN ASSISTANT

## 2024-08-18 PROCEDURE — A4216 STERILE WATER/SALINE, 10 ML: HCPCS | Performed by: EMERGENCY MEDICINE

## 2024-08-18 PROCEDURE — 94761 N-INVAS EAR/PLS OXIMETRY MLT: CPT

## 2024-08-18 PROCEDURE — 80048 BASIC METABOLIC PNL TOTAL CA: CPT | Performed by: STUDENT IN AN ORGANIZED HEALTH CARE EDUCATION/TRAINING PROGRAM

## 2024-08-18 PROCEDURE — 63600175 PHARM REV CODE 636 W HCPCS: Performed by: PHYSICIAN ASSISTANT

## 2024-08-18 PROCEDURE — 85025 COMPLETE CBC W/AUTO DIFF WBC: CPT | Performed by: STUDENT IN AN ORGANIZED HEALTH CARE EDUCATION/TRAINING PROGRAM

## 2024-08-18 PROCEDURE — 63600175 PHARM REV CODE 636 W HCPCS: Performed by: STUDENT IN AN ORGANIZED HEALTH CARE EDUCATION/TRAINING PROGRAM

## 2024-08-18 RX ADMIN — Medication 10 ML: at 05:08

## 2024-08-18 RX ADMIN — PANTOPRAZOLE SODIUM 40 MG: 40 TABLET, DELAYED RELEASE ORAL at 07:08

## 2024-08-18 RX ADMIN — Medication 10 ML: at 12:08

## 2024-08-18 RX ADMIN — ACETAMINOPHEN 650 MG: 325 TABLET ORAL at 02:08

## 2024-08-18 RX ADMIN — METRONIDAZOLE 500 MG: 500 TABLET ORAL at 06:08

## 2024-08-18 RX ADMIN — INSULIN ASPART 5 UNITS: 100 INJECTION, SOLUTION INTRAVENOUS; SUBCUTANEOUS at 12:08

## 2024-08-18 RX ADMIN — INSULIN GLARGINE 15 UNITS: 100 INJECTION, SOLUTION SUBCUTANEOUS at 07:08

## 2024-08-18 RX ADMIN — PROMETHAZINE HYDROCHLORIDE 12.5 MG: 25 INJECTION INTRAMUSCULAR; INTRAVENOUS at 02:08

## 2024-08-18 RX ADMIN — FLUOXETINE 20 MG: 20 CAPSULE ORAL at 07:08

## 2024-08-18 RX ADMIN — INSULIN ASPART 2 UNITS: 100 INJECTION, SOLUTION INTRAVENOUS; SUBCUTANEOUS at 07:08

## 2024-08-18 RX ADMIN — INSULIN ASPART 5 UNITS: 100 INJECTION, SOLUTION INTRAVENOUS; SUBCUTANEOUS at 07:08

## 2024-08-18 RX ADMIN — PROMETHAZINE HYDROCHLORIDE 12.5 MG: 25 INJECTION INTRAMUSCULAR; INTRAVENOUS at 03:08

## 2024-08-18 RX ADMIN — Medication 10 ML: at 06:08

## 2024-08-18 RX ADMIN — METRONIDAZOLE 500 MG: 500 TABLET ORAL at 01:08

## 2024-08-18 RX ADMIN — METRONIDAZOLE 500 MG: 500 TABLET ORAL at 10:08

## 2024-08-18 RX ADMIN — CEFTRIAXONE 2 G: 2 INJECTION, POWDER, FOR SOLUTION INTRAMUSCULAR; INTRAVENOUS at 05:08

## 2024-08-18 NOTE — PROGRESS NOTES
[facility] Medicine  Progress Note    Patient Name: Hollie Wilson  MRN: [id number]  Patient Class: IP- Inpatient   Admission Date: 8/12/2024  Length of Stay: 6 days  Attending Physician: Santhosh Anand MD  Primary Care Provider: Martin Rios MD        Subjective:     Principal Problem:Bacteremia due to Gram-positive bacteria        HPI:  Patient is a 34-year-old female with past medical history of type 1 diabetes with gastroparesis/neuropathy, developmental delay, schizoaffective disorder, polysubstance use disorder (cocaine, amphetamines, opiates, marijuana), tobacco use disorder and GERD who presented to [facility] ER on 08/12/2024 for further evaluation of vomiting.  She reports 2 days of associated nausea and vomiting with associated subjective fevers.  She finds her nausea has improved with treatment in the ER.  She reports she was not had her home insulin last 2 days as she was not had food in her house and was afraid to take it.  She also has had 2-3 weeks low atraumatic back pain.  She reports she was smokes marijuana and cigarettes, but denies other recreational drug use.  She denies leg swelling melena hematuria hematemesis incontinence numbness weakness in extremity.      During evaluation in the ER, patient is hemodynamically stable anion gap 19 CO2 15 blood glucose 663 creatinine 1.5 potassium 5.5 beta hydroxy 2.4 pH 7.278 UA without leukocytes or nitrites chest x-ray without acute process.    Overview/Hospital Course:  Ms. Martin is a 34-year-old female who was admitted with DKA secondary to sepsis.  She was found to have bacteremia with strep viridans.  She denies IV drug use.  She was started on IV antibiotics, IV fluids and IV insulin.  She was then transitioned to subcutaneous insulin.  Echocardiogram was ordered with out any vegetations.  Repeat cultures negative to date.  Currently on Flagyl for bacterial vaginosis.    Had neurological change on 08/13 and mild  hematemesis.  CT head with no acute issues and CT abdomen and pelvis without any concerning pathology.  She does have history of gastroparesis and reports being on Reglan.  Id is following. Reglan improving and she is now tolerating diet. Recommending Ceftriaxone until 8/28/24. CM assisting with placement for IV antibiotics.        Interval History: no new issues, awaiting placement.    Review of Systems  Objective:     Vital Signs (Most Recent):  Temp: 98.2 °F (36.8 °C) (08/18/24 1120)  Pulse: 105 (08/18/24 1120)  Resp: 17 (08/18/24 1120)  BP: 106/67 (08/18/24 1120)  SpO2: 100 % (08/18/24 1120) Vital Signs (24h Range):  Temp:  [98.2 °F (36.8 °C)-98.9 °F (37.2 °C)] 98.2 °F (36.8 °C)  Pulse:  [] 105  Resp:  [17-20] 17  SpO2:  [94 %-100 %] 100 %  BP: ()/(55-72) 106/67     Weight: 57.1 kg (125 lb 14.1 oz)  Body mass index is 19.72 kg/m².    Intake/Output Summary (Last 24 hours) at 8/18/2024 1512  Last data filed at 8/18/2024 1220  Gross per 24 hour   Intake 577.36 ml   Output --   Net 577.36 ml         Physical Exam  Vitals and nursing note reviewed.   Constitutional:       General: She is not in acute distress.  Cardiovascular:      Rate and Rhythm: Normal rate and regular rhythm.      Pulses: Normal pulses.   Pulmonary:      Effort: Pulmonary effort is normal.   Abdominal:      General: Abdomen is flat. There is no distension.      Palpations: Abdomen is soft.      Tenderness: There is no abdominal tenderness.   Musculoskeletal:         General: No swelling.   Skin:     General: Skin is warm.   Neurological:      General: No focal deficit present.      Mental Status: She is alert and oriented to person, place, and time.   Psychiatric:         Mood and Affect: Mood normal.         Thought Content: Thought content normal.             Significant Labs: All pertinent labs within the past 24 hours have been reviewed.    Significant Imaging: I have reviewed all pertinent imaging results/findings within the past  24 hours.    Assessment/Plan:      * Bacteremia due to Gram-positive bacteria  Likely cause of DKA  GPC in multiple bottles - strep viridans growing  Repeat cultures negative to date  TTE with no obvious vegetations  Currently on ceftriaxone, plan IV Cetriaxone until 8/28 (2 weeks from last neg culture)  - CM working on placement as she is not a candidate for outpatient IV antibiotics  - She has LEFT midline in place for IV antibiotics      Fever  See Bacteremia    Schizoaffective disorder  Continue home prozac/vistaril    Prolonged Q-T interval on ECG  Chronically prolonged near 500 per chart review.  Will monitor on telemetry, and attempt to minimize anti-emetics to as needed for DKA  Qtc chronically prolonged and with chronic RBBB which will cause QRS widening  - monitor closely with reglan      DKA (diabetic ketoacidoses)  Presents with Nausea/vomiting. pH 7.278 on arrival with beta of 2.4, anion gap 19, CO2 15. Reports did not have insulin for last 2 days as endorses food insecurity at home and missed insulin due to this. Also arrived febrile.   Started on IV insulin with IVF  Repeat BMP q4  Repeat A1c  RESOLVED, switched to subcut insulin    KIMBERLY (acute kidney injury)  KIMBERLY is likely due to pre-renal azotemia due to dehydration. Baseline creatinine is  0.9 . Most recent creatinine and eGFR are listed below.  Recent Labs     08/14/24  1155 08/14/24  1606 08/15/24  0456   CREATININE 0.9 0.9 0.8   EGFRNORACEVR >60 >60 >60        Plan  - KIMBERLY is  resolved  - Avoid nephrotoxins and renally dose meds for GFR listed above  - Monitor urine output, serial BMP, and adjust therapy as needed  - suspect related to vomiting from DKA and osmotic diuresis. Started on IVF, on IV insulin for associated mild hyperkalemia.     Tobacco abuse  Smokes less than 1/2ppd. Greater than 3min spent counseling patient on dangers of continued tobacco abuse    Polysubstance abuse  She reports THC use only, denies other recreational drug use.    Check UDS, counseled on THC cessation      VTE Risk Mitigation (From admission, onward)           Ordered     enoxaparin injection 40 mg  Every 24 hours         08/13/24 0144     IP VTE LOW RISK PATIENT  Once         08/12/24 1558     Place TRISTAN hose  Until discontinued         08/12/24 1558                    Discharge Planning   THELMA:      Code Status: Full Code   Is the patient medically ready for discharge?:     Reason for patient still in hospital (select all that apply): Pending disposition  Discharge Plan A: Long-term acute care facility (LTAC)   Discharge Delays: (!) Post-Acute Set-up              Santhosh Anand MD  Department of Hospital Medicine   [facility]

## 2024-08-18 NOTE — SUBJECTIVE & OBJECTIVE
Interval History: no new issues, awaiting placement.    Review of Systems  Objective:     Vital Signs (Most Recent):  Temp: 98.2 °F (36.8 °C) (08/18/24 1120)  Pulse: 105 (08/18/24 1120)  Resp: 17 (08/18/24 1120)  BP: 106/67 (08/18/24 1120)  SpO2: 100 % (08/18/24 1120) Vital Signs (24h Range):  Temp:  [98.2 °F (36.8 °C)-98.9 °F (37.2 °C)] 98.2 °F (36.8 °C)  Pulse:  [] 105  Resp:  [17-20] 17  SpO2:  [94 %-100 %] 100 %  BP: ()/(55-72) 106/67     Weight: 57.1 kg (125 lb 14.1 oz)  Body mass index is 19.72 kg/m².    Intake/Output Summary (Last 24 hours) at 8/18/2024 1512  Last data filed at 8/18/2024 1220  Gross per 24 hour   Intake 577.36 ml   Output --   Net 577.36 ml         Physical Exam  Vitals and nursing note reviewed.   Constitutional:       General: She is not in acute distress.  Cardiovascular:      Rate and Rhythm: Normal rate and regular rhythm.      Pulses: Normal pulses.   Pulmonary:      Effort: Pulmonary effort is normal.   Abdominal:      General: Abdomen is flat. There is no distension.      Palpations: Abdomen is soft.      Tenderness: There is no abdominal tenderness.   Musculoskeletal:         General: No swelling.   Skin:     General: Skin is warm.   Neurological:      General: No focal deficit present.      Mental Status: She is alert and oriented to person, place, and time.   Psychiatric:         Mood and Affect: Mood normal.         Thought Content: Thought content normal.             Significant Labs: All pertinent labs within the past 24 hours have been reviewed.    Significant Imaging: I have reviewed all pertinent imaging results/findings within the past 24 hours.

## 2024-08-18 NOTE — PLAN OF CARE
Problem: Adult Inpatient Plan of Care  Goal: Plan of Care Review  Outcome: Progressing  Flowsheets (Taken 8/18/2024 0815)  Plan of Care Reviewed With: patient  Goal: Patient-Specific Goal (Individualized)  Outcome: Progressing     Problem: Diabetes Comorbidity  Goal: Blood Glucose Level Within Targeted Range  Outcome: Progressing  Intervention: Monitor and Manage Glycemia  Flowsheets (Taken 8/18/2024 0815)  Glycemic Management:   blood glucose monitored   carbohydrate replacement provided   oral hydration promoted   supplemental insulin given     Problem: Adult Inpatient Plan of Care  Goal: Plan of Care Review  Outcome: Progressing  Flowsheets (Taken 8/18/2024 0815)  Plan of Care Reviewed With: patient     Problem: Adult Inpatient Plan of Care  Goal: Plan of Care Review  Outcome: Progressing  Flowsheets (Taken 8/18/2024 0815)  Plan of Care Reviewed With: patient     Problem: Adult Inpatient Plan of Care  Goal: Patient-Specific Goal (Individualized)  Outcome: Progressing     Problem: Adult Inpatient Plan of Care  Goal: Patient-Specific Goal (Individualized)  Outcome: Progressing     Problem: Diabetes Comorbidity  Goal: Blood Glucose Level Within Targeted Range  Outcome: Progressing  Intervention: Monitor and Manage Glycemia  Flowsheets (Taken 8/18/2024 0815)  Glycemic Management:   blood glucose monitored   carbohydrate replacement provided   oral hydration promoted   supplemental insulin given     Problem: Diabetes Comorbidity  Goal: Blood Glucose Level Within Targeted Range  Outcome: Progressing     Problem: Diabetes Comorbidity  Goal: Blood Glucose Level Within Targeted Range  Intervention: Monitor and Manage Glycemia  Flowsheets (Taken 8/18/2024 0815)  Glycemic Management:   blood glucose monitored   carbohydrate replacement provided   oral hydration promoted   supplemental insulin given     Problem: Diabetes Comorbidity  Goal: Blood Glucose Level Within Targeted Range  Intervention: Monitor and Manage  Glycemia  Flowsheets (Taken 8/18/2024 0815)  Glycemic Management:   blood glucose monitored   carbohydrate replacement provided   oral hydration promoted   supplemental insulin given

## 2024-08-18 NOTE — CONSULTS
Thank you for your consult to [facility]. We have reviewed the patient chart. This patient does meet criteria for [facility] service at this time.  Will assume care on 08/19/24 at 6AM.

## 2024-08-18 NOTE — NURSING
Received report from off going nurse, Hannah. Patient received AAO X 3. Resp even and unlabored. Tele box # [id number] is in use and is visible on the screen. Bed locked in the lowest position with SR up X 2. Call light within reach. Midline noted to the pts BRITTANY.   [facility]  Nurses Note -- 4 Eyes        8/17/2024         Skin assessed on: Q Shift        [x] No Pressure Injuries Present               []Prevention Measures Documented     [] Yes LDA  for Pressure Injury Previously documented      [] Yes New Pressure Injury Discovered              [] LDA for New Pressure Injury Added        Attending RN:  Bon Olson, RN      Second RN:  Hannah

## 2024-08-18 NOTE — NURSING
[facility] [place]  Nurses Note -- 4 Eyes      8/18/2024       Skin assessed on: Q Shift      [x] No Pressure Injuries Present    []Prevention Measures Documented    [] Yes LDA  for Pressure Injury Previously documented     [] Yes New Pressure Injury Discovered   [] LDA for New Pressure Injury Added      Attending RN:  Hannah Nelson RN     Second RN:  Bon

## 2024-08-19 VITALS
RESPIRATION RATE: 18 BRPM | HEIGHT: 67 IN | BODY MASS INDEX: 19.76 KG/M2 | OXYGEN SATURATION: 99 % | WEIGHT: 125.88 LBS | SYSTOLIC BLOOD PRESSURE: 97 MMHG | TEMPERATURE: 99 F | HEART RATE: 88 BPM | DIASTOLIC BLOOD PRESSURE: 65 MMHG

## 2024-08-19 LAB
POCT GLUCOSE: 114 MG/DL (ref 70–110)
POCT GLUCOSE: 222 MG/DL (ref 70–110)
POCT GLUCOSE: 242 MG/DL (ref 70–110)
POCT GLUCOSE: 262 MG/DL (ref 70–110)
POCT GLUCOSE: 41 MG/DL (ref 70–110)
POCT GLUCOSE: 42 MG/DL (ref 70–110)

## 2024-08-19 PROCEDURE — 25000003 PHARM REV CODE 250: Performed by: STUDENT IN AN ORGANIZED HEALTH CARE EDUCATION/TRAINING PROGRAM

## 2024-08-19 PROCEDURE — 63600175 PHARM REV CODE 636 W HCPCS: Performed by: STUDENT IN AN ORGANIZED HEALTH CARE EDUCATION/TRAINING PROGRAM

## 2024-08-19 PROCEDURE — 25000003 PHARM REV CODE 250: Performed by: PHYSICIAN ASSISTANT

## 2024-08-19 PROCEDURE — 63600175 PHARM REV CODE 636 W HCPCS: Performed by: HOSPITALIST

## 2024-08-19 PROCEDURE — A4216 STERILE WATER/SALINE, 10 ML: HCPCS | Performed by: EMERGENCY MEDICINE

## 2024-08-19 PROCEDURE — 63600175 PHARM REV CODE 636 W HCPCS: Performed by: PHYSICIAN ASSISTANT

## 2024-08-19 PROCEDURE — 25000003 PHARM REV CODE 250: Performed by: EMERGENCY MEDICINE

## 2024-08-19 RX ORDER — LORAZEPAM 2 MG/ML
0.5 INJECTION INTRAMUSCULAR ONCE
Status: COMPLETED | OUTPATIENT
Start: 2024-08-19 | End: 2024-08-19

## 2024-08-19 RX ORDER — RISPERIDONE 1 MG/ML
1 SOLUTION ORAL 2 TIMES DAILY
Status: DISCONTINUED | OUTPATIENT
Start: 2024-08-19 | End: 2024-08-20 | Stop reason: HOSPADM

## 2024-08-19 RX ORDER — RISPERIDONE 1 MG/ML
1 SOLUTION ORAL 2 TIMES DAILY
COMMUNITY
Start: 2024-06-20

## 2024-08-19 RX ADMIN — RISPERIDONE 1 MG: 1 SOLUTION ORAL at 01:08

## 2024-08-19 RX ADMIN — Medication 10 ML: at 12:08

## 2024-08-19 RX ADMIN — Medication 10 ML: at 11:08

## 2024-08-19 RX ADMIN — FLUOXETINE 20 MG: 20 CAPSULE ORAL at 08:08

## 2024-08-19 RX ADMIN — LORAZEPAM 0.5 MG: 2 INJECTION INTRAMUSCULAR; INTRAVENOUS at 01:08

## 2024-08-19 RX ADMIN — CEFTRIAXONE 2 G: 2 INJECTION, POWDER, FOR SOLUTION INTRAMUSCULAR; INTRAVENOUS at 05:08

## 2024-08-19 RX ADMIN — ENOXAPARIN SODIUM 40 MG: 40 INJECTION SUBCUTANEOUS at 05:08

## 2024-08-19 RX ADMIN — Medication 10 ML: at 05:08

## 2024-08-19 RX ADMIN — INSULIN ASPART 6 UNITS: 100 INJECTION, SOLUTION INTRAVENOUS; SUBCUTANEOUS at 08:08

## 2024-08-19 RX ADMIN — INSULIN ASPART 5 UNITS: 100 INJECTION, SOLUTION INTRAVENOUS; SUBCUTANEOUS at 08:08

## 2024-08-19 RX ADMIN — PANTOPRAZOLE SODIUM 40 MG: 40 TABLET, DELAYED RELEASE ORAL at 08:08

## 2024-08-19 RX ADMIN — INSULIN GLARGINE 15 UNITS: 100 INJECTION, SOLUTION SUBCUTANEOUS at 08:08

## 2024-08-19 RX ADMIN — INSULIN ASPART 5 UNITS: 100 INJECTION, SOLUTION INTRAVENOUS; SUBCUTANEOUS at 04:08

## 2024-08-19 RX ADMIN — METRONIDAZOLE 500 MG: 500 TABLET ORAL at 05:08

## 2024-08-19 NOTE — ASSESSMENT & PLAN NOTE
Lab Results   Component Value Date    HGBA1C 10.7 (H) 08/12/2024     Recent Labs   Lab 08/19/24  0730   POCTGLUCOSE 262*      Cont current basal/prandial insulin regimen   Low dose correction scale   Cont blood glucose monitoring   BG goal:  Preprandial blood glucose target <140 mg/dL  Random glucoses <180 mg/dL  Avoid hypoglycemia -  Reduce antihyperglycemic therapy when caloric intake is reduced. Avoid insulin stacking as a result of repeated injection of prandial insulin at close intervals.   Avoid severe hyperglycemia  ADA diet  Antihyperglycemics (From admission, onward)      Start     Stop Route Frequency Ordered    08/14/24 1130  insulin aspart U-100 pen 5 Units         -- SubQ 3 times daily with meals 08/14/24 0857    08/14/24 0956  insulin aspart U-100 pen 0-10 Units         -- SubQ Before meals & nightly PRN 08/14/24 0857    08/14/24 0900  insulin glargine U-100 (Lantus) pen 15 Units         -- SubQ Daily 08/14/24 0856

## 2024-08-19 NOTE — PROGRESS NOTES
[facility] Medicine  Telemedicine Progress Note    Patient Name: Hollie Wilson  MRN: [id number]  Patient Class: IP- Inpatient   Admission Date: 8/12/2024  Length of Stay: 7 days  Attending Physician: Jayde Garcia MD  Primary Care Provider: Martin Rios MD          Subjective:     Principal Problem:Bacteremia due to Gram-positive bacteria        HPI:  Patient is a 34-year-old female with past medical history of type 1 diabetes with gastroparesis/neuropathy, developmental delay, schizoaffective disorder, polysubstance use disorder (cocaine, amphetamines, opiates, marijuana), tobacco use disorder and GERD who presented to [facility] on 08/12/2024 for further evaluation of vomiting.  She reports 2 days of associated nausea and vomiting with associated subjective fevers.  She finds her nausea has improved with treatment in the ER.  She reports she was not had her home insulin last 2 days as she was not had food in her house and was afraid to take it.  She also has had 2-3 weeks low atraumatic back pain.  She reports she was smokes marijuana and cigarettes, but denies other recreational drug use.  She denies leg swelling melena hematuria hematemesis incontinence numbness weakness in extremity.      During evaluation in the ER, patient is hemodynamically stable anion gap 19 CO2 15 blood glucose 663 creatinine 1.5 potassium 5.5 beta hydroxy 2.4 pH 7.278 UA without leukocytes or nitrites chest x-ray without acute process.    Overview/Hospital Course:  Ms. Martin is a 34-year-old female who was admitted with DKA secondary to sepsis.  She was found to have bacteremia with strep viridans.  She denies IV drug use.  She was started on IV antibiotics, IV fluids and IV insulin.  She was then transitioned to subcutaneous insulin.  Echocardiogram was ordered with out any vegetations.  Repeat cultures negative to date.  Currently on Flagyl for bacterial vaginosis.    Had neurological change  on 08/13 and mild hematemesis.  CT head with no acute issues and CT abdomen and pelvis without any concerning pathology.  She does have history of gastroparesis and reports being on Reglan.  Id is following. Reglan improving and she is now tolerating diet. Recommending Ceftriaxone until 8/28/24. CM assisting with placement for IV antibiotics.        Follow-up For:  Patient Active Problem List    Diagnosis Date Noted Date Diagnosed    Bacteremia due to Gram-positive bacteria 08/13/2024     Type 1 diabetes mellitus with hyperglycemia 04/20/2024     DKA (diabetic ketoacidoses) 11/12/2020      Discharge Planning   THELMA:    Discharge Plan A: Long-term acute care facility (LTAC)   Discharge Delays: (!) Post-Acute Set-up    Interval History:   Subjective: Hollie Wilson is being followed for Bacteremia due to Gram-positive bacteria. No acute events overnight. Lying in bed resting comfortably. She is being treated with IV Rocephin for bacteremia. She will need to complete IV abx until 8/28. She is not a candidate for home IV infusion. Pending LTAC placement.     Symptoms: Stable. The patient denies shortness of breath, malaise, cough, chest pain, weakness, headache, chest pressure, anorexia, diarrhea and anxiety.     Diet: Regular. Adequate intake. The patient denies nausea and vomiting.    Last Bowel Movement: 08/13/24    Activity Level: Normal    Pain: The patient Denies pain     Review of Systems   Constitutional:  Negative for chills and fever.   Respiratory:  Negative for cough and shortness of breath.    Cardiovascular:  Negative for chest pain and palpitations.   Gastrointestinal:  Negative for abdominal pain.        Scheduled Meds:   cefTRIAXone (Rocephin) IV (PEDS and ADULTS)  2 g Intravenous Q24H    enoxparin  40 mg Subcutaneous Q24H (prophylaxis, 1700)    FLUoxetine  20 mg Oral Daily    Followed by    [START ON 8/29/2024] FLUoxetine  40 mg Oral Daily    insulin aspart U-100  5 Units Subcutaneous TIDWM     insulin glargine U-100  15 Units Subcutaneous Daily    nicotine  1 patch Transdermal Daily    pantoprazole  40 mg Oral Daily    sodium chloride 0.9%  10 mL Intravenous Q6H     Continuous Infusions:  PRN Meds:.  Current Facility-Administered Medications:     acetaminophen, 650 mg, Oral, Q4H PRN    glucagon (human recombinant), 1 mg, Intramuscular, PRN    glucose, 16 g, Oral, PRN    glucose, 24 g, Oral, PRN    hydrOXYzine HCL, 50 mg, Oral, BID PRN    insulin aspart U-100, 0-10 Units, Subcutaneous, QID (AC + HS) PRN    promethazine (PHENERGAN) 12.5 mg in 0.9% NaCl 50 mL IVPB, 12.5 mg, Intravenous, Q6H PRN    Flushing PICC/Midline Protocol, , , Until Discontinued **AND** sodium chloride 0.9%, 10 mL, Intravenous, Q6H **AND** sodium chloride 0.9%, 10 mL, Intravenous, PRN      Objective:     Vitals:    08/19/24 0415 08/19/24 0456 08/19/24 0730 08/19/24 0814   BP:  (!) 96/52 103/62    BP Location:  Right arm     Patient Position:  Lying     Pulse: 87 82 86 81   Resp:  18 19    Temp:  98.4 °F (36.9 °C) 98.4 °F (36.9 °C)    TempSrc:  Oral     SpO2:  100% 97%    Weight:       Height:           No data found.    Intake/Output Summary (Last 24 hours) at 8/19/2024 0832  Last data filed at 8/18/2024 1810  Gross per 24 hour   Intake 628.18 ml   Output --   Net 628.18 ml     Net IO Since Admission: 9,153.65 mL [08/19/24 0832]    Physical Exam  Vitals and nursing note reviewed.   Constitutional:       General: She is awake. She is not in acute distress.     Appearance: Normal appearance. She is well-developed and well-groomed. She is not toxic-appearing.   HENT:      Head: Normocephalic and atraumatic.   Cardiovascular:      Rate and Rhythm: Normal rate.   Pulmonary:      Effort: No tachypnea, accessory muscle usage or respiratory distress.   Abdominal:      General: There is no distension.   Neurological:      Mental Status: She is alert and oriented to person, place, and time. Mental status is at baseline.   Psychiatric:          Mood and Affect: Mood normal.         Behavior: Behavior normal. Behavior is cooperative.         Thought Content: Thought content normal.       Significant Labs: All pertinent labs within the past 24 hours have been reviewed.    BMP (Last 3 Results):   Recent Labs   Lab 08/14/24  1155 08/14/24  1606 08/15/24  0456 08/18/24  0509   * 193* 121* 105    137 141 139   K 3.8 4.3 3.8 4.0    111* 112* 107   CO2 19* 15* 19* 22*   BUN 7 6 7 8   CREATININE 0.9 0.9 0.8 0.8   CALCIUM 8.7 8.7 9.1 9.1   MG 1.7 1.8 1.7  --      CBC (Last 3 Results):   Recent Labs   Lab 08/14/24  0413 08/15/24  0700 08/18/24  0509   WBC 7.38 5.62 5.36   RBC 3.97* 3.94* 3.92*   HGB 10.3* 10.1* 10.0*   HCT 32.3* 31.6* 31.9*    310 230   MCV 81* 80* 81*   MCH 25.9* 25.6* 25.5*   MCHC 31.9* 32.0 31.3*     Microbiology Results (last 7 days)       Procedure Component Value Units Date/Time    Blood culture [[id number]] Collected: 08/14/24 0433    Order Status: Completed Specimen: Blood from Peripheral, Hand, Right Updated: 08/18/24 0703     Blood Culture, Routine No Growth after 4 days.    Blood culture [[id number]] Collected: 08/14/24 0432    Order Status: Completed Specimen: Blood from Peripheral, Antecubital, Right Updated: 08/18/24 0703     Blood Culture, Routine No Growth after 4 days.    Blood culture #1 [[id number]]  (Abnormal) Collected: 08/12/24 1513    Order Status: Completed Specimen: Blood from Peripheral, Forearm, Right Updated: 08/14/24 1057     Blood Culture, Routine Gram stain aer bottle: Gram positive cocci in chains resembling Strep      Results called to and read back by: KAROLINA MORIN  03:15  08/13/2024      VIRIDANS STREPTOCOCCUS GROUP  For susceptibility see order # [id number]      Narrative:      Blood Culture #1    Blood culture #2 [[id number]]  (Abnormal)  (Susceptibility) Collected: 08/12/24 1512    Order Status: Completed Specimen: Blood from Peripheral, Antecubital, Right Updated: 08/14/24 1057     Blood  Culture, Routine Gram stain kuldip bottle: Gram positive cocci in chains resembling Strep      Positive results previously called 03:50  08/13/2024      Gram stain aer bottle: Gram positive cocci in chains resembling Strep      Positive results previously called 04:45  08/13/2024      VIRIDANS STREPTOCOCCUS GROUP    Narrative:      Blood Culture #2    Rapid Organism ID by PCR (from Blood culture) [[id number]]  (Abnormal) Collected: 08/12/24 1513    Order Status: Completed Updated: 08/13/24 1135     Enterococcus faecalis Not Detected     Enterococcus faecium Not Detected     Listeria monocytogenes Not Detected     Staphylococcus spp. Not Detected     Staphylococcus aureus Not Detected     Staphylococcus epidermidis Not Detected     Staphylococcus lugdunensis Not Detected     Streptococcus species Detected     Streptococcus agalactiae Not Detected     Streptococcus pneumoniae Not Detected     Streptococcus pyogenes Not Detected     Acinetobacter calcoaceticus/baumannii complex Not Detected     Bacteroides fragilis Not Detected     Enterobacterales Not Detected     Enterobacter cloacae complex Not Detected     Escherichia coli Not Detected     Klebsiella aerogenes Not Detected     Klebsiella oxytoca Not Detected     Klebsiella pneumoniae group Not Detected     Proteus Not Detected     Salmonella sp Not Detected     Serratia marcescens Not Detected     Haemophilus influenzae Not Detected     Neisseria meningtidis Not Detected     Pseudomonas aeruginosa Not Detected     Stenotrophomonas maltophilia Not Detected     Candida albicans Not Detected     Candida auris Not Detected     Candida glabrata Not Detected     Candida krusei Not Detected     Candida parapsilosis Not Detected     Candida tropicalis Not Detected     Cryptococcus neoformans/gattii Not Detected     CTX-M (ESBL ) Test Not Applicable     IMP (Carbapenem resistant) Test Not Applicable     KPC resistance gene (Carbapenem resistant) Test Not Applicable      mcr-1  Test Not Applicable     mec A/C  Test Not Applicable     mec A/C and MREJ (MRSA) gene Test Not Applicable     NDM (Carbapenem resistant) Test Not Applicable     OXA-48-like (Carbapenem resistant) Test Not Applicable     van A/B (VRE gene) Test Not Applicable     VIM (Carbapenem resistant) Test Not Applicable    Narrative:      Blood Culture #1    Influenza A & B by Molecular [[id number]] Collected: 08/12/24 1850    Order Status: Canceled Specimen: Nasopharyngeal Swab             Significant Imaging: I have reviewed all pertinent imaging results/findings within the past 24 hours.  Echo    Left Ventricle: The left ventricle is normal in size. Mildly increased   wall thickness. There is mild concentric hypertrophy. There is normal   systolic function with a visually estimated ejection fraction of 55 - 60%.   There is normal diastolic function.    Right Ventricle: Normal right ventricular cavity size. Systolic   function is normal.    Tricuspid Valve: There is mild regurgitation.    Pulmonary Artery: The estimated pulmonary artery systolic pressure is   30 mmHg.    No valvular vegetations noted on this exam.  If high clinical   suspicion, consider SHANNA.  CT Abdomen Pelvis  Without Contrast  Narrative: EXAMINATION:  CT ABDOMEN PELVIS WITHOUT CONTRAST    CLINICAL HISTORY:  hematemasis;    TECHNIQUE:  Low dose axial CT images obtained throughout the region of the abdomen and pelvis without the use of intravenous contrast.  Axial, sagittal and coronal reconstructions were performed.    COMPARISON:  03/26/2024    FINDINGS:  Mild atelectatic changes in the partially visualized lung bases.    Noncontrast evaluation of liver, gallbladder, spleen, pancreas, and adrenal glands appear within normal limits.    Kidneys appear within normal limits.  Duplicated ureter on the left, both of which appear dilated at least the level of the pelvis no renal or ureteral calculi..  Question mild circumferential urinary bladder wall  thickening.  Uterus and adnexal structures are unremarkable.    Limited assessment of the gastrointestinal tract without the use of oral contrast. The stomach, small bowel and colon demonstrate no evidence of obstruction or focal inflammation.  Appendix is unremarkable.    No free air or free fluid identified within the abdomen or pelvis.    Aorta tapers normally throughout its visualized course.    Osseous structures exhibit mild degenerative changes. No fracture or focal osseous destructive lesion.  Impression: Left ureteral duplication with hydronephrosis.    Question mild circumferential urinary bladder wall thickening.    No definite renal or ureteral calculi.    Clinical correlation advised with CT urogram for further characterization if warranted.    Electronically signed by: Keith Hsieh MD  Date:    08/13/2024  Time:    12:06  CT Head Without Contrast  Narrative: EXAMINATION:  CT HEAD WITHOUT CONTRAST    CLINICAL HISTORY:  Mental status change, unknown cause;    TECHNIQUE:  Low dose axial CT images obtained throughout the head without the use of intravenous contrast.  Axial, sagittal and coronal reconstructions were performed.    COMPARISON:  06/18/2023    FINDINGS:  Intracranial compartment:    Ventricles and sulci are normal in size for age without evidence of hydrocephalus.    The brain parenchyma appears within normal limits.  No parenchymal  hemorrhage, edema, mass effect or major vascular distribution infarct.    No extra-axial blood or fluid collections.    Skull/extracranial contents (limited evaluation):    No displaced calvarial fracture.    The mastoid air cells and visualized paranasal sinuses are essentially clear.  Impression: No evidence of acute hemorrhage or major vascular distribution infarct.    Electronically signed by: Keith Hsieh MD  Date:    08/13/2024  Time:    11:59        Assessment/Plan:      * Bacteremia due to Gram-positive bacteria  Presented with DKA and found to have  strep viridans bacteremia.   GPC in multiple bottles - strep viridans growing  Suspect bacteremia due to gut translocation vs skin vs oral cavity.   TTE with no obvious vegetations  Repeat cultures negative to date  Currently on ceftriaxone, plan IV Cetriaxone until 8/28 (2 weeks from last neg culture)  CM working on placement as she is not a candidate for outpatient IV antibiotics  She has LEFT midline in place for IV antibiotics  Antibiotics (From admission, onward)      Start     Stop Route Frequency Ordered    08/12/24 1830  cefTRIAXone (ROCEPHIN) 2 g in D5W 100 mL IVPB (MB+)         -- IV Every 24 hours (non-standard times) 08/12/24 1723            Type 1 diabetes mellitus with hyperglycemia  Lab Results   Component Value Date    HGBA1C 10.7 (H) 08/12/2024     Recent Labs   Lab 08/19/24  0730   POCTGLUCOSE 262*      Cont current basal/prandial insulin regimen   Low dose correction scale   Cont blood glucose monitoring   BG goal:  Preprandial blood glucose target <140 mg/dL  Random glucoses <180 mg/dL  Avoid hypoglycemia -  Reduce antihyperglycemic therapy when caloric intake is reduced. Avoid insulin stacking as a result of repeated injection of prandial insulin at close intervals.   Avoid severe hyperglycemia  ADA diet  Antihyperglycemics (From admission, onward)      Start     Stop Route Frequency Ordered    08/14/24 1130  insulin aspart U-100 pen 5 Units         -- SubQ 3 times daily with meals 08/14/24 0857    08/14/24 0956  insulin aspart U-100 pen 0-10 Units         -- SubQ Before meals & nightly PRN 08/14/24 0857    08/14/24 0900  insulin glargine U-100 (Lantus) pen 15 Units         -- SubQ Daily 08/14/24 0856             DKA (diabetic ketoacidoses)  RESOLVED, switched to subcut insulin    Fever  See Bacteremia    Schizoaffective disorder  Continue home prozac/vistaril    Prolonged Q-T interval on ECG  Chronically prolonged near 500 per chart review.  Will monitor on telemetry, and attempt to minimize  anti-emetics to as needed for DKA  Qtc chronically prolonged and with chronic RBBB which will cause QRS widening  - monitor closely with reglan      KIMBERLY (acute kidney injury)  KIMBERLY is likely due to pre-renal azotemia due to dehydration. Baseline creatinine is  0.9 . Most recent creatinine and eGFR are listed below.  Recent Labs     08/18/24  0509   CREATININE 0.8   EGFRNORACEVR >60      Plan  - KIMBERLY is  resolved  - Avoid nephrotoxins and renally dose meds for GFR listed above  - Monitor urine output, serial BMP, and adjust therapy as needed    Tobacco abuse  Smokes less than 1/2ppd. Greater than 3min spent counseling patient on dangers of continued tobacco abuse    Polysubstance abuse  She reports THC use only, denies other recreational drug use.   counseled on THC cessation      VTE Risk Mitigation (From admission, onward)           Ordered     enoxaparin injection 40 mg  Every 24 hours         08/13/24 0144     IP VTE LOW RISK PATIENT  Once         08/12/24 1558     Place TRISTAN hose  Until discontinued         08/12/24 1558                          I have completed this tele-visit without the assistance of a telepresenter.    The attending portion of this evaluation, treatment, and documentation was performed per Jayde Ho MD via Telemedicine [employer] using the secure [employer] software platform with 2 way audio/video. The provider was located off-site and the patient is located in the hospital. The aforementioned video software was utilized to document the relevant history and physical exam    Jayde Ho MD  Department of Hospital Medicine   [facility]

## 2024-08-19 NOTE — ASSESSMENT & PLAN NOTE
KIMBERLY is likely due to pre-renal azotemia due to dehydration. Baseline creatinine is  0.9 . Most recent creatinine and eGFR are listed below.  Recent Labs     08/18/24  0509   CREATININE 0.8   EGFRNORACEVR >60      Plan  - KIMBERLY is  resolved  - Avoid nephrotoxins and renally dose meds for GFR listed above  - Monitor urine output, serial BMP, and adjust therapy as needed

## 2024-08-19 NOTE — NURSING
[facility] [place]es [place] -- 4 Eyes      8/18/2024       Skin assessed on: Q Shift      [x] No Pressure Injuries Present    []Prevention Measures Documented    [] Yes LDA  for Pressure Injury Previously documented     [] Yes New Pressure Injury Discovered   [] LDA for New Pressure Injury Added      Attending RN:  Bon Olson, RN     Second RN:  Hannah       Received report from off going nurse, Hannah. Patient received AAO X 3. Resp even and unlabored Tele box # [id number] is in use and is visible on the screen.  Bed locked in the lowest position with SR up X 2. Call light within reach. Visitor at the bedside. Midline noted to the pts BRITTANY.

## 2024-08-19 NOTE — NURSING
[facility] [place]  Nurses Note -- 4 Eyes      8/19/2024       Skin assessed on: Q Shift      [x] No Pressure Injuries Present    []Prevention Measures Documented    [] Yes LDA  for Pressure Injury Previously documented     [] Yes New Pressure Injury Discovered   [] LDA for New Pressure Injury Added      Attending RN:  Janice Villagomez RN     Second RN:  ALEXANDRA Crockett

## 2024-08-19 NOTE — SUBJECTIVE & OBJECTIVE
Follow-up For:  Patient Active Problem List    Diagnosis Date Noted Date Diagnosed    Bacteremia due to Gram-positive bacteria 08/13/2024     Type 1 diabetes mellitus with hyperglycemia 04/20/2024     DKA (diabetic ketoacidoses) 11/12/2020      Discharge Planning   THELMA:    Discharge Plan A: Long-term acute care facility (LTAC)   Discharge Delays: (!) Post-Acute Set-up    Interval History:   Subjective: Hollie Wilson is being followed for Bacteremia due to Gram-positive bacteria. No acute events overnight. Lying in bed resting comfortably. She is being treated with IV Rocephin for bacteremia. She will need to complete IV abx until 8/28. She is not a candidate for home IV infusion. Pending LTAC placement.     Symptoms: Stable. The patient denies shortness of breath, malaise, cough, chest pain, weakness, headache, chest pressure, anorexia, diarrhea and anxiety.     Diet: Regular. Adequate intake. The patient denies nausea and vomiting.    Last Bowel Movement: 08/13/24    Activity Level: Normal    Pain: The patient Denies pain     Review of Systems   Constitutional:  Negative for chills and fever.   Respiratory:  Negative for cough and shortness of breath.    Cardiovascular:  Negative for chest pain and palpitations.   Gastrointestinal:  Negative for abdominal pain.        Scheduled Meds:   cefTRIAXone (Rocephin) IV (PEDS and ADULTS)  2 g Intravenous Q24H    enoxparin  40 mg Subcutaneous Q24H (prophylaxis, 1700)    FLUoxetine  20 mg Oral Daily    Followed by    [START ON 8/29/2024] FLUoxetine  40 mg Oral Daily    insulin aspart U-100  5 Units Subcutaneous TIDWM    insulin glargine U-100  15 Units Subcutaneous Daily    nicotine  1 patch Transdermal Daily    pantoprazole  40 mg Oral Daily    sodium chloride 0.9%  10 mL Intravenous Q6H     Continuous Infusions:  PRN Meds:.  Current Facility-Administered Medications:     acetaminophen, 650 mg, Oral, Q4H PRN    glucagon (human recombinant), 1 mg, Intramuscular, PRN     glucose, 16 g, Oral, PRN    glucose, 24 g, Oral, PRN    hydrOXYzine HCL, 50 mg, Oral, BID PRN    insulin aspart U-100, 0-10 Units, Subcutaneous, QID (AC + HS) PRN    promethazine (PHENERGAN) 12.5 mg in 0.9% NaCl 50 mL IVPB, 12.5 mg, Intravenous, Q6H PRN    Flushing PICC/Midline Protocol, , , Until Discontinued **AND** sodium chloride 0.9%, 10 mL, Intravenous, Q6H **AND** sodium chloride 0.9%, 10 mL, Intravenous, PRN      Objective:     Vitals:    08/19/24 0415 08/19/24 0456 08/19/24 0730 08/19/24 0814   BP:  (!) 96/52 103/62    BP Location:  Right arm     Patient Position:  Lying     Pulse: 87 82 86 81   Resp:  18 19    Temp:  98.4 °F (36.9 °C) 98.4 °F (36.9 °C)    TempSrc:  Oral     SpO2:  100% 97%    Weight:       Height:           No data found.    Intake/Output Summary (Last 24 hours) at 8/19/2024 0832  Last data filed at 8/18/2024 1810  Gross per 24 hour   Intake 628.18 ml   Output --   Net 628.18 ml     Net IO Since Admission: 9,153.65 mL [08/19/24 0832]    Physical Exam  Vitals and nursing note reviewed.   Constitutional:       General: She is awake. She is not in acute distress.     Appearance: Normal appearance. She is well-developed and well-groomed. She is not toxic-appearing.   HENT:      Head: Normocephalic and atraumatic.   Cardiovascular:      Rate and Rhythm: Normal rate.   Pulmonary:      Effort: No tachypnea, accessory muscle usage or respiratory distress.   Abdominal:      General: There is no distension.   Neurological:      Mental Status: She is alert and oriented to person, place, and time. Mental status is at baseline.   Psychiatric:         Mood and Affect: Mood normal.         Behavior: Behavior normal. Behavior is cooperative.         Thought Content: Thought content normal.       Significant Labs: All pertinent labs within the past 24 hours have been reviewed.    BMP (Last 3 Results):   Recent Labs   Lab 08/14/24  1155 08/14/24  1606 08/15/24  0456 08/18/24  0509   * 193* 121* 105     137 141 139   K 3.8 4.3 3.8 4.0    111* 112* 107   CO2 19* 15* 19* 22*   BUN 7 6 7 8   CREATININE 0.9 0.9 0.8 0.8   CALCIUM 8.7 8.7 9.1 9.1   MG 1.7 1.8 1.7  --      CBC (Last 3 Results):   Recent Labs   Lab 08/14/24  0413 08/15/24  0700 08/18/24  0509   WBC 7.38 5.62 5.36   RBC 3.97* 3.94* 3.92*   HGB 10.3* 10.1* 10.0*   HCT 32.3* 31.6* 31.9*    310 230   MCV 81* 80* 81*   MCH 25.9* 25.6* 25.5*   MCHC 31.9* 32.0 31.3*     Microbiology Results (last 7 days)       Procedure Component Value Units Date/Time    Blood culture [[id number]] Collected: 08/14/24 0433    Order Status: Completed Specimen: Blood from Peripheral, Hand, Right Updated: 08/18/24 0703     Blood Culture, Routine No Growth after 4 days.    Blood culture [[id number]] Collected: 08/14/24 0432    Order Status: Completed Specimen: Blood from Peripheral, Antecubital, Right Updated: 08/18/24 0703     Blood Culture, Routine No Growth after 4 days.    Blood culture #1 [[id number]]  (Abnormal) Collected: 08/12/24 1513    Order Status: Completed Specimen: Blood from Peripheral, Forearm, Right Updated: 08/14/24 1057     Blood Culture, Routine Gram stain aer bottle: Gram positive cocci in chains resembling Strep      Results called to and read back by: KAROLINA MORIN  03:15  08/13/2024      VIRIDANS STREPTOCOCCUS GROUP  For susceptibility see order # [id number]      Narrative:      Blood Culture #1    Blood culture #2 [[id number]]  (Abnormal)  (Susceptibility) Collected: 08/12/24 1512    Order Status: Completed Specimen: Blood from Peripheral, Antecubital, Right Updated: 08/14/24 1057     Blood Culture, Routine Gram stain kuldip bottle: Gram positive cocci in chains resembling Strep      Positive results previously called 03:50  08/13/2024      Gram stain aer bottle: Gram positive cocci in chains resembling Strep      Positive results previously called 04:45  08/13/2024      VIRIDANS STREPTOCOCCUS GROUP    Narrative:      Blood Culture #2    Rapid  Organism ID by PCR (from Blood culture) [[id number]]  (Abnormal) Collected: 08/12/24 1513    Order Status: Completed Updated: 08/13/24 1135     Enterococcus faecalis Not Detected     Enterococcus faecium Not Detected     Listeria monocytogenes Not Detected     Staphylococcus spp. Not Detected     Staphylococcus aureus Not Detected     Staphylococcus epidermidis Not Detected     Staphylococcus lugdunensis Not Detected     Streptococcus species Detected     Streptococcus agalactiae Not Detected     Streptococcus pneumoniae Not Detected     Streptococcus pyogenes Not Detected     Acinetobacter calcoaceticus/baumannii complex Not Detected     Bacteroides fragilis Not Detected     Enterobacterales Not Detected     Enterobacter cloacae complex Not Detected     Escherichia coli Not Detected     Klebsiella aerogenes Not Detected     Klebsiella oxytoca Not Detected     Klebsiella pneumoniae group Not Detected     Proteus Not Detected     Salmonella sp Not Detected     Serratia marcescens Not Detected     Haemophilus influenzae Not Detected     Neisseria meningtidis Not Detected     Pseudomonas aeruginosa Not Detected     Stenotrophomonas maltophilia Not Detected     Candida albicans Not Detected     Candida auris Not Detected     Candida glabrata Not Detected     Candida krusei Not Detected     Candida parapsilosis Not Detected     Candida tropicalis Not Detected     Cryptococcus neoformans/gattii Not Detected     CTX-M (ESBL ) Test Not Applicable     IMP (Carbapenem resistant) Test Not Applicable     KPC resistance gene (Carbapenem resistant) Test Not Applicable     mcr-1  Test Not Applicable     mec A/C  Test Not Applicable     mec A/C and MREJ (MRSA) gene Test Not Applicable     NDM (Carbapenem resistant) Test Not Applicable     OXA-48-like (Carbapenem resistant) Test Not Applicable     van A/B (VRE gene) Test Not Applicable     VIM (Carbapenem resistant) Test Not Applicable    Narrative:      Blood Culture #1     Influenza A & B by Molecular [[id number]] Collected: 08/12/24 1850    Order Status: Canceled Specimen: Nasopharyngeal Swab             Significant Imaging: I have reviewed all pertinent imaging results/findings within the past 24 hours.  Echo    Left Ventricle: The left ventricle is normal in size. Mildly increased   wall thickness. There is mild concentric hypertrophy. There is normal   systolic function with a visually estimated ejection fraction of 55 - 60%.   There is normal diastolic function.    Right Ventricle: Normal right ventricular cavity size. Systolic   function is normal.    Tricuspid Valve: There is mild regurgitation.    Pulmonary Artery: The estimated pulmonary artery systolic pressure is   30 mmHg.    No valvular vegetations noted on this exam.  If high clinical   suspicion, consider SHANNA.  CT Abdomen Pelvis  Without Contrast  Narrative: EXAMINATION:  CT ABDOMEN PELVIS WITHOUT CONTRAST    CLINICAL HISTORY:  hematemasis;    TECHNIQUE:  Low dose axial CT images obtained throughout the region of the abdomen and pelvis without the use of intravenous contrast.  Axial, sagittal and coronal reconstructions were performed.    COMPARISON:  03/26/2024    FINDINGS:  Mild atelectatic changes in the partially visualized lung bases.    Noncontrast evaluation of liver, gallbladder, spleen, pancreas, and adrenal glands appear within normal limits.    Kidneys appear within normal limits.  Duplicated ureter on the left, both of which appear dilated at least the level of the pelvis no renal or ureteral calculi..  Question mild circumferential urinary bladder wall thickening.  Uterus and adnexal structures are unremarkable.    Limited assessment of the gastrointestinal tract without the use of oral contrast. The stomach, small bowel and colon demonstrate no evidence of obstruction or focal inflammation.  Appendix is unremarkable.    No free air or free fluid identified within the abdomen or pelvis.    Aorta tapers  normally throughout its visualized course.    Osseous structures exhibit mild degenerative changes. No fracture or focal osseous destructive lesion.  Impression: Left ureteral duplication with hydronephrosis.    Question mild circumferential urinary bladder wall thickening.    No definite renal or ureteral calculi.    Clinical correlation advised with CT urogram for further characterization if warranted.    Electronically signed by: Keith Hsieh MD  Date:    08/13/2024  Time:    12:06  CT Head Without Contrast  Narrative: EXAMINATION:  CT HEAD WITHOUT CONTRAST    CLINICAL HISTORY:  Mental status change, unknown cause;    TECHNIQUE:  Low dose axial CT images obtained throughout the head without the use of intravenous contrast.  Axial, sagittal and coronal reconstructions were performed.    COMPARISON:  06/18/2023    FINDINGS:  Intracranial compartment:    Ventricles and sulci are normal in size for age without evidence of hydrocephalus.    The brain parenchyma appears within normal limits.  No parenchymal  hemorrhage, edema, mass effect or major vascular distribution infarct.    No extra-axial blood or fluid collections.    Skull/extracranial contents (limited evaluation):    No displaced calvarial fracture.    The mastoid air cells and visualized paranasal sinuses are essentially clear.  Impression: No evidence of acute hemorrhage or major vascular distribution infarct.    Electronically signed by: Keith Hsieh MD  Date:    08/13/2024  Time:    11:59

## 2024-08-19 NOTE — ASSESSMENT & PLAN NOTE
Presented with DKA and found to have strep viridans bacteremia.   GPC in multiple bottles - strep viridans growing  Suspect bacteremia due to gut translocation vs skin vs oral cavity.   TTE with no obvious vegetations  Repeat cultures negative to date  Currently on ceftriaxone, plan IV Cetriaxone until 8/28 (2 weeks from last neg culture)  CM working on placement as she is not a candidate for outpatient IV antibiotics  She has LEFT midline in place for IV antibiotics  Antibiotics (From admission, onward)      Start     Stop Route Frequency Ordered    08/12/24 1830  cefTRIAXone (ROCEPHIN) 2 g in D5W 100 mL IVPB (MB+)         -- IV Every 24 hours (non-standard times) 08/12/24 8853

## 2024-08-19 NOTE — PLAN OF CARE
Problem: Adult Inpatient Plan of Care  Goal: Plan of Care Review  Outcome: Progressing  Flowsheets (Taken 8/19/2024 1548)  Plan of Care Reviewed With: patient  Goal: Optimal Comfort and Wellbeing  Outcome: Progressing  Intervention: Provide Person-Centered Care  Flowsheets (Taken 8/19/2024 1548)  Trust Relationship/Rapport:   care explained   choices provided   emotional support provided   empathic listening provided   questions answered   questions encouraged   reassurance provided   thoughts/feelings acknowledged     Problem: Diabetes Comorbidity  Goal: Blood Glucose Level Within Targeted Range  Outcome: Progressing  Intervention: Monitor and Manage Glycemia  Flowsheets (Taken 8/19/2024 1548)  Glycemic Management:   blood glucose monitored   oral glucose given

## 2024-08-19 NOTE — PLAN OF CARE
CM left two messages for [facility] to follow up on referral.    [facility] - Out of Network.    [facility]- Out of Network    CM contacted insurance providers and pt doesn't have LTAC benefits. Reference # [id number]. MD notified.

## 2024-08-19 NOTE — NURSING
Patient's presented with hypoglycemia.  BG < 42.  Patient refused the glucose tablets.  Patient was encouraged to drink juice and eat cereal and milk.  Sugar packets were placed in both the juice and cereal, per patient's request.  Will recheck BG and continue to monitor

## 2024-08-19 NOTE — PLAN OF CARE
"CM informed pt that the LTAC facilities on [facility] are out of network. CM inquired if I could extend the search and pt stated " yes". CM sent additional referrals to [facility]. Stop date for antibiotics 8/28. CM will follow up as needed.      08/19/24 7833   Discharge Reassessment   Assessment Type Discharge Planning Reassessment   Did the patient's condition or plan change since previous assessment? No   Discharge Plan discussed with: Patient   Communicated THELMA with patient/caregiver Yes   Discharge Plan A Long-term acute care facility (LTAC)   Discharge Plan B Home with family   DME Needed Upon Discharge  other (see comments)  (NONE)   Transition of Care Barriers Substance Abuse;Mental illness   Why the patient remains in the hospital Requires continued medical care   Post-Acute Status   Discharge Delays (!) Post-Acute Set-up       "

## 2024-08-20 NOTE — DISCHARGE SUMMARY
[facility] Medicine  Discharge Summary      Patient Name: Hollie Wilson  MRN: [id number]  Patient Class: IP- Inpatient  Admission Date: 8/12/2024  Hospital Length of Stay: 7 days  Discharge Date and Time: 8/19/2024 10:03 PM  Attending Physician: Em att. providers found   Discharging Provider: Jayde Ho MD  Primary Care Provider: Martin Rios MD      HPI:   Patient is a 34-year-old female with past medical history of type 1 diabetes with gastroparesis/neuropathy, developmental delay, schizoaffective disorder, polysubstance use disorder (cocaine, amphetamines, opiates, marijuana), tobacco use disorder and GERD who presented to [facility] ER on 08/12/2024 for further evaluation of vomiting.  She reports 2 days of associated nausea and vomiting with associated subjective fevers.  She finds her nausea has improved with treatment in the ER.  She reports she was not had her home insulin last 2 days as she was not had food in her house and was afraid to take it.  She also has had 2-3 weeks low atraumatic back pain.  She reports she was smokes marijuana and cigarettes, but denies other recreational drug use.  She denies leg swelling melena hematuria hematemesis incontinence numbness weakness in extremity.      During evaluation in the ER, patient is hemodynamically stable anion gap 19 CO2 15 blood glucose 663 creatinine 1.5 potassium 5.5 beta hydroxy 2.4 pH 7.278 UA without leukocytes or nitrites chest x-ray without acute process.    * No surgery found *      Hospital Course:   Ms. Martin is a 34-year-old female who was admitted with DKA secondary to sepsis.  She was found to have bacteremia with strep viridans.  She denies IV drug use.  She was started on IV antibiotics, IV fluids and IV insulin.  She was then transitioned to subcutaneous insulin.  Echocardiogram was ordered with out any vegetations.  Repeat cultures negative to date.  Currently on Flagyl for bacterial vaginosis.    Had  neurological change on 08/13 and mild hematemesis.  CT head with no acute issues and CT abdomen and pelvis without any concerning pathology.  She does have history of gastroparesis and reports being on Reglan.  Id is following. Reglan improving and she is now tolerating diet. Recommending Ceftriaxone until 8/28/24. CM assisting with placement for IV antibiotics.    Patient has left AMA. Please see Dr. Johnny Nichols note for details.      Goals of Care Treatment Preferences:  Code Status: Full Code      Consults:   Consults (From admission, onward)          Status Ordering Provider     Inpatient consult to Midline team  Once        Provider:  (Not yet assigned)    Completed SUSAN CHRISTOPHER     Inpatient virtual consult to Hospital Medicine  Once        Provider:  (Not yet assigned)    Completed SUSAN CHRISTOPHER     Inpatient consult to Infectious Diseases  Once        Provider:  Al Roca MD    Completed JUANJO VAUGHN A     Inpatient consult to Neurology  Once        Provider:  (Not yet assigned)    Completed JUANJO VAUGHN A     Inpatient consult to Gastroenterology  Once        Provider:  (Not yet assigned)    Completed JUANJO VAUGHN A     Inpatient consult to PICC team ([facility])  Once        Provider:  (Not yet assigned)    Completed DANY SAM     Inpatient consult to Registered Dietitian/Nutritionist  Once        Provider:  (Not yet assigned)    Completed ANA HERNANDEZ            Psychiatric  Polysubstance abuse  She reports THC use only, denies other recreational drug use.   counseled on THC cessation    ID  * Bacteremia due to Gram-positive bacteria  Presented with DKA and found to have strep viridans bacteremia.   GPC in multiple bottles - strep viridans growing  Suspect bacteremia due to gut translocation vs skin vs oral cavity.   TTE with no obvious vegetations  Repeat cultures negative to date  Currently on ceftriaxone, plan IV Cetriaxone until 8/28 (2 weeks from last neg culture)  CM working on  placement as she is not a candidate for outpatient IV antibiotics  She has LEFT midline in place for IV antibiotics  Antibiotics (From admission, onward)      None            Endocrine  DKA (diabetic ketoacidoses)  RESOLVED, switched to subcut insulin    Type 1 diabetes mellitus with hyperglycemia  Lab Results   Component Value Date    HGBA1C 10.7 (H) 08/12/2024     Recent Labs   Lab 08/19/24 2016   POCTGLUCOSE 222*        Cont current basal/prandial insulin regimen   Low dose correction scale   Cont blood glucose monitoring   BG goal:  Preprandial blood glucose target <140 mg/dL  Random glucoses <180 mg/dL  Avoid hypoglycemia -  Reduce antihyperglycemic therapy when caloric intake is reduced. Avoid insulin stacking as a result of repeated injection of prandial insulin at close intervals.   Avoid severe hyperglycemia  ADA diet  Antihyperglycemics (From admission, onward)      None               Final Active Diagnoses:    Diagnosis Date Noted POA    PRINCIPAL PROBLEM:  Bacteremia due to Gram-positive bacteria [R78.81] 08/13/2024 Yes    Type 1 diabetes mellitus with hyperglycemia [E10.65] 04/20/2024 Yes     Chronic    DKA (diabetic ketoacidoses) [E11.10] 11/12/2020 Yes    Schizoaffective disorder [F25.9] 05/08/2024 Yes     Chronic    Prolonged Q-T interval on ECG [R94.31] 11/12/2020 Yes    Polysubstance abuse [F19.10] 06/13/2019 Yes     Chronic    Tobacco abuse [Z72.0] 06/13/2019 Yes    KIMBERLY (acute kidney injury) [N17.9] 06/13/2019 Yes      Problems Resolved During this Admission:       Discharged Condition: against medical advice    Disposition: Left Against Medical Adv*    Follow Up:    Patient Instructions:   No discharge procedures on file.    Significant Diagnostic Studies: N/A    Pending Diagnostic Studies:       None           Medications:  None    Indwelling Lines/Drains at time of discharge:   Lines/Drains/Airways       None                   Time spent on the discharge of patient: 45 minutes         The  attending portion of this evaluation, treatment, and documentation was performed per Jayde Ho MD via Telemedicine AudioVisual using the secure ACE software platform with 2 way audio/video. The provider was located off-site and the patient is located in the hospital. The aforementioned video software was utilized to document the relevant history and physical exam    Jayde Ho MD  Department of Hospital Medicine  [facility]

## 2024-08-20 NOTE — ASSESSMENT & PLAN NOTE
Presented with DKA and found to have strep viridans bacteremia.   GPC in multiple bottles - strep viridans growing  Suspect bacteremia due to gut translocation vs skin vs oral cavity.   TTE with no obvious vegetations  Repeat cultures negative to date  Currently on ceftriaxone, plan IV Cetriaxone until 8/28 (2 weeks from last neg culture)  CM working on placement as she is not a candidate for outpatient IV antibiotics  She has LEFT midline in place for IV antibiotics  Antibiotics (From admission, onward)    None

## 2024-08-20 NOTE — NURSING
[facility]  Nurses Note -- 4 Eyes      8/19/2024       Skin assessed on: Q Shift      [x] No Pressure Injuries Present    []Prevention Measures Documented    [] Yes LDA  for Pressure Injury Previously documented     [] Yes New Pressure Injury Discovered   [] LDA for New Pressure Injury Added      Attending RN:  Bon Olson, RN     Second RN:  Janice      Received report from off going nurse, Janice. Patient received AAO X 3. Resp even and unlabored Tele box # [id number] is in use and is visible on the screen.  Bed locked in the lowest position with SR up X 2. Call light within reach. Midline noted to the pts BRITTANY..

## 2024-08-20 NOTE — CARE UPDATE
"The patient has chosen to leave the hospital against our medical advice. The patient is clinically not intoxicated, free from distracting pain, appears to have intact insight, judgment and reason and in my medical opinion has the capacity to make decisions. The patient is also not under any duress to leave the hospital. In this scenario, it would be battery to subject a patient to treatment against his/her will. I have voiced my concerns for the patient's health given that a full evaluation and treatment had not occurred. I have discussed the need for continued evaluation to determine if their symptoms are caused by a condition that present risk of death or morbidity. Risks including but not limited to death, permanent disability, prolonged hospitalization, prolonged illness, were discussed. I tried offering alternative options in hopes that the patient might be amenable to partial evaluation and treatment which would be medically beneficial to the patient, though the patient declined my options and insisted on leaving. Because I have been unable to convince the patient to stay, I answered all of their questions about their condition and asked them to return to the ED as soon as possible to complete their evaluation, especially if their symptoms worsen or do not improve. I emphasized that leaving against medical advice does not preclude returning here for further evaluation. I asked the patient to return if they change their mind about the further evaluation and treatment. I strongly encouraged the patient to return to this Emergency Department or any Emergency Department at any time, particularly with worsening symptoms.      Patient understands why she is hospitalized and the risks of leaving. Patient wants to leave because she "can't go out and get some fresh air". Earlier she requested to be transferred to [facility] because we do no have glucagon gel available.   "

## 2024-08-20 NOTE — ASSESSMENT & PLAN NOTE
Lab Results   Component Value Date    HGBA1C 10.7 (H) 08/12/2024     Recent Labs   Lab 08/19/24 2016   POCTGLUCOSE 222*        Cont current basal/prandial insulin regimen   Low dose correction scale   Cont blood glucose monitoring   BG goal:  Preprandial blood glucose target <140 mg/dL  Random glucoses <180 mg/dL  Avoid hypoglycemia -  Reduce antihyperglycemic therapy when caloric intake is reduced. Avoid insulin stacking as a result of repeated injection of prandial insulin at close intervals.   Avoid severe hyperglycemia  ADA diet  Antihyperglycemics (From admission, onward)    None

## 2024-09-06 ENCOUNTER — HOSPITAL ENCOUNTER (EMERGENCY)
Facility: HOSPITAL | Age: 34
Discharge: HOME | End: 2024-09-06
Attending: EMERGENCY MEDICINE
Payer: COMMERCIAL

## 2024-09-06 VITALS
OXYGEN SATURATION: 100 % | SYSTOLIC BLOOD PRESSURE: 106 MMHG | TEMPERATURE: 99 F | HEART RATE: 72 BPM | HEIGHT: 67 IN | RESPIRATION RATE: 16 BRPM | BODY MASS INDEX: 19.62 KG/M2 | DIASTOLIC BLOOD PRESSURE: 70 MMHG | WEIGHT: 125 LBS

## 2024-09-06 DIAGNOSIS — E10.8 DIABETES MELLITUS TYPE 1, CONTROLLED, WITH COMPLICATIONS: Primary | ICD-10-CM

## 2024-09-06 DIAGNOSIS — E16.2 HYPOGLYCEMIA: ICD-10-CM

## 2024-09-06 LAB
ALBUMIN SERPL BCP-MCNC: 3.9 G/DL (ref 3.5–5.2)
ALP SERPL-CCNC: 89 U/L (ref 55–135)
ALT SERPL W/O P-5'-P-CCNC: 10 U/L (ref 10–44)
ANION GAP SERPL CALC-SCNC: 13 MMOL/L (ref 8–16)
AST SERPL-CCNC: 14 U/L (ref 10–40)
BASOPHILS # BLD AUTO: 0.04 K/UL (ref 0–0.2)
BASOPHILS NFR BLD: 0.7 % (ref 0–1.9)
BILIRUB SERPL-MCNC: 0.3 MG/DL (ref 0.1–1)
BUN SERPL-MCNC: 18 MG/DL (ref 6–20)
CALCIUM SERPL-MCNC: 9.5 MG/DL (ref 8.7–10.5)
CHLORIDE SERPL-SCNC: 105 MMOL/L (ref 95–110)
CO2 SERPL-SCNC: 21 MMOL/L (ref 23–29)
CREAT SERPL-MCNC: 0.8 MG/DL (ref 0.5–1.4)
DIFFERENTIAL METHOD BLD: ABNORMAL
EOSINOPHIL # BLD AUTO: 0.2 K/UL (ref 0–0.5)
EOSINOPHIL NFR BLD: 3.7 % (ref 0–8)
ERYTHROCYTE [DISTWIDTH] IN BLOOD BY AUTOMATED COUNT: 17.2 % (ref 11.5–14.5)
EST. GFR  (NO RACE VARIABLE): >60 ML/MIN/1.73 M^2
GLUCOSE SERPL-MCNC: 106 MG/DL (ref 70–110)
HCT VFR BLD AUTO: 42.4 % (ref 37–48.5)
HGB BLD-MCNC: 13 G/DL (ref 12–16)
IMM GRANULOCYTES # BLD AUTO: 0.02 K/UL (ref 0–0.04)
IMM GRANULOCYTES NFR BLD AUTO: 0.3 % (ref 0–0.5)
LYMPHOCYTES # BLD AUTO: 1.6 K/UL (ref 1–4.8)
LYMPHOCYTES NFR BLD: 27 % (ref 18–48)
MCH RBC QN AUTO: 25.6 PG (ref 27–31)
MCHC RBC AUTO-ENTMCNC: 30.7 G/DL (ref 32–36)
MCV RBC AUTO: 84 FL (ref 82–98)
MONOCYTES # BLD AUTO: 0.5 K/UL (ref 0.3–1)
MONOCYTES NFR BLD: 9.4 % (ref 4–15)
NEUTROPHILS # BLD AUTO: 3.4 K/UL (ref 1.8–7.7)
NEUTROPHILS NFR BLD: 58.9 % (ref 38–73)
NRBC BLD-RTO: 0 /100 WBC
OHS QRS DURATION: 134 MS
OHS QTC CALCULATION: 490 MS
PLATELET # BLD AUTO: 275 K/UL (ref 150–450)
PMV BLD AUTO: 10.6 FL (ref 9.2–12.9)
POCT GLUCOSE: 148 MG/DL (ref 70–110)
POCT GLUCOSE: 243 MG/DL (ref 70–110)
POTASSIUM SERPL-SCNC: 4.2 MMOL/L (ref 3.5–5.1)
PROT SERPL-MCNC: 7.4 G/DL (ref 6–8.4)
RBC # BLD AUTO: 5.07 M/UL (ref 4–5.4)
SODIUM SERPL-SCNC: 139 MMOL/L (ref 136–145)
WBC # BLD AUTO: 5.75 K/UL (ref 3.9–12.7)

## 2024-09-06 PROCEDURE — 80053 COMPREHEN METABOLIC PANEL: CPT | Performed by: NURSE PRACTITIONER

## 2024-09-06 PROCEDURE — 99284 EMERGENCY DEPT VISIT MOD MDM: CPT | Mod: 25

## 2024-09-06 PROCEDURE — 93010 ELECTROCARDIOGRAM REPORT: CPT | Mod: ,,, | Performed by: INTERNAL MEDICINE

## 2024-09-06 PROCEDURE — 85025 COMPLETE CBC W/AUTO DIFF WBC: CPT | Performed by: NURSE PRACTITIONER

## 2024-09-06 PROCEDURE — 82962 GLUCOSE BLOOD TEST: CPT

## 2024-09-06 PROCEDURE — 93005 ELECTROCARDIOGRAM TRACING: CPT

## 2024-09-06 NOTE — ED PROVIDER NOTES
Encounter Date: 9/6/2024    SCRIBE #1 NOTE: I, Roxi Dominguez, am scribing for, and in the presence of,  Neo Mix MD. I have scribed the following portions of the note - Other sections scribed: HPI, ROS.       History     Chief Complaint   Patient presents with    Hypoglycemia     Pt reports to the ED BIB NO EMS with C/O hypoglycemia with a CBG of 30 on scene with EMS. Per EMS pt was somnolent and only responding to touch on arrival. Pt given oral glucose with EMS and CBG came up to 81. Pt currently AAOx4, RESP easy and unlabored. Pt placed in ANDREA bed for eval.       HPI: 34-year-old female, with a PMHx of Anemia, Borderline intellectual functioning, DM, Substance use disorder, presents to the ED via EMS complaining of hypoglycemia, symptoms onset PTA. Additional history obtained from independent historian: EMS personnel, states the patient had a CBG of 30 on scene. Per EMS the patient was somnolent and only responding to touch on arrival. Also, per EMS, the patient was given oral glucose and her CBG came up to 81;  in triage. Patient denies cough, shortness of breath, chest pain, fever, chills, abdominal pain, nausea, vomiting, diarrhea, dysuria, headaches, congestion, sore throat, arm or leg trouble, eye pain, ear pain, rash, or other associated symptoms. No other alleviating or exacerbating factors. This is the extent of the patient's complaints in the ED.                The history is provided by the EMS personnel. No  was used.     Review of patient's allergies indicates:   Allergen Reactions    Clove oil Itching    Pork/porcine containing products Other (See Comments)     Pt does not eat Pork    Zofran (as hydrochloride) [ondansetron hcl] Hives     Past Medical History:   Diagnosis Date    Anemia     Borderline intellectual functioning 08/08/2017    Diabetes mellitus     Insulin overdose 06/01/2024    Mood disorder     Scoliosis     Substance use disorder      Past Surgical  History:   Procedure Laterality Date     SECTION       No family history on file.  Social History     Tobacco Use    Smoking status: Every Day     Current packs/day: 1.00     Types: Cigarettes    Smokeless tobacco: Never   Substance Use Topics    Alcohol use: Not Currently    Drug use: Yes     Types: Marijuana     Review of Systems   Constitutional:  Negative for chills, diaphoresis and fever.   HENT:  Negative for ear pain and sore throat.    Eyes:  Negative for pain.   Respiratory:  Negative for cough and shortness of breath.    Cardiovascular:  Negative for chest pain.   Gastrointestinal:  Negative for abdominal pain, diarrhea, nausea and vomiting.   Endocrine:        (+) hypoglycemia    Genitourinary:  Negative for dysuria.   Musculoskeletal:  Negative for back pain.        (-) Arm or leg trouble.    Skin:  Negative for rash.   Neurological:  Negative for headaches.   Psychiatric/Behavioral:  Negative for confusion.        Physical Exam     Initial Vitals   BP Pulse Resp Temp SpO2   24 0716 24 0716 24 0716 24 1129 24 0716   109/71 80 18 98.5 °F (36.9 °C) 100 %      MAP       --                Physical Exam  The patient was examined specifically for the following:   General:No significant distress, Good color, Warm and dry. Head and neck:Scalp atraumatic, Neck supple. Neurological:Appropriate conversation, Gross motor deficits. Eyes:Conjugate gaze, Clear corneas. ENT: No epistaxis. Cardiac: Regular rate and rhythm, Grossly normal heart tones. Pulmonary: Wheezing, Rales. Gastrointestinal: Abdominal tenderness, Abdominal distention. Musculoskeletal: Extremity deformity, Apparent pain with range of motion of the joints. Skin: Rash.   The findings on examination were normal except for the following:  The patient is awake and alert.  She is in no distress.  Lungs are clear.  The heart tones are normal.  The abdomen is soft.  Vital signs are stable.  The patient is awake and  conversational.  ED Course   Procedures  Labs Reviewed   CBC W/ AUTO DIFFERENTIAL - Abnormal       Result Value    WBC 5.75      RBC 5.07      Hemoglobin 13.0      Hematocrit 42.4      MCV 84      MCH 25.6 (*)     MCHC 30.7 (*)     RDW 17.2 (*)     Platelets 275      MPV 10.6      Immature Granulocytes 0.3      Gran # (ANC) 3.4      Immature Grans (Abs) 0.02      Lymph # 1.6      Mono # 0.5      Eos # 0.2      Baso # 0.04      nRBC 0      Gran % 58.9      Lymph % 27.0      Mono % 9.4      Eosinophil % 3.7      Basophil % 0.7      Differential Method Automated     COMPREHENSIVE METABOLIC PANEL - Abnormal    Sodium 139      Potassium 4.2      Chloride 105      CO2 21 (*)     Glucose 106      BUN 18      Creatinine 0.8      Calcium 9.5      Total Protein 7.4      Albumin 3.9      Total Bilirubin 0.3      Alkaline Phosphatase 89      AST 14      ALT 10      eGFR >60      Anion Gap 13     POCT GLUCOSE - Abnormal    POCT Glucose 148 (*)    POCT GLUCOSE - Abnormal    POCT Glucose 243 (*)    URINALYSIS, REFLEX TO URINE CULTURE   DRUG SCREEN PANEL, URINE EMERGENCY     EKG Readings: (Independently Interpreted)   This patient is in a normal sinus rhythm with a heart rate of 72.  The patient has a right bundle branch block.  The patient has an indeterminate axis.  There are no significant ST segment and T-wave changes.  There is no definite evidence of acute myocardial infarction or malignant arrhythmia.  This is doctor Mix dictating     ECG Results              EKG 12-lead (Final result)        Collection Time Result Time QRS Duration OHS QTC Calculation    09/06/24 08:38:31 09/06/24 11:12:20 134 490                     Final result by Interface, Lab In [facility] (09/06/24 11:12:30)                   Narrative:    Test Reason : E16.2,    Vent. Rate : 072 BPM     Atrial Rate : 072 BPM     P-R Int : 146 ms          QRS Dur : 134 ms      QT Int : 448 ms       P-R-T Axes : 055 -79 031 degrees     QTc Int : 490 ms    Normal sinus  rhythm  Right bundle branch block  Left anterior fascicular block   Bifascicular block   Septal infarct (cited on or before 12-AUG-2024)  Abnormal ECG  When compared with ECG of 13-AUG-2024 08:06,  Significant changes have occurred  Confirmed by Claude Barragan MD ([id number]) on 9/6/2024 11:12:14 AM    Referred By: IZA   SELF           Confirmed By:Claude Barragan MD                                  Imaging Results    None          Medications - No data to display  Medical Decision Making  Amount and/or Complexity of Data Reviewed  Independent Historian: EMS     Details: See HPI.  Labs: ordered. Decision-making details documented in ED Course.  ECG/medicine tests: ordered. Decision-making details documented in ED Course.    Given the above this insulin-dependent type 1 diabetic presents emergency with a an episode of hypoglycemia today.  The patient took her usual insulin last night.  She did not eat.  I have counseled her about the importance of taking her insulin and eating regularly as recommended.  Diagnostic evaluation fails to reveal significant abnormalities the physical examination is essentially unremarkable.  The abdomen is soft.  Last blood sugar was 243.  I will discharge this patient to outpatient evaluation and treatment.        Scribe Attestation:   Scribe #1: I performed the above scribed service and the documentation accurately describes the services I performed. I attest to the accuracy of the note.                               Clinical Impression:  Final diagnoses:  [E16.2] Hypoglycemia  [E10.8] Diabetes mellitus type 1, controlled, with complications (Primary)          ED Disposition Condition    Discharge Stable          Please note that the documentation on this chart was provided by the scribe above on the date of service noted above, and that the documentation in the chart accurately reflects the work and decisions made by me alone.  Signed, Dr. Mix  ED Prescriptions    None       Follow-up  Information       Follow up With Specialties Details Why Contact Info    Martin Rios MD Internal Medicine In 1 week  [address] [facility] [address]  [phone]               Neo Mix MD  09/06/24 1059       Neo Mix MD  09/06/24 7839

## 2024-09-06 NOTE — DISCHARGE INSTRUCTIONS
Please take your insulin exactly as directed.  Please eat regularly.  Do not skip meals.  Return immediately if you get worse or if new problems develop.  Please follow-up with your primary care doctor this week.  Rest.

## 2024-09-15 ENCOUNTER — HOSPITAL ENCOUNTER (INPATIENT)
Facility: HOSPITAL | Age: 34
LOS: 1 days | Discharge: HOME | DRG: 918 | End: 2024-09-17
Attending: EMERGENCY MEDICINE | Admitting: INTERNAL MEDICINE
Payer: COMMERCIAL

## 2024-09-15 DIAGNOSIS — F19.20 POLYSUBSTANCE (INCLUDING OPIOIDS) DEPENDENCE, DAILY USE: ICD-10-CM

## 2024-09-15 DIAGNOSIS — E16.2 HYPOGLYCEMIA: Primary | ICD-10-CM

## 2024-09-15 DIAGNOSIS — R41.83 BORDERLINE INTELLECTUAL FUNCTIONING: ICD-10-CM

## 2024-09-15 DIAGNOSIS — T38.3X1A INSULIN OVERDOSE, ACCIDENTAL OR UNINTENTIONAL, INITIAL ENCOUNTER: ICD-10-CM

## 2024-09-15 DIAGNOSIS — F11.20 POLYSUBSTANCE (INCLUDING OPIOIDS) DEPENDENCE, DAILY USE: ICD-10-CM

## 2024-09-15 DIAGNOSIS — E10.9 TYPE 1 DIABETES MELLITUS WITHOUT COMPLICATION: ICD-10-CM

## 2024-09-15 DIAGNOSIS — E10.65 TYPE 1 DIABETES MELLITUS WITH HYPERGLYCEMIA: Chronic | ICD-10-CM

## 2024-09-15 DIAGNOSIS — F99 PSYCHIATRIC DISORDER: ICD-10-CM

## 2024-09-15 DIAGNOSIS — R73.9 HYPERGLYCEMIA: ICD-10-CM

## 2024-09-15 LAB
ALLENS TEST: ABNORMAL
B-HCG UR QL: NEGATIVE
B-OH-BUTYR BLD STRIP-SCNC: 0.1 MMOL/L (ref 0–0.5)
BACTERIA #/AREA URNS AUTO: NORMAL /HPF
BASOPHILS # BLD AUTO: 0.06 K/UL (ref 0–0.2)
BASOPHILS NFR BLD: 1.1 % (ref 0–1.9)
BILIRUB UR QL STRIP: NEGATIVE
CLARITY UR REFRACT.AUTO: CLEAR
COLOR UR AUTO: COLORLESS
CTP QC/QA: YES
DIFFERENTIAL METHOD BLD: ABNORMAL
EOSINOPHIL # BLD AUTO: 0.2 K/UL (ref 0–0.5)
EOSINOPHIL NFR BLD: 4.2 % (ref 0–8)
ERYTHROCYTE [DISTWIDTH] IN BLOOD BY AUTOMATED COUNT: 18.6 % (ref 11.5–14.5)
GLUCOSE SERPL-MCNC: 476 MG/DL (ref 70–110)
GLUCOSE UR QL STRIP: ABNORMAL
HCO3 UR-SCNC: 27.2 MMOL/L (ref 24–28)
HCT VFR BLD AUTO: 38.5 % (ref 37–48.5)
HGB BLD-MCNC: 12.4 G/DL (ref 12–16)
HGB UR QL STRIP: NEGATIVE
IMM GRANULOCYTES # BLD AUTO: 0.01 K/UL (ref 0–0.04)
IMM GRANULOCYTES NFR BLD AUTO: 0.2 % (ref 0–0.5)
KETONES UR QL STRIP: ABNORMAL
LEUKOCYTE ESTERASE UR QL STRIP: NEGATIVE
LYMPHOCYTES # BLD AUTO: 2.2 K/UL (ref 1–4.8)
LYMPHOCYTES NFR BLD: 42.7 % (ref 18–48)
MCH RBC QN AUTO: 25.6 PG (ref 27–31)
MCHC RBC AUTO-ENTMCNC: 32.2 G/DL (ref 32–36)
MCV RBC AUTO: 79 FL (ref 82–98)
MICROSCOPIC COMMENT: NORMAL
MONOCYTES # BLD AUTO: 0.4 K/UL (ref 0.3–1)
MONOCYTES NFR BLD: 6.7 % (ref 4–15)
NEUTROPHILS # BLD AUTO: 2.4 K/UL (ref 1.8–7.7)
NEUTROPHILS NFR BLD: 45.1 % (ref 38–73)
NITRITE UR QL STRIP: NEGATIVE
NRBC BLD-RTO: 0 /100 WBC
PCO2 BLDA: 55.9 MMHG (ref 35–45)
PH SMN: 7.29 [PH] (ref 7.35–7.45)
PH UR STRIP: 6 [PH] (ref 5–8)
PLATELET # BLD AUTO: 323 K/UL (ref 150–450)
PMV BLD AUTO: ABNORMAL FL (ref 9.2–12.9)
PO2 BLDA: 25 MMHG (ref 40–60)
POC BE: 1 MMOL/L
POC SATURATED O2: 39 % (ref 95–100)
POC TCO2: 29 MMOL/L (ref 24–29)
POCT GLUCOSE: 101 MG/DL (ref 70–110)
POCT GLUCOSE: 293 MG/DL (ref 70–110)
POCT GLUCOSE: 476 MG/DL (ref 70–110)
POCT GLUCOSE: 57 MG/DL (ref 70–110)
PROT UR QL STRIP: NEGATIVE
RBC # BLD AUTO: 4.85 M/UL (ref 4–5.4)
RBC #/AREA URNS AUTO: 3 /HPF (ref 0–4)
SAMPLE: ABNORMAL
SITE: ABNORMAL
SP GR UR STRIP: >1.03 (ref 1–1.03)
SQUAMOUS #/AREA URNS AUTO: 1 /HPF
URN SPEC COLLECT METH UR: ABNORMAL
WBC # BLD AUTO: 5.25 K/UL (ref 3.9–12.7)
WBC #/AREA URNS AUTO: 2 /HPF (ref 0–5)
YEAST UR QL AUTO: NORMAL

## 2024-09-15 PROCEDURE — 93005 ELECTROCARDIOGRAM TRACING: CPT

## 2024-09-15 PROCEDURE — 99900035 HC TECH TIME PER 15 MIN (STAT)

## 2024-09-15 PROCEDURE — 81025 URINE PREGNANCY TEST: CPT | Performed by: EMERGENCY MEDICINE

## 2024-09-15 PROCEDURE — 93010 ELECTROCARDIOGRAM REPORT: CPT | Mod: ,,, | Performed by: INTERNAL MEDICINE

## 2024-09-15 PROCEDURE — 82803 BLOOD GASES ANY COMBINATION: CPT

## 2024-09-15 PROCEDURE — 81001 URINALYSIS AUTO W/SCOPE: CPT | Performed by: EMERGENCY MEDICINE

## 2024-09-15 PROCEDURE — 82010 KETONE BODYS QUAN: CPT | Performed by: EMERGENCY MEDICINE

## 2024-09-15 PROCEDURE — 25000003 PHARM REV CODE 250: Performed by: EMERGENCY MEDICINE

## 2024-09-15 PROCEDURE — 85025 COMPLETE CBC W/AUTO DIFF WBC: CPT | Performed by: EMERGENCY MEDICINE

## 2024-09-15 RX ORDER — DEXTROSE MONOHYDRATE 100 MG/ML
INJECTION, SOLUTION INTRAVENOUS CONTINUOUS
Status: DISCONTINUED | OUTPATIENT
Start: 2024-09-16 | End: 2024-09-16

## 2024-09-15 RX ADMIN — DEXTROSE MONOHYDRATE: 100 INJECTION, SOLUTION INTRAVENOUS at 10:09

## 2024-09-15 RX ADMIN — DEXTROSE 500 ML: 10 SOLUTION INTRAVENOUS at 10:09

## 2024-09-15 RX ADMIN — DEXTROSE MONOHYDRATE: 100 INJECTION, SOLUTION INTRAVENOUS at 11:09

## 2024-09-15 RX ADMIN — SODIUM CHLORIDE 1000 ML: 9 INJECTION, SOLUTION INTRAVENOUS at 09:09

## 2024-09-16 PROBLEM — E10.9 T1DM (TYPE 1 DIABETES MELLITUS): Status: ACTIVE | Noted: 2024-09-16

## 2024-09-16 LAB
ALBUMIN SERPL BCP-MCNC: 3.7 G/DL (ref 3.5–5.2)
ALP SERPL-CCNC: 100 U/L (ref 55–135)
ALT SERPL W/O P-5'-P-CCNC: 9 U/L (ref 10–44)
ANION GAP SERPL CALC-SCNC: 11 MMOL/L (ref 8–16)
ANION GAP SERPL CALC-SCNC: 11 MMOL/L (ref 8–16)
AST SERPL-CCNC: 9 U/L (ref 10–40)
BASOPHILS # BLD AUTO: 0.02 K/UL (ref 0–0.2)
BASOPHILS NFR BLD: 0.4 % (ref 0–1.9)
BILIRUB SERPL-MCNC: 0.5 MG/DL (ref 0.1–1)
BUN SERPL-MCNC: 22 MG/DL (ref 6–20)
BUN SERPL-MCNC: 26 MG/DL (ref 6–20)
CALCIUM SERPL-MCNC: 9.2 MG/DL (ref 8.7–10.5)
CALCIUM SERPL-MCNC: 9.3 MG/DL (ref 8.7–10.5)
CHLORIDE SERPL-SCNC: 104 MMOL/L (ref 95–110)
CHLORIDE SERPL-SCNC: 105 MMOL/L (ref 95–110)
CO2 SERPL-SCNC: 18 MMOL/L (ref 23–29)
CO2 SERPL-SCNC: 18 MMOL/L (ref 23–29)
CREAT SERPL-MCNC: 0.9 MG/DL (ref 0.5–1.4)
CREAT SERPL-MCNC: 0.9 MG/DL (ref 0.5–1.4)
DIFFERENTIAL METHOD BLD: ABNORMAL
EOSINOPHIL # BLD AUTO: 0.2 K/UL (ref 0–0.5)
EOSINOPHIL NFR BLD: 5 % (ref 0–8)
ERYTHROCYTE [DISTWIDTH] IN BLOOD BY AUTOMATED COUNT: 16.1 % (ref 11.5–14.5)
EST. GFR  (NO RACE VARIABLE): >60 ML/MIN/1.73 M^2
EST. GFR  (NO RACE VARIABLE): >60 ML/MIN/1.73 M^2
GLUCOSE SERPL-MCNC: 120 MG/DL (ref 70–110)
GLUCOSE SERPL-MCNC: 75 MG/DL (ref 70–110)
HCT VFR BLD AUTO: 36.1 % (ref 37–48.5)
HGB BLD-MCNC: 11.4 G/DL (ref 12–16)
IMM GRANULOCYTES # BLD AUTO: 0.01 K/UL (ref 0–0.04)
IMM GRANULOCYTES NFR BLD AUTO: 0.2 % (ref 0–0.5)
LYMPHOCYTES # BLD AUTO: 2.1 K/UL (ref 1–4.8)
LYMPHOCYTES NFR BLD: 44.4 % (ref 18–48)
MAGNESIUM SERPL-MCNC: 2.1 MG/DL (ref 1.6–2.6)
MCH RBC QN AUTO: 25.6 PG (ref 27–31)
MCHC RBC AUTO-ENTMCNC: 31.6 G/DL (ref 32–36)
MCV RBC AUTO: 81 FL (ref 82–98)
MONOCYTES # BLD AUTO: 0.7 K/UL (ref 0.3–1)
MONOCYTES NFR BLD: 13.9 % (ref 4–15)
NEUTROPHILS # BLD AUTO: 1.7 K/UL (ref 1.8–7.7)
NEUTROPHILS NFR BLD: 36.1 % (ref 38–73)
NRBC BLD-RTO: 0 /100 WBC
OHS QRS DURATION: 134 MS
OHS QTC CALCULATION: 493 MS
PHOSPHATE SERPL-MCNC: 2.8 MG/DL (ref 2.7–4.5)
PLATELET # BLD AUTO: 253 K/UL (ref 150–450)
PMV BLD AUTO: 11.1 FL (ref 9.2–12.9)
POCT GLUCOSE: 108 MG/DL (ref 70–110)
POCT GLUCOSE: 125 MG/DL (ref 70–110)
POCT GLUCOSE: 158 MG/DL (ref 70–110)
POCT GLUCOSE: 188 MG/DL (ref 70–110)
POCT GLUCOSE: 240 MG/DL (ref 70–110)
POCT GLUCOSE: 262 MG/DL (ref 70–110)
POCT GLUCOSE: 297 MG/DL (ref 70–110)
POCT GLUCOSE: 300 MG/DL (ref 70–110)
POCT GLUCOSE: 305 MG/DL (ref 70–110)
POCT GLUCOSE: 324 MG/DL (ref 70–110)
POCT GLUCOSE: 36 MG/DL (ref 70–110)
POCT GLUCOSE: 70 MG/DL (ref 70–110)
POTASSIUM SERPL-SCNC: 3.5 MMOL/L (ref 3.5–5.1)
POTASSIUM SERPL-SCNC: 4.3 MMOL/L (ref 3.5–5.1)
PROT SERPL-MCNC: 6.9 G/DL (ref 6–8.4)
RBC # BLD AUTO: 4.45 M/UL (ref 4–5.4)
SODIUM SERPL-SCNC: 133 MMOL/L (ref 136–145)
SODIUM SERPL-SCNC: 134 MMOL/L (ref 136–145)
WBC # BLD AUTO: 4.82 K/UL (ref 3.9–12.7)

## 2024-09-16 PROCEDURE — 80053 COMPREHEN METABOLIC PANEL: CPT | Performed by: EMERGENCY MEDICINE

## 2024-09-16 PROCEDURE — 85025 COMPLETE CBC W/AUTO DIFF WBC: CPT | Performed by: EMERGENCY MEDICINE

## 2024-09-16 PROCEDURE — 63600175 PHARM REV CODE 636 W HCPCS: Performed by: STUDENT IN AN ORGANIZED HEALTH CARE EDUCATION/TRAINING PROGRAM

## 2024-09-16 PROCEDURE — 25000003 PHARM REV CODE 250

## 2024-09-16 PROCEDURE — 80048 BASIC METABOLIC PNL TOTAL CA: CPT

## 2024-09-16 PROCEDURE — 20600001 HC STEP DOWN PRIVATE ROOM

## 2024-09-16 PROCEDURE — 83735 ASSAY OF MAGNESIUM: CPT | Performed by: EMERGENCY MEDICINE

## 2024-09-16 PROCEDURE — 99291 CRITICAL CARE FIRST HOUR: CPT | Mod: ,,, | Performed by: INTERNAL MEDICINE

## 2024-09-16 PROCEDURE — 84100 ASSAY OF PHOSPHORUS: CPT | Performed by: EMERGENCY MEDICINE

## 2024-09-16 RX ORDER — FLUOXETINE HYDROCHLORIDE 20 MG/1
60 CAPSULE ORAL DAILY
Status: DISCONTINUED | OUTPATIENT
Start: 2024-09-16 | End: 2024-09-17 | Stop reason: HOSPADM

## 2024-09-16 RX ORDER — GLUCAGON 1 MG
1 KIT INJECTION
Status: DISCONTINUED | OUTPATIENT
Start: 2024-09-16 | End: 2024-09-17 | Stop reason: HOSPADM

## 2024-09-16 RX ORDER — PROCHLORPERAZINE EDISYLATE 5 MG/ML
2.5 INJECTION INTRAMUSCULAR; INTRAVENOUS EVERY 6 HOURS PRN
Status: DISCONTINUED | OUTPATIENT
Start: 2024-09-16 | End: 2024-09-17 | Stop reason: HOSPADM

## 2024-09-16 RX ORDER — INSULIN ASPART 100 [IU]/ML
5 INJECTION, SOLUTION INTRAVENOUS; SUBCUTANEOUS
Status: DISCONTINUED | OUTPATIENT
Start: 2024-09-16 | End: 2024-09-17 | Stop reason: HOSPADM

## 2024-09-16 RX ORDER — HYDROXYZINE PAMOATE 25 MG/1
50 CAPSULE ORAL 2 TIMES DAILY PRN
Status: DISCONTINUED | OUTPATIENT
Start: 2024-09-16 | End: 2024-09-17 | Stop reason: HOSPADM

## 2024-09-16 RX ORDER — ACETAMINOPHEN 500 MG
1000 TABLET ORAL
Status: COMPLETED | OUTPATIENT
Start: 2024-09-16 | End: 2024-09-16

## 2024-09-16 RX ORDER — POTASSIUM CHLORIDE 20 MEQ/1
40 TABLET, EXTENDED RELEASE ORAL ONCE
Status: COMPLETED | OUTPATIENT
Start: 2024-09-16 | End: 2024-09-16

## 2024-09-16 RX ORDER — IBUPROFEN 200 MG
16 TABLET ORAL ONCE
Status: DISCONTINUED | OUTPATIENT
Start: 2024-09-16 | End: 2024-09-16

## 2024-09-16 RX ORDER — SODIUM CHLORIDE 0.9 % (FLUSH) 0.9 %
10 SYRINGE (ML) INJECTION
Status: DISCONTINUED | OUTPATIENT
Start: 2024-09-16 | End: 2024-09-17 | Stop reason: HOSPADM

## 2024-09-16 RX ORDER — INSULIN GLARGINE 100 [IU]/ML
12 INJECTION, SOLUTION SUBCUTANEOUS DAILY
Status: DISCONTINUED | OUTPATIENT
Start: 2024-09-16 | End: 2024-09-17 | Stop reason: HOSPADM

## 2024-09-16 RX ORDER — INSULIN ASPART 100 [IU]/ML
0-5 INJECTION, SOLUTION INTRAVENOUS; SUBCUTANEOUS
Status: DISCONTINUED | OUTPATIENT
Start: 2024-09-16 | End: 2024-09-17 | Stop reason: HOSPADM

## 2024-09-16 RX ORDER — IBUPROFEN 200 MG
16 TABLET ORAL
Status: DISCONTINUED | OUTPATIENT
Start: 2024-09-16 | End: 2024-09-17 | Stop reason: HOSPADM

## 2024-09-16 RX ORDER — IBUPROFEN 200 MG
24 TABLET ORAL
Status: DISCONTINUED | OUTPATIENT
Start: 2024-09-16 | End: 2024-09-17 | Stop reason: HOSPADM

## 2024-09-16 RX ADMIN — INSULIN ASPART 5 UNITS: 100 INJECTION, SOLUTION INTRAVENOUS; SUBCUTANEOUS at 01:09

## 2024-09-16 RX ADMIN — DEXTROSE 500 ML: 10 SOLUTION INTRAVENOUS at 01:09

## 2024-09-16 RX ADMIN — FLUOXETINE HYDROCHLORIDE 60 MG: 20 CAPSULE ORAL at 07:09

## 2024-09-16 RX ADMIN — ACETAMINOPHEN 1000 MG: 500 TABLET ORAL at 01:09

## 2024-09-16 RX ADMIN — INSULIN ASPART 1 UNITS: 100 INJECTION, SOLUTION INTRAVENOUS; SUBCUTANEOUS at 07:09

## 2024-09-16 RX ADMIN — POTASSIUM CHLORIDE 40 MEQ: 1500 TABLET, EXTENDED RELEASE ORAL at 03:09

## 2024-09-16 RX ADMIN — INSULIN GLARGINE 12 UNITS: 100 INJECTION, SOLUTION SUBCUTANEOUS at 11:09

## 2024-09-16 RX ADMIN — HYDROXYZINE PAMOATE 50 MG: 25 CAPSULE ORAL at 11:09

## 2024-09-16 NOTE — HPI
Hollie Wilson is a 34 year old female with T1D, gastroparesis, polysubstance abuse, intelectual disability, schizoaffective disorder, and prior SI who presented for hyperglycemia. She reports not eating much but drinking some fruit punch. She checked had  her glucose and reports that it was elevated so she took double her normal insulin (total of 20U of short acting insulin)    In the ED, she was initally hypotensive but responded appropriately to fluids, vitals were otherwise unremarkable. VBG was 7.29/55/26. Glucose on arrival was 475 with a normal BHB. Serial POCT glucose continued to down trend despite d10 bolus x2 and d10 infusion. Patient will be admitted to [facility] for further management of hypoglycemia.

## 2024-09-16 NOTE — ED NOTES
"POCT Glu- 36. MD notified and gave verbal order to give 500ml bolus of D10. Pt was asleep in stretcher woke to verbal stimulation and this nurse noticed pt drooling. Oriented and following commands at this time, states "I am sleepy".   "

## 2024-09-16 NOTE — CONSULTS
Consult acknowledged. Patient will be admitted to [facility] for further management.    Vahid Nash, PGY3  Critical Care Medicine  [facility]

## 2024-09-16 NOTE — HOSPITAL COURSE
Hollie Wilson is admitted to [facility] and stepped down to  9/16 after initial presentation to ED for hypoglycemia 2/2 taking double her home insulin dose. Received D10 bolus x 10 and started on D10 infusion with improving sugars. Glucose improved to near 300, after which D10 stopped, and then received home insulin dosing with stable repeat BG checks. Patient with poor social support and  unable to assist with insulin. CM/SW consulted with recommendations for HH and ACAH. Patient stable for discharge.     Patient will continue home insulin dosing, refills of insulin and supplies sent to bedside. Patient will follow up with PCP and Endocrine, referrals placed. HH ordered. Return precautions provided. Patient medically ready for discharge. Plan of care discussed with patient, patient agreeable to plan, and all questions answered.      Physical Exam    General: NAD.  Neuro: Alert and oriented. No focal deficits.  HEENT: EOMI. No JVD appreciated.  CVS: RRR.  Respiratory: Normal respiratory effort.    Abdominal: NTND, soft to palpation.    Extremities: Pulses full, equal, and regular over all 4 extremities.

## 2024-09-16 NOTE — ED NOTES
"Hollie Wilson, a 34 y.o. female presents to the ED w/ complaint of elevated glucose. Per pt she hasn't been eating. She has been drinking "fruit punch" all day while ubering    Triage note:  Chief Complaint   Patient presents with    Hyperglycemia     Elevated glucose. Weakness     Review of patient's allergies indicates:   Allergen Reactions    Clove oil Itching    Pork/porcine containing products Other (See Comments)     Pt does not eat Pork    Zofran (as hydrochloride) [ondansetron hcl] Hives     Past Medical History:   Diagnosis Date    Anemia     Borderline intellectual functioning 08/08/2017    Diabetes mellitus     Insulin overdose 06/01/2024    Mood disorder     Scoliosis     Substance use disorder       "

## 2024-09-16 NOTE — ED NOTES
Nurses Note -- 4 Eyes      9/16/2024   3:08 AM      Skin assessed during: Admit      [x] No Altered Skin Integrity Present    []Prevention Measures Documented      [] Yes- Altered Skin Integrity Present or Discovered   [] LDA Added if Not in Epic (Describe Wound)   [] New Altered Skin Integrity was Present on Admit and Documented in LDA   [] Wound Image Taken    Wound Care Consulted? No    Attending Nurse:  Estefani Yusuf RN     Second RN/Staff Member:  jamir

## 2024-09-16 NOTE — ASSESSMENT & PLAN NOTE
See insulin overdose   Plastic Surgeon Procedure Text (C): After obtaining clear surgical margins the patient was sent to plastics for surgical repair.  The patient understands they will receive post-surgical care and follow-up from the referring physician's office.

## 2024-09-16 NOTE — H&P
[facility] - Emergency Dept  Critical Care Medicine  History & Physical    Patient Name: Hollie Wilson  MRN: [id number]  Admission Date: 9/15/2024  Hospital Length of Stay: 0 days  Code Status: Full Code  Attending Physician: Alex Miller MD   Primary Care Provider: Martin Rios MD   Principal Problem: Insulin overdose    Subjective:     HPI:  Hollie Wilson is a 34 year old female with T1D, gastroparesis, polysubstance abuse, intelectual disability, schizoaffective disorder, and prior SI who presented for hyperglycemia. She reports not eating much but drinking some fruit punch. She checked had  her glucose and reports that it was elevated so she took double her normal insulin (total of 20U of short acting insulin)    In the ED, she was initally hypotensive but responded appropriately to fluids, vitals were otherwise unremarkable. VBG was 7.29/55/26. Glucose on arrival was 475 with a normal BHB. Serial POCT glucose continued to down trend despite d10 bolus x2 and d10 infusion. Patient will be admitted to [facility] for further management of hypoglycemia.     Hospital/ICU Course:  No notes on file     Past Medical History:   Diagnosis Date    Anemia     Borderline intellectual functioning 2017    Diabetes mellitus     Insulin overdose 2024    Mood disorder     Scoliosis     Substance use disorder        Past Surgical History:   Procedure Laterality Date     SECTION         Review of patient's allergies indicates:   Allergen Reactions    Clove oil Itching    Pork/porcine containing products Other (See Comments)     Pt does not eat Pork    Zofran (as hydrochloride) [ondansetron hcl] Hives       Family History    None       Tobacco Use    Smoking status: Every Day     Current packs/day: 0.50     Average packs/day: 0.5 packs/day for 4.0 years (2.0 ttl pk-yrs)     Types: Cigarettes     Start date: 2020    Smokeless tobacco: Never   Substance and Sexual Activity    Alcohol use: Not Currently     Drug use: Yes     Types: Marijuana    Sexual activity: Not Currently      Review of Systems   Constitutional:  Positive for activity change and fatigue. Negative for appetite change and fever.   Respiratory:  Negative for cough, choking, chest tightness, shortness of breath and stridor.    Gastrointestinal:  Negative for abdominal distention, abdominal pain and anal bleeding.   Genitourinary:  Negative for dyspareunia, dysuria, hematuria and urgency.   Musculoskeletal:  Negative for back pain.   Neurological:  Negative for weakness and headaches.   Psychiatric/Behavioral:  Negative for agitation, behavioral problems, confusion and decreased concentration.      Objective:     Vital Signs (Most Recent):  Temp: 97 °F (36.1 °C) (09/16/24 0328)  Pulse: 79 (09/16/24 0346)  Resp: 19 (09/16/24 0346)  BP: (!) 92/56 (09/16/24 0346)  SpO2: 100 % (09/16/24 0346) Vital Signs (24h Range):  Temp:  [97 °F (36.1 °C)-98.3 °F (36.8 °C)] 97 °F (36.1 °C)  Pulse:  [77-88] 79  Resp:  [16-22] 19  SpO2:  [98 %-100 %] 100 %  BP: ()/(54-78) 92/56   Weight: 56.7 kg (125 lb)  Body mass index is 19.58 kg/m².      Intake/Output Summary (Last 24 hours) at 9/16/2024 0433  Last data filed at 9/16/2024 0206  Gross per 24 hour   Intake 1500 ml   Output --   Net 1500 ml          Physical Exam  Vitals and nursing note reviewed.   Constitutional:       Appearance: She is well-developed.   HENT:      Head: Normocephalic.      Mouth/Throat:      Mouth: Mucous membranes are moist.   Eyes:      Pupils: Pupils are equal, round, and reactive to light.   Cardiovascular:      Rate and Rhythm: Normal rate and regular rhythm.      Pulses: Normal pulses.   Pulmonary:      Effort: Pulmonary effort is normal.   Abdominal:      General: Bowel sounds are normal. There is no distension.      Palpations: Abdomen is soft.      Tenderness: There is no abdominal tenderness.   Musculoskeletal:         General: No swelling.   Skin:     General: Skin is warm and dry.    Neurological:      General: No focal deficit present.      Mental Status: She is alert and oriented to person, place, and time.      Motor: No weakness.            Vents:     Lines/Drains/Airways       Drain  Duration             Female External Urinary Catheter w/ Suction 09/16/24 0331 <1 day              Peripheral Intravenous Line  Duration                  Peripheral IV - Single Lumen 09/15/24 2135 20 G Distal;Posterior;Right Upper Arm <1 day         Peripheral IV - Single Lumen 09/16/24 0329 24 G Anterior;Proximal;Right Forearm <1 day                  Significant Labs:    CBC/Anemia Profile:  Recent Labs   Lab 09/15/24  2131 09/16/24  0341   WBC 5.25 4.82   HGB 12.4 11.4*   HCT 38.5 36.1*    253   MCV 79* 81*   RDW 18.6* 16.1*        Chemistries:  Recent Labs   Lab 09/16/24  0010 09/16/24  0318   * 133*   K 3.5 4.3    104   CO2 18* 18*   BUN 26* 22*   CREATININE 0.9 0.9   CALCIUM 9.3 9.2   ALBUMIN 3.7  --    PROT 6.9  --    BILITOT 0.5  --    ALKPHOS 100  --    ALT 9*  --    AST 9*  --    MG 2.1  --    PHOS 2.8  --        All pertinent labs within the past 24 hours have been reviewed.    Significant Imaging: I have reviewed all pertinent imaging results/findings within the past 24 hours.  Assessment/Plan:     Neuro  Intellectual disability  History noted    Psychiatric  Psychiatric disorder  Continue Fluoxetine 60mg  PRN atarax    Endocrine  * Insulin overdose  34 year old female with T1D presents with complaints of hyperglycemia. She reports that she had not been good about taking her insulin for several days and noticed some elevated blood glucose levels. So, she doubled her insulin dose at home (total of 20U short acting).    - Continue to monitor in ICU setting  - POCT glucose q1h  - Continue D10 infusion  - Consider diabetic education consultation prior to discharge     T1DM (type 1 diabetes mellitus)  See insulin overdose        Critical Care Daily Checklist:    A: Awake: RASS  Goal/Actual Goal:    Actual:     B: Spontaneous Breathing Trial Performed?     C: SAT & SBT Coordinated?                        D: Delirium: CAM-ICU     E: Early Mobility Performed? Yes   F: Feeding Goal:    Status:     Current Diet Order   Procedures    Diet Adult Regular      AS: Analgesia/Sedation    T: Thromboembolic Prophylaxis    H: HOB > 300 Yes   U: Stress Ulcer Prophylaxis (if needed) n   G: Glucose Control y   B: Bowel Function     I: Indwelling Catheter (Lines & Carroll) Necessity n   D: De-escalation of Antimicrobials/Pharmacotherapies n    Plan for the day/ETD Monitor    Code Status:  Family/Goals of Care: Full Code         Critical secondary to Patient has a condition that poses threat to life and bodily function: Hypoglycemia     Critical care was time spent personally by me on the following activities: development of treatment plan with patient or surrogate and bedside caregivers, discussions with consultants, evaluation of patient's response to treatment, examination of patient, ordering and performing treatments and interventions, ordering and review of laboratory studies, ordering and review of radiographic studies, pulse oximetry, re-evaluation of patient's condition. This critical care time did not overlap with that of any other provider or involve time for any procedures.     Vahid Nash MD  Critical Care Medicine  [facility] - Emergency Dept

## 2024-09-16 NOTE — SUBJECTIVE & OBJECTIVE
Past Medical History:   Diagnosis Date    Anemia     Borderline intellectual functioning 2017    Diabetes mellitus     Insulin overdose 2024    Mood disorder     Scoliosis     Substance use disorder        Past Surgical History:   Procedure Laterality Date     SECTION         Review of patient's allergies indicates:   Allergen Reactions    Clove oil Itching    Pork/porcine containing products Other (See Comments)     Pt does not eat Pork    Zofran (as hydrochloride) [ondansetron hcl] Hives       Family History    None       Tobacco Use    Smoking status: Every Day     Current packs/day: 0.50     Average packs/day: 0.5 packs/day for 4.0 years (2.0 ttl pk-yrs)     Types: Cigarettes     Start date: 2020    Smokeless tobacco: Never   Substance and Sexual Activity    Alcohol use: Not Currently    Drug use: Yes     Types: Marijuana    Sexual activity: Not Currently      Review of Systems   Constitutional:  Positive for activity change and fatigue. Negative for appetite change and fever.   Respiratory:  Negative for cough, choking, chest tightness, shortness of breath and stridor.    Gastrointestinal:  Negative for abdominal distention, abdominal pain and anal bleeding.   Genitourinary:  Negative for dyspareunia, dysuria, hematuria and urgency.   Musculoskeletal:  Negative for back pain.   Neurological:  Negative for weakness and headaches.   Psychiatric/Behavioral:  Negative for agitation, behavioral problems, confusion and decreased concentration.      Objective:     Vital Signs (Most Recent):  Temp: 97 °F (36.1 °C) (24 0328)  Pulse: 79 (24 0346)  Resp: 19 (24 0346)  BP: (!) 92/56 (24 0346)  SpO2: 100 % (24 0346) Vital Signs (24h Range):  Temp:  [97 °F (36.1 °C)-98.3 °F (36.8 °C)] 97 °F (36.1 °C)  Pulse:  [77-88] 79  Resp:  [16-22] 19  SpO2:  [98 %-100 %] 100 %  BP: ()/(54-78) 92/56   Weight: 56.7 kg (125 lb)  Body mass index is 19.58 kg/m².      Intake/Output  Summary (Last 24 hours) at 9/16/2024 0433  Last data filed at 9/16/2024 0206  Gross per 24 hour   Intake 1500 ml   Output --   Net 1500 ml          Physical Exam  Vitals and nursing note reviewed.   Constitutional:       Appearance: She is well-developed.   HENT:      Head: Normocephalic.      Mouth/Throat:      Mouth: Mucous membranes are moist.   Eyes:      Pupils: Pupils are equal, round, and reactive to light.   Cardiovascular:      Rate and Rhythm: Normal rate and regular rhythm.      Pulses: Normal pulses.   Pulmonary:      Effort: Pulmonary effort is normal.   Abdominal:      General: Bowel sounds are normal. There is no distension.      Palpations: Abdomen is soft.      Tenderness: There is no abdominal tenderness.   Musculoskeletal:         General: No swelling.   Skin:     General: Skin is warm and dry.   Neurological:      General: No focal deficit present.      Mental Status: She is alert and oriented to person, place, and time.      Motor: No weakness.            Vents:     Lines/Drains/Airways       Drain  Duration             Female External Urinary Catheter w/ Suction 09/16/24 0331 <1 day              Peripheral Intravenous Line  Duration                  Peripheral IV - Single Lumen 09/15/24 2135 20 G Distal;Posterior;Right Upper Arm <1 day         Peripheral IV - Single Lumen 09/16/24 0329 24 G Anterior;Proximal;Right Forearm <1 day                  Significant Labs:    CBC/Anemia Profile:  Recent Labs   Lab 09/15/24  2131 09/16/24  0341   WBC 5.25 4.82   HGB 12.4 11.4*   HCT 38.5 36.1*    253   MCV 79* 81*   RDW 18.6* 16.1*        Chemistries:  Recent Labs   Lab 09/16/24  0010 09/16/24  0318   * 133*   K 3.5 4.3    104   CO2 18* 18*   BUN 26* 22*   CREATININE 0.9 0.9   CALCIUM 9.3 9.2   ALBUMIN 3.7  --    PROT 6.9  --    BILITOT 0.5  --    ALKPHOS 100  --    ALT 9*  --    AST 9*  --    MG 2.1  --    PHOS 2.8  --        All pertinent labs within the past 24 hours have been  reviewed.    Significant Imaging: I have reviewed all pertinent imaging results/findings within the past 24 hours.

## 2024-09-16 NOTE — ASSESSMENT & PLAN NOTE
34 year old female with T1D presents with complaints of hyperglycemia. She reports that she had not been good about taking her insulin for several days and noticed some elevated blood glucose levels. So, she doubled her insulin dose at home.     - Continue to monitor in ICU setting  - POCT glucose q1h  - Continue D10 infusion  - Consider diabetic education consultation prior to discharge

## 2024-09-16 NOTE — HPI
Hollie Wilson is a 34 y.o. female with PMHx of T1DM, gastroparesis, polysubstance abuse, intellectual disability, schizoaffective disorder, and prior SI who presents to [facility] ED for hyperglycemia. She reports not eating much but did drink some fruit punch. She had checked her glucose and reports that it was elevated so she took double her normal insulin (total of 20U of short acting insulin).      In the ED: Initially hypotensive but responded appropriately to fluids, vitals were otherwise unremarkable. VBG was 7.29/55/26. , BHB normal. Serial POCT glucose continued to down trend despite D10 bolus x 2 and D10 infusion. Patient admitted to [facility].

## 2024-09-16 NOTE — ASSESSMENT & PLAN NOTE
34 year old female with T1D presents with complaints of hyperglycemia. She reports that she had not been good about taking her insulin for several days and noticed some elevated blood glucose levels. So, she doubled her insulin dose at home (total of 20U short acting).    - Continue to monitor in ICU setting  - POCT glucose q1h  - Continue D10 infusion  - Consider diabetic education consultation prior to discharge

## 2024-09-16 NOTE — PROVIDER TRANSFER
Patient being stepped down from [facility] to .     Initially admitted to [facility] after initial presentation to ED for hypoglycemia 2/2 taking double her home insulin dose. Received D10 bolus x 2 and started on D10 infusion with improving sugars. Glucose improved to near 300, after which D10 stopped, and then received home insulin dosing with stable repeat BG checks. Patient with poor social support and  unable to assist with insulin. CM/SW consulted.    AF, soft BP but stable MAP. No leukocytosis. Lytes without emergent abnormalities. UA not infectious. CXR w/out acute findings.     - aspart 5u tidwm, lantus 12u daily, LDSSI  - accuchecks  - social work consulted

## 2024-09-16 NOTE — ED PROVIDER NOTES
Encounter Date: 9/15/2024       History     Chief Complaint   Patient presents with    Hyperglycemia     Elevated glucose. Weakness     Patient was a 34-year-old female with past medical history of insulin-dependent diabetes, intellectual disability that presents to the emergency department with hyperglycemia.  Patient states that she took her blood sugar and it was 500 ED at home.  States that she gave herself a proximally 20 units of short-acting insulin.  States that she has been feeling weak, urinating frequently, increased thirst as well.  Otherwise denying any other symptoms.  Except for sharp lower abdominal pain it comes and goes.        The history is provided by the patient, medical records and a relative.     Review of patient's allergies indicates:   Allergen Reactions    Clove oil Itching    Pork/porcine containing products Other (See Comments)     Pt does not eat Pork    Zofran (as hydrochloride) [ondansetron hcl] Hives     Past Medical History:   Diagnosis Date    Anemia     Borderline intellectual functioning 2017    Diabetes mellitus     Insulin overdose 2024    Mood disorder     Scoliosis     Substance use disorder      Past Surgical History:   Procedure Laterality Date     SECTION       No family history on file.  Social History     Tobacco Use    Smoking status: Every Day     Current packs/day: 0.50     Average packs/day: 0.5 packs/day for 4.0 years (2.0 ttl pk-yrs)     Types: Cigarettes     Start date: 2020    Smokeless tobacco: Never   Substance Use Topics    Alcohol use: Not Currently    Drug use: Yes     Types: Marijuana     Review of Systems  ROS negative except as noted in HPI    Physical Exam     Initial Vitals [09/15/24 2003]   BP Pulse Resp Temp SpO2   (!) 91/54 88 16 98.1 °F (36.7 °C) 98 %      MAP       --         Physical Exam    Nursing note and vitals reviewed.  Constitutional: She is not diaphoretic. She appears distressed.   HENT:   Head: Normocephalic.    Right Ear: External ear normal.   Left Ear: External ear normal.   Nose: Nose normal.   Mouth/Throat: Oropharynx is clear and moist.   Eyes: Conjunctivae are normal.   Neck: No JVD present.   Cardiovascular:  Normal rate, regular rhythm, normal heart sounds and intact distal pulses.           Pulmonary/Chest: Breath sounds normal. No respiratory distress. She has no wheezes. She has no rales.   Abdominal: Abdomen is soft. She exhibits no distension. There is no abdominal tenderness. There is no rebound.   Musculoskeletal:         General: No edema. Normal range of motion.     Neurological: She is alert and oriented to person, place, and time. She has normal strength. No cranial nerve deficit or sensory deficit. GCS score is 15. GCS eye subscore is 4. GCS verbal subscore is 5. GCS motor subscore is 6.   Skin: Skin is warm. Capillary refill takes less than 2 seconds.   Psychiatric: She has a normal mood and affect.         ED Course   Procedures  Labs Reviewed   CBC W/ AUTO DIFFERENTIAL - Abnormal       Result Value    WBC 5.25      RBC 4.85      Hemoglobin 12.4      Hematocrit 38.5      MCV 79 (*)     MCH 25.6 (*)     MCHC 32.2      RDW 18.6 (*)     Platelets 323      MPV SEE COMMENT      Immature Granulocytes 0.2      Gran # (ANC) 2.4      Immature Grans (Abs) 0.01      Lymph # 2.2      Mono # 0.4      Eos # 0.2      Baso # 0.06      nRBC 0      Gran % 45.1      Lymph % 42.7      Mono % 6.7      Eosinophil % 4.2      Basophil % 1.1      Differential Method Automated     URINALYSIS, REFLEX TO URINE CULTURE - Abnormal    Specimen UA Urine, Clean Catch      Color, UA Colorless (*)     Appearance, UA Clear      pH, UA 6.0      Specific Gravity, UA >1.030 (*)     Protein, UA Negative      Glucose, UA 4+ (*)     Ketones, UA Trace (*)     Bilirubin (UA) Negative      Occult Blood UA Negative      Nitrite, UA Negative      Leukocytes, UA Negative      Narrative:     Specimen Source->Urine   COMPREHENSIVE METABOLIC PANEL -  "Abnormal    Sodium 134 (*)     Potassium 3.5      Chloride 105      CO2 18 (*)     Glucose 120 (*)     BUN 26 (*)     Creatinine 0.9      Calcium 9.3      Total Protein 6.9      Albumin 3.7      Total Bilirubin 0.5      Alkaline Phosphatase 100      AST 9 (*)     ALT 9 (*)     eGFR >60.0      Anion Gap 11     POCT GLUCOSE - Abnormal    POCT Glucose 476 (*)    POCT GLUCOSE - Abnormal    POCT Glucose 293 (*)    ISTAT PROCEDURE - Abnormal    POC PH 7.294 (*)     POC PCO2 55.9 (*)     POC PO2 25 (*)     POC HCO3 27.2      POC BE 1      POC SATURATED O2 39      POC TCO2 29      Sample VENOUS      Site Other      Allens Test N/A     POCT GLUCOSE - Abnormal    POCT Glucose 57 (*)    POCT GLUCOSE - Abnormal    POCT Glucose 36 (*)    BETA - HYDROXYBUTYRATE, SERUM    Beta-Hydroxybutyrate 0.1     URINALYSIS MICROSCOPIC    RBC, UA 3      WBC, UA 2      Bacteria Rare      Yeast, UA None      Squam Epithel, UA 1      Microscopic Comment SEE COMMENT      Narrative:     Specimen Source->Urine   MAGNESIUM   PHOSPHORUS   COMPREHENSIVE METABOLIC PANEL   BASIC METABOLIC PANEL   CBC W/ AUTO DIFFERENTIAL   POCT URINE PREGNANCY    POC Preg Test, Ur Negative       Acceptable Yes     POCT GLUCOSE    POCT Glucose 101     POCT GLUCOSE MONITORING CONTINUOUS   POCT GLUCOSE MONITORING CONTINUOUS          Imaging Results              X-Ray Chest AP Portable (Final result)  Result time 09/15/24 23:29:49      Final result by Kd Altman MD (09/15/24 23:29:49)                   Impression:      No acute cardiopulmonary finding identified on this single view.      Electronically signed by: Kd Altman MD  Date:    09/15/2024  Time:    23:29               Narrative:    EXAMINATION:  XR CHEST AP PORTABLE    CLINICAL HISTORY:  Provided history is "hyperglycemia;  ".    TECHNIQUE:  One view of the chest.    COMPARISON:  08/12/2024.    FINDINGS:  Cardiac wires overlie the chest.  Cardiac silhouette is not enlarged.  No focal " consolidation.  No sizable pleural effusion.  No pneumothorax.                                       Medications   dextrose 10 % infusion ( Intravenous Rate/Dose Change 9/16/24 0321)   glucose chewable tablet 16 g (16 g Oral Not Given 9/16/24 0230)   sodium chloride 0.9% bolus 1,000 mL 1,000 mL (0 mLs Intravenous Stopped 9/15/24 2332)   dextrose 10% bolus 500 mL 500 mL (0 mLs Intravenous Stopped 9/15/24 2308)   acetaminophen tablet 1,000 mg (1,000 mg Oral Given 9/16/24 0116)   dextrose 10% bolus 500 mL 500 mL (0 mLs Intravenous Stopped 9/16/24 0206)   potassium chloride SA CR tablet 40 mEq (40 mEq Oral Given 9/16/24 0312)     Medical Decision Making  Patient was a 34-year-old female with past medical history of insulin-dependent diabetes, intellectual disability that presents to the emergency department with hyperglycemia.    On initial evaluation patient was hypoglycemic, and awake.  And vitals were within normal limits.    As stated in HPI patient self administered significant amount of insulin.  Stating 20 units short-acting.  Point of care glucose demonstrates rapid decrease in when it care glucose down to 37 with decreased mental status as well as slurring of speech.  Highly suspicious at this time for hypoglycemia secondary to medication administration.  Given persistent hypoglycemia despite D10 IV administration we will consult critical Care for admission.    Amount and/or Complexity of Data Reviewed  Labs: ordered. Decision-making details documented in ED Course.  Radiology: ordered.    Risk  OTC drugs.  Prescription drug management.  Decision regarding hospitalization.               ED Course as of 09/16/24 0343   Sun Sep 15, 2024   2220 POCT Glucose(!): 293 [TK]   2220 POC PH(!): 7.294 [TK]   2220 POC PCO2(!): 55.9 [TK]   2220 Hemoglobin: 12.4 [TK]   2220 Hematocrit: 38.5 [TK]   2221 WBC: 5.25 [TK]   2221 Platelet Count: 323 [TK]   2254 POCT Glucose: 101  Significant decrease compared to reported 500 at  home. Patient states that she gave herself more than 20 units Short acting Insulin. Patients Items removed from room.    [TK]   2307 Glucose, UA(!): 4+ [TK]   2307 Ketones, UA(!): Trace [TK]   2307 POCT Glucose(!): 57  Starting d10 bolus and followed by d10 infusion   [TK]   Mon Sep 16, 2024   0043 Repeat POCT after bolus 500 demonstrates 125 [TK]   0336 POCT Glucose(!!): 36  Repeat bolus and order glucose tablets   [TK]      ED Course User Index  [TK] Sunny Meadows DO                             Clinical Impression:  Final diagnoses:  [R73.9] Hyperglycemia  [E16.2] Hypoglycemia (Primary)  [E10.65] Type 1 diabetes mellitus with hyperglycemia (Chronic)          ED Disposition Condition    Admit Stable                Sunny Meadows DO  Resident  09/16/24 0347

## 2024-09-17 VITALS
HEIGHT: 67 IN | DIASTOLIC BLOOD PRESSURE: 76 MMHG | SYSTOLIC BLOOD PRESSURE: 121 MMHG | TEMPERATURE: 98 F | OXYGEN SATURATION: 95 % | WEIGHT: 132.06 LBS | BODY MASS INDEX: 20.73 KG/M2 | RESPIRATION RATE: 20 BRPM | HEART RATE: 96 BPM

## 2024-09-17 LAB
ANION GAP SERPL CALC-SCNC: 8 MMOL/L (ref 8–16)
BUN SERPL-MCNC: 18 MG/DL (ref 6–20)
CALCIUM SERPL-MCNC: 9.6 MG/DL (ref 8.7–10.5)
CHLORIDE SERPL-SCNC: 105 MMOL/L (ref 95–110)
CO2 SERPL-SCNC: 23 MMOL/L (ref 23–29)
CREAT SERPL-MCNC: 0.8 MG/DL (ref 0.5–1.4)
EST. GFR  (NO RACE VARIABLE): >60 ML/MIN/1.73 M^2
GLUCOSE SERPL-MCNC: 204 MG/DL (ref 70–110)
POCT GLUCOSE: 163 MG/DL (ref 70–110)
POCT GLUCOSE: 196 MG/DL (ref 70–110)
POCT GLUCOSE: 204 MG/DL (ref 70–110)
POCT GLUCOSE: 241 MG/DL (ref 70–110)
POCT GLUCOSE: 323 MG/DL (ref 70–110)
POCT GLUCOSE: 42 MG/DL (ref 70–110)
POTASSIUM SERPL-SCNC: 4.7 MMOL/L (ref 3.5–5.1)
SODIUM SERPL-SCNC: 136 MMOL/L (ref 136–145)

## 2024-09-17 PROCEDURE — 25000003 PHARM REV CODE 250

## 2024-09-17 PROCEDURE — 80048 BASIC METABOLIC PNL TOTAL CA: CPT

## 2024-09-17 PROCEDURE — 63600175 PHARM REV CODE 636 W HCPCS: Performed by: STUDENT IN AN ORGANIZED HEALTH CARE EDUCATION/TRAINING PROGRAM

## 2024-09-17 PROCEDURE — 25000003 PHARM REV CODE 250: Performed by: STUDENT IN AN ORGANIZED HEALTH CARE EDUCATION/TRAINING PROGRAM

## 2024-09-17 PROCEDURE — 36415 COLL VENOUS BLD VENIPUNCTURE: CPT

## 2024-09-17 RX ORDER — INSULIN ASPART 100 [IU]/ML
4 INJECTION, SOLUTION INTRAVENOUS; SUBCUTANEOUS 3 TIMES DAILY PRN
Qty: 3 ML | Refills: 11 | Status: SHIPPED | OUTPATIENT
Start: 2024-09-17 | End: 2025-09-17

## 2024-09-17 RX ORDER — INSULIN GLARGINE 100 [IU]/ML
12 INJECTION, SOLUTION SUBCUTANEOUS DAILY
Qty: 3 ML | Refills: 11 | Status: SHIPPED | OUTPATIENT
Start: 2024-09-17 | End: 2025-09-17

## 2024-09-17 RX ORDER — ISOPROPYL ALCOHOL 70 ML/100ML
1 SWAB TOPICAL
Qty: 100 EACH | Refills: 3 | Status: SHIPPED | OUTPATIENT
Start: 2024-09-17

## 2024-09-17 RX ORDER — BLOOD-GLUCOSE CONTROL, NORMAL
EACH MISCELLANEOUS
Qty: 200 EACH | Refills: 2 | Status: SHIPPED | OUTPATIENT
Start: 2024-09-17

## 2024-09-17 RX ADMIN — FLUOXETINE HYDROCHLORIDE 60 MG: 20 CAPSULE ORAL at 08:09

## 2024-09-17 RX ADMIN — INSULIN ASPART 5 UNITS: 100 INJECTION, SOLUTION INTRAVENOUS; SUBCUTANEOUS at 12:09

## 2024-09-17 RX ADMIN — INSULIN ASPART 4 UNITS: 100 INJECTION, SOLUTION INTRAVENOUS; SUBCUTANEOUS at 08:09

## 2024-09-17 RX ADMIN — INSULIN ASPART 5 UNITS: 100 INJECTION, SOLUTION INTRAVENOUS; SUBCUTANEOUS at 08:09

## 2024-09-17 RX ADMIN — INSULIN GLARGINE 12 UNITS: 100 INJECTION, SOLUTION SUBCUTANEOUS at 08:09

## 2024-09-17 RX ADMIN — DEXTROSE MONOHYDRATE 250 ML: 100 INJECTION, SOLUTION INTRAVENOUS at 02:09

## 2024-09-17 NOTE — PLAN OF CARE
[facility] Flex ([place])  Initial Discharge Assessment      Discharge Plan A and Plan B have been determined by review of patient's clinical status, future medical and therapeutic needs, and coverage/benefits for post-acute care in coordination with multidisciplinary team members.      Primary Care Provider: Martin Rios MD    Admission Diagnosis: Hypoglycemia [E16.2]  Hyperglycemia [R73.9]  Type 1 diabetes mellitus with hyperglycemia [E10.65]    Admission Date: 9/15/2024  Expected Discharge Date: 9/17/2024    Transition of Care Barriers: Underinsured, Transportation    Payor: [employer] / Plan: [facility] EXCHANGE PLAN / Product Type: Commercial /     Extended Emergency Contact Information  Primary Emergency Contact: Rosa Arita  Mobile Phone: [phone]  Relation: Mother  Secondary Emergency Contact: Hollie Wilson  Mobile Phone: [phone]  Relation: Mother   needed? No    Discharge Plan A: Home Health  Discharge Plan B: Home      [facility]  [address]  [facility] [address]  Phone: [phone] Fax: [phone]    [id number] @ [facility], [place] - [address]  [address]  [address]  [facility] [address]  Phone: [phone] Fax: [phone]    [id number] - [place] - [address]  [address]  [facility] [address]  Phone: [phone] Fax: [phone]    [facility] [address] [place], [place] - [address]  [address]  [place] [address]  Phone: [phone] Fax: [phone]      Initial Assessment (most recent)       Adult Discharge Assessment - 09/17/24 0800          Discharge Assessment    Assessment Type Discharge Planning Assessment     Confirmed/corrected address, phone number and insurance Yes     Confirmed Demographics Correct on Facesheet     Source of Information patient     When was your last doctors appointment?  08/06/24   Martin Rios MD    Communicated THELMA with patient/caregiver Yes     Reason For Admission insulin overdose     People in Home significant other   Jori Ramos [phone]    Facility Arrived From: home     Do you expect to return to your current living situation? Yes     Do you have help at home or someone to help you manage your care at home? Yes     Who are your caregiver(s) and their phone number(s)? Jori Ramos [phone]     Prior to hospitilization cognitive status: Alert/Oriented     Current cognitive status: Alert/Oriented     Walking or Climbing Stairs Difficulty no     Dressing/Bathing Difficulty no     Equipment Currently Used at Home glucometer     Readmission within 30 days? Yes   [facility]    Patient currently being followed by outpatient case management? No     Do you currently have service(s) that help you manage your care at home? No     Do you take prescription medications? Yes     Do you have prescription coverage? Yes     Coverage [facility] EXCHANGE PLAN     Do you have any problems affording any of your prescribed medications? No     Is the patient taking medications as prescribed? yes     Who is going to help you get home at discharge? Jori Ramos [phone]     How do you get to doctors appointments? family or friend will provide;health plan transportation     Are you on dialysis? No     Do you take coumadin? No     Discharge Plan A Home Health   [facility] Phone: [phone]    Discharge Plan B Home Health     DME Needed Upon Discharge  other (see comments)   diabetic supplies: lancets, test strips, and alcohol preps    Discharge Plan discussed with: Patient;Spouse/sig other     Name(s) and Number(s) Jori Ramos [phone]     Transition of Care Barriers Underinsured;Transportation        Physical Activity    On average, how many days per week do you engage in moderate to strenuous exercise  (like a brisk walk)? 7 days     On average, how many minutes do you engage in exercise at this level? 30 min        Financial Resource Strain    How hard is it for you to pay for the very basics like food, housing, medical care, and heating? Not hard at all        Housing Stability    In the last 12 months, was there a time when you were not able to pay the mortgage or rent on time? No     At any time in the past 12 months, were you homeless or living in a shelter (including now)? No        Transportation Needs    Has the lack of transportation kept you from medical appointments, meetings, work or from getting things needed for daily living? No        Food Insecurity    Within the past 12 months, you worried that your food would run out before you got the money to buy more. Never true     Within the past 12 months, the food you bought just didn't last and you didn't have money to get more. Never true        Stress    Do you feel stress - tense, restless, nervous, or anxious, or unable to sleep at night because your mind is troubled all the time - these days? To some extent        Social Isolation    How often do you feel lonely or isolated from those around you?  Rarely        Alcohol Use    Q1: How often do you have a drink containing alcohol? Never     Q2: How many drinks containing alcohol do you have on a typical day when you are drinking? Patient does not drink     Q3: How often do you have six or more drinks on one occasion? Never        Utilities    In the past 12 months has the electric, gas, oil, or water company threatened to shut off services in your home? No        Health Literacy    How often do you need to have someone help you when you read instructions, pamphlets, or other written material from your doctor or pharmacy? Never        OTHER    Name(s) of People in Home Jori Richard [phone]                     Readmission Assessment (most recent)       Readmission Assessment - 09/17/24 0800           Readmission    Was this a planned readmission? Yes     Why were you hospitalized in the last 30 days? 9/6/24   Hypoglycemia  Primary Diagnosis Diabetes mellitus type 1, controlled, with complications    Why were you readmitted? Related to previous admission     When you left the hospital where did you go? Home with Family     Did patient/caregiver refused recommended DC plan? No     Tell me about what happened between when you left the hospital and the day you returned. My sugar dropped     When did you start not feeling well? I felt sick     Did you try to manage your symptoms your self? No     Did you call anyone? Yes     Who did you call? Emergency Room     Did you try to see or did see a doctor or nurse before you came? No     Why? I did not have a ride     Did you have  a follow-up appointment on discharge? Yes     Did you go? No     Why? No transportation;Financial Concerns;Not feeling well                      CM met with patient at bedside to complete discharge planning assessment.  Patient alert and oriented xs 4.  Patient verified all demographic information on facesheet is correct.  Patient verified PCP is Martin Rios MD  Patient verified primary health insurance is [facility] EXCHANGE PLAN -  and [place] Medicaid. Patient  is agreeable with bedside medication delivery.   Patient is not active with  home health and has listed DME.  Patient with NO POA or Living Will.  Patient not on dialysis or medication coumadin.  Patient with no 30 day admission.  Patient with no financial issues at this time.  Patient  significant other will provide transportation upon discharge from facility.  Patient will have assistance from his wife upon discharge Patient independent with ADLs.   All questions answered regarding Case Management Discharge Planning, patient verbalized understanding.  Discharge booklet with CM's contact information given to patient.          María Elena Capellan RN  Case  Management  [facility]  [phone]
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
## 2024-09-17 NOTE — PLAN OF CARE
"[facility] ([place]-14)  Discharge Final Note    Primary Care Provider: Martin Rios MD    Expected Discharge Date: 9/17/2024    Final Discharge Note (most recent)       Final Note - 09/17/24 1813          Final Note    Assessment Type Final Discharge Note     Anticipated Discharge Disposition Home-Health Care [facility]     What phone number can be called within the next 1-3 days to see how you are doing after discharge? [phone]     Hospital Resources/Appts/Education Provided Provided patient/caregiver with written discharge plan information;Appointments scheduled and added to AVS        Post-Acute Status    Post-Acute Authorization Home Health     Home Health Status Set-up Complete/Auth obtained     Coverage Payor: [employer] - UHC EXCHANGE PLAN -     Patient choice form signed by patient/caregiver List with quality metrics by geographic area provided     Discharge Delays None known at this time                     Important Message from Medicare             Contact Info       [facility]    [address] [place] [address]  [phone]       Next Steps: Follow up    "[facility] Transportation    [phone]    to follow up with a scheduled ride  [phone]       Next Steps: Follow up    Matheus Gottlieb MD   Specialty: Internal Medicine    [address] [facility] [address]   Phone: [phone]       Next Steps: Go on 9/24/2024    Instructions: Follow up 9/24 at 230p          No future appointments.    On call SW asked to aide in patients discharge.     Patient to discharge to home today and continue services with [facility]. No other case management needs at this time.     Lyft ride ordered and completed.     ELLEN Jerry  [facility]   Ext: [phone]    "

## 2024-09-17 NOTE — PROGRESS NOTES
VSS. Episode of hypoglycemia (42) after reciving lunchtime insulin but barely eating her meal. Resolved with d10 bolus. Per PA ok to proceed with discharge. Ivs removed x2. AVS given to and reviewed with patient. Dinner time insulin held as patient stated she was not going to eat it. Meds and supplies delivered to bedside by pharmacy. On call  notified of need of lyft as patient stated her  could no longer come to get her. Patient currently resting at this time with needs met and awaiting transport home.     09/17/24 0853   Pain/Comfort/Sleep   Pain Rating (0-10): Rest 0   Cognitive   Cognitive/Neuro/Behavioral WDL WDL   Level of Consciousness (AVPU) alert   Arousal Level opens eyes spontaneously   Orientation oriented x 4   Speech delayed reponses;slurred/dysarthric   Mood/Behavior calm;cooperative   HEENT   HEENT WDL WDL   Respiratory   Respiratory WDL WDL   Rhythm/Pattern, Respiratory unlabored;pattern regular;chest wiggle adequate;depth regular;no shortness of breath reported   Cough And Deep Breathing done independently per patient   Cardiac   Cardiac WDL WDL   Cardiac Rhythm apical pulse regular;radial pulse regular   Peripheral Neurovascular   Peripheral Neurovascular WDL WDL        Peripheral IV - Single Lumen 09/15/24 2135 20 G Distal;Posterior;Right Upper Arm   Placement Date/Time: 09/15/24 2135   Inserted by: ED Paramedic  Size (G): 20 G  Orientation: Distal;Posterior;Right  Location: Upper Arm  Placement directed by: Anatomic Landmarks  Site Prep: Chlorhexidine    Site Assessment Clean;Dry;Intact;No redness;No swelling;No warmth;No drainage   Extremity Assessment Distal to IV No warmth;No redness;No abnormal discoloration;No swelling   Line Status Saline locked   Dressing Status Dry;Clean;Intact   Dressing Intervention Integrity maintained   Reason Not Rotated Anticipated discharge        Peripheral IV - Single Lumen 09/16/24 0329 24 G Anterior;Proximal;Right Forearm   Placement  Date/Time: 09/16/24 0329   Present Prior to Hospital Arrival?: No  Inserted by: RN  Size (G): 24 G  Orientation: Anterior;Proximal;Right  Location: Forearm  Placement directed by: Anatomic Landmarks  Site Prep: Chlorhexidine   Insertion atte...   Site Assessment No swelling;No redness;Intact;Dry;Clean   Extremity Assessment Distal to IV No warmth;No swelling;No redness;No abnormal discoloration   Line Status Saline locked   Dressing Status Clean;Dry;Intact   Dressing Intervention Integrity maintained   Reason Not Rotated Anticipated discharge   Skin   Skin WDL WDL   Skin Temperature warm   Fernando Risk Assessment   Sensory Perception 3-->slightly limited   Moisture 4-->rarely moist   Activity 4-->walks frequently   Mobility 4-->no limitation   Nutrition 3-->adequate   Friction and Shear 3-->no apparent problem   Fernando Score 21   Musculoskeletal   Musculoskeletal WDL WDL   General Mobility generalized weakness   Gastrointestinal   GI WDL WDL   Last Bowel Movement 09/15/24   Genitourinary   Genitourinary WDL WDL   Voiding Characteristics voids spontaneously without difficulty   Coping/Psychosocial   Observed Emotional State accepting;calm;cooperative   Verbalized Emotional State acceptance   Plan of Care Reviewed With patient   Nutrition   Diet/Nutrition Received consistent carb/diabetic diet   Nutrition Risk Screen no indicators present   Safety   Safety WDL WDL   Safety Factors ID band on;upper side rails raised x 2;bed in low position;call light in reach;wheels locked   Enhanced Safety Measures bed alarm set   Infection Prevention hand hygiene promoted   Fall Risk Assessment (every shift)   History Of Fall (W/I 3 Mos) 0-->No   Polypharmacy 0-->No   Central Nervous System/Psychotropic Medication 0-->No   Cardiovascular Medication 3-->Yes   Age Greater Than 65 Years 0-->No   Altered Elimination 0-->No   Cognitive Deficit 0-->No   Sensory Deficit 0-->No   Dizziness/Vertigo 0-->No   Depression 0-->No   Mobility  Deficit/Weakness 2-->Yes   Male 0-->No   Fall Risk Score 5   ABC Risk for Fall with Injury Assessment   A= Age: Is the patient greater than or equal to 85 years old or frail due to clinical condition? No   B=Bones: Does the patient have osteoporosis, previous fracture, prolonged steroid use, or metastatic bone cancer? No   C=Coagulation Disorders: Does the patient have a bleeding disorder, either through anticoagulants or underlying clinical condition? No   S=recent Surgery: Is the patient post-op surgicalwith a recent lower limb amputation or recent major abdominal or thoracic surgery? No   Safety Management   Safety Promotion/Fall Prevention assistive device/personal item within reach;medications reviewed;nonskid shoes/socks when out of bed;lighting adjusted;instructed to call staff for mobility   Medication Review/Management medications reviewed   Patient Rounds bed in low position;call light in patient/parent reach;clutter free environment maintained;ID band on;toileting offered;visualized patient;placement of personal items at bedside;bed wheels locked   Daily Care   Activity Management Ambulated to bathroom - L4   Mobility   GEMS ([place] Early Mobility Scale) Level 3-Standing activities with assist   Positioning   Body Position position changed independently

## 2024-09-17 NOTE — PLAN OF CARE
[facility] Flex ([place] [address])      HOME HEALTH ORDERS  FACE TO FACE ENCOUNTER    Patient Name: Hollie Wilson  YOB: 1990    PCP: Martin Rios MD   PCP Address: [address] [place] [address] / [facility] [address]  PCP Phone Number: [phone]  PCP Fax: [phone]    Encounter Date: 9/15/24    Admit to Home Health    Diagnoses:  Active Hospital Problems    Diagnosis  POA    *Insulin overdose [T38.3X1A]  Yes    T1DM (type 1 diabetes mellitus) [E10.9]  Yes    Intellectual disability [F79]  Yes     Chronic    Psychiatric disorder [F99]  Yes      Resolved Hospital Problems   No resolved problems to display.       Follow Up Appointments:  No future appointments.    Allergies:  Review of patient's allergies indicates:   Allergen Reactions    Clove oil Itching    Pork/porcine containing products Other (See Comments)     Pt does not eat Pork    Zofran (as hydrochloride) [ondansetron hcl] Hives       Medications: Review discharge medications with patient and family and provide education.    Current Facility-Administered Medications   Medication Dose Route Frequency Provider Last Rate Last Admin    dextrose 10% bolus 125 mL 125 mL  12.5 g Intravenous PRN Melissa Ferris MD        dextrose 10% bolus 250 mL 250 mL  25 g Intravenous PRN Melissa Ferris MD        FLUoxetine capsule 60 mg  60 mg Oral Daily Vahid Nash MD   60 mg at 09/17/24 0853    glucagon (human recombinant) injection 1 mg  1 mg Intramuscular PRN Melissa Ferris MD        glucose chewable tablet 16 g  16 g Oral PRN Melissa Ferris MD        glucose chewable tablet 24 g  24 g Oral PRN Melissa Ferris MD        hydrOXYzine pamoate capsule 50 mg  50 mg Oral BID PRN Vahid Nash MD   50 mg at 09/16/24 1147    insulin aspart U-100 pen 0-5 Units  0-5 Units Subcutaneous QID (AC + HS) PRMelissa Dhillon MD   4 Units at 09/17/24 0854    insulin aspart U-100 pen 5 Units  5 Units Subcutaneous TIDWM Melissa Ferris MD   5  Units at 09/17/24 0853    insulin glargine U-100 (Lantus) pen 12 Units  12 Units Subcutaneous Daily Melissa Ferris MD   12 Units at 09/17/24 0853    prochlorperazine injection Soln 2.5 mg  2.5 mg Intravenous Q6H PRN Melinda Ralph DO        sodium chloride 0.9% flush 10 mL  10 mL Intravenous PRN Vahid Nash MD            Medication List        CONTINUE taking these medications      blood sugar diagnostic Strp  To check BG 4 times daily, to use with insurance preferred meter     blood-glucose meter kit  To check BG 4 times daily, to use with insurance preferred meter     DEXCOM G7 SENSOR Latanya  Generic drug: blood-glucose sensor  1 Device by Misc.(Non-Drug; Combo Route) route as needed (to check blood sugar).     DEXCOM  Misc  Generic drug: blood-glucose meter,continuous  1 Device by Misc.(Non-Drug; Combo Route) route as needed (to check blood sugar).     FLUoxetine 20 MG capsule  Take 3 capsules by mouth once daily.     gabapentin 300 MG capsule  Commonly known as: NEURONTIN  Take 1 capsule by mouth 3 (three) times daily.     glucagon 3 mg/actuation Spry  1 spray by Nasal route as needed (hypoglycemia).     glucose 4 GM chewable tablet  Take 4 tablets (16 g total) by mouth as needed for Low blood sugar.     insulin aspart U-100 100 unit/mL (3 mL) Inpn pen  Commonly known as: NovoLOG  Inject 4 Units into the skin 3 (three) times daily as needed (blood glucose greater than 250).     insulin glargine U-100 (Lantus) 100 unit/mL (3 mL) Inpn pen  Inject 12 Units into the skin once daily.     * lancets 30 gauge Misc  To check BG 4 times daily, to use with insurance preferred meter     * lancets Misc  To check BG 4 times daily, to use with insurance preferred meter     metoclopramide HCl 10 MG tablet  Commonly known as: REGLAN  Take 1 tablet (10 mg total) by mouth every 6 (six) hours as needed (nausea or vomiting).     nicotine 7 mg/24 hr  Commonly known as: NICODERM CQ  Place 1 patch onto the skin once  "daily.     pantoprazole 40 MG tablet  Commonly known as: PROTONIX  Take 1 tablet (40 mg total) by mouth every morning.     pen needle, diabetic 32 gauge x 5/32" Ndle  1 each by Misc.(Non-Drug; Combo Route) route 4 (four) times daily.     polyethylene glycol 17 gram Pwpk  Commonly known as: GLYCOLAX  Take 17 g by mouth once daily.     promethazine 25 MG suppository  Commonly known as: PHENERGAN  Place 1 suppository (25 mg total) rectally every 6 (six) hours as needed for Nausea (For severe nausea and vomiting not improved with oral medications).     risperidone 1 mg/ml 1 mg/mL Soln  Commonly known as: RISPERDAL  Take 1 mg by mouth 2 (two) times daily.           * This list has 2 medication(s) that are the same as other medications prescribed for you. Read the directions carefully, and ask your doctor or other care provider to review them with you.                    I have seen and examined this patient within the last 30 days. My clinical findings that support the need for the home health skilled services and home bound status are the following:no   Weakness/numbness causing balance and gait disturbance due to Weakness/Debility making it taxing to leave home.  Medical restrictions requiring assistance of another human to leave home due to  intellectual disability.     Diet:   diabetic diet 2000 calorie    Labs:  routine    Referrals/ Consults   to evaluate for community resources/long-range planning.  Aide to provide assistance with personal care, ADLs, and vital signs.    Activities:   activity as tolerated    Nursing:   Agency to admit patient within 24 hours of hospital discharge unless specified on physician order or at patient request    SN to complete comprehensive assessment including routine vital signs. Instruct on disease process and s/s of complications to report to MD. Review/verify medication list sent home with the patient at time of discharge  and instruct patient/caregiver as needed. " Frequency may be adjusted depending on start of care date.     Skilled nurse to perform up to 3 visits PRN for symptoms related to diagnosis    Notify MD if SBP > 160 or < 90; DBP > 90 or < 50; HR > 120 or < 50; Temp > 101; O2 < 88%.    Ok to schedule additional visits based on staff availability and patient request on consecutive days within the home health episode.    When multiple disciplines ordered:    Start of Care occurs on Sunday - Wednesday schedule remaining discipline evaluations as ordered on separate consecutive days following the start of care.    Thursday SOC -schedule subsequent evaluations Friday and Monday the following week.     Friday - Saturday SOC - schedule subsequent discipline evaluations on consecutive days starting Monday of the following week.    For all post-discharge communication and subsequent orders please contact patient's primary care physician. If unable to reach primary care physician or do not receive response within 30 minutes, please contact [phone] for clinical staff order clarification    Miscellaneous   Diabetic Care:   Report CBG < 60 or > 350 to physician.    Home Health Aide:  Nursing Three times weekly, Medical Social Work Weekly, and Home Health Aide Three times weekly    Wound Care Orders  no    I certify that this patient is confined to her home and needs intermittent skilled nursing care.
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
## 2024-09-18 NOTE — PLAN OF CARE
"[facility] - Step[facility] Flex ([place])  Discharge Final Note    Primary Care Provider: Martin Rios MD    Expected Discharge Date: 9/17/2024  Discharge Plan A and Plan B have been determined by review of patient's clinical status, future medical and therapeutic needs, and coverage/benefits for post-acute care in coordination with multidisciplinary team members.     Final Discharge Note (most recent)       Final Note - 09/18/24 0835          Final Note    Assessment Type Final Discharge Note     Anticipated Discharge Disposition Planned Readmission - [facility]    [facility]  [phone]    What phone number can be called within the next 1-3 days to see how you are doing after discharge? [phone]     Hospital Resources/Appts/Education Provided Appointments scheduled and added to AVS        Post-Acute Status    Post-Acute Authorization Home Health     Home Health Status Set-up Complete/Auth obtained     Coverage [facility] EXCHANGE PLAN -     Patient choice form signed by patient/caregiver List from CMS Compare;List with quality metrics by geographic area provided     Discharge Delays None known at this time                                Contact Info       [facility]    [address] [place] [address]  [phone]       Next Steps: Follow up    [facility]    [phone]    to follow up with a scheduled ride  [phone]       Next Steps: Follow up    Matheus Gottlieb MD   Specialty: Internal Medicine    [address] [facility] [address]   Phone: [phone]       Next Steps: Go on 9/24/2024    Instructions: Follow up 9/24 at 230p                CM met with patient and patients boyfriend, Jori Richard # [phone]  -  and discussed discharge plans, patient to [place] home with . Patients  medications delivered to bedside,  NO HME/DME  recommended  and follow up appointment[s] scheduled.   Transportation provided by Jori " Richard via private vehicle. .    María Elena Capellan RN  Case Management  [facility]  [phone]

## 2024-10-17 ENCOUNTER — HOSPITAL ENCOUNTER (OUTPATIENT)
Facility: HOSPITAL | Age: 34
Discharge: HOME | End: 2024-10-20
Attending: EMERGENCY MEDICINE
Payer: MEDICAID

## 2024-10-17 DIAGNOSIS — E16.2 HYPOGLYCEMIA: ICD-10-CM

## 2024-10-17 DIAGNOSIS — F19.20 POLYSUBSTANCE DEPENDENCE: ICD-10-CM

## 2024-10-17 DIAGNOSIS — T38.3X5A HYPOGLYCEMIA DUE TO INSULIN: ICD-10-CM

## 2024-10-17 DIAGNOSIS — Z91.89 AT RISK FOR DOMESTIC ABUSE: Primary | ICD-10-CM

## 2024-10-17 DIAGNOSIS — E10.649 HYPOGLYCEMIA DUE TO TYPE 1 DIABETES MELLITUS: ICD-10-CM

## 2024-10-17 DIAGNOSIS — F25.9 SCHIZOAFFECTIVE DISORDER, CHRONIC CONDITION: ICD-10-CM

## 2024-10-17 DIAGNOSIS — E10.9 TYPE 1 DIABETES MELLITUS WITHOUT COMPLICATION: ICD-10-CM

## 2024-10-17 DIAGNOSIS — F32.9 MAJOR DEPRESSIVE DISORDER WITH CURRENT ACTIVE EPISODE, UNSPECIFIED DEPRESSION EPISODE SEVERITY, UNSPECIFIED WHETHER RECURRENT: ICD-10-CM

## 2024-10-17 DIAGNOSIS — R07.9 CHEST PAIN: ICD-10-CM

## 2024-10-17 DIAGNOSIS — E16.0 HYPOGLYCEMIA DUE TO INSULIN: ICD-10-CM

## 2024-10-17 PROBLEM — N39.0 UTI (URINARY TRACT INFECTION): Status: ACTIVE | Noted: 2024-10-17

## 2024-10-17 PROBLEM — E11.43 GASTROPARESIS DUE TO DM: Status: ACTIVE | Noted: 2024-04-12

## 2024-10-17 PROBLEM — S06.9X9A ACUTE HEAD INJURY WITH LOSS OF CONSCIOUSNESS: Status: ACTIVE | Noted: 2024-10-17

## 2024-10-17 PROBLEM — K92.1 HEMATOCHEZIA: Status: ACTIVE | Noted: 2024-10-17

## 2024-10-17 PROBLEM — R10.814 ABDOMINAL LEFT LOWER QUADRANT TENDERNESS: Status: ACTIVE | Noted: 2024-10-17

## 2024-10-17 LAB
ALBUMIN SERPL BCP-MCNC: 3.4 G/DL (ref 3.5–5.2)
ALP SERPL-CCNC: 115 U/L (ref 55–135)
ALT SERPL W/O P-5'-P-CCNC: 12 U/L (ref 10–44)
AMPHET+METHAMPHET UR QL: NEGATIVE
ANION GAP SERPL CALC-SCNC: 11 MMOL/L (ref 8–16)
ANION GAP SERPL CALC-SCNC: 8 MMOL/L (ref 8–16)
ANISOCYTOSIS BLD QL SMEAR: SLIGHT
AST SERPL-CCNC: 23 U/L (ref 10–40)
B-HCG UR QL: NEGATIVE
BACTERIA #/AREA URNS AUTO: ABNORMAL /HPF
BARBITURATES UR QL SCN>200 NG/ML: NEGATIVE
BASOPHILS # BLD AUTO: 0.04 K/UL (ref 0–0.2)
BASOPHILS NFR BLD: 0.7 % (ref 0–1.9)
BENZODIAZ UR QL SCN>200 NG/ML: NEGATIVE
BILIRUB SERPL-MCNC: 0.2 MG/DL (ref 0.1–1)
BILIRUB UR QL STRIP: NEGATIVE
BUN SERPL-MCNC: 17 MG/DL (ref 6–20)
BUN SERPL-MCNC: 9 MG/DL (ref 6–20)
BURR CELLS BLD QL SMEAR: ABNORMAL
BZE UR QL SCN: NEGATIVE
CALCIUM SERPL-MCNC: 8.6 MG/DL (ref 8.7–10.5)
CALCIUM SERPL-MCNC: 9.1 MG/DL (ref 8.7–10.5)
CANNABINOIDS UR QL SCN: ABNORMAL
CHLORIDE SERPL-SCNC: 107 MMOL/L (ref 95–110)
CHLORIDE SERPL-SCNC: 107 MMOL/L (ref 95–110)
CLARITY UR REFRACT.AUTO: ABNORMAL
CO2 SERPL-SCNC: 21 MMOL/L (ref 23–29)
CO2 SERPL-SCNC: 24 MMOL/L (ref 23–29)
COLOR UR AUTO: YELLOW
CREAT SERPL-MCNC: 0.9 MG/DL (ref 0.5–1.4)
CREAT SERPL-MCNC: 0.9 MG/DL (ref 0.5–1.4)
CREAT UR-MCNC: 56 MG/DL (ref 15–325)
CTP QC/QA: YES
DIFFERENTIAL METHOD BLD: ABNORMAL
EOSINOPHIL # BLD AUTO: 0.4 K/UL (ref 0–0.5)
EOSINOPHIL NFR BLD: 6.4 % (ref 0–8)
ERYTHROCYTE [DISTWIDTH] IN BLOOD BY AUTOMATED COUNT: 17 % (ref 11.5–14.5)
EST. GFR  (NO RACE VARIABLE): >60 ML/MIN/1.73 M^2
EST. GFR  (NO RACE VARIABLE): >60 ML/MIN/1.73 M^2
ETHANOL UR-MCNC: <10 MG/DL
GLUCOSE SERPL-MCNC: 108 MG/DL (ref 70–110)
GLUCOSE SERPL-MCNC: 118 MG/DL (ref 70–110)
GLUCOSE SERPL-MCNC: 52 MG/DL (ref 70–110)
GLUCOSE SERPL-MCNC: 60 MG/DL (ref 70–110)
GLUCOSE SERPL-MCNC: 99 MG/DL (ref 70–110)
GLUCOSE UR QL STRIP: NEGATIVE
HCG INTACT+B SERPL-ACNC: <2.4 MIU/ML
HCT VFR BLD AUTO: 34.4 % (ref 37–48.5)
HGB BLD-MCNC: 10.2 G/DL (ref 12–16)
HGB UR QL STRIP: NEGATIVE
HIV 1+2 AB+HIV1 P24 AG SERPL QL IA: NORMAL
IMM GRANULOCYTES # BLD AUTO: 0.01 K/UL (ref 0–0.04)
IMM GRANULOCYTES NFR BLD AUTO: 0.2 % (ref 0–0.5)
KETONES UR QL STRIP: NEGATIVE
LEUKOCYTE ESTERASE UR QL STRIP: ABNORMAL
LYMPHOCYTES # BLD AUTO: 2.7 K/UL (ref 1–4.8)
LYMPHOCYTES NFR BLD: 47.4 % (ref 18–48)
MAGNESIUM SERPL-MCNC: 2.2 MG/DL (ref 1.6–2.6)
MCH RBC QN AUTO: 24.3 PG (ref 27–31)
MCHC RBC AUTO-ENTMCNC: 29.7 G/DL (ref 32–36)
MCV RBC AUTO: 82 FL (ref 82–98)
METHADONE UR QL SCN>300 NG/ML: NEGATIVE
MICROSCOPIC COMMENT: ABNORMAL
MONOCYTES # BLD AUTO: 0.5 K/UL (ref 0.3–1)
MONOCYTES NFR BLD: 9.1 % (ref 4–15)
NEUTROPHILS # BLD AUTO: 2 K/UL (ref 1.8–7.7)
NEUTROPHILS NFR BLD: 36.2 % (ref 38–73)
NITRITE UR QL STRIP: POSITIVE
NRBC BLD-RTO: 0 /100 WBC
OHS QRS DURATION: 118 MS
OHS QTC CALCULATION: 477 MS
OPIATES UR QL SCN: NEGATIVE
OVALOCYTES BLD QL SMEAR: ABNORMAL
PCP UR QL SCN>25 NG/ML: NEGATIVE
PH UR STRIP: 6 [PH] (ref 5–8)
PHOSPHATE SERPL-MCNC: 4.8 MG/DL (ref 2.7–4.5)
PLATELET # BLD AUTO: 233 K/UL (ref 150–450)
PLATELET BLD QL SMEAR: ABNORMAL
PMV BLD AUTO: 12 FL (ref 9.2–12.9)
POCT GLUCOSE: 100 MG/DL (ref 70–110)
POCT GLUCOSE: 116 MG/DL (ref 70–110)
POCT GLUCOSE: 133 MG/DL (ref 70–110)
POCT GLUCOSE: 155 MG/DL (ref 70–110)
POCT GLUCOSE: 43 MG/DL (ref 70–110)
POCT GLUCOSE: 46 MG/DL (ref 70–110)
POCT GLUCOSE: 47 MG/DL (ref 70–110)
POCT GLUCOSE: 51 MG/DL (ref 70–110)
POCT GLUCOSE: 51 MG/DL (ref 70–110)
POCT GLUCOSE: 52 MG/DL (ref 70–110)
POCT GLUCOSE: 61 MG/DL (ref 70–110)
POCT GLUCOSE: 64 MG/DL (ref 70–110)
POCT GLUCOSE: 78 MG/DL (ref 70–110)
POCT GLUCOSE: 82 MG/DL (ref 70–110)
POCT GLUCOSE: 88 MG/DL (ref 70–110)
POCT GLUCOSE: 99 MG/DL (ref 70–110)
POIKILOCYTOSIS BLD QL SMEAR: SLIGHT
POTASSIUM SERPL-SCNC: 4.1 MMOL/L (ref 3.5–5.1)
POTASSIUM SERPL-SCNC: 4.2 MMOL/L (ref 3.5–5.1)
PROT SERPL-MCNC: 6.5 G/DL (ref 6–8.4)
PROT UR QL STRIP: NEGATIVE
RBC # BLD AUTO: 4.2 M/UL (ref 4–5.4)
RBC #/AREA URNS AUTO: 6 /HPF (ref 0–4)
SODIUM SERPL-SCNC: 139 MMOL/L (ref 136–145)
SODIUM SERPL-SCNC: 139 MMOL/L (ref 136–145)
SP GR UR STRIP: 1.01 (ref 1–1.03)
SQUAMOUS #/AREA URNS AUTO: 2 /HPF
TOXICOLOGY INFORMATION: ABNORMAL
TREPONEMA PALLIDUM IGG+IGM AB [PRESENCE] IN SERUM OR PLASMA BY IMMUNOASSAY: REACTIVE
TROPONIN I SERPL DL<=0.01 NG/ML-MCNC: <0.006 NG/ML (ref 0–0.03)
URN SPEC COLLECT METH UR: ABNORMAL
WBC # BLD AUTO: 5.63 K/UL (ref 3.9–12.7)
WBC #/AREA URNS AUTO: >100 /HPF (ref 0–5)

## 2024-10-17 PROCEDURE — 25000003 PHARM REV CODE 250: Performed by: EMERGENCY MEDICINE

## 2024-10-17 PROCEDURE — 86592 SYPHILIS TEST NON-TREP QUAL: CPT

## 2024-10-17 PROCEDURE — 96376 TX/PRO/DX INJ SAME DRUG ADON: CPT

## 2024-10-17 PROCEDURE — 84484 ASSAY OF TROPONIN QUANT: CPT | Performed by: EMERGENCY MEDICINE

## 2024-10-17 PROCEDURE — 96375 TX/PRO/DX INJ NEW DRUG ADDON: CPT

## 2024-10-17 PROCEDURE — 63600175 PHARM REV CODE 636 W HCPCS

## 2024-10-17 PROCEDURE — 81001 URINALYSIS AUTO W/SCOPE: CPT | Mod: XB | Performed by: EMERGENCY MEDICINE

## 2024-10-17 PROCEDURE — 86780 TREPONEMA PALLIDUM: CPT

## 2024-10-17 PROCEDURE — 80053 COMPREHEN METABOLIC PANEL: CPT | Performed by: EMERGENCY MEDICINE

## 2024-10-17 PROCEDURE — 80307 DRUG TEST PRSMV CHEM ANLYZR: CPT

## 2024-10-17 PROCEDURE — 96366 THER/PROPH/DIAG IV INF ADDON: CPT

## 2024-10-17 PROCEDURE — 86593 SYPHILIS TEST NON-TREP QUANT: CPT

## 2024-10-17 PROCEDURE — 25000003 PHARM REV CODE 250

## 2024-10-17 PROCEDURE — G0378 HOSPITAL OBSERVATION PER HR: HCPCS

## 2024-10-17 PROCEDURE — 93005 ELECTROCARDIOGRAM TRACING: CPT

## 2024-10-17 PROCEDURE — 87389 HIV-1 AG W/HIV-1&-2 AB AG IA: CPT

## 2024-10-17 PROCEDURE — 80048 BASIC METABOLIC PNL TOTAL CA: CPT | Mod: XB

## 2024-10-17 PROCEDURE — 93010 ELECTROCARDIOGRAM REPORT: CPT | Mod: ,,, | Performed by: INTERNAL MEDICINE

## 2024-10-17 PROCEDURE — 90792 PSYCH DIAG EVAL W/MED SRVCS: CPT | Mod: ,,, | Performed by: STUDENT IN AN ORGANIZED HEALTH CARE EDUCATION/TRAINING PROGRAM

## 2024-10-17 PROCEDURE — 83735 ASSAY OF MAGNESIUM: CPT | Performed by: EMERGENCY MEDICINE

## 2024-10-17 PROCEDURE — 82962 GLUCOSE BLOOD TEST: CPT

## 2024-10-17 PROCEDURE — 36415 COLL VENOUS BLD VENIPUNCTURE: CPT

## 2024-10-17 PROCEDURE — 99285 EMERGENCY DEPT VISIT HI MDM: CPT | Mod: 25

## 2024-10-17 PROCEDURE — 87086 URINE CULTURE/COLONY COUNT: CPT | Performed by: EMERGENCY MEDICINE

## 2024-10-17 PROCEDURE — 84100 ASSAY OF PHOSPHORUS: CPT | Performed by: EMERGENCY MEDICINE

## 2024-10-17 PROCEDURE — 85025 COMPLETE CBC W/AUTO DIFF WBC: CPT | Performed by: EMERGENCY MEDICINE

## 2024-10-17 PROCEDURE — 84702 CHORIONIC GONADOTROPIN TEST: CPT | Performed by: EMERGENCY MEDICINE

## 2024-10-17 PROCEDURE — 96365 THER/PROPH/DIAG IV INF INIT: CPT

## 2024-10-17 RX ORDER — FLUOXETINE HYDROCHLORIDE 20 MG/1
20 CAPSULE ORAL DAILY
Status: DISCONTINUED | OUTPATIENT
Start: 2024-10-17 | End: 2024-10-18

## 2024-10-17 RX ORDER — CEFTRIAXONE 1 G/1
1 INJECTION, POWDER, FOR SOLUTION INTRAMUSCULAR; INTRAVENOUS
Status: DISCONTINUED | OUTPATIENT
Start: 2024-10-17 | End: 2024-10-17

## 2024-10-17 RX ORDER — DEXTROSE MONOHYDRATE 100 MG/ML
INJECTION, SOLUTION INTRAVENOUS CONTINUOUS
Status: DISCONTINUED | OUTPATIENT
Start: 2024-10-17 | End: 2024-10-18

## 2024-10-17 RX ORDER — CEFTRIAXONE 1 G/1
1 INJECTION, POWDER, FOR SOLUTION INTRAMUSCULAR; INTRAVENOUS
Status: DISCONTINUED | OUTPATIENT
Start: 2024-10-17 | End: 2024-10-20 | Stop reason: HOSPADM

## 2024-10-17 RX ORDER — DEXTROSE MONOHYDRATE 100 MG/ML
INJECTION, SOLUTION INTRAVENOUS CONTINUOUS
Status: DISCONTINUED | OUTPATIENT
Start: 2024-10-17 | End: 2024-10-17

## 2024-10-17 RX ORDER — POLYETHYLENE GLYCOL 3350 17 G/17G
17 POWDER, FOR SOLUTION ORAL 2 TIMES DAILY
Status: DISCONTINUED | OUTPATIENT
Start: 2024-10-17 | End: 2024-10-20 | Stop reason: HOSPADM

## 2024-10-17 RX ORDER — HYDROXYZINE HYDROCHLORIDE 25 MG/1
50 TABLET, FILM COATED ORAL 3 TIMES DAILY PRN
Status: DISCONTINUED | OUTPATIENT
Start: 2024-10-17 | End: 2024-10-17

## 2024-10-17 RX ORDER — IBUPROFEN 200 MG
16 TABLET ORAL
Status: DISCONTINUED | OUTPATIENT
Start: 2024-10-17 | End: 2024-10-18 | Stop reason: SDUPTHER

## 2024-10-17 RX ORDER — NALOXONE HCL 0.4 MG/ML
0.02 VIAL (ML) INJECTION
Status: DISCONTINUED | OUTPATIENT
Start: 2024-10-17 | End: 2024-10-20 | Stop reason: HOSPADM

## 2024-10-17 RX ORDER — MIRTAZAPINE 7.5 MG/1
15 TABLET, FILM COATED ORAL NIGHTLY
Status: DISCONTINUED | OUTPATIENT
Start: 2024-10-17 | End: 2024-10-20 | Stop reason: HOSPADM

## 2024-10-17 RX ORDER — IBUPROFEN 200 MG
16 TABLET ORAL ONCE
Status: DISCONTINUED | OUTPATIENT
Start: 2024-10-17 | End: 2024-10-17

## 2024-10-17 RX ORDER — ACETAMINOPHEN 325 MG/1
650 TABLET ORAL EVERY 6 HOURS PRN
Status: DISCONTINUED | OUTPATIENT
Start: 2024-10-17 | End: 2024-10-20 | Stop reason: HOSPADM

## 2024-10-17 RX ORDER — SODIUM CHLORIDE 0.9 % (FLUSH) 0.9 %
10 SYRINGE (ML) INJECTION EVERY 12 HOURS PRN
Status: DISCONTINUED | OUTPATIENT
Start: 2024-10-17 | End: 2024-10-20 | Stop reason: HOSPADM

## 2024-10-17 RX ORDER — GLUCAGON 1 MG
1 KIT INJECTION
Status: DISCONTINUED | OUTPATIENT
Start: 2024-10-17 | End: 2024-10-18 | Stop reason: SDUPTHER

## 2024-10-17 RX ORDER — HYDROXYZINE PAMOATE 25 MG/1
25 CAPSULE ORAL EVERY 6 HOURS PRN
Status: DISCONTINUED | OUTPATIENT
Start: 2024-10-17 | End: 2024-10-18

## 2024-10-17 RX ORDER — IBUPROFEN 200 MG
24 TABLET ORAL
Status: DISCONTINUED | OUTPATIENT
Start: 2024-10-17 | End: 2024-10-18 | Stop reason: SDUPTHER

## 2024-10-17 RX ADMIN — POLYETHYLENE GLYCOL 3350 17 G: 17 POWDER, FOR SOLUTION ORAL at 09:10

## 2024-10-17 RX ADMIN — DEXTROSE MONOHYDRATE 250 ML: 100 INJECTION, SOLUTION INTRAVENOUS at 06:10

## 2024-10-17 RX ADMIN — DEXTROSE MONOHYDRATE 125 ML: 10 INJECTION, SOLUTION INTRAVENOUS at 02:10

## 2024-10-17 RX ADMIN — MIRTAZAPINE 15 MG: 7.5 TABLET, FILM COATED ORAL at 09:10

## 2024-10-17 RX ADMIN — DEXTROSE MONOHYDRATE 125 ML: 10 INJECTION, SOLUTION INTRAVENOUS at 05:10

## 2024-10-17 RX ADMIN — DEXTROSE MONOHYDRATE: 100 INJECTION, SOLUTION INTRAVENOUS at 07:10

## 2024-10-17 RX ADMIN — DEXTROSE MONOHYDRATE 250 ML: 100 INJECTION, SOLUTION INTRAVENOUS at 10:10

## 2024-10-17 RX ADMIN — DEXTROSE MONOHYDRATE: 100 INJECTION, SOLUTION INTRAVENOUS at 08:10

## 2024-10-17 RX ADMIN — DEXTROSE MONOHYDRATE 250 ML: 100 INJECTION, SOLUTION INTRAVENOUS at 07:10

## 2024-10-17 RX ADMIN — DEXTROSE MONOHYDRATE 250 ML: 100 INJECTION, SOLUTION INTRAVENOUS at 05:10

## 2024-10-17 RX ADMIN — CEFTRIAXONE 1 G: 1 INJECTION, POWDER, FOR SOLUTION INTRAMUSCULAR; INTRAVENOUS at 11:10

## 2024-10-17 RX ADMIN — FLUOXETINE HYDROCHLORIDE 20 MG: 20 CAPSULE ORAL at 01:10
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
## 2024-10-17 NOTE — PHARMACY MED REC
"Admission Medication History     The home medication history was taken by Candace Salgado.    You may go to "Admission" then "Reconcile Home Medications" tabs to review and/or act upon these items.     The home medication list has been updated by the Pharmacy department.   Please read ALL comments highlighted in yellow.   Please address this information as you see fit.    Feel free to contact us if you have any questions or require assistance.      The medications listed below were removed from the home medication list. Please reorder if appropriate:  Patient reports no longer taking the following medication(s):  FLUOXETINE 20 MG  GLUCOSE 4 GM CHEWS  METOCLOPRAMIDE 10 MG  NICOTINE 7 MG/24 HR  PANTOPRAZOLE 40 MG  POLYETHYLENE GLYCOL 17 GM PWPK    Medications listed below were obtained from: Patient/family and Analytic software- [employer]  Current Outpatient Medications on File Prior to Encounter   Medication Sig    alcohol swabs (ALCOHOL PREP PADS) PadM   Apply 1 each topically as needed.    blood sugar diagnostic Strp   Use to check blood glucose 4 times daily.    blood-glucose meter kit To check BG 4 times daily, to use with insurance preferred meter      blood-glucose meter Misc   Use to check blood glucose 4 times daily.    blood-glucose   meter,continuous (DEXCOM ) Misc   1 Device by Misc.(Non-Drug; Combo Route) route as needed (to check blood sugar).    blood-glucose sensor (DEXCOM G7 SENSOR) Latanya 1 Device by Misc.(Non-Drug; Combo Route) route as needed (to check blood sugar).      gabapentin (NEURONTIN) 300 MG capsule Take 1 capsule by mouth 3 (three) times daily. (Patient not taking: Reported on 10/17/2024). Last filled 6/24/24, 90/30 day supply.      glucagon 3 mg/actuation Spry 1 spray by Nasal route as needed (hypoglycemia). Patient has never used.      insulin aspart U-100  (NOVOLOG) 100 unit/mL (3 mL) InPn pen   Inject 4 Units into the skin 3 (three) times daily as needed (blood glucose greater than " "250). (Discard pen 28 days after initial use)    insulin glargine U-100, Lantus, 100 unit/mL (3 mL) SubQ InPn pen Inject 12 Units into the skin once daily. (Discard pen 28 days after initial use). Last filled 9/17/24, 3 ml/25 day supply. Patient states spouse refuses to administer this medication as he assumes it is not needed.      lancets 30 gauge Misc Use to check blood glucose 4 times daily.      lancets Misc To check BG 4 times daily, to use with insurance preferred meter      pen needle, diabetic 29 gauge x 1/2" Ndle   Use to inject insulin into the skin 4 times daily    pen needle, diabetic 32 gauge x 5/32" Ndle   1 each by Misc.(Non-Drug; Combo Route) route 4 (four) times daily.    risperidone 1 mg/ml (RISPERDAL) 1 mg/mL Soln   Take 1 mg by mouth 2 (two) times daily. Last filled 6/20/24, #60/30 day supply. Unsure if taking.     Potential issues to be addressed PRIOR TO DISCHARGE  Please discuss with the patient barriers to adherence with medication treatment plans    Patient requires education regarding drug therapies           Candace Salgado  [phone] [id number]                  .          "

## 2024-10-17 NOTE — ASSESSMENT & PLAN NOTE
Ms. Wilson has a complicated psychiatric history that includes a suicide attempt, MDD, bipolar disorder, schizoaffective disorder with previous psychiatric medication history including seroquel, sertraline, fluoxetine, insulin overdose, polysubstance abuse (THC, amphetamines, cocaine, and opioid, THC only present in urine tox 10/17), as well as intellectual disability.     - Psych consultation for medication review   - Not interested in nicotine patch at this time

## 2024-10-17 NOTE — ASSESSMENT & PLAN NOTE
Ms. Wilson reports sharp chest pain that is worse with breathing.    - troponin ordered  - EKG ordered   - chest x ray

## 2024-10-17 NOTE — ASSESSMENT & PLAN NOTE
With acute malodorous purulent vaginal discharge and multiple episodes of emesis   - KUB and chest xray imaging ordered  - Gonorrhea/chlamydia, HIV, syphilis - pending   - bhcg negative

## 2024-10-17 NOTE — ED TRIAGE NOTES
"Hollie Wilson, a 34 y.o. female presents to the ED w/ complaint of hpoglycemia, pt took 26U of insulin because she "doesn't want diabetes." Slurred speech. Type 1 diabetic     Triage note:  Chief Complaint   Patient presents with    Hypoglycemia     Pt presents for hypoglycemia, per nestorance pt's BG was 29 at home and was given sugar kemp and cookies. +dizziness, lightheadedness, and weakness. Hx of Type 1 DM. CBG in triage 64.     Review of patient's allergies indicates:   Allergen Reactions    Clove oil Itching    Pork/porcine containing products Other (See Comments)     Pt does not eat Pork    Zofran (as hydrochloride) [ondansetron hcl] Hives     Past Medical History:   Diagnosis Date    Anemia     Borderline intellectual functioning 08/08/2017    Diabetes mellitus     Insulin overdose 06/01/2024    Mood disorder     Scoliosis     Substance use disorder        "

## 2024-10-17 NOTE — NURSING
Received the pt from ED to room 75406 at 5pm. Vitals took, stable. 4 eyes done, skin intact. BG 52, D10 bolus gave. Med team 5 notified. Recheck .

## 2024-10-17 NOTE — ED PROVIDER NOTES
Encounter Date: 10/17/2024       History     Chief Complaint   Patient presents with    Hypoglycemia     Pt presents for hypoglycemia, per jossie pt's BG was 29 at home and was given sugar kemp and cookies. +dizziness, lightheadedness, and weakness. Hx of Type 1 DM. CBG in triage 64.     HPI    This is a 34-year-old female history of diabetes, intellectual disability presents the ER for evaluation of hypoglycemia.  Patient reports her blood sugars were elevated earlier today she gave herself 26 units of insulin to treat this.  Since then persistent hypoglycemia to bite increased oral intake.  Also endorsing dysuria.  Review of patient's allergies indicates:   Allergen Reactions    Clove oil Itching    Pork/porcine containing products Other (See Comments)     Pt does not eat Pork    Zofran (as hydrochloride) [ondansetron hcl] Hives     Past Medical History:   Diagnosis Date    Anemia     Borderline intellectual functioning 2017    Diabetes mellitus     Insulin overdose 2024    Mood disorder     Scoliosis     Substance use disorder      Past Surgical History:   Procedure Laterality Date     SECTION       No family history on file.  Social History     Tobacco Use    Smoking status: Every Day     Current packs/day: 0.50     Average packs/day: 0.5 packs/day for 4.1 years (2.1 ttl pk-yrs)     Types: Cigarettes     Start date: 2020    Smokeless tobacco: Never   Substance Use Topics    Alcohol use: Not Currently    Drug use: Yes     Types: Marijuana     Review of Systems   Constitutional:  Positive for fatigue.   Genitourinary:  Positive for dysuria.   All other systems reviewed and are negative.      Physical Exam     Initial Vitals [10/17/24 0220]   BP Pulse Resp Temp SpO2   (!) 92/58 93 18 97.5 °F (36.4 °C) 100 %      MAP       --         Physical Exam    Nursing note and vitals reviewed.  Constitutional: She appears well-developed and well-nourished. No distress.   HENT:   Head: Normocephalic  and atraumatic.   Eyes: EOM are normal. Pupils are equal, round, and reactive to light.   Neck:   Normal range of motion.  Pulmonary/Chest: No respiratory distress.   Abdominal: Abdomen is soft. She exhibits no distension. There is no abdominal tenderness.   Musculoskeletal:         General: Normal range of motion.      Cervical back: Normal range of motion.     Neurological: She is alert and oriented to person, place, and time. She has normal strength. GCS score is 15. GCS eye subscore is 4. GCS verbal subscore is 5. GCS motor subscore is 6.   Skin: Skin is warm and dry. Capillary refill takes less than 2 seconds.   Psychiatric: She has a normal mood and affect. Thought content normal.         ED Course   Procedures  Labs Reviewed   CBC W/ AUTO DIFFERENTIAL - Abnormal       Result Value    WBC 5.63      RBC 4.20      Hemoglobin 10.2 (*)     Hematocrit 34.4 (*)     MCV 82      MCH 24.3 (*)     MCHC 29.7 (*)     RDW 17.0 (*)     Platelets 233      MPV 12.0      Immature Granulocytes 0.2      Gran # (ANC) 2.0      Immature Grans (Abs) 0.01      Lymph # 2.7      Mono # 0.5      Eos # 0.4      Baso # 0.04      nRBC 0      Gran % 36.2 (*)     Lymph % 47.4      Mono % 9.1      Eosinophil % 6.4      Basophil % 0.7      Platelet Estimate Appears normal      Aniso Slight      Poik Slight      Ovalocytes Occasional      Elvin Cells Occasional      Differential Method Automated     COMPREHENSIVE METABOLIC PANEL - Abnormal    Sodium 139      Potassium 4.1      Chloride 107      CO2 21 (*)     Glucose 52 (*)     BUN 17      Creatinine 0.9      Calcium 9.1      Total Protein 6.5      Albumin 3.4 (*)     Total Bilirubin 0.2      Alkaline Phosphatase 115      AST 23      ALT 12      eGFR >60.0      Anion Gap 11     URINALYSIS, REFLEX TO URINE CULTURE - Abnormal    Specimen UA Urine, Clean Catch      Color, UA Yellow      Appearance, UA Hazy (*)     pH, UA 6.0      Specific Gravity, UA 1.010      Protein, UA Negative      Glucose, UA  Negative      Ketones, UA Negative      Bilirubin (UA) Negative      Occult Blood UA Negative      Nitrite, UA Positive (*)     Leukocytes, UA 3+ (*)     Narrative:     Specimen Source->Urine   PHOSPHORUS - Abnormal    Phosphorus 4.8 (*)    TOXICOLOGY SCREEN, URINE, RANDOM (COMPLIANCE) - Abnormal    Alcohol, Urine <10      Benzodiazepines Negative      Methadone metabolites Negative      Cocaine (Metab.) Negative      Opiate Scrn, Ur Negative      Barbiturate Screen, Ur Negative      Amphetamine Screen, Ur Negative      THC Presumptive Positive (*)     Phencyclidine Negative      Creatinine, Urine 56.0      Toxicology Information SEE COMMENT      Narrative:     Specimen Source->Urine   URINALYSIS MICROSCOPIC - Abnormal    RBC, UA 6 (*)     WBC, UA >100 (*)     Bacteria Many (*)     Squam Epithel, UA 2      Microscopic Comment SEE COMMENT      Narrative:     Specimen Source->Urine   TREPONEMA PALLIDIUM ANTIBODIES IGG, IGM - Abnormal    Treponema Pallidum Antibodies (IgG, IgM) Reactive (*)     Narrative:     Release to patient->Immediate   POCT GLUCOSE - Abnormal    POCT Glucose 64 (*)    POCT GLUCOSE - Abnormal    POCT Glucose 61 (*)    POCT GLUCOSE - Abnormal    POCT Glucose 46 (*)    POCT GLUCOSE - Abnormal    POCT Glucose 43 (*)    POCT GLUCOSE - Abnormal    POCT Glucose 116 (*)    POCT GLUCOSE - Abnormal    POCT Glucose 51 (*)    POCT GLUCOSE - Abnormal    POCT Glucose 51 (*)    CULTURE, URINE   MAGNESIUM    Magnesium 2.2     HCG, QUANTITATIVE   TROPONIN I   HIV 1 / 2 ANTIBODY    HIV 1/2 Ag/Ab Non-reactive      Narrative:     Release to patient->Immediate   HCG, QUANTITATIVE    HCG Quant <2.4      Narrative:     add on HCG-[id number]; Troponin-[id number] per Margy Garcia MD    10/17/2024  09:19 Rich   TROPONIN I    Troponin I <0.006      Narrative:     add on HCG-[id number]; Troponin-[id number] per Margy Garcia MD    10/17/2024  09:19 Rich   C. TRACHOMATIS/N. GONORRHOEAE BY AMP DNA   RPR   POCT GLUCOSE     POCT Glucose 88     POCT GLUCOSE    POCT Glucose 99     POCT GLUCOSE    POCT Glucose 100     POCT GLUCOSE    POCT Glucose 82     POCT GLUCOSE MONITORING CONTINUOUS   POCT GLUCOSE MONITORING CONTINUOUS   POCT GLUCOSE MONITORING CONTINUOUS        ECG Results              EKG 12-lead (Final result)        Collection Time Result Time QRS Duration OHS QTC Calculation    10/17/24 10:38:50 10/17/24 13:58:33 118 477                     Final result by Interface, Lab In [facility] (10/17/24 13:58:38)                   Narrative:    Test Reason : R07.9,    Vent. Rate : 080 BPM     Atrial Rate : 080 BPM     P-R Int : 140 ms          QRS Dur : 118 ms      QT Int : 414 ms       P-R-T Axes : 079 -56 044 degrees     QTc Int : 477 ms    Normal sinus rhythm  Right bundle branch block  Left anterior fascicular block   Bifascicular block   Abnormal ECG  When compared with ECG of 15-SEP-2024 22:29,  No significant change was found  Confirmed by Wesley Maldonado MD ([id number]) on 10/17/2024 1:58:25 PM    Referred By: [email or web address]   SELF           Confirmed By:Wesley Maldonado MD                                  Imaging Results              X-Ray Abdomen AP 1 View (Final result)  Result time 10/17/24 10:48:51      Final result by Neo Dumont MD (10/17/24 10:48:51)                   Impression:      No significant intra-abdominal abnormality.  A large amount of fecal material is seen within the colon.  No demonstrated gastric distention.      Electronically signed by: Neo Dumont MD  Date:    10/17/2024  Time:    10:48               Narrative:    EXAMINATION:  XR ABDOMEN AP 1 VIEW    CLINICAL HISTORY:  gastroparesis;    TECHNIQUE:  One view    COMPARISON:  Reference is made to a CT examination of the abdomen/pelvis dated 08/13/2024.    FINDINGS:  Intestinal gas pattern demonstrates gas predominantly within the stomach and colon, with a large amount of fecal material seen in the colon.  There is no evidence of gastric distention, and no significant  gaseous distention of large or small bowel loops is appreciated.  No findings indicating intestinal obstruction or significant ileus observed.  No conventional radiographic evidence of organomegaly or intra-abdominal/pelvic soft tissue mass.  No significant osseous abnormality.                                       X-Ray Chest AP Portable (Final result)  Result time 10/17/24 10:39:07      Final result by Robson Rhoades MD (10/17/24 10:39:07)                   Impression:      No acute abnormality.      Electronically signed by: Robson Rhoades MD  Date:    10/17/2024  Time:    10:39               Narrative:    EXAMINATION:  XR CHEST AP PORTABLE    CLINICAL HISTORY:  chest pain;    TECHNIQUE:  Single views of the chest were performed.    COMPARISON:  Chest radiograph dated September 15, 2024    FINDINGS:  The trachea and cardiomediastinal silhouette are within normal limits.  There is no evidence of pleural effusions, pneumothoraces or consolidations.  Lungs are clear.  Osseous structures demonstrate no evidence for acute fractures or dislocations.                                       CT Head Without Contrast (Final result)  Result time 10/17/24 09:15:48      Final result by Isaiah Gomez DO (10/17/24 09:15:48)                   Impression:      No acute intracranial findings as detailed above specifically without evidence for acute intracranial hemorrhage or sulcal effacement to suggest large territory recent infarction.    Clinical correlation and further evaluation as warranted    Electronically signed by resident: Keith Moody  Date:    10/17/2024  Time:    08:45    Electronically signed by: Isaiah Gomez DO  Date:    10/17/2024  Time:    09:15               Narrative:    EXAMINATION:  CT HEAD WITHOUT CONTRAST    CLINICAL HISTORY:  trauma occiput;    TECHNIQUE:  Low dose axial CT images obtained throughout the head without the use of intravenous contrast.  Axial, sagittal and coronal reconstructions were  performed.    COMPARISON:  CT head 08/13/2024, 06/18/2023, 12/06/2018    FINDINGS:  There is slight distortion by motion artifacts particularly the skull base.  Within limits of the study there is no evidence for acute intracranial hemorrhage or sulcal effacement.  Ventricles stable without hydrocephalus.  No midline shift or mass effect.  Visualized paranasal sinuses and mastoid air cells are clear.  Continued advanced degenerative change of the right temporomandibular joint partially included the field of view.                                       Medications   sodium chloride 0.9% flush 10 mL (has no administration in time range)   naloxone 0.4 mg/mL injection 0.02 mg (has no administration in time range)   glucose chewable tablet 16 g (has no administration in time range)   glucose chewable tablet 24 g (24 g Oral Not Given 10/17/24 1003)   glucagon (human recombinant) injection 1 mg (has no administration in time range)   dextrose 10% bolus 125 mL 125 mL (0 mLs Intravenous Stopped 10/17/24 1717)   dextrose 10% bolus 250 mL 250 mL (0 mLs Intravenous Stopped 10/17/24 1959)   polyethylene glycol packet 17 g (17 g Oral Given 10/17/24 2150)   cefTRIAXone injection 1 g (1 g Intravenous Given 10/17/24 1113)   FLUoxetine capsule 20 mg (20 mg Oral Given 10/17/24 1343)   acetaminophen tablet 650 mg (has no administration in time range)   hydrOXYzine pamoate capsule 25 mg (has no administration in time range)   mirtazapine tablet 15 mg (15 mg Oral Given 10/17/24 2150)   dextrose 10 % infusion ( Intravenous New Bag 10/17/24 2003)   dextrose 10% bolus 250 mL 250 mL (0 mLs Intravenous Stopped 10/17/24 0729)   dextrose 10% bolus 250 mL 250 mL (0 mLs Intravenous Stopped 10/17/24 1115)   dextrose 10% bolus 250 mL 250 mL (0 mLs Intravenous Stopped 10/17/24 1819)     Medical Decision Making  34-year-old female presents the ER for evaluation of insulin overdose.  Per chart review it appears that patient has poor understanding of  "her insulin regimen.  When she is hyperglycemic she intentionally overdoses on insulin to "make her blood sugar and normal".  Today her blood sugar was in the 200s and she gave herself 26 units of short-acting insulin.  She reports that her doctor told her to do this, however per chart review she was supposed to be prescribed 12 units of long-acting 4 units of shortness a main insulin.  She has been required to be admitted to the ICU due to persistent hypoglycemia.    Patient's blood sugar was as low as 29 today and persistently no despite increasing her sugar intake.  Her blood sugar was 64 in triage, however during my evaluation she appears to be having slurred speech which is normal.  I am concerned that she has been intentional insulin overdose causing persistent hypoglycemia from her poor understanding of her medication regimen.  Will plan blood work continuous blood sugar monitoring and likely admission      Amount and/or Complexity of Data Reviewed  Independent Historian: caregiver  External Data Reviewed: notes.  Labs: ordered. Decision-making details documented in ED Course.  ECG/medicine tests: ordered and independent interpretation performed. Decision-making details documented in ED Course.               ED Course as of 10/18/24 0000   Thu Oct 17, 2024   0636 POCT glucose(!!) [SE]   0636 Phosphorus(!) [SE]   0636 CBC auto differential(!) [SE]   0636 Comprehensive metabolic panel(!) [SE]   0636 Magnesium  Patient persistently hypoglycemic.  Concern for insulin overdose.  Starting D10 drip admitting to Medicine. [SE]      ED Course User Index  [SE] Jaya Mercer MD                   Critical Care Time: 55 minutes hypoglycemia requiring D10 drip  Critical care was time spent personally by me on the following activities: evaluating this patient's organ dysfunction, development of treatment plan, discussing treatment plan with patient or surrogate and bedside caregivers, discussions with consultants, " evaluation of patient's response to treatment, examination of patient, ordering and performing treatments and interventions, ordering and review of laboratory studies, ordering and review of radiographic studies, re-evaluation of patient's condition. This critical care time did not overlap with that of any other provider or involve time for any procedures.          Clinical Impression:  Final diagnoses:  [E16.2] Hypoglycemia (Primary)  [E16.0, T38.3X5A] Hypoglycemia due to insulin          ED Disposition Condition    Observation Stable                Jaya Mercer MD  10/18/24 0000

## 2024-10-17 NOTE — ASSESSMENT & PLAN NOTE
33 yo female with PMH T1DM (Last A1C 9/2024: 10.7)here for symptomatic hypoglycemia with fall and loc after being given 36units of short acting. She had a negative ct head without contrast. Also reporting acute pleuritic cp, dysuria, pulling abdominal pain, LLQ, and purulent malodorous vaginal discharge. Was prescribed home insulin regimen of 12 units of lantus and 4 units of aspart.    - Given dextrose 10% 250ml bolus (2 x) and on dextrose 10% infusion.  - holding home regimen in the setting of hypoglycemia, will gradually restart once patient us eating and glucose is back up

## 2024-10-17 NOTE — ASSESSMENT & PLAN NOTE
Patient reports recent (within 3 days) history of nausea, vomiting, and both constipation and diarrhea.     Patient's FSGs are uncontrolled due to hypoglycemia on current medication regimen.  Last A1c reviewed-   Lab Results   Component Value Date    HGBA1C 10.7 (H) 09/22/2024     Most recent fingerstick glucose reviewed-   Recent Labs   Lab 10/17/24  0501 10/17/24  0620 10/17/24  0728 10/17/24  0959   POCTGLUCOSE 61* 46* 99 43*     Current correctional scale   Not rescribed until patient is euglycemic.  D10 infusion.   Maintain anti-hyperglycemic dose as follows-   Antihyperglycemics (From admission, onward)      None          Hold Oral hypoglycemics while patient is in the hospital.

## 2024-10-17 NOTE — ED NOTES
Blood glucose of 51 resulting at 1426 is the blood glucose that was taken for 1224 and the blood glucose of 100 resulting at 1426 is the blood glucose that was taken at 1255

## 2024-10-17 NOTE — ASSESSMENT & PLAN NOTE
Ms. Wilson reports hitting her head after passing out from hypoglycemia.     - CT head ordered and showed no acute intracranial pathology

## 2024-10-17 NOTE — ED NOTES
Patient refusing, to take glucose tablets, because they don't taste good. Patient informed on importance of medication considering patients blood glucose was 42, patient still refused.

## 2024-10-17 NOTE — PLAN OF CARE
Problem: Adult Inpatient Plan of Care  Goal: Plan of Care Review  Outcome: Progressing  Goal: Patient-Specific Goal (Individualized)  Outcome: Progressing  Goal: Absence of Hospital-Acquired Illness or Injury  Outcome: Progressing  Goal: Optimal Comfort and Wellbeing  Outcome: Progressing  Goal: Readiness for Transition of Care  Outcome: Progressing     Problem: Diabetes Comorbidity  Goal: Blood Glucose Level Within Targeted Range  Outcome: Progressing     Problem: Diabetes  Goal: Optimal Coping  Outcome: Progressing  Goal: Optimal Functional Ability  Outcome: Progressing  Goal: Blood Glucose Level Within Target Range  Outcome: Progressing  Goal: Minimize Risk of Hypoglycemia  Outcome: Progressing     Problem: Fall Injury Risk  Goal: Absence of Fall and Fall-Related Injury  Outcome: Progressing

## 2024-10-17 NOTE — ASSESSMENT & PLAN NOTE
UA hazy in appearance, + for nitrites, 3+ leukocytes >100 WBC, many bacteria, 6 RBC    - ceftriaxone 1 g q24
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
## 2024-10-17 NOTE — HPI
"Ms. Hood is our 34 year old female with PMH of type 1 diabetes, gastroparesis with neuropathy, GERD, schizoaffective: bipolar, mdd, insomnia, anxiety, SI, T1DM, polysubstance use disorder, and intellectual disability/developmental delay, admitted for symptomatic hypoglycemia. Ms. Hood reports her  has not allowed her to take any of her home medications other than her short acting insulin. She states he gave her 26 units because her sugars were high. She states she knows she is prescribed less but he doesn't allow her to take her prescribed regimen because he told her the doctors are trying to kill her.     She reports being upset that she is off her psychiatric medications, and that even if she wanted to take her medications, he would not allow her to. She states her sugar got so low that she fell and hit her head, is not sure if she passed out, but states her  had to drive her here. She also reports she currently feels dizzy, her eyes feel weak and states her vision is blurry, and is reporting this slurred speech is acute and occurs when her sugars are too high or too low.     She also reports sharp chest pain that is worse with breathing, 6 episodes of emesis with dark blood, sob, sore throat, "pulling sensation" and burning when she urinates, purulent foul smelling vaginal discharge, and constipation for the last 2-3 days. She states she has had bright red streaky hematochezia that is painful with bm and reports possible history of hemorrhoids. She had a history of trichomonas. Her last period was a week ago and reports being monogamous to her . She denies any genital wounds/ulcers or history of any.  "

## 2024-10-17 NOTE — ASSESSMENT & PLAN NOTE
Streaky with pain on defecation and reported history of hemorrhoids.     - Monitor for blood loss  - If occurs in patient, consider SHARI, nurse made aware to observe for blood in stool

## 2024-10-17 NOTE — SUBJECTIVE & OBJECTIVE
Past Medical History:   Diagnosis Date    Anemia     Borderline intellectual functioning 2017    Diabetes mellitus     Insulin overdose 2024    Mood disorder     Scoliosis     Substance use disorder        Past Surgical History:   Procedure Laterality Date     SECTION         Review of patient's allergies indicates:   Allergen Reactions    Clove oil Itching    Pork/porcine containing products Other (See Comments)     Pt does not eat Pork    Zofran (as hydrochloride) [ondansetron hcl] Hives       No current facility-administered medications on file prior to encounter.     Current Outpatient Medications on File Prior to Encounter   Medication Sig    alcohol swabs (ALCOHOL PREP PADS) PadM Apply 1 each topically as needed.    blood sugar diagnostic Strp Use to check blood glucose 4 times daily.    blood-glucose meter kit To check BG 4 times daily, to use with insurance preferred meter    blood-glucose meter Misc Use to check blood glucose 4 times daily.    blood-glucose meter,continuous (DEXCOM ) Misc 1 Device by Misc.(Non-Drug; Combo Route) route as needed (to check blood sugar).    blood-glucose sensor (DEXCOM G7 SENSOR) Latanya 1 Device by Misc.(Non-Drug; Combo Route) route as needed (to check blood sugar).    FLUoxetine 20 MG capsule Take 3 capsules by mouth once daily.    gabapentin (NEURONTIN) 300 MG capsule Take 1 capsule by mouth 3 (three) times daily.    glucagon 3 mg/actuation Spry 1 spray by Nasal route as needed (hypoglycemia).    glucose 4 GM chewable tablet Take 4 tablets (16 g total) by mouth as needed for Low blood sugar.    insulin aspart U-100 (NOVOLOG) 100 unit/mL (3 mL) InPn pen Inject 4 Units into the skin 3 (three) times daily as needed (blood glucose greater than 250). (Discard pen 28 days after initial use)    insulin glargine U-100, Lantus, 100 unit/mL (3 mL) SubQ InPn pen Inject 12 Units into the skin once daily. (Discard pen 28 days after initial use)    lancets 30  "gauge Misc Use to check blood glucose 4 times daily.    lancets Misc To check BG 4 times daily, to use with insurance preferred meter    metoclopramide HCl (REGLAN) 10 MG tablet Take 1 tablet (10 mg total) by mouth every 6 (six) hours as needed (nausea or vomiting).    nicotine (NICODERM CQ) 7 mg/24 hr Place 1 patch onto the skin once daily.    pantoprazole (PROTONIX) 40 MG tablet Take 1 tablet (40 mg total) by mouth every morning.    pen needle, diabetic 29 gauge x 1/2" Ndle Use to inject insulin into the skin 4 times daily    pen needle, diabetic 32 gauge x 5/32" Ndle 1 each by Misc.(Non-Drug; Combo Route) route 4 (four) times daily.    polyethylene glycol (GLYCOLAX) 17 gram PwPk Take 17 g by mouth once daily.    promethazine (PHENERGAN) 25 MG suppository Place 1 suppository (25 mg total) rectally every 6 (six) hours as needed for Nausea (For severe nausea and vomiting not improved with oral medications).    risperidone 1 mg/ml (RISPERDAL) 1 mg/mL Soln Take 1 mg by mouth 2 (two) times daily.     Family History    None       Tobacco Use    Smoking status: Every Day     Current packs/day: 0.50     Average packs/day: 0.5 packs/day for 4.1 years (2.1 ttl pk-yrs)     Types: Cigarettes     Start date: 9/6/2020    Smokeless tobacco: Never   Substance and Sexual Activity    Alcohol use: Not Currently    Drug use: Yes     Types: Marijuana    Sexual activity: Not Currently     Review of Systems   Constitutional:  Positive for chills. Negative for fever.   HENT:  Positive for sore throat.    Respiratory:  Positive for chest tightness. Negative for cough and shortness of breath.    Cardiovascular:  Positive for chest pain. Negative for leg swelling.   Gastrointestinal:  Positive for abdominal pain, diarrhea, nausea and vomiting.   Genitourinary:  Positive for dysuria. Negative for hematuria.   Neurological:  Positive for dizziness.     Objective:     Vital Signs (Most Recent):  Temp: 98.4 °F (36.9 °C) (10/17/24 0700)  Pulse: " 83 (10/17/24 0700)  Resp: 18 (10/17/24 0220)  BP: 119/79 (10/17/24 0700)  SpO2: 100 % (10/17/24 0700) Vital Signs (24h Range):  Temp:  [97.5 °F (36.4 °C)-98.4 °F (36.9 °C)] 98.4 °F (36.9 °C)  Pulse:  [83-93] 83  Resp:  [18] 18  SpO2:  [100 %] 100 %  BP: ()/(53-79) 119/79     Weight: 63.5 kg (140 lb)  Body mass index is 21.93 kg/m².     Physical Exam  Constitutional:       Appearance: She is ill-appearing.   HENT:      Head:      Comments: Tenderness to palpation at occiput     Mouth/Throat:      Mouth: Mucous membranes are dry.   Cardiovascular:      Rate and Rhythm: Normal rate and regular rhythm.      Pulses: Normal pulses.      Heart sounds: No murmur heard.  Pulmonary:      Breath sounds: Normal breath sounds. No wheezing.   Abdominal:      Palpations: Abdomen is soft.      Tenderness: There is abdominal tenderness. There is no guarding.      Comments: Tenderness to palpation in left lower quadrant   Musculoskeletal:         General: No swelling.   Skin:     General: Skin is warm.                Significant Labs: All pertinent labs within the past 24 hours have been reviewed.  CBC:   Recent Labs   Lab 10/17/24  0325   WBC 5.63   HGB 10.2*   HCT 34.4*        CMP:   Recent Labs   Lab 10/17/24  0325      K 4.1      CO2 21*   GLU 52*   BUN 17   CREATININE 0.9   CALCIUM 9.1   PROT 6.5   ALBUMIN 3.4*   BILITOT 0.2   ALKPHOS 115   AST 23   ALT 12   ANIONGAP 11       Significant Imaging: I have reviewed all pertinent imaging results/findings within the past 24 hours.

## 2024-10-17 NOTE — PLAN OF CARE
[facility]  Initial Discharge Assessment       Primary Care Provider: Martin Rios MD    Admission Diagnosis: Hypoglycemia [E16.2]    Admission Date: 10/17/2024  Expected Discharge Date:     Transition of Care Barriers: (P) Social, Substance Abuse    Payor: [employer] / Plan: [facility] EXCHANGE PLAN / Product Type: Commercial /     Extended Emergency Contact Information  Primary Emergency Contact: Rosa Arita  Mobile Phone: [phone]  Relation: Mother  Secondary Emergency Contact: Hollie Wilson  Mobile Phone: [phone]  Relation: Mother   needed? No    Discharge Plan A: (P) Shelter  Discharge Plan B: (P) Home, Home Health      [facility]  [address]  [place] [address]  Phone: [phone] Fax: [phone]    [id number] @ [facility], [place] - [address]  [address]  [address]  [facility] [address]  Phone: [phone] Fax: [phone]    [id number] - [place], [place] - [address]  [address]  [place] [address]  Phone: [phone] Fax: [phone]    [facility] [address] [facility], [place] - [address]  [address]  [place] [address]  Phone: [phone] Fax: [phone]      Initial Assessment (most recent)       Adult Discharge Assessment - 10/17/24 1126          Discharge Assessment    Assessment Type Discharge Planning Assessment (P)      Confirmed/corrected address, phone number and insurance Yes (P)      Confirmed Demographics Correct on Facesheet (P)      Source of Information patient;health record (P)      Does patient/caregiver understand observation status Yes (P)      Communicated THELMA with patient/caregiver Yes (P)      People in Home spouse (P)      Do you expect to return to your current living situation? Other (see comments) (P)    tbd    Prior to hospitilization cognitive status: Alert/Oriented (P)      Current  cognitive status: Alert/Oriented (P)      Equipment Currently Used at Home glucometer (P)      Patient currently being followed by outpatient case management? No (P)      Do you currently have service(s) that help you manage your care at home? No (P)      Do you take prescription medications? Yes (P)      Do you have prescription coverage? Yes (P)      Do you have any problems affording any of your prescribed medications? No (P)      Is the patient taking medications as prescribed? yes (P)      How do you get to doctors appointments? family or friend will provide;public transportation (P)      Are you on dialysis? No (P)      Discharge Plan A Shelter (P)      Discharge Plan B Home;Home Health (P)      DME Needed Upon Discharge  none (P)      Discharge Plan discussed with: Patient (P)      Transition of Care Barriers Social;Substance Abuse (P)         Physical Activity    On average, how many days per week do you engage in moderate to strenuous exercise (like a brisk walk)? 0 days (P)         Financial Resource Strain    How hard is it for you to pay for the very basics like food, housing, medical care, and heating? Somewhat hard (P)         Housing Stability    In the last 12 months, was there a time when you were not able to pay the mortgage or rent on time? No (P)         Transportation Needs    Has the lack of transportation kept you from medical appointments, meetings, work or from getting things needed for daily living? No (P)         Food Insecurity    Within the past 12 months, you worried that your food would run out before you got the money to buy more. Never true (P)         Stress    Do you feel stress - tense, restless, nervous, or anxious, or unable to sleep at night because your mind is troubled all the time - these days? To some extent (P)         Social Isolation    How often do you feel lonely or isolated from those around you?  Rarely (P)         Alcohol Use    Q1: How often do you have a drink  containing alcohol? Patient declined (P)         Health Literacy    How often do you need to have someone help you when you read instructions, pamphlets, or other written material from your doctor or pharmacy? Often (P)

## 2024-10-17 NOTE — ASSESSMENT & PLAN NOTE
Patient reports recent (within 3 days) history of nausea, vomiting, and both constipation and diarrhea.     Patient's FSGs are uncontrolled due to hypoglycemia on current medication regimen.  Last A1c reviewed-   Lab Results   Component Value Date    HGBA1C 10.7 (H) 09/22/2024     Most recent fingerstick glucose reviewed-   Recent Labs   Lab 10/17/24  1108 10/17/24  1221 10/17/24  1254 10/17/24  1522   POCTGLUCOSE 116* 51* 100 82       Current correctional scale   Not rescribed until patient is euglycemic.  D10 infusion.   Maintain anti-hyperglycemic dose as follows-   Antihyperglycemics (From admission, onward)      None          Hold Oral hypoglycemics while patient is in the hospital.    - glucose checks q1 hr  - if patient sustains glucose >100 w/o any glucose bolus, then give reduced insulin for 6 units lantus and 2 units with meals and low dose sliding scale   - if >150, turn off drip   - BMP q4

## 2024-10-18 LAB
ALBUMIN SERPL BCP-MCNC: 3.2 G/DL (ref 3.5–5.2)
ALP SERPL-CCNC: 88 U/L (ref 40–150)
ALT SERPL W/O P-5'-P-CCNC: 11 U/L (ref 10–44)
ANION GAP SERPL CALC-SCNC: 8 MMOL/L (ref 8–16)
ANION GAP SERPL CALC-SCNC: 8 MMOL/L (ref 8–16)
AST SERPL-CCNC: 17 U/L (ref 10–40)
BACTERIA UR CULT: NORMAL
BACTERIA UR CULT: NORMAL
BASOPHILS # BLD AUTO: 0.04 K/UL (ref 0–0.2)
BASOPHILS NFR BLD: 0.9 % (ref 0–1.9)
BILIRUB SERPL-MCNC: 0.2 MG/DL (ref 0.1–1)
BUN SERPL-MCNC: 8 MG/DL (ref 6–20)
BUN SERPL-MCNC: 8 MG/DL (ref 6–20)
CALCIUM SERPL-MCNC: 9 MG/DL (ref 8.7–10.5)
CALCIUM SERPL-MCNC: 9 MG/DL (ref 8.7–10.5)
CHLORIDE SERPL-SCNC: 108 MMOL/L (ref 95–110)
CHLORIDE SERPL-SCNC: 108 MMOL/L (ref 95–110)
CO2 SERPL-SCNC: 23 MMOL/L (ref 23–29)
CO2 SERPL-SCNC: 23 MMOL/L (ref 23–29)
CREAT SERPL-MCNC: 0.8 MG/DL (ref 0.5–1.4)
CREAT SERPL-MCNC: 0.8 MG/DL (ref 0.5–1.4)
DIFFERENTIAL METHOD BLD: ABNORMAL
EOSINOPHIL # BLD AUTO: 0.4 K/UL (ref 0–0.5)
EOSINOPHIL NFR BLD: 9.5 % (ref 0–8)
ERYTHROCYTE [DISTWIDTH] IN BLOOD BY AUTOMATED COUNT: 17.2 % (ref 11.5–14.5)
EST. GFR  (NO RACE VARIABLE): >60 ML/MIN/1.73 M^2
EST. GFR  (NO RACE VARIABLE): >60 ML/MIN/1.73 M^2
GLUCOSE SERPL-MCNC: 66 MG/DL (ref 70–110)
GLUCOSE SERPL-MCNC: 66 MG/DL (ref 70–110)
HCT VFR BLD AUTO: 35.7 % (ref 37–48.5)
HGB BLD-MCNC: 10.9 G/DL (ref 12–16)
IMM GRANULOCYTES # BLD AUTO: 0.01 K/UL (ref 0–0.04)
IMM GRANULOCYTES NFR BLD AUTO: 0.2 % (ref 0–0.5)
LYMPHOCYTES # BLD AUTO: 2.1 K/UL (ref 1–4.8)
LYMPHOCYTES NFR BLD: 50 % (ref 18–48)
MAGNESIUM SERPL-MCNC: 2.2 MG/DL (ref 1.6–2.6)
MCH RBC QN AUTO: 24.3 PG (ref 27–31)
MCHC RBC AUTO-ENTMCNC: 30.5 G/DL (ref 32–36)
MCV RBC AUTO: 80 FL (ref 82–98)
MONOCYTES # BLD AUTO: 0.5 K/UL (ref 0.3–1)
MONOCYTES NFR BLD: 11.8 % (ref 4–15)
NEUTROPHILS # BLD AUTO: 1.2 K/UL (ref 1.8–7.7)
NEUTROPHILS NFR BLD: 27.6 % (ref 38–73)
NRBC BLD-RTO: 0 /100 WBC
PHOSPHATE SERPL-MCNC: 4.2 MG/DL (ref 2.7–4.5)
PLATELET # BLD AUTO: 233 K/UL (ref 150–450)
PMV BLD AUTO: 11.6 FL (ref 9.2–12.9)
POCT GLUCOSE: 100 MG/DL (ref 70–110)
POCT GLUCOSE: 104 MG/DL (ref 70–110)
POCT GLUCOSE: 107 MG/DL (ref 70–110)
POCT GLUCOSE: 107 MG/DL (ref 70–110)
POCT GLUCOSE: 110 MG/DL (ref 70–110)
POCT GLUCOSE: 118 MG/DL (ref 70–110)
POCT GLUCOSE: 126 MG/DL (ref 70–110)
POCT GLUCOSE: 148 MG/DL (ref 70–110)
POCT GLUCOSE: 165 MG/DL (ref 70–110)
POCT GLUCOSE: 63 MG/DL (ref 70–110)
POCT GLUCOSE: 72 MG/DL (ref 70–110)
POCT GLUCOSE: 75 MG/DL (ref 70–110)
POCT GLUCOSE: 75 MG/DL (ref 70–110)
POCT GLUCOSE: 77 MG/DL (ref 70–110)
POCT GLUCOSE: 80 MG/DL (ref 70–110)
POCT GLUCOSE: 84 MG/DL (ref 70–110)
POCT GLUCOSE: 85 MG/DL (ref 70–110)
POCT GLUCOSE: 85 MG/DL (ref 70–110)
POCT GLUCOSE: 86 MG/DL (ref 70–110)
POCT GLUCOSE: 96 MG/DL (ref 70–110)
POTASSIUM SERPL-SCNC: 4.1 MMOL/L (ref 3.5–5.1)
POTASSIUM SERPL-SCNC: 4.1 MMOL/L (ref 3.5–5.1)
PROT SERPL-MCNC: 6.3 G/DL (ref 6–8.4)
RBC # BLD AUTO: 4.48 M/UL (ref 4–5.4)
RPR SER QL: NORMAL
SODIUM SERPL-SCNC: 139 MMOL/L (ref 136–145)
SODIUM SERPL-SCNC: 139 MMOL/L (ref 136–145)
WBC # BLD AUTO: 4.22 K/UL (ref 3.9–12.7)

## 2024-10-18 PROCEDURE — G0378 HOSPITAL OBSERVATION PER HR: HCPCS

## 2024-10-18 PROCEDURE — 96376 TX/PRO/DX INJ SAME DRUG ADON: CPT

## 2024-10-18 PROCEDURE — 83735 ASSAY OF MAGNESIUM: CPT

## 2024-10-18 PROCEDURE — 25000003 PHARM REV CODE 250

## 2024-10-18 PROCEDURE — 84100 ASSAY OF PHOSPHORUS: CPT

## 2024-10-18 PROCEDURE — 63600175 PHARM REV CODE 636 W HCPCS

## 2024-10-18 PROCEDURE — 36415 COLL VENOUS BLD VENIPUNCTURE: CPT

## 2024-10-18 PROCEDURE — 80053 COMPREHEN METABOLIC PANEL: CPT

## 2024-10-18 PROCEDURE — 99214 OFFICE O/P EST MOD 30 MIN: CPT | Mod: ,,, | Performed by: STUDENT IN AN ORGANIZED HEALTH CARE EDUCATION/TRAINING PROGRAM

## 2024-10-18 PROCEDURE — 96366 THER/PROPH/DIAG IV INF ADDON: CPT

## 2024-10-18 PROCEDURE — 96372 THER/PROPH/DIAG INJ SC/IM: CPT

## 2024-10-18 PROCEDURE — 85025 COMPLETE CBC W/AUTO DIFF WBC: CPT

## 2024-10-18 RX ORDER — FLUOXETINE HYDROCHLORIDE 20 MG/1
20 CAPSULE ORAL DAILY
Status: COMPLETED | OUTPATIENT
Start: 2024-10-18 | End: 2024-10-18

## 2024-10-18 RX ORDER — GLUCAGON 1 MG
1 KIT INJECTION
Status: DISCONTINUED | OUTPATIENT
Start: 2024-10-18 | End: 2024-10-20 | Stop reason: HOSPADM

## 2024-10-18 RX ORDER — FLUOXETINE HYDROCHLORIDE 20 MG/1
20 CAPSULE ORAL DAILY
Status: DISCONTINUED | OUTPATIENT
Start: 2024-10-18 | End: 2024-10-18

## 2024-10-18 RX ORDER — FLUOXETINE HYDROCHLORIDE 20 MG/1
60 CAPSULE ORAL DAILY
Status: DISCONTINUED | OUTPATIENT
Start: 2024-10-22 | End: 2024-10-20 | Stop reason: HOSPADM

## 2024-10-18 RX ORDER — DEXTROSE MONOHYDRATE 100 MG/ML
INJECTION, SOLUTION INTRAVENOUS CONTINUOUS
Status: DISCONTINUED | OUTPATIENT
Start: 2024-10-18 | End: 2024-10-18

## 2024-10-18 RX ORDER — IBUPROFEN 200 MG
16 TABLET ORAL
Status: DISCONTINUED | OUTPATIENT
Start: 2024-10-18 | End: 2024-10-20 | Stop reason: HOSPADM

## 2024-10-18 RX ORDER — HYDROXYZINE PAMOATE 25 MG/1
50 CAPSULE ORAL EVERY 6 HOURS
Status: DISCONTINUED | OUTPATIENT
Start: 2024-10-18 | End: 2024-10-18

## 2024-10-18 RX ORDER — HYDROXYZINE PAMOATE 25 MG/1
25 CAPSULE ORAL EVERY 6 HOURS
Status: DISCONTINUED | OUTPATIENT
Start: 2024-10-18 | End: 2024-10-18

## 2024-10-18 RX ORDER — INSULIN ASPART 100 [IU]/ML
0-5 INJECTION, SOLUTION INTRAVENOUS; SUBCUTANEOUS
Status: DISCONTINUED | OUTPATIENT
Start: 2024-10-18 | End: 2024-10-20 | Stop reason: HOSPADM

## 2024-10-18 RX ORDER — IBUPROFEN 200 MG
24 TABLET ORAL
Status: DISCONTINUED | OUTPATIENT
Start: 2024-10-18 | End: 2024-10-20 | Stop reason: HOSPADM

## 2024-10-18 RX ORDER — FLUOXETINE HYDROCHLORIDE 20 MG/1
40 CAPSULE ORAL DAILY
Status: DISCONTINUED | OUTPATIENT
Start: 2024-10-19 | End: 2024-10-20 | Stop reason: HOSPADM

## 2024-10-18 RX ORDER — HYDROXYZINE PAMOATE 25 MG/1
50 CAPSULE ORAL 3 TIMES DAILY
Status: DISCONTINUED | OUTPATIENT
Start: 2024-10-18 | End: 2024-10-20 | Stop reason: HOSPADM

## 2024-10-18 RX ORDER — INSULIN GLARGINE 100 [IU]/ML
8 INJECTION, SOLUTION SUBCUTANEOUS DAILY
Status: DISCONTINUED | OUTPATIENT
Start: 2024-10-18 | End: 2024-10-20 | Stop reason: HOSPADM

## 2024-10-18 RX ORDER — FLUOXETINE HYDROCHLORIDE 20 MG/1
40 CAPSULE ORAL DAILY
Status: DISCONTINUED | OUTPATIENT
Start: 2024-10-19 | End: 2024-10-18

## 2024-10-18 RX ORDER — INSULIN ASPART 100 [IU]/ML
2 INJECTION, SOLUTION INTRAVENOUS; SUBCUTANEOUS
Status: DISCONTINUED | OUTPATIENT
Start: 2024-10-18 | End: 2024-10-20 | Stop reason: HOSPADM

## 2024-10-18 RX ADMIN — MIRTAZAPINE 15 MG: 7.5 TABLET, FILM COATED ORAL at 08:10

## 2024-10-18 RX ADMIN — CEFTRIAXONE 1 G: 1 INJECTION, POWDER, FOR SOLUTION INTRAMUSCULAR; INTRAVENOUS at 11:10

## 2024-10-18 RX ADMIN — HYDROXYZINE PAMOATE 50 MG: 25 CAPSULE ORAL at 02:10

## 2024-10-18 RX ADMIN — DEXTROSE MONOHYDRATE: 100 INJECTION, SOLUTION INTRAVENOUS at 02:10

## 2024-10-18 RX ADMIN — FLUOXETINE HYDROCHLORIDE 20 MG: 20 CAPSULE ORAL at 01:10

## 2024-10-18 RX ADMIN — HYDROXYZINE PAMOATE 50 MG: 25 CAPSULE ORAL at 08:10

## 2024-10-18 RX ADMIN — DEXTROSE MONOHYDRATE: 100 INJECTION, SOLUTION INTRAVENOUS at 10:10

## 2024-10-18 RX ADMIN — POLYETHYLENE GLYCOL 3350 17 G: 17 POWDER, FOR SOLUTION ORAL at 08:10

## 2024-10-18 RX ADMIN — INSULIN GLARGINE 8 UNITS: 100 INJECTION, SOLUTION SUBCUTANEOUS at 01:10

## 2024-10-18 NOTE — ASSESSMENT & PLAN NOTE
Ms. Wilson has a complicated psychiatric history that includes a suicide attempt, MDD, bipolar disorder, schizoaffective disorder with previous psychiatric medication history including seroquel, sertraline, fluoxetine, insulin overdose, polysubstance abuse (THC, amphetamines, cocaine, and opioid, THC only present in urine tox 10/17), as well as intellectual disability.     - Prozac - continue 20 mg today, increase to 40 mg 10/19, can increase to 60 mg on 10/22.  - Remeron - continue 15 mg  - Vistaril scheduled 50 mg TID

## 2024-10-18 NOTE — PROGRESS NOTES
"CONSULTATION LIAISON PSYCHIATRY PROGRESS NOTE    Patient Name: Hollie Wilson  MRN: [id number]  Patient Class: OP- Observation  Admission Date: 10/17/2024  Attending Physician: Margy Garcia MD      SUBJECTIVE:   Hollie Wilson is a 34 y.o. 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      Psychiatry consulted for "33 y/o F with history of MDD, bipolar, and schizoaffective disorder, has not been able to take medications for the last 3 months. Does her medication regimen need adjusting?"     Today, patient seated up in bed with array of food at bedside. Immediately states she is irritated and annoyed that she hasn't gotten her vistaril, stating that the nurse won't give it to her. States she wants her vistaril with her prozac or she won't take it, but also doesn't want her prozac unless it's at her prior 60 mg dose. Discussed that she has not had prozac for months and must titrate up; she did not appear to understand. States she doesn't want to talk to a psychiatrist unless she has her vistaril and refuses further interview.        OBJECTIVE:    Mental Status Exam:  General Appearance: appears stated age, tense, gown falling off, disheveled  Behavior: agitated, poor eye contact  Involuntary Movements and Motor Activity: no abnormal involuntary movements noted  Gait and Station: unable to assess - patient lying down or seated  Speech and Language:  emotional, intermittently garbled, normal volume  Mood: "irritated and agitated"  Affect: irritable  Thought Process and Associations: goal-directed, organized  Thought Content and " Perceptions:: no suicidal or homicidal ideation, no auditory or visual hallucinations, no paranoid ideation, no ideas of reference, no evidence of delusions or psychosis  Sensorium and Orientation: grossly intact  Recent and Remote Memory: grossly intact, able to recall relevant and salient information from the recent and remote past  Attention and Concentration: grossly intact, attentive to conversation  Fund of Knowledge: intellectually disabled  Insight: demonstrates awareness of situation, impaired due to intellectual disability  Judgment: impaired due to intellectual disability    CAM ICU positive? no      ASSESSMENT & RECOMMENDATIONS   Unspecified depressive disorder, r/o SIMD   Generalized anxiety disorder, r/o if substance induced  Cannabis use  Polysubstance use disorder   Intellectual disability vs Borderline Intellectual Functioning   Medication non compliance   Interpersonal conflict    PSYCH MEDICATIONS  Scheduled:   Prozac - continue 20 mg today, increase to 40 mg tomorrow, can increase to 60 mg after three days of 40. Fine to do this outpatient if discharged sooner.  Remeron - continue 15 mg  Vistaril - would schedule 50 mg TID     RISK ASSESSMENT  NO NEED FOR PEC. Patient has a history of leaving AMA, however she does not meet PEC criteria and has capacity at this time.     FOLLOW UP  Will sign off. Patient can follow up with outpatient mental health provider, Dr. Lozada.    DISPOSITION - once medically cleared:  Defer to medical team    Please contact ON CALL psychiatry service (24/7) for any acute issues that may arise.    Dr. Kd RAZA Psychiatry  [facility]  10/18/2024 10:28 AM        --------------------------------------------------------------------------------------------------------------------------------------------------------------------------------------------------------------------------------------    CONTINUED OBJECTIVE clinical data & findings  reviewed and noted for above decision making    Current Medications:   Scheduled Meds:    cefTRIAXone (Rocephin) IV (PEDS and ADULTS)  1 g Intravenous Q24H    FLUoxetine  20 mg Oral Daily    mirtazapine  15 mg Oral QHS    polyethylene glycol  17 g Oral BID     PRN Meds:   Current Facility-Administered Medications:     acetaminophen, 650 mg, Oral, Q6H PRN    dextrose 10%, 12.5 g, Intravenous, PRN    dextrose 10%, 25 g, Intravenous, PRN    glucagon (human recombinant), 1 mg, Intramuscular, PRN    glucose, 16 g, Oral, PRN    glucose, 24 g, Oral, PRN    hydrOXYzine pamoate, 25 mg, Oral, Q6H PRN    naloxone, 0.02 mg, Intravenous, PRN    sodium chloride 0.9%, 10 mL, Intravenous, Q12H PRN    Allergies:   Review of patient's allergies indicates:   Allergen Reactions    Clove oil Itching    Pork/porcine containing products Other (See Comments)     Pt does not eat Pork    Zofran (as hydrochloride) [ondansetron hcl] Hives       Vitals  Vitals:    10/18/24 0930   BP: 109/70   Pulse: 88   Resp:    Temp: 98.9 °F (37.2 °C)       Labs/Imaging/Studies:  Recent Results (from the past 24 hours)   EKG 12-lead    Collection Time: 10/17/24 10:38 AM   Result Value Ref Range    QRS Duration 118 ms    [facility] QTC Calculation 477 ms   POCT glucose    Collection Time: 10/17/24 11:08 AM   Result Value Ref Range    POCT Glucose 116 (H) 70 - 110 mg/dL   POCT Glucose, Hand-Held Device    Collection Time: 10/17/24 11:29 AM   Result Value Ref Range    POC Glucose 118 (A) 70 - 110 MG/DL   POCT glucose    Collection Time: 10/17/24 12:21 PM   Result Value Ref Range    POCT Glucose 51 (L) 70 - 110 mg/dL   POCT glucose    Collection Time: 10/17/24 12:54 PM   Result Value Ref Range    POCT Glucose 100 70 - 110 mg/dL   HIV 1/2 Ag/Ab (4th Gen)    Collection Time: 10/17/24  1:42 PM   Result Value Ref Range    HIV 1/2 Ag/Ab Non-reactive Non-reactive   Treponema Pallidium Antibodies IgG, IgM    Collection Time: 10/17/24  1:42 PM   Result Value Ref Range     Treponema Pallidum Antibodies (IgG, IgM) Reactive (A) Nonreactive   POCT Glucose, Hand-Held Device    Collection Time: 10/17/24  2:23 PM   Result Value Ref Range    POC Glucose 60 (A) 70 - 110 MG/DL   POCT glucose    Collection Time: 10/17/24  3:22 PM   Result Value Ref Range    POCT Glucose 82 70 - 110 mg/dL   POCT glucose    Collection Time: 10/17/24  4:43 PM   Result Value Ref Range    POCT Glucose 51 (L) 70 - 110 mg/dL   POCT glucose    Collection Time: 10/17/24  5:07 PM   Result Value Ref Range    POCT Glucose 52 (L) 70 - 110 mg/dL   POCT glucose    Collection Time: 10/17/24  6:16 PM   Result Value Ref Range    POCT Glucose 155 (H) 70 - 110 mg/dL   POCT glucose    Collection Time: 10/17/24  7:41 PM   Result Value Ref Range    POCT Glucose 47 (LL) 70 - 110 mg/dL   Basic metabolic panel    Collection Time: 10/17/24  8:01 PM   Result Value Ref Range    Sodium 139 136 - 145 mmol/L    Potassium 4.2 3.5 - 5.1 mmol/L    Chloride 107 95 - 110 mmol/L    CO2 24 23 - 29 mmol/L    Glucose 108 70 - 110 mg/dL    BUN 9 6 - 20 mg/dL    Creatinine 0.9 0.5 - 1.4 mg/dL    Calcium 8.6 (L) 8.7 - 10.5 mg/dL    Anion Gap 8 8 - 16 mmol/L    eGFR >60.0 >60 mL/min/1.73 m^2   POCT glucose    Collection Time: 10/17/24  8:25 PM   Result Value Ref Range    POCT Glucose 133 (H) 70 - 110 mg/dL   POCT glucose    Collection Time: 10/17/24  9:38 PM   Result Value Ref Range    POCT Glucose 78 70 - 110 mg/dL   POCT glucose    Collection Time: 10/17/24 10:35 PM   Result Value Ref Range    POCT Glucose 75 70 - 110 mg/dL   POCT glucose    Collection Time: 10/17/24 11:36 PM   Result Value Ref Range    POCT Glucose 85 70 - 110 mg/dL   POCT glucose    Collection Time: 10/18/24 12:43 AM   Result Value Ref Range    POCT Glucose 85 70 - 110 mg/dL   POCT glucose    Collection Time: 10/18/24  1:37 AM   Result Value Ref Range    POCT Glucose 126 (H) 70 - 110 mg/dL   POCT glucose    Collection Time: 10/18/24  2:41 AM   Result Value Ref Range    POCT Glucose  118 (H) 70 - 110 mg/dL   POCT glucose    Collection Time: 10/18/24  3:36 AM   Result Value Ref Range    POCT Glucose 104 70 - 110 mg/dL   POCT glucose    Collection Time: 10/18/24  4:43 AM   Result Value Ref Range    POCT Glucose 80 70 - 110 mg/dL   POCT glucose    Collection Time: 10/18/24  5:41 AM   Result Value Ref Range    POCT Glucose 72 70 - 110 mg/dL   CBC Auto Differential    Collection Time: 10/18/24  5:56 AM   Result Value Ref Range    WBC 4.22 3.90 - 12.70 K/uL    RBC 4.48 4.00 - 5.40 M/uL    Hemoglobin 10.9 (L) 12.0 - 16.0 g/dL    Hematocrit 35.7 (L) 37.0 - 48.5 %    MCV 80 (L) 82 - 98 fL    MCH 24.3 (L) 27.0 - 31.0 pg    MCHC 30.5 (L) 32.0 - 36.0 g/dL    RDW 17.2 (H) 11.5 - 14.5 %    Platelets 233 150 - 450 K/uL    MPV 11.6 9.2 - 12.9 fL    Immature Granulocytes 0.2 0.0 - 0.5 %    Gran # (ANC) 1.2 (L) 1.8 - 7.7 K/uL    Immature Grans (Abs) 0.01 0.00 - 0.04 K/uL    Lymph # 2.1 1.0 - 4.8 K/uL    Mono # 0.5 0.3 - 1.0 K/uL    Eos # 0.4 0.0 - 0.5 K/uL    Baso # 0.04 0.00 - 0.20 K/uL    nRBC 0 0 /100 WBC    Gran % 27.6 (L) 38.0 - 73.0 %    Lymph % 50.0 (H) 18.0 - 48.0 %    Mono % 11.8 4.0 - 15.0 %    Eosinophil % 9.5 (H) 0.0 - 8.0 %    Basophil % 0.9 0.0 - 1.9 %    Differential Method Automated    Comprehensive Metabolic Panel    Collection Time: 10/18/24  5:56 AM   Result Value Ref Range    Sodium 139 136 - 145 mmol/L    Potassium 4.1 3.5 - 5.1 mmol/L    Chloride 108 95 - 110 mmol/L    CO2 23 23 - 29 mmol/L    Glucose 66 (L) 70 - 110 mg/dL    BUN 8 6 - 20 mg/dL    Creatinine 0.8 0.5 - 1.4 mg/dL    Calcium 9.0 8.7 - 10.5 mg/dL    Total Protein 6.3 6.0 - 8.4 g/dL    Albumin 3.2 (L) 3.5 - 5.2 g/dL    Total Bilirubin 0.2 0.1 - 1.0 mg/dL    Alkaline Phosphatase 88 40 - 150 U/L    AST 17 10 - 40 U/L    ALT 11 10 - 44 U/L    eGFR >60.0 >60 mL/min/1.73 m^2    Anion Gap 8 8 - 16 mmol/L   Magnesium    Collection Time: 10/18/24  5:56 AM   Result Value Ref Range    Magnesium 2.2 1.6 - 2.6 mg/dL   Phosphorus     Collection Time: 10/18/24  5:56 AM   Result Value Ref Range    Phosphorus 4.2 2.7 - 4.5 mg/dL   Basic metabolic panel    Collection Time: 10/18/24  5:56 AM   Result Value Ref Range    Sodium 139 136 - 145 mmol/L    Potassium 4.1 3.5 - 5.1 mmol/L    Chloride 108 95 - 110 mmol/L    CO2 23 23 - 29 mmol/L    Glucose 66 (L) 70 - 110 mg/dL    BUN 8 6 - 20 mg/dL    Creatinine 0.8 0.5 - 1.4 mg/dL    Calcium 9.0 8.7 - 10.5 mg/dL    Anion Gap 8 8 - 16 mmol/L    eGFR >60.0 >60 mL/min/1.73 m^2   POCT glucose    Collection Time: 10/18/24  6:25 AM   Result Value Ref Range    POCT Glucose 75 70 - 110 mg/dL   POCT glucose    Collection Time: 10/18/24  8:04 AM   Result Value Ref Range    POCT Glucose 107 70 - 110 mg/dL   POCT glucose    Collection Time: 10/18/24  9:36 AM   Result Value Ref Range    POCT Glucose 96 70 - 110 mg/dL     Imaging Results              X-Ray Abdomen AP 1 View (Final result)  Result time 10/17/24 10:48:51      Final result by Neo Dumont MD (10/17/24 10:48:51)                   Impression:      No significant intra-abdominal abnormality.  A large amount of fecal material is seen within the colon.  No demonstrated gastric distention.      Electronically signed by: Neo Dumont MD  Date:    10/17/2024  Time:    10:48               Narrative:    EXAMINATION:  XR ABDOMEN AP 1 VIEW    CLINICAL HISTORY:  gastroparesis;    TECHNIQUE:  One view    COMPARISON:  Reference is made to a CT examination of the abdomen/pelvis dated 08/13/2024.    FINDINGS:  Intestinal gas pattern demonstrates gas predominantly within the stomach and colon, with a large amount of fecal material seen in the colon.  There is no evidence of gastric distention, and no significant gaseous distention of large or small bowel loops is appreciated.  No findings indicating intestinal obstruction or significant ileus observed.  No conventional radiographic evidence of organomegaly or intra-abdominal/pelvic soft tissue mass.  No significant osseous  abnormality.                                       X-Ray Chest AP Portable (Final result)  Result time 10/17/24 10:39:07      Final result by Robson Rhoades MD (10/17/24 10:39:07)                   Impression:      No acute abnormality.      Electronically signed by: Robson Rhoades MD  Date:    10/17/2024  Time:    10:39               Narrative:    EXAMINATION:  XR CHEST AP PORTABLE    CLINICAL HISTORY:  chest pain;    TECHNIQUE:  Single views of the chest were performed.    COMPARISON:  Chest radiograph dated September 15, 2024    FINDINGS:  The trachea and cardiomediastinal silhouette are within normal limits.  There is no evidence of pleural effusions, pneumothoraces or consolidations.  Lungs are clear.  Osseous structures demonstrate no evidence for acute fractures or dislocations.                                       CT Head Without Contrast (Final result)  Result time 10/17/24 09:15:48      Final result by Isaiah Gomez DO (10/17/24 09:15:48)                   Impression:      No acute intracranial findings as detailed above specifically without evidence for acute intracranial hemorrhage or sulcal effacement to suggest large territory recent infarction.    Clinical correlation and further evaluation as warranted    Electronically signed by resident: Keith Moody  Date:    10/17/2024  Time:    08:45    Electronically signed by: Isaiah Gomez DO  Date:    10/17/2024  Time:    09:15               Narrative:    EXAMINATION:  CT HEAD WITHOUT CONTRAST    CLINICAL HISTORY:  trauma occiput;    TECHNIQUE:  Low dose axial CT images obtained throughout the head without the use of intravenous contrast.  Axial, sagittal and coronal reconstructions were performed.    COMPARISON:  CT head 08/13/2024, 06/18/2023, 12/06/2018    FINDINGS:  There is slight distortion by motion artifacts particularly the skull base.  Within limits of the study there is no evidence for acute intracranial hemorrhage or sulcal effacement.   Ventricles stable without hydrocephalus.  No midline shift or mass effect.  Visualized paranasal sinuses and mastoid air cells are clear.  Continued advanced degenerative change of the right temporomandibular joint partially included the field of view.

## 2024-10-18 NOTE — ASSESSMENT & PLAN NOTE
With acute malodorous purulent vaginal discharge and multiple episodes of emesis   - KUB and chest xray imaging ordered: heavy stool burden  - Gonorrhea/chlamydia, syphilis - pending   - bhcg negative
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
## 2024-10-18 NOTE — SUBJECTIVE & OBJECTIVE
Interval History: Blood sugar sustained above 100 for > 2 POCT and lantus resumed 8 units with 2 units short acting with meals. Patient refused to eat and wanted to go home, aspart short acting held.     Review of Systems   Constitutional:  Negative for diaphoresis.   Gastrointestinal:  Positive for abdominal pain.   Musculoskeletal:         Complained of pain in left elbow and shins   Neurological:  Negative for dizziness and headaches.     Objective:     Vital Signs (Most Recent):  Temp: 98.7 °F (37.1 °C) (10/18/24 1225)  Pulse: 86 (10/18/24 1225)  Resp: 18 (10/18/24 1225)  BP: 116/76 (10/18/24 1225)  SpO2: 100 % (10/18/24 1225) Vital Signs (24h Range):  Temp:  [97.7 °F (36.5 °C)-98.9 °F (37.2 °C)] 98.7 °F (37.1 °C)  Pulse:  [] 86  Resp:  [16-18] 18  SpO2:  [97 %-100 %] 100 %  BP: (101-136)/(63-84) 116/76     Weight: 63.5 kg (140 lb)  Body mass index is 21.93 kg/m².  No intake or output data in the 24 hours ending 10/18/24 1355      Physical Exam  Vitals reviewed.   Constitutional:       Appearance: She is not ill-appearing.   HENT:      Head: Normocephalic and atraumatic.   Cardiovascular:      Rate and Rhythm: Normal rate and regular rhythm.      Pulses: Normal pulses.      Heart sounds: No murmur heard.  Pulmonary:      Breath sounds: Normal breath sounds. No wheezing.   Abdominal:      General: Bowel sounds are normal.      Palpations: Abdomen is soft.      Tenderness: There is no abdominal tenderness. There is no guarding.   Musculoskeletal:         General: No swelling.   Skin:     General: Skin is warm.             Significant Labs: All pertinent labs within the past 24 hours have been reviewed.  POCT Glucose:   Recent Labs   Lab 10/18/24  1105 10/18/24  1207 10/18/24  1314   POCTGLUCOSE 165* 107 148*       Significant Imaging: I have reviewed all pertinent imaging results/findings within the past 24 hours.

## 2024-10-18 NOTE — PLAN OF CARE
Problem: Adult Inpatient Plan of Care  Goal: Plan of Care Review  Outcome: Progressing  Goal: Patient-Specific Goal (Individualized)  Outcome: Progressing  Goal: Absence of Hospital-Acquired Illness or Injury  Outcome: Progressing  Goal: Optimal Comfort and Wellbeing  Outcome: Progressing  Goal: Readiness for Transition of Care  Outcome: Progressing     Problem: Diabetes Comorbidity  Goal: Blood Glucose Level Within Targeted Range  Outcome: Progressing     Problem: Acute Kidney Injury/Impairment  Goal: Fluid and Electrolyte Balance  Outcome: Progressing  Goal: Improved Oral Intake  Outcome: Progressing  Goal: Effective Renal Function  Outcome: Progressing     Problem: Diabetes  Goal: Optimal Coping  Outcome: Progressing  Goal: Optimal Functional Ability  Outcome: Progressing  Goal: Blood Glucose Level Within Target Range  Outcome: Progressing  Goal: Minimize Risk of Hypoglycemia  Outcome: Progressing     Problem: Fall Injury Risk  Goal: Absence of Fall and Fall-Related Injury  Outcome: Progressing   Pt Aox4, confused at times easily reoriented. No PRNs given. Pt refused midnight and 4 AM vital signs. Call light within reach, safety measures in place, rounded per facility policy.
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
## 2024-10-18 NOTE — ASSESSMENT & PLAN NOTE
35 yo female with PMH T1DM (Last A1C 9/2024: 10.7)here for symptomatic hypoglycemia with fall and loc after being given 36units of short acting. She had a negative ct head without contrast. Also reporting acute pleuritic cp, dysuria, pulling abdominal pain, LLQ, and purulent malodorous vaginal discharge. Was prescribed home insulin regimen of 12 units of lantus and 4 units of aspart. Given dextrose 10% 250ml bolus (2 x) and on dextrose 10% infusion.    - 8 units long acting resumed + 2 units short acting  - sliding scale added

## 2024-10-18 NOTE — PLAN OF CARE
Patient aaox4. Patient complains of pain, tylenol given but patient refused. Patient blood sugar has been monitored and within normal range. Patient D10 stopped. Insulin aspart and glargine ordered. Patient refused to eat and insulin aspart not given. Patient antibiotics and other due meds administered and patient tolerating well. Plan of care reviewed with patient and patient verbalized understanding. No other concerns at this time.      Problem: Adult Inpatient Plan of Care  Goal: Plan of Care Review  Outcome: Progressing  Goal: Patient-Specific Goal (Individualized)  Outcome: Progressing  Goal: Absence of Hospital-Acquired Illness or Injury  Outcome: Progressing  Goal: Optimal Comfort and Wellbeing  Outcome: Progressing  Goal: Readiness for Transition of Care  Outcome: Progressing     Problem: Diabetes Comorbidity  Goal: Blood Glucose Level Within Targeted Range  Outcome: Progressing     Problem: Acute Kidney Injury/Impairment  Goal: Fluid and Electrolyte Balance  Outcome: Progressing  Goal: Improved Oral Intake  Outcome: Progressing  Goal: Effective Renal Function  Outcome: Progressing     Problem: Diabetes  Goal: Optimal Coping  Outcome: Progressing  Goal: Optimal Functional Ability  Outcome: Progressing  Goal: Blood Glucose Level Within Target Range  Outcome: Progressing  Goal: Minimize Risk of Hypoglycemia  Outcome: Progressing     Problem: Fall Injury Risk  Goal: Absence of Fall and Fall-Related Injury  Outcome: Progressing

## 2024-10-19 PROBLEM — F25.9 SCHIZOAFFECTIVE DISORDER, CHRONIC CONDITION: Status: ACTIVE | Noted: 2024-10-19

## 2024-10-19 PROBLEM — F19.20 POLYSUBSTANCE DEPENDENCE: Status: ACTIVE | Noted: 2024-10-19

## 2024-10-19 LAB
ALBUMIN SERPL BCP-MCNC: 3.3 G/DL (ref 3.5–5.2)
ALP SERPL-CCNC: 85 U/L (ref 40–150)
ALT SERPL W/O P-5'-P-CCNC: 11 U/L (ref 10–44)
ANION GAP SERPL CALC-SCNC: 10 MMOL/L (ref 8–16)
AST SERPL-CCNC: 17 U/L (ref 10–40)
BASOPHILS # BLD AUTO: 0.04 K/UL (ref 0–0.2)
BASOPHILS NFR BLD: 0.8 % (ref 0–1.9)
BILIRUB SERPL-MCNC: 0.2 MG/DL (ref 0.1–1)
BUN SERPL-MCNC: 10 MG/DL (ref 6–20)
CALCIUM SERPL-MCNC: 8.8 MG/DL (ref 8.7–10.5)
CHLORIDE SERPL-SCNC: 111 MMOL/L (ref 95–110)
CO2 SERPL-SCNC: 19 MMOL/L (ref 23–29)
CREAT SERPL-MCNC: 0.8 MG/DL (ref 0.5–1.4)
DIFFERENTIAL METHOD BLD: ABNORMAL
EOSINOPHIL # BLD AUTO: 0.4 K/UL (ref 0–0.5)
EOSINOPHIL NFR BLD: 8.3 % (ref 0–8)
ERYTHROCYTE [DISTWIDTH] IN BLOOD BY AUTOMATED COUNT: 17.5 % (ref 11.5–14.5)
EST. GFR  (NO RACE VARIABLE): >60 ML/MIN/1.73 M^2
GLUCOSE SERPL-MCNC: 54 MG/DL (ref 70–110)
HCT VFR BLD AUTO: 39.5 % (ref 37–48.5)
HGB BLD-MCNC: 11.9 G/DL (ref 12–16)
IMM GRANULOCYTES # BLD AUTO: 0.01 K/UL (ref 0–0.04)
IMM GRANULOCYTES NFR BLD AUTO: 0.2 % (ref 0–0.5)
LYMPHOCYTES # BLD AUTO: 2.6 K/UL (ref 1–4.8)
LYMPHOCYTES NFR BLD: 53.9 % (ref 18–48)
MAGNESIUM SERPL-MCNC: 2.2 MG/DL (ref 1.6–2.6)
MCH RBC QN AUTO: 24.8 PG (ref 27–31)
MCHC RBC AUTO-ENTMCNC: 30.1 G/DL (ref 32–36)
MCV RBC AUTO: 83 FL (ref 82–98)
MONOCYTES # BLD AUTO: 0.6 K/UL (ref 0.3–1)
MONOCYTES NFR BLD: 12.7 % (ref 4–15)
NEUTROPHILS # BLD AUTO: 1.2 K/UL (ref 1.8–7.7)
NEUTROPHILS NFR BLD: 24.1 % (ref 38–73)
NRBC BLD-RTO: 0 /100 WBC
PHOSPHATE SERPL-MCNC: 4.4 MG/DL (ref 2.7–4.5)
PLATELET # BLD AUTO: 198 K/UL (ref 150–450)
PMV BLD AUTO: 11.7 FL (ref 9.2–12.9)
POCT GLUCOSE: 100 MG/DL (ref 70–110)
POCT GLUCOSE: 112 MG/DL (ref 70–110)
POCT GLUCOSE: 163 MG/DL (ref 70–110)
POCT GLUCOSE: 200 MG/DL (ref 70–110)
POCT GLUCOSE: 60 MG/DL (ref 70–110)
POCT GLUCOSE: 66 MG/DL (ref 70–110)
POCT GLUCOSE: 71 MG/DL (ref 70–110)
POCT GLUCOSE: 72 MG/DL (ref 70–110)
POCT GLUCOSE: 79 MG/DL (ref 70–110)
POCT GLUCOSE: 80 MG/DL (ref 70–110)
POCT GLUCOSE: 84 MG/DL (ref 70–110)
POCT GLUCOSE: 93 MG/DL (ref 70–110)
POCT GLUCOSE: 96 MG/DL (ref 70–110)
POTASSIUM SERPL-SCNC: 4.4 MMOL/L (ref 3.5–5.1)
PROT SERPL-MCNC: 6.6 G/DL (ref 6–8.4)
RBC # BLD AUTO: 4.79 M/UL (ref 4–5.4)
SODIUM SERPL-SCNC: 140 MMOL/L (ref 136–145)
WBC # BLD AUTO: 4.82 K/UL (ref 3.9–12.7)

## 2024-10-19 PROCEDURE — 25000003 PHARM REV CODE 250

## 2024-10-19 PROCEDURE — 96372 THER/PROPH/DIAG INJ SC/IM: CPT

## 2024-10-19 PROCEDURE — 63600175 PHARM REV CODE 636 W HCPCS

## 2024-10-19 PROCEDURE — G0378 HOSPITAL OBSERVATION PER HR: HCPCS

## 2024-10-19 PROCEDURE — 96375 TX/PRO/DX INJ NEW DRUG ADDON: CPT

## 2024-10-19 PROCEDURE — 83735 ASSAY OF MAGNESIUM: CPT

## 2024-10-19 PROCEDURE — 36415 COLL VENOUS BLD VENIPUNCTURE: CPT

## 2024-10-19 PROCEDURE — 99232 SBSQ HOSP IP/OBS MODERATE 35: CPT | Mod: ,,, | Performed by: PSYCHIATRY & NEUROLOGY

## 2024-10-19 PROCEDURE — 84100 ASSAY OF PHOSPHORUS: CPT

## 2024-10-19 PROCEDURE — 85025 COMPLETE CBC W/AUTO DIFF WBC: CPT

## 2024-10-19 PROCEDURE — 80053 COMPREHEN METABOLIC PANEL: CPT

## 2024-10-19 RX ORDER — INSULIN ASPART 100 [IU]/ML
0-5 INJECTION, SOLUTION INTRAVENOUS; SUBCUTANEOUS
Status: DISCONTINUED | OUTPATIENT
Start: 2024-10-19 | End: 2024-10-20 | Stop reason: HOSPADM

## 2024-10-19 RX ORDER — IBUPROFEN 200 MG
24 TABLET ORAL
Status: DISCONTINUED | OUTPATIENT
Start: 2024-10-19 | End: 2024-10-20 | Stop reason: HOSPADM

## 2024-10-19 RX ORDER — HALOPERIDOL 5 MG/ML
5 INJECTION INTRAMUSCULAR ONCE
Status: COMPLETED | OUTPATIENT
Start: 2024-10-19 | End: 2024-10-19

## 2024-10-19 RX ORDER — IBUPROFEN 200 MG
16 TABLET ORAL
Status: DISCONTINUED | OUTPATIENT
Start: 2024-10-19 | End: 2024-10-20 | Stop reason: HOSPADM

## 2024-10-19 RX ORDER — INSULIN ASPART 100 [IU]/ML
2 INJECTION, SOLUTION INTRAVENOUS; SUBCUTANEOUS 3 TIMES DAILY
Qty: 3 ML | Refills: 0 | Status: SHIPPED | OUTPATIENT
Start: 2024-10-19 | End: 2024-11-20

## 2024-10-19 RX ORDER — HYDROXYZINE PAMOATE 50 MG/1
50 CAPSULE ORAL 3 TIMES DAILY
Qty: 90 CAPSULE | Refills: 0 | Status: SHIPPED | OUTPATIENT
Start: 2024-10-19 | End: 2024-11-19

## 2024-10-19 RX ORDER — LORAZEPAM 2 MG/ML
INJECTION INTRAMUSCULAR
Status: COMPLETED
Start: 2024-10-19 | End: 2024-10-19

## 2024-10-19 RX ORDER — FLUOXETINE HYDROCHLORIDE 60 MG/1
60 TABLET, FILM COATED ORAL; ORAL DAILY
Qty: 30 TABLET | Refills: 0 | Status: SHIPPED | OUTPATIENT
Start: 2024-10-22

## 2024-10-19 RX ORDER — INSULIN GLARGINE 100 [IU]/ML
6 INJECTION, SOLUTION SUBCUTANEOUS DAILY
Qty: 3 ML | Refills: 0 | Status: SHIPPED | OUTPATIENT
Start: 2024-10-19 | End: 2024-10-20

## 2024-10-19 RX ORDER — GLUCAGON 1 MG
1 KIT INJECTION
Status: DISCONTINUED | OUTPATIENT
Start: 2024-10-19 | End: 2024-10-20 | Stop reason: HOSPADM

## 2024-10-19 RX ORDER — INSULIN GLARGINE 100 [IU]/ML
8 INJECTION, SOLUTION SUBCUTANEOUS DAILY
Qty: 3 ML | Refills: 0 | Status: SHIPPED | OUTPATIENT
Start: 2024-10-19 | End: 2024-10-19

## 2024-10-19 RX ORDER — CEFPODOXIME PROXETIL 100 MG/1
100 TABLET, FILM COATED ORAL EVERY 12 HOURS
Qty: 8 TABLET | Refills: 0 | Status: SHIPPED | OUTPATIENT
Start: 2024-10-19 | End: 2024-10-19 | Stop reason: HOSPADM

## 2024-10-19 RX ORDER — FLUOXETINE HYDROCHLORIDE 40 MG/1
40 CAPSULE ORAL DAILY
Qty: 2 CAPSULE | Refills: 0 | Status: SHIPPED | OUTPATIENT
Start: 2024-10-19 | End: 2024-10-22

## 2024-10-19 RX ORDER — DIPHENHYDRAMINE HYDROCHLORIDE 50 MG/ML
INJECTION INTRAMUSCULAR; INTRAVENOUS
Status: COMPLETED
Start: 2024-10-19 | End: 2024-10-19

## 2024-10-19 RX ORDER — MIRTAZAPINE 15 MG/1
15 TABLET, FILM COATED ORAL NIGHTLY
Qty: 30 TABLET | Refills: 11 | Status: SHIPPED | OUTPATIENT
Start: 2024-10-19 | End: 2025-10-19

## 2024-10-19 RX ORDER — NITROFURANTOIN 25; 75 MG/1; MG/1
100 CAPSULE ORAL 2 TIMES DAILY
Qty: 8 CAPSULE | Refills: 0 | Status: SHIPPED | OUTPATIENT
Start: 2024-10-20 | End: 2024-10-24

## 2024-10-19 RX ADMIN — INSULIN GLARGINE 8 UNITS: 100 INJECTION, SOLUTION SUBCUTANEOUS at 08:10

## 2024-10-19 RX ADMIN — INSULIN ASPART 2 UNITS: 100 INJECTION, SOLUTION INTRAVENOUS; SUBCUTANEOUS at 04:10

## 2024-10-19 RX ADMIN — HYDROXYZINE PAMOATE 50 MG: 25 CAPSULE ORAL at 08:10

## 2024-10-19 RX ADMIN — DIPHENHYDRAMINE HYDROCHLORIDE 50 MG: 50 INJECTION, SOLUTION INTRAMUSCULAR; INTRAVENOUS at 09:10

## 2024-10-19 RX ADMIN — POLYETHYLENE GLYCOL 3350 17 G: 17 POWDER, FOR SOLUTION ORAL at 08:10

## 2024-10-19 RX ADMIN — LORAZEPAM 2 MG: 2 INJECTION INTRAMUSCULAR; INTRAVENOUS at 09:10

## 2024-10-19 RX ADMIN — POLYETHYLENE GLYCOL 3350 17 G: 17 POWDER, FOR SOLUTION ORAL at 09:10

## 2024-10-19 RX ADMIN — HALOPERIDOL LACTATE 5 MG: 5 INJECTION, SOLUTION INTRAMUSCULAR at 09:10

## 2024-10-19 RX ADMIN — ACETAMINOPHEN 650 MG: 325 TABLET ORAL at 08:10

## 2024-10-19 RX ADMIN — INSULIN ASPART 2 UNITS: 100 INJECTION, SOLUTION INTRAVENOUS; SUBCUTANEOUS at 01:10

## 2024-10-19 RX ADMIN — HYDROXYZINE PAMOATE 50 MG: 25 CAPSULE ORAL at 09:10

## 2024-10-19 RX ADMIN — MIRTAZAPINE 15 MG: 7.5 TABLET, FILM COATED ORAL at 09:10

## 2024-10-19 RX ADMIN — FLUOXETINE HYDROCHLORIDE 40 MG: 20 CAPSULE ORAL at 08:10
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
## 2024-10-19 NOTE — PLAN OF CARE
Problem: Adult Inpatient Plan of Care  Goal: Plan of Care Review  Outcome: Progressing  Goal: Patient-Specific Goal (Individualized)  Outcome: Progressing  Goal: Absence of Hospital-Acquired Illness or Injury  Outcome: Progressing  Goal: Optimal Comfort and Wellbeing  Outcome: Progressing  Goal: Readiness for Transition of Care  Outcome: Progressing     Problem: Diabetes Comorbidity  Goal: Blood Glucose Level Within Targeted Range  Outcome: Progressing     Problem: Acute Kidney Injury/Impairment  Goal: Fluid and Electrolyte Balance  Outcome: Progressing  Goal: Improved Oral Intake  Outcome: Progressing  Goal: Effective Renal Function  Outcome: Progressing     Problem: Diabetes  Goal: Optimal Coping  Outcome: Progressing  Goal: Optimal Functional Ability  Outcome: Progressing  Goal: Blood Glucose Level Within Target Range  Outcome: Progressing  Goal: Minimize Risk of Hypoglycemia  Outcome: Progressing     Problem: Fall Injury Risk  Goal: Absence of Fall and Fall-Related Injury  Outcome: Progressing  Pt Aox4, hourly glucose checks. Low glucose corrected with 120ml of apple juice x2. Call light within reach, safety measures in place, rounded per facility policy.
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
## 2024-10-19 NOTE — PLAN OF CARE
Follow up with Martin Rios MD  CHW called to schedule pcp f/u, Clinic closed. [address] [facility] [address]  [phone]
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
## 2024-10-19 NOTE — PLAN OF CARE
ALIN spoke with Johanna  at [facility] in [place]. She stated that she would have to speak with the pt over the phone and fill out paperwork, then see if they have any spots available before pt can go to shelter. She stated that whenever the pt is free to speak with her they can start the process.   The second option will be for pt to go to [facility].   Will follow up.    [facility] ([place])  Discharge Reassessment    Primary Care Provider: Martin Rios MD    Expected Discharge Date: 10/18/2024    Reassessment (most recent)       Discharge Reassessment - 10/19/24 1532          Discharge Reassessment    Assessment Type Discharge Planning Reassessment     Did the patient's condition or plan change since previous assessment? Yes     Discharge Plan discussed with: Patient     Communicated THELMA with patient/caregiver Yes     Discharge Plan A Shelter     Discharge Plan B Shelter     DME Needed Upon Discharge  none     Transition of Care Barriers None     Why the patient remains in the hospital Requires continued medical care        Post-Acute Status    Post-Acute Authorization Placement     Post-Acute Placement Status Referrals Sent     Hospital Resources/Appts/Education Provided Appointments scheduled and added to AVS     Discharge Delays None known at this time                       Discharge Plan A and Plan B have been determined by review of patient's clinical status, future medical and therapeutic needs, and coverage/benefits for post-acute care in coordination with multidisciplinary team members.    Kaylee Hurd MSW, CSW

## 2024-10-19 NOTE — SUBJECTIVE & OBJECTIVE
Interval History: NAEO. Complains of minimal appetite and not eating very much. Patient had severe behavioral disturbance during the day, PEC ordered, B52 medication given. Maintaining blood sugar within acceptable range.      Review of Systems   Constitutional:  Negative for diaphoresis.   Gastrointestinal:  Positive for abdominal pain.   Musculoskeletal:         Complained of shin pain. Told nurse to give tylenol.    Neurological:  Negative for dizziness and headaches.     Objective:     Vital Signs (Most Recent):  Temp: 98.4 °F (36.9 °C) (10/19/24 1300)  Pulse: 101 (10/19/24 1300)  Resp: 16 (10/19/24 1300)  BP: 131/77 (10/19/24 1300)  SpO2: 100 % (10/19/24 1300) Vital Signs (24h Range):  Temp:  [97.9 °F (36.6 °C)-99.2 °F (37.3 °C)] 98.4 °F (36.9 °C)  Pulse:  [] 101  Resp:  [16-17] 16  SpO2:  [98 %-100 %] 100 %  BP: ()/(53-77) 131/77     Weight: 63.5 kg (140 lb)  Body mass index is 21.93 kg/m².    Intake/Output Summary (Last 24 hours) at 10/19/2024 1303  Last data filed at 10/18/2024 1816  Gross per 24 hour   Intake 260 ml   Output --   Net 260 ml         Physical Exam        Significant Labs: All pertinent labs within the past 24 hours have been reviewed.  CMP:   Recent Labs   Lab 10/17/24  2001 10/18/24  0556 10/19/24  0213    139  139 140   K 4.2 4.1  4.1 4.4    108  108 111*   CO2 24 23  23 19*    66*  66* 54*   BUN 9 8  8 10   CREATININE 0.9 0.8  0.8 0.8   CALCIUM 8.6* 9.0  9.0 8.8   PROT  --  6.3 6.6   ALBUMIN  --  3.2* 3.3*   BILITOT  --  0.2 0.2   ALKPHOS  --  88 85   AST  --  17 17   ALT  --  11 11   ANIONGAP 8 8  8 10       Significant Imaging: I have reviewed all pertinent imaging results/findings within the past 24 hours.

## 2024-10-20 VITALS
DIASTOLIC BLOOD PRESSURE: 64 MMHG | HEART RATE: 97 BPM | HEIGHT: 67 IN | BODY MASS INDEX: 21.97 KG/M2 | WEIGHT: 140 LBS | SYSTOLIC BLOOD PRESSURE: 106 MMHG | OXYGEN SATURATION: 98 % | TEMPERATURE: 99 F | RESPIRATION RATE: 18 BRPM

## 2024-10-20 LAB
ALBUMIN SERPL BCP-MCNC: 3.3 G/DL (ref 3.5–5.2)
ALP SERPL-CCNC: 85 U/L (ref 40–150)
ALT SERPL W/O P-5'-P-CCNC: 9 U/L (ref 10–44)
ANION GAP SERPL CALC-SCNC: 11 MMOL/L (ref 8–16)
AST SERPL-CCNC: 16 U/L (ref 10–40)
BASOPHILS # BLD AUTO: 0.05 K/UL (ref 0–0.2)
BASOPHILS NFR BLD: 0.9 % (ref 0–1.9)
BILIRUB SERPL-MCNC: 0.4 MG/DL (ref 0.1–1)
BUN SERPL-MCNC: 11 MG/DL (ref 6–20)
CALCIUM SERPL-MCNC: 9.4 MG/DL (ref 8.7–10.5)
CHLORIDE SERPL-SCNC: 108 MMOL/L (ref 95–110)
CO2 SERPL-SCNC: 19 MMOL/L (ref 23–29)
CREAT SERPL-MCNC: 0.8 MG/DL (ref 0.5–1.4)
DIFFERENTIAL METHOD BLD: ABNORMAL
EOSINOPHIL # BLD AUTO: 0.4 K/UL (ref 0–0.5)
EOSINOPHIL NFR BLD: 7.5 % (ref 0–8)
ERYTHROCYTE [DISTWIDTH] IN BLOOD BY AUTOMATED COUNT: 17.4 % (ref 11.5–14.5)
EST. GFR  (NO RACE VARIABLE): >60 ML/MIN/1.73 M^2
GLUCOSE SERPL-MCNC: 160 MG/DL (ref 70–110)
HCT VFR BLD AUTO: 40.5 % (ref 37–48.5)
HGB BLD-MCNC: 12 G/DL (ref 12–16)
IMM GRANULOCYTES # BLD AUTO: 0.01 K/UL (ref 0–0.04)
IMM GRANULOCYTES NFR BLD AUTO: 0.2 % (ref 0–0.5)
LYMPHOCYTES # BLD AUTO: 2 K/UL (ref 1–4.8)
LYMPHOCYTES NFR BLD: 35 % (ref 18–48)
MAGNESIUM SERPL-MCNC: 2.1 MG/DL (ref 1.6–2.6)
MCH RBC QN AUTO: 24.8 PG (ref 27–31)
MCHC RBC AUTO-ENTMCNC: 29.6 G/DL (ref 32–36)
MCV RBC AUTO: 84 FL (ref 82–98)
MONOCYTES # BLD AUTO: 0.6 K/UL (ref 0.3–1)
MONOCYTES NFR BLD: 10.5 % (ref 4–15)
NEUTROPHILS # BLD AUTO: 2.6 K/UL (ref 1.8–7.7)
NEUTROPHILS NFR BLD: 45.9 % (ref 38–73)
NRBC BLD-RTO: 0 /100 WBC
PHOSPHATE SERPL-MCNC: 4.1 MG/DL (ref 2.7–4.5)
PLATELET # BLD AUTO: 224 K/UL (ref 150–450)
PMV BLD AUTO: 13 FL (ref 9.2–12.9)
POCT GLUCOSE: 166 MG/DL (ref 70–110)
POCT GLUCOSE: 169 MG/DL (ref 70–110)
POCT GLUCOSE: 222 MG/DL (ref 70–110)
POTASSIUM SERPL-SCNC: 4.4 MMOL/L (ref 3.5–5.1)
PROT SERPL-MCNC: 6.6 G/DL (ref 6–8.4)
RBC # BLD AUTO: 4.83 M/UL (ref 4–5.4)
SODIUM SERPL-SCNC: 138 MMOL/L (ref 136–145)
WBC # BLD AUTO: 5.71 K/UL (ref 3.9–12.7)

## 2024-10-20 PROCEDURE — 25000003 PHARM REV CODE 250

## 2024-10-20 PROCEDURE — 84100 ASSAY OF PHOSPHORUS: CPT

## 2024-10-20 PROCEDURE — G0378 HOSPITAL OBSERVATION PER HR: HCPCS

## 2024-10-20 PROCEDURE — 96372 THER/PROPH/DIAG INJ SC/IM: CPT

## 2024-10-20 PROCEDURE — 85025 COMPLETE CBC W/AUTO DIFF WBC: CPT

## 2024-10-20 PROCEDURE — 63600175 PHARM REV CODE 636 W HCPCS

## 2024-10-20 PROCEDURE — 80053 COMPREHEN METABOLIC PANEL: CPT

## 2024-10-20 PROCEDURE — 83735 ASSAY OF MAGNESIUM: CPT

## 2024-10-20 PROCEDURE — 36415 COLL VENOUS BLD VENIPUNCTURE: CPT

## 2024-10-20 RX ORDER — INSULIN GLARGINE 100 [IU]/ML
8 INJECTION, SOLUTION SUBCUTANEOUS DAILY
Qty: 3 ML | Refills: 0 | Status: SHIPPED | OUTPATIENT
Start: 2024-10-20 | End: 2024-11-19

## 2024-10-20 RX ORDER — INSULIN ASPART 100 [IU]/ML
2 INJECTION, SOLUTION INTRAVENOUS; SUBCUTANEOUS 3 TIMES DAILY
Qty: 3 ML | Refills: 0 | Status: SHIPPED | OUTPATIENT
Start: 2024-10-20 | End: 2024-10-20 | Stop reason: HOSPADM

## 2024-10-20 RX ADMIN — INSULIN ASPART 2 UNITS: 100 INJECTION, SOLUTION INTRAVENOUS; SUBCUTANEOUS at 01:10

## 2024-10-20 RX ADMIN — HYDROXYZINE PAMOATE 50 MG: 25 CAPSULE ORAL at 08:10

## 2024-10-20 RX ADMIN — POLYETHYLENE GLYCOL 3350 17 G: 17 POWDER, FOR SOLUTION ORAL at 08:10

## 2024-10-20 RX ADMIN — HYDROXYZINE PAMOATE 50 MG: 25 CAPSULE ORAL at 02:10

## 2024-10-20 RX ADMIN — INSULIN GLARGINE 8 UNITS: 100 INJECTION, SOLUTION SUBCUTANEOUS at 08:10

## 2024-10-20 RX ADMIN — INSULIN ASPART 2 UNITS: 100 INJECTION, SOLUTION INTRAVENOUS; SUBCUTANEOUS at 08:10

## 2024-10-20 RX ADMIN — FLUOXETINE HYDROCHLORIDE 40 MG: 20 CAPSULE ORAL at 08:10
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
## 2024-10-20 NOTE — PLAN OF CARE
Problem: Adult Inpatient Plan of Care  Goal: Plan of Care Review  Outcome: Progressing  Goal: Patient-Specific Goal (Individualized)  Outcome: Progressing  Goal: Absence of Hospital-Acquired Illness or Injury  Outcome: Progressing  Goal: Optimal Comfort and Wellbeing  Outcome: Progressing  Goal: Readiness for Transition of Care  Outcome: Progressing     Problem: Diabetes Comorbidity  Goal: Blood Glucose Level Within Targeted Range  Outcome: Progressing     Problem: Acute Kidney Injury/Impairment  Goal: Fluid and Electrolyte Balance  Outcome: Progressing  Goal: Improved Oral Intake  Outcome: Progressing  Goal: Effective Renal Function  Outcome: Progressing     Problem: Diabetes  Goal: Optimal Coping  Outcome: Progressing  Goal: Optimal Functional Ability  Outcome: Progressing  Goal: Blood Glucose Level Within Target Range  Outcome: Progressing  Goal: Minimize Risk of Hypoglycemia  Outcome: Progressing     Problem: Fall Injury Risk  Goal: Absence of Fall and Fall-Related Injury  Outcome: Progressing  Pt Aox4, no PRNs given. Call light within reach, safety measures in place, rounded per facility.

## 2024-10-20 NOTE — PROGRESS NOTES
Patient given discharge instructions and home medication regimen. Patients home medication delivered to bedside. Patient informed of follow up appointments and given a copy of discharge instructions with follow up appointments listed. Patient's telemetry monitor and peripheral IV discontinued. Patient discharged to a shelter per MD orders with all personal belongings.

## 2024-10-20 NOTE — SUBJECTIVE & OBJECTIVE
Interval History: NAOE, VSS. Pt was resting comfortably. No episodes of agitation overnight. Pt eating, glucose up to 160. Plan to restart aspart 3 units TID and Glargine 8 units daily. Pending dispo to shelter.     Review of Systems  Objective:     Vital Signs (Most Recent):  Temp: 99.2 °F (37.3 °C) (10/20/24 1205)  Pulse: 97 (10/20/24 1205)  Resp: 18 (10/20/24 1205)  BP: 106/64 (10/20/24 1205)  SpO2: 98 % (10/20/24 1205) Vital Signs (24h Range):  Temp:  [98.3 °F (36.8 °C)-99.4 °F (37.4 °C)] 99.2 °F (37.3 °C)  Pulse:  [] 97  Resp:  [15-18] 18  SpO2:  [97 %-100 %] 98 %  BP: (100-131)/(62-77) 106/64     Weight: 63.5 kg (140 lb)  Body mass index is 21.93 kg/m².    Intake/Output Summary (Last 24 hours) at 10/20/2024 1248  Last data filed at 10/19/2024 1729  Gross per 24 hour   Intake 220 ml   Output --   Net 220 ml         Physical Exam  Vitals reviewed.   Constitutional:       Appearance: She is not ill-appearing.   HENT:      Head: Normocephalic and atraumatic.   Cardiovascular:      Rate and Rhythm: Normal rate and regular rhythm.      Pulses: Normal pulses.      Heart sounds: No murmur heard.  Pulmonary:      Breath sounds: Normal breath sounds. No wheezing.   Abdominal:      General: Bowel sounds are normal.      Palpations: Abdomen is soft.      Tenderness: There is no abdominal tenderness. There is no guarding.   Musculoskeletal:         General: No swelling.   Skin:     General: Skin is warm.             Significant Labs: All pertinent labs within the past 24 hours have been reviewed.    Significant Imaging: I have reviewed all pertinent imaging results/findings within the past 24 hours.
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
## 2024-10-20 NOTE — ASSESSMENT & PLAN NOTE
Patient reports recent (within 3 days) history of nausea, vomiting, and both constipation and diarrhea.     Patient's FSGs are uncontrolled due to hypoglycemia on current medication regimen.  Last A1c reviewed-   Lab Results   Component Value Date    HGBA1C 10.7 (H) 09/22/2024     Most recent fingerstick glucose reviewed-   Recent Labs   Lab 10/19/24  1636 10/19/24  2106 10/20/24  0837 10/20/24  1201   POCTGLUCOSE 112* 79 222* 169*       Current correctional scale   Not rescribed until patient is euglycemic.  D10 infusion.   Maintain anti-hyperglycemic dose as follows-   Antihyperglycemics (From admission, onward)    Start     Stop Route Frequency Ordered    10/19/24 1612  insulin aspart U-100 pen 0-5 Units         -- SubQ Before meals & nightly PRN 10/19/24 1513    10/18/24 1400  insulin aspart U-100 pen 2 Units         -- SubQ 3 times daily with meals 10/18/24 1345    10/18/24 1255  insulin aspart U-100 pen 0-5 Units         -- SubQ Before meals & nightly PRN 10/18/24 1156    10/18/24 1151  insulin glargine U-100 (Lantus) pen 8 Units         -- SubQ Daily 10/18/24 1151        Hold Oral hypoglycemics while patient is in the hospital.    - glucose checks q1 hr  - if patient sustains glucose >100 w/o any glucose bolus, then give reduced insulin for 6 units lantus and 2 units with meals and low dose sliding scale   - if >150, turn off drip   - BMP q4
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
## 2024-10-24 LAB — T PALLIDUM AB SER QL IF: ABNORMAL
